# Patient Record
Sex: FEMALE | Race: BLACK OR AFRICAN AMERICAN | NOT HISPANIC OR LATINO | Employment: OTHER | URBAN - METROPOLITAN AREA
[De-identification: names, ages, dates, MRNs, and addresses within clinical notes are randomized per-mention and may not be internally consistent; named-entity substitution may affect disease eponyms.]

---

## 2023-03-25 PROBLEM — H52.203 MYOPIA OF BOTH EYES WITH ASTIGMATISM AND PRESBYOPIA: Status: ACTIVE | Noted: 2023-03-25

## 2023-03-25 PROBLEM — E66.812 OBESITY, CLASS II, BMI 35-39.9: Status: ACTIVE | Noted: 2023-03-25

## 2023-03-25 PROBLEM — S81.801A WOUND OF RIGHT LEG: Status: ACTIVE | Noted: 2023-03-25

## 2023-03-25 PROBLEM — D17.9 LIPOMA: Status: ACTIVE | Noted: 2023-03-25

## 2023-03-25 PROBLEM — I50.9 CHF, CHRONIC (MULTI): Status: ACTIVE | Noted: 2023-03-25

## 2023-03-25 PROBLEM — R80.9 MICROALBUMINURIA: Status: ACTIVE | Noted: 2023-03-25

## 2023-03-25 PROBLEM — R07.9 CHEST PAIN AT REST: Status: ACTIVE | Noted: 2023-03-25

## 2023-03-25 PROBLEM — R53.83 FATIGUE: Status: ACTIVE | Noted: 2023-03-25

## 2023-03-25 PROBLEM — N90.89 SKIN TAG OF LABIA: Status: ACTIVE | Noted: 2023-03-25

## 2023-03-25 PROBLEM — I25.10 CAD (CORONARY ARTERY DISEASE): Status: ACTIVE | Noted: 2023-03-25

## 2023-03-25 PROBLEM — M25.562 KNEE PAIN, BILATERAL: Status: ACTIVE | Noted: 2023-03-25

## 2023-03-25 PROBLEM — M13.0 POLYARTHRITIS: Status: ACTIVE | Noted: 2023-03-25

## 2023-03-25 PROBLEM — R05.3 COUGH, PERSISTENT: Status: ACTIVE | Noted: 2023-03-25

## 2023-03-25 PROBLEM — L28.0 LICHEN SIMPLEX CHRONICUS: Status: ACTIVE | Noted: 2023-03-25

## 2023-03-25 PROBLEM — L85.3 XEROSIS OF SKIN: Status: ACTIVE | Noted: 2023-03-25

## 2023-03-25 PROBLEM — H52.4 MYOPIA OF BOTH EYES WITH ASTIGMATISM AND PRESBYOPIA: Status: ACTIVE | Noted: 2023-03-25

## 2023-03-25 PROBLEM — I25.10 ARTERIOSCLEROTIC CARDIOVASCULAR DISEASE (ASCVD): Status: ACTIVE | Noted: 2023-03-25

## 2023-03-25 PROBLEM — G50.1 ATYPICAL FACE PAIN: Status: ACTIVE | Noted: 2023-03-25

## 2023-03-25 PROBLEM — G89.4 PAIN SYNDROME, CHRONIC: Status: ACTIVE | Noted: 2023-03-25

## 2023-03-25 PROBLEM — L28.0 NEURODERMATITIS: Status: ACTIVE | Noted: 2023-03-25

## 2023-03-25 PROBLEM — R60.0 BILATERAL EDEMA OF LOWER EXTREMITY: Status: ACTIVE | Noted: 2023-03-25

## 2023-03-25 PROBLEM — E55.9 VITAMIN D DEFICIENCY, UNSPECIFIED: Status: ACTIVE | Noted: 2023-03-25

## 2023-03-25 PROBLEM — N76.0 VAGINITIS: Status: ACTIVE | Noted: 2023-03-25

## 2023-03-25 PROBLEM — D25.9 FIBROID UTERUS: Status: ACTIVE | Noted: 2023-03-25

## 2023-03-25 PROBLEM — G47.00 INSOMNIA: Status: ACTIVE | Noted: 2023-03-25

## 2023-03-25 PROBLEM — R06.02 SHORTNESS OF BREATH: Status: ACTIVE | Noted: 2023-03-25

## 2023-03-25 PROBLEM — F32.A DEPRESSION: Status: ACTIVE | Noted: 2023-03-25

## 2023-03-25 PROBLEM — R47.9 DIFFICULTY SPEAKING: Status: ACTIVE | Noted: 2023-03-25

## 2023-03-25 PROBLEM — M25.562 LATERAL KNEE PAIN, LEFT: Status: ACTIVE | Noted: 2023-03-25

## 2023-03-25 PROBLEM — G47.33 OBSTRUCTIVE SLEEP APNEA: Status: ACTIVE | Noted: 2023-03-25

## 2023-03-25 PROBLEM — S91.309A WOUND OF FOOT: Status: ACTIVE | Noted: 2023-03-25

## 2023-03-25 PROBLEM — E11.29 TYPE II OR UNSPECIFIED TYPE DIABETES MELLITUS WITH RENAL MANIFESTATIONS, UNCONTROLLED(250.42) (MULTI): Status: ACTIVE | Noted: 2023-03-25

## 2023-03-25 PROBLEM — S91.301A WOUND OF RIGHT FOOT: Status: ACTIVE | Noted: 2023-03-25

## 2023-03-25 PROBLEM — D25.9 UTERINE FIBROID: Status: ACTIVE | Noted: 2023-03-25

## 2023-03-25 PROBLEM — K21.9 GERD (GASTROESOPHAGEAL REFLUX DISEASE): Status: ACTIVE | Noted: 2023-03-25

## 2023-03-25 PROBLEM — L97.909 DIABETIC LEG ULCER (MULTI): Status: ACTIVE | Noted: 2023-03-25

## 2023-03-25 PROBLEM — R06.09 DYSPNEA ON EXERTION: Status: ACTIVE | Noted: 2023-03-25

## 2023-03-25 PROBLEM — A59.9 TRICHIMONIASIS: Status: ACTIVE | Noted: 2023-03-25

## 2023-03-25 PROBLEM — R23.4: Status: ACTIVE | Noted: 2023-03-25

## 2023-03-25 PROBLEM — E66.01 MORBID OBESITY (MULTI): Status: ACTIVE | Noted: 2023-03-25

## 2023-03-25 PROBLEM — R19.5 LOOSE STOOLS: Status: ACTIVE | Noted: 2023-03-25

## 2023-03-25 PROBLEM — Z86.2 HISTORY OF ANEMIA: Status: ACTIVE | Noted: 2023-03-25

## 2023-03-25 PROBLEM — E66.9 OBESITY, CLASS II, BMI 35-39.9: Status: ACTIVE | Noted: 2023-03-25

## 2023-03-25 PROBLEM — N13.4 HYDROURETER ON LEFT: Status: ACTIVE | Noted: 2023-03-25

## 2023-03-25 PROBLEM — B37.31 VAGINAL CANDIDA: Status: ACTIVE | Noted: 2023-03-25

## 2023-03-25 PROBLEM — Z98.84 BARIATRIC SURGERY STATUS: Status: ACTIVE | Noted: 2023-03-25

## 2023-03-25 PROBLEM — R60.0 LOCALIZED EDEMA: Status: ACTIVE | Noted: 2023-03-25

## 2023-03-25 PROBLEM — R25.2 CRAMP AND SPASM: Status: ACTIVE | Noted: 2023-03-25

## 2023-03-25 PROBLEM — L30.8 PRURITIC DERMATITIS: Status: ACTIVE | Noted: 2023-03-25

## 2023-03-25 PROBLEM — E04.1 THYROID NODULE: Status: ACTIVE | Noted: 2023-03-25

## 2023-03-25 PROBLEM — R06.83 SNORING: Status: ACTIVE | Noted: 2023-03-25

## 2023-03-25 PROBLEM — E11.622 DIABETIC LEG ULCER (MULTI): Status: ACTIVE | Noted: 2023-03-25

## 2023-03-25 PROBLEM — E11.65 TYPE II OR UNSPECIFIED TYPE DIABETES MELLITUS WITH RENAL MANIFESTATIONS, UNCONTROLLED(250.42) (MULTI): Status: ACTIVE | Noted: 2023-03-25

## 2023-03-25 PROBLEM — N93.9 ABNORMAL UTERINE BLEEDING: Status: ACTIVE | Noted: 2023-03-25

## 2023-03-25 PROBLEM — G60.9 IDIOPATHIC PERIPHERAL NEUROPATHY: Status: ACTIVE | Noted: 2023-03-25

## 2023-03-25 PROBLEM — F41.9 ANXIETY: Status: ACTIVE | Noted: 2023-03-25

## 2023-03-25 PROBLEM — H40.1190 PRIMARY OPEN ANGLE GLAUCOMA: Status: ACTIVE | Noted: 2023-03-25

## 2023-03-25 PROBLEM — R29.6 MULTIPLE FALLS: Status: ACTIVE | Noted: 2023-03-25

## 2023-03-25 PROBLEM — R79.89 ELEVATED PARATHYROID HORMONE: Status: ACTIVE | Noted: 2023-03-25

## 2023-03-25 PROBLEM — L40.9 PSORIASIS: Status: ACTIVE | Noted: 2023-03-25

## 2023-03-25 PROBLEM — S81.802A WOUND OF LEFT LEG: Status: ACTIVE | Noted: 2023-03-25

## 2023-03-25 PROBLEM — L81.9 DISCOLORATION OF SKIN OF LOWER LEG: Status: ACTIVE | Noted: 2023-03-25

## 2023-03-25 PROBLEM — R21 RASH/SKIN ERUPTION: Status: ACTIVE | Noted: 2023-03-25

## 2023-03-25 PROBLEM — M25.561 KNEE PAIN, BILATERAL: Status: ACTIVE | Noted: 2023-03-25

## 2023-03-25 PROBLEM — G47.30 SLEEP DISORDER BREATHING: Status: ACTIVE | Noted: 2023-03-25

## 2023-03-25 PROBLEM — G47.30 SLEEP APNEA: Status: ACTIVE | Noted: 2023-03-25

## 2023-03-25 PROBLEM — D25.9 LEIOMYOMA OF UTERUS: Status: ACTIVE | Noted: 2023-03-25

## 2023-03-25 PROBLEM — H33.41 RETINAL DETACHMENT, TRACTIONAL, RIGHT EYE: Status: ACTIVE | Noted: 2023-03-25

## 2023-03-25 PROBLEM — K76.0 NAFLD (NONALCOHOLIC FATTY LIVER DISEASE): Status: ACTIVE | Noted: 2023-03-25

## 2023-03-25 PROBLEM — M79.7 FIBROMYALGIA: Status: ACTIVE | Noted: 2023-03-25

## 2023-03-25 PROBLEM — Z86.39 HISTORY OF UNCONTROLLED DIABETES: Status: ACTIVE | Noted: 2023-03-25

## 2023-03-25 PROBLEM — H52.13 MYOPIA OF BOTH EYES WITH ASTIGMATISM AND PRESBYOPIA: Status: ACTIVE | Noted: 2023-03-25

## 2023-03-25 PROBLEM — E66.01 PSYCHOLOGICAL FACTORS AFFECTING MORBID OBESITY (MULTI): Status: ACTIVE | Noted: 2023-03-25

## 2023-03-25 PROBLEM — R30.0 DYSURIA: Status: ACTIVE | Noted: 2023-03-25

## 2023-03-25 PROBLEM — K59.09 OTHER CONSTIPATION: Status: ACTIVE | Noted: 2023-03-25

## 2023-03-25 PROBLEM — I73.9 PERIPHERAL VASCULAR DISEASE (CMS-HCC): Status: ACTIVE | Noted: 2023-03-25

## 2023-03-25 PROBLEM — H40.1131 PRIMARY OPEN ANGLE GLAUCOMA OF BOTH EYES, MILD STAGE: Status: ACTIVE | Noted: 2023-03-25

## 2023-03-25 PROBLEM — F54 PSYCHOLOGICAL FACTORS AFFECTING MORBID OBESITY (MULTI): Status: ACTIVE | Noted: 2023-03-25

## 2023-03-25 PROBLEM — L60.3 DYSTROPHIA UNGUIUM: Status: ACTIVE | Noted: 2023-03-25

## 2023-03-25 PROBLEM — Z95.5 PRESENCE OF DRUG COATED STENT IN RIGHT CORONARY ARTERY: Status: ACTIVE | Noted: 2023-03-25

## 2023-03-25 PROBLEM — I50.30 (HFPEF) HEART FAILURE WITH PRESERVED EJECTION FRACTION (MULTI): Status: ACTIVE | Noted: 2023-03-25

## 2023-03-25 PROBLEM — K46.0 ABDOMINAL HERNIA WITH OBSTRUCTION AND WITHOUT GANGRENE: Status: ACTIVE | Noted: 2023-03-25

## 2023-03-25 PROBLEM — E87.6 HYPOKALEMIA: Status: ACTIVE | Noted: 2023-03-25

## 2023-03-25 PROBLEM — R63.4 WEIGHT LOSS, UNINTENTIONAL: Status: ACTIVE | Noted: 2023-03-25

## 2023-03-25 PROBLEM — R10.2 FEMALE PELVIC PAIN: Status: ACTIVE | Noted: 2023-03-25

## 2023-03-25 PROBLEM — I20.0 UNSTABLE ANGINA (MULTI): Status: ACTIVE | Noted: 2023-03-25

## 2023-03-25 PROBLEM — K63.5 COLON POLYPS: Status: ACTIVE | Noted: 2023-03-25

## 2023-03-25 PROBLEM — F11.90 CHRONIC, CONTINUOUS USE OF OPIOIDS: Status: ACTIVE | Noted: 2023-03-25

## 2023-03-25 PROBLEM — I21.4 NSTEMI, INITIAL EPISODE OF CARE (MULTI): Status: ACTIVE | Noted: 2023-03-25

## 2023-03-25 PROBLEM — H35.61 RETINAL HEMORRHAGE OF RIGHT EYE: Status: ACTIVE | Noted: 2023-03-25

## 2023-03-25 PROBLEM — R51.9 HEADACHE: Status: ACTIVE | Noted: 2023-03-25

## 2023-03-25 PROBLEM — M54.50 LOWER BACK PAIN: Status: ACTIVE | Noted: 2023-03-25

## 2023-03-25 PROBLEM — N95.0 POSTMENOPAUSAL BLEEDING: Status: ACTIVE | Noted: 2023-03-25

## 2023-03-25 PROBLEM — N39.0 LOWER URINARY TRACT INFECTIOUS DISEASE: Status: ACTIVE | Noted: 2023-03-25

## 2023-03-25 PROBLEM — I10 HYPERTENSION, UNCONTROLLED: Status: ACTIVE | Noted: 2023-03-25

## 2023-03-25 PROBLEM — I16.0 ASYMPTOMATIC HYPERTENSIVE URGENCY: Status: ACTIVE | Noted: 2023-03-25

## 2023-03-25 PROBLEM — K31.84 GASTROPARESIS: Status: ACTIVE | Noted: 2023-03-25

## 2023-03-25 PROBLEM — Z98.61 S/P PTCA (PERCUTANEOUS TRANSLUMINAL CORONARY ANGIOPLASTY): Status: ACTIVE | Noted: 2023-03-25

## 2023-03-25 RX ORDER — ATORVASTATIN CALCIUM 80 MG/1
1 TABLET, FILM COATED ORAL DAILY
COMMUNITY
Start: 2015-10-13 | End: 2023-05-01 | Stop reason: SDUPTHER

## 2023-03-25 RX ORDER — NIFEDIPINE 90 MG/1
TABLET, FILM COATED, EXTENDED RELEASE ORAL
COMMUNITY
Start: 2023-01-19 | End: 2023-09-24 | Stop reason: ALTCHOICE

## 2023-03-25 RX ORDER — IBUPROFEN 200 MG
CAPSULE ORAL
COMMUNITY
Start: 2019-04-02 | End: 2023-05-11 | Stop reason: SDUPTHER

## 2023-03-25 RX ORDER — CARVEDILOL 25 MG/1
1 TABLET ORAL 2 TIMES DAILY
COMMUNITY
Start: 2018-09-14 | End: 2023-09-24 | Stop reason: ALTCHOICE

## 2023-03-25 RX ORDER — METOCLOPRAMIDE 5 MG/1
5 TABLET ORAL 2 TIMES DAILY
COMMUNITY
Start: 2021-03-05 | End: 2023-09-24 | Stop reason: ALTCHOICE

## 2023-03-25 RX ORDER — NITROGLYCERIN 0.4 MG/1
TABLET SUBLINGUAL
COMMUNITY
Start: 2016-07-20

## 2023-03-25 RX ORDER — FUROSEMIDE 40 MG/1
1 TABLET ORAL DAILY
COMMUNITY
Start: 2018-06-18 | End: 2023-05-01 | Stop reason: SDUPTHER

## 2023-03-25 RX ORDER — BRIMONIDINE TARTRATE 2 MG/ML
1 SOLUTION/ DROPS OPHTHALMIC 2 TIMES DAILY
COMMUNITY
Start: 2017-01-21 | End: 2023-09-24 | Stop reason: ALTCHOICE

## 2023-03-25 RX ORDER — DULOXETIN HYDROCHLORIDE 30 MG/1
30 CAPSULE, DELAYED RELEASE ORAL 2 TIMES DAILY
COMMUNITY
End: 2024-03-22

## 2023-03-25 RX ORDER — ONDANSETRON 4 MG/1
TABLET, ORALLY DISINTEGRATING ORAL
COMMUNITY
Start: 2021-07-13 | End: 2023-09-24 | Stop reason: ALTCHOICE

## 2023-03-25 RX ORDER — HYDROXYZINE HYDROCHLORIDE 25 MG/1
25 TABLET, FILM COATED ORAL 2 TIMES DAILY PRN
Status: ON HOLD | COMMUNITY
Start: 2016-02-19 | End: 2024-06-08

## 2023-03-25 RX ORDER — INSULIN LISPRO 100 [IU]/ML
INJECTION, SOLUTION INTRAVENOUS; SUBCUTANEOUS
COMMUNITY
Start: 2020-11-08 | End: 2023-09-24 | Stop reason: ALTCHOICE

## 2023-03-25 RX ORDER — NAPROXEN SODIUM 220 MG/1
81 TABLET, FILM COATED ORAL
COMMUNITY
Start: 2018-01-20 | End: 2023-05-01 | Stop reason: SDUPTHER

## 2023-03-25 RX ORDER — AMMONIUM LACTATE 5 %
LOTION (GRAM) TOPICAL
COMMUNITY
Start: 2016-02-19 | End: 2023-09-24 | Stop reason: ALTCHOICE

## 2023-03-25 RX ORDER — LOSARTAN POTASSIUM 100 MG/1
1 TABLET ORAL DAILY
COMMUNITY
Start: 2019-01-15 | End: 2023-05-01 | Stop reason: SDUPTHER

## 2023-03-25 RX ORDER — MUPIROCIN 20 MG/G
OINTMENT TOPICAL
COMMUNITY
Start: 2017-07-13 | End: 2023-09-24 | Stop reason: ALTCHOICE

## 2023-03-25 RX ORDER — NALOXONE HYDROCHLORIDE 4 MG/.1ML
4 SPRAY NASAL AS NEEDED
COMMUNITY
End: 2024-03-21 | Stop reason: ALTCHOICE

## 2023-03-25 RX ORDER — DOCUSATE SODIUM 100 MG/1
100 CAPSULE, LIQUID FILLED ORAL 2 TIMES DAILY PRN
COMMUNITY
Start: 2021-03-05 | End: 2023-09-24 | Stop reason: ALTCHOICE

## 2023-03-25 RX ORDER — LATANOPROST 50 UG/ML
1 SOLUTION/ DROPS OPHTHALMIC NIGHTLY
COMMUNITY
Start: 2017-01-21 | End: 2023-10-20 | Stop reason: SDUPTHER

## 2023-03-25 RX ORDER — HEATING PAD
EACH MISCELLANEOUS
COMMUNITY
Start: 2020-12-21 | End: 2023-09-24 | Stop reason: ALTCHOICE

## 2023-03-25 RX ORDER — INSULIN GLARGINE 300 U/ML
INJECTION, SOLUTION SUBCUTANEOUS
COMMUNITY
Start: 2016-03-04 | End: 2023-09-24 | Stop reason: ALTCHOICE

## 2023-03-25 RX ORDER — TRAZODONE HYDROCHLORIDE 50 MG/1
1 TABLET ORAL NIGHTLY PRN
COMMUNITY
Start: 2016-11-28 | End: 2023-09-24 | Stop reason: ALTCHOICE

## 2023-03-25 RX ORDER — LYSINE HCL 500 MG
2 TABLET ORAL DAILY
COMMUNITY
Start: 2017-04-05 | End: 2023-05-01 | Stop reason: SDUPTHER

## 2023-03-25 RX ORDER — OXYCODONE AND ACETAMINOPHEN 5; 325 MG/1; MG/1
1 TABLET ORAL DAILY PRN
COMMUNITY
Start: 2016-06-15 | End: 2023-03-30 | Stop reason: SDUPTHER

## 2023-03-25 RX ORDER — LANCETS
1 EACH MISCELLANEOUS 2 TIMES DAILY
COMMUNITY
End: 2023-09-24 | Stop reason: ALTCHOICE

## 2023-03-27 ENCOUNTER — PATIENT OUTREACH (OUTPATIENT)
Dept: CARE COORDINATION | Facility: CLINIC | Age: 63
End: 2023-03-27
Payer: COMMERCIAL

## 2023-03-29 ENCOUNTER — PATIENT OUTREACH (OUTPATIENT)
Dept: CARE COORDINATION | Facility: CLINIC | Age: 63
End: 2023-03-29
Payer: COMMERCIAL

## 2023-03-30 ENCOUNTER — OFFICE VISIT (OUTPATIENT)
Dept: PRIMARY CARE | Facility: CLINIC | Age: 63
End: 2023-03-30
Payer: COMMERCIAL

## 2023-03-30 VITALS
DIASTOLIC BLOOD PRESSURE: 65 MMHG | TEMPERATURE: 96.3 F | SYSTOLIC BLOOD PRESSURE: 127 MMHG | BODY MASS INDEX: 39.69 KG/M2 | HEART RATE: 75 BPM | HEIGHT: 59 IN | OXYGEN SATURATION: 96 %

## 2023-03-30 DIAGNOSIS — F11.90 OPIOID USE: Primary | ICD-10-CM

## 2023-03-30 DIAGNOSIS — G89.4 CHRONIC PAIN SYNDROME: ICD-10-CM

## 2023-03-30 PROCEDURE — 3046F HEMOGLOBIN A1C LEVEL >9.0%: CPT | Performed by: STUDENT IN AN ORGANIZED HEALTH CARE EDUCATION/TRAINING PROGRAM

## 2023-03-30 PROCEDURE — 3074F SYST BP LT 130 MM HG: CPT | Performed by: STUDENT IN AN ORGANIZED HEALTH CARE EDUCATION/TRAINING PROGRAM

## 2023-03-30 PROCEDURE — 3066F NEPHROPATHY DOC TX: CPT | Performed by: STUDENT IN AN ORGANIZED HEALTH CARE EDUCATION/TRAINING PROGRAM

## 2023-03-30 PROCEDURE — 3008F BODY MASS INDEX DOCD: CPT | Performed by: STUDENT IN AN ORGANIZED HEALTH CARE EDUCATION/TRAINING PROGRAM

## 2023-03-30 PROCEDURE — 3078F DIAST BP <80 MM HG: CPT | Performed by: STUDENT IN AN ORGANIZED HEALTH CARE EDUCATION/TRAINING PROGRAM

## 2023-03-30 PROCEDURE — 99214 OFFICE O/P EST MOD 30 MIN: CPT | Performed by: STUDENT IN AN ORGANIZED HEALTH CARE EDUCATION/TRAINING PROGRAM

## 2023-03-30 PROCEDURE — 4010F ACE/ARB THERAPY RXD/TAKEN: CPT | Performed by: STUDENT IN AN ORGANIZED HEALTH CARE EDUCATION/TRAINING PROGRAM

## 2023-03-30 RX ORDER — OXYCODONE AND ACETAMINOPHEN 5; 325 MG/1; MG/1
1 TABLET ORAL EVERY 12 HOURS PRN
Qty: 28 TABLET | Refills: 0 | Status: SHIPPED | OUTPATIENT
Start: 2023-03-30 | End: 2023-04-13

## 2023-03-30 ASSESSMENT — PAIN SCALES - GENERAL: PAINLEVEL: 10-WORST PAIN EVER

## 2023-03-30 NOTE — PROGRESS NOTES
"Subjective   Patient ID: Nelida Mata is a 63 y.o. female who presents to family medicine clinic for hospital Follow-up s/p acute exacerbation of acute heart failure due to missed diuretic dose.     HPI     Patient was admitted to hospital 18-March-2023 for acute exacerbation of heart failure due to missed diurectic dose, accompanied by severe b/l lower extremity edema. Patient reports greatly improved lower extremity swelling since discharge.     Patient reports mild \"puffiness\" of feet and ankles which she reports is at her baseline from prior to most recent hospitalization. She denies cough. She reports limited ability to tolerate supine position, requiring her to sleep elevated with 3 pillows, however, she reports this was her baseline prior to most recent hospitalization.    Patient reporting significant pain in abdomen from uterine fibroids, lower back, and around pelvis from chronic pelvic pain. She reports \"They only gave me 14 Oxycodones at discharge which is not going to be enough for me. I can't take this pain no more.\"     Confirmed with patient she IS taking Furosemide BID, and NOT taking Nifedipine or Trazodone, as per discharge instructions.     Review of Systems negative except for above.    Objective   /65 (BP Location: Right arm, Patient Position: Sitting, BP Cuff Size: Adult)   Pulse 75   Temp 35.7 °C (96.3 °F)   Ht 1.499 m (4' 11\")   SpO2 96%   BMI 39.69 kg/m²     Physical Exam  General: Obese patient sitting in chair in no acute distress, though reports significant pain.  Lungs: CTAB, no increased work of breathing noted.  Heart: Systolic murmur with loud S2 sound present on ausculation.   Lower Extremity Exam: bilateral generalized mild edema of feet, ankles, and shins. 1+ pitting edema bilaterally with mild overlying erythema of skin.     Assessment/Plan   Nelida Mata is a 63 y.o. female presenting to family medicine clinic for hospital follow-up s/p acute " Noted.   exacerbation of acute heart failure. Patient showing improvement of lower extremity edema and is back to baseline, however, patient struggling with significant chronic pain issues and is need of Rx refill.     #Bilateral lower extremity edema  Likely 2/2 to hx of CHF  Last echo 3/23 with Normal EF diastolic dysfunction   - Back to pre-hospitalization baseline per assessment in clinic today   - Recommend continuing current regimen and following up with cardiology and usual care team.     #Chronic pain  Likely 2/2 to hx of fibroid uterus   - Rx Oxycodone/Acetaminophen 5/325 q12h for 14 days until patient can be seen by  who is the primary care provider managing chronic pain and narcotic Rx.   - PDMP reviewed  - encouraged Obgyn follow up for consideration of surgical management  - encoruaged vascular fu with anticoagulant management post surgical      Patient seen and discussed with resident Dr. Blanca Holguni and attending Dr. Africa Godwin, medical student    I saw and evaluated the patient. I personally obtained the key and critical portions of the history and physical exam. I reviewed and modified the student's documentation of the HPI and discussed the patient with the medical student. I agree with the above documentation of the HPI.    Blanca Holguin MD  Family Medicine, PGY-2

## 2023-03-31 ENCOUNTER — APPOINTMENT (OUTPATIENT)
Dept: PRIMARY CARE | Facility: CLINIC | Age: 63
End: 2023-03-31
Payer: COMMERCIAL

## 2023-03-31 DIAGNOSIS — L28.0 LICHEN SIMPLEX CHRONICUS: Primary | ICD-10-CM

## 2023-03-31 LAB
HAPTOGLOBIN (MG/DL) IN SER/PLAS: 244 MG/DL (ref 30–200)
LACTATE DEHYDROGENASE (U/L) IN SER/PLAS BY LAC->PYR RXN: 200 U/L (ref 84–246)

## 2023-03-31 RX ORDER — TRIAMCINOLONE ACETONIDE 1 MG/G
OINTMENT TOPICAL 2 TIMES DAILY PRN
Qty: 60 G | Refills: 0 | Status: SHIPPED | OUTPATIENT
Start: 2023-03-31 | End: 2023-07-29

## 2023-03-31 NOTE — PROGRESS NOTES
I saw and evaluated the patient. I personally obtained the key and critical portions of the history and physical exam or was physically present for key and critical portions performed by the resident/fellow. I reviewed the resident/fellow's documentation and discussed the patient with the resident/fellow. I agree with the resident/fellow's medical decision making as documented in the note with the exception/addition of the following:  She is advised to take an extra dose of diuretic (furosemide) if she starts to develop increased edema, and to contact her physician's office then.  She recognized this advice from prior encounters, but had not followed it prior to her recent hospitalization.  Had missed some doses of diuretic.    She has had chronic pain opioid agreements for lower abdominal (fibroid uterus) pain intermittently since at least 2018.  Recently, was not a surgical candidate because of SVC thrombus, anticoagulated.  She has upcoming consultation with vascular medicine regarding duration of anticoagulation.  (They have recommended indefinite for now.)  Dr. Ventura's office note says that opioid agreement was renewed in October 2022, although the AEMR seems to contain a most recent opioid consent from October 2021.   WE decided to renew the stable dose of oxycodone 5 mg BID prn for 1 month, until her upcoming visit with her primary prescribing physician, Dr. Ventura.    UDS ordered to F/U on positive screen for barbiturates in past.   She denies use of any illicit drugs or non-prescribed pills.     Lichen simplex chronicus:  She has serpiginous, purplish-brown skin lesions on, for example, her r. Shoulder and chest.  Today there are small ulcers related to excoriations, but no yellowish crusts nor thick scale.  The lesions were previously biopsied, consistent with either eczema or psoriasis.  Dermatology consultation gave a diagnosis of lichen simplex chronicus.  She would like a refill of topical steroid  cream to reduce the itching.  I agree.     .          Africa Joe MD

## 2023-04-02 LAB
IMMUNOGLOBULIN LIGHT CHAINS KAPPA/LAMBDA (MASS RATIO) IN SERUM: 2.1 (ref 0.26–1.65)
IMMUNOGLOBULIN LIGHT CHAINS.KAPPA (MG/DL) IN SERUM: 4.39 MG/DL (ref 0.33–1.94)
IMMUNOGLOBULIN LIGHT CHAINS.LAMBDA (MG/DL) IN SERUM: 2.09 MG/DL (ref 0.57–2.63)
Lab: NORMAL
PNH RESULTS: NEGATIVE
PV191: NORMAL

## 2023-04-06 LAB
ALBUMIN ELP: 3.8 G/DL (ref 3.4–5)
ALPHA 1: 0.3 G/DL (ref 0.2–0.6)
ALPHA 2: 1 G/DL (ref 0.4–1.1)
BETA: 0.8 G/DL (ref 0.5–1.2)
GAMMA GLOBULIN: 1.1 G/DL (ref 0.5–1.4)
PATH REVIEW - SERUM IMMUNOFIXATION: NORMAL
PATH REVIEW-SERUM PROTEIN ELECTROPHORESIS: NORMAL
PROTEIN ELECTROPHORESIS INTERPRETATION: NORMAL
PROTEIN TOTAL: 7 G/DL (ref 6.4–8.2)
SERUM IMMUNOFIXATION INTERPRETATION: NORMAL

## 2023-04-28 ENCOUNTER — APPOINTMENT (OUTPATIENT)
Dept: PRIMARY CARE | Facility: CLINIC | Age: 63
End: 2023-04-28
Payer: COMMERCIAL

## 2023-05-01 DIAGNOSIS — I10 HYPERTENSION, UNCONTROLLED: ICD-10-CM

## 2023-05-01 DIAGNOSIS — I25.10 ARTERIOSCLEROTIC CARDIOVASCULAR DISEASE (ASCVD): ICD-10-CM

## 2023-05-01 DIAGNOSIS — I50.32 CHRONIC HEART FAILURE WITH PRESERVED EJECTION FRACTION (MULTI): Primary | ICD-10-CM

## 2023-05-01 DIAGNOSIS — K59.04 CHRONIC IDIOPATHIC CONSTIPATION: ICD-10-CM

## 2023-05-01 DIAGNOSIS — E55.9 VITAMIN D DEFICIENCY, UNSPECIFIED: ICD-10-CM

## 2023-05-01 DIAGNOSIS — I50.32 CHRONIC HEART FAILURE WITH PRESERVED EJECTION FRACTION (MULTI): ICD-10-CM

## 2023-05-01 RX ORDER — LOSARTAN POTASSIUM 100 MG/1
100 TABLET ORAL DAILY
Qty: 90 TABLET | Refills: 3 | Status: SHIPPED | OUTPATIENT
Start: 2023-05-01 | End: 2023-05-01 | Stop reason: SDUPTHER

## 2023-05-01 RX ORDER — ATORVASTATIN CALCIUM 80 MG/1
80 TABLET, FILM COATED ORAL DAILY
Qty: 90 TABLET | Refills: 3 | Status: SHIPPED | OUTPATIENT
Start: 2023-05-01 | End: 2023-05-01 | Stop reason: SDUPTHER

## 2023-05-01 RX ORDER — NAPROXEN SODIUM 220 MG/1
81 TABLET, FILM COATED ORAL
Qty: 90 TABLET | Refills: 3 | Status: SHIPPED | OUTPATIENT
Start: 2023-05-01 | End: 2023-05-01 | Stop reason: SDUPTHER

## 2023-05-01 RX ORDER — AMOXICILLIN 250 MG
1 CAPSULE ORAL DAILY
Qty: 30 TABLET | Refills: 11 | Status: SHIPPED | OUTPATIENT
Start: 2023-05-01 | End: 2023-05-01 | Stop reason: SDUPTHER

## 2023-05-01 RX ORDER — ATORVASTATIN CALCIUM 80 MG/1
80 TABLET, FILM COATED ORAL DAILY
Qty: 90 TABLET | Refills: 3 | Status: SHIPPED | OUTPATIENT
Start: 2023-05-01 | End: 2023-05-01

## 2023-05-01 RX ORDER — LYSINE HCL 500 MG
1 TABLET ORAL DAILY
Qty: 90 EACH | Refills: 3 | Status: SHIPPED | OUTPATIENT
Start: 2023-05-01

## 2023-05-01 RX ORDER — FUROSEMIDE 40 MG/1
40 TABLET ORAL DAILY
Qty: 90 TABLET | Refills: 3 | Status: SHIPPED | OUTPATIENT
Start: 2023-05-01 | End: 2023-05-01 | Stop reason: SDUPTHER

## 2023-05-01 RX ORDER — NAPROXEN SODIUM 220 MG/1
81 TABLET, FILM COATED ORAL
Qty: 90 TABLET | Refills: 3 | Status: SHIPPED | OUTPATIENT
Start: 2023-05-01 | End: 2023-05-01

## 2023-05-01 RX ORDER — FUROSEMIDE 40 MG/1
40 TABLET ORAL DAILY
Qty: 90 TABLET | Refills: 3 | Status: SHIPPED | OUTPATIENT
Start: 2023-05-01 | End: 2023-09-24 | Stop reason: ALTCHOICE

## 2023-05-01 RX ORDER — AMOXICILLIN 250 MG
1 CAPSULE ORAL DAILY
Qty: 30 TABLET | Refills: 11 | Status: SHIPPED | OUTPATIENT
Start: 2023-05-01 | End: 2023-05-01

## 2023-05-01 RX ORDER — LYSINE HCL 500 MG
1 TABLET ORAL DAILY
Qty: 90 EACH | Refills: 3 | Status: SHIPPED | OUTPATIENT
Start: 2023-05-01 | End: 2023-05-01 | Stop reason: SDUPTHER

## 2023-05-01 RX ORDER — LOSARTAN POTASSIUM 100 MG/1
100 TABLET ORAL DAILY
Qty: 90 TABLET | Refills: 3 | Status: SHIPPED | OUTPATIENT
Start: 2023-05-01 | End: 2023-09-24 | Stop reason: ALTCHOICE

## 2023-05-01 NOTE — PROGRESS NOTES
Refilling medications per patient request:    Statin  ASA 81 mg CVD hx  ARB, loop diuretic  Ca-VitD  Senna-docusate

## 2023-05-10 ENCOUNTER — TELEPHONE (OUTPATIENT)
Dept: PRIMARY CARE | Facility: CLINIC | Age: 63
End: 2023-05-10
Payer: COMMERCIAL

## 2023-05-11 DIAGNOSIS — E11.29 TYPE II OR UNSPECIFIED TYPE DIABETES MELLITUS WITH RENAL MANIFESTATIONS, UNCONTROLLED(250.42) (MULTI): Primary | ICD-10-CM

## 2023-05-11 DIAGNOSIS — E11.65 TYPE II OR UNSPECIFIED TYPE DIABETES MELLITUS WITH RENAL MANIFESTATIONS, UNCONTROLLED(250.42) (MULTI): Primary | ICD-10-CM

## 2023-05-11 RX ORDER — IBUPROFEN 200 MG
CAPSULE ORAL
Qty: 100 STRIP | Refills: 3 | Status: SHIPPED | OUTPATIENT
Start: 2023-05-11 | End: 2023-11-20

## 2023-05-19 ENCOUNTER — APPOINTMENT (OUTPATIENT)
Dept: PRIMARY CARE | Facility: CLINIC | Age: 63
End: 2023-05-19
Payer: COMMERCIAL

## 2023-05-30 ENCOUNTER — APPOINTMENT (OUTPATIENT)
Dept: PRIMARY CARE | Facility: CLINIC | Age: 63
End: 2023-05-30
Payer: COMMERCIAL

## 2023-06-03 DIAGNOSIS — E11.65 TYPE 2 DIABETES MELLITUS WITH HYPERGLYCEMIA (MULTI): ICD-10-CM

## 2023-06-03 DIAGNOSIS — Z79.4 LONG TERM (CURRENT) USE OF INSULIN (MULTI): ICD-10-CM

## 2023-06-07 RX ORDER — INSULIN GLARGINE 300 U/ML
INJECTION, SOLUTION SUBCUTANEOUS
Refills: 5 | OUTPATIENT
Start: 2023-06-07

## 2023-08-02 ENCOUNTER — APPOINTMENT (OUTPATIENT)
Dept: PRIMARY CARE | Facility: CLINIC | Age: 63
End: 2023-08-02
Payer: COMMERCIAL

## 2023-08-16 PROBLEM — E11.29 TYPE 2 DIABETES MELLITUS WITH RENAL COMPLICATION (MULTI): Status: ACTIVE | Noted: 2023-08-16

## 2023-08-16 PROBLEM — I82.210: Status: ACTIVE | Noted: 2023-08-16

## 2023-08-16 PROBLEM — Z89.421 ACQUIRED ABSENCE OF OTHER RIGHT TOE(S) (MULTI): Status: ACTIVE | Noted: 2020-12-08

## 2023-08-16 PROBLEM — F32.9 MAJOR DEPRESSIVE DISORDER, SINGLE EPISODE: Status: ACTIVE | Noted: 2020-11-20

## 2023-08-16 PROBLEM — I11.0 HYPERTENSIVE HEART DISEASE WITH HEART FAILURE (MULTI): Status: ACTIVE | Noted: 2020-11-20

## 2023-08-16 PROBLEM — E16.2 HYPOGLYCEMIA: Status: ACTIVE | Noted: 2021-06-20

## 2023-08-16 PROBLEM — E78.49 OTHER HYPERLIPIDEMIA: Status: ACTIVE | Noted: 2021-06-22

## 2023-08-16 PROBLEM — Z01.411 ABNORMAL GYNECOLOGICAL EXAMINATION: Status: ACTIVE | Noted: 2023-08-16

## 2023-08-16 RX ORDER — OXYCODONE AND ACETAMINOPHEN 5; 325 MG/1; MG/1
2 TABLET ORAL DAILY PRN
COMMUNITY
End: 2023-10-20 | Stop reason: ALTCHOICE

## 2023-08-16 RX ORDER — TRAMADOL HYDROCHLORIDE 50 MG/1
50 TABLET ORAL EVERY 6 HOURS PRN
COMMUNITY
Start: 2012-08-25 | End: 2023-09-24 | Stop reason: ALTCHOICE

## 2023-08-16 RX ORDER — ISOSORBIDE MONONITRATE 60 MG/1
TABLET, EXTENDED RELEASE ORAL
COMMUNITY
Start: 2023-07-02 | End: 2024-02-13

## 2023-08-16 RX ORDER — NYSTATIN 100000 [USP'U]/G
1 POWDER TOPICAL 3 TIMES DAILY PRN
COMMUNITY
Start: 2022-11-17 | End: 2023-09-24 | Stop reason: ALTCHOICE

## 2023-08-16 RX ORDER — NIFEDIPINE 60 MG/1
60 TABLET, EXTENDED RELEASE ORAL DAILY
COMMUNITY
Start: 2022-10-25 | End: 2023-09-24 | Stop reason: ALTCHOICE

## 2023-08-16 RX ORDER — POLYETHYLENE GLYCOL 3350 17 G/17G
POWDER, FOR SOLUTION ORAL DAILY PRN
Status: ON HOLD | COMMUNITY
Start: 2020-10-01 | End: 2024-06-08

## 2023-08-16 RX ORDER — DULOXETIN HYDROCHLORIDE 20 MG/1
30 CAPSULE, DELAYED RELEASE ORAL 2 TIMES DAILY
COMMUNITY
Start: 2023-07-02 | End: 2023-09-24 | Stop reason: ALTCHOICE

## 2023-08-16 RX ORDER — INSULIN GLARGINE 100 [IU]/ML
INJECTION, SOLUTION SUBCUTANEOUS
COMMUNITY
Start: 2023-03-24 | End: 2023-09-24 | Stop reason: ALTCHOICE

## 2023-08-16 RX ORDER — LANCETS 30 GAUGE
EACH MISCELLANEOUS
COMMUNITY
Start: 2023-07-02 | End: 2023-09-24 | Stop reason: ALTCHOICE

## 2023-08-16 RX ORDER — ESOMEPRAZOLE MAGNESIUM 40 MG/1
CAPSULE, DELAYED RELEASE ORAL
COMMUNITY
Start: 2023-07-02 | End: 2023-09-24 | Stop reason: ALTCHOICE

## 2023-08-16 RX ORDER — DOXYCYCLINE 100 MG/1
100 CAPSULE ORAL 2 TIMES DAILY
COMMUNITY
End: 2023-09-24 | Stop reason: ALTCHOICE

## 2023-08-16 RX ORDER — HYDRALAZINE HYDROCHLORIDE 25 MG/1
25 TABLET, FILM COATED ORAL 3 TIMES DAILY
COMMUNITY
Start: 2023-07-02 | End: 2023-11-27

## 2023-08-16 RX ORDER — ISOSORBIDE MONONITRATE 120 MG/1
1 TABLET, EXTENDED RELEASE ORAL DAILY
COMMUNITY
Start: 2022-09-22 | End: 2023-09-24 | Stop reason: ALTCHOICE

## 2023-08-16 RX ORDER — PANTOPRAZOLE SODIUM 40 MG/1
40 TABLET, DELAYED RELEASE ORAL DAILY
COMMUNITY
End: 2023-12-14

## 2023-08-16 RX ORDER — MULTIVITAMIN
1 TABLET ORAL DAILY
COMMUNITY
End: 2023-09-24 | Stop reason: ALTCHOICE

## 2023-08-16 RX ORDER — ASPIRIN 81 MG/1
162 TABLET ORAL DAILY
COMMUNITY
Start: 2020-09-30 | End: 2023-10-20 | Stop reason: SDUPTHER

## 2023-08-16 RX ORDER — CLOPIDOGREL BISULFATE 75 MG/1
75 TABLET ORAL DAILY
COMMUNITY
End: 2023-09-24 | Stop reason: ALTCHOICE

## 2023-08-16 RX ORDER — LANCETS 33 GAUGE
EACH MISCELLANEOUS
COMMUNITY
Start: 2023-03-24 | End: 2023-09-24 | Stop reason: ALTCHOICE

## 2023-08-16 RX ORDER — ISOPROPYL ALCOHOL 70 ML/100ML
SWAB TOPICAL
COMMUNITY
Start: 2023-07-02 | End: 2023-09-24 | Stop reason: ALTCHOICE

## 2023-08-16 RX ORDER — LOSARTAN POTASSIUM 25 MG/1
TABLET ORAL
COMMUNITY
Start: 2023-07-02 | End: 2023-09-24 | Stop reason: ALTCHOICE

## 2023-08-16 RX ORDER — FUROSEMIDE 40 MG/1
40 TABLET ORAL 2 TIMES DAILY PRN
COMMUNITY
Start: 2020-08-17 | End: 2023-09-24 | Stop reason: ALTCHOICE

## 2023-08-16 RX ORDER — ERGOCALCIFEROL 1.25 MG/1
50000 CAPSULE ORAL
COMMUNITY
Start: 2022-11-17 | End: 2023-09-24 | Stop reason: ALTCHOICE

## 2023-08-16 RX ORDER — LOSARTAN POTASSIUM 50 MG/1
TABLET ORAL
COMMUNITY
Start: 2023-03-23 | End: 2023-09-24 | Stop reason: ALTCHOICE

## 2023-08-16 RX ORDER — FLASH GLUCOSE SCANNING READER
EACH MISCELLANEOUS
COMMUNITY
End: 2023-09-24 | Stop reason: ALTCHOICE

## 2023-08-16 RX ORDER — CLOBETASOL PROPIONATE 0.5 MG/G
1 OINTMENT TOPICAL 2 TIMES DAILY
COMMUNITY
Start: 2022-11-17 | End: 2023-09-24 | Stop reason: ALTCHOICE

## 2023-09-11 PROBLEM — F33.40 RECURRENT MAJOR DEPRESSIVE DISORDER, IN REMISSION (CMS-HCC): Chronic | Status: ACTIVE | Noted: 2023-06-29

## 2023-09-11 PROBLEM — Z86.718 HISTORY OF DVT (DEEP VEIN THROMBOSIS): Chronic | Status: ACTIVE | Noted: 2023-06-29

## 2023-09-11 PROBLEM — E11.65 UNCONTROLLED TYPE 2 DIABETES MELLITUS WITH HYPERGLYCEMIA (MULTI): Status: ACTIVE | Noted: 2023-06-29

## 2023-09-11 PROBLEM — Z95.5 S/P CORONARY ARTERY STENT PLACEMENT: Chronic | Status: ACTIVE | Noted: 2023-06-29

## 2023-09-11 RX ORDER — SENNOSIDES 8.6 MG/1
8.6 TABLET ORAL DAILY
COMMUNITY
Start: 2023-07-02 | End: 2023-09-24 | Stop reason: ALTCHOICE

## 2023-09-13 ENCOUNTER — DOCUMENTATION (OUTPATIENT)
Dept: CARE COORDINATION | Facility: CLINIC | Age: 63
End: 2023-09-13
Payer: COMMERCIAL

## 2023-09-13 NOTE — PROGRESS NOTES
Admitted to [facility] 9/7/23 to 9/12/23 with STEMI.; status post cardiac cath without PCI.   PMH: HFpEF (EF 60-65% 3/2023), IDDM (A1c 9.0 in 9/2023), CAD s/p PCI w/ stent x3 (2016, 2018), SVC thrombus on Eliquis (dx 1/2023)   Refused rehab.  Home with [facility] ( start of care 1-3 days)   Home meds resumed,  Lasix reduced to 20 mg daily, start Hydral, hold Losartan.  Appts with cardiology 10/20, endocrine 10/4,  Schedule 1 week appt with PCP and with GYN.      Transition of care outreach completed by discharging facility.   Enrolled in conversa call to monitor for 30 day transition period and will outreach if issues arise.    Valentine MENDOZA RN CCM  RN Care Coordinator  [facility]  Phone [phone]

## 2023-09-18 ENCOUNTER — PATIENT OUTREACH (OUTPATIENT)
Dept: CARE COORDINATION | Facility: CLINIC | Age: 63
End: 2023-09-18
Payer: COMMERCIAL

## 2023-09-18 NOTE — PROGRESS NOTES
Attempted outreach to patient for ED visit 9/17/23 for dizziness and left sided neck pain..  Patient was directed to ED by home care nurse for bp 80's/40's; however, was  131/61 in ED.    Appt with PCP 9/19    Valentine MENDOZA RN CCM  RN Care Coordinator  [facility]  Phone [phone]

## 2023-09-19 ENCOUNTER — LAB (OUTPATIENT)
Dept: LAB | Facility: LAB | Age: 63
End: 2023-09-19
Payer: COMMERCIAL

## 2023-09-19 ENCOUNTER — OFFICE VISIT (OUTPATIENT)
Dept: PRIMARY CARE | Facility: CLINIC | Age: 63
End: 2023-09-19
Payer: COMMERCIAL

## 2023-09-19 VITALS
HEART RATE: 65 BPM | OXYGEN SATURATION: 97 % | TEMPERATURE: 97.3 F | BODY MASS INDEX: 43.26 KG/M2 | WEIGHT: 214.6 LBS | SYSTOLIC BLOOD PRESSURE: 119 MMHG | HEIGHT: 59 IN | DIASTOLIC BLOOD PRESSURE: 69 MMHG

## 2023-09-19 DIAGNOSIS — E11.65 TYPE 2 DIABETES MELLITUS WITH HYPERGLYCEMIA, WITH LONG-TERM CURRENT USE OF INSULIN (MULTI): Primary | ICD-10-CM

## 2023-09-19 DIAGNOSIS — G89.4 CHRONIC PAIN SYNDROME: ICD-10-CM

## 2023-09-19 DIAGNOSIS — I50.32 CHRONIC HEART FAILURE WITH PRESERVED EJECTION FRACTION (MULTI): ICD-10-CM

## 2023-09-19 DIAGNOSIS — Z79.4 TYPE 2 DIABETES MELLITUS WITH HYPERGLYCEMIA, WITH LONG-TERM CURRENT USE OF INSULIN (MULTI): ICD-10-CM

## 2023-09-19 DIAGNOSIS — I10 HYPERTENSION, UNCONTROLLED: ICD-10-CM

## 2023-09-19 DIAGNOSIS — F11.90 OPIOID USE: ICD-10-CM

## 2023-09-19 DIAGNOSIS — E11.65 TYPE 2 DIABETES MELLITUS WITH HYPERGLYCEMIA, WITH LONG-TERM CURRENT USE OF INSULIN (MULTI): ICD-10-CM

## 2023-09-19 DIAGNOSIS — Z79.4 TYPE 2 DIABETES MELLITUS WITH HYPERGLYCEMIA, WITH LONG-TERM CURRENT USE OF INSULIN (MULTI): Primary | ICD-10-CM

## 2023-09-19 LAB
ALBUMIN (G/DL) IN SER/PLAS: 4.2 G/DL (ref 3.4–5)
ANION GAP IN SER/PLAS: 15 MMOL/L (ref 10–20)
CALCIUM (MG/DL) IN SER/PLAS: 9.6 MG/DL (ref 8.6–10.6)
CARBON DIOXIDE, TOTAL (MMOL/L) IN SER/PLAS: 27 MMOL/L (ref 21–32)
CHLORIDE (MMOL/L) IN SER/PLAS: 104 MMOL/L (ref 98–107)
CREATININE (MG/DL) IN SER/PLAS: 2.23 MG/DL (ref 0.5–1.05)
GFR FEMALE: 24 ML/MIN/1.73M2
GLUCOSE (MG/DL) IN SER/PLAS: 288 MG/DL (ref 74–99)
PHOSPHATE (MG/DL) IN SER/PLAS: 3.9 MG/DL (ref 2.5–4.9)
POC FINGERSTICK BLOOD GLUCOSE: 265 MG/DL (ref 70–100)
POTASSIUM (MMOL/L) IN SER/PLAS: 4.5 MMOL/L (ref 3.5–5.3)
SODIUM (MMOL/L) IN SER/PLAS: 141 MMOL/L (ref 136–145)
UREA NITROGEN (MG/DL) IN SER/PLAS: 31 MG/DL (ref 6–23)

## 2023-09-19 PROCEDURE — 3052F HG A1C>EQUAL 8.0%<EQUAL 9.0%: CPT | Performed by: STUDENT IN AN ORGANIZED HEALTH CARE EDUCATION/TRAINING PROGRAM

## 2023-09-19 PROCEDURE — 80069 RENAL FUNCTION PANEL: CPT

## 2023-09-19 PROCEDURE — 99214 OFFICE O/P EST MOD 30 MIN: CPT | Performed by: STUDENT IN AN ORGANIZED HEALTH CARE EDUCATION/TRAINING PROGRAM

## 2023-09-19 PROCEDURE — 3074F SYST BP LT 130 MM HG: CPT | Performed by: STUDENT IN AN ORGANIZED HEALTH CARE EDUCATION/TRAINING PROGRAM

## 2023-09-19 PROCEDURE — 3066F NEPHROPATHY DOC TX: CPT | Performed by: STUDENT IN AN ORGANIZED HEALTH CARE EDUCATION/TRAINING PROGRAM

## 2023-09-19 PROCEDURE — 3008F BODY MASS INDEX DOCD: CPT | Performed by: STUDENT IN AN ORGANIZED HEALTH CARE EDUCATION/TRAINING PROGRAM

## 2023-09-19 PROCEDURE — 36415 COLL VENOUS BLD VENIPUNCTURE: CPT

## 2023-09-19 PROCEDURE — 82962 GLUCOSE BLOOD TEST: CPT | Performed by: STUDENT IN AN ORGANIZED HEALTH CARE EDUCATION/TRAINING PROGRAM

## 2023-09-19 PROCEDURE — 3078F DIAST BP <80 MM HG: CPT | Performed by: STUDENT IN AN ORGANIZED HEALTH CARE EDUCATION/TRAINING PROGRAM

## 2023-09-19 RX ORDER — TRAMADOL HYDROCHLORIDE 50 MG/1
50 TABLET ORAL EVERY 12 HOURS PRN
Qty: 60 TABLET | Refills: 0 | Status: SHIPPED | OUTPATIENT
Start: 2023-09-19 | End: 2023-09-19

## 2023-09-19 RX ORDER — TRAMADOL HYDROCHLORIDE 50 MG/1
50 TABLET ORAL EVERY 12 HOURS PRN
Qty: 14 TABLET | Refills: 0 | Status: SHIPPED | OUTPATIENT
Start: 2023-09-19 | End: 2023-09-26

## 2023-09-19 RX ORDER — FLASH GLUCOSE SENSOR
KIT MISCELLANEOUS
Qty: 1 EACH | Refills: 0 | Status: SHIPPED | OUTPATIENT
Start: 2023-09-19 | End: 2023-10-04 | Stop reason: ALTCHOICE

## 2023-09-19 RX ORDER — INSULIN GLARGINE 300 U/ML
40 INJECTION, SOLUTION SUBCUTANEOUS 2 TIMES DAILY
Qty: 7.8 ML | Refills: 11 | Status: SHIPPED | OUTPATIENT
Start: 2023-09-19 | End: 2024-04-05 | Stop reason: CLARIF

## 2023-09-19 ASSESSMENT — PATIENT HEALTH QUESTIONNAIRE - PHQ9
SUM OF ALL RESPONSES TO PHQ9 QUESTIONS 1 AND 2: 6
3. TROUBLE FALLING OR STAYING ASLEEP OR SLEEPING TOO MUCH: NOT AT ALL
4. FEELING TIRED OR HAVING LITTLE ENERGY: NEARLY EVERY DAY
1. LITTLE INTEREST OR PLEASURE IN DOING THINGS: NEARLY EVERY DAY
2. FEELING DOWN, DEPRESSED OR HOPELESS: NEARLY EVERY DAY

## 2023-09-19 ASSESSMENT — PAIN SCALES - GENERAL: PAINLEVEL: 8

## 2023-09-19 ASSESSMENT — ENCOUNTER SYMPTOMS
SHORTNESS OF BREATH: 1
NUMBNESS: 1
FEVER: 0
RHINORRHEA: 0
COUGH: 0
CONSTIPATION: 0
FATIGUE: 1
SORE THROAT: 0
DIARRHEA: 0
WOUND: 1
DEPRESSION: 1
NAUSEA: 0
VOMITING: 0
ABDOMINAL PAIN: 0

## 2023-09-19 ASSESSMENT — COLUMBIA-SUICIDE SEVERITY RATING SCALE - C-SSRS
6. HAVE YOU EVER DONE ANYTHING, STARTED TO DO ANYTHING, OR PREPARED TO DO ANYTHING TO END YOUR LIFE?: NO
2. HAVE YOU ACTUALLY HAD ANY THOUGHTS OF KILLING YOURSELF?: NO
1. IN THE PAST MONTH, HAVE YOU WISHED YOU WERE DEAD OR WISHED YOU COULD GO TO SLEEP AND NOT WAKE UP?: NO

## 2023-09-19 NOTE — PROGRESS NOTES
"Subjective   Patient ID: Nelida Mata is a 63 y.o. female who presents for Follow-up and Med Refill.    HPI    #ED follow up  - head/neck pain resolved  - dizziness/lightedness improved  - taking per    #DM  - 40u glargine  -22 {unit/units:3697893} before meals  - sugars checked 4x a day  - 160s-200s  - 319 this morning  - [facility] visit glucose 1160  - needs refill on long acting    #CKD Stage 3  - following with    #HFpEF  #MI    #    Review of Systems    Objective   /69 (BP Location: Left arm, Patient Position: Sitting) Comment (Patient Position): in a wheelchair  Pulse 65   Temp 36.3 °C (97.3 °F) (Temporal)   Ht 1.499 m (4' 11\")   Wt 97.3 kg (214 lb 9.6 oz)   SpO2 97%   BMI 43.34 kg/m²      Physical Exam     Assessment/Plan   Diagnoses and all orders for this visit:  Type 2 diabetes mellitus with hyperglycemia, with long-term current use of insulin (CMS/[facility])  -     POCT Fingerstick Glucose manually resulted  -     Renal function panel; Future  -     Referral to Podiatry; Future  -     Referral to Wound Clinic; Future  -     FreeStyle Filipe sensor system (FreeStyle Filipe 2 Sensor) kit; Use as instructed  -     insulin glargine (Toujeo Max U-300 SoloStar) 300 unit/mL (3 mL) injection; Inject 39 Units under the skin 2 times a day. Take as directed per insulin instructions.  -     traMADol (Ultram) 50 mg tablet; Take 1 tablet (50 mg) by mouth every 12 hours if needed for severe pain (7 - 10) for up to 7 days.  -     C-Peptide; Future  Chronic pain syndrome  -     traMADol (Ultram) 50 mg tablet; Take 1 tablet (50 mg) by mouth every 12 hours if needed for severe pain (7 - 10) for up to 7 days.  -     Drug Screen, Urine With Reflex to Confirmation; Future  Other orders  -     Follow Up In Primary Care - Established; Future  -     Follow Up In Primary Care      Follow up/Return to the clinic in {RTC:16262}    {Attending Supervision:14433::\"discussed\":1} with attending physician,  ***    Kassandra " MD Jessica  Family Medicine, PGY-3

## 2023-09-19 NOTE — PROGRESS NOTES
Subjective   Patient ID: Nelida Mata is a 63 y.o. female who presents for diabetes management and ED FUV (9/17).     HPI   Ms. Mata presents today following an ED visit at  on 9/17 for left sided head, neck, and chest pain as well as dizziness. In the ED, she was found to have blood pressures of 80s/40s. Troponin, BNP, and CBC were wnl. CMP showed elevated glucose of 210, elevated Cr of 2.62 and elevated BUN of 33. She had a CTA chest and head and neck which showed no PE, no acute pulmonary process, no stenosis of the vessels and no aneurysms. She was discharged with suspected MSK pain.    Today she is feeling much better since her ED visit. She states that the left neck and chest pain have largely resolved. She does not take any OTC NSAIDs or tylenol. She does take percocet as needed for pain management of her neck and chest pain. The dizziness and lightheadedness is also back to her baseline and has resolved.     In terms of diabetes management, she reports checking her blood glucose 4 times per day. She reports that her BG at home has been around 160-200 but can reach the 300s. This morning her BG was 319 fasting. She feels that they have been the highest after each meal. 2 months ago she had a complication with her DM where she went to [facility] for glucose readings over 1,000. She reports good adherence to her insulin regimen of 40 units glargine BID and 22 units lispro before each meal. She reports some episodes of hypoglycemia with BG of 60 where she feels hot, sweaty, and anxious. She would like a refill on her long acting insulin at this time.     Review of Systems   Constitutional:  Positive for fatigue. Negative for fever.   HENT:  Negative for rhinorrhea and sore throat.    Respiratory:  Positive for shortness of breath. Negative for cough.    Cardiovascular:  Negative for leg swelling.   Gastrointestinal:  Negative for abdominal pain, constipation, diarrhea, nausea and vomiting.   Skin:   "Positive for wound.   Neurological:  Positive for numbness.   Extremities: Positive for tingling and numbness in bilateral lower extremities      Objective   /69 (BP Location: Left arm, Patient Position: Sitting) Comment (Patient Position): in a wheelchair  Pulse 65   Temp 36.3 °C (97.3 °F) (Temporal)   Ht 1.499 m (4' 11\")   Wt 97.3 kg (214 lb 9.6 oz)   SpO2 97%   BMI 43.34 kg/m²     Physical Examination:  General: well-appearing, in no acute distress, pleasant  Cardio: RRR with normal S1/S2, no murmurs, rubs, or gallops, however heart sounds were distant; no pretibial or pedal edema  Pulm: clear to auscultation with no wheezes, rhonchi, or crackles  Extremities: sensation in tact and symmetric bilaterally in the lower extremities; small ulceration on heel of right foot with fissuring and cracking     Assessment/Plan   Ms. Mata is a 64 yo Fm who presents today for diabetes management and ED follow up. My assessment is that her symptoms from her visit to the ED on 9/17 have largely resolved, aside from slight pain to the left neck. In addition, her diabetes appears to be uncontrolled given her blood glucose readings ranging from 200s-300s and episodes of hypoglycemia. This could potentially be in the setting her worsening CKD, given recently elevated creatinine (2.62) and BUN (33).    #Insulin dependent T2DM, uncontrolled  - POC glucose today was 265 fasting  - last HbA1c 9% (9/7)  - maintain current insulin regimen (40 units glargine BID and 22 units lispro with each meal) until endocrinology appointment  - endocrinology appt on 10/4  - prescribed CGM today  - patient states she has an eye doctor appointment in December- encouraged her to reach out about earlier cancellations  - encouraged patient to reconnect with podiatry about foot ulceration  - C peptide ordered to assess T1DM vs T2DM given early onset (in her 20s) and patient states she has always been insulin dependent  - patient would like to " consider Ozempic in the future for help with weight loss    #CKD stage III  - has appointment with nephrology on 10/5  - will repeat CMP to reassess creatinine and BUN after ED visit on 9/17    #Chronic pain  -Rx Tramadol for 7 days until patient can be seen by  who is the primary care provider managing chronic pain and narcotic Rx  - Urine drug screen ordered  - Consider pain management referral for continued assessment    Patient to follow up in 1 month    Seen and discussed with Dr. Jessica Lowery, MS3       I saw and evaluated the patient. I personally obtained the key and critical portions of the history and physical exam. I reviewed and modified the student's documentation and discussed the patient with the medical student. I agree with the above documentation and medical decision making.    Attending Supervision: seen and discussed with attending physician, Dr. Susan Lopez MD  Family Medicine, PGY-3

## 2023-09-22 ENCOUNTER — PATIENT OUTREACH (OUTPATIENT)
Dept: CARE COORDINATION | Facility: CLINIC | Age: 63
End: 2023-09-22
Payer: COMMERCIAL

## 2023-09-22 NOTE — PROGRESS NOTES
Hospital provider appointment follow up.     PCP note of 9/19 is not imaged.    Outreach to patient who states saw PCP on 9/19 and Losartan continues to be on hold.   This writer reviewed upcoming appts with PCP 10/4 endocrinology and 10/5 renal.   Patient states she will begin CINEMA program, needs to get her blood sugar under better control to have hysterectomy, has appt with GYN in early October, and transportation provided by [facility].    Will continue to monitor for 30 day transition period and outreach if issues arise.    Valentine MENDOZA RN CCM  RN Care Coordinator  [facility]  Phone [phone]

## 2023-09-24 PROBLEM — Z79.4 TYPE 2 DIABETES MELLITUS WITH HYPERGLYCEMIA, WITH LONG-TERM CURRENT USE OF INSULIN (MULTI): Status: ACTIVE | Noted: 2023-08-16

## 2023-09-24 PROBLEM — E11.65 TYPE 2 DIABETES MELLITUS WITH HYPERGLYCEMIA, WITH LONG-TERM CURRENT USE OF INSULIN (MULTI): Status: ACTIVE | Noted: 2023-08-16

## 2023-09-24 RX ORDER — METOPROLOL SUCCINATE 50 MG/1
50 TABLET, EXTENDED RELEASE ORAL DAILY
COMMUNITY
Start: 2023-09-12 | End: 2024-02-29

## 2023-09-24 RX ORDER — FUROSEMIDE 20 MG/1
20 TABLET ORAL DAILY
COMMUNITY
Start: 2023-09-24 | End: 2024-02-19

## 2023-09-24 RX ORDER — BRIMONIDINE TARTRATE 2 MG/ML
1 SOLUTION/ DROPS OPHTHALMIC 2 TIMES DAILY
COMMUNITY
Start: 2023-09-24 | End: 2023-10-20 | Stop reason: SDUPTHER

## 2023-09-25 NOTE — PROGRESS NOTES
I saw and evaluated the patient. I personally obtained the key and critical portions of the history and physical exam or was physically present for key and critical portions performed by the resident/fellow. I reviewed the resident/fellow's documentation and discussed the patient with the resident/fellow. I agree with the resident/fellow's medical decision making as documented in the note.    Kulwinder Davis MD

## 2023-10-02 ENCOUNTER — DOCUMENTATION (OUTPATIENT)
Dept: CARE COORDINATION | Facility: CLINIC | Age: 63
End: 2023-10-02
Payer: COMMERCIAL

## 2023-10-02 NOTE — PROGRESS NOTES
10/02/2023 Discharged from the ED with lightheadedness and left neck pain. Had a prior cardiac cath. No answer at this time, unable to leave a voice message    marleny    WITH PS AROUND PUPIL DESPITE FREQUENT STEROIDS.

## 2023-10-03 NOTE — PROGRESS NOTES
Provider Impressions  Nelida Willis is a  62yo female with a history of HFpEF (EF 60-65% 3/2023), IDDM (A1c 9.0 in 9/2023), CAD s/p PCI w/ stent x3 (2016, 2018), STEMI (9/2023), PVD, SVC thrombus on Eliquis (dx 1/2023), CKD  3, CVA (9/2023) here for evaluation for poorly controlled diabetes .    Pt is phenotypically type 2 but states that she was dx at age 20 concerning for type 1 . We will work her up completely before considering initiation of GLP-1 agonist or SGLT2 inhibitor..     #Type 2 DM vs Type 1 DM with extensive vasculopathy  Recommendations  - Glargine 50 bid. If  low BS <100 in am then back off to glargine 45  if continues to be less than 100 decrease the morning dose to 40  - humalog 22 -22-25 plus  SSI 2 units for every 50 above 150  - ma/cr, lipid panel, c peptide, carlito 65, IA2 Ab , rfp.   - Free style kenyon re ordered with reader   - referral to ophthalmology, podiatry , nutrition placed   - Pt will f/up with nephrology tomorrow and obtain labs then as she needs to be fasting   - Based on whether appears to be type 1 or type 2 we will determine whether she is a candidate for ozempic .   - She is also a candidate for Jardiance but states that she predisposed to UTIs so will likely hold off on starting this at next visit .   -Counselled her on wt loss . Asked her to change to diet pop from regular pop    Return to clinic in 3 months    Case seen, examined, and discussed with Dr. Miles     History of Present Illness  NELIDA WILLIS is being seen for routine follow-up for diabetes.   date of diagnosis: 20 years old and placed on insulin at dx.Pt says  she was diagnosed in \[facility] [place] [facility]\"" .  She was on metformin  previously per pt.  Never been on other oral medications or injectables other than insulin.   LCV w/ endo 9/2020 with Dr. Ravi Valentin . Here for follow up after a 3 year hiatus,    Interval History:   She was admitted in march for  ADHF and in September 7-12, 2023 for STEMI.   She was seen  by primary on sept 19th and given CGM but states that she didn't receive it.  Pt seen recently admitted to [facility] for  setp 27- 29 2023 for CVA With right-sided weakness in the lower extremities felt to be possibly secondary to cardioembolic versus hypercoagulable disease?    MRI head:  IMPRESSION:  Small acute-subacute infarcts at the left thalamocapsular junction and subcortical white matter of the left precentral gyrus. No significant mass effect or hemorrhagic conversion.       Current DM Regimen:.   Glargine 40 units qam and 40 units qpm  and 22 units lispro with each meal along with a mild sliding scale of 2 units for every 50 above 150  Compliant with home glucose monitoring. Checks 4 times a day.   Glucose Ranges: AM: 168-250, bedtime: 322 reportedly  Did not bring glucometer   Hypoglycemic awareness present , No recent hypoglycemic episodes. 80's very symptomatic, sweating, dizzy.   Eats erratically- Skips breakfast sometimes, this morning ate potatoes , night drinks orange juice . Snacks on grapes . Drinks pop frequently    Diabetes Complications:   Opthalmic: Has not been recently seen by ophthalmology .blind in left eye according to patient  Foot ulcers: History of chronic nonpressure ulceration right foot ,  PVD , Amputated 4th and 5th metacarpal of R foot in 2020 at  - saw podiatry last during inpt admission in March 2023      Diabetic neuropathy : Present, on duloxetine   Current symptoms include the patient is currently asymptomatic.  At this visit, she request to be started on Ozempic for weight loss.   No h/o pancreatitis   No FH of Medullary thyroid cancer   No history of gastroparesis or  gastric bypass surgery    Cr 1.63 GFR 35 on 9/27/2023  Last LDL 90/22/21-98 statin: Atorvastatin 80 mg once daily  Ace: Losartan 25 mg once daily  ASA: Aspirin 81 mg once daily    A1C           Vitals:    10/04/23 0855   BP: 178/80   Pulse: 74   Temp: 36.3 °C (97.3 °F)   SpO2: 99%      Physical  exam  PE:  Constitutional: NAD, well groomed. AOx3. Cooperative  Skin/Hair: Hyperpigmentation over bilateral arms with exposed areas from excoriations .  HEENT: EOMI, Anicteric scleras   Neck: Soft, supple   Cardiovascular: normal HR  Respiratory: no increased wob,  accessory muscle use.  Abdomen - obese, soft  , nondistended, nontender to palpation  Psych : appropriate affect    Musculoskeletal-no lower extremity edema noted  Labs reviewed

## 2023-10-04 ENCOUNTER — OFFICE VISIT (OUTPATIENT)
Dept: ENDOCRINOLOGY | Facility: HOSPITAL | Age: 63
End: 2023-10-04
Payer: COMMERCIAL

## 2023-10-04 VITALS
HEART RATE: 74 BPM | SYSTOLIC BLOOD PRESSURE: 178 MMHG | DIASTOLIC BLOOD PRESSURE: 80 MMHG | HEIGHT: 59 IN | OXYGEN SATURATION: 99 % | BODY MASS INDEX: 41.93 KG/M2 | WEIGHT: 208 LBS | TEMPERATURE: 97.3 F

## 2023-10-04 DIAGNOSIS — E11.65 TYPE 2 DIABETES MELLITUS WITH HYPERGLYCEMIA, WITH LONG-TERM CURRENT USE OF INSULIN (MULTI): Primary | ICD-10-CM

## 2023-10-04 DIAGNOSIS — Z79.4 TYPE 2 DIABETES MELLITUS WITH HYPERGLYCEMIA, WITH LONG-TERM CURRENT USE OF INSULIN (MULTI): Primary | ICD-10-CM

## 2023-10-04 DIAGNOSIS — E78.49 OTHER HYPERLIPIDEMIA: ICD-10-CM

## 2023-10-04 LAB — GLUCOSE BLD MANUAL STRIP-MCNC: 158 MG/DL (ref 74–99)

## 2023-10-04 PROCEDURE — 3008F BODY MASS INDEX DOCD: CPT | Performed by: STUDENT IN AN ORGANIZED HEALTH CARE EDUCATION/TRAINING PROGRAM

## 2023-10-04 PROCEDURE — 3048F LDL-C <100 MG/DL: CPT | Performed by: STUDENT IN AN ORGANIZED HEALTH CARE EDUCATION/TRAINING PROGRAM

## 2023-10-04 PROCEDURE — 3066F NEPHROPATHY DOC TX: CPT | Performed by: STUDENT IN AN ORGANIZED HEALTH CARE EDUCATION/TRAINING PROGRAM

## 2023-10-04 PROCEDURE — 82947 ASSAY GLUCOSE BLOOD QUANT: CPT | Performed by: STUDENT IN AN ORGANIZED HEALTH CARE EDUCATION/TRAINING PROGRAM

## 2023-10-04 PROCEDURE — 99215 OFFICE O/P EST HI 40 MIN: CPT | Performed by: STUDENT IN AN ORGANIZED HEALTH CARE EDUCATION/TRAINING PROGRAM

## 2023-10-04 PROCEDURE — 36416 COLLJ CAPILLARY BLOOD SPEC: CPT | Performed by: STUDENT IN AN ORGANIZED HEALTH CARE EDUCATION/TRAINING PROGRAM

## 2023-10-04 PROCEDURE — 3052F HG A1C>EQUAL 8.0%<EQUAL 9.0%: CPT | Performed by: STUDENT IN AN ORGANIZED HEALTH CARE EDUCATION/TRAINING PROGRAM

## 2023-10-04 PROCEDURE — 99205 OFFICE O/P NEW HI 60 MIN: CPT | Performed by: STUDENT IN AN ORGANIZED HEALTH CARE EDUCATION/TRAINING PROGRAM

## 2023-10-04 PROCEDURE — 3079F DIAST BP 80-89 MM HG: CPT | Performed by: STUDENT IN AN ORGANIZED HEALTH CARE EDUCATION/TRAINING PROGRAM

## 2023-10-04 PROCEDURE — 3061F NEG MICROALBUMINURIA REV: CPT | Performed by: STUDENT IN AN ORGANIZED HEALTH CARE EDUCATION/TRAINING PROGRAM

## 2023-10-04 PROCEDURE — 1036F TOBACCO NON-USER: CPT | Performed by: STUDENT IN AN ORGANIZED HEALTH CARE EDUCATION/TRAINING PROGRAM

## 2023-10-04 PROCEDURE — 3077F SYST BP >= 140 MM HG: CPT | Performed by: STUDENT IN AN ORGANIZED HEALTH CARE EDUCATION/TRAINING PROGRAM

## 2023-10-04 RX ORDER — FLASH GLUCOSE SCANNING READER
EACH MISCELLANEOUS
Qty: 1 EACH | Refills: 0 | Status: SHIPPED | OUTPATIENT
Start: 2023-10-04

## 2023-10-04 RX ORDER — FLASH GLUCOSE SENSOR
KIT MISCELLANEOUS
Qty: 2 EACH | Refills: 11 | Status: SHIPPED | OUTPATIENT
Start: 2023-10-04 | End: 2023-10-20 | Stop reason: SDUPTHER

## 2023-10-04 ASSESSMENT — ENCOUNTER SYMPTOMS
DEPRESSION: 1
OCCASIONAL FEELINGS OF UNSTEADINESS: 1
LOSS OF SENSATION IN FEET: 1

## 2023-10-04 ASSESSMENT — PATIENT HEALTH QUESTIONNAIRE - PHQ9
SUM OF ALL RESPONSES TO PHQ9 QUESTIONS 1 AND 2: 2
1. LITTLE INTEREST OR PLEASURE IN DOING THINGS: SEVERAL DAYS
10. IF YOU CHECKED OFF ANY PROBLEMS, HOW DIFFICULT HAVE THESE PROBLEMS MADE IT FOR YOU TO DO YOUR WORK, TAKE CARE OF THINGS AT HOME, OR GET ALONG WITH OTHER PEOPLE: SOMEWHAT DIFFICULT
2. FEELING DOWN, DEPRESSED OR HOPELESS: SEVERAL DAYS

## 2023-10-04 ASSESSMENT — PAIN SCALES - GENERAL: PAINLEVEL: 6

## 2023-10-04 NOTE — PATIENT INSTRUCTIONS
Increase Lantus to 50 units twice daily  In case blood sugar in the morning less than 100 decrease the evening dose to 45 if continues to be less than 100 decrease the morning dose to 45  Increase Humalog to 22 units before breakfast 22 units before lunch and 25 units before dinner  Change sliding scale to 2 units for every 50 above 150  Make sure you check your sugars 3-4 times a day  We will reorder the freestyle kenyon  Make sure to get your glucometer next time with you  We will refer you to ophthalmology podiatry and dietitian  Continue cholesterol medication and losartan  Blood work tomorrow fasting

## 2023-10-05 ENCOUNTER — APPOINTMENT (OUTPATIENT)
Dept: NEPHROLOGY | Facility: CLINIC | Age: 63
End: 2023-10-05
Payer: COMMERCIAL

## 2023-10-13 ENCOUNTER — DOCUMENTATION (OUTPATIENT)
Dept: CARE COORDINATION | Facility: CLINIC | Age: 63
End: 2023-10-13
Payer: COMMERCIAL

## 2023-10-13 NOTE — PROGRESS NOTES
"10/13/2023  Patient recently discharged from the hospital.   While discussing her state of health, she stated, \" both sides of my neck hurt like when I was having my stroke and heart attacks\".  Advised Nelida to return to the emergency room for evaluation.  She was not happy but said she would go today.    Medications  Medications reviewed with patient/caregiver?: Yes (10/13/2023 11:14 AM)  Is the patient having any side effects they believe may be caused by any medication additions or changes?: No (10/13/2023 11:14 AM)  Does the patient have all medications ordered at discharge?: Yes (10/13/2023 11:14 AM)  Care Management Interventions: Provided patient education (10/13/2023 11:14 AM)  Is the patient taking all medications as directed (includes completed medication regime)?: Yes (10/13/2023 11:14 AM)  Care Management Interventions: Provided patient education (10/13/2023 11:14 AM)    Appointments  Does the patient have a primary care provider?: Yes (10/13/2023 Patient stated both sides of her neck her \"like\" when I had my stroke and heart attack blood pressure today 173/77.  Advised patient to call her doctor and go to the ED for evaluation.  hjm) (10/13/2023 11:14 AM)  Care Management Interventions: Advised patient to make appointment (10/13/2023 11:14 AM)  Has the patient kept scheduled appointments due by today?: Yes (10/13/2023 11:14 AM)    Patient Teaching  Does the patient have access to their discharge instructions?: Yes (10/13/2023 11:14 AM)  Care Management Interventions: Reviewed instructions with patient (10/13/2023 11:14 AM)  What is the patient's perception of their health status since discharge?: Worsening (10/13/2023   Complaints of neck pain bilaterally, she stated, \"it hurts the same way it did when I had my stroke and heart attack\".  Advised her to go to the emergency room for evaluation.  She was not happy but did say she would go.  marleny) (10/13/2023 11:14 AM)      hjm  "

## 2023-10-17 ENCOUNTER — TELEPHONE (OUTPATIENT)
Dept: PRIMARY CARE | Facility: CLINIC | Age: 63
End: 2023-10-17

## 2023-10-17 NOTE — TELEPHONE ENCOUNTER
Pt's  BP during her home health care visit today was 101/40, they said they needed to notify the provider. Thanks.

## 2023-10-20 ENCOUNTER — OFFICE VISIT (OUTPATIENT)
Dept: CARDIOLOGY | Facility: HOSPITAL | Age: 63
End: 2023-10-20
Payer: COMMERCIAL

## 2023-10-20 ENCOUNTER — PHARMACY VISIT (OUTPATIENT)
Dept: PHARMACY | Facility: CLINIC | Age: 63
End: 2023-10-20
Payer: COMMERCIAL

## 2023-10-20 VITALS
HEART RATE: 77 BPM | HEIGHT: 59 IN | WEIGHT: 205 LBS | SYSTOLIC BLOOD PRESSURE: 121 MMHG | BODY MASS INDEX: 41.33 KG/M2 | DIASTOLIC BLOOD PRESSURE: 67 MMHG

## 2023-10-20 DIAGNOSIS — I25.10 ARTERIOSCLEROTIC CARDIOVASCULAR DISEASE (ASCVD): ICD-10-CM

## 2023-10-20 DIAGNOSIS — Z86.718 HISTORY OF DVT (DEEP VEIN THROMBOSIS): Chronic | ICD-10-CM

## 2023-10-20 DIAGNOSIS — I21.4 NSTEMI, INITIAL EPISODE OF CARE (MULTI): ICD-10-CM

## 2023-10-20 DIAGNOSIS — Z79.4 CONTROLLED TYPE 2 DIABETES MELLITUS WITHOUT COMPLICATION, WITH LONG-TERM CURRENT USE OF INSULIN (MULTI): Primary | ICD-10-CM

## 2023-10-20 DIAGNOSIS — I10 HYPERTENSION, UNCONTROLLED: ICD-10-CM

## 2023-10-20 DIAGNOSIS — E11.9 CONTROLLED TYPE 2 DIABETES MELLITUS WITHOUT COMPLICATION, WITH LONG-TERM CURRENT USE OF INSULIN (MULTI): Primary | ICD-10-CM

## 2023-10-20 DIAGNOSIS — I50.32 CHRONIC HEART FAILURE WITH PRESERVED EJECTION FRACTION (MULTI): ICD-10-CM

## 2023-10-20 PROCEDURE — 93005 ELECTROCARDIOGRAM TRACING: CPT | Performed by: INTERNAL MEDICINE

## 2023-10-20 PROCEDURE — 3061F NEG MICROALBUMINURIA REV: CPT | Performed by: INTERNAL MEDICINE

## 2023-10-20 PROCEDURE — 1036F TOBACCO NON-USER: CPT | Performed by: INTERNAL MEDICINE

## 2023-10-20 PROCEDURE — 3008F BODY MASS INDEX DOCD: CPT | Performed by: INTERNAL MEDICINE

## 2023-10-20 PROCEDURE — 97802 MEDICAL NUTRITION INDIV IN: CPT | Mod: 59 | Performed by: INTERNAL MEDICINE

## 2023-10-20 PROCEDURE — 99215 OFFICE O/P EST HI 40 MIN: CPT | Performed by: INTERNAL MEDICINE

## 2023-10-20 PROCEDURE — 3066F NEPHROPATHY DOC TX: CPT | Performed by: INTERNAL MEDICINE

## 2023-10-20 PROCEDURE — 3078F DIAST BP <80 MM HG: CPT | Performed by: INTERNAL MEDICINE

## 2023-10-20 PROCEDURE — 3052F HG A1C>EQUAL 8.0%<EQUAL 9.0%: CPT | Performed by: INTERNAL MEDICINE

## 2023-10-20 PROCEDURE — 3048F LDL-C <100 MG/DL: CPT | Performed by: INTERNAL MEDICINE

## 2023-10-20 PROCEDURE — 3074F SYST BP LT 130 MM HG: CPT | Performed by: INTERNAL MEDICINE

## 2023-10-20 RX ORDER — DAPAGLIFLOZIN 10 MG/1
10 TABLET, FILM COATED ORAL DAILY
Qty: 30 TABLET | Refills: 11 | Status: SHIPPED | OUTPATIENT
Start: 2023-10-20 | End: 2023-12-08 | Stop reason: SDUPTHER

## 2023-10-20 RX ORDER — SEMAGLUTIDE 0.68 MG/ML
0.25 INJECTION, SOLUTION SUBCUTANEOUS
Qty: 3 ML | Refills: 0 | Status: SHIPPED | OUTPATIENT
Start: 2023-10-20 | End: 2023-12-08 | Stop reason: SDUPTHER

## 2023-10-20 ASSESSMENT — PAIN SCALES - GENERAL: PAINLEVEL: 0-NO PAIN

## 2023-10-20 NOTE — PROGRESS NOTES
[facility] NUTRITION THERAPY   62 yo female is here today for nutrition counseling and support for diabetes. Pt is frustrated that she continues to gain weight and she would like ozempic.     Pt has CHF, CAD, HTN, ASCVD  Social History: lives byself - boyfriend lives upstairs.     Pt was diagnosed with diabetes at age 20. She reports that she has been on insulin ever since she was diagnosed. A1c 8.4%  Current DM meds include: Lantus 50 units in AM and PM, humalog before meals 20-22 units + sliding scale.     Other significant labs: triglycerides 245    Diet: vague about intake. States she doesn't eat a lot. She did mention drinking juice to keep her sugar up. Today she had green tea, salad, and chili.     Pt did see Endo this week and started her on kenyon 2 CGM, reader downloaded. Reminded pt to scan minimally once every 8 hours. Glucose is erractic.     Exercise: ADL. She has physical therapist at her house 1x per week. Encouraged her to do exercises 2 days per week.    Education/plan:  Reviewed [employer] welcome folder.   Pt denied zoom education calls.   Nutrition goal: stop drinking sugar sweetened beverages  Exercise goal: provided pt with resistance band, encouraged to do strength exercises 2 days per week and to walk more as tolerated.    I will follow up with patient in 1-2 weeks with a phone call.   30 minutes spent face to face.   Madie Ríos RD, LD, CDE

## 2023-10-20 NOTE — PROGRESS NOTES
[facility] ([facility])      Reason for Visit: Hypertension and Hyperlipidemia and INITIAL VISIT CINEMA    Type 2 DM but long standing diabetic with concern for Type 1. Being worked up with ZOHAIB and Islet cell antobodies  CAD. NSTEMI recently. Prior PCI in 2016 and 2018  HFPEF. Stage C.  SVC Thrombus on Eliquis  Cerebrovascular disease with admission in September 27 to 29, 2023 at [facility] for right-sided weakness felt to be secondary to cardioembolic.  An MRI at that time revealed small acute/subacute infarction of the thalamocapsular junction and subcortical white matter of the left precentral gyrus.    Problems:  Problem List Items Addressed This Visit          Cardiac and Vasculature    (HFpEF) heart failure with preserved ejection fraction (CMS/HCC)    Arteriosclerotic cardiovascular disease (ASCVD)    NSTEMI, initial episode of care (CMS/[facility])    Hypertension, uncontrolled - Primary    Relevant Orders    ECG 12 lead (Clinic Performed)       Coag and Thromboembolic    History of DVT (deep vein thrombosis) (Chronic)       Endocrine/Metabolic    Diabetes mellitus type 2, controlled, without complications (CMS/[facility])    Overview     Controlled type 2 diabetes with diabetic polyneuropathy with long term current use of insulin              History of Present Illness: Nelida Mata is a 63 y.o. female who presents for initial evaluation in the CINEMA program.  He was recently hospitalized with a non-STEMI and was taken to Cath Lab where a diagnostic left heart catheterization revealed mostly patent stents with a jailed first diagonal.  She has undergone a previous PCI 3 stents in 2016 to LAD as well as in 2018.  She has a history of a SVC thrombus for which she is on Eliquis diagnosed in January 2023.  She has multiple other comorbidities including advanced CKD and carries a history of heart failure with preserved ejection fraction.  A previous echocardiogram revealed  normal ejection fraction with impaired LV relaxation.    She is very sedentary and does get dyspneic on minimal exertion since discharge she has not felt orthopnea or PND.  She does get mild pedal edema.      Patient does not report symptoms of hypoglycemia. Patient deniesnone.    Past Medical History:  Recently hospitalized.  64yo female with a history of HFpEF (EF 60-65%    /2023), IDDM (A1c 9.0 in 9/2023), CAD s/p PCI w/ stent x3 (2016, 2018), SVC   thrombus on Eliquis (dx 1/2023) who presented with chest pain, N/V. Pt vitally   stable on arrival. Pt was given BMX, oxy 5mg, and Zofran 4mg IVP initially.   CTPE was done with no PE visualized. Pt subsequently developed worsening chest   pain while in the ED with diaphoresis a few hours after arrival to ED. ECG done   showing ST depression and T wave inversions in inferior leads with lateral ST   elevations. STEMI call was activated, pt loaded with heparin, Brilinta and   aspirin and transferred to cath lab. LHC done showing no significant stenosis   in LAD except ostial first diagonal has 80 to 90% stenosis with NAT-3 flow   jailed by 2 layers of stent of bracket. Otherwise patent stents. No   intervention wasdone. Pt was transferred to the CICU for further management and   monitoring. On arrival, pt HDS with 8/10 chest pain although appeared   comfortable. Pt started on heparin gtt for ACS, switched to home apixiban 9/8.   Pt not continued on DAPT given no trop leak and symptoms resolved. Pt to   continue aspirin. Pt was started on metop tartrate 12.5mg q6h and hydral 25mg   q8h 9/7. Hospital course c/b CLARIBEL likely prerenal and contrast induced,   uptrending on 9/8. Pt without arrhthymias on telemetry and chest pain relieved   and was stable for floor transfer to [facility] 9/8. Her heparin gtt was   discontinued and her home Apixaban 5 mg BID was re-started on 9/8. Her   creatinine was up-trending to 3.20 on 9/9 so heparin gtt was re-started in the   event that  Apixaban was contributing to her CLARIBEL. She also received LR fluids   for her CLARIBEL.  On 9/10 she was transitioned from heparin gtt to home Apixaban   5mg BID. Her creatinine increased to 3.60. She continued to receive 500 cc LR   boluses. PT assessed the patient and recommended rehab for moderate intensity   therapy. Patient refused rehab/SNF and wanted to go home with PT/OT. On 9/12   her creatinine was 1.97.     Past Surgical History:  She has a past surgical history that includes Coronary angioplasty with stent (03/06/2017); Cataract extraction (03/18/2015); Retinal detachment surgery (03/18/2015); MR angio head wo IV contrast (8/16/2013); MR angio neck wo IV contrast (8/16/2013); and CT angio aorta and bilateral iliofemoral runoff w and or wo IV contrast (8/8/2022).    Social History:  She reports that she has never smoked. She has never used smokeless tobacco. She reports that she does not use drugs.    Family History:  Family History   Problem Relation Name Age of Onset    Diabetes Mother      Glaucoma Father      Hypertension Other FAMILY        Allergies:  Other, Penicillins, and Procaine    Outpatient Medications:  Current Outpatient Medications   Medication Instructions    apixaban (ELIQUIS) 5 mg, oral, Every 12 hours    aspirin 81 mg chewable tablet CHEW 1 TABLET BY MOUTH ONCE DAILY    atorvastatin (Lipitor) 80 mg tablet TAKE 1 TABLET BY MOUTH ONCE DAILY    blood sugar diagnostic (Blood Glucose Test) strip USE 1 STRIP 3 times daily    brimonidine (AlphaGAN P) 0.2 % ophthalmic solution INSTILL 1 DROP IN BOTH EYES TWO TIMES A DAY    calcium carbonate-vit D3-min 600 mg calcium- 400 unit tablet 1 tablet, oral, Daily    DULoxetine (CYMBALTA) 30 mg, oral, 2 times daily    FreeStyle Filipe reader (FreeStyle Filipe 2 [place]) misc Use as instructed    furosemide (LASIX) 20 mg, oral, Daily    hydrALAZINE (Apresoline) 25 mg tablet Take 1 tablet (25 mg) by mouth 3 times a day.    hydrOXYzine HCL (ATARAX) 25 mg, oral, 2  "times daily PRN    isosorbide mononitrate ER (Imdur) 60 mg 24 hr tablet     latanoprost (Xalatan) 0.005 % ophthalmic solution INSTILL 1 DROP IN BOTH EYES AT BEDTIME    metoprolol succinate XL (TOPROL-XL) 50 mg, oral, Daily    naloxone (NARCAN) 4 mg, nasal, As needed    nitroglycerin (Nitrostat) 0.4 mg SL tablet PLACE 1 TABLET UNDER THE TONGUE EVERY 5 MINUTES FOR UP TO 3 DOSES AS NEEDED FOR CHEST PAIN.CALL 911 IF PAIN PERSISTS.    pantoprazole (PROTONIX) 40 mg, oral, Daily, (6 am)    polyethylene glycol (Glycolax, Miralax) 17 gram/dose powder oral, Daily PRN    sennosides-docusate sodium (Zoila-Colace) 8.6-50 mg tablet TAKE 1 TABLET BY MOUTH ONCE DAILY    Toujeo Max U-300 SoloStar 39 Units, subcutaneous, 2 times daily, Take as directed per insulin instructions.       Review of Systems:  ROS    Last Recorded Vitals:  Vitals:    10/20/23 1502   BP: 121/67   BP Location: Left arm   Patient Position: Sitting   Pulse: 77   Weight: 93 kg (205 lb)   Height: 1.499 m (4' 11\")     Body mass index is 41.4 kg/m².     Physical Examination:  General: Well appearing, well-nourished, in no acute distress.  HEENT: Normocephalic atraumatic, pupils equal and reactive to light, extraocular muscles intact, no conjunctival injection, oropharynx clear without exudates.  Neck: Normal carotid arterial pulses, no arterial bruits, no thyromegaly.  Cardiac: Regular rhythm and normal heart rate.  S1, S2 present and normal.  No murmurs, rubs, or gallops.   Jugular venous pressure and pulsations are normal  Pulmonary: Normal breath sounds, no increased work of breathing, no wheezes or crackles.  GI: Normal bowel sounds, no organomegaly appreciated;  no abdominal bruits.  Lower extremities: No cyanosis, clubbing, or edema.  No xanthelasma present. Normal distal pulses.  Skin: Skin intact. No significant rashes or lesions present.  Neuro: Alert and oriented x 3, normal attention and cognition, no focal motor or sensory neurologic deficits.  Psych: " "Normal affect and mood.  Musculoskeletal: Normal gait normal muscle tone.    Laboratory Studies:  Lab Results   Component Value Date    GLUCOSE 288 (H) 09/19/2023    CALCIUM 9.6 09/19/2023     09/19/2023    K 4.5 09/19/2023    CO2 27 09/19/2023     09/19/2023    BUN 31 (H) 09/19/2023    CREATININE 2.23 (H) 09/19/2023     Lab Results   Component Value Date    ALT 13 09/17/2023    AST 17 09/17/2023    ALKPHOS 88 09/17/2023    BILITOT 0.9 09/17/2023     Lab Results   Component Value Date    CHOL 183 09/22/2020    CHOL 70 01/02/2020    CHOL 220 (H) 01/10/2018     Lab Results   Component Value Date    HDL 45.4 09/22/2020    HDL 35.4 (A) 01/02/2020    HDL 56.2 01/10/2018     No results found for: \"LDLCALC\"  Lab Results   Component Value Date    TRIG 198 (H) 09/22/2020    TRIG 210 (H) 01/02/2020    TRIG 435 (H) 01/10/2018     No components found for: \"CHOLHDL\"  No components found for: \"UACR\"  Lab Results   Component Value Date    HGBA1C 8.4 (H) 09/27/2023    HGBA1C 9.0 (A) 09/07/2023    HGBA1C CANCELED 09/07/2023     Lab Results   Component Value Date    TSH 2.35 09/07/2023     Lab Results   Component Value Date    WBC 7.4 09/17/2023    HGB 12.2 09/17/2023    HCT 35.9 (L) 09/17/2023    MCV 89 09/17/2023     09/17/2023            Assessment and Plan:  63-year-old complex vasculopath with longstanding type 2 diabetes mellitus with concern for burnt out pancreas versus type 1 diabetes mellitus.  She was recently hospitalized for non-STEMI in addition to right-sided weakness which is presumably cardioembolic in origin.  Her concomitant metabolic problems include hypertension and hyperlipidemia.  Her physical examination today is consistent with visceral adiposity with no overt cardiovascular signs.  Laboratory evaluation reveals poor glucose control.  Her medical regimen includes a twice daily regimen of glargine insulin.  She is on a factor X inhibitor for prior SVC thrombus which may also benefit her " arterial thrombotic risk.    PLAN  Type 1 versus Type 2 DM.  She is currently being worked up with antiislet cell antibodies and anti-ZOHAIB antibodies that would provide distinction.  It is best to continue her current glargine regimen until we understand the physiology better.  She is on CAD risk reduction including a statin and factor X inhibitor for hypercoagulability.  Her hypertension regimen may need better approaches given the fact that she is on complex regimen including thrice daily drug like hydralazine.  Follow up in 3 months        Problem List Items Addressed This Visit          Cardiac and Vasculature    (HFpEF) heart failure with preserved ejection fraction (CMS/HCC)    Arteriosclerotic cardiovascular disease (ASCVD)    NSTEMI, initial episode of care (CMS/[facility])    Hypertension, uncontrolled - Primary    Relevant Orders    ECG 12 lead (Clinic Performed)       Coag and Thromboembolic    History of DVT (deep vein thrombosis) (Chronic)       Endocrine/Metabolic    Diabetes mellitus type 2, controlled, without complications (CMS/[facility])       Education: Reviewed ‘ABCs’ of diabetes management (respective goals in parentheses):  A1C (<6), blood pressure (<130/80), and cholesterol (LDL <70).  Compliance at present is estimated to be fair. Efforts to improve compliance (if necessary) will be directed.  Follow up: 3 month        Michael Hilario MD, FACC, FACANDIDA.  Chief, Cardiovascular Medicine  [facility]  Chief Academic and Scientific Officer  [employer]  Professor of Medicine, Radiology and Biomedical Engineering  [employer]  [place], [place]

## 2023-10-20 NOTE — PATIENT INSTRUCTIONS
Plan:  Cardiac MRI  Start Ozempic 0.25 mg and will titrate up to 1-2 mg.  Farxiga 10 mg  Get Labs in 2 weeks after starting Farxiga.  Follow up in 3 months with Labs.

## 2023-10-24 ENCOUNTER — APPOINTMENT (OUTPATIENT)
Dept: PRIMARY CARE | Facility: CLINIC | Age: 63
End: 2023-10-24
Payer: COMMERCIAL

## 2023-10-25 ENCOUNTER — TELEMEDICINE (OUTPATIENT)
Dept: PHARMACY | Facility: HOSPITAL | Age: 63
End: 2023-10-25
Payer: COMMERCIAL

## 2023-10-25 DIAGNOSIS — I10 HYPERTENSION, UNCONTROLLED: ICD-10-CM

## 2023-10-25 DIAGNOSIS — Z86.718 HISTORY OF DVT (DEEP VEIN THROMBOSIS): Chronic | ICD-10-CM

## 2023-10-25 DIAGNOSIS — I25.10 ARTERIOSCLEROTIC CARDIOVASCULAR DISEASE (ASCVD): ICD-10-CM

## 2023-10-25 DIAGNOSIS — I50.32 CHRONIC HEART FAILURE WITH PRESERVED EJECTION FRACTION (MULTI): ICD-10-CM

## 2023-10-25 DIAGNOSIS — I21.4 NSTEMI, INITIAL EPISODE OF CARE (MULTI): ICD-10-CM

## 2023-10-25 DIAGNOSIS — Z79.4 CONTROLLED TYPE 2 DIABETES MELLITUS WITHOUT COMPLICATION, WITH LONG-TERM CURRENT USE OF INSULIN (MULTI): ICD-10-CM

## 2023-10-25 DIAGNOSIS — E11.9 CONTROLLED TYPE 2 DIABETES MELLITUS WITHOUT COMPLICATION, WITH LONG-TERM CURRENT USE OF INSULIN (MULTI): ICD-10-CM

## 2023-10-25 NOTE — PROGRESS NOTES
Patient is sent at the request of Michael Hilario MD for medication management.  My final recommendations will be communicated back to the requesting provider by way of shared medical record.    Subjective     Allergies   Allergen Reactions    Other Rash     Novocaine Solution, reaction rash     Penicillins Rash    Procaine Rash     Cardiovascular risk factors include diabetes mellitus, hypertension, obesity (BMI >= 30 kg/m2), and CHF, CAD, clinical ASCVD . The patient is not on an ACE inhibitor or angiotensin II receptor blocker.      Current monitoring regimen:   Patient is using: continuous glucose monitor - Freestyle Filipe 2 with Freestyle Filipe 2 reader    Not connected to [employer] portal     Objective     There were no vitals taken for this visit.    Diabetes Pharmacotherapy:    Toujeo Max Solostar: Inject 50 units in the morning and 50 units in the evening     Insulin injections: Insulin dosage review with Nelida suggested compliance all of the time.    Pre-breakfast Humalog 22 units plus SSI   Pre-lunch Humalog 22 units plus SSI   Pre-dinner Humalog 22 units plus SSI   Bedtime None     Patient was advised to do 22-22-25 on 10/4    Sliding Scale:   <150 - 0 units  151-200 - 2 units   201-250 - 4 units   251-300 - 6 units   301-350 - 8 units   351-400 -10 units  401 - 12 units    Historical Pharmacotherapy:  Metformin     Secondary Prevention:   - Statin? Yes  - ACE-I/ARB? No  - Aspirin? Yes    Pertinent PMH Review: Per chart review  - PMH of Pancreatitis: No  - PMH of Retinopathy: No  - PMH of Urinary Tract Infections: Yes  - PMH of MTC: No    Labs:   Lab Results   Component Value Date    BILITOT 0.9 09/17/2023    CALCIUM 9.6 09/19/2023    CO2 27 09/19/2023     09/19/2023    CREATININE 2.23 (H) 09/19/2023    GLUCOSE 288 (H) 09/19/2023    ALKPHOS 88 09/17/2023    K 4.5 09/19/2023    PROT 7.4 09/17/2023     09/19/2023    AST 17 09/17/2023    ALT 13 09/17/2023    BUN 31 (H) 09/19/2023     ANIONGAP 15 09/19/2023    MG 2.42 (H) 09/12/2023    PHOS 3.9 09/19/2023     03/31/2023    ALBUMIN 4.2 09/19/2023    LIPASE 15 09/07/2023    GFRF 24 (A) 09/19/2023    GFRMALE CANCELED 03/22/2023     Lab Results   Component Value Date    TRIG 198 (H) 09/22/2020    CHOL 183 09/22/2020    HDL 45.4 09/22/2020     Lab Results   Component Value Date    HGBA1C 8.4 (H) 09/27/2023    HGBA1C 9.0 (A) 09/07/2023    HGBA1C CANCELED 09/07/2023     The ASCVD Risk score (Adrianne POTTS, et al., 2019) failed to calculate for the following reasons:    The patient has a prior MI or stroke diagnosis    Health Maintenance:   Lipid Panel: 9/27/2023 - 44 mg/dL  Urine Albumin/Creatinine Ratio: Not available - Lab order placed on 10/4    Assessment/Plan   Problem List Items Addressed This Visit       (HFpEF) heart failure with preserved ejection fraction (CMS/[facility])    Arteriosclerotic cardiovascular disease (ASCVD)    NSTEMI, initial episode of care (CMS/[facility])    Hypertension, uncontrolled    Diabetes mellitus type 2, controlled, without complications (CMS/[facility])    History of DVT (deep vein thrombosis) (Chronic)     Patients diabetes is poorly controlled with most recent A1c of 8.4% (Goal < 7%).   Therapy was initiated with SGLT2i and GLP1ra at her initial [facility] visit. Therapy was pended after conclusion of her initial endocrinology visit on 10/4/2023.  Today I I discussed with patient completing her lab work that was advised after her initial endocrinology visit.   We will follow up in one week to discuss initiation of therapy with Farxiga 10 mg and Ozempic 0.25 mg/0.5 mg. Both prescriptions are no copay under her current insurance plan.   She is currently monitoring her glucose with Freestyle Filipe 2 CGM and reader. She is not connected to [email or web address].   Losartan 100 mg previously dispensed on 7/27/2023 for #90 ds, will discuss with patient current supply at our follow up.  Compliance at present is estimated to be fair.   Follow-up: I  recommend diabetes care be  11/3/2023 at 3:30 .  [facility] Follow-Up:     Nima Santamaria, PharmD    Continue all meds under the continuation of care with the referring provider and clinical pharmacy team.

## 2023-11-10 ENCOUNTER — LAB (OUTPATIENT)
Dept: LAB | Facility: LAB | Age: 63
End: 2023-11-10
Payer: COMMERCIAL

## 2023-11-10 DIAGNOSIS — G89.4 CHRONIC PAIN SYNDROME: ICD-10-CM

## 2023-11-10 DIAGNOSIS — Z79.4 TYPE 2 DIABETES MELLITUS WITH HYPERGLYCEMIA, WITH LONG-TERM CURRENT USE OF INSULIN (MULTI): ICD-10-CM

## 2023-11-10 DIAGNOSIS — I10 HYPERTENSION, UNCONTROLLED: ICD-10-CM

## 2023-11-10 DIAGNOSIS — E11.65 TYPE 2 DIABETES MELLITUS WITH HYPERGLYCEMIA, WITH LONG-TERM CURRENT USE OF INSULIN (MULTI): ICD-10-CM

## 2023-11-10 LAB
ALBUMIN SERPL BCP-MCNC: 4.4 G/DL (ref 3.4–5)
ALBUMIN SERPL BCP-MCNC: 4.4 G/DL (ref 3.4–5)
ALP SERPL-CCNC: 102 U/L (ref 33–136)
ALT SERPL W P-5'-P-CCNC: 10 U/L (ref 7–45)
AMPHETAMINES UR QL SCN: NORMAL
ANION GAP SERPL CALC-SCNC: 16 MMOL/L (ref 10–20)
ANION GAP SERPL CALC-SCNC: 20 MMOL/L (ref 10–20)
AST SERPL W P-5'-P-CCNC: 14 U/L (ref 9–39)
BARBITURATES UR QL SCN: NORMAL
BENZODIAZ UR QL SCN: NORMAL
BILIRUB SERPL-MCNC: 0.5 MG/DL (ref 0–1.2)
BUN SERPL-MCNC: 38 MG/DL (ref 6–23)
BUN SERPL-MCNC: 39 MG/DL (ref 6–23)
BZE UR QL SCN: NORMAL
C PEPTIDE SERPL-MCNC: 1.5 NG/ML (ref 0.7–3.9)
CALCIUM SERPL-MCNC: 9.3 MG/DL (ref 8.6–10.6)
CALCIUM SERPL-MCNC: 9.5 MG/DL (ref 8.6–10.6)
CANNABINOIDS UR QL SCN: NORMAL
CHLORIDE SERPL-SCNC: 102 MMOL/L (ref 98–107)
CHLORIDE SERPL-SCNC: 102 MMOL/L (ref 98–107)
CHOLEST SERPL-MCNC: 109 MG/DL (ref 0–199)
CHOLESTEROL/HDL RATIO: 2.7
CO2 SERPL-SCNC: 21 MMOL/L (ref 21–32)
CO2 SERPL-SCNC: 26 MMOL/L (ref 21–32)
CREAT SERPL-MCNC: 2.28 MG/DL (ref 0.5–1.05)
CREAT SERPL-MCNC: 2.31 MG/DL (ref 0.5–1.05)
CREAT UR-MCNC: 57.1 MG/DL (ref 20–320)
FENTANYL+NORFENTANYL UR QL SCN: NORMAL
GFR SERPL CREATININE-BSD FRML MDRD: 23 ML/MIN/1.73M*2
GFR SERPL CREATININE-BSD FRML MDRD: 23 ML/MIN/1.73M*2
GLUCOSE SERPL-MCNC: 345 MG/DL (ref 74–99)
GLUCOSE SERPL-MCNC: 346 MG/DL (ref 74–99)
HDLC SERPL-MCNC: 39.8 MG/DL
LDLC SERPL CALC-MCNC: 7 MG/DL
MICROALBUMIN UR-MCNC: 27.1 MG/L
MICROALBUMIN/CREAT UR: 47.5 UG/MG CREAT
NON HDL CHOLESTEROL: 69 MG/DL (ref 0–149)
OPIATES UR QL SCN: NORMAL
OXYCODONE+OXYMORPHONE UR QL SCN: NORMAL
PCP UR QL SCN: NORMAL
PHOSPHATE SERPL-MCNC: 3.9 MG/DL (ref 2.5–4.9)
POTASSIUM SERPL-SCNC: 4.9 MMOL/L (ref 3.5–5.3)
POTASSIUM SERPL-SCNC: 5 MMOL/L (ref 3.5–5.3)
PROT SERPL-MCNC: 7.1 G/DL (ref 6.4–8.2)
SODIUM SERPL-SCNC: 138 MMOL/L (ref 136–145)
SODIUM SERPL-SCNC: 139 MMOL/L (ref 136–145)
TRIGL SERPL-MCNC: 312 MG/DL (ref 0–149)
VLDL: 62 MG/DL (ref 0–40)

## 2023-11-10 PROCEDURE — 80307 DRUG TEST PRSMV CHEM ANLYZR: CPT

## 2023-11-10 PROCEDURE — 82570 ASSAY OF URINE CREATININE: CPT

## 2023-11-10 PROCEDURE — 80061 LIPID PANEL: CPT

## 2023-11-10 PROCEDURE — 84681 ASSAY OF C-PEPTIDE: CPT

## 2023-11-10 PROCEDURE — 80053 COMPREHEN METABOLIC PANEL: CPT

## 2023-11-10 PROCEDURE — 36415 COLL VENOUS BLD VENIPUNCTURE: CPT

## 2023-11-10 PROCEDURE — 80069 RENAL FUNCTION PANEL: CPT

## 2023-11-10 PROCEDURE — 82043 UR ALBUMIN QUANTITATIVE: CPT

## 2023-11-10 PROCEDURE — 86341 ISLET CELL ANTIBODY: CPT

## 2023-11-10 PROCEDURE — 83519 RIA NONANTIBODY: CPT

## 2023-11-12 LAB — GAD65 AB SER IA-ACNC: <5 IU/ML (ref 0–5)

## 2023-11-13 LAB — ISLET CELL512 AB SER IA-ACNC: <5.4 U/ML (ref 0–7.4)

## 2023-11-20 ENCOUNTER — TELEPHONE (OUTPATIENT)
Dept: PRIMARY CARE | Facility: CLINIC | Age: 63
End: 2023-11-20

## 2023-11-20 NOTE — TELEPHONE ENCOUNTER
PT is calling asking for a refill of their OneTouch Ultra Blue test strips.  Would like them sent to the [facility] pharmacy in her chart.

## 2023-11-27 DIAGNOSIS — I10 PRIMARY HYPERTENSION: Primary | ICD-10-CM

## 2023-11-27 RX ORDER — HYDRALAZINE HYDROCHLORIDE 25 MG/1
25 TABLET, FILM COATED ORAL EVERY 8 HOURS
Qty: 90 TABLET | Refills: 1 | Status: SHIPPED | OUTPATIENT
Start: 2023-11-27 | End: 2024-02-09 | Stop reason: ALTCHOICE

## 2023-11-27 NOTE — TELEPHONE ENCOUNTER
Refilled hydralazine through end of January. Notes say she should get in with  Nephrology beginning of January.

## 2023-12-05 ENCOUNTER — HOSPITAL ENCOUNTER (OUTPATIENT)
Dept: RADIOLOGY | Facility: HOSPITAL | Age: 63
Discharge: HOME | End: 2023-12-05
Payer: COMMERCIAL

## 2023-12-05 DIAGNOSIS — Z12.31 ENCOUNTER FOR SCREENING MAMMOGRAM FOR MALIGNANT NEOPLASM OF BREAST: ICD-10-CM

## 2023-12-05 PROCEDURE — 77063 BREAST TOMOSYNTHESIS BI: CPT | Performed by: RADIOLOGY

## 2023-12-05 PROCEDURE — 77067 SCR MAMMO BI INCL CAD: CPT | Performed by: RADIOLOGY

## 2023-12-05 PROCEDURE — 77063 BREAST TOMOSYNTHESIS BI: CPT

## 2023-12-08 ENCOUNTER — TELEMEDICINE (OUTPATIENT)
Dept: PHARMACY | Facility: HOSPITAL | Age: 63
End: 2023-12-08
Payer: COMMERCIAL

## 2023-12-08 DIAGNOSIS — E11.9 CONTROLLED TYPE 2 DIABETES MELLITUS WITHOUT COMPLICATION, WITH LONG-TERM CURRENT USE OF INSULIN (MULTI): ICD-10-CM

## 2023-12-08 DIAGNOSIS — Z79.4 CONTROLLED TYPE 2 DIABETES MELLITUS WITHOUT COMPLICATION, WITH LONG-TERM CURRENT USE OF INSULIN (MULTI): ICD-10-CM

## 2023-12-08 PROCEDURE — RXMED WILLOW AMBULATORY MEDICATION CHARGE

## 2023-12-08 RX ORDER — DAPAGLIFLOZIN 10 MG/1
10 TABLET, FILM COATED ORAL DAILY
Qty: 30 TABLET | Refills: 11 | Status: SHIPPED | OUTPATIENT
Start: 2023-12-08 | End: 2024-12-07

## 2023-12-08 RX ORDER — SEMAGLUTIDE 0.68 MG/ML
INJECTION, SOLUTION SUBCUTANEOUS
Qty: 3 ML | Refills: 0 | Status: SHIPPED | OUTPATIENT
Start: 2023-12-08 | End: 2024-01-24 | Stop reason: SDUPTHER

## 2023-12-08 NOTE — Clinical Note
Patient completed updated labs placed by endocrinology. C peptide, ZOHAIB and islet antigen 2 ab appear to be normal and indicative of type two picture. Advised to start Ozempic and farxiga. Of note, eGFR is <30 ml/min/1.73m2 (at 23 ml/min/1.73 m2).  recommendation for Farxiga states not to start below 25 ml/min/1.73 m2.

## 2023-12-08 NOTE — PROGRESS NOTES
Patient is sent at the request of Michael Hilario MD for medication management.  My final recommendations will be communicated back to the requesting provider by way of shared medical record.    Subjective     Allergies   Allergen Reactions    Other Rash     Novocaine Solution, reaction rash     Penicillins Rash    Procaine Rash     Cardiovascular risk factors include diabetes mellitus, hypertension, obesity (BMI >= 30 kg/m2), and CHF, CAD, clinical ASCVD . The patient is not on an ACE inhibitor or angiotensin II receptor blocker.      Current monitoring regimen:   Patient is using: continuous glucose monitor - Freestyle Filipe 2 with Freestyle Filipe 2 reader    Reports glucose on 12/7/2023 as 173 mg/dL in a fasting state and 240 mg/dL in the afternoon.     CGM Data - Not available during our encounter on 12/8/2023    Not connected to [employer] portal     Objective     Diabetes Pharmacotherapy:    Ozempic - Not started as of 12/8/2023  Farxiga 10 mg - Not started as of 12/8/2023  Toujeo Max Solostar: Inject 39 units in the morning and 39 units in the evening     Insulin injections: Insulin dosage review with Nelida suggested compliance all of the time.    Pre-breakfast Humalog 22 units plus SSI   Pre-lunch Humalog 22 units plus SSI   Pre-dinner Humalog 22 units plus SSI   Bedtime None     Sliding Scale:   <150 - 0 units  151-200 - 2 units   201-250 - 4 units   251-300 - 6 units   301-350 - 8 units   351-400 -10 units  401 - 12 units    Historical Pharmacotherapy:  Metformin     Secondary Prevention:   - Statin? Yes  - ACE-I/ARB? No  - Aspirin? Yes    Pertinent PMH Review: Per chart review  - PMH of Pancreatitis: No  - PMH of Retinopathy: No  - PMH of Urinary Tract Infections: Yes  - PMH of MTC: No    Labs:   Lab Results   Component Value Date    BILITOT 0.5 11/10/2023    CALCIUM 9.3 11/10/2023    CO2 21 11/10/2023     11/10/2023    CREATININE 2.31 (H) 11/10/2023    GLUCOSE 345 (H) 11/10/2023    ALKPHOS  102 11/10/2023    K 5.0 11/10/2023    PROT 7.1 11/10/2023     11/10/2023    AST 14 11/10/2023    ALT 10 11/10/2023    BUN 39 (H) 11/10/2023    ANIONGAP 20 11/10/2023    MG 2.42 (H) 09/12/2023    PHOS 3.9 11/10/2023     03/31/2023    ALBUMIN 4.4 11/10/2023    LIPASE 15 09/07/2023    GFRF 24 (A) 09/19/2023    GFRMALE CANCELED 03/22/2023     Lab Results   Component Value Date    TRIG 312 (H) 11/10/2023    CHOL 109 11/10/2023    LDLCALC 7 11/10/2023    HDL 39.8 11/10/2023     Lab Results   Component Value Date    HGBA1C 8.4 (H) 09/27/2023    HGBA1C 9.0 (A) 09/07/2023    HGBA1C CANCELED 09/07/2023     Component      Latest Ref Rng 11/10/2023   Albumin, Urine Random      Not established mg/L 27.1    Creatinine, Urine Random      20.0 - 320.0 mg/dL 57.1    Albumin/Creatine Ratio      <30.0 ug/mg Creat 47.5 (H)       Component      Latest Ref Rng 11/10/2023   C-Peptide      0.7 - 3.9 ng/mL 1.5    Glutamic Acid Decarb Ab      0.0 - 5.0 IU/mL <5.0    Islet Antigen-2 Antibody      0.0 - 7.4 U/mL <5.4      The ASCVD Risk score (Clinton DK, et al., 2019) failed to calculate for the following reasons:    The patient has a prior MI or stroke diagnosis    Health Maintenance:   Lipid Panel: 11/10/2023 - 7 mg/dL  Urine Albumin/Creatinine Ratio: 47.5 mcg/mg (Microalbuminuria)    Assessment/Plan   Problem List Items Addressed This Visit       Diabetes mellitus type 2, controlled, without complications (CMS/HCC)    Relevant Medications    semaglutide (Ozempic) 0.25 mg or 0.5 mg (2 mg/3 mL) pen injector    dapagliflozin propanediol (Farxiga) 10 mg     Patients diabetes is poorly controlled with most recent A1c of 8.4% (Goal < 7%).   Initiate:   Ozempic 0.25 mg/0.5 mg: Inject 0.25 mg under the skin weekly for four weeks then inject 0.5 mg thereafter   Farxiga 10 mg: Take one tablet by mouth once daily QAM  Renal function currently <25 ml/min/1.73 m2   She is currently monitoring her glucose via glucosestick readings. She notes  that her most recently freestyle kenyon sensor fell off. She will go to [facility] to have this refilled.  eGFR <30 ml/min/1.73 m2   Compliance at present is estimated to be fair.   Follow-up: I recommend diabetes care be  12/13/2023 .  [facility] Follow-Up: 2/9/2024    Nima Santamaria, PharmD    Continue all meds under the continuation of care with the referring provider and clinical pharmacy team.

## 2023-12-08 NOTE — PATIENT INSTRUCTIONS
Advised to pickup new start medications:   - Ozempic 0.25 mg/0.5 mg: Inject 0.25 mg under the skin weekly for four weeks then inject 0.5 mg thereafter   - Farxiga 10 mg: Take one tablet by mouth once daily QAM    Once new prescriptions are picked up please contact me at [phone] to discuss medication administration and dosing instructions.     Continue basal/bolus insulin regimen as prescribed at this time.  a new prescription for Filipe 2 sensors.

## 2023-12-10 DIAGNOSIS — K21.9 GASTROESOPHAGEAL REFLUX DISEASE WITHOUT ESOPHAGITIS: ICD-10-CM

## 2023-12-10 DIAGNOSIS — Z79.4 TYPE 2 DIABETES MELLITUS WITH HYPERGLYCEMIA, WITH LONG-TERM CURRENT USE OF INSULIN (MULTI): Primary | ICD-10-CM

## 2023-12-10 DIAGNOSIS — H40.20X0 PRIMARY ANGLE CLOSURE GLAUCOMA OF BOTH EYES, UNSPECIFIED GLAUCOMA STAGE, UNSPECIFIED PRIMARY ANGLE-CLOSURE GLAUCOMA TYPE: ICD-10-CM

## 2023-12-10 DIAGNOSIS — E11.65 TYPE 2 DIABETES MELLITUS WITH HYPERGLYCEMIA, WITH LONG-TERM CURRENT USE OF INSULIN (MULTI): Primary | ICD-10-CM

## 2023-12-11 ENCOUNTER — PHARMACY VISIT (OUTPATIENT)
Dept: PHARMACY | Facility: CLINIC | Age: 63
End: 2023-12-11
Payer: COMMERCIAL

## 2023-12-12 DIAGNOSIS — R92.0 MICROCALCIFICATION OF RIGHT BREAST ON MAMMOGRAM: Primary | ICD-10-CM

## 2023-12-12 NOTE — RESULT ENCOUNTER NOTE
Spoke w/ patient over the phone. R breast w/ microcalcifications, requiring spot magnification views. Ordered both diagnostic mammogram and ultrasound of the right breast. Patient advised to call and schedule these imaging. Patient agreeable to plan.

## 2023-12-13 ENCOUNTER — TELEMEDICINE (OUTPATIENT)
Dept: PHARMACY | Facility: HOSPITAL | Age: 63
End: 2023-12-13
Payer: COMMERCIAL

## 2023-12-13 DIAGNOSIS — I21.4 NSTEMI, INITIAL EPISODE OF CARE (MULTI): ICD-10-CM

## 2023-12-13 DIAGNOSIS — I25.10 ARTERIOSCLEROTIC CARDIOVASCULAR DISEASE (ASCVD): ICD-10-CM

## 2023-12-13 DIAGNOSIS — I10 HYPERTENSION, UNCONTROLLED: ICD-10-CM

## 2023-12-13 DIAGNOSIS — E11.9 CONTROLLED TYPE 2 DIABETES MELLITUS WITHOUT COMPLICATION, WITH LONG-TERM CURRENT USE OF INSULIN (MULTI): Primary | ICD-10-CM

## 2023-12-13 DIAGNOSIS — Z86.718 HISTORY OF DVT (DEEP VEIN THROMBOSIS): ICD-10-CM

## 2023-12-13 DIAGNOSIS — I50.32 CHRONIC HEART FAILURE WITH PRESERVED EJECTION FRACTION (MULTI): ICD-10-CM

## 2023-12-13 DIAGNOSIS — Z79.4 CONTROLLED TYPE 2 DIABETES MELLITUS WITHOUT COMPLICATION, WITH LONG-TERM CURRENT USE OF INSULIN (MULTI): Primary | ICD-10-CM

## 2023-12-13 NOTE — PROGRESS NOTES
Patient is sent at the request of Michael Hilario MD for medication management.  My final recommendations will be communicated back to the requesting provider by way of shared medical record.    Subjective     Allergies   Allergen Reactions    Other Rash     Novocaine Solution, reaction rash     Penicillins Rash    Procaine Rash     Cardiovascular risk factors include diabetes mellitus, hypertension, obesity (BMI >= 30 kg/m2), and CHF, CAD, clinical ASCVD . The patient is not on an ACE inhibitor or angiotensin II receptor blocker.      Current monitoring regimen:   Patient is using: continuous glucose monitor - Freestyle Filipe 2 with Freestyle Filipe 2 reader    12/13/2023 - 234 mg/dL  1:50 pm - 123 mg/dL (Last meal - 10 am)    Not connected to [employer] portal     Objective     Diabetes Pharmacotherapy:    Ozempic 0.25 mg/0.5 mg: Inject 0.25 mg under the skin once weekly x 4 weeks then 0.5 mg thereafter. (Therapy not started as of 12/13/2023)  Farxiga 10 mg: Take one tablet by mouth once daily (Therapy started on 12/13/2023)  Toujeo Max Solostar: Inject 39 units in the morning and 39 units in the evening     Insulin injections: Insulin dosage review with Nelida suggested compliance all of the time.    Pre-breakfast Humalog 22 units plus SSI   Pre-lunch Humalog 22 units plus SSI   Pre-dinner Humalog 22 units plus SSI   Bedtime None     Sliding Scale:   <150 - 0 units  151-200 - 2 units   201-250 - 4 units   251-300 - 6 units   301-350 - 8 units   351-400 -10 units  401 - 12 units    Historical Pharmacotherapy:  Metformin     Secondary Prevention:   - Statin? Yes  - ACE-I/ARB? No  - Aspirin? Yes    Pertinent PMH Review: Per chart review  - PMH of Pancreatitis: No  - PMH of Retinopathy: No  - PMH of Urinary Tract Infections: Yes  - PMH of MTC: No    Labs:   Lab Results   Component Value Date    BILITOT 0.5 11/10/2023    CALCIUM 9.3 11/10/2023    CO2 21 11/10/2023     11/10/2023    CREATININE 2.31 (H)  11/10/2023    GLUCOSE 345 (H) 11/10/2023    ALKPHOS 102 11/10/2023    K 5.0 11/10/2023    PROT 7.1 11/10/2023     11/10/2023    AST 14 11/10/2023    ALT 10 11/10/2023    BUN 39 (H) 11/10/2023    ANIONGAP 20 11/10/2023    MG 2.42 (H) 09/12/2023    PHOS 3.9 11/10/2023     03/31/2023    ALBUMIN 4.4 11/10/2023    LIPASE 15 09/07/2023    GFRF 24 (A) 09/19/2023    GFRMALE CANCELED 03/22/2023     Lab Results   Component Value Date    TRIG 312 (H) 11/10/2023    CHOL 109 11/10/2023    LDLCALC 7 11/10/2023    HDL 39.8 11/10/2023     Lab Results   Component Value Date    HGBA1C 8.4 (H) 09/27/2023    HGBA1C 9.0 (A) 09/07/2023    HGBA1C CANCELED 09/07/2023     Component      Latest Ref Rng 11/10/2023   Albumin, Urine Random      Not established mg/L 27.1    Creatinine, Urine Random      20.0 - 320.0 mg/dL 57.1    Albumin/Creatine Ratio      <30.0 ug/mg Creat 47.5 (H)       Component      Latest Ref Rng 11/10/2023   C-Peptide      0.7 - 3.9 ng/mL 1.5    Glutamic Acid Decarb Ab      0.0 - 5.0 IU/mL <5.0    Islet Antigen-2 Antibody      0.0 - 7.4 U/mL <5.4      The ASCVD Risk score (Adrianne DK, et al., 2019) failed to calculate for the following reasons:    The patient has a prior MI or stroke diagnosis    Health Maintenance:   Lipid Panel: 11/10/2023 - 7 mg/dL  Urine Albumin/Creatinine Ratio: 47.5 mcg/mg (Microalbuminuria)    Assessment/Plan   Problem List Items Addressed This Visit       (HFpEF) heart failure with preserved ejection fraction (CMS/HCC)    Arteriosclerotic cardiovascular disease (ASCVD)    NSTEMI, initial episode of care (CMS/[facility])    Hypertension, uncontrolled    Diabetes mellitus type 2, controlled, without complications (CMS/HCC) - Primary    History of DVT (deep vein thrombosis) (Chronic)     Patients diabetes is poorly controlled with most recent A1c of 8.4% (Goal < 7%).   Initiate:   Ozempic 0.25 mg/0.5 mg: Inject 0.25 mg under the skin weekly for four weeks then inject 0.5 mg thereafter.  Patient  confirmed understanding of medication dosing and administration instructions. Noted that ozempic injections are similar to her current administration techinque for humalog/toujeo.  Patient notes she will complete her injections on Sundays starting 12/17/2023.  I will follow up after her first four doses to review tolerability and efficacy  Farxiga 10 mg: Take one tablet by mouth once daily QAM  Renal function currently <25 ml/min/1.73 m2   Continue: Toujeo Max solostar and Humalog as prescribed  Compliance at present is estimated to be fair.   Education:  Injection days will be on Sundays  Ozempic Education:   Counseled patient on Ozempic MOA, expectations, side effects, duration of therapy, administration, and monitoring parameters.  Provided detailed dosing and administration counseling to ensure proper technique.   Reviewed Ozempic titration schedule, starting with 0.25 mg once weekly for 4 weeks, then continuing on 0.5 mg once weekly. Pt verbalized understanding.  Counseled patient on the benefits of GLP-1ra, such as cardiovascular risk reduction, glycemic control, and weight loss potential.  Reviewed storage requirements of Ozempic when not in use, and when to administer the medication if a dose is missed.  Advised patient that they may experience improved satiety after meals and portion sizes of meals may be reduced as doses of Ozempic increase.  Counseled patient to avoid foods that are fatty/oily as this may precipitate the nausea/GI upset that may occur with new start Ozempic.   Follow-up: I recommend diabetes care be  1/10/2024 .  [facility] Follow-Up: 2/9/2024    Nima Santamaria, PharmD    Continue all meds under the continuation of care with the referring provider and clinical pharmacy team.

## 2023-12-13 NOTE — PATIENT INSTRUCTIONS
Completed Diabetic Education for the administration of once-weekly Ozempic:    1. Instructed patient that Ozempic must be kept refrigerated, if necessary a pen may be kept at room temperature for up to 56 days.   2. Remove the pen from the refrigerator and check the name and colored label to ensure you have the right medication and the right strength.   3. Prior to administration, wash and rinse your hands with soap and water.  4. Pull the cap off of the pen and check to ensure the Ozempic medication is clear and colorless.   5. When ready to give your injection, wipe off the tip of the pen with an alcohol swab, then attach a new needle to your pen by pushing it straight onto the pen and turning until it is tight. Remove the outer needle cap and do not throw it away. Then remove the inner needle cap which can be thrown away.   6. With each new pen, turn the dial until you reach the flow check symbol (two dots). Press and hold the dose button until the dose counter shows 0. A drop of medication should appear at the tip of the needle. If a drop of medication does not appear repeat this step up to 6 times. If there is still no drop change the needle to a new one and repeat this step one more time. If a drop still does not appear, do not use the pen and contact the .    7. Next, choose your injection site (You may inject into your abdomen, thigh, or the fatty part of the arm) and wipe it with an alcohol swab. Rotate your injection site each week. You may use the same area of your body but be sure to choose a different injection site in that area.   8. Turn the dose selector until the dose counter shows your correct dose.   9. Insert the needle into your skin, then press and hold down the dose button until the dose counter shows 0.   10. Hold the needle in your skin and count to 6 after the dose counter has reached 0.  11. Remove the needle from your skin, replace the outer needle cap, then twist the needle  off and place it in a sharps container.   12. Put the pen cap back on the Ozempic pen and store the used pen at room temperature for up to 56 days.  13. When finished with a pen, dispose of the whole pen into a sharps container.  14. Injections should be done on the same day every week. If a dose is missed, administer Ozempic as soon as possible within 5 days after the missed dose. If more than 5 days have passed, skip the missed dose and administer the next dose on the regularly scheduled day.

## 2023-12-14 RX ORDER — LATANOPROST 50 UG/ML
1 SOLUTION/ DROPS OPHTHALMIC NIGHTLY
Qty: 30 ML | Refills: 1 | Status: SHIPPED | OUTPATIENT
Start: 2023-12-14 | End: 2024-02-12

## 2023-12-14 RX ORDER — PANTOPRAZOLE SODIUM 40 MG/1
40 TABLET, DELAYED RELEASE ORAL
Qty: 90 TABLET | Refills: 3 | Status: SHIPPED | OUTPATIENT
Start: 2023-12-14 | End: 2024-12-13

## 2023-12-19 ENCOUNTER — HOSPITAL ENCOUNTER (OUTPATIENT)
Dept: RADIOLOGY | Facility: HOSPITAL | Age: 63
Discharge: HOME | End: 2023-12-19
Payer: COMMERCIAL

## 2023-12-19 DIAGNOSIS — R92.0 MICROCALCIFICATION OF RIGHT BREAST ON MAMMOGRAM: ICD-10-CM

## 2023-12-19 PROCEDURE — 77065 DX MAMMO INCL CAD UNI: CPT | Mod: RT

## 2023-12-19 PROCEDURE — 77065 DX MAMMO INCL CAD UNI: CPT | Mod: RIGHT SIDE | Performed by: STUDENT IN AN ORGANIZED HEALTH CARE EDUCATION/TRAINING PROGRAM

## 2024-01-04 ENCOUNTER — APPOINTMENT (OUTPATIENT)
Dept: NEPHROLOGY | Facility: CLINIC | Age: 64
End: 2024-01-04
Payer: COMMERCIAL

## 2024-01-10 ENCOUNTER — TELEMEDICINE (OUTPATIENT)
Dept: PHARMACY | Facility: HOSPITAL | Age: 64
End: 2024-01-10
Payer: COMMERCIAL

## 2024-01-10 DIAGNOSIS — I50.32 CHRONIC HEART FAILURE WITH PRESERVED EJECTION FRACTION (MULTI): ICD-10-CM

## 2024-01-10 DIAGNOSIS — E11.9 CONTROLLED TYPE 2 DIABETES MELLITUS WITHOUT COMPLICATION, WITH LONG-TERM CURRENT USE OF INSULIN (MULTI): Primary | ICD-10-CM

## 2024-01-10 DIAGNOSIS — I21.4 NSTEMI, INITIAL EPISODE OF CARE (MULTI): ICD-10-CM

## 2024-01-10 DIAGNOSIS — I25.10 ARTERIOSCLEROTIC CARDIOVASCULAR DISEASE (ASCVD): ICD-10-CM

## 2024-01-10 DIAGNOSIS — Z86.718 HISTORY OF DVT (DEEP VEIN THROMBOSIS): ICD-10-CM

## 2024-01-10 DIAGNOSIS — I10 HYPERTENSION, UNCONTROLLED: ICD-10-CM

## 2024-01-10 DIAGNOSIS — Z79.4 CONTROLLED TYPE 2 DIABETES MELLITUS WITHOUT COMPLICATION, WITH LONG-TERM CURRENT USE OF INSULIN (MULTI): Primary | ICD-10-CM

## 2024-01-10 NOTE — PROGRESS NOTES
Patient is sent at the request of Michael Hilario MD for medication management.  My final recommendations will be communicated back to the requesting provider by way of shared medical record.    Yvette Mata is a 63 y.o. year old female patient with  long-standing uncontrolled type two diabetes mellitus complicated by microalbuminuria, CKD, HFpEF, NAFLD, CAD s/p PCI, SVC thrombus (Apixaban), Hx DVT, MICHAEL, CVA referred for medication management following CINEMA visit. Since her initial CINEMA encounter she has initiated therapy with Ozempic and Farxiga therapy. On 2023 she presented to [facility] for concerns related to CLARIBEL. At the time of her ED visit she presented with SBP in the low 70s and was raised with IV fluid hydration. She was discontinued on hydralazine and metoprolol therapy.         Allergies   Allergen Reactions    Other Rash     Novocaine Solution, reaction rash     Penicillins Rash    Procaine Rash     Cardiovascular risk factors include diabetes mellitus, hypertension, obesity (BMI >= 30 kg/m2), and CHF, CAD, clinical ASCVD . The patient is not on an ACE inhibitor or angiotensin II receptor blocker.      Diet/Exercise:  Has increase fluid intake  1 meal per day - States that she is only hungry for one meal during the day   She will eat on fruits and vegetables throughout the day  Notes overall reduced appetite     Current monitoring regimen:   Patient is using: continuous glucose monitor - Freestyle Filipe 2 with Freestyle Filipe 2 reader    Not connected to [employer] portal     Tries to check her blood glucose four times per day    Patient reported range: 150 mg/dL - 250 mg/dL  Reports Low Glucose: 60 mg/dL - Two episodes in the evening    Current HTN monitorin/77 mmHg on 1/15/2024     MEDICATION RECONCILIATION  Therapy stopped:   Hydralazine 25 mg TID-  Discontinued by outside provider   Metoprolol succinate 50 mg - Discontinued by outside  provider    Adverse Effects:   Reports worsening side effect of itching all over her body   Utilizes hydroxyzine 25 mg BID for itching  No reported GI adverse effects    Objective     Diabetes Pharmacotherapy:    Ozempic 0.25 mg/0.5 mg: Inject 0.25 mg under the skin once weekly x 4 weeks then 0.5 mg thereafter on Sundays  Farxiga 10 mg: Take one tablet by mouth once daily  Toujeo solostar : Inject 39 units once daily in the morning     Insulin injections: Insulin dosage review with Nelida suggested compliance all of the time.    Pre-breakfast Humalog 22 units plus SSI   Pre-lunch Humalog 22 units plus SSI   Pre-dinner Humalog 22 units plus SSI   Bedtime None     Sliding Scale:   <150 - 0 units  151-200 - 2 units   201-250 - 4 units   251-300 - 6 units   301-350 - 8 units   351-400 -10 units  401 - 12 units    Historical DM Pharmacotherapy:  Metformin - Not appropriate with renal function     Current HTN Pharmacotherapy:   Carvedilol 25 mg: Take one tablet by mouth twice daily  Isosorbide Mononitrate ER   Secondary Prevention:   - Statin? Yes  - ACE-I/ARB? No  - Aspirin? Yes    Pertinent PMH Review: Per chart review  - PMH of Pancreatitis: No  - PMH of Retinopathy: No  - PMH of Urinary Tract Infections: Yes  - PMH of MTC: No    Labs:   Lab Results   Component Value Date    BILITOT 0.5 11/10/2023    CALCIUM 9.3 11/10/2023    CO2 21 11/10/2023     11/10/2023    CREATININE 2.31 (H) 11/10/2023    GLUCOSE 345 (H) 11/10/2023    ALKPHOS 102 11/10/2023    K 5.0 11/10/2023    PROT 7.1 11/10/2023     11/10/2023    AST 14 11/10/2023    ALT 10 11/10/2023    BUN 39 (H) 11/10/2023    ANIONGAP 20 11/10/2023    MG 2.42 (H) 09/12/2023    PHOS 3.9 11/10/2023     03/31/2023    ALBUMIN 4.4 11/10/2023    LIPASE 15 09/07/2023    GFRF 24 (A) 09/19/2023    GFRMALE CANCELED 03/22/2023     Lab Results   Component Value Date    TRIG 312 (H) 11/10/2023    CHOL 109 11/10/2023    LDLCALC 7 11/10/2023    HDL 39.8 11/10/2023      Lab Results   Component Value Date    HGBA1C 8.4 (H) 09/27/2023    HGBA1C 9.0 (A) 09/07/2023    HGBA1C CANCELED 09/07/2023     Component      Latest Ref Rng 11/10/2023   Albumin, Urine Random      Not established mg/L 27.1    Creatinine, Urine Random      20.0 - 320.0 mg/dL 57.1    Albumin/Creatine Ratio      <30.0 ug/mg Creat 47.5 (H)       Component      Latest Ref Rng 11/10/2023   C-Peptide      0.7 - 3.9 ng/mL 1.5    Glutamic Acid Decarb Ab      0.0 - 5.0 IU/mL <5.0    Islet Antigen-2 Antibody      0.0 - 7.4 U/mL <5.4      The ASCVD Risk score (Adrianne POTTS, et al., 2019) failed to calculate for the following reasons:    The patient has a prior MI or stroke diagnosis    Health Maintenance:   Lipid Panel: 11/10/2023 - 7 mg/dL  Urine Albumin/Creatinine Ratio: 47.5 mcg/mg (Microalbuminuria)    Assessment/Plan   Problem List Items Addressed This Visit       (HFpEF) heart failure with preserved ejection fraction (CMS/[facility])    Relevant Medications    carvedilol (Coreg) 25 mg tablet    Arteriosclerotic cardiovascular disease (ASCVD)    NSTEMI, initial episode of care (CMS/[facility])    Relevant Medications    carvedilol (Coreg) 25 mg tablet    Hypertension, uncontrolled    Diabetes mellitus type 2, controlled, without complications (CMS/[facility]) - Primary    History of DVT (deep vein thrombosis) (Chronic)     Patients diabetes is poorly controlled with most recent A1c of 8.5% on 12/28/2023 compared to previous elevation 8.4% on 9/27/2023 (Goal < 7%). Patient presented to ED on 12/28/2023, eGFR 29 ml/min/1.73 m2 (Scr 1.93 mg/dL and BUN elevated at 44 mg/dL).  Continue:   Ozempic 0.25 mg/0.5 mg: Inject 0.5 mg under the skin   Patient advised to increase dosing starting Sunday 1/13/2024 for her next injection  Patient is aware for her upcoming injection and the following week to inject 0.5 mg.  Patient verbalized understanding and noted over the phone that she recognized the 0.5 mg dose on her device.  Farxiga 10 mg: Take one  tablet by mouth once daily QAM  Renal function currently <25 ml/min/1.73 m2   Toujeo Max solostar: Inject 44 units under the skin once daily in the morning   Humalog 22 units plus SSI  Compliance at present is estimated to be fair.   Follow-up: I recommend diabetes care be  1/24/2024 .  [facility] Follow-Up: 2/9/2024    Nima Santamaria, PharmD    Continue all meds under the continuation of care with the referring provider and clinical pharmacy team.

## 2024-01-15 RX ORDER — CARVEDILOL 25 MG/1
25 TABLET ORAL
COMMUNITY

## 2024-01-18 ENCOUNTER — APPOINTMENT (OUTPATIENT)
Dept: NEPHROLOGY | Facility: CLINIC | Age: 64
End: 2024-01-18
Payer: COMMERCIAL

## 2024-01-19 PROCEDURE — RXMED WILLOW AMBULATORY MEDICATION CHARGE

## 2024-01-22 ENCOUNTER — PHARMACY VISIT (OUTPATIENT)
Dept: PHARMACY | Facility: CLINIC | Age: 64
End: 2024-01-22
Payer: COMMERCIAL

## 2024-01-24 ENCOUNTER — TELEMEDICINE (OUTPATIENT)
Dept: PHARMACY | Facility: HOSPITAL | Age: 64
End: 2024-01-24
Payer: COMMERCIAL

## 2024-01-24 DIAGNOSIS — I50.32 CHRONIC HEART FAILURE WITH PRESERVED EJECTION FRACTION (MULTI): ICD-10-CM

## 2024-01-24 DIAGNOSIS — Z79.4 CONTROLLED TYPE 2 DIABETES MELLITUS WITHOUT COMPLICATION, WITH LONG-TERM CURRENT USE OF INSULIN (MULTI): Primary | ICD-10-CM

## 2024-01-24 DIAGNOSIS — I21.4 NSTEMI, INITIAL EPISODE OF CARE (MULTI): ICD-10-CM

## 2024-01-24 DIAGNOSIS — I10 HYPERTENSION, UNCONTROLLED: ICD-10-CM

## 2024-01-24 DIAGNOSIS — I25.10 ARTERIOSCLEROTIC CARDIOVASCULAR DISEASE (ASCVD): ICD-10-CM

## 2024-01-24 DIAGNOSIS — Z86.718 HISTORY OF DVT (DEEP VEIN THROMBOSIS): ICD-10-CM

## 2024-01-24 DIAGNOSIS — E11.9 CONTROLLED TYPE 2 DIABETES MELLITUS WITHOUT COMPLICATION, WITH LONG-TERM CURRENT USE OF INSULIN (MULTI): Primary | ICD-10-CM

## 2024-01-24 PROCEDURE — RXMED WILLOW AMBULATORY MEDICATION CHARGE

## 2024-01-24 RX ORDER — SEMAGLUTIDE 0.68 MG/ML
0.5 INJECTION, SOLUTION SUBCUTANEOUS
Qty: 3 ML | Refills: 1 | Status: SHIPPED | OUTPATIENT
Start: 2024-01-24 | End: 2024-02-16 | Stop reason: ALTCHOICE

## 2024-01-24 NOTE — PROGRESS NOTES
Patient is sent at the request of Michael Hilario MD for medication management.  My final recommendations will be communicated back to the requesting provider by way of shared medical record.    Yvette Mata is a 63 y.o. year old female patient with  long-standing uncontrolled type two diabetes mellitus complicated by microalbuminuria, CKD, HFpEF, NAFLD, CAD s/p PCI, SVC thrombus (Apixaban), Hx DVT, MICHAEL, CVA referred for medication management following CINEMA visit. Since her initial CINEMA encounter she has initiated therapy with Ozempic and Farxiga therapy. On 12/28/2023 she presented to [facility] for concerns related to CLARIBEL. At the time of her ED visit she presented with SBP in the low 70s and was raised with IV fluid hydration. She was discontinued on hydralazine and metoprolol therapy. Today we are following up to review her home glucose and blood pressure readings.      Allergies   Allergen Reactions    Other Rash     Novocaine Solution, reaction rash     Penicillins Rash    Procaine Rash     Cardiovascular risk factors include diabetes mellitus, hypertension, obesity (BMI >= 30 kg/m2), and CHF, CAD, clinical ASCVD . The patient is not on an ACE inhibitor or angiotensin II receptor blocker.      Diet/Exercise:  1 meal per day - States that she is only hungry for one meal during the day  Today she reports that she ate breakfast - she had noodles. She states she will not eat any other food throughout   She will eat on fruits and vegetables throughout the day, does not endorse any other snacking throughout the day  Notes overall reduced appetite and infrequent snacking of unhealthy foods  Patient could utilize nutritional counseling    Current monitoring regimen:   Patient is using: continuous glucose monitor - Freestyle Filipe 2 with Freestyle Filipe 2 reader - Not connected to [employer] portal     Glucose on Filipe at 1:40 pm: 211 mg/dL     Fingerstick readings provided below while  patient did not have a sensor placed    Date FBG   1/16/2024 339   1/17/2024 211   1/18/2024 235   1/19/2024 245   1/20/2024 233   1/21/2024 215   1/22/2024 216   1/23/2024 168   1/24/2024 165   Average 225     Hypoglycemia Reported: No reported hypoglycemia since our last encounter    Current HTN monitoring:   Date SBP DBP HR   1/16/2024 1/17/2024 132 79 89   1/18/2024 147 72 87   1/19/2024 137 78 80   1/20/2024 117 64 84   1/21/2024 142 79 81   1/22/2024 116 55 79   1/23/2024 152 79 81   1/24/2024 145 78 78   Average 136 73 82.375     MEDICATION RECONCILIATION  No changes to medication therapy today    Adverse Effects:   No reported GI adverse effects    Objective     Diabetes Pharmacotherapy:    Ozempic 0.25 mg/0.5 mg: Inject 0.5 mg under the skin once weekly  Farxiga 10 mg: Take one tablet by mouth once daily  Toujeo solostar : Inject 44 units once daily in the morning     Insulin injections: Insulin dosage review with Nelida suggested compliance all of the time.    - Patient states that she will NOT inject her insulin if she is not consuming a meal  - We reviewed today for example that she consumed breakfast. She completed both her Toujeo injection and pre-breakfast dose of humalog + SSI.  Pre-breakfast Humalog 22 units plus SSI   Pre-lunch Humalog 22 units plus SSI   Pre-dinner Humalog 22 units plus SSI   Bedtime None     Sliding Scale:   <150 - 0 units  151-200 - 2 units   201-250 - 4 units   251-300 - 6 units   301-350 - 8 units   351-400 -10 units  401 - 12 units    Patient verbalized and read off sliding scale that she has at home (1/24/2024)    Historical DM Pharmacotherapy:  Metformin - Not appropriate with renal function     Current HTN Pharmacotherapy:   Carvedilol 25 mg: Take one tablet by mouth twice daily  Isosorbide Mononitrate ER 60 mg once daily    Secondary Prevention:   - Statin? Yes  - ACE-I/ARB? No  - Aspirin? Yes    Pertinent PMH Review: Per chart review  - PMH of Pancreatitis:  No  - PMH of Retinopathy: No  - PMH of Urinary Tract Infections: Yes  - PMH of MTC: No    Labs:   Lab Results   Component Value Date    BILITOT 0.5 11/10/2023    CALCIUM 9.3 11/10/2023    CO2 21 11/10/2023     11/10/2023    CREATININE 2.31 (H) 11/10/2023    GLUCOSE 345 (H) 11/10/2023    ALKPHOS 102 11/10/2023    K 5.0 11/10/2023    PROT 7.1 11/10/2023     11/10/2023    AST 14 11/10/2023    ALT 10 11/10/2023    BUN 39 (H) 11/10/2023    ANIONGAP 20 11/10/2023    MG 2.42 (H) 09/12/2023    PHOS 3.9 11/10/2023     03/31/2023    ALBUMIN 4.4 11/10/2023    LIPASE 15 09/07/2023    GFRF 24 (A) 09/19/2023    GFRMALE CANCELED 03/22/2023     Lab Results   Component Value Date    TRIG 312 (H) 11/10/2023    CHOL 109 11/10/2023    LDLCALC 7 11/10/2023    HDL 39.8 11/10/2023     Lab Results   Component Value Date    HGBA1C 8.5 (H) 12/28/2023    HGBA1C 8.4 (H) 09/27/2023    HGBA1C 9.0 (A) 09/07/2023     Component      Latest Ref Rng 11/10/2023   Albumin, Urine Random      Not established mg/L 27.1    Creatinine, Urine Random      20.0 - 320.0 mg/dL 57.1    Albumin/Creatine Ratio      <30.0 ug/mg Creat 47.5 (H)       Component      Latest Ref Rng 11/10/2023   C-Peptide      0.7 - 3.9 ng/mL 1.5    Glutamic Acid Decarb Ab      0.0 - 5.0 IU/mL <5.0    Islet Antigen-2 Antibody      0.0 - 7.4 U/mL <5.4      The ASCVD Risk score (Adrianne DK, et al., 2019) failed to calculate for the following reasons:    The patient has a prior MI or stroke diagnosis    Health Maintenance:   Lipid Panel: 11/10/2023 - 7 mg/dL  Urine Albumin/Creatinine Ratio: 47.5 mcg/mg (Microalbuminuria)    Assessment/Plan   Problem List Items Addressed This Visit       (HFpEF) heart failure with preserved ejection fraction (CMS/[facility])    Arteriosclerotic cardiovascular disease (ASCVD)    NSTEMI, initial episode of care (CMS/[facility])    Hypertension, uncontrolled    Diabetes mellitus type 2, controlled, without complications (CMS/[facility]) - Primary    Relevant  Medications    semaglutide (Ozempic) 0.25 mg or 0.5 mg (2 mg/3 mL) pen injector    History of DVT (deep vein thrombosis) (Chronic)     Patients diabetes is poorly controlled with most recent A1c of 8.5% on 12/28/2023 compared to previous elevation 8.4% on 9/27/2023 (Goal < 7%). Advised to continue with Ozempic 0.5 mg once weekly as patient reports only eating approximately one meal per day. Fingerstick glucose readings were provided during today's encounter which are overall above goal. I have elected to increase her Toujeo to 50 units daily and continue all other medication doses.  Initiate:   Toujeo Max solostar: Inject 50 units under the skin once daily in the morning (Previously injecting 44 units once daily)  Patient verbalized and provided read back of instructions  Continue:   Ozempic 0.25 mg/0.5 mg: Inject 0.5 mg under the skin   Farxiga 10 mg: Take one tablet by mouth once daily QAM  Renal function currently <25 ml/min/1.73 m2   Humalog 22 units plus SSI with meals  Compliance at present is estimated to be fair.   Follow-up: I recommend diabetes care be  2/16/2024 at 1:30 pm .  [facility] Follow-Up: 2/9/2024    Nima Santamaria, PharmD    Continue all meds under the continuation of care with the referring provider and clinical pharmacy team.

## 2024-01-29 ENCOUNTER — PHARMACY VISIT (OUTPATIENT)
Dept: PHARMACY | Facility: CLINIC | Age: 64
End: 2024-01-29
Payer: COMMERCIAL

## 2024-01-29 DIAGNOSIS — E11.9 CONTROLLED TYPE 2 DIABETES MELLITUS WITHOUT COMPLICATION, WITHOUT LONG-TERM CURRENT USE OF INSULIN (MULTI): Primary | ICD-10-CM

## 2024-02-02 ENCOUNTER — APPOINTMENT (OUTPATIENT)
Dept: LAB | Facility: HOSPITAL | Age: 64
End: 2024-02-02
Payer: COMMERCIAL

## 2024-02-02 ENCOUNTER — LAB (OUTPATIENT)
Dept: LAB | Facility: LAB | Age: 64
End: 2024-02-02
Payer: COMMERCIAL

## 2024-02-02 DIAGNOSIS — Z79.4 CONTROLLED TYPE 2 DIABETES MELLITUS WITHOUT COMPLICATION, WITH LONG-TERM CURRENT USE OF INSULIN (MULTI): ICD-10-CM

## 2024-02-02 DIAGNOSIS — E11.9 CONTROLLED TYPE 2 DIABETES MELLITUS WITHOUT COMPLICATION, WITHOUT LONG-TERM CURRENT USE OF INSULIN (MULTI): ICD-10-CM

## 2024-02-02 DIAGNOSIS — E11.9 CONTROLLED TYPE 2 DIABETES MELLITUS WITHOUT COMPLICATION, WITH LONG-TERM CURRENT USE OF INSULIN (MULTI): ICD-10-CM

## 2024-02-02 LAB
ALBUMIN SERPL BCP-MCNC: 3.9 G/DL (ref 3.4–5)
ALP SERPL-CCNC: 99 U/L (ref 33–136)
ALT SERPL W P-5'-P-CCNC: 13 U/L (ref 7–45)
ANION GAP SERPL CALC-SCNC: 16 MMOL/L (ref 10–20)
AST SERPL W P-5'-P-CCNC: 15 U/L (ref 9–39)
BILIRUB SERPL-MCNC: 0.7 MG/DL (ref 0–1.2)
BUN SERPL-MCNC: 17 MG/DL (ref 6–23)
CALCIUM SERPL-MCNC: 9.6 MG/DL (ref 8.6–10.6)
CHLORIDE SERPL-SCNC: 102 MMOL/L (ref 98–107)
CHOLEST SERPL-MCNC: 84 MG/DL (ref 0–199)
CHOLESTEROL/HDL RATIO: 3.2
CO2 SERPL-SCNC: 27 MMOL/L (ref 21–32)
CREAT SERPL-MCNC: 1.71 MG/DL (ref 0.5–1.05)
EGFRCR SERPLBLD CKD-EPI 2021: 33 ML/MIN/1.73M*2
EST. AVERAGE GLUCOSE BLD GHB EST-MCNC: 197 MG/DL
GLUCOSE SERPL-MCNC: 307 MG/DL (ref 74–99)
HBA1C MFR BLD: 8.5 %
HDLC SERPL-MCNC: 26.5 MG/DL
LDLC SERPL CALC-MCNC: 28 MG/DL
NON HDL CHOLESTEROL: 58 MG/DL (ref 0–149)
POTASSIUM SERPL-SCNC: 3.7 MMOL/L (ref 3.5–5.3)
PROT SERPL-MCNC: 6.7 G/DL (ref 6.4–8.2)
SODIUM SERPL-SCNC: 141 MMOL/L (ref 136–145)
TRIGL SERPL-MCNC: 150 MG/DL (ref 0–149)
VLDL: 30 MG/DL (ref 0–40)

## 2024-02-02 PROCEDURE — 36415 COLL VENOUS BLD VENIPUNCTURE: CPT

## 2024-02-02 PROCEDURE — 80061 LIPID PANEL: CPT

## 2024-02-02 PROCEDURE — 80053 COMPREHEN METABOLIC PANEL: CPT

## 2024-02-02 PROCEDURE — 83036 HEMOGLOBIN GLYCOSYLATED A1C: CPT

## 2024-02-08 NOTE — PROGRESS NOTES
[facility] (Critical access hospital)      Reason for Visit: No chief complaint on file. and INITIAL VISIT Critical access hospital    Type 2 DM but long standing diabetic. ZOHAIB and Islet cell antobodies are negative. Most recent HbA1c was 8.5. Glargine 50 units once daily  Class 3 Obesity. Most recent BMI of 42.  CKD. Stage 4. Last GFR between 23-33. K value was 3.7. She should be able to tolerate Spironolactone  CAD. NSTEMI recently. Prior PCI in 2016 and 2018  HFPEF. Stage C.  SVC Thrombus on Eliquis  Cerebrovascular disease with admission in September 27 to 29, 2023 at [facility] for right-sided weakness felt to be secondary to cardioembolic.  An MRI at that time revealed small acute/subacute infarction of the thalamocapsular junction and subcortical white matter of the left precentral gyrus.  6. Hyperlipidemia. Most recent LDL was <40      History of Present Illness: Nelida Mata is a 64 y.o. female who presents for a follow up evaluation in the Critical access hospital program.  She is doing relatively well but they have not made significant inroads in her weight as well as glycemic control.  Her blood pressure and LDL cholesterol are well-controlled.  She remains at high risk for heart failure.  Her most recent echocardiogram revealed an LVEF between 50 to 55% with diastolic dysfunction.  She is short of breath with minimal exertion and often has to use a wheelchair.    She was recently switched to once a day glargine regimen at 50 units once today.  Her fasting glucose is not very well-controlled    She does not have NT proBNP levels.  She has not had a cardiac MRI.      She had a non-STEMI in 2023 and was taken to Cath Lab where a diagnostic left heart catheterization revealed mostly patent stents with a jailed first diagonal.  She has undergone a previous PCI 3 stents in 2016 to LAD as well as in 2018.      She has a history of a SVC thrombus for which she is on Eliquis diagnosed in January 2023.  She has  multiple other comorbidities including advanced CKD and carries a history of heart failure with preserved ejection fraction.        A previous echocardiogram revealed normal ejection fraction with impaired LV relaxation.    Past Medical History:  Recently hospitalized.  64yo female with a history of HFpEF (EF 60-65%    /2023), IDDM (A1c 9.0 in 9/2023), CAD s/p PCI w/ stent x3 (2016, 2018), SVC   thrombus on Eliquis (dx 1/2023) who presented with chest pain, N/V.     Past Surgical History:  She has a past surgical history that includes Coronary angioplasty with stent (03/06/2017); Cataract extraction (03/18/2015); Retinal detachment surgery (03/18/2015); MR angio head wo IV contrast (08/16/2013); MR angio neck wo IV contrast (08/16/2013); CT angio aorta and bilateral iliofemoral runoff w and or wo IV contrast (08/08/2022); and Breast biopsy (Left).    Social History:  She reports that she has never smoked. She has never used smokeless tobacco. She reports that she does not use drugs.    Family History:  Family History   Problem Relation Name Age of Onset    Diabetes Mother      Glaucoma Father      Hypertension Other FAMILY        Allergies:  Other, Penicillins, and Procaine    Outpatient Medications:  Current Outpatient Medications   Medication Instructions    apixaban (ELIQUIS) 5 mg, oral, Every 12 hours    aspirin 81 mg chewable tablet CHEW 1 TABLET BY MOUTH ONCE DAILY    atorvastatin (Lipitor) 80 mg tablet TAKE 1 TABLET BY MOUTH ONCE DAILY    blood sugar diagnostic (OneTouch Ultra Test) strip 1 STRIP(S) INJECTABLE 3 TIMES A DAY    brimonidine (AlphaGAN P) 0.2 % ophthalmic solution INSTILL 1 DROP IN BOTH EYES TWO TIMES A DAY    calcium carbonate-vit D3-min 600 mg calcium- 400 unit tablet 1 tablet, oral, Daily    carvedilol (COREG) 25 mg, oral, 2 times daily with meals    DULoxetine (CYMBALTA) 30 mg, oral, 2 times daily    Farxiga 10 mg, oral, Daily    FreeStyle Filipe reader (FreeStyle Filipe 2 [place]) misc Use as  instructed    furosemide (LASIX) 20 mg, oral, Daily    hydrALAZINE (APRESOLINE) 25 mg, oral, Every 8 hours    hydrOXYzine HCL (ATARAX) 25 mg, oral, 2 times daily PRN    isosorbide mononitrate ER (Imdur) 60 mg 24 hr tablet     latanoprost (Xalatan) 0.005 % ophthalmic solution 1 drop, Both Eyes, Nightly    metoprolol succinate XL (TOPROL-XL) 50 mg, oral, Daily    naloxone (NARCAN) 4 mg, nasal, As needed    nitroglycerin (Nitrostat) 0.4 mg SL tablet PLACE 1 TABLET UNDER THE TONGUE EVERY 5 MINUTES FOR UP TO 3 DOSES AS NEEDED FOR CHEST PAIN.CALL 911 IF PAIN PERSISTS.    Ozempic 0.5 mg, subcutaneous, Weekly    pantoprazole (PROTONIX) 40 mg, oral, Daily before breakfast    polyethylene glycol (Glycolax, Miralax) 17 gram/dose powder oral, Daily PRN    sennosides-docusate sodium (Zoila-Colace) 8.6-50 mg tablet TAKE 1 TABLET BY MOUTH ONCE DAILY    Toujeo Max U-300 SoloStar 39 Units, subcutaneous, 2 times daily, Take as directed per insulin instructions.       Review of Systems:  ROS    Last Recorded Vitals:  There were no vitals filed for this visit.    There is no height or weight on file to calculate BMI.     Physical Examination:  General: Well appearing, well-nourished, in no acute distress.  HEENT: Normocephalic atraumatic, pupils equal and reactive to light, extraocular muscles intact, no conjunctival injection, oropharynx clear without exudates.  Neck: Normal carotid arterial pulses, no arterial bruits, no thyromegaly.  Cardiac: Regular rhythm and normal heart rate.  S1, S2 present and normal.  No murmurs, rubs, or gallops.   Jugular venous pressure and pulsations are normal  Pulmonary: Normal breath sounds, no increased work of breathing, no wheezes or crackles.  GI: Normal bowel sounds, no organomegaly appreciated;  no abdominal bruits.  Lower extremities: No cyanosis, clubbing, or edema.  No xanthelasma present. Normal distal pulses.  Skin: Skin intact. No significant rashes or lesions present.  Neuro: Alert and  "oriented x 3, normal attention and cognition, no focal motor or sensory neurologic deficits.  Psych: Normal affect and mood.  Musculoskeletal: Normal gait normal muscle tone.    Laboratory Studies:  Lab Results   Component Value Date    GLUCOSE 307 (H) 02/02/2024    CALCIUM 9.6 02/02/2024     02/02/2024    K 3.7 02/02/2024    CO2 27 02/02/2024     02/02/2024    BUN 17 02/02/2024    CREATININE 1.71 (H) 02/02/2024     Lab Results   Component Value Date    ALT 13 02/02/2024    AST 15 02/02/2024    ALKPHOS 99 02/02/2024    BILITOT 0.7 02/02/2024     Lab Results   Component Value Date    CHOL 84 02/02/2024    CHOL 109 11/10/2023    CHOL 183 09/22/2020     Lab Results   Component Value Date    HDL 26.5 02/02/2024    HDL 39.8 11/10/2023    HDL 45.4 09/22/2020     Lab Results   Component Value Date    LDLCALC 28 02/02/2024    LDLCALC 7 11/10/2023     Lab Results   Component Value Date    TRIG 150 (H) 02/02/2024    TRIG 312 (H) 11/10/2023    TRIG 198 (H) 09/22/2020     No components found for: \"CHOLHDL\"  No components found for: \"UACR\"  Lab Results   Component Value Date    HGBA1C 8.5 (H) 02/02/2024    HGBA1C 8.5 (H) 12/28/2023    HGBA1C 8.4 (H) 09/27/2023     Lab Results   Component Value Date    TSH 2.35 09/07/2023     Lab Results   Component Value Date    WBC 7.4 09/17/2023    HGB 12.2 09/17/2023    HCT 35.9 (L) 09/17/2023    MCV 89 09/17/2023     09/17/2023            Assessment and Plan:  63-year-old complex vasculopath with longstanding type 2 diabetes mellitus with concern for burnt out pancreas versus type 1 diabetes mellitus.  She was recently hospitalized for non-STEMI in addition to right-sided weakness which is presumably cardioembolic in origin.  Her concomitant metabolic problems include hypertension and hyperlipidemia.  Her physical examination today is consistent with visceral adiposity with no overt cardiovascular signs.  Laboratory evaluation reveals poor glucose control.  Her medical " regimen includes a twice daily regimen of glargine insulin.  She is on a factor X inhibitor for prior SVC thrombus which may also benefit her arterial thrombotic risk.    PLAN  Type 2 DM. Will need better control to get HBa1C to <7.  She is currently on glargine regimen 50 units once a day.  She may benefit from being on a long-acting insulin such as degludec at 50 units once daily.  In addition we will escalate her GLP-1 receptor agonist which is currently at 0.5 mg of Ozempic.  She is on an SGLT2 inhibitor in the form of Farxiga.  Given that her GFR is in the high 20s we may consider metformin 500 mg 1 p.o. daily.  CKD.  She has been scheduled to see nephrologist.  She will benefit from being on Kerendia at 5 mg 1 p.o. daily.  He is on an SGLT2 inhibitor.  She remains at high risk for progression to renal replacement therapy.  HFpEF.  Stage C heart failure ACC AHA.  She is symptomatic at rest and I suspect that her dyspnea is likely related to heart failure.  We will try and schedule a regadenoson cardiac MRI and also have her see our heart failure specialist.  She is in all guideline based treatment for heart failure including beta-blocker, SGLT2 inhibitor, and MRA which we are going to add but is not on an ARNI.  However the latter may be problematic given her CKD.  Will follow-up with this patient approximately 3 months.  Will also obtain a Chem-7 to ensure that her potassium remains under reasonable levels on initiation of therapy with Kerendia.  Refer to heart Failure with Dr. Hurst.    Problem List Items Addressed This Visit    None    Education: Reviewed ‘ABCs’ of diabetes management (respective goals in parentheses):  A1C (<6), blood pressure (<130/80), and cholesterol (LDL <70).  Compliance at present is estimated to be fair. Efforts to improve compliance (if necessary) will be directed.  Follow up: 3 month        Michael Hilario MD, FACC, FACANDIDA.  Chief, Cardiovascular Medicine  [employer]  [facility] [place]  Chief Academic and Scientific Officer  [employer]  Professor of Medicine, Radiology and Biomedical Engineering  [employer]  [place], [place]

## 2024-02-09 ENCOUNTER — OFFICE VISIT (OUTPATIENT)
Dept: CARDIOLOGY | Facility: HOSPITAL | Age: 64
End: 2024-02-09
Payer: COMMERCIAL

## 2024-02-09 ENCOUNTER — NURSE ONLY (OUTPATIENT)
Dept: CARDIOLOGY | Facility: HOSPITAL | Age: 64
End: 2024-02-09

## 2024-02-09 VITALS
SYSTOLIC BLOOD PRESSURE: 87 MMHG | DIASTOLIC BLOOD PRESSURE: 50 MMHG | HEIGHT: 59 IN | HEART RATE: 83 BPM | BODY MASS INDEX: 41.93 KG/M2 | OXYGEN SATURATION: 95 % | WEIGHT: 208 LBS

## 2024-02-09 DIAGNOSIS — I50.32 CHRONIC HEART FAILURE WITH PRESERVED EJECTION FRACTION (MULTI): ICD-10-CM

## 2024-02-09 DIAGNOSIS — I13.11 BENIGN HYPERTENSIVE HEART AND KIDNEY DISEASE WITHOUT HEART FAILURE AND WITH CHRONIC KIDNEY DISEASE STAGE V OR END STAGE RENAL DISEASE(404.12) (MULTI): ICD-10-CM

## 2024-02-09 DIAGNOSIS — R80.9 MICROALBUMINURIA: ICD-10-CM

## 2024-02-09 DIAGNOSIS — E11.65 UNCONTROLLED TYPE 2 DIABETES MELLITUS WITH HYPERGLYCEMIA (MULTI): Primary | ICD-10-CM

## 2024-02-09 PROCEDURE — 1036F TOBACCO NON-USER: CPT | Performed by: INTERNAL MEDICINE

## 2024-02-09 PROCEDURE — 99215 OFFICE O/P EST HI 40 MIN: CPT | Performed by: INTERNAL MEDICINE

## 2024-02-09 PROCEDURE — 3074F SYST BP LT 130 MM HG: CPT | Performed by: INTERNAL MEDICINE

## 2024-02-09 PROCEDURE — 3078F DIAST BP <80 MM HG: CPT | Performed by: INTERNAL MEDICINE

## 2024-02-09 PROCEDURE — 3008F BODY MASS INDEX DOCD: CPT | Performed by: INTERNAL MEDICINE

## 2024-02-09 PROCEDURE — 3048F LDL-C <100 MG/DL: CPT | Performed by: INTERNAL MEDICINE

## 2024-02-09 PROCEDURE — 1125F AMNT PAIN NOTED PAIN PRSNT: CPT | Performed by: INTERNAL MEDICINE

## 2024-02-09 PROCEDURE — 3052F HG A1C>EQUAL 8.0%<EQUAL 9.0%: CPT | Performed by: INTERNAL MEDICINE

## 2024-02-09 RX ORDER — INSULIN LISPRO 200 [IU]/ML
22 INJECTION, SOLUTION SUBCUTANEOUS
Qty: 11 EACH | Refills: 3
Start: 2024-02-09

## 2024-02-09 RX ORDER — METFORMIN HYDROCHLORIDE 500 MG/1
500 TABLET, EXTENDED RELEASE ORAL
Qty: 30 TABLET | Refills: 11 | Status: SHIPPED | OUTPATIENT
Start: 2024-02-09 | End: 2024-02-16 | Stop reason: ALTCHOICE

## 2024-02-09 RX ORDER — INSULIN DEGLUDEC 200 U/ML
50 INJECTION, SOLUTION SUBCUTANEOUS NIGHTLY
Qty: 24 ML | Refills: 3 | Status: SHIPPED | OUTPATIENT
Start: 2024-02-09 | End: 2025-02-08

## 2024-02-09 ASSESSMENT — PATIENT HEALTH QUESTIONNAIRE - PHQ9
1. LITTLE INTEREST OR PLEASURE IN DOING THINGS: NOT AT ALL
2. FEELING DOWN, DEPRESSED OR HOPELESS: NOT AT ALL
SUM OF ALL RESPONSES TO PHQ9 QUESTIONS 1 AND 2: 0

## 2024-02-09 ASSESSMENT — PAIN SCALES - GENERAL: PAINLEVEL: 7

## 2024-02-09 ASSESSMENT — COLUMBIA-SUICIDE SEVERITY RATING SCALE - C-SSRS
2. HAVE YOU ACTUALLY HAD ANY THOUGHTS OF KILLING YOURSELF?: NO
6. HAVE YOU EVER DONE ANYTHING, STARTED TO DO ANYTHING, OR PREPARED TO DO ANYTHING TO END YOUR LIFE?: NO
1. IN THE PAST MONTH, HAVE YOU WISHED YOU WERE DEAD OR WISHED YOU COULD GO TO SLEEP AND NOT WAKE UP?: NO

## 2024-02-12 NOTE — PROGRESS NOTES
[facility] ([facility]) Nursing Note FUV    Name: Nelida Mata    Referring Clinician: Erin Valenzuela MD (GYN)    Cardiometabolic Problem List: HFpER, PCI, ASCVD, CHF, HTN, Neuropathy, NAFLD, NSTEMI, Obesity class III, MICHAEL    Past Medical History:  She has a past medical history of Abnormal uterine and vaginal bleeding, unspecified (04/10/2018), Abnormal uterine and vaginal bleeding, unspecified (02/22/2018), Acute candidiasis of vulva and vagina (04/10/2018), Acute candidiasis of vulva and vagina (02/22/2018), Acute vaginitis (04/10/2018), Acute vaginitis (02/22/2018), Alcohol abuse, in remission, Anxiety disorder, unspecified (04/10/2018), Anxiety disorder, unspecified (02/22/2018), Atherosclerotic heart disease of native coronary artery without angina pectoris (04/10/2018), Atherosclerotic heart disease of native coronary artery without angina pectoris (02/22/2018), Atherosclerotic heart disease of native coronary artery without angina pectoris (02/22/2018), Atherosclerotic heart disease of native coronary artery without angina pectoris (04/10/2018), Atypical facial pain (04/10/2018), Atypical facial pain (02/22/2018), Benign lipomatous neoplasm, unspecified (04/10/2018), Benign lipomatous neoplasm, unspecified (02/22/2018), Changes in skin texture (04/10/2018), Changes in skin texture (02/22/2018), Chest pain, unspecified (04/10/2018), Chest pain, unspecified (02/22/2018), Chronic cough (04/10/2018), Chronic cough (02/22/2018), Chronic pain syndrome (04/10/2018), Chronic pain syndrome (02/22/2018), Cramp and spasm (04/10/2018), Cramp and spasm (02/22/2018), Depression, unspecified (04/10/2018), Depression, unspecified (02/22/2018), Disorder of pigmentation, unspecified (04/10/2018), Disorder of pigmentation, unspecified (02/22/2018), Dysuria (04/10/2018), Dysuria (02/22/2018), Encounter for immunization (04/10/2018), Encounter for immunization (04/10/2018), Encounter for immunization (02/22/2018),  Encounter for immunization (02/22/2018), Encounter for screening for infections with a predominantly sexual mode of transmission, Encounter for screening mammogram for malignant neoplasm of breast (04/10/2018), Encounter for screening mammogram for malignant neoplasm of breast (02/22/2018), Essential (primary) hypertension (04/10/2018), Essential (primary) hypertension (02/22/2018), Fatty (change of) liver, not elsewhere classified (04/10/2018), Fatty (change of) liver, not elsewhere classified (02/22/2018), Fibromyalgia (04/10/2018), Fibromyalgia (02/22/2018), Gastro-esophageal reflux disease without esophagitis (04/10/2018), Generalized contraction of visual field, left eye (02/06/2017), Headache, unspecified (04/10/2018), Headache, unspecified (02/22/2018), Heart failure, unspecified (CMS/HCC) (02/22/2018), Heart failure, unspecified (CMS/HCC) (04/10/2018), Hereditary and idiopathic neuropathy, unspecified (04/10/2018), Hereditary and idiopathic neuropathy, unspecified (02/22/2018), Hydroureter (04/10/2018), Hydroureter (02/22/2018), Hyperlipidemia, unspecified, Hypertensive urgency (04/10/2018), Hypertensive urgency (02/22/2018), Hypokalemia (04/10/2018), Hypokalemia (02/22/2018), Insomnia, unspecified (04/10/2018), Insomnia, unspecified (02/22/2018), Localized edema (04/10/2018), Localized edema (02/22/2018), Macula scars of posterior pole (postinflammatory) (post-traumatic), left eye (04/10/2018), Morbid (severe) obesity due to excess calories (CMS/HCC) (04/10/2018), Morbid (severe) obesity due to excess calories (CMS/HCC) (02/22/2018), 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), Nail dystrophy (02/22/2018), Non-ST elevation (NSTEMI) myocardial infarction (CMS/HCC) (04/10/2018), Non-ST elevation (NSTEMI) myocardial infarction (CMS/HCC) (02/22/2018), Obstructive sleep apnea (adult) (pediatric) (04/10/2018), Obstructive sleep apnea (adult) (pediatric) (02/22/2018), Other chest pain (02/22/2018), Other chest pain (04/10/2018),  Other conditions influencing health status (04/10/2018), Other conditions influencing health status (04/10/2018), Other conditions influencing health status (04/10/2018), Other conditions influencing health status (02/22/2018), Other conditions influencing health status (02/22/2018), Other conditions influencing health status (02/22/2018), Other fatigue (04/10/2018), Other fatigue (02/22/2018), Other fecal abnormalities (04/10/2018), Other fecal abnormalities (02/22/2018), Other forms of dyspnea (04/10/2018), Other forms of dyspnea (02/22/2018), Other problems related to lifestyle, Other specified health status (04/10/2018), Other specified health status (02/22/2018), Pain in right knee (02/22/2018), Pain in right knee (04/10/2018), Peripheral vascular disease, unspecified (CMS/HCC) (04/10/2018), Peripheral vascular disease, unspecified (CMS/HCC) (02/22/2018), Personal history of other diseases of the digestive system, Personal history of other diseases of the digestive system, Personal history of other diseases of the nervous system and sense organs, Personal history of other endocrine, nutritional and metabolic disease, Personal history of other endocrine, nutritional and metabolic disease, Personal history of other endocrine, nutritional and metabolic disease, Polyarthritis, unspecified (04/10/2018), Polyarthritis, unspecified (02/22/2018), Polyp of colon (04/10/2018), Polyp of colon (02/22/2018), Postmenopausal bleeding (04/10/2018), Postmenopausal bleeding (02/22/2018), Presence of coronary angioplasty implant and graft (04/10/2018), Presence of coronary angioplasty implant and graft (02/22/2018), Presence of intraocular lens (04/10/2018), Presence of intraocular lens (03/18/2015), Primary open-angle glaucoma, unspecified eye, stage unspecified (04/10/2018), Proteinuria, unspecified (04/10/2018), Proteinuria, unspecified (02/22/2018), Pruritus, unspecified (04/10/2018), Pruritus, unspecified (02/22/2018),  Psoriasis, unspecified (04/10/2018), Psoriasis, unspecified (02/22/2018), Rash and other nonspecific skin eruption (04/10/2018), Rash and other nonspecific skin eruption (02/22/2018), Repeated falls (04/10/2018), Repeated falls (02/22/2018), Retinal hemorrhage, right eye (02/06/2017), Shortness of breath (04/10/2018), Shortness of breath (02/22/2018), Sleep apnea, unspecified (04/10/2018), Sleep apnea, unspecified (02/22/2018), Traction detachment of retina, right eye (02/06/2017), Type 2 diabetes mellitus with diabetic polyneuropathy (CMS/[facility]) (04/10/2018), Type 2 diabetes mellitus with diabetic polyneuropathy (CMS/[facility]) (02/22/2018), Type 2 diabetes mellitus with other skin ulcer (CODE) (CMS/[facility]) (04/10/2018), Type 2 diabetes mellitus with other skin ulcer (CODE) (CMS/[facility]) (02/22/2018), Type 2 diabetes mellitus with proliferative diabetic retinopathy without macular edema, unspecified eye (CMS/[facility]) (04/10/2018), Type 2 diabetes mellitus with unspecified diabetic retinopathy without macular edema (CMS/[facility]) (02/06/2017), Umbilical hernia without obstruction or gangrene, Unqualified visual loss, both eyes (02/06/2017), Unspecified glaucoma (06/11/2015), Unstable angina (CMS/[facility]) (04/10/2018), Unstable angina (CMS/[facility]) (02/22/2018), Vitamin D deficiency, unspecified (04/10/2018), Vitamin D deficiency, unspecified (02/22/2018), Xerosis cutis (04/10/2018), and Xerosis cutis (02/22/2018).    Past Surgical History:  She has a past surgical history that includes Coronary angioplasty with stent (03/06/2017); Cataract extraction (03/18/2015); Retinal detachment surgery (03/18/2015); MR angio head wo IV contrast (08/16/2013); MR angio neck wo IV contrast (08/16/2013); CT angio aorta and bilateral iliofemoral runoff w and or wo IV contrast (08/08/2022); and Breast biopsy (Left).    Social History:  She reports that she has never smoked. She has never used smokeless tobacco. She reports that she does not use drugs.    Family  History:  Family History   Problem Relation Name Age of Onset    Diabetes Mother      Glaucoma Father      Hypertension Other FAMILY        Allergies:  Other, Penicillins, and Procaine    Outpatient Medications:  Current Outpatient Medications   Medication Instructions    apixaban (ELIQUIS) 5 mg, oral, Every 12 hours    aspirin 81 mg chewable tablet CHEW 1 TABLET BY MOUTH ONCE DAILY    atorvastatin (Lipitor) 80 mg tablet TAKE 1 TABLET BY MOUTH ONCE DAILY    blood sugar diagnostic (OneTouch Ultra Test) strip 1 STRIP(S) INJECTABLE 3 TIMES A DAY    brimonidine (AlphaGAN P) 0.2 % ophthalmic solution INSTILL 1 DROP IN BOTH EYES TWO TIMES A DAY    calcium carbonate-vit D3-min 600 mg calcium- 400 unit tablet 1 tablet, oral, Daily    carvedilol (COREG) 25 mg, oral, 2 times daily with meals    DULoxetine (CYMBALTA) 30 mg, oral, 2 times daily    Farxiga 10 mg, oral, Daily    finerenone (KERENDIA) 10 mg, oral, Daily    FreeStyle Filipe reader (FreeStyle Filipe 2 [place]) misc Use as instructed    furosemide (LASIX) 20 mg, oral, Daily    HumaLOG KwikPen Insulin 22 Units, subcutaneous, 3 times daily with meals, Take as directed per insulin instructions.    hydrOXYzine HCL (ATARAX) 25 mg, oral, 2 times daily PRN    insulin degludec (TRESIBA FLEXTOUCH) 50 Units, subcutaneous, Nightly, Take as directed per insulin instructions.    isosorbide mononitrate ER (Imdur) 60 mg 24 hr tablet     latanoprost (Xalatan) 0.005 % ophthalmic solution 1 drop, Both Eyes, Nightly    metFORMIN XR (GLUCOPHAGE-XR) 500 mg, oral, Daily with evening meal, Do not crush, chew, or split.    metoprolol succinate XL (TOPROL-XL) 50 mg, oral, Daily    naloxone (NARCAN) 4 mg, nasal, As needed    nitroglycerin (Nitrostat) 0.4 mg SL tablet PLACE 1 TABLET UNDER THE TONGUE EVERY 5 MINUTES FOR UP TO 3 DOSES AS NEEDED FOR CHEST PAIN.CALL 911 IF PAIN PERSISTS.    Ozempic 0.5 mg, subcutaneous, Weekly    pantoprazole (PROTONIX) 40 mg, oral, Daily before breakfast     "polyethylene glycol (Glycolax, Miralax) 17 gram/dose powder oral, Daily PRN    sennosides-docusate sodium (Zoila-Colace) 8.6-50 mg tablet TAKE 1 TABLET BY MOUTH ONCE DAILY    Toujeo Max U-300 SoloStar 39 Units, subcutaneous, 2 times daily, Take as directed per insulin instructions.       Last Recorded Vitals:      3/30/2023    11:48 AM 3/31/2023    10:49 AM 5/1/2023     2:26 PM 9/19/2023     8:49 AM 10/4/2023     8:55 AM 10/20/2023     3:02 PM 2/9/2024    10:03 AM   Vitals   Systolic 127 103 113 119 178 121 87   Diastolic 65 66 71 69 80 67 50   Heart Rate 75 72 81 65 74 77 83   Temp 35.7 °C (96.3 °F)   36.3 °C (97.3 °F) 36.3 °C (97.3 °F)     Resp  18        Height (in) 1.499 m (4' 11\") 1.499 m (4' 11\")  1.499 m (4' 11\") 1.499 m (4' 11\") 1.499 m (4' 11\") 1.499 m (4' 11\")   Weight (lb)  199 207 214.6 208 205 208   BMI 39.18 kg/m2 40.19 kg/m2 41.81 kg/m2 43.34 kg/m2 42.01 kg/m2 41.4 kg/m2 42.01 kg/m2   BSA (m2) 1.91 m2 1.94 m2 1.98 m2 2.01 m2 1.98 m2 1.97 m2 1.98 m2   Visit Report Report   Report Report Report Report       Laboratory Studies:  Lab Results   Component Value Date    GLUCOSE 307 (H) 02/02/2024    K 3.7 02/02/2024    CREATININE 1.71 (H) 02/02/2024     Lab Results   Component Value Date    ALT 13 02/02/2024    AST 15 02/02/2024    ALKPHOS 99 02/02/2024    BILITOT 0.7 02/02/2024     Lab Results   Component Value Date    CHOL 84 02/02/2024    CHOL 109 11/10/2023    CHOL 183 09/22/2020     Lab Results   Component Value Date    HDL 26.5 02/02/2024    HDL 39.8 11/10/2023    HDL 45.4 09/22/2020     Lab Results   Component Value Date    LDLCALC 28 02/02/2024    LDLCALC 7 11/10/2023     Lab Results   Component Value Date    TRIG 150 (H) 02/02/2024    TRIG 312 (H) 11/10/2023    TRIG 198 (H) 09/22/2020     No components found for: \"CHOLHDL\"  No components found for: \"UACR\"  Lab Results   Component Value Date    HGBA1C 8.5 (H) 02/02/2024    HGBA1C 8.5 (H) 12/28/2023    HGBA1C 8.4 (H) 09/27/2023         Cardiology " Tests:  ECG:No results found for this or any previous visit from the past 1095 days.    Echo:No results found for this or any previous visit from the past 1095 days.    Cath:No results found for this or any previous visit from the past 1095 days.    Stress Test:No results found for this or any previous visit from the past 1095 days.    Cardiac Imaging:No results found for this or any previous visit from the past 1095 days.      Assessment/Plan     Patient-Centered Goals:    Switch to Degludec 50 units, Metformin XR, Kerendia, Check K in 1 week, BNP, MR Stress with Regegenason.     Notable Changes:   Labs are good.  A1C unchanged.     Referral Orders Placed Today:    Cardiology Destiny Hurst for HF

## 2024-02-13 DIAGNOSIS — I25.118 CORONARY ARTERY DISEASE OF NATIVE HEART WITH STABLE ANGINA PECTORIS, UNSPECIFIED VESSEL OR LESION TYPE (CMS-HCC): Primary | ICD-10-CM

## 2024-02-13 DIAGNOSIS — I50.32 CHRONIC HEART FAILURE WITH PRESERVED EJECTION FRACTION (MULTI): ICD-10-CM

## 2024-02-13 PROCEDURE — RXMED WILLOW AMBULATORY MEDICATION CHARGE

## 2024-02-13 RX ORDER — ISOSORBIDE MONONITRATE 60 MG/1
60 TABLET, EXTENDED RELEASE ORAL
Qty: 90 TABLET | Refills: 1 | Status: SHIPPED | OUTPATIENT
Start: 2024-02-13

## 2024-02-16 ENCOUNTER — TELEMEDICINE (OUTPATIENT)
Dept: PHARMACY | Facility: HOSPITAL | Age: 64
End: 2024-02-16
Payer: COMMERCIAL

## 2024-02-16 DIAGNOSIS — E11.9 CONTROLLED TYPE 2 DIABETES MELLITUS WITHOUT COMPLICATION, WITH LONG-TERM CURRENT USE OF INSULIN (MULTI): Primary | ICD-10-CM

## 2024-02-16 DIAGNOSIS — I25.10 ARTERIOSCLEROTIC CARDIOVASCULAR DISEASE (ASCVD): ICD-10-CM

## 2024-02-16 DIAGNOSIS — Z86.718 HISTORY OF DVT (DEEP VEIN THROMBOSIS): ICD-10-CM

## 2024-02-16 DIAGNOSIS — I21.4 NSTEMI, INITIAL EPISODE OF CARE (MULTI): ICD-10-CM

## 2024-02-16 DIAGNOSIS — Z79.4 CONTROLLED TYPE 2 DIABETES MELLITUS WITHOUT COMPLICATION, WITH LONG-TERM CURRENT USE OF INSULIN (MULTI): Primary | ICD-10-CM

## 2024-02-16 DIAGNOSIS — R80.9 MICROALBUMINURIA: ICD-10-CM

## 2024-02-16 DIAGNOSIS — I50.32 CHRONIC HEART FAILURE WITH PRESERVED EJECTION FRACTION (MULTI): ICD-10-CM

## 2024-02-16 DIAGNOSIS — E11.65 UNCONTROLLED TYPE 2 DIABETES MELLITUS WITH HYPERGLYCEMIA (MULTI): ICD-10-CM

## 2024-02-16 DIAGNOSIS — I10 HYPERTENSION, UNCONTROLLED: ICD-10-CM

## 2024-02-16 PROCEDURE — RXMED WILLOW AMBULATORY MEDICATION CHARGE

## 2024-02-16 RX ORDER — SEMAGLUTIDE 1.34 MG/ML
1 INJECTION, SOLUTION SUBCUTANEOUS
Qty: 3 ML | Refills: 1 | Status: SHIPPED | OUTPATIENT
Start: 2024-02-16 | End: 2024-04-05 | Stop reason: DRUGHIGH

## 2024-02-16 NOTE — PROGRESS NOTES
Patient is sent at the request of Michael Hilario MD for medication management.  My final recommendations will be communicated back to the requesting provider by way of shared medical record.    Yvette Mata is a 63 y.o. year old female patient with  long-standing uncontrolled type two diabetes mellitus complicated by microalbuminuria, CKD, HFpEF, NAFLD, CAD s/p PCI, SVC thrombus (Apixaban), Hx DVT, MICHAEL, CVA referred for medication management following [facility] visit. Since her initial [facility] encounter she has initiated therapy with Ozempic and Farxiga therapy. Today we are following up after her last [facility] visit which she was prescribed Kerendia 10 mg, Metformin 500 mg daily and Tresiba flextouch.       Allergies   Allergen Reactions    Other Rash     Novocaine Solution, reaction rash     Penicillins Rash    Procaine Rash     Cardiovascular risk factors include diabetes mellitus, hypertension, obesity (BMI >= 30 kg/m2), and CHF, CAD, clinical ASCVD . The patient is not on an ACE inhibitor or angiotensin II receptor blocker.      Current monitoring regimen:   Patient is using: continuous glucose monitor - Freestyle Filipe 2 with Freestyle Filipe 2 reader - Not connected to [employer] portal     Filipe 2 FBG at 9 am on 2/16/2024: 209 mg/dL   Filipe 2 Glucose check at 1:42 PM on 2/16/2024: 247 mg/dL - Last meal today was breakfast at 11 am (cereal with milk)  Glucose on Filipe at 1:40 pm: 211 mg/dL     CGM Data  Time in ranges    7 Day  Above Goal: 43%  In range: 57%  Low: 0% 14 day  Above Goal: 47%  In range: 53%  Low: 0%   30 days  Above Goal: N/A  In range: N/A  Low: N/A 90 Days  Above Goal: N/A  In range: N/A  Low: N/A       Time CGM Active      Scans per day: 5  Sensor data captured: 85%         Hypoglycemia Reported: No reported hypoglycemia since our last encounter    MEDICATION RECONCILIATION  No changes to medication therapy today    Adverse Effects:   No reported GI adverse  effects  Reports itching   Reports heart burn with acid reflux     Objective     Diabetes Pharmacotherapy:    Ozempic 0.25 mg/0.5 mg: Inject 0.5 mg under the skin once weekly on Mondays (1 dose remaining - 02/19/2024)  Farxiga 10 mg: Take one tablet by mouth once daily  Toujeo solostar : Inject 50 units once daily in the morning  Metformin  mg: Take one tablet by mouth once daily (Therapy not started as of 2/16/2024)    Insulin injections: Insulin dosage review with Nelida suggested compliance all of the time.    - Patient states that she will NOT inject her insulin if she is not consuming a meal  - We reviewed today for example that she consumed breakfast. She completed both her Toujeo injection and pre-breakfast dose of humalog + SSI.    Pre-breakfast Humalog 22 units plus SSI   Pre-lunch Humalog 22 units plus SSI   Pre-dinner Humalog 22 units plus SSI   Bedtime None     Sliding Scale:   <150 - 0 units  151-200 - 2 units   201-250 - 4 units   251-300 - 6 units   301-350 - 8 units   351-400 -10 units  401 - 12 units    Patient verbalized and read off sliding scale that she has at home (1/24/2024)    Historical DM Pharmacotherapy:  Metformin - Not appropriate with renal function     Current HTN Pharmacotherapy:   Carvedilol 25 mg: Take one tablet by mouth twice daily  Isosorbide Mononitrate ER 60 mg once daily    Secondary Prevention:   - Statin? Yes  - ACE-I/ARB? No  - Aspirin? Yes    Pertinent PMH Review: Per chart review  - PMH of Pancreatitis: No  - PMH of Retinopathy: No  - PMH of Urinary Tract Infections: Yes  - PMH of MTC: No    Labs:   Lab Results   Component Value Date    BILITOT 0.7 02/02/2024    CALCIUM 9.6 02/02/2024    CO2 27 02/02/2024     02/02/2024    CREATININE 1.71 (H) 02/02/2024    GLUCOSE 307 (H) 02/02/2024    ALKPHOS 99 02/02/2024    K 3.7 02/02/2024    PROT 6.7 02/02/2024     02/02/2024    AST 15 02/02/2024    ALT 13 02/02/2024    BUN 17 02/02/2024    ANIONGAP 16  02/02/2024    MG 2.42 (H) 09/12/2023    PHOS 3.9 11/10/2023     03/31/2023    ALBUMIN 3.9 02/02/2024    LIPASE 15 09/07/2023    GFRF 24 (A) 09/19/2023    GFRMALE CANCELED 03/22/2023     Lab Results   Component Value Date    TRIG 150 (H) 02/02/2024    CHOL 84 02/02/2024    LDLCALC 28 02/02/2024    HDL 26.5 02/02/2024     Lab Results   Component Value Date    HGBA1C 8.5 (H) 02/02/2024    HGBA1C 8.5 (H) 12/28/2023    HGBA1C 8.4 (H) 09/27/2023     Component      Latest Ref Rng 11/10/2023   Albumin, Urine Random      Not established mg/L 27.1    Creatinine, Urine Random      20.0 - 320.0 mg/dL 57.1    Albumin/Creatine Ratio      <30.0 ug/mg Creat 47.5 (H)       Component      Latest Ref Rng 11/10/2023   C-Peptide      0.7 - 3.9 ng/mL 1.5    Glutamic Acid Decarb Ab      0.0 - 5.0 IU/mL <5.0    Islet Antigen-2 Antibody      0.0 - 7.4 U/mL <5.4      The ASCVD Risk score (Adrianne DK, et al., 2019) failed to calculate for the following reasons:    The patient has a prior MI or stroke diagnosis    Health Maintenance:   Lipid Panel: 11/10/2023 - 7 mg/dL  Urine Albumin/Creatinine Ratio: 47.5 mcg/mg (Microalbuminuria)    Assessment/Plan   Problem List Items Addressed This Visit       (HFpEF) heart failure with preserved ejection fraction (CMS/HCC)    Arteriosclerotic cardiovascular disease (ASCVD)    NSTEMI, initial episode of care (CMS/[facility])    Hypertension, uncontrolled    Diabetes mellitus type 2, controlled, without complications (CMS/[facility]) - Primary    History of DVT (deep vein thrombosis) (Chronic)     Patients diabetes is poorly controlled with most recent A1c of 8.5% on 12/28/2023 compared to previous elevation 8.4% on 9/27/2023 (Goal < 7%). Today we discussed her follow up after her most recent [facility] visit. She has continued with therapy with Toujeo Max solostar as she endorsed remaining supply. She was prescribed Tresiba therapy which has not been started. She has not started therapy with Metformin or Kerendia. I  will review the claims for Kerendia and asssit with prior authoirzation. She will need to repeat her BMP in four weeks after initiation. She is aware to contact me following starting the medication. Finally, we discussed increasing her Ozempic to 1 mg weekly after completing her Ozempic 0.5 mg. I will follow up on 3/8/2024 to discuss her glycemic control. She was able to provide her 7 day and 14 day target goals which are below goal.   Initiate:   Toujeo Max solostar: Inject 56 units under the skin once daily in the morning (Increase from 50 units daily)  Ozempic 4 mg/3 mL: Inject 1 mg under the skin once weekly  Kerendia 10 mg: Take one tablet by mouth once daily  Continue:   Ozempic 0.25 mg/0.5 mg: Inject 0.5 mg under the skin once weekly - Complete final injection for 2/19/2024  Farxiga 10 mg: Take one tablet by mouth once daily QAM  Humalog 22 units plus SSI with meals  Discontinue:   Metformin 500 mg - Renal function most consistently below 30 ml/min/1.73 m2   Most recent eGFR > 30 ml/min/1.73 m2 -   Compliance at present is estimated to be fair.   Follow-up: I recommend diabetes care be  03/08/2024 at 1:30 pm .  [facility] Follow-Up: 08/09/2024  Nephrology: 2/26/2024  Heart Failure: 03/21/2024    Nima Santamaria, PharmD    Continue all meds under the continuation of care with the referring provider and clinical pharmacy team.

## 2024-02-19 ENCOUNTER — PHARMACY VISIT (OUTPATIENT)
Dept: PHARMACY | Facility: CLINIC | Age: 64
End: 2024-02-19
Payer: COMMERCIAL

## 2024-02-19 DIAGNOSIS — I10 HYPERTENSION, UNCONTROLLED: ICD-10-CM

## 2024-02-19 DIAGNOSIS — I50.32 CHRONIC HEART FAILURE WITH PRESERVED EJECTION FRACTION (MULTI): ICD-10-CM

## 2024-02-19 RX ORDER — FUROSEMIDE 20 MG/1
20 TABLET ORAL DAILY
Qty: 90 TABLET | Refills: 2 | Status: SHIPPED | OUTPATIENT
Start: 2024-02-19 | End: 2024-04-30 | Stop reason: SDUPTHER

## 2024-02-21 ENCOUNTER — SPECIALTY PHARMACY (OUTPATIENT)
Dept: PHARMACY | Facility: CLINIC | Age: 64
End: 2024-02-21

## 2024-02-25 DIAGNOSIS — I50.32 CHRONIC HEART FAILURE WITH PRESERVED EJECTION FRACTION (MULTI): Primary | ICD-10-CM

## 2024-02-25 DIAGNOSIS — I10 HYPERTENSION, UNCONTROLLED: ICD-10-CM

## 2024-02-26 DIAGNOSIS — I50.9 HEART FAILURE, UNSPECIFIED (MULTI): ICD-10-CM

## 2024-02-26 RX ORDER — FUROSEMIDE 40 MG/1
40 TABLET ORAL DAILY
Qty: 90 TABLET | Refills: 3 | OUTPATIENT
Start: 2024-02-26

## 2024-02-29 RX ORDER — METOPROLOL SUCCINATE 50 MG/1
50 TABLET, EXTENDED RELEASE ORAL DAILY
Qty: 90 TABLET | Refills: 3 | Status: SHIPPED | OUTPATIENT
Start: 2024-02-29 | End: 2024-06-08 | Stop reason: HOSPADM

## 2024-03-08 ENCOUNTER — TELEMEDICINE (OUTPATIENT)
Dept: PHARMACY | Facility: HOSPITAL | Age: 64
End: 2024-03-08
Payer: COMMERCIAL

## 2024-03-08 DIAGNOSIS — E11.65 UNCONTROLLED TYPE 2 DIABETES MELLITUS WITH HYPERGLYCEMIA (MULTI): Primary | ICD-10-CM

## 2024-03-08 PROCEDURE — RXMED WILLOW AMBULATORY MEDICATION CHARGE

## 2024-03-08 RX ORDER — FLASH GLUCOSE SENSOR
KIT MISCELLANEOUS
Qty: 2 EACH | Refills: 11 | Status: SHIPPED | OUTPATIENT
Start: 2024-03-08

## 2024-03-08 NOTE — PROGRESS NOTES
Patient is sent at the request of Michael Hilario MD for medication management.  My final recommendations will be communicated back to the requesting provider by way of shared medical record.    Yvette Mata is a 63 y.o. year old female patient with  long-standing uncontrolled type two diabetes mellitus complicated by microalbuminuria, CKD, HFpEF, NAFLD, CAD s/p PCI, SVC thrombus (Apixaban), Hx DVT, MICHAEL, CVA referred for medication management following [facility] visit. Since her initial [facility] encounter she has initiated therapy with Ozempic and Farxiga therapy. Today we are following up to discuss glycemic control and medication follow-up.       Allergies   Allergen Reactions    Other Rash     Novocaine Solution, reaction rash     Penicillins Rash    Procaine Rash     Cardiovascular risk factors include diabetes mellitus, hypertension, obesity (BMI >= 30 kg/m2), and CHF, CAD, clinical ASCVD . The patient is not on an ACE inhibitor or angiotensin II receptor blocker.      Current monitoring regimen:   Patient is using: continuous glucose monitor - Freestyle Filipe 2 with Freestyle Filipe 2 reader - Not connected to [employer] portal     Blood glucose:   2 pm on 3/8/2024 - 128 mg/dL    CGM Data - 3/8/24  Time in ranges    7 Day  Above Goal: 22%  In range: 75%  Low: 3% 14 day  Above Goal: 21%  In range: 77%  Low: 2%   30 days  Above Goal: 30%  In range: 68%  Low: 2% 90 Days  Above Goal: 46%  In range: 52%  Low: 2%   Average glucose    Time CGM Active     7 days average: 138 mg/dL   14 day average: 142 mg/dL   30 day average: 157 mg/dL  Scans per day: 6  Sensor data captured: 92%         CGM Data: 2/16/2024  Time in ranges    7 Day  Above Goal: 43%  In range: 57%  Low: 0% 14 day  Above Goal: 47%  In range: 53%  Low: 0%   30 days  Above Goal: N/A  In range: N/A  Low: N/A 90 Days  Above Goal: N/A  In range: N/A  Low: N/A       Time CGM Active      Scans per day: 5  Sensor data captured: 85%          Hypoglycemia Reported: No reported symptomatic hypoglycemia symptoms   3/7/2024 - 69 mg/dL     MEDICATION RECONCILIATION  No changes to medication therapy today    Adverse Effects:   No reported GI adverse effects  Reports itching   Reports heart burn with acid reflux     Weight:   3/2/2024 - 197 lb    Objective     Wt Readings from Last 6 Encounters:   02/09/24 94.3 kg (208 lb)   10/20/23 93 kg (205 lb)   10/04/23 94.3 kg (208 lb)   09/19/23 97.3 kg (214 lb 9.6 oz)   05/01/23 93.9 kg (207 lb)   03/31/23 90.3 kg (199 lb)      Diabetes Pharmacotherapy:    Toujeo Max solostar: Inject 56 units under the skin once daily in the evening  Ozempic 4 mg/3 mL: Inject 1 mg under the skin once weekly  Farxiga 10 mg: Take one tablet by mouth once daily  Toujeo solostar : Inject 50 units once daily in the morning  Metformin  mg: Take one tablet by mouth once daily (Therapy not started as of 2/16/2024)  Humalog kwikpen: Inject 22 units daily plus SSI    Sliding Scale:   <150 - 0 units  151-200 - 2 units   201-250 - 4 units   251-300 - 6 units   301-350 - 8 units   351-400 -10 units  401 - 12 units    Historical DM Pharmacotherapy:  Metformin - Not appropriate with renal function     Chronic Kidney Disease:   Kerendia 10 mg: Take one tablet by mouth once daily    Current HTN Pharmacotherapy:   Carvedilol 25 mg: Take one tablet by mouth twice daily  Isosorbide Mononitrate ER 60 mg once daily    Secondary Prevention:   - Statin? Yes  - ACE-I/ARB? No  - Aspirin? Yes    Pertinent PMH Review: Per chart review  - PMH of Pancreatitis: No  - PMH of Retinopathy: No  - PMH of Urinary Tract Infections: Yes  - PMH of MTC: No    Labs:   Lab Results   Component Value Date    BILITOT 0.7 02/02/2024    CALCIUM 9.6 02/02/2024    CO2 27 02/02/2024     02/02/2024    CREATININE 1.71 (H) 02/02/2024    GLUCOSE 307 (H) 02/02/2024    ALKPHOS 99 02/02/2024    K 3.7 02/02/2024    PROT 6.7 02/02/2024     02/02/2024    AST 15 02/02/2024     ALT 13 02/02/2024    BUN 17 02/02/2024    ANIONGAP 16 02/02/2024    MG 2.42 (H) 09/12/2023    PHOS 3.9 11/10/2023     03/31/2023    ALBUMIN 3.9 02/02/2024    LIPASE 15 09/07/2023    GFRF 24 (A) 09/19/2023    GFRMALE CANCELED 03/22/2023     Lab Results   Component Value Date    TRIG 150 (H) 02/02/2024    CHOL 84 02/02/2024    LDLCALC 28 02/02/2024    HDL 26.5 02/02/2024     Lab Results   Component Value Date    HGBA1C 8.5 (H) 02/02/2024    HGBA1C 8.5 (H) 12/28/2023    HGBA1C 8.4 (H) 09/27/2023     Component      Latest Ref Rng 11/10/2023   Albumin, Urine Random      Not established mg/L 27.1    Creatinine, Urine Random      20.0 - 320.0 mg/dL 57.1    Albumin/Creatine Ratio      <30.0 ug/mg Creat 47.5 (H)       Component      Latest Ref Rng 11/10/2023   C-Peptide      0.7 - 3.9 ng/mL 1.5    Glutamic Acid Decarb Ab      0.0 - 5.0 IU/mL <5.0    Islet Antigen-2 Antibody      0.0 - 7.4 U/mL <5.4      The ASCVD Risk score (Adrianne POTTS, et al., 2019) failed to calculate for the following reasons:    The patient has a prior MI or stroke diagnosis    Health Maintenance:   Lipid Panel: 11/10/2023 - 7 mg/dL  Urine Albumin/Creatinine Ratio: 47.5 mcg/mg (Microalbuminuria)    Assessment/Plan   Problem List Items Addressed This Visit       Uncontrolled type 2 diabetes mellitus with hyperglycemia (CMS/[facility]) - Primary    Relevant Medications    FreeStyle Filipe 2 Sensor kit     Patients diabetes is poorly controlled with most recent A1c of 8.5% on 2/2/2024 and 12/28/2023 (Goal < 7%). Today we reviewed home glycemic control based on freestyle filipe 2 monitor readings. Patient provded 7, 14 and 30 day average glucose readings along with time in target readings for 7, 14, 30 and 90 days. Glycemic control has improved with most recent changes including increase to 56 units of Toujeo and Ozempic 1 mg. Patient is currently eating one large meal per day, thus only injecting humalog once daily with her meal. She has had one noted  hypoglycemic value, but her CGM reports 2-3% lows. I am decreasing Toujeo to 52 units. Metformin not started based on renal function.   Initiate:   Toujeo Max solostar: Inject 52 units under the skin once daily in the evening (Decreased from 56 units)  Continue:   Ozempic 1 mg: Inject 1 mg under the skin once   Farxiga 10 mg: Take one tablet by mouth once daily QAM  Humalog 22 units plus SSI with meals  Kerendia - Advised to complete updated BMP to review potassium.   Compliance at present is estimated to be fair.   Follow-up: I recommend diabetes care be  3/14/2024  .  [facility] Follow-Up: 08/09/2024  Heart Failure: 03/21/2024    Nima Santamaria, PharmD    Continue all meds under the continuation of care with the referring provider and clinical pharmacy team.

## 2024-03-12 ENCOUNTER — PHARMACY VISIT (OUTPATIENT)
Dept: PHARMACY | Facility: CLINIC | Age: 64
End: 2024-03-12
Payer: COMMERCIAL

## 2024-03-15 ENCOUNTER — LAB (OUTPATIENT)
Dept: LAB | Facility: LAB | Age: 64
End: 2024-03-15
Payer: COMMERCIAL

## 2024-03-15 DIAGNOSIS — I50.32 CHRONIC HEART FAILURE WITH PRESERVED EJECTION FRACTION (MULTI): ICD-10-CM

## 2024-03-15 DIAGNOSIS — I13.11 BENIGN HYPERTENSIVE HEART AND KIDNEY DISEASE WITHOUT HEART FAILURE AND WITH CHRONIC KIDNEY DISEASE STAGE V OR END STAGE RENAL DISEASE(404.12) (MULTI): ICD-10-CM

## 2024-03-15 DIAGNOSIS — E11.9 CONTROLLED TYPE 2 DIABETES MELLITUS WITHOUT COMPLICATION, WITHOUT LONG-TERM CURRENT USE OF INSULIN (MULTI): ICD-10-CM

## 2024-03-15 LAB
ANION GAP SERPL CALC-SCNC: 14 MMOL/L (ref 10–20)
BNP SERPL-MCNC: 8 PG/ML (ref 0–99)
BUN SERPL-MCNC: 18 MG/DL (ref 6–23)
CALCIUM SERPL-MCNC: 9.9 MG/DL (ref 8.6–10.6)
CHLORIDE SERPL-SCNC: 99 MMOL/L (ref 98–107)
CO2 SERPL-SCNC: 32 MMOL/L (ref 21–32)
CREAT SERPL-MCNC: 1.37 MG/DL (ref 0.5–1.05)
CREAT UR-MCNC: 95.9 MG/DL (ref 20–320)
EGFRCR SERPLBLD CKD-EPI 2021: 43 ML/MIN/1.73M*2
GLUCOSE SERPL-MCNC: 132 MG/DL (ref 74–99)
MICROALBUMIN UR-MCNC: 15.9 MG/L
MICROALBUMIN/CREAT UR: 16.6 UG/MG CREAT
POTASSIUM SERPL-SCNC: 3.9 MMOL/L (ref 3.5–5.3)
SODIUM SERPL-SCNC: 141 MMOL/L (ref 136–145)

## 2024-03-15 PROCEDURE — 82570 ASSAY OF URINE CREATININE: CPT

## 2024-03-15 PROCEDURE — 82043 UR ALBUMIN QUANTITATIVE: CPT

## 2024-03-15 PROCEDURE — 80048 BASIC METABOLIC PNL TOTAL CA: CPT

## 2024-03-15 PROCEDURE — 83880 ASSAY OF NATRIURETIC PEPTIDE: CPT

## 2024-03-15 PROCEDURE — 36415 COLL VENOUS BLD VENIPUNCTURE: CPT

## 2024-03-19 DIAGNOSIS — G89.4 CHRONIC PAIN SYNDROME: Primary | ICD-10-CM

## 2024-03-19 DIAGNOSIS — H40.1131 PRIMARY OPEN ANGLE GLAUCOMA OF BOTH EYES, MILD STAGE: ICD-10-CM

## 2024-03-19 PROCEDURE — RXMED WILLOW AMBULATORY MEDICATION CHARGE

## 2024-03-21 ENCOUNTER — OFFICE VISIT (OUTPATIENT)
Dept: CARDIOLOGY | Facility: HOSPITAL | Age: 64
End: 2024-03-21
Payer: COMMERCIAL

## 2024-03-21 ENCOUNTER — PHARMACY VISIT (OUTPATIENT)
Dept: PHARMACY | Facility: CLINIC | Age: 64
End: 2024-03-21
Payer: COMMERCIAL

## 2024-03-21 ENCOUNTER — LAB (OUTPATIENT)
Dept: LAB | Facility: LAB | Age: 64
End: 2024-03-21
Payer: COMMERCIAL

## 2024-03-21 VITALS
SYSTOLIC BLOOD PRESSURE: 112 MMHG | OXYGEN SATURATION: 93 % | DIASTOLIC BLOOD PRESSURE: 78 MMHG | HEART RATE: 96 BPM | BODY MASS INDEX: 40.32 KG/M2 | HEIGHT: 59 IN | WEIGHT: 200 LBS

## 2024-03-21 DIAGNOSIS — G47.30 SLEEP APNEA, UNSPECIFIED TYPE: ICD-10-CM

## 2024-03-21 DIAGNOSIS — I50.32 CHRONIC HEART FAILURE WITH PRESERVED EJECTION FRACTION (MULTI): Primary | ICD-10-CM

## 2024-03-21 DIAGNOSIS — R22.9 MULTIPLE SKIN NODULES: Primary | ICD-10-CM

## 2024-03-21 DIAGNOSIS — I50.32 CHRONIC HEART FAILURE WITH PRESERVED EJECTION FRACTION (MULTI): ICD-10-CM

## 2024-03-21 LAB — TSH SERPL-ACNC: 2.15 MIU/L (ref 0.44–3.98)

## 2024-03-21 PROCEDURE — 3074F SYST BP LT 130 MM HG: CPT | Performed by: STUDENT IN AN ORGANIZED HEALTH CARE EDUCATION/TRAINING PROGRAM

## 2024-03-21 PROCEDURE — 1036F TOBACCO NON-USER: CPT | Performed by: STUDENT IN AN ORGANIZED HEALTH CARE EDUCATION/TRAINING PROGRAM

## 2024-03-21 PROCEDURE — 84443 ASSAY THYROID STIM HORMONE: CPT

## 2024-03-21 PROCEDURE — 3061F NEG MICROALBUMINURIA REV: CPT | Performed by: STUDENT IN AN ORGANIZED HEALTH CARE EDUCATION/TRAINING PROGRAM

## 2024-03-21 PROCEDURE — 3078F DIAST BP <80 MM HG: CPT | Performed by: STUDENT IN AN ORGANIZED HEALTH CARE EDUCATION/TRAINING PROGRAM

## 2024-03-21 PROCEDURE — 3048F LDL-C <100 MG/DL: CPT | Performed by: STUDENT IN AN ORGANIZED HEALTH CARE EDUCATION/TRAINING PROGRAM

## 2024-03-21 PROCEDURE — 93005 ELECTROCARDIOGRAM TRACING: CPT | Performed by: STUDENT IN AN ORGANIZED HEALTH CARE EDUCATION/TRAINING PROGRAM

## 2024-03-21 PROCEDURE — 3052F HG A1C>EQUAL 8.0%<EQUAL 9.0%: CPT | Performed by: STUDENT IN AN ORGANIZED HEALTH CARE EDUCATION/TRAINING PROGRAM

## 2024-03-21 PROCEDURE — 1159F MED LIST DOCD IN RCRD: CPT | Performed by: STUDENT IN AN ORGANIZED HEALTH CARE EDUCATION/TRAINING PROGRAM

## 2024-03-21 PROCEDURE — 99215 OFFICE O/P EST HI 40 MIN: CPT | Performed by: STUDENT IN AN ORGANIZED HEALTH CARE EDUCATION/TRAINING PROGRAM

## 2024-03-21 PROCEDURE — 3008F BODY MASS INDEX DOCD: CPT | Performed by: STUDENT IN AN ORGANIZED HEALTH CARE EDUCATION/TRAINING PROGRAM

## 2024-03-21 PROCEDURE — 36415 COLL VENOUS BLD VENIPUNCTURE: CPT

## 2024-03-21 NOTE — PROGRESS NOTES
"Referring Clinician: Dr. 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 Review of the electronic medical record shows a past medical history significant for HFpEF, GERD,  obesity with BMI 40 kg/m² (maintained on semaglutide), CAD status post PCI ( STEMI 9/2023 at ; NSTEMI 11/2023 care at [facility]), known SVC thrombus maintained on DOAC, TIA 9/2023, hyperlipidemia, CKD, MICHAEL non adherent with CPAP and does not follow the sleep medicine, status post toe amputations for gangrene, insomnia.    She is on excellent pharmacotherapy for HFpEF (finerenone, dapagliflozin) blood pressure is well controlled.    At her last visit with [facility] team she was referred for cardiac MRI which is pending.    Symptomatically, there is no chest pain but she has had \" heart burn\"  which she associates with the initiation of semaglutide, she has been heartburn free for approximately 1 week. She has chronic SOB at rest, a 3 pillow orthopnoea since 2016, chronic fatigue since  2020 which she relates temporarily to her toe  amputations, bendopnoea, chronic leg  swelling for 5-6 years.    Functionally, her exercise capacity is described as limited by exertional dyspnea and imbalance that occurred after her toe amputations.  Her exertional dyspnea is not progressive since 2016.  She describes that she needs to stop walking after 5 minutes due to dyspnea.  She is unable to climb stairs but this seems to be driven more by disequilibrium/imbalance.    She is adherent with prescribed medications.    Surgical Hx:  - Toe amputations 2020    Past Obstetric Hx:  - G 1  - No  cardiac complications of pregnancy    Social Hx:  - Smoking-never  - ETOH-1 glass of wine twice a week, prev heavy use \" in early life\"  - Illicit drugs- never   - Lives alone, has a very supportive partner who lives nearby.    Family Hx:  Specifically, there is no family history of ICD, PPM, " "LVAD, OHT, arrhythmias, CVA, or sudden cardiac death.    Brother- \" Enlarged heart\"  Mother- CVA , \" heart attack\"  Multiple relatives - DM    Review of Systems   Constitutional: Negative for decreased appetite.   HENT:  Negative for sore throat.    Eyes:  Negative for blurred vision and double vision.   Cardiovascular:  Positive for dyspnea on exertion, leg swelling, orthopnea and paroxysmal nocturnal dyspnea. Negative for chest pain and palpitations.   Respiratory:  Positive for shortness of breath. Negative for cough.    Musculoskeletal:  Negative for falls and muscle weakness.   Gastrointestinal:  Positive for constipation and nausea. Negative for vomiting.   Genitourinary:  Negative for dysuria.   Neurological:  Positive for headaches. Negative for light-headedness.   Psychiatric/Behavioral:  Positive for depression.       Medication reconciliation completed, see below.     Medication Documentation Review Audit       Reviewed by Deborah Hopkins RN (Registered Nurse) on 03/21/24 at 0821      Medication Order Taking? Sig Documenting Provider Last Dose Status   apixaban (Eliquis) 5 mg tablet [id number] Yes Take 1 tablet (5 mg) by mouth every 12 hours. Historical Provider, MD Taking Active   aspirin 81 mg chewable tablet [id number] Yes CHEW 1 TABLET BY MOUTH ONCE DAILY Darren Ventura MD Taking Active   atorvastatin (Lipitor) 80 mg tablet [id number] Yes TAKE 1 TABLET BY MOUTH ONCE DAILY Darren Ventura MD Taking Active   blood sugar diagnostic (OneTouch Ultra Test) strip [id number]  1 STRIP(S) INJECTABLE 3 TIMES A DAY Marilyn Degroot MD  Active   brimonidine (AlphaGAN P) 0.2 % ophthalmic solution [id number] Yes INSTILL 1 DROP IN BOTH EYES TWO TIMES A DAY Nile Lopez MD Taking Active   calcium carbonate-vit D3-min 600 mg calcium- 400 unit tablet [id number] Yes Take 1 tablet by mouth once daily. Darren Ventura MD Taking Active   carvedilol (Coreg) 25 mg tablet [id number] Yes Take 1 tablet (25 mg) by mouth 2 times a day " with meals. Historical Provider, MD Taking Active   dapagliflozin propanediol (Farxiga) 10 mg [id number] Yes Take 1 tablet (10 mg) by mouth once daily. Michael Hilario MD Taking Active   DULoxetine (Cymbalta) 30 mg DR capsule [id number] Yes Take 1 capsule (30 mg) by mouth twice a day. Historical Provider, MD Taking Active   finerenone (Kerendia) 10 mg tablet tablet [id number] Yes Take 1 tablet (10 mg) by mouth once daily. Michael Hilario MD Taking Active   FreeStyle Filipe 2 Sensor kit [id number] Yes Apply 1 sensor to the back of the upper arm. Change sensor every 14 days. Michael Hilario MD Taking Active   FreeStyle Filipe reader (FreeStyle Filipe 2 [place]) misc [id number] Yes Use as instructed Marek Miles MD Taking Active   furosemide (Lasix) 20 mg tablet [id number] Yes TAKE 1 TABLET BY MOUTH EVERY DAY Clive Rivera MD Taking Active   hydrOXYzine HCL (Atarax) 25 mg tablet [id number] Yes Take 1 tablet (25 mg) by mouth 2 times a day as needed. Historical Provider, MD Taking Active   insulin degludec (Tresiba FlexTouch) 200 unit/mL (3 mL) injection [id number] Yes Inject 50 Units under the skin once daily at bedtime. Take as directed per insulin instructions. Michael Hilario MD Taking Active   insulin glargine (Toujeo Max U-300 SoloStar) 300 unit/mL (3 mL) injection [id number] No Inject 39 Units under the skin 2 times a day. Take as directed per insulin instructions.   Patient not taking: Reported on 3/21/2024    Kassandra Lopez MD Not Taking Flag for Review   insulin lispro (HumaLOG KwikPen Insulin) 200 unit/mL (3 mL) insulin pen pen [id number] Yes Inject 22 Units under the skin 3 times a day with meals. Take as directed per insulin instructions. Michael Hilario MD Taking Active   isosorbide mononitrate ER (Imdur) 60 mg 24 hr tablet [id number] Yes TAKE 1 TABLET BY MOUTH EVERY DAY IN THE MORNING Oral Schwarz MD Taking Active   metoprolol succinate XL (Toprol-XL) 50 mg 24 hr tablet [id number] No Take 1  "tablet (50 mg) by mouth once daily.   Patient not taking: Reported on 3/21/2024    Kassandra Lopez MD Not Taking Active     Discontinued 03/21/24 0821   nitroglycerin (Nitrostat) 0.4 mg SL tablet [id number] Yes PLACE 1 TABLET UNDER THE TONGUE EVERY 5 MINUTES FOR UP TO 3 DOSES AS NEEDED FOR CHEST PAIN.CALL 911 IF PAIN PERSISTS. Historical Provider, MD Taking Active   pantoprazole (ProtoNix) 40 mg EC tablet [id number] Yes Take 1 tablet (40 mg) by mouth once daily in the morning. Take before meals. Kassandra Lopez MD Taking Active   polyethylene glycol (Glycolax, Miralax) 17 gram/dose powder [id number] Yes Take by mouth once daily as needed. Historical Provider, MD Taking Active   semaglutide (Ozempic) 1 mg/dose (4 mg/3 mL) pen injector [id number] Yes Inject 1 mg under the skin 1 (one) time per week. Michael Hilario MD Taking Active   sennosides-docusate sodium (Zoila-Colace) 8.6-50 mg tablet [id number] Yes TAKE 1 TABLET BY MOUTH ONCE DAILY Darren Ventura MD Taking Active                   Allergies   Allergen Reactions    Other Rash     Novocaine Solution, reaction rash     Penicillins Rash    Procaine Rash     Investigations:    The electronic medical record has been reviewed by me for salient history. All cardiovascular imaging and testing available in the electronic medical record, and [employer] has been reviewed. The most recent ECG (3/21/2024) has been reviewed independently by me.    3/21/2024 ECG: LVH changes      Visit Vitals  /78 (BP Location: Left arm, Patient Position: Sitting, BP Cuff Size: Large adult)   Pulse 96   Ht 1.499 m (4' 11\")   Wt 90.7 kg (200 lb)   LMP  (LMP Unknown)   SpO2 93%   BMI 40.40 kg/m²   OB Status Postmenopausal   Smoking Status Never   BSA 1.94 m²      On examination:    Very pleasant obese AA woman of short stature in no apparent CP or painful distress  Neck: No appreciable JVD or HJR  CVS: HS 1,2.   No added sounds  Resp: CTA bilaterally. Percussion note resonant   Abdomen: " Obese, SNT, BS wnl  Extremities: 2+ pedal oedema  Skin: warm and dry, multiple thickened nontender hyperkeratotic plaques her extremities, thorax, abdomen  CNS: AO x 4    Lab Results   Component Value Date    WBC 7.4 09/17/2023    HGB 12.2 09/17/2023    HCT 35.9 (L) 09/17/2023    MCV 89 09/17/2023     09/17/2023     Lab Results   Component Value Date    GLUCOSE 132 (H) 03/15/2024    CALCIUM 9.9 03/15/2024     03/15/2024    K 3.9 03/15/2024    CO2 32 03/15/2024    CL 99 03/15/2024    BUN 18 03/15/2024    CREATININE 1.37 (H) 03/15/2024       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 Review of the electronic medical record shows a past medical history significant for HFpEF, GERD,  obesity with BMI 40 kg/m² (maintained on semaglutide), CAD status post PCI ( STEMI 9/2023 at ; NSTEMI 11/2023 care at [facility]), known SVC thrombus maintained on DOAC, TIA 9/2023, hyperlipidemia, CKD, MICHAEL non adherent with CPAP and does not follow the sleep medicine, status post toe amputations for gangrene, insomnia.    NYHA Functional Class: 3  ACC/AHA Stage C heart failure  Volume status: Hypervolaemic  Perfusion status: Warm to touch  Aetiology: Ischemic    PLAN:    #HFpEF  -Ms Mata has complex CAD which likely contributed to her dyspneic symptoms.  Cardiac MRI for viability has been requested for her, and this is still pending.  She was reminded today of the need to schedule this study.  Once cardiac MRI is reviewed, we will explore the utility of right heart catheterization with and without exercise challenge.  In the interim she will continue on finerenone, SGLT2 inhibition.    We discussed the importance of weight loss, she is doing her best with this, and is on semaglutide.    She is non-adherent with MICHAEL management (finds CPAP uncomfortable); management needed for optimize HFpEF care.  She has been referred to the sleep medicine team for discussion of  alternative treatments for MICHAEL.    This note was transcribed using the Dragon Dictation system. There may be grammatical, punctuation, or verbiage errors that can occur with voice recognition programs.    Destiny Hurst MD PhD

## 2024-03-21 NOTE — PATIENT INSTRUCTIONS
Thank you for coming in today. If you have any questions or concerns, you may call the Heart Failure Office at [phone] option [phone], or [phone].  You may also contact our heart failure nursing team via email on [email or web address].    For quicker results set-up your  [employer] account to receive results and other correspondence directly to your phone.    Please bring all your pills/medications to your Cardiology appointments.    **  -Please schedule the cardiac MRI ordered for you by Dr. Hilario.  You can CALL [phone] to schedule this test    - No new medication changes today    - Please have the following tests done:  1.Blood tests (TSH with auto reflex)    -You will be referred to the following teams, CALL  [phone] to schedule your appointments with:  1. Sleep Medicine ( management of MICHAEL). You may also CALL  [phone] ([phone]) to  schedule follow up with Sleep Medicine    - Please make an appointment to be seen in 2 months

## 2024-03-22 RX ORDER — DULOXETIN HYDROCHLORIDE 30 MG/1
30 CAPSULE, DELAYED RELEASE ORAL 2 TIMES DAILY
Qty: 60 CAPSULE | Refills: 0 | Status: SHIPPED | OUTPATIENT
Start: 2024-03-22 | End: 2024-06-08 | Stop reason: HOSPADM

## 2024-03-22 RX ORDER — BRIMONIDINE TARTRATE 2 MG/ML
1 SOLUTION/ DROPS OPHTHALMIC 2 TIMES DAILY
Qty: 30 ML | Refills: 0 | Status: SHIPPED | OUTPATIENT
Start: 2024-03-22

## 2024-03-25 ENCOUNTER — TELEPHONE (OUTPATIENT)
Dept: PHARMACY | Facility: HOSPITAL | Age: 64
End: 2024-03-25
Payer: COMMERCIAL

## 2024-03-25 DIAGNOSIS — Z86.718 HISTORY OF DVT (DEEP VEIN THROMBOSIS): ICD-10-CM

## 2024-03-25 DIAGNOSIS — E11.65 UNCONTROLLED TYPE 2 DIABETES MELLITUS WITH HYPERGLYCEMIA (MULTI): Primary | ICD-10-CM

## 2024-03-25 DIAGNOSIS — I10 HYPERTENSION, UNCONTROLLED: ICD-10-CM

## 2024-03-25 DIAGNOSIS — I21.4 NSTEMI, INITIAL EPISODE OF CARE (MULTI): ICD-10-CM

## 2024-03-25 DIAGNOSIS — R80.9 MICROALBUMINURIA: ICD-10-CM

## 2024-03-25 DIAGNOSIS — I25.10 ARTERIOSCLEROTIC CARDIOVASCULAR DISEASE (ASCVD): ICD-10-CM

## 2024-03-25 DIAGNOSIS — I50.32 CHRONIC HEART FAILURE WITH PRESERVED EJECTION FRACTION (MULTI): ICD-10-CM

## 2024-03-25 PROCEDURE — RXMED WILLOW AMBULATORY MEDICATION CHARGE

## 2024-03-25 NOTE — TELEPHONE ENCOUNTER
Scheduled patient for follow up on 4/15/2024 at 1 pm. Patient advised to contact [facility] pharmacy for refill of Ozempic 1 mg. We will follow up to discuss ozempic 2 mg titration.

## 2024-03-26 ENCOUNTER — PHARMACY VISIT (OUTPATIENT)
Dept: PHARMACY | Facility: CLINIC | Age: 64
End: 2024-03-26
Payer: COMMERCIAL

## 2024-04-03 LAB
ATRIAL RATE: 94 BPM
P AXIS: 39 DEGREES
P OFFSET: 181 MS
P ONSET: 129 MS
PR INTERVAL: 160 MS
Q ONSET: 209 MS
QRS COUNT: 15 BEATS
QRS DURATION: 122 MS
QT INTERVAL: 374 MS
QTC CALCULATION(BAZETT): 467 MS
QTC FREDERICIA: 434 MS
R AXIS: -24 DEGREES
T AXIS: 86 DEGREES
T OFFSET: 396 MS
VENTRICULAR RATE: 94 BPM

## 2024-04-04 NOTE — PROGRESS NOTES
Pharmacy [facility]   Follow Up  Nelida Mata is a 64 y.o. female was referred to Clinical Pharmacy Team for diabetes management.     Referring Provider: Michael Hilario MD    Subjective   Nelida Mata is a 63 y.o. year old female patient with long-standing uncontrolled type two diabetes mellitus complicated by microalbuminuria, CKD, HFpEF, NAFLD, CAD s/p PCI, SVC thrombus (Apixaban), Hx DVT, MICHAEL, CVA referred for medication management following [facility] visit. Since her initial [facility] encounter she has initiated therapy with Ozempic and Farxiga therapy. Today we are following up to discuss glycemic control and medication follow-up.     Cardiovascular risk factors include diabetes mellitus, hypertension, obesity (BMI >= 30 kg/m2), and CHF, CAD, clinical ASCVD . The patient is not on an ACE inhibitor or angiotensin II receptor blocker.    Special needs/barriers to therapy: none     At last visit, Tresiba insulin was decreased from 56 units to 52 units.     Pharmacotherapy   Farxiga 10 mg: Take one tablet by mouth once daily QAM  Humalog 22 units plus SSI with meals  Tresiba: Inject 52 units under the skin once daily in the evening  Ozempic 1 mg inject 1 mg under the skin once a week    Previous Pharmacotherapy    N/a     Adverse Effects:   Patient denies any GI adverse effects (Upset stomach/diarrhea/constipation/nausea/vomiting)  Adherence: Takes medication as directed and reports no missed doses  Affordability/Accessibility: No issues reported    Risk Reducing Meds  On ACEi/ARB: No   On Statin: Yes   On SGLT2i: Yes   On GLP1-RA: Yes       Glucose Monitoring:  Patient is using continuous glucose monitor; FreeStyle Filipe 2; not connected to [employer]    Reported BG Measurements:  - This  mg/dL  - FBG Avg 97      Previous CGM Report:   Refer to previous PharmD notes for detailed reports  - 3/8/24: 30 day TIR 68% (above goal 30%)  - 3/8/24: 90 day TIR 52% (above goal 46%)  -  2/16/24: 14 day TIR 53% (above goal 47%)    Hypoglycemia? Experienced 1 hypoglycemic events (BG 51 mg/dL)  Hyperglycemia? Denies signs and symptoms Highest 247 mg/dL    Reported Weight  Baseline: 200 lbs  Current: 196 lbs      Pertinent PMH Review for GLP1-ra and/or SGLT2i:  PMH of Pancreatitis: No  PMH of Retinopathy: No  PMH of Recurrent Urinary Tract Infections: No  PMH of Medullary Thyroid Cancer (MTC): No  PMH of Multiple Endocrine Neoplasia (MEN) Type II: No     Objective   Labs:   Lab Results   Component Value Date    BILITOT 0.7 02/02/2024    CALCIUM 9.9 03/15/2024    CO2 32 03/15/2024    CL 99 03/15/2024    CREATININE 1.37 (H) 03/15/2024    GLUCOSE 132 (H) 03/15/2024    ALKPHOS 99 02/02/2024    K 3.9 03/15/2024    PROT 6.7 02/02/2024     03/15/2024    AST 15 02/02/2024    ALT 13 02/02/2024    BUN 18 03/15/2024    ANIONGAP 14 03/15/2024    MG 2.42 (H) 09/12/2023    PHOS 3.9 11/10/2023     03/31/2023    ALBUMIN 3.9 02/02/2024    LIPASE 15 09/07/2023    GFRF 24 (A) 09/19/2023    GFRMALE CANCELED 03/22/2023     Lab Results   Component Value Date    TRIG 150 (H) 02/02/2024    CHOL 84 02/02/2024    LDLCALC 28 02/02/2024    HDL 26.5 02/02/2024     Lab Results   Component Value Date    HGBA1C 8.5 (H) 02/02/2024    HGBA1C 8.5 (H) 12/28/2023    HGBA1C 8.4 (H) 09/27/2023     The ASCVD Risk score (Adrianne POTTS, et al., 2019) failed to calculate for the following reasons:    The patient has a prior MI or stroke diagnosis      Assessment/Plan   Problem List Items Addressed This Visit          Cardiac and Vasculature    (HFpEF) heart failure with preserved ejection fraction (CMS/HCC)    Relevant Medications    semaglutide 2 mg/dose (8 mg/3 mL) pen injector (Start on 4/7/2024)    Arteriosclerotic cardiovascular disease (ASCVD)    Relevant Medications    semaglutide 2 mg/dose (8 mg/3 mL) pen injector (Start on 4/7/2024)    NSTEMI, initial episode of care (CMS/[facility])    Relevant Medications    semaglutide 2 mg/dose  (8 mg/3 mL) pen injector (Start on 4/7/2024)    Hypertension, uncontrolled    Relevant Medications    semaglutide 2 mg/dose (8 mg/3 mL) pen injector (Start on 4/7/2024)       Coag and Thromboembolic    History of DVT (deep vein thrombosis) (Chronic)    Relevant Medications    semaglutide 2 mg/dose (8 mg/3 mL) pen injector (Start on 4/7/2024)       Endocrine/Metabolic    Uncontrolled type 2 diabetes mellitus with hyperglycemia (CMS/HCC)    Relevant Medications    semaglutide 2 mg/dose (8 mg/3 mL) pen injector (Start on 4/7/2024)       Genitourinary and Reproductive    Microalbuminuria    Relevant Medications    semaglutide 2 mg/dose (8 mg/3 mL) pen injector (Start on 4/7/2024)     Patients diabetes is poorly controlled with most recent A1c of 8.5% (goal < 7 %). Glycemic control is estimated to be not at goal. Patient's SMBG are pretty controlled with fasting averages in the low 100s.     Patient has been tolerating Ozempic 1 mg with no reported adverse effects. Will titrate up to Ozempic 2 mg and adjust her long acting insulin Tresiba to 48 units daily. Patient is currently eating one large meal per day, thus only injecting humalog 22 units once daily with her meal. Will also titrate her Humalog down to 18 units as she notes her blood sugars after meals are in the 90s.     Kerendia - Advised to complete updated BMP to review potassium.     Compliance at present is estimated to be fair.     Plan:  CONTINUE  - Kerendia 10 mg 1 tablet by mouth once a day (K 3.9)  - Farxiga 10 mg 1 tablet by mouth once daily AM (eGFR 43)    DECREASE  - Tresiba: 52 units to 48 units under the skin once daily in the evening  - Humalog 22 units to 18 units under the skin plus SSI with meals    INCREASE  - Ozempic 1 mg to 2 mg: Inject 2 mg under the skin once       Labs ordered: none     Follow up: 4/26/24  [facility] Follow Up: 8/9/24  PCP Follow Up: last visit 3/15/24      Provided contact info and encouraged patient to reach out with  any concerns or questions regarding medications.     Elaina Dimas, PharmD    Continue all meds under the continuation of care with the referring provider and clinical pharmacy team.

## 2024-04-05 ENCOUNTER — PHARMACY VISIT (OUTPATIENT)
Dept: PHARMACY | Facility: CLINIC | Age: 64
End: 2024-04-05
Payer: COMMERCIAL

## 2024-04-05 ENCOUNTER — TELEMEDICINE (OUTPATIENT)
Dept: PHARMACY | Facility: HOSPITAL | Age: 64
End: 2024-04-05
Payer: COMMERCIAL

## 2024-04-05 DIAGNOSIS — E11.65 UNCONTROLLED TYPE 2 DIABETES MELLITUS WITH HYPERGLYCEMIA (MULTI): ICD-10-CM

## 2024-04-05 DIAGNOSIS — I21.4 NSTEMI, INITIAL EPISODE OF CARE (MULTI): ICD-10-CM

## 2024-04-05 DIAGNOSIS — Z86.718 HISTORY OF DVT (DEEP VEIN THROMBOSIS): ICD-10-CM

## 2024-04-05 DIAGNOSIS — R80.9 MICROALBUMINURIA: ICD-10-CM

## 2024-04-05 DIAGNOSIS — I10 HYPERTENSION, UNCONTROLLED: ICD-10-CM

## 2024-04-05 DIAGNOSIS — I25.10 ARTERIOSCLEROTIC CARDIOVASCULAR DISEASE (ASCVD): ICD-10-CM

## 2024-04-05 DIAGNOSIS — I50.32 CHRONIC HEART FAILURE WITH PRESERVED EJECTION FRACTION (MULTI): ICD-10-CM

## 2024-04-05 PROCEDURE — RXMED WILLOW AMBULATORY MEDICATION CHARGE

## 2024-04-08 ENCOUNTER — HOSPITAL ENCOUNTER (OUTPATIENT)
Dept: RADIOLOGY | Facility: HOSPITAL | Age: 64
Discharge: HOME | End: 2024-04-08
Payer: COMMERCIAL

## 2024-04-08 VITALS — WEIGHT: 200 LBS | HEIGHT: 59 IN | BODY MASS INDEX: 40.32 KG/M2

## 2024-04-08 DIAGNOSIS — I50.32 CHRONIC HEART FAILURE WITH PRESERVED EJECTION FRACTION (MULTI): ICD-10-CM

## 2024-04-08 PROCEDURE — 75563 CARD MRI W/STRESS IMG & DYE: CPT | Performed by: INTERNAL MEDICINE

## 2024-04-08 PROCEDURE — 2550000001 HC RX 255 CONTRASTS: Performed by: INTERNAL MEDICINE

## 2024-04-08 PROCEDURE — 75563 CARD MRI W/STRESS IMG & DYE: CPT

## 2024-04-08 PROCEDURE — 75565 CARD MRI VELOC FLOW MAPPING: CPT | Performed by: INTERNAL MEDICINE

## 2024-04-08 PROCEDURE — A9575 INJ GADOTERATE MEGLUMI 0.1ML: HCPCS | Performed by: INTERNAL MEDICINE

## 2024-04-08 RX ORDER — GADOTERATE MEGLUMINE 376.9 MG/ML
36 INJECTION INTRAVENOUS
Status: COMPLETED | OUTPATIENT
Start: 2024-04-08 | End: 2024-04-08

## 2024-04-08 RX ADMIN — GADOTERATE MEGLUMINE 36 ML: 376.9 INJECTION INTRAVENOUS at 15:35

## 2024-04-08 NOTE — NURSING NOTE
MRI STRESS        Stress protocol: Regadenoson  Regadenoson Dose: 0.4mg  Aminophylline Dose: 100mg     Resting Vitals:  BP: 122/63 HR   HR: 88 bpm   SPO2: 97% RA   Resting ECG: NSR, left axis      Stress:  0.4mg IV push Regadenoson:  1 min: BP 84/43  HR 92 bpm 98% RA Symptoms: “tingling”  2 min: BP 84/43  HR 94 bpm 94% RA Symptoms: “tingling”     Reversal/Recovery:  100mg IV push Aminophylline:  1 min: BP 95/54  HR 90 bpm 98% RA Symptoms: Denies  2 min: /66  HR 94 bpm 96% RA Symptoms: Denies  4 min: /74  HR 89 bpm 93% RA Symptoms: Denies   6 min: /64  HR 90 bpm 93% RA Symptoms: Denies     Patients resting HR of 90 bpm calli to a maximum of 94 bpm.  Resting BP of 122/63 calli to a maximum of 140/64. Patients post stress EKG remained unchanged.  Patient discharged from the [facility] to home.

## 2024-04-19 ENCOUNTER — TELEPHONE (OUTPATIENT)
Dept: PRIMARY CARE | Facility: CLINIC | Age: 64
End: 2024-04-19
Payer: COMMERCIAL

## 2024-04-19 DIAGNOSIS — G89.4 CHRONIC PAIN SYNDROME: Primary | ICD-10-CM

## 2024-04-19 RX ORDER — DEXTROMETHORPHAN HYDROBROMIDE, GUAIFENESIN 5; 100 MG/5ML; MG/5ML
650 LIQUID ORAL EVERY 8 HOURS PRN
Qty: 30 TABLET | Refills: 2 | Status: SHIPPED | OUTPATIENT
Start: 2024-04-19 | End: 2024-05-19

## 2024-04-22 PROCEDURE — RXMED WILLOW AMBULATORY MEDICATION CHARGE

## 2024-04-25 ENCOUNTER — PHARMACY VISIT (OUTPATIENT)
Dept: PHARMACY | Facility: CLINIC | Age: 64
End: 2024-04-25
Payer: COMMERCIAL

## 2024-04-26 ENCOUNTER — TELEMEDICINE (OUTPATIENT)
Dept: PHARMACY | Facility: HOSPITAL | Age: 64
End: 2024-04-26
Payer: COMMERCIAL

## 2024-04-26 DIAGNOSIS — I25.10 ARTERIOSCLEROTIC CARDIOVASCULAR DISEASE (ASCVD): ICD-10-CM

## 2024-04-26 DIAGNOSIS — I21.4 NSTEMI, INITIAL EPISODE OF CARE (MULTI): ICD-10-CM

## 2024-04-26 DIAGNOSIS — Z86.718 HISTORY OF DVT (DEEP VEIN THROMBOSIS): ICD-10-CM

## 2024-04-26 DIAGNOSIS — I50.32 CHRONIC HEART FAILURE WITH PRESERVED EJECTION FRACTION (MULTI): ICD-10-CM

## 2024-04-26 DIAGNOSIS — I10 HYPERTENSION, UNCONTROLLED: ICD-10-CM

## 2024-04-26 DIAGNOSIS — R80.9 MICROALBUMINURIA: ICD-10-CM

## 2024-04-26 DIAGNOSIS — E11.65 UNCONTROLLED TYPE 2 DIABETES MELLITUS WITH HYPERGLYCEMIA (MULTI): Primary | ICD-10-CM

## 2024-04-26 PROCEDURE — RXMED WILLOW AMBULATORY MEDICATION CHARGE

## 2024-04-26 NOTE — PROGRESS NOTES
Patient is sent at the request of Michael Hilario MD for medication management.  My final recommendations will be communicated back to the requesting provider by way of shared medical record.    Yvette Mata is a 63 y.o. year old female patient with  long-standing uncontrolled type two diabetes mellitus complicated by microalbuminuria, CKD, HFpEF, NAFLD, CAD s/p PCI, SVC thrombus (Apixaban), Hx DVT, MICHAEL, CVA referred for medication management following [facility] visit. She remains on Ozempic and Farxiga for glycemic control and cardiovascular risk reduction.       Allergies   Allergen Reactions    Other Rash     Novocaine Solution, reaction rash     Penicillins Rash    Procaine Rash     Cardiovascular risk factors include diabetes mellitus, hypertension, obesity (BMI >= 30 kg/m2), and CHF, CAD, clinical ASCVD . The patient is not on an ACE inhibitor or angiotensin II receptor blocker.      Meals Per Day: 1     Current monitoring regimen:   Patient is using: continuous glucose monitor - Freestyle Filipe 2 with Freestyle Filipe 2 reader - Not connected to [employer] portal     CGM Data - 4/26/2024     10:37 Am: 113 mg/dL (7:58 am - woke up)    Time in target    7 Day  Above Goal: 30%  In range: 69%  Low: 1% 14 day  Above Goal: 29%  In range: 70%  Low: 1%   30 days  Above Goal: 32%  In range: 67%  Low: 1% 90 Days  Above Goal: 37%  In range: 62%  Low: 1%   Average glucose    Time CGM Active Hypoglycemia Event    7 days average: 169 mg/dL   14 day average: 162 mg/dL   30 day average: 161 mg/dL   90 day average: 167 mg/dL  Scans per day: 7  Sensor data captured: 73% 51 mg/dL - Overnight low glucose (Non-symptomatic)        CGM Data - 3/8/24    2 pm on 3/8/2024 - 128 mg/dL    Time in target    7 Day  Above Goal: 22%  In range: 75%  Low: 3% 14 day  Above Goal: 21%  In range: 77%  Low: 2%   30 days  Above Goal: 30%  In range: 68%  Low: 2% 90 Days  Above Goal: 46%  In range: 52%  Low: 2%   Average  glucose    Time CGM Active     7 days average: 138 mg/dL   14 day average: 142 mg/dL   30 day average: 157 mg/dL  Scans per day: 6  Sensor data captured: 92%         CGM Data: 2024  Time in target    7 Day  Above Goal: 43%  In range: 57%  Low: 0% 14 day  Above Goal: 47%  In range: 53%  Low: 0%   30 days  Above Goal: N/A  In range: N/A  Low: N/A 90 Days  Above Goal: N/A  In range: N/A  Low: N/A       Time CGM Active      Scans per day: 5  Sensor data captured: 85%         MEDICATION RECONCILIATION  No changes to medication therapy today    Weight:   2024 - 200 lb   2024 - 195 lb   3/2/2024 - 197 lb    Adverse Effects:   1 episode of constipation on 2024 - No other reported GI adverse effects   Relieved constipation with one dose of Senokot     Objective     Wt Readings from Last 6 Encounters:   24 90.7 kg (200 lb)   24 90.7 kg (200 lb)   24 94.3 kg (208 lb)   10/20/23 93 kg (205 lb)   10/04/23 94.3 kg (208 lb)   23 97.3 kg (214 lb 9.6 oz)      Diabetes Pharmacotherapy:    Tresiba Flextouch:  Inject 48 units under the skin once daily in the evening  Ozempic 8 mg/3 mL: Inject 2 mg under the skin once weekly (Injections Remainin)  Farxiga 10 mg: Take one tablet by mouth once daily  Humalog kwikpen: Inject 18 units daily plus SSI    Sliding Scale:   <150 - 0 units  151-200 - 2 units   201-250 - 4 units   251-300 - 6 units   301-350 - 8 units   351-400 -10 units  401 - 12 units    Historical DM Pharmacotherapy:  Metformin - Not appropriate with renal function     Chronic Kidney Disease:   Kerendia 10 mg: Take one tablet by mouth once daily    Current HTN Pharmacotherapy:   Carvedilol 25 mg: Take one tablet by mouth twice daily  Isosorbide Mononitrate ER 60 mg once daily    Secondary Prevention:   - Statin? Yes  - ACE-I/ARB? No  - Aspirin? Yes    Pertinent PMH Review: Per chart review  - PMH of Pancreatitis: No  - PMH of Retinopathy: No  - PMH of Urinary Tract Infections:  Yes  - PMH of MTC: No    Labs:   Lab Results   Component Value Date    BILITOT 0.7 02/02/2024    CALCIUM 9.9 03/15/2024    CO2 32 03/15/2024    CL 99 03/15/2024    CREATININE 1.37 (H) 03/15/2024    GLUCOSE 132 (H) 03/15/2024    ALKPHOS 99 02/02/2024    K 3.9 03/15/2024    PROT 6.7 02/02/2024     03/15/2024    AST 15 02/02/2024    ALT 13 02/02/2024    BUN 18 03/15/2024    ANIONGAP 14 03/15/2024    MG 2.42 (H) 09/12/2023    PHOS 3.9 11/10/2023     03/31/2023    ALBUMIN 3.9 02/02/2024    LIPASE 15 09/07/2023    GFRF 24 (A) 09/19/2023    GFRMALE CANCELED 03/22/2023     Lab Results   Component Value Date    TRIG 150 (H) 02/02/2024    CHOL 84 02/02/2024    LDLCALC 28 02/02/2024    HDL 26.5 02/02/2024     Lab Results   Component Value Date    HGBA1C 8.5 (H) 02/02/2024    HGBA1C 8.5 (H) 12/28/2023    HGBA1C 8.4 (H) 09/27/2023     Component      Latest Ref Rng 11/10/2023   Albumin, Urine Random      Not established mg/L 27.1    Creatinine, Urine Random      20.0 - 320.0 mg/dL 57.1    Albumin/Creatine Ratio      <30.0 ug/mg Creat 47.5 (H)       Component      Latest Ref Rng 11/10/2023   C-Peptide      0.7 - 3.9 ng/mL 1.5    Glutamic Acid Decarb Ab      0.0 - 5.0 IU/mL <5.0    Islet Antigen-2 Antibody      0.0 - 7.4 U/mL <5.4      The ASCVD Risk score (Adrianne DK, et al., 2019) failed to calculate for the following reasons:    The patient has a prior MI or stroke diagnosis    Health Maintenance:   Lipid Panel: 11/10/2023 - 7 mg/dL --> 2/2/2024 28 mg/dL   Urine Albumin/Creatinine Ratio: 47.5 mcg/mg (Microalbuminuria) --> WNL on3/15/2024    Assessment/Plan   Problem List Items Addressed This Visit       (HFpEF) heart failure with preserved ejection fraction (Multi)    Arteriosclerotic cardiovascular disease (ASCVD)    Microalbuminuria    NSTEMI, initial episode of care (Multi)    Hypertension, uncontrolled    History of DVT (deep vein thrombosis) (Chronic)    Uncontrolled type 2 diabetes mellitus with hyperglycemia  (Multi) - Primary     Patients diabetes is poorly controlled with most recent A1c of 8.5% on 2/2/2024 and 12/28/2023 (Goal < 7%). Today we reviewed home glycemic control based on freestyle kenyon 2 monitor readings. Patient provded 7, 14 and 30 day average glucose readings along with time in target readings for 7, 14, 30 and 90 days. Glycemic control has worsened since previous encounter based on kenyon report. I previously held new start Metformin based on renal function less than 30 ml/min/1.73 m2. Renal function has improved to 43 ml/min/1.73 m2. Consideration for combination therapy Xigduo XR discussed.   Continue:   Tresiba Flextouch: Inject 52 units under the skin once daily in the evening   Ozempic 2 mg: Inject 2 mg under the skin once   Farxiga 10 mg: Take one tablet by mouth once daily QAM  Humalog 18 units plus SSI with meals   Kerendia 10 mg: Take one tablet by mouth once daily  Potassium 3.9 on 3/15/2024  Compliance at present is estimated to be fair.   Follow-up: I recommend diabetes care be  05/22/2024 at 3:30 pm  .  [facility] Follow-Up: 08/09/2024  Heart Failure: 05/24/2024    Nima Santamaria, PharmD    Continue all meds under the continuation of care with the referring provider and clinical pharmacy team.

## 2024-04-27 ENCOUNTER — PHARMACY VISIT (OUTPATIENT)
Dept: PHARMACY | Facility: CLINIC | Age: 64
End: 2024-04-27
Payer: COMMERCIAL

## 2024-04-29 ENCOUNTER — PHARMACY VISIT (OUTPATIENT)
Dept: PHARMACY | Facility: CLINIC | Age: 64
End: 2024-04-29

## 2024-04-30 DIAGNOSIS — I10 HYPERTENSION, UNCONTROLLED: ICD-10-CM

## 2024-04-30 DIAGNOSIS — I50.32 CHRONIC HEART FAILURE WITH PRESERVED EJECTION FRACTION (MULTI): ICD-10-CM

## 2024-04-30 RX ORDER — FUROSEMIDE 20 MG/1
20 TABLET ORAL DAILY
Qty: 90 TABLET | Refills: 2 | Status: SHIPPED | OUTPATIENT
Start: 2024-04-30

## 2024-04-30 NOTE — TELEPHONE ENCOUNTER
Pt spoke with nurse on the phone and requested refill of losartan and lasix. Noted losartan discontinued via primary at last visit. Lasix order pended, and message sent to provider r/t pt request of discontinued med.

## 2024-05-03 ENCOUNTER — TELEPHONE (OUTPATIENT)
Dept: PRIMARY CARE | Facility: CLINIC | Age: 64
End: 2024-05-03

## 2024-05-06 DIAGNOSIS — I10 HYPERTENSION, UNCONTROLLED: ICD-10-CM

## 2024-05-06 DIAGNOSIS — I11.0 HYPERTENSIVE HEART DISEASE WITH HEART FAILURE (MULTI): Primary | ICD-10-CM

## 2024-05-06 RX ORDER — ACETAMINOPHEN 500 MG
1 TABLET ORAL DAILY
Qty: 1 KIT | Refills: 0 | Status: SHIPPED | OUTPATIENT
Start: 2024-05-06

## 2024-05-14 PROBLEM — G47.30 SLEEP DISORDER BREATHING: Status: RESOLVED | Noted: 2023-03-25 | Resolved: 2024-05-14

## 2024-05-14 PROBLEM — G47.30 SLEEP APNEA: Status: RESOLVED | Noted: 2023-03-25 | Resolved: 2024-05-14

## 2024-05-14 PROBLEM — R06.83 SNORING: Status: RESOLVED | Noted: 2023-03-25 | Resolved: 2024-05-14

## 2024-05-14 PROBLEM — R53.83 FATIGUE: Status: RESOLVED | Noted: 2023-03-25 | Resolved: 2024-05-14

## 2024-05-19 DIAGNOSIS — E11.29 TYPE II OR UNSPECIFIED TYPE DIABETES MELLITUS WITH RENAL MANIFESTATIONS, UNCONTROLLED(250.42) (MULTI): ICD-10-CM

## 2024-05-19 DIAGNOSIS — E11.65 TYPE II OR UNSPECIFIED TYPE DIABETES MELLITUS WITH RENAL MANIFESTATIONS, UNCONTROLLED(250.42) (MULTI): ICD-10-CM

## 2024-05-20 RX ORDER — BLOOD SUGAR DIAGNOSTIC
STRIP MISCELLANEOUS
Qty: 100 STRIP | Refills: 3 | Status: SHIPPED | OUTPATIENT
Start: 2024-05-20

## 2024-05-26 PROCEDURE — RXMED WILLOW AMBULATORY MEDICATION CHARGE

## 2024-05-29 ENCOUNTER — PHARMACY VISIT (OUTPATIENT)
Dept: PHARMACY | Facility: CLINIC | Age: 64
End: 2024-05-29
Payer: COMMERCIAL

## 2024-05-30 PROCEDURE — RXMED WILLOW AMBULATORY MEDICATION CHARGE

## 2024-05-31 ENCOUNTER — APPOINTMENT (OUTPATIENT)
Dept: PRIMARY CARE | Facility: CLINIC | Age: 64
End: 2024-05-31
Payer: COMMERCIAL

## 2024-06-03 ENCOUNTER — PHARMACY VISIT (OUTPATIENT)
Dept: PHARMACY | Facility: CLINIC | Age: 64
End: 2024-06-03
Payer: COMMERCIAL

## 2024-06-05 ENCOUNTER — APPOINTMENT (OUTPATIENT)
Dept: PHARMACY | Facility: HOSPITAL | Age: 64
End: 2024-06-05
Payer: COMMERCIAL

## 2024-06-06 ENCOUNTER — CLINICAL SUPPORT (OUTPATIENT)
Dept: EMERGENCY MEDICINE | Facility: HOSPITAL | Age: 64
End: 2024-06-06
Payer: COMMERCIAL

## 2024-06-06 ENCOUNTER — HOSPITAL ENCOUNTER (INPATIENT)
Facility: HOSPITAL | Age: 64
LOS: 2 days | Discharge: HOME | End: 2024-06-08
Attending: EMERGENCY MEDICINE | Admitting: INTERNAL MEDICINE
Payer: COMMERCIAL

## 2024-06-06 ENCOUNTER — APPOINTMENT (OUTPATIENT)
Dept: RADIOLOGY | Facility: HOSPITAL | Age: 64
End: 2024-06-06
Payer: COMMERCIAL

## 2024-06-06 DIAGNOSIS — E11.65 UNCONTROLLED TYPE 2 DIABETES MELLITUS WITH HYPERGLYCEMIA (MULTI): ICD-10-CM

## 2024-06-06 DIAGNOSIS — Z86.39 HISTORY OF UNCONTROLLED DIABETES: ICD-10-CM

## 2024-06-06 DIAGNOSIS — I50.32 CHRONIC HEART FAILURE WITH PRESERVED EJECTION FRACTION (MULTI): ICD-10-CM

## 2024-06-06 DIAGNOSIS — R07.9 CHEST PAIN, UNSPECIFIED TYPE: ICD-10-CM

## 2024-06-06 DIAGNOSIS — I21.4 NSTEMI (NON-ST ELEVATED MYOCARDIAL INFARCTION) (MULTI): Primary | ICD-10-CM

## 2024-06-06 DIAGNOSIS — I11.0 HYPERTENSIVE HEART DISEASE WITH HEART FAILURE (MULTI): ICD-10-CM

## 2024-06-06 DIAGNOSIS — I50.33 ACUTE ON CHRONIC HEART FAILURE WITH PRESERVED EJECTION FRACTION (MULTI): ICD-10-CM

## 2024-06-06 DIAGNOSIS — K59.00 CONSTIPATION, UNSPECIFIED CONSTIPATION TYPE: ICD-10-CM

## 2024-06-06 DIAGNOSIS — F33.40 RECURRENT MAJOR DEPRESSIVE DISORDER, IN REMISSION (CMS-HCC): Chronic | ICD-10-CM

## 2024-06-06 DIAGNOSIS — Z95.5 S/P CORONARY ARTERY STENT PLACEMENT: Chronic | ICD-10-CM

## 2024-06-06 LAB
ALBUMIN SERPL BCP-MCNC: 4 G/DL (ref 3.4–5)
ALP SERPL-CCNC: 112 U/L (ref 33–136)
ALT SERPL W P-5'-P-CCNC: 6 U/L (ref 7–45)
ANION GAP BLDV CALCULATED.4IONS-SCNC: 11 MMOL/L (ref 10–25)
ANION GAP SERPL CALC-SCNC: 17 MMOL/L (ref 10–20)
APTT PPP: 42 SECONDS (ref 27–38)
AST SERPL W P-5'-P-CCNC: 14 U/L (ref 9–39)
ATRIAL RATE: 78 BPM
ATRIAL RATE: 79 BPM
ATRIAL RATE: 82 BPM
ATRIAL RATE: 85 BPM
B-OH-BUTYR SERPL-SCNC: 0.15 MMOL/L (ref 0.02–0.27)
BASE EXCESS BLDV CALC-SCNC: 3.5 MMOL/L (ref -2–3)
BASOPHILS # BLD AUTO: 0.1 X10*3/UL (ref 0–0.1)
BASOPHILS NFR BLD AUTO: 1.3 %
BILIRUB SERPL-MCNC: 0.5 MG/DL (ref 0–1.2)
BNP SERPL-MCNC: 12 PG/ML (ref 0–99)
BODY TEMPERATURE: 37 DEGREES CELSIUS
BUN SERPL-MCNC: 20 MG/DL (ref 6–23)
CA-I BLDV-SCNC: 1.17 MMOL/L (ref 1.1–1.33)
CALCIUM SERPL-MCNC: 9.2 MG/DL (ref 8.6–10.6)
CARDIAC TROPONIN I PNL SERPL HS: 38 NG/L (ref 0–34)
CARDIAC TROPONIN I PNL SERPL HS: 42 NG/L (ref 0–34)
CARDIAC TROPONIN I PNL SERPL HS: 77 NG/L (ref 0–34)
CHLORIDE BLDV-SCNC: 101 MMOL/L (ref 98–107)
CHLORIDE SERPL-SCNC: 101 MMOL/L (ref 98–107)
CO2 SERPL-SCNC: 25 MMOL/L (ref 21–32)
CREAT SERPL-MCNC: 1.54 MG/DL (ref 0.5–1.05)
EGFRCR SERPLBLD CKD-EPI 2021: 37 ML/MIN/1.73M*2
EOSINOPHIL # BLD AUTO: 0.17 X10*3/UL (ref 0–0.7)
EOSINOPHIL NFR BLD AUTO: 2.2 %
ERYTHROCYTE [DISTWIDTH] IN BLOOD BY AUTOMATED COUNT: 13.4 % (ref 11.5–14.5)
GLUCOSE BLD MANUAL STRIP-MCNC: 278 MG/DL (ref 74–99)
GLUCOSE BLDV-MCNC: 367 MG/DL (ref 74–99)
GLUCOSE SERPL-MCNC: 314 MG/DL (ref 74–99)
HCO3 BLDV-SCNC: 29.6 MMOL/L (ref 22–26)
HCT VFR BLD AUTO: 39.4 % (ref 36–46)
HCT VFR BLD EST: 41 % (ref 36–46)
HGB BLD-MCNC: 13.5 G/DL (ref 12–16)
HGB BLDV-MCNC: 13.5 G/DL (ref 12–16)
IMM GRANULOCYTES # BLD AUTO: 0.02 X10*3/UL (ref 0–0.7)
IMM GRANULOCYTES NFR BLD AUTO: 0.3 % (ref 0–0.9)
INR PPP: 1.4 (ref 0.9–1.1)
LACTATE BLDV-SCNC: 2.1 MMOL/L (ref 0.4–2)
LYMPHOCYTES # BLD AUTO: 1.74 X10*3/UL (ref 1.2–4.8)
LYMPHOCYTES NFR BLD AUTO: 22.8 %
MAGNESIUM SERPL-MCNC: 2.16 MG/DL (ref 1.6–2.4)
MCH RBC QN AUTO: 29.6 PG (ref 26–34)
MCHC RBC AUTO-ENTMCNC: 34.3 G/DL (ref 32–36)
MCV RBC AUTO: 86 FL (ref 80–100)
MONOCYTES # BLD AUTO: 0.43 X10*3/UL (ref 0.1–1)
MONOCYTES NFR BLD AUTO: 5.6 %
NEUTROPHILS # BLD AUTO: 5.16 X10*3/UL (ref 1.2–7.7)
NEUTROPHILS NFR BLD AUTO: 67.8 %
NRBC BLD-RTO: 0 /100 WBCS (ref 0–0)
OXYHGB MFR BLDV: 45.3 % (ref 45–75)
P AXIS: -7 DEGREES
P AXIS: 30 DEGREES
P AXIS: 4 DEGREES
P AXIS: 8 DEGREES
P OFFSET: 154 MS
P OFFSET: 161 MS
P OFFSET: 184 MS
P OFFSET: 189 MS
P ONSET: 134 MS
P ONSET: 134 MS
P ONSET: 139 MS
P ONSET: 140 MS
PCO2 BLDV: 50 MM HG (ref 41–51)
PH BLDV: 7.38 PH (ref 7.33–7.43)
PLATELET # BLD AUTO: 235 X10*3/UL (ref 150–450)
PO2 BLDV: 34 MM HG (ref 35–45)
POTASSIUM BLDV-SCNC: 3.9 MMOL/L (ref 3.5–5.3)
POTASSIUM SERPL-SCNC: 3.7 MMOL/L (ref 3.5–5.3)
PR INTERVAL: 136 MS
PR INTERVAL: 138 MS
PR INTERVAL: 146 MS
PR INTERVAL: 162 MS
PROT SERPL-MCNC: 7.4 G/DL (ref 6.4–8.2)
PROTHROMBIN TIME: 15.9 SECONDS (ref 9.8–12.8)
Q ONSET: 207 MS
Q ONSET: 207 MS
Q ONSET: 209 MS
Q ONSET: 215 MS
QRS COUNT: 13 BEATS
QRS COUNT: 13 BEATS
QRS COUNT: 14 BEATS
QRS COUNT: 14 BEATS
QRS DURATION: 118 MS
QRS DURATION: 118 MS
QRS DURATION: 120 MS
QRS DURATION: 124 MS
QT INTERVAL: 426 MS
QT INTERVAL: 430 MS
QT INTERVAL: 432 MS
QT INTERVAL: 440 MS
QTC CALCULATION(BAZETT): 495 MS
QTC CALCULATION(BAZETT): 501 MS
QTC CALCULATION(BAZETT): 502 MS
QTC CALCULATION(BAZETT): 506 MS
QTC FREDERICIA: 473 MS
QTC FREDERICIA: 477 MS
QTC FREDERICIA: 478 MS
QTC FREDERICIA: 480 MS
R AXIS: -22 DEGREES
R AXIS: -23 DEGREES
R AXIS: -24 DEGREES
R AXIS: -26 DEGREES
RBC # BLD AUTO: 4.56 X10*6/UL (ref 4–5.2)
SAO2 % BLDV: 46 % (ref 45–75)
SODIUM BLDV-SCNC: 138 MMOL/L (ref 136–145)
SODIUM SERPL-SCNC: 139 MMOL/L (ref 136–145)
T AXIS: 15 DEGREES
T AXIS: 32 DEGREES
T AXIS: 41 DEGREES
T AXIS: 7 DEGREES
T OFFSET: 422 MS
T OFFSET: 422 MS
T OFFSET: 427 MS
T OFFSET: 431 MS
VENTRICULAR RATE: 78 BPM
VENTRICULAR RATE: 79 BPM
VENTRICULAR RATE: 82 BPM
VENTRICULAR RATE: 85 BPM
WBC # BLD AUTO: 7.6 X10*3/UL (ref 4.4–11.3)

## 2024-06-06 PROCEDURE — 80069 RENAL FUNCTION PANEL: CPT | Mod: CCI | Performed by: EMERGENCY MEDICINE

## 2024-06-06 PROCEDURE — 93005 ELECTROCARDIOGRAM TRACING: CPT

## 2024-06-06 PROCEDURE — 82947 ASSAY GLUCOSE BLOOD QUANT: CPT

## 2024-06-06 PROCEDURE — 82010 KETONE BODYS QUAN: CPT | Performed by: EMERGENCY MEDICINE

## 2024-06-06 PROCEDURE — 71275 CT ANGIOGRAPHY CHEST: CPT

## 2024-06-06 PROCEDURE — 36415 COLL VENOUS BLD VENIPUNCTURE: CPT | Performed by: EMERGENCY MEDICINE

## 2024-06-06 PROCEDURE — 84484 ASSAY OF TROPONIN QUANT: CPT | Mod: 91 | Performed by: EMERGENCY MEDICINE

## 2024-06-06 PROCEDURE — 83735 ASSAY OF MAGNESIUM: CPT | Performed by: EMERGENCY MEDICINE

## 2024-06-06 PROCEDURE — 2550000001 HC RX 255 CONTRASTS: Performed by: EMERGENCY MEDICINE

## 2024-06-06 PROCEDURE — 84132 ASSAY OF SERUM POTASSIUM: CPT

## 2024-06-06 PROCEDURE — 2500000001 HC RX 250 WO HCPCS SELF ADMINISTERED DRUGS (ALT 637 FOR MEDICARE OP): Performed by: STUDENT IN AN ORGANIZED HEALTH CARE EDUCATION/TRAINING PROGRAM

## 2024-06-06 PROCEDURE — 1200000002 HC GENERAL ROOM WITH TELEMETRY DAILY

## 2024-06-06 PROCEDURE — 85730 THROMBOPLASTIN TIME PARTIAL: CPT | Performed by: EMERGENCY MEDICINE

## 2024-06-06 PROCEDURE — 82435 ASSAY OF BLOOD CHLORIDE: CPT | Performed by: EMERGENCY MEDICINE

## 2024-06-06 PROCEDURE — 36415 COLL VENOUS BLD VENIPUNCTURE: CPT

## 2024-06-06 PROCEDURE — 71046 X-RAY EXAM CHEST 2 VIEWS: CPT | Mod: FOREIGN READ | Performed by: RADIOLOGY

## 2024-06-06 PROCEDURE — 84484 ASSAY OF TROPONIN QUANT: CPT | Mod: 91

## 2024-06-06 PROCEDURE — 2500000002 HC RX 250 W HCPCS SELF ADMINISTERED DRUGS (ALT 637 FOR MEDICARE OP, ALT 636 FOR OP/ED): Performed by: STUDENT IN AN ORGANIZED HEALTH CARE EDUCATION/TRAINING PROGRAM

## 2024-06-06 PROCEDURE — 85520 HEPARIN ASSAY: CPT

## 2024-06-06 PROCEDURE — 82435 ASSAY OF BLOOD CHLORIDE: CPT

## 2024-06-06 PROCEDURE — 71046 X-RAY EXAM CHEST 2 VIEWS: CPT

## 2024-06-06 PROCEDURE — 83880 ASSAY OF NATRIURETIC PEPTIDE: CPT | Performed by: EMERGENCY MEDICINE

## 2024-06-06 PROCEDURE — 99285 EMERGENCY DEPT VISIT HI MDM: CPT

## 2024-06-06 PROCEDURE — 85025 COMPLETE CBC W/AUTO DIFF WBC: CPT | Performed by: EMERGENCY MEDICINE

## 2024-06-06 PROCEDURE — 2500000001 HC RX 250 WO HCPCS SELF ADMINISTERED DRUGS (ALT 637 FOR MEDICARE OP)

## 2024-06-06 PROCEDURE — 84484 ASSAY OF TROPONIN QUANT: CPT | Performed by: EMERGENCY MEDICINE

## 2024-06-06 PROCEDURE — 71275 CT ANGIOGRAPHY CHEST: CPT | Mod: FOREIGN READ | Performed by: RADIOLOGY

## 2024-06-06 PROCEDURE — 2500000004 HC RX 250 GENERAL PHARMACY W/ HCPCS (ALT 636 FOR OP/ED)

## 2024-06-06 PROCEDURE — 2500000001 HC RX 250 WO HCPCS SELF ADMINISTERED DRUGS (ALT 637 FOR MEDICARE OP): Performed by: EMERGENCY MEDICINE

## 2024-06-06 RX ORDER — POLYETHYLENE GLYCOL 3350 17 G/17G
17 POWDER, FOR SOLUTION ORAL DAILY PRN
Status: DISCONTINUED | OUTPATIENT
Start: 2024-06-06 | End: 2024-06-08 | Stop reason: HOSPADM

## 2024-06-06 RX ORDER — DEXTROSE 50 % IN WATER (D50W) INTRAVENOUS SYRINGE
12.5
Status: DISCONTINUED | OUTPATIENT
Start: 2024-06-06 | End: 2024-06-08 | Stop reason: HOSPADM

## 2024-06-06 RX ORDER — METOPROLOL SUCCINATE 50 MG/1
50 TABLET, EXTENDED RELEASE ORAL DAILY
Status: DISCONTINUED | OUTPATIENT
Start: 2024-06-06 | End: 2024-06-06

## 2024-06-06 RX ORDER — BRIMONIDINE TARTRATE 2 MG/ML
1 SOLUTION/ DROPS OPHTHALMIC 2 TIMES DAILY
Status: DISCONTINUED | OUTPATIENT
Start: 2024-06-06 | End: 2024-06-08 | Stop reason: HOSPADM

## 2024-06-06 RX ORDER — FUROSEMIDE 20 MG/1
20 TABLET ORAL DAILY
Status: DISCONTINUED | OUTPATIENT
Start: 2024-06-06 | End: 2024-06-08 | Stop reason: HOSPADM

## 2024-06-06 RX ORDER — ASPIRIN 81 MG/1
81 TABLET ORAL DAILY
Status: DISCONTINUED | OUTPATIENT
Start: 2024-06-07 | End: 2024-06-08 | Stop reason: HOSPADM

## 2024-06-06 RX ORDER — NAPROXEN SODIUM 220 MG/1
324 TABLET, FILM COATED ORAL ONCE
Status: COMPLETED | OUTPATIENT
Start: 2024-06-06 | End: 2024-06-06

## 2024-06-06 RX ORDER — UREA 200 MG/G
1 CREAM TOPICAL 2 TIMES DAILY
Status: DISCONTINUED | OUTPATIENT
Start: 2024-06-06 | End: 2024-06-08 | Stop reason: HOSPADM

## 2024-06-06 RX ORDER — INSULIN DEGLUDEC 100 U/ML
25 INJECTION, SOLUTION SUBCUTANEOUS NIGHTLY
Status: DISCONTINUED | OUTPATIENT
Start: 2024-06-06 | End: 2024-06-08

## 2024-06-06 RX ORDER — HEPARIN SODIUM 10000 [USP'U]/100ML
0-4500 INJECTION, SOLUTION INTRAVENOUS CONTINUOUS
Status: DISCONTINUED | OUTPATIENT
Start: 2024-06-06 | End: 2024-06-08

## 2024-06-06 RX ORDER — DAPAGLIFLOZIN 10 MG/1
10 TABLET, FILM COATED ORAL DAILY
Status: DISCONTINUED | OUTPATIENT
Start: 2024-06-06 | End: 2024-06-08 | Stop reason: HOSPADM

## 2024-06-06 RX ORDER — ACETAMINOPHEN 160 MG/5ML
650 SOLUTION ORAL EVERY 4 HOURS PRN
Status: DISCONTINUED | OUTPATIENT
Start: 2024-06-06 | End: 2024-06-08 | Stop reason: HOSPADM

## 2024-06-06 RX ORDER — ACETAMINOPHEN 650 MG/1
650 SUPPOSITORY RECTAL EVERY 4 HOURS PRN
Status: DISCONTINUED | OUTPATIENT
Start: 2024-06-06 | End: 2024-06-08 | Stop reason: HOSPADM

## 2024-06-06 RX ORDER — TALC
6 POWDER (GRAM) TOPICAL ONCE
Status: COMPLETED | OUTPATIENT
Start: 2024-06-06 | End: 2024-06-06

## 2024-06-06 RX ORDER — ISOSORBIDE MONONITRATE 30 MG/1
60 TABLET, EXTENDED RELEASE ORAL
Status: DISCONTINUED | OUTPATIENT
Start: 2024-06-07 | End: 2024-06-08 | Stop reason: HOSPADM

## 2024-06-06 RX ORDER — CARVEDILOL 25 MG/1
25 TABLET ORAL
Status: DISCONTINUED | OUTPATIENT
Start: 2024-06-06 | End: 2024-06-08 | Stop reason: HOSPADM

## 2024-06-06 RX ORDER — ACETAMINOPHEN 325 MG/1
650 TABLET ORAL EVERY 4 HOURS PRN
Status: DISCONTINUED | OUTPATIENT
Start: 2024-06-06 | End: 2024-06-08 | Stop reason: HOSPADM

## 2024-06-06 RX ORDER — INSULIN LISPRO 100 [IU]/ML
15 INJECTION, SOLUTION INTRAVENOUS; SUBCUTANEOUS
Status: COMPLETED | OUTPATIENT
Start: 2024-06-06 | End: 2024-06-06

## 2024-06-06 RX ORDER — LATANOPROST 50 UG/ML
1 SOLUTION/ DROPS OPHTHALMIC NIGHTLY
Status: DISCONTINUED | OUTPATIENT
Start: 2024-06-06 | End: 2024-06-08 | Stop reason: HOSPADM

## 2024-06-06 RX ORDER — DULOXETIN HYDROCHLORIDE 30 MG/1
30 CAPSULE, DELAYED RELEASE ORAL 2 TIMES DAILY
Status: DISCONTINUED | OUTPATIENT
Start: 2024-06-06 | End: 2024-06-08 | Stop reason: HOSPADM

## 2024-06-06 RX ORDER — NITROGLYCERIN 0.4 MG/1
0.4 TABLET SUBLINGUAL EVERY 5 MIN PRN
Status: DISCONTINUED | OUTPATIENT
Start: 2024-06-06 | End: 2024-06-08 | Stop reason: HOSPADM

## 2024-06-06 RX ORDER — INSULIN LISPRO 100 [IU]/ML
0-5 INJECTION, SOLUTION INTRAVENOUS; SUBCUTANEOUS EVERY 4 HOURS
Status: DISCONTINUED | OUTPATIENT
Start: 2024-06-07 | End: 2024-06-07

## 2024-06-06 RX ORDER — ATORVASTATIN CALCIUM 80 MG/1
80 TABLET, FILM COATED ORAL DAILY
Status: DISCONTINUED | OUTPATIENT
Start: 2024-06-06 | End: 2024-06-08 | Stop reason: HOSPADM

## 2024-06-06 RX ORDER — PANTOPRAZOLE SODIUM 40 MG/1
40 TABLET, DELAYED RELEASE ORAL
Status: DISCONTINUED | OUTPATIENT
Start: 2024-06-07 | End: 2024-06-08 | Stop reason: HOSPADM

## 2024-06-06 RX ORDER — FERROUS SULFATE, DRIED 160(50) MG
1 TABLET, EXTENDED RELEASE ORAL DAILY
Status: DISCONTINUED | OUTPATIENT
Start: 2024-06-06 | End: 2024-06-08 | Stop reason: HOSPADM

## 2024-06-06 RX ADMIN — DULOXETINE HYDROCHLORIDE 30 MG: 30 CAPSULE, DELAYED RELEASE ORAL at 20:44

## 2024-06-06 RX ADMIN — ASPIRIN 81 MG 324 MG: 81 TABLET ORAL at 13:39

## 2024-06-06 RX ADMIN — BRIMONIDINE TARTRATE 1 DROP: 2 SOLUTION OPHTHALMIC at 20:45

## 2024-06-06 RX ADMIN — ATORVASTATIN CALCIUM 80 MG: 80 TABLET, FILM COATED ORAL at 20:44

## 2024-06-06 RX ADMIN — HEPARIN SODIUM 1600 UNITS/HR: 10000 INJECTION, SOLUTION INTRAVENOUS at 18:12

## 2024-06-06 RX ADMIN — INSULIN DEGLUDEC INJECTION 25 UNITS: 100 INJECTION, SOLUTION SUBCUTANEOUS at 22:38

## 2024-06-06 RX ADMIN — INSULIN LISPRO 15 UNITS: 100 INJECTION, SOLUTION INTRAVENOUS; SUBCUTANEOUS at 20:45

## 2024-06-06 RX ADMIN — Medication 6 MG: at 21:05

## 2024-06-06 RX ADMIN — IOHEXOL 56 ML: 350 INJECTION, SOLUTION INTRAVENOUS at 19:49

## 2024-06-06 RX ADMIN — ACETAMINOPHEN 650 MG: 325 TABLET ORAL at 22:42

## 2024-06-06 RX ADMIN — CARVEDILOL 25 MG: 25 TABLET, FILM COATED ORAL at 20:44

## 2024-06-06 RX ADMIN — Medication 1 APPLICATION: at 22:38

## 2024-06-06 RX ADMIN — Medication 1 TABLET: at 20:44

## 2024-06-06 ASSESSMENT — PAIN - FUNCTIONAL ASSESSMENT: PAIN_FUNCTIONAL_ASSESSMENT: 0-10

## 2024-06-06 ASSESSMENT — PAIN DESCRIPTION - DESCRIPTORS: DESCRIPTORS: DISCOMFORT

## 2024-06-06 ASSESSMENT — PAIN SCALES - GENERAL
PAINLEVEL_OUTOF10: 7
PAINLEVEL_OUTOF10: 7

## 2024-06-06 NOTE — HOSPITAL COURSE
"64 y.o. female with a history of HFpEF (EF 50-55%, 9/2023), CAD s/p PCI w/ stent x3 (2016, 2018) and STEMI 9/2023 (no stents placed), TIA 9/2023, SVC thrombus on Eliquis, remote DVT, DM2, HLD, CKD, obesity, NAFLD, MICHAEL, prior toe amputations for gangrene in 2020, presenting with one day of sharp 8/10 chest pain lasting 15 minutes which then became a chest \"tightness.\" Vitals stable. Exam notable for baseline 1+ bilateral LE edema but otherwise unremarkable. No signs of acute volume overload. Labs notable for Cr 1.54 (Cr 1.37 in 3/2024), glucose 314, Troponin HS 38 -> 42, lactate 2.1. LFT wnl. CXR with no acute findings. CTA chest with no PE, opacification of SVC consistent with known SVC thrombus. EKG with NSR, small ST elevation in V1-V2 (seen on prior EKG) but no other acute ST or T-wave changes. Given  in ED. Suspect possible NSTEMI given sx and mildly elevated troponin. EKG does not appear to have new, acute pathology as these small ST elevations in V1 and V2 were seen in the last two EKGs. Less likely aortic dissection or pericarditis. Trended troponin and EKGs. Started heparin drip (hold home DOAC) on admission. Troponin 38 -> 42 -> 77 -> 70 -> 78 -> 66. EKGs were trended and stable overnight after admission. Chest pain resolved by the morning following admission. BNP wnl.     Follow up:   [ ] Primary care   [ ] Cardiology     To do/FYI:   - Pending TTE   - Pending PT/OT - given weakness, lives alone currently   - No cardiology consult needed unless worsening sx or EKG changes   - Monitor fluid status and consider resuming home Lasix PO   "

## 2024-06-06 NOTE — ED PROVIDER NOTES
BENI Mata is a 64 y.o. female with a history of HfpEF, CAD s/p PCI for STEMI 9/23, NSTEMI 11/13, TIA 9/23, known SVC thrombus on Eliquis, remote DVT, DM, HLD, CKD, BMI 40, MICHAEL presents with one day of chest pain. The patient woke up this morning with sharp chest pain that radiated across her chest and into her left arm. The pain has been constant, it started at a 8/10 and is now 7/10. The patient was worried about her pain in the context of her past MI x2 and TIA and therefore came to the ED. Pt endorses headache and chronic shortness of breath on exertion, denies new back pain and pleuritic chest pain.     PMH  Past Medical History:   Diagnosis Date    Abnormal uterine and vaginal bleeding, unspecified 04/10/2018    Abnormal uterine bleeding    Abnormal uterine and vaginal bleeding, unspecified 02/22/2018    Abnormal uterine bleeding    Acute candidiasis of vulva and vagina 04/10/2018    Vaginal candida    Acute candidiasis of vulva and vagina 02/22/2018    Vaginal candida    Acute vaginitis 04/10/2018    Vaginitis    Acute vaginitis 02/22/2018    Vaginitis    Alcohol abuse, in remission     History of alcohol abuse    Anxiety disorder, unspecified 04/10/2018    Anxiety    Anxiety disorder, unspecified 02/22/2018    Anxiety    Atherosclerotic heart disease of native coronary artery without angina pectoris 04/10/2018    Arteriosclerotic cardiovascular disease (ASCVD)    Atherosclerotic heart disease of native coronary artery without angina pectoris 02/22/2018    Arteriosclerotic cardiovascular disease (ASCVD)    Atherosclerotic heart disease of native coronary artery without angina pectoris 02/22/2018    CAD (coronary artery disease)    Atherosclerotic heart disease of native coronary artery without angina pectoris 04/10/2018    CAD (coronary artery disease)    Atypical facial pain 04/10/2018    Atypical face pain    Atypical facial pain 02/22/2018    Atypical face pain    Benign lipomatous  neoplasm, unspecified 04/10/2018    Lipoma    Benign lipomatous neoplasm, unspecified 02/22/2018    Lipoma    Changes in skin texture 04/10/2018    Thickening, skin    Changes in skin texture 02/22/2018    Thickening, skin    Chest pain, unspecified 04/10/2018    Chest pain at rest    Chest pain, unspecified 02/22/2018    Chest pain at rest    Chronic cough 04/10/2018    Cough, persistent    Chronic cough 02/22/2018    Cough, persistent    Chronic pain syndrome 04/10/2018    Pain syndrome, chronic    Chronic pain syndrome 02/22/2018    Pain syndrome, chronic    Cramp and spasm 04/10/2018    Cramp and spasm    Cramp and spasm 02/22/2018    Cramp and spasm    Depression, unspecified 04/10/2018    Depression    Depression, unspecified 02/22/2018    Depression    Disorder of pigmentation, unspecified 04/10/2018    Discoloration of skin of lower leg    Disorder of pigmentation, unspecified 02/22/2018    Discoloration of skin of lower leg    Dysuria 04/10/2018    Dysuria    Dysuria 02/22/2018    Dysuria    Encounter for immunization 04/10/2018    Flu vaccine need    Encounter for immunization 04/10/2018    Immunization due    Encounter for immunization 02/22/2018    Flu vaccine need    Encounter for immunization 02/22/2018    Immunization due    Encounter for screening for infections with a predominantly sexual mode of transmission     Routine screening for STI (sexually transmitted infection)    Encounter for screening mammogram for malignant neoplasm of breast 04/10/2018    Visit for screening mammogram    Encounter for screening mammogram for malignant neoplasm of breast 02/22/2018    Visit for screening mammogram    Essential (primary) hypertension 04/10/2018    Hypertension, uncontrolled    Essential (primary) hypertension 02/22/2018    Hypertension, uncontrolled    Fatty (change of) liver, not elsewhere classified 04/10/2018    NAFLD (nonalcoholic fatty liver disease)    Fatty (change of) liver, not elsewhere  classified 02/22/2018    NAFLD (nonalcoholic fatty liver disease)    Fibromyalgia 04/10/2018    Fibromyalgia    Fibromyalgia 02/22/2018    Fibromyalgia    Gastro-esophageal reflux disease without esophagitis 04/10/2018    GERD (gastroesophageal reflux disease)    Generalized contraction of visual field, left eye 02/06/2017    Generalized contraction of visual field of left eye    Headache, unspecified 04/10/2018    Headache    Headache, unspecified 02/22/2018    Headache    Heart failure, unspecified (Multi) 02/22/2018    CHF, chronic    Heart failure, unspecified (Multi) 04/10/2018    CHF, chronic    Hereditary and idiopathic neuropathy, unspecified 04/10/2018    Idiopathic peripheral neuropathy    Hereditary and idiopathic neuropathy, unspecified 02/22/2018    Idiopathic peripheral neuropathy    Hydroureter 04/10/2018    Hydroureter on left    Hydroureter 02/22/2018    Hydroureter on left    Hyperlipidemia, unspecified     Dyslipidemia    Hypertensive urgency 04/10/2018    Asymptomatic hypertensive urgency    Hypertensive urgency 02/22/2018    Asymptomatic hypertensive urgency    Hypokalemia 04/10/2018    Hypokalemia    Hypokalemia 02/22/2018    Hypokalemia    Insomnia, unspecified 04/10/2018    Insomnia    Insomnia, unspecified 02/22/2018    Insomnia    Localized edema 04/10/2018    Bilateral edema of lower extremity    Localized edema 02/22/2018    Bilateral edema of lower extremity    Macula scars of posterior pole (postinflammatory) (post-traumatic), left eye 04/10/2018    Macula scar of posterior pole of left eye    Morbid (severe) obesity due to excess calories (Multi) 04/10/2018    Morbid obesity with BMI of 40.0-44.9, adult    Morbid (severe) obesity due to excess calories (Multi) 02/22/2018    Morbid obesity with BMI of 40.0-44.9, adult    Nail dystrophy 04/10/2018    Dystrophia unguium    Nail dystrophy 02/22/2018    Dystrophia unguium    Non-ST elevation (NSTEMI) myocardial infarction (Multi) 04/10/2018     NSTEMI, initial episode of care    Non-ST elevation (NSTEMI) myocardial infarction (Multi) 02/22/2018    NSTEMI, initial episode of care    Obstructive sleep apnea (adult) (pediatric) 04/10/2018    Obstructive sleep apnea    Obstructive sleep apnea (adult) (pediatric) 02/22/2018    Obstructive sleep apnea    Other chest pain 02/22/2018    Chest tightness or pressure    Other chest pain 04/10/2018    Chest tightness or pressure    Other conditions influencing health status 04/10/2018    Insulin dependent type 2 diabetes mellitus, uncontrolled    Other conditions influencing health status 04/10/2018    Uncontrolled diabetes mellitus    Other conditions influencing health status 04/10/2018    DM (diabetes mellitus), type 2, uncontrolled, with renal complications    Other conditions influencing health status 02/22/2018    Insulin dependent type 2 diabetes mellitus, uncontrolled    Other conditions influencing health status 02/22/2018    Uncontrolled diabetes mellitus    Other conditions influencing health status 02/22/2018    DM (diabetes mellitus), type 2, uncontrolled, with renal complications    Other fatigue 04/10/2018    Fatigue    Other fatigue 02/22/2018    Fatigue    Other fecal abnormalities 04/10/2018    Loose stools    Other fecal abnormalities 02/22/2018    Loose stools    Other forms of dyspnea 04/10/2018    Dyspnea on exertion    Other forms of dyspnea 02/22/2018    Dyspnea on exertion    Other problems related to lifestyle     Other problems related to lifestyle    Other specified health status 04/10/2018    Medically complex patient    Other specified health status 02/22/2018    Medically complex patient    Pain in right knee 02/22/2018    Knee pain, bilateral    Pain in right knee 04/10/2018    Knee pain, bilateral    Peripheral vascular disease, unspecified (CMS-HCC) 04/10/2018    Peripheral vascular disease    Peripheral vascular disease, unspecified (CMS-HCC) 02/22/2018    Peripheral vascular  disease    Personal history of other diseases of the digestive system     History of esophageal reflux    Personal history of other diseases of the digestive system     History of constipation    Personal history of other diseases of the nervous system and sense organs     History of glaucoma    Personal history of other endocrine, nutritional and metabolic disease     History of hypothyroidism    Personal history of other endocrine, nutritional and metabolic disease     History of hyperlipidemia    Personal history of other endocrine, nutritional and metabolic disease     History of diabetes mellitus    Polyarthritis, unspecified 04/10/2018    Polyarthritis    Polyarthritis, unspecified 02/22/2018    Polyarthritis    Polyp of colon 04/10/2018    Colon polyps    Polyp of colon 02/22/2018    Colon polyps    Postmenopausal bleeding 04/10/2018    Postmenopausal bleeding    Postmenopausal bleeding 02/22/2018    Postmenopausal bleeding    Presence of coronary angioplasty implant and graft 04/10/2018    Presence of drug coated stent in right coronary artery    Presence of coronary angioplasty implant and graft 02/22/2018    Presence of drug coated stent in right coronary artery    Presence of intraocular lens 04/10/2018    Pseudophakia, both eyes    Presence of intraocular lens 03/18/2015    Pseudophakia of left eye    Primary open-angle glaucoma, unspecified eye, stage unspecified 04/10/2018    Primary open angle glaucoma    Proteinuria, unspecified 04/10/2018    Microalbuminuria    Proteinuria, unspecified 02/22/2018    Microalbuminuria    Pruritus, unspecified 04/10/2018    Pruritic dermatitis    Pruritus, unspecified 02/22/2018    Pruritic dermatitis    Psoriasis, unspecified 04/10/2018    Psoriasis    Psoriasis, unspecified 02/22/2018    Psoriasis    Rash and other nonspecific skin eruption 04/10/2018    Rash/skin eruption    Rash and other nonspecific skin eruption 02/22/2018    Rash/skin eruption    Repeated falls  04/10/2018    Multiple falls    Repeated falls 02/22/2018    Multiple falls    Retinal hemorrhage, right eye 02/06/2017    Retinal hemorrhage of right eye    Shortness of breath 04/10/2018    Shortness of breath    Shortness of breath 02/22/2018    Shortness of breath    Sleep apnea, unspecified 04/10/2018    Sleep disorder breathing    Sleep apnea, unspecified 02/22/2018    Sleep disorder breathing    Traction detachment of retina, right eye 02/06/2017    Retinal detachment, tractional, right eye    Type 2 diabetes mellitus with diabetic polyneuropathy (Multi) 04/10/2018    Controlled type 2 diabetes mellitus with diabetic polyneuropathy, with long-term current use of insulin    Type 2 diabetes mellitus with diabetic polyneuropathy (Multi) 02/22/2018    Controlled type 2 diabetes mellitus with diabetic polyneuropathy, with long-term current use of insulin    Type 2 diabetes mellitus with other skin ulcer (CODE) (Multi) 04/10/2018    Diabetic leg ulcer    Type 2 diabetes mellitus with other skin ulcer (CODE) (Multi) 02/22/2018    Diabetic leg ulcer    Type 2 diabetes mellitus with proliferative diabetic retinopathy without macular edema, unspecified eye (Multi) 04/10/2018    Proliferative diabetic retinopathy    Type 2 diabetes mellitus with unspecified diabetic retinopathy without macular edema (Multi) 02/06/2017    Background diabetic retinopathy    Umbilical hernia without obstruction or gangrene     Umbilical hernia    Unqualified visual loss, both eyes 02/06/2017    Complete loss of vision in visual field    Unspecified glaucoma 06/11/2015    Glaucoma    Unstable angina (Multi) 04/10/2018    Unstable angina    Unstable angina (Multi) 02/22/2018    Unstable angina    Vitamin D deficiency, unspecified 04/10/2018    Vitamin D deficiency, unspecified    Vitamin D deficiency, unspecified 02/22/2018    Vitamin D deficiency, unspecified    Xerosis cutis 04/10/2018    Xerosis of skin    Xerosis cutis 02/22/2018     Xerosis of skin       Meds  Current Outpatient Medications   Medication Instructions    apixaban (ELIQUIS) 5 mg, oral, Every 12 hours    aspirin 81 mg EC tablet 1 TAB(S) ORALLY ONCE A DAY -.MEDS TO BEDS    atorvastatin (Lipitor) 80 mg tablet TAKE 1 TABLET BY MOUTH ONCE DAILY    blood pressure monitor kit 1 each, miscellaneous, Daily, Use once daily to check blood pressure    blood sugar diagnostic (OneTouch Ultra Test) strip USE 1 STRIP(S) TO TEST BLOOD SUGAR 3 TIMES A DAY    brimonidine (AlphaGAN) 0.2 % ophthalmic solution 1 drop, Both Eyes, 2 times daily, PATIENT NEEDS PCP APPT FOR FURTHER REFILLS    calcium carbonate-vit D3-min 600 mg calcium- 400 unit tablet 1 tablet, oral, Daily    carvedilol (COREG) 25 mg, oral, 2 times daily (morning and late afternoon)    DULoxetine (CYMBALTA) 30 mg, oral, 2 times daily, PATIENT NEEDS APPT FOR FURTHER REFILLS    Farxiga 10 mg, oral, Daily    FreeStyle Filipe 2 Sensor kit Apply 1 sensor to the back of the upper arm. Change sensor every 14 days.    FreeStyle Filipe reader (FreeStyle Filipe 2 Gilbert) misc Use as instructed    furosemide (LASIX) 20 mg, oral, Daily    HumaLOG KwikPen Insulin 22 Units, subcutaneous, 3 times daily (morning, midday, late afternoon), Take as directed per insulin instructions.    hydrOXYzine HCL (ATARAX) 25 mg, oral, 2 times daily PRN    insulin degludec (TRESIBA FLEXTOUCH) 50 Units, subcutaneous, Nightly, Take as directed per insulin instructions.    isosorbide mononitrate ER (IMDUR) 60 mg, oral, Daily before breakfast    Kerendia 10 mg, oral, Daily    latanoprost (Xalatan) 0.005 % ophthalmic solution ADMINISTER 1 DROP INTO BOTH EYES ONCE DAILY AT BEDTIME.    metoprolol succinate XL (TOPROL-XL) 50 mg, oral, Daily    nitroglycerin (Nitrostat) 0.4 mg SL tablet PLACE 1 TABLET UNDER THE TONGUE EVERY 5 MINUTES FOR UP TO 3 DOSES AS NEEDED FOR CHEST PAIN.CALL 911 IF PAIN PERSISTS.    Ozempic 2 mg, subcutaneous, Once Weekly    pantoprazole (PROTONIX) 40 mg,  "oral, Daily before breakfast    polyethylene glycol (Glycolax, Miralax) 17 gram/dose powder oral, Daily PRN    urea (Carmol) 20 % cream 1 Application, Topical, 2 times daily       Allergies  Allergies   Allergen Reactions    Other Rash     Novocaine Solution, reaction rash     Penicillins Rash    Procaine Rash        SHx  Social History     Tobacco Use    Smoking status: Never    Smokeless tobacco: Never   Substance Use Topics    Alcohol use: Not Currently     Comment: occasionally    Drug use: Never     Pt uses alcohol 3 times per week with 2 shots of alcohol during each use.     ------------------------------------------------------------------------------------------------------------------------------------------    /77   Pulse 81   Temp 36.9 °C (98.4 °F)   Resp 18   Ht 1.499 m (4' 11\")   Wt 89.4 kg (197 lb)   LMP  (LMP Unknown)   SpO2 98%   BMI 39.79 kg/m²     Physical Exam  Vitals reviewed.   Constitutional:       Appearance: She is not toxic-appearing.      Comments: BMI 40.   HENT:      Head: Normocephalic and atraumatic.   Eyes:      Extraocular Movements: Extraocular movements intact.   Cardiovascular:      Rate and Rhythm: Normal rate and regular rhythm.      Pulses:           Radial pulses are 2+ on the right side and 2+ on the left side.      Heart sounds: Normal heart sounds.   Pulmonary:      Effort: Pulmonary effort is normal. No tachypnea or respiratory distress.      Breath sounds: Normal breath sounds.   Chest:      Chest wall: No mass, deformity, tenderness or edema.   Abdominal:      Palpations: Abdomen is soft.      Comments: Tenderness to palpation of the bilateral upper quadrants.    Musculoskeletal:      Comments: 3+ pitting edema of the bilateral lower extremities.   Skin:     General: Skin is warm and dry.      Capillary Refill: Capillary refill takes less than 2 seconds.      Coloration: Skin is not cyanotic.      Findings: No erythema.   Neurological:      General: No focal " deficit present.      Mental Status: She is alert and oriented to person, place, and time.      Comments: 4/5 strength of the RLE, 3/5 strength of the LLE. No facial weakness or pronator drift bilaterally.    Psychiatric:         Mood and Affect: Mood normal.         Behavior: Behavior normal.        ------------------------------------------------------------------------------------------------------------------------------------------    Medical Decision Making: Nelida Mata is a 64 y.o. female with a history of HFpEF, CAD s/p PCI for STEMI 9/23, NSTEMI 11/13, TIA 9/23, known SVC thrombus on Eliquis, remote DVT, DM, HLD, CKD, BMI 40, MICHAEL presents with one day of sharp chest pain in the setting of chronic shortness of breath. In the context of her extensive cardiac and clotting history, her chest pain was most concerning for ACS and PE. Aortic dissection was unlikely without pain radiating to the back, and pneumonia was unlikely without a fever or cough and with an unremarkable CXR. VBG demonstrated normal pH without hypercapnia but did demonstrate a blood glucose of 367, prompting concern for DKA in the context of her DM and abdominal tenderness. Beta-hydroxybutyrate returned WNL. CMP returned with creatinine at baseline and was otherwise unremarkable, CBC and BNP unremarkable, making acute CHF exacerbation unlikely. Troponins returned elevated and rising therefore the patient was admitted to medicine for NSTEMI, the patient is stable.     Patient was signed out at 1715 with CTA pending.       EKG interpreted by me:  ED Course as of 06/06/24 1631   Thu Jun 06, 2024   1241 POCT Glucose, Venous(!): 367 [JY]      ED Course User Index  [JY] Alexey Romero DO         Diagnoses as of 06/06/24 1631   NSTEMI (non-ST elevated myocardial infarction) (Multi)       Discussed with: Attending Dr. Nick Ulloa, MS4       Denise Ulloa  06/06/24 4840

## 2024-06-06 NOTE — H&P
Chief Complaint: chest pain    History Of Present Illness  Nelida Mata is a 64 y.o. female with a history of HFpEF (EF 50-55%, 9/2023), CAD s/p PCI w/ stent x3 (2016, 2018) and STEMI 9/2023 (no stents placed), TIA 9/2023, SVC thrombus on Eliquis, remote DVT, DM2, HLD, CKD, obesity, NAFLD, MICHAEL, prior toe amputations for gangrene in 2020, presenting with one day of chest pain. Chest pain started when she got out of bed around 7 am today. Pt states that she was hypertensive to 195/90s at that time. Sharp 8/10 pain started on right side of chest and spread across to the left side and down her arm to mid-elbow. Pt states that sharp pain lasted approx 15 minutes and has had dull chest tightness since then.     Pt endorsing dyspnea on exertion and at rest which is chronic and unchanged. States that her chronic LE edema is stable. Denies fever, chills, or cough.     Left Heart Catheterization (9/27/23)  CONCLUSIONS:  1. Indication: STEMI (high lateral).  2. Ostial medium size diagonal has 90% stenosis with NAT 3 flow (Jailed by 2 layers of stents in LAD).  3. Known occluded RCA (not injected today).  4. Otherwise, mild to moderate left coronary artery disease.      ED Course  Vitals   T 98.4 F, HR 85, RR 16, /78, Sats 98% RA       Labs  CBC: WBC 7.6, Hgb 13.5,   RFP: Na 139, K 3.7, Cl 101, HCO3 25, BUN 20, Cr 1.54 (Cr 1.37 in 3/2024), Glucose 314, Mg 2.16  LFT: Alkp 112, ALT 6, AST 14, Tbili 0.5  Troponin HS: 38 -> 42  VBG: pH 7.38, pCO2 50, pO2 34, lactate 2.1  BNP: pending     Imaging/studies  CXR   IMPRESSION:  No acute findings on two-view chest.    CTA chest: pending     EKG: NSR, small ST elevation in V1-V2 (seen on prior EKG) but no other acute ST or T-wave changes     Interventions  - In ED, pt given        Past Medical History  - as above     Surgical History  She has a past surgical history that includes Coronary angioplasty with stent (03/06/2017); Cataract extraction (03/18/2015);  Retinal detachment surgery (03/18/2015); MR angio head wo IV contrast (08/16/2013); MR angio neck wo IV contrast (08/16/2013); CT angio aorta and bilateral iliofemoral runoff w and or wo IV contrast (08/08/2022); and Breast biopsy (Left).     Social History  - No tobacco use hx. Alcohol use of 1-2 drinks per week. Denies any other drug use.   - Lives alone. Boyfriend lives in an apartment in the same building. Does not use cane or other device for assistance with walking.     Family History  Family History   Problem Relation Name Age of Onset    Diabetes Mother      Glaucoma Father      Hypertension Other FAMILY      Allergies  Other, Penicillins, and Procaine    Medications  Current Outpatient Medications   Medication Instructions    apixaban (ELIQUIS) 5 mg, oral, Every 12 hours    aspirin 81 mg EC tablet 1 TAB(S) ORALLY ONCE A DAY -.MEDS TO BEDS    atorvastatin (Lipitor) 80 mg tablet TAKE 1 TABLET BY MOUTH ONCE DAILY    blood pressure monitor kit 1 each, miscellaneous, Daily, Use once daily to check blood pressure    blood sugar diagnostic (OneTouch Ultra Test) strip USE 1 STRIP(S) TO TEST BLOOD SUGAR 3 TIMES A DAY    brimonidine (AlphaGAN) 0.2 % ophthalmic solution 1 drop, Both Eyes, 2 times daily, PATIENT NEEDS PCP APPT FOR FURTHER REFILLS    calcium carbonate-vit D3-min 600 mg calcium- 400 unit tablet 1 tablet, oral, Daily    carvedilol (COREG) 25 mg, oral, 2 times daily (morning and late afternoon)    DULoxetine (CYMBALTA) 30 mg, oral, 2 times daily, PATIENT NEEDS APPT FOR FURTHER REFILLS    Farxiga 10 mg, oral, Daily    FreeStyle Filipe 2 Sensor kit Apply 1 sensor to the back of the upper arm. Change sensor every 14 days.    FreeStyle Filipe reader (FreeStyle Filipe 2 [place]) misc Use as instructed    furosemide (LASIX) 20 mg, oral, Daily    HumaLOG KwikPen Insulin 22 Units, subcutaneous, 3 times daily (morning, midday, late afternoon), Take as directed per insulin instructions.    hydrOXYzine HCL (ATARAX) 25  "mg, oral, 2 times daily PRN    insulin degludec (TRESIBA FLEXTOUCH) 50 Units, subcutaneous, Nightly, Take as directed per insulin instructions.    isosorbide mononitrate ER (IMDUR) 60 mg, oral, Daily before breakfast    Kerendia 10 mg, oral, Daily    latanoprost (Xalatan) 0.005 % ophthalmic solution ADMINISTER 1 DROP INTO BOTH EYES ONCE DAILY AT BEDTIME.    metoprolol succinate XL (TOPROL-XL) 50 mg, oral, Daily    nitroglycerin (Nitrostat) 0.4 mg SL tablet PLACE 1 TABLET UNDER THE TONGUE EVERY 5 MINUTES FOR UP TO 3 DOSES AS NEEDED FOR CHEST PAIN.CALL 911 IF PAIN PERSISTS.    Ozempic 2 mg, subcutaneous, Once Weekly    pantoprazole (PROTONIX) 40 mg, oral, Daily before breakfast    polyethylene glycol (Glycolax, Miralax) 17 gram/dose powder oral, Daily PRN    urea (Carmol) 20 % cream 1 Application, Topical, 2 times daily      Scheduled medications    Continuous medications    PRN medications       ROS  - Pt endorsing chronic fatigue for the past few years. Chronic bilateral LE edema. Chronic constipation (last bm the day prior to admission). ROS negative aside from that mentioned above in HPI      Last Recorded Vitals  Blood pressure 114/77, pulse 81, temperature 36.9 °C (98.4 °F), resp. rate 18, height 1.499 m (4' 11\"), weight 89.4 kg (197 lb), SpO2 98%.    Physical Exam   General: awake, alert, no acute distress  HEENT: head normocephalic, atraumatic; EOM grossly intact; oral mucosa moist  Cardiac: regular rate and rhythm; no murmurs; no JVD   Pulm: CTAB; normal respiratory effort; no wheezing, rales, or rhonchi   Abdominal: soft, non-tender, non-distended, without palpable masses, no suprapubic tenderness   MSK: moves all extremities spontaneously, bilateral 1+ pitting edema in LE    Neuro: AOx3, no gross focal neuro deficits  Psych: affect and mood appropriate     Assessment & Plan  64 y.o. female with a history of HFpEF (EF 50-55%, 9/2023), CAD s/p PCI w/ stent x3 (2016, 2018) and STEMI 9/2023 (no stents placed), " "TIA 9/2023, SVC thrombus on Eliquis, remote DVT, DM2, HLD, CKD, obesity, NAFLD, MICHAEL, prior toe amputations for gangrene in 2020, presenting with one day of sharp 8/10 chest pain lasting 15 minutes which then became a chest \"tightness.\" Vitals stable. Exam notable for baseline 1+ bilateral LE edema but otherwise unremarkable. No signs of acute volume overload. Labs notable for Cr 1.54 (Cr 1.37 in 3/2024), glucose 314, Troponin HS 38 -> 42, lactate 2.1. LFT wnl. CXR with no acute findings. EKG with NSR, small ST elevation in V1-V2 (seen on prior EKG) but no other acute ST or T-wave changes. Given  in ED. Suspect NSTEMI given sx and mildly elevated troponin. EKG does not appear to have new, acute pathology as these small ST elevations in V1 and V2 were seen in the last two EKGs. Less likely aortic dissection or pericarditis. Will trend troponin and EKGs. Will start heparin (hold home DOAC), repeat TTE, and consider further studies such as coronary CTA or stress test.       # NSTEMI  # CAD s/p PCI w/ stent x3 (2016, 2018) and STEMI 9/2023 (no stents placed)  :: Sx sharp 8/10 chest pain for 15 min followed by chest \"tightness\"  :: Vitals stable on admission. No signs of acute worsening volume overload on exam.   :: EKG on admission with NSR, small ST elevation in V1-V2 (seen on prior EKG), but no other acute ST or T-wave changes   :: Last heart cath at time of STEMI in 9/2023 with diagonal 90% stenosis (Jailed by 2 layers of stents in LAD) and Known occluded RCA (not injected), no stents placed  :: Troponin 38 -> 42  - Trend troponin and EKG q4hr  - Start heparin drip   - NPO at midnight  - Obtain TTE   - On Tele   - Consider stress test vs coronary CTA for further evaluation   - Follow up CTA chest   - Cont. ASA 81  - Cont. Atorva 80 mg daily  - Sublingual nitroglycerin PRN  - Cont. Imdur 60 mg daily     # HFpEF  :: TTE 9/2023: EF 50-55%, abnormal pattern of LV diastolic filling, elevated mean LA pressure, " reduced RV systolic function   :: Follows with cardiology (last seen 3/2024)  :: Appears euvolemic and pt states that she is at dry weight on admission   - Repeat TTE as above   - HOLD Dapagliflozin 10 mg daily   - Cont. Coreg 25 mg BID  - HOLD home Lasix 20 mg daily     - strict I/O, daily weights   - Follow up BNP    # SVC thrombus on Eliquis  # TIA 9/2023  # Remote DVT hx  - HOLD home apixaban 5 mg BID  - Heparin drip as above     # DM2  # HLD  :: Home regimen: Tresiba flextouch (insulin degludec) 52 units at bedtime, ozempic, farxiga 10 mg qAM, insulin lispro 20 units plus SSI with meals   - Cont. Insulin degludec 26 units at bedtime  - Cont. Insulin lispro 16 units TID   - SSI     # CKD   :: Cr 1.54 on admission (Cr 1.37 in 3/2024)   - HOLD Kerendia 10 mg PO daily   - Monitor RFP     # MICHAEL   :: Non adherent with CPAP and does not follow the sleep medicine   - Monitor    # NAFLD  - Monitor     # Misc  - Miralax daily PRN   - Cont brimonidine and latanoprost eye drops   - Cont. Duloxetine 30 mg BID       F: PRN   E: PRN   N: cardiac diet   A: PIV   DVT Ppx: on heparin drip   GI Ppx: PPI    Code status: full code (confirmed on admission)   NOK: Brother (Joe) [phone]; Son (Glynn Medina) [phone]      Tk Osman MD   PGY-1    Patient will be staffed with internal medicine attending in the morning.

## 2024-06-06 NOTE — ED TRIAGE NOTES
Patient has chest pain that started when she got out of the bed this morning at 7am that starts in R chest and spreads across her L chest and down her L arm to about mid elbow. Patient also complains of feeling tired with activity for about x1 year. Hx x2 MI with 3 stents placed and hx of stroke with residual slight aphasia all within the last 20 months - per patient report

## 2024-06-06 NOTE — SIGNIFICANT EVENT
INPATIENT INTERNAL MEDICINE  SENIOR STAFFING NOTE     Subjective   HPI:  Nelida Mata is a 64 y.o. female with HFpEF, GERD,  obesity with BMI 40 kg/m² (maintained on semaglutide), CAD status post PCI ( STEMI 9/2023 at ; NSTEMI 11/2023 care at [facility]), known SVC thrombus maintained on DOAC, TIA 9/2023, hyperlipidemia, CKD, MICHAEL non adherent with CPAP and does not follow the sleep medicine, status post toe amputations for gangrene, insomnia who presents with chest pain.    Patient was in her usual state of health, 3 days ago developed nausea and vomiting and had 3 episodes of emesis. Reports that this was followed by chest pain that started today. She woke up from sleep and then started to develop chest pain at rest, pain was across chest and radiates to left arm. Reports measuring her BP with SBP 190s. She denies fevers or chills. She called EMS and by time team arrived pain had started to resolved. In the ED the patient reports mild chest pressure. She was HDS, BP was improved, and EKG showed ST elevation in V1 and V2 that were present during prior admission and office visit EKGs. She was loaded with aspirin by ED team.    Of note, she had a STEMI on 9.2023 with 90% D1 lesion however no possible PCI due to LAD jailing stents. Had a TIA in Oct 2023 suspected to be due to cardio embolic disease. (although patient never had afib or known PFO)    She was as    ED Course:  Vitals:   T 36.9 °C (98.4 °F)  HR 85  /78  RR 16  O2 98 % None (Room air)    Relevant studies:  EKG reviewed  Labs reviewed  Troponins 38>42, Cr noted 1.5, unclear baseline was normal prior to 2023.      Interventions:  Aspirin load      Medical History:  PMH: above  Medications prior to admission: reviewed   Current Outpatient Medications   Medication Instructions    apixaban (ELIQUIS) 5 mg, oral, Every 12 hours    aspirin 81 mg EC tablet 1 TAB(S) ORALLY ONCE A DAY -.MEDS TO BEDS    atorvastatin (Lipitor) 80 mg tablet TAKE 1  TABLET BY MOUTH ONCE DAILY    blood pressure monitor kit 1 each, miscellaneous, Daily, Use once daily to check blood pressure    blood sugar diagnostic (OneTouch Ultra Test) strip USE 1 STRIP(S) TO TEST BLOOD SUGAR 3 TIMES A DAY    brimonidine (AlphaGAN) 0.2 % ophthalmic solution 1 drop, Both Eyes, 2 times daily, PATIENT NEEDS PCP APPT FOR FURTHER REFILLS    calcium carbonate-vit D3-min 600 mg calcium- 400 unit tablet 1 tablet, oral, Daily    carvedilol (COREG) 25 mg, oral, 2 times daily (morning and late afternoon)    DULoxetine (CYMBALTA) 30 mg, oral, 2 times daily, PATIENT NEEDS APPT FOR FURTHER REFILLS    Farxiga 10 mg, oral, Daily    FreeStyle Filipe 2 Sensor kit Apply 1 sensor to the back of the upper arm. Change sensor every 14 days.    FreeStyle Filipe reader (FreeStyle Filipe 2 [place]) misc Use as instructed    furosemide (LASIX) 20 mg, oral, Daily    HumaLOG KwikPen Insulin 22 Units, subcutaneous, 3 times daily (morning, midday, late afternoon), Take as directed per insulin instructions.    hydrOXYzine HCL (ATARAX) 25 mg, oral, 2 times daily PRN    insulin degludec (TRESIBA FLEXTOUCH) 50 Units, subcutaneous, Nightly, Take as directed per insulin instructions.    isosorbide mononitrate ER (IMDUR) 60 mg, oral, Daily before breakfast    Kerendia 10 mg, oral, Daily    latanoprost (Xalatan) 0.005 % ophthalmic solution ADMINISTER 1 DROP INTO BOTH EYES ONCE DAILY AT BEDTIME.    metoprolol succinate XL (TOPROL-XL) 50 mg, oral, Daily    nitroglycerin (Nitrostat) 0.4 mg SL tablet PLACE 1 TABLET UNDER THE TONGUE EVERY 5 MINUTES FOR UP TO 3 DOSES AS NEEDED FOR CHEST PAIN.CALL 911 IF PAIN PERSISTS.    Ozempic 2 mg, subcutaneous, Once Weekly    pantoprazole (PROTONIX) 40 mg, oral, Daily before breakfast    polyethylene glycol (Glycolax, Miralax) 17 gram/dose powder oral, Daily PRN    urea (Carmol) 20 % cream 1 Application, Topical, 2 times daily       Allergies: NKDA   FamHx:   +ve for heart disease in  faimly    SocHx:  Home: lives alone, supportive partner visits daily  ADLs: performs ADLs on own. Ambulates without aid, but has balance issues 2/2 toe amps    -Alcohol: denies  -Tobacco: denies  -Recreational drugs: denies    ROS: 10-point ROS negative unless otherwise mentioned in HPI          Objective   Vitals: reviewed  Exam:  Gen: well-appearing, NAD  Head and neck: NCAT, neck supple without LAD  HEENT: MMM, normal nose without congestion  CV: RRR no MRG, radial pulses 2+  Pulm: CTAB, no wheezing or crackles, normal WOB on RA  Abd: soft, non-tender, non-distended  Ext: WWP, trace pedal edema  Neuro: alert and oriented x3, normal tone, face symmetric, moves all extremities spontaneously           Assessment/Plan    Nelida Mata is a 64 y.o. female with HFpEF, GERD,  obesity with BMI 40 kg/m² (maintained on semaglutide), CAD status post PCI ( STEMI 9/2023 at ; NSTEMI 11/2023 care at [facility]), known SVC thrombus maintained on DOAC, TIA 9/2023, hyperlipidemia, CKD, MICHAEL non adherent with CPAP and does not follow the sleep medicine, status post toe amputations for gangrene, insomnia who presents with chest pain. Trop mild elevation. EKG with ST changes present from prior. Chest pain improved compared to sx onset. Possible demand ischemia in setting of HTN at home vs ACS. Cont to trend troponin, sub ling nitrate, switch doac to hep gtt, cont asa, will obtain echocardiogram to eval wall motion and EF. Will consider percy scan given CLARIBEL. Will cont home HTN meds given BP well controlled in ED in ED.     CODE STATUS: FULL (confirmed with patient on admission)  Surrogate decision maker: brother   Glynn Diaz (Sibling)  [phone] (Home Phone)     See H&P for more details.    Rosalva Davila MD

## 2024-06-06 NOTE — PROGRESS NOTES
Pharmacy Medication History Review    Nelida Mata is a 64 y.o. female admitted for NSTEMI (non-ST elevated myocardial infarction) (Multi). Pharmacy reviewed the patient's [id number] medications and allergies for accuracy.    The list below reflects the updated PTA list. Comments regarding how patient may be taking medications differently can be found in the Admit Orders Activity  Prior to Admission Medications   Prescriptions Last Dose Informant   DULoxetine (Cymbalta) 30 mg DR capsule 2024 Self   Sig: Take 1 capsule (30 mg) by mouth 2 times a day. PATIENT NEEDS APPT FOR FURTHER REFILLS   FreeStyle Filipe 2 Sensor kit  Self   Sig: Apply 1 sensor to the back of the upper arm. Change sensor every 14 days.   FreeStyle Filipe reader (FreeStyle Filipe 2 [place]) misc  Self   Sig: Use as instructed   MELATONIN ORAL Past Week Self   Sig: Take 1 tablet by mouth once daily at bedtime.   apixaban (Eliquis) 5 mg tablet 2024 Self   Sig: Take 1 tablet (5 mg) by mouth every 12 hours.   aspirin 81 mg EC tablet 2024 Self   Si TAB(S) ORALLY ONCE A DAY -.MEDS TO BEDS   atorvastatin (Lipitor) 80 mg tablet 2024 Self   Sig: TAKE 1 TABLET BY MOUTH ONCE DAILY   blood pressure monitor kit  Self   Si each once daily. Use once daily to check blood pressure   blood sugar diagnostic (OneTouch Ultra Test) strip  Self   Sig: USE 1 STRIP(S) TO TEST BLOOD SUGAR 3 TIMES A DAY   brimonidine (AlphaGAN) 0.2 % ophthalmic solution 2024 Self   Sig: Administer 1 drop into both eyes 2 times a day. PATIENT NEEDS PCP APPT FOR FURTHER REFILLS   calcium carbonate-vit D3-min 600 mg calcium- 400 unit tablet Past Month Self   Sig: Take 1 tablet by mouth once daily.   carvedilol (Coreg) 25 mg tablet 2024 Self   Sig: Take 1 tablet (25 mg) by mouth 2 times daily (morning and late afternoon).   dapagliflozin propanediol (Farxiga) 10 mg 2024 Self   Sig: Take 1 tablet (10 mg) by mouth once daily.   finerenone  (Kerendia) 10 mg tablet tablet 6/6/2024 Self   Sig: Take 1 tablet (10 mg) by mouth once daily.   furosemide (Lasix) 20 mg tablet 6/6/2024 Self   Sig: Take 1 tablet (20 mg) by mouth once daily.   hydrOXYzine HCL (Atarax) 25 mg tablet 6/6/2024 Self   Sig: Take 1 tablet (25 mg) by mouth 2 times a day as needed.   insulin degludec (Tresiba FlexTouch) 200 unit/mL (3 mL) injection 6/5/2024 at Night Self   Sig: Inject 50 Units under the skin once daily at bedtime. Take as directed per insulin instructions.   Patient taking differently: Inject 52 Units under the skin once daily at bedtime. Take as directed per insulin instructions.   insulin lispro (HumaLOG KwikPen Insulin) 200 unit/mL (3 mL) insulin pen pen 6/5/2024 at Night Self   Sig: Inject 22 Units under the skin 3 times a day with meals. Take as directed per insulin instructions.   Patient taking differently: Inject 20 Units under the skin 3 times daily (morning, midday, late afternoon). Sliding scale. Take as directed per insulin instructions.   isosorbide mononitrate ER (Imdur) 60 mg 24 hr tablet Past Month Self   Sig: TAKE 1 TABLET BY MOUTH EVERY DAY IN THE MORNING   latanoprost (Xalatan) 0.005 % ophthalmic solution 6/5/2024 Self   Sig: ADMINISTER 1 DROP INTO BOTH EYES ONCE DAILY AT BEDTIME.   metoprolol succinate XL (Toprol-XL) 50 mg 24 hr tablet Not Taking Self   Sig: Take 1 tablet (50 mg) by mouth once daily.   Patient not taking: Reported on 6/6/2024   nitroglycerin (Nitrostat) 0.4 mg SL tablet 6/5/2024 Self   Sig: PLACE 1 TABLET UNDER THE TONGUE EVERY 5 MINUTES FOR UP TO 3 DOSES AS NEEDED FOR CHEST PAIN.CALL 911 IF PAIN PERSISTS.   pantoprazole (ProtoNix) 40 mg EC tablet 6/5/2024 Self   Sig: Take 1 tablet (40 mg) by mouth once daily in the morning. Take before meals.   polyethylene glycol (Glycolax, Miralax) 17 gram/dose powder Past Week Self   Sig: Take by mouth once daily as needed.   semaglutide 2 mg/dose (8 mg/3 mL) pen injector Past Week at Sunday Self    Sig: Inject 2 mg under the skin 1 (one) time per week.   urea (Carmol) 20 % cream 6/5/2024 Self   Sig: Apply 1 Application topically 2 times a day.      Facility-Administered Medications: None        The list below reflects the updated allergy list. Please review each documented allergy for additional clarification and justification.  Allergies  Reviewed by Xenia Ivy RN on 6/6/2024        Severity Reactions Comments    Other Low Rash Novocaine Solution, reaction rash     Penicillins Low Rash     Procaine Low Rash             Patient accepts M2B at discharge. Pharmacy has been updated to [facility].    Sources used to complete the med history include out patient fill history, OARRS, and patient interview (patient was a decent medical historian, knew the medications when listed off to her.).    Below are additional concerns with the patient's PTA list.  N/A    BreAnna Phoenix, CPhT  Transitions of Care   Meds [facility]  Please reach out via Secure Chat for questions, or if no response call [facility] or [facility]

## 2024-06-07 ENCOUNTER — CLINICAL SUPPORT (OUTPATIENT)
Dept: EMERGENCY MEDICINE | Facility: HOSPITAL | Age: 64
End: 2024-06-07
Payer: COMMERCIAL

## 2024-06-07 ENCOUNTER — APPOINTMENT (OUTPATIENT)
Dept: CARDIOLOGY | Facility: HOSPITAL | Age: 64
End: 2024-06-07
Payer: COMMERCIAL

## 2024-06-07 LAB
ALBUMIN SERPL BCP-MCNC: 4 G/DL (ref 3.4–5)
ANION GAP SERPL CALC-SCNC: 26 MMOL/L (ref 10–20)
AORTIC VALVE PEAK VELOCITY: 1.75 M/S
APTT PPP: 37 SECONDS (ref 27–38)
ATRIAL RATE: 76 BPM
AV PEAK GRADIENT: 12.3 MMHG
AVA (PEAK VEL): 1.64 CM2
BASOPHILS # BLD AUTO: 0.13 X10*3/UL (ref 0–0.1)
BASOPHILS NFR BLD AUTO: 1.5 %
BUN SERPL-MCNC: 21 MG/DL (ref 6–23)
CALCIUM SERPL-MCNC: 9.4 MG/DL (ref 8.6–10.6)
CARDIAC TROPONIN I PNL SERPL HS: 66 NG/L (ref 0–34)
CARDIAC TROPONIN I PNL SERPL HS: 70 NG/L (ref 0–34)
CARDIAC TROPONIN I PNL SERPL HS: 78 NG/L (ref 0–34)
CHLORIDE SERPL-SCNC: 105 MMOL/L (ref 98–107)
CO2 SERPL-SCNC: 18 MMOL/L (ref 21–32)
CREAT SERPL-MCNC: 1.67 MG/DL (ref 0.5–1.05)
EGFRCR SERPLBLD CKD-EPI 2021: 34 ML/MIN/1.73M*2
EJECTION FRACTION APICAL 4 CHAMBER: 52.7
EOSINOPHIL # BLD AUTO: 0.22 X10*3/UL (ref 0–0.7)
EOSINOPHIL NFR BLD AUTO: 2.6 %
ERYTHROCYTE [DISTWIDTH] IN BLOOD BY AUTOMATED COUNT: 14.6 % (ref 11.5–14.5)
GLUCOSE BLD MANUAL STRIP-MCNC: 139 MG/DL (ref 74–99)
GLUCOSE BLD MANUAL STRIP-MCNC: 152 MG/DL (ref 74–99)
GLUCOSE BLD MANUAL STRIP-MCNC: 161 MG/DL (ref 74–99)
GLUCOSE BLD MANUAL STRIP-MCNC: 179 MG/DL (ref 74–99)
GLUCOSE BLD MANUAL STRIP-MCNC: 195 MG/DL (ref 74–99)
GLUCOSE BLD MANUAL STRIP-MCNC: 354 MG/DL (ref 74–99)
GLUCOSE SERPL-MCNC: 330 MG/DL (ref 74–99)
HCT VFR BLD AUTO: 38.3 % (ref 36–46)
HGB BLD-MCNC: 13.3 G/DL (ref 12–16)
IMM GRANULOCYTES # BLD AUTO: 0.02 X10*3/UL (ref 0–0.7)
IMM GRANULOCYTES NFR BLD AUTO: 0.2 % (ref 0–0.9)
INR PPP: 1.2 (ref 0.9–1.1)
LEFT ATRIUM VOLUME AREA LENGTH INDEX BSA: 19.1 ML/M2
LEFT VENTRICLE INTERNAL DIMENSION DIASTOLE: 4.7 CM (ref 3.5–6)
LEFT VENTRICULAR OUTFLOW TRACT DIAMETER: 1.9 CM
LV EJECTION FRACTION BIPLANE: 56 %
LYMPHOCYTES # BLD AUTO: 2.23 X10*3/UL (ref 1.2–4.8)
LYMPHOCYTES NFR BLD AUTO: 25.9 %
MCH RBC QN AUTO: 29.5 PG (ref 26–34)
MCHC RBC AUTO-ENTMCNC: 34.7 G/DL (ref 32–36)
MCV RBC AUTO: 85 FL (ref 80–100)
MITRAL VALVE E/A RATIO: 0.84
MITRAL VALVE E/E' RATIO: 14.82
MONOCYTES # BLD AUTO: 0.56 X10*3/UL (ref 0.1–1)
MONOCYTES NFR BLD AUTO: 6.5 %
NEUTROPHILS # BLD AUTO: 5.45 X10*3/UL (ref 1.2–7.7)
NEUTROPHILS NFR BLD AUTO: 63.3 %
NRBC BLD-RTO: 0 /100 WBCS (ref 0–0)
P AXIS: 5 DEGREES
P OFFSET: 185 MS
P ONSET: 136 MS
PHOSPHATE SERPL-MCNC: 2.4 MG/DL (ref 2.5–4.9)
PLATELET # BLD AUTO: 158 X10*3/UL (ref 150–450)
POTASSIUM SERPL-SCNC: 3.8 MMOL/L (ref 3.5–5.3)
PR INTERVAL: 148 MS
PROTHROMBIN TIME: 13.3 SECONDS (ref 9.8–12.8)
Q ONSET: 210 MS
QRS COUNT: 12 BEATS
QRS DURATION: 116 MS
QT INTERVAL: 424 MS
QTC CALCULATION(BAZETT): 477 MS
QTC FREDERICIA: 458 MS
R AXIS: -27 DEGREES
RBC # BLD AUTO: 4.51 X10*6/UL (ref 4–5.2)
RIGHT VENTRICLE FREE WALL PEAK S': 9.3 CM/S
SODIUM SERPL-SCNC: 145 MMOL/L (ref 136–145)
T AXIS: 19 DEGREES
T OFFSET: 422 MS
TRICUSPID ANNULAR PLANE SYSTOLIC EXCURSION: 2.1 CM
UFH PPP CHRO-ACNC: 0.6 IU/ML
UFH PPP CHRO-ACNC: 0.8 IU/ML
UFH PPP CHRO-ACNC: 0.9 IU/ML
UFH PPP CHRO-ACNC: 1 IU/ML
UFH PPP CHRO-ACNC: 1.5 IU/ML
UFH PPP CHRO-ACNC: >2 IU/ML
VENTRICULAR RATE: 76 BPM
VIT B12 SERPL-MCNC: 367 PG/ML (ref 211–911)
WBC # BLD AUTO: 8.6 X10*3/UL (ref 4.4–11.3)

## 2024-06-07 PROCEDURE — 93005 ELECTROCARDIOGRAM TRACING: CPT

## 2024-06-07 PROCEDURE — 36415 COLL VENOUS BLD VENIPUNCTURE: CPT

## 2024-06-07 PROCEDURE — 82947 ASSAY GLUCOSE BLOOD QUANT: CPT | Mod: 91

## 2024-06-07 PROCEDURE — 85610 PROTHROMBIN TIME: CPT

## 2024-06-07 PROCEDURE — 84484 ASSAY OF TROPONIN QUANT: CPT

## 2024-06-07 PROCEDURE — 93306 TTE W/DOPPLER COMPLETE: CPT | Performed by: INTERNAL MEDICINE

## 2024-06-07 PROCEDURE — 85520 HEPARIN ASSAY: CPT | Mod: 91 | Performed by: STUDENT IN AN ORGANIZED HEALTH CARE EDUCATION/TRAINING PROGRAM

## 2024-06-07 PROCEDURE — 82947 ASSAY GLUCOSE BLOOD QUANT: CPT | Mod: 91,MUE

## 2024-06-07 PROCEDURE — 93306 TTE W/DOPPLER COMPLETE: CPT

## 2024-06-07 PROCEDURE — 85520 HEPARIN ASSAY: CPT | Mod: 91 | Performed by: EMERGENCY MEDICINE

## 2024-06-07 PROCEDURE — 99233 SBSQ HOSP IP/OBS HIGH 50: CPT

## 2024-06-07 PROCEDURE — 2500000001 HC RX 250 WO HCPCS SELF ADMINISTERED DRUGS (ALT 637 FOR MEDICARE OP): Performed by: STUDENT IN AN ORGANIZED HEALTH CARE EDUCATION/TRAINING PROGRAM

## 2024-06-07 PROCEDURE — 85520 HEPARIN ASSAY: CPT | Mod: 91,MUE | Performed by: INTERNAL MEDICINE

## 2024-06-07 PROCEDURE — 2500000002 HC RX 250 W HCPCS SELF ADMINISTERED DRUGS (ALT 637 FOR MEDICARE OP, ALT 636 FOR OP/ED): Performed by: STUDENT IN AN ORGANIZED HEALTH CARE EDUCATION/TRAINING PROGRAM

## 2024-06-07 PROCEDURE — 1200000002 HC GENERAL ROOM WITH TELEMETRY DAILY

## 2024-06-07 PROCEDURE — 2500000004 HC RX 250 GENERAL PHARMACY W/ HCPCS (ALT 636 FOR OP/ED)

## 2024-06-07 PROCEDURE — 82607 VITAMIN B-12: CPT

## 2024-06-07 PROCEDURE — 85025 COMPLETE CBC W/AUTO DIFF WBC: CPT

## 2024-06-07 PROCEDURE — 2500000004 HC RX 250 GENERAL PHARMACY W/ HCPCS (ALT 636 FOR OP/ED): Performed by: STUDENT IN AN ORGANIZED HEALTH CARE EDUCATION/TRAINING PROGRAM

## 2024-06-07 PROCEDURE — 2500000001 HC RX 250 WO HCPCS SELF ADMINISTERED DRUGS (ALT 637 FOR MEDICARE OP)

## 2024-06-07 RX ORDER — ACETAMINOPHEN 500 MG
5 TABLET ORAL NIGHTLY PRN
Status: DISCONTINUED | OUTPATIENT
Start: 2024-06-07 | End: 2024-06-08 | Stop reason: HOSPADM

## 2024-06-07 RX ORDER — CYCLOBENZAPRINE HCL 10 MG
5 TABLET ORAL ONCE
Status: COMPLETED | OUTPATIENT
Start: 2024-06-07 | End: 2024-06-07

## 2024-06-07 RX ORDER — INSULIN LISPRO 100 [IU]/ML
0-10 INJECTION, SOLUTION INTRAVENOUS; SUBCUTANEOUS
Status: DISCONTINUED | OUTPATIENT
Start: 2024-06-08 | End: 2024-06-08 | Stop reason: HOSPADM

## 2024-06-07 RX ORDER — CYCLOBENZAPRINE HCL 10 MG
5 TABLET ORAL 3 TIMES DAILY
Status: COMPLETED | OUTPATIENT
Start: 2024-06-07 | End: 2024-06-07

## 2024-06-07 RX ORDER — ACETAMINOPHEN 10 MG/ML
1000 INJECTION, SOLUTION INTRAVENOUS ONCE
Status: COMPLETED | OUTPATIENT
Start: 2024-06-07 | End: 2024-06-07

## 2024-06-07 RX ORDER — ONDANSETRON HYDROCHLORIDE 2 MG/ML
4 INJECTION, SOLUTION INTRAVENOUS EVERY 6 HOURS PRN
Status: DISCONTINUED | OUTPATIENT
Start: 2024-06-07 | End: 2024-06-08 | Stop reason: HOSPADM

## 2024-06-07 RX ADMIN — CYCLOBENZAPRINE 5 MG: 10 TABLET, FILM COATED ORAL at 23:07

## 2024-06-07 RX ADMIN — INSULIN LISPRO 1 UNITS: 100 INJECTION, SOLUTION INTRAVENOUS; SUBCUTANEOUS at 12:41

## 2024-06-07 RX ADMIN — INSULIN LISPRO 1 UNITS: 100 INJECTION, SOLUTION INTRAVENOUS; SUBCUTANEOUS at 08:13

## 2024-06-07 RX ADMIN — DULOXETINE HYDROCHLORIDE 30 MG: 30 CAPSULE, DELAYED RELEASE ORAL at 22:32

## 2024-06-07 RX ADMIN — CYCLOBENZAPRINE 5 MG: 10 TABLET, FILM COATED ORAL at 17:56

## 2024-06-07 RX ADMIN — BRIMONIDINE TARTRATE 1 DROP: 2 SOLUTION OPHTHALMIC at 08:13

## 2024-06-07 RX ADMIN — ONDANSETRON 4 MG: 2 INJECTION INTRAMUSCULAR; INTRAVENOUS at 09:37

## 2024-06-07 RX ADMIN — PANTOPRAZOLE SODIUM 40 MG: 40 TABLET, DELAYED RELEASE ORAL at 06:17

## 2024-06-07 RX ADMIN — HEPARIN SODIUM 1100 UNITS/HR: 10000 INJECTION, SOLUTION INTRAVENOUS at 18:43

## 2024-06-07 RX ADMIN — ISOSORBIDE MONONITRATE 60 MG: 30 TABLET, EXTENDED RELEASE ORAL at 08:12

## 2024-06-07 RX ADMIN — CARVEDILOL 25 MG: 25 TABLET, FILM COATED ORAL at 08:12

## 2024-06-07 RX ADMIN — INSULIN LISPRO 1 UNITS: 100 INJECTION, SOLUTION INTRAVENOUS; SUBCUTANEOUS at 18:48

## 2024-06-07 RX ADMIN — HEPARIN SODIUM 1100 UNITS/HR: 10000 INJECTION, SOLUTION INTRAVENOUS at 19:25

## 2024-06-07 RX ADMIN — LATANOPROST 1 DROP: 50 SOLUTION OPHTHALMIC at 22:32

## 2024-06-07 RX ADMIN — ACETAMINOPHEN 1000 MG: 1000 INJECTION INTRAVENOUS at 09:19

## 2024-06-07 RX ADMIN — BRIMONIDINE TARTRATE 1 DROP: 2 SOLUTION OPHTHALMIC at 22:32

## 2024-06-07 RX ADMIN — INSULIN DEGLUDEC INJECTION 25 UNITS: 100 INJECTION, SOLUTION SUBCUTANEOUS at 22:33

## 2024-06-07 RX ADMIN — PERFLUTREN 2 ML OF DILUTION: 6.52 INJECTION, SUSPENSION INTRAVENOUS at 16:04

## 2024-06-07 RX ADMIN — ASPIRIN 81 MG: 81 TABLET, COATED ORAL at 08:12

## 2024-06-07 RX ADMIN — DULOXETINE HYDROCHLORIDE 30 MG: 30 CAPSULE, DELAYED RELEASE ORAL at 08:12

## 2024-06-07 RX ADMIN — CARVEDILOL 25 MG: 25 TABLET, FILM COATED ORAL at 17:56

## 2024-06-07 RX ADMIN — HEPARIN SODIUM 1300 UNITS/HR: 10000 INJECTION, SOLUTION INTRAVENOUS at 13:02

## 2024-06-07 RX ADMIN — Medication 5 MG: at 23:07

## 2024-06-07 RX ADMIN — Medication 1 TABLET: at 08:12

## 2024-06-07 RX ADMIN — ATORVASTATIN CALCIUM 80 MG: 80 TABLET, FILM COATED ORAL at 22:32

## 2024-06-07 SDOH — ECONOMIC STABILITY: HOUSING INSECURITY: IN THE LAST 12 MONTHS, HOW MANY PLACES HAVE YOU LIVED?: 1

## 2024-06-07 SDOH — SOCIAL STABILITY: SOCIAL INSECURITY: DO YOU FEEL ANYONE HAS EXPLOITED OR TAKEN ADVANTAGE OF YOU FINANCIALLY OR OF YOUR PERSONAL PROPERTY?: NO

## 2024-06-07 SDOH — SOCIAL STABILITY: SOCIAL INSECURITY: WERE YOU ABLE TO COMPLETE ALL THE BEHAVIORAL HEALTH SCREENINGS?: YES

## 2024-06-07 SDOH — SOCIAL STABILITY: SOCIAL INSECURITY: DOES ANYONE TRY TO KEEP YOU FROM HAVING/CONTACTING OTHER FRIENDS OR DOING THINGS OUTSIDE YOUR HOME?: NO

## 2024-06-07 SDOH — SOCIAL STABILITY: SOCIAL INSECURITY: HAVE YOU HAD ANY THOUGHTS OF HARMING ANYONE ELSE?: NO

## 2024-06-07 SDOH — HEALTH STABILITY: MENTAL HEALTH: HOW MANY STANDARD DRINKS CONTAINING ALCOHOL DO YOU HAVE ON A TYPICAL DAY?: 1 OR 2

## 2024-06-07 SDOH — SOCIAL STABILITY: SOCIAL INSECURITY: HAS ANYONE EVER THREATENED TO HURT YOUR FAMILY OR YOUR PETS?: NO

## 2024-06-07 SDOH — ECONOMIC STABILITY: INCOME INSECURITY: HOW HARD IS IT FOR YOU TO PAY FOR THE VERY BASICS LIKE FOOD, HOUSING, MEDICAL CARE, AND HEATING?: NOT VERY HARD

## 2024-06-07 SDOH — ECONOMIC STABILITY: INCOME INSECURITY: IN THE LAST 12 MONTHS, WAS THERE A TIME WHEN YOU WERE NOT ABLE TO PAY THE MORTGAGE OR RENT ON TIME?: NO

## 2024-06-07 SDOH — SOCIAL STABILITY: SOCIAL INSECURITY: DO YOU FEEL UNSAFE GOING BACK TO THE PLACE WHERE YOU ARE LIVING?: NO

## 2024-06-07 SDOH — HEALTH STABILITY: MENTAL HEALTH: HOW OFTEN DO YOU HAVE A DRINK CONTAINING ALCOHOL?: 2-4 TIMES A MONTH

## 2024-06-07 SDOH — ECONOMIC STABILITY: TRANSPORTATION INSECURITY
IN THE PAST 12 MONTHS, HAS LACK OF TRANSPORTATION KEPT YOU FROM MEETINGS, WORK, OR FROM GETTING THINGS NEEDED FOR DAILY LIVING?: NO

## 2024-06-07 SDOH — ECONOMIC STABILITY: HOUSING INSECURITY
IN THE LAST 12 MONTHS, WAS THERE A TIME WHEN YOU DID NOT HAVE A STEADY PLACE TO SLEEP OR SLEPT IN A SHELTER (INCLUDING NOW)?: NO

## 2024-06-07 SDOH — SOCIAL STABILITY: SOCIAL INSECURITY: HAVE YOU HAD THOUGHTS OF HARMING ANYONE ELSE?: NO

## 2024-06-07 SDOH — HEALTH STABILITY: MENTAL HEALTH: HOW OFTEN DO YOU HAVE 6 OR MORE DRINKS ON ONE OCCASION?: NEVER

## 2024-06-07 SDOH — ECONOMIC STABILITY: TRANSPORTATION INSECURITY
IN THE PAST 12 MONTHS, HAS THE LACK OF TRANSPORTATION KEPT YOU FROM MEDICAL APPOINTMENTS OR FROM GETTING MEDICATIONS?: NO

## 2024-06-07 SDOH — SOCIAL STABILITY: SOCIAL INSECURITY: ARE THERE ANY APPARENT SIGNS OF INJURIES/BEHAVIORS THAT COULD BE RELATED TO ABUSE/NEGLECT?: NO

## 2024-06-07 SDOH — SOCIAL STABILITY: SOCIAL INSECURITY: ARE YOU OR HAVE YOU BEEN THREATENED OR ABUSED PHYSICALLY, EMOTIONALLY, OR SEXUALLY BY ANYONE?: NO

## 2024-06-07 SDOH — SOCIAL STABILITY: SOCIAL INSECURITY: ABUSE: ADULT

## 2024-06-07 ASSESSMENT — ACTIVITIES OF DAILY LIVING (ADL)
DRESSING YOURSELF: INDEPENDENT
WALKS IN HOME: INDEPENDENT
JUDGMENT_ADEQUATE_SAFELY_COMPLETE_DAILY_ACTIVITIES: YES
HEARING - RIGHT EAR: FUNCTIONAL
GROOMING: INDEPENDENT
ADEQUATE_TO_COMPLETE_ADL: NO
PATIENT'S MEMORY ADEQUATE TO SAFELY COMPLETE DAILY ACTIVITIES?: YES
HEARING - LEFT EAR: FUNCTIONAL
TOILETING: INDEPENDENT
FEEDING YOURSELF: INDEPENDENT
BATHING: INDEPENDENT
ASSISTIVE_DEVICE: CANE;WALKER

## 2024-06-07 ASSESSMENT — COGNITIVE AND FUNCTIONAL STATUS - GENERAL
MOBILITY SCORE: 21
DAILY ACTIVITIY SCORE: 23
STANDING UP FROM CHAIR USING ARMS: A LITTLE
TOILETING: A LITTLE
WALKING IN HOSPITAL ROOM: A LITTLE
WALKING IN HOSPITAL ROOM: A LITTLE
PATIENT BASELINE BEDBOUND: NO
MOBILITY SCORE: 21
CLIMB 3 TO 5 STEPS WITH RAILING: A LITTLE
DAILY ACTIVITIY SCORE: 23
CLIMB 3 TO 5 STEPS WITH RAILING: A LITTLE
STANDING UP FROM CHAIR USING ARMS: A LITTLE
TOILETING: A LITTLE

## 2024-06-07 ASSESSMENT — LIFESTYLE VARIABLES
AUDIT-C TOTAL SCORE: 2
EVER FELT BAD OR GUILTY ABOUT YOUR DRINKING: NO
EVER HAD A DRINK FIRST THING IN THE MORNING TO STEADY YOUR NERVES TO GET RID OF A HANGOVER: NO
SKIP TO QUESTIONS 9-10: 1
HAVE YOU EVER FELT YOU SHOULD CUT DOWN ON YOUR DRINKING: NO
TOTAL SCORE: 0
HAVE PEOPLE ANNOYED YOU BY CRITICIZING YOUR DRINKING: NO

## 2024-06-07 ASSESSMENT — PAIN SCALES - GENERAL
PAINLEVEL_OUTOF10: 4
PAINLEVEL_OUTOF10: 2

## 2024-06-07 ASSESSMENT — PATIENT HEALTH QUESTIONNAIRE - PHQ9
1. LITTLE INTEREST OR PLEASURE IN DOING THINGS: SEVERAL DAYS
SUM OF ALL RESPONSES TO PHQ9 QUESTIONS 1 & 2: 2
2. FEELING DOWN, DEPRESSED OR HOPELESS: SEVERAL DAYS

## 2024-06-07 ASSESSMENT — PAIN - FUNCTIONAL ASSESSMENT: PAIN_FUNCTIONAL_ASSESSMENT: 0-10

## 2024-06-07 ASSESSMENT — PAIN SCALES - WONG BAKER: WONGBAKER_NUMERICALRESPONSE: HURTS LITTLE BIT

## 2024-06-07 NOTE — NURSING NOTE
Patient admitted to the floor with heparin infusion not hooked up to patient and iv pump turned off. Patient heparin infusion was not infusing in to patient at the time the patient arrived to [place] [id number]. The end of the tubing was connected to y-site upon arrival.

## 2024-06-07 NOTE — PROGRESS NOTES
"Nelida Mata is a 64 y.o. female on day 1 of admission presenting with NSTEMI (non-ST elevated myocardial infarction) (Multi).    Subjective   No acute events. Chest pain has resolved. Endorsing occasional posterior neck tension/soreness. Denies fever, chills, worsening shortness of breath, or any other new issues.        Objective     Physical Exam  General: awake, alert, no acute distress  HEENT: head normocephalic, atraumatic; EOM grossly intact; oral mucosa moist  Cardiac: regular rate and rhythm; no murmurs; no JVD   Pulm: CTAB; normal respiratory effort; no wheezing, rales, or rhonchi   Abdominal: soft, non-tender, non-distended, without palpable masses, no suprapubic tenderness   MSK: moves all extremities spontaneously, bilateral 1+ pitting edema in LE    Neuro: AOx3, no gross focal neuro deficits  Psych: affect and mood appropriate     Last Recorded Vitals  Blood pressure (!) 137/97, pulse 85, temperature 36.9 °C (98.4 °F), resp. rate 16, height 1.499 m (4' 11\"), weight 89.4 kg (197 lb), SpO2 97%.  Intake/Output last 3 Shifts:  I/O last 3 completed shifts:  In: - (0 mL/kg)   Out: 300 (3.4 mL/kg) [Urine:300 (0.1 mL/kg/hr)]  Weight: 89.4 kg     Relevant Results  Results for orders placed or performed during the hospital encounter of 06/06/24 (from the past 24 hour(s))   Blood Gas Venous Full Panel Unsolicited   Result Value Ref Range    POCT pH, Venous 7.38 7.33 - 7.43 pH    POCT pCO2, Venous 50 41 - 51 mm Hg    POCT pO2, Venous 34 (L) 35 - 45 mm Hg    POCT SO2, Venous 46 45 - 75 %    POCT Oxy Hemoglobin, Venous 45.3 45.0 - 75.0 %    POCT Hematocrit Calculated, Venous 41.0 36.0 - 46.0 %    POCT Sodium, Venous 138 136 - 145 mmol/L    POCT Potassium, Venous 3.9 3.5 - 5.3 mmol/L    POCT Chloride, Venous 101 98 - 107 mmol/L    POCT Ionized Calicum, Venous 1.17 1.10 - 1.33 mmol/L    POCT Glucose, Venous 367 (H) 74 - 99 mg/dL    POCT Lactate, Venous 2.1 (H) 0.4 - 2.0 mmol/L    POCT Base Excess, Venous 3.5 " (H) -2.0 - 3.0 mmol/L    POCT HCO3 Calculated, Venous 29.6 (H) 22.0 - 26.0 mmol/L    POCT Hemoglobin, Venous 13.5 12.0 - 16.0 g/dL    POCT Anion Gap, Venous 11.0 10.0 - 25.0 mmol/L    Patient Temperature 37.0 degrees Celsius   ECG 12 lead   Result Value Ref Range    Ventricular Rate 85 BPM    Atrial Rate 85 BPM    MA Interval 138 ms    QRS Duration 118 ms    QT Interval 426 ms    QTC Calculation(Bazett) 506 ms    P Axis 4 degrees    R Axis -23 degrees    T Axis 41 degrees    QRS Count 14 beats    Q Onset 209 ms    P Onset 140 ms    P Offset 189 ms    T Offset 422 ms    QTC Fredericia 478 ms   CBC and Auto Differential   Result Value Ref Range    WBC 7.6 4.4 - 11.3 x10*3/uL    nRBC 0.0 0.0 - 0.0 /100 WBCs    RBC 4.56 4.00 - 5.20 x10*6/uL    Hemoglobin 13.5 12.0 - 16.0 g/dL    Hematocrit 39.4 36.0 - 46.0 %    MCV 86 80 - 100 fL    MCH 29.6 26.0 - 34.0 pg    MCHC 34.3 32.0 - 36.0 g/dL    RDW 13.4 11.5 - 14.5 %    Platelets 235 150 - 450 x10*3/uL    Neutrophils % 67.8 40.0 - 80.0 %    Immature Granulocytes %, Automated 0.3 0.0 - 0.9 %    Lymphocytes % 22.8 13.0 - 44.0 %    Monocytes % 5.6 2.0 - 10.0 %    Eosinophils % 2.2 0.0 - 6.0 %    Basophils % 1.3 0.0 - 2.0 %    Neutrophils Absolute 5.16 1.20 - 7.70 x10*3/uL    Immature Granulocytes Absolute, Automated 0.02 0.00 - 0.70 x10*3/uL    Lymphocytes Absolute 1.74 1.20 - 4.80 x10*3/uL    Monocytes Absolute 0.43 0.10 - 1.00 x10*3/uL    Eosinophils Absolute 0.17 0.00 - 0.70 x10*3/uL    Basophils Absolute 0.10 0.00 - 0.10 x10*3/uL   Comprehensive Metabolic Panel   Result Value Ref Range    Glucose 314 (H) 74 - 99 mg/dL    Sodium 139 136 - 145 mmol/L    Potassium 3.7 3.5 - 5.3 mmol/L    Chloride 101 98 - 107 mmol/L    Bicarbonate 25 21 - 32 mmol/L    Anion Gap 17 10 - 20 mmol/L    Urea Nitrogen 20 6 - 23 mg/dL    Creatinine 1.54 (H) 0.50 - 1.05 mg/dL    eGFR 37 (L) >60 mL/min/1.73m*2    Calcium 9.2 8.6 - 10.6 mg/dL    Albumin 4.0 3.4 - 5.0 g/dL    Alkaline Phosphatase 112 33 -  136 U/L    Total Protein 7.4 6.4 - 8.2 g/dL    AST 14 9 - 39 U/L    Bilirubin, Total 0.5 0.0 - 1.2 mg/dL    ALT 6 (L) 7 - 45 U/L   Magnesium   Result Value Ref Range    Magnesium 2.16 1.60 - 2.40 mg/dL   Troponin I, High Sensitivity, Initial   Result Value Ref Range    Troponin I, High Sensitivity 38 (H) 0 - 34 ng/L   Beta Hydroxybutyrate   Result Value Ref Range    Beta-Hydroxybutyrate 0.15 0.02 - 0.27 mmol/L   B-type natriuretic peptide   Result Value Ref Range    BNP 12 0 - 99 pg/mL   Troponin, High Sensitivity, 1 Hour   Result Value Ref Range    Troponin I, High Sensitivity 42 (H) 0 - 34 ng/L   ECG 12 lead   Result Value Ref Range    Ventricular Rate 79 BPM    Atrial Rate 79 BPM    SD Interval 162 ms    QRS Duration 118 ms    QT Interval 432 ms    QTC Calculation(Bazett) 495 ms    P Axis 30 degrees    R Axis -22 degrees    T Axis 15 degrees    QRS Count 13 beats    Q Onset 215 ms    P Onset 134 ms    P Offset 184 ms    T Offset 431 ms    QTC Fredericia 473 ms   Troponin I, High Sensitivity   Result Value Ref Range    Troponin I, High Sensitivity 77 (H) 0 - 34 ng/L   Coagulation Screen   Result Value Ref Range    Protime 15.9 (H) 9.8 - 12.8 seconds    INR 1.4 (H) 0.9 - 1.1    aPTT 42 (H) 27 - 38 seconds   POCT GLUCOSE   Result Value Ref Range    POCT Glucose 278 (H) 74 - 99 mg/dL   ECG 12 lead   Result Value Ref Range    Ventricular Rate 82 BPM    Atrial Rate 82 BPM    SD Interval 146 ms    QRS Duration 124 ms    QT Interval 430 ms    QTC Calculation(Bazett) 502 ms    P Axis -7 degrees    R Axis -26 degrees    T Axis 7 degrees    QRS Count 14 beats    Q Onset 207 ms    P Onset 134 ms    P Offset 154 ms    T Offset 422 ms    QTC Fredericia 477 ms   ECG 12 Lead   Result Value Ref Range    Ventricular Rate 78 BPM    Atrial Rate 78 BPM    SD Interval 136 ms    QRS Duration 120 ms    QT Interval 440 ms    QTC Calculation(Bazett) 501 ms    P Axis 8 degrees    R Axis -24 degrees    T Axis 32 degrees    QRS Count 13  beats    Q Onset 207 ms    P Onset 139 ms    P Offset 161 ms    T Offset 427 ms    QTC Fredericia 480 ms   Troponin I, High Sensitivity   Result Value Ref Range    Troponin I, High Sensitivity 70 (H) 0 - 34 ng/L   Heparin Assay, UFH   Result Value Ref Range    Heparin Unfractionated >2.0 (HH) See Comment Below for Therapeutic Ranges IU/mL   POCT GLUCOSE   Result Value Ref Range    POCT Glucose 139 (H) 74 - 99 mg/dL   POCT GLUCOSE   Result Value Ref Range    POCT Glucose 152 (H) 74 - 99 mg/dL   Troponin I, High Sensitivity   Result Value Ref Range    Troponin I, High Sensitivity 78 (H) 0 - 34 ng/L   Heparin Assay, UFH   Result Value Ref Range    Heparin Unfractionated 1.5 (HH) See Comment Below for Therapeutic Ranges IU/mL   CBC and Auto Differential   Result Value Ref Range    WBC 8.6 4.4 - 11.3 x10*3/uL    nRBC 0.0 0.0 - 0.0 /100 WBCs    RBC 4.51 4.00 - 5.20 x10*6/uL    Hemoglobin 13.3 12.0 - 16.0 g/dL    Hematocrit 38.3 36.0 - 46.0 %    MCV 85 80 - 100 fL    MCH 29.5 26.0 - 34.0 pg    MCHC 34.7 32.0 - 36.0 g/dL    RDW 14.6 (H) 11.5 - 14.5 %    Platelets 158 150 - 450 x10*3/uL    Neutrophils % 63.3 40.0 - 80.0 %    Immature Granulocytes %, Automated 0.2 0.0 - 0.9 %    Lymphocytes % 25.9 13.0 - 44.0 %    Monocytes % 6.5 2.0 - 10.0 %    Eosinophils % 2.6 0.0 - 6.0 %    Basophils % 1.5 0.0 - 2.0 %    Neutrophils Absolute 5.45 1.20 - 7.70 x10*3/uL    Immature Granulocytes Absolute, Automated 0.02 0.00 - 0.70 x10*3/uL    Lymphocytes Absolute 2.23 1.20 - 4.80 x10*3/uL    Monocytes Absolute 0.56 0.10 - 1.00 x10*3/uL    Eosinophils Absolute 0.22 0.00 - 0.70 x10*3/uL    Basophils Absolute 0.13 (H) 0.00 - 0.10 x10*3/uL   Coagulation Screen   Result Value Ref Range    Protime 13.3 (H) 9.8 - 12.8 seconds    INR 1.2 (H) 0.9 - 1.1    aPTT 37 27 - 38 seconds   Troponin I, High Sensitivity   Result Value Ref Range    Troponin I, High Sensitivity 66 (H) 0 - 34 ng/L   Heparin Assay, UFH   Result Value Ref Range    Heparin  "Unfractionated 1.0 See Comment Below for Therapeutic Ranges IU/mL   POCT GLUCOSE   Result Value Ref Range    POCT Glucose 161 (H) 74 - 99 mg/dL     Scheduled medications  acetaminophen, 1,000 mg, intravenous, Once  aspirin, 81 mg, oral, Daily  atorvastatin, 80 mg, oral, Daily  brimonidine, 1 drop, Both Eyes, BID  calcium carbonate-vitamin D3, 1 tablet, oral, Daily  carvedilol, 25 mg, oral, BID  [Held by provider] dapagliflozin propanediol, 10 mg, oral, Daily  DULoxetine, 30 mg, oral, BID  [Held by provider] finerenone, 10 mg, oral, Daily  [Held by provider] furosemide, 20 mg, oral, Daily  insulin degludec, 25 Units, subcutaneous, Nightly  insulin lispro, 0-5 Units, subcutaneous, q4h  isosorbide mononitrate ER, 60 mg, oral, Daily before breakfast  latanoprost, 1 drop, Both Eyes, Nightly  pantoprazole, 40 mg, oral, Daily before breakfast  urea, 1 Application, Topical, BID      Continuous medications  heparin, 0-4,500 Units/hr, Last Rate: Stopped (06/07/24 0008)      PRN medications  PRN medications: [Held by provider] acetaminophen **OR** [Held by provider] acetaminophen **OR** [Held by provider] acetaminophen, dextrose, glucagon, heparin, nitroglycerin, ondansetron, polyethylene glycol    Assessment & Plan  64 y.o. female with a history of HFpEF (EF 50-55%, 9/2023), CAD s/p PCI w/ stent x3 (2016, 2018) and STEMI 9/2023 (no stents placed), TIA 9/2023, SVC thrombus on Eliquis, remote DVT, DM2, HLD, CKD, obesity, NAFLD, MICHAEL, prior toe amputations for gangrene in 2020, presenting with one day of sharp 8/10 chest pain lasting 15 minutes which then became a chest \"tightness.\" Vitals stable. Exam notable for baseline 1+ bilateral LE edema but otherwise unremarkable. No signs of acute volume overload. Labs notable for Cr 1.54 (Cr 1.37 in 3/2024), glucose 314, Troponin HS 38 -> 42, lactate 2.1. LFT wnl. CXR with no acute findings. EKG with NSR, small ST elevation in V1-V2 (seen on prior EKG) but no other acute ST or T-wave " "changes. Given  in ED. Suspect NSTEMI given sx and mildly elevated troponin. EKG does not appear to have new, acute pathology as these small ST elevations in V1 and V2 were seen in the last two EKGs. Less likely aortic dissection or pericarditis. Will trend troponin and EKGs. Will start heparin (hold home DOAC), repeat TTE, and consider further studies such as coronary CTA or stress test.     Update 6/7:   - Troponin 38 -> 42 -> 77 -> 70 -> 78 -> 66 (stopped trending)  - EKG stable, chest pain resolved  - Pending TTE   - Pending PT/OT eval   - CTA chest with no PE, opacification of SVC consistent with known SVC thrombus   - BNP wnl   - Obtain B12        # Possible NSTEMI  # CAD s/p PCI w/ stent x3 (2016, 2018) and STEMI 9/2023 (no stents placed)  :: Sx sharp 8/10 chest pain for 15 min followed by chest \"tightness\"  :: Vitals stable on admission. No signs of acute worsening volume overload on exam.   :: EKG on admission with NSR, small ST elevation in V1-V2 (seen on prior EKG), but no other acute ST or T-wave changes   :: Last heart cath at time of STEMI in 9/2023 with diagonal 90% stenosis (Jailed by 2 layers of stents in LAD) and Known occluded RCA (not injected), no stents placed  :: Troponin 38 -> 42 -> 77 -> 70 -> 78 -> 66 (stopped trending)  :: CTA chest with no PE, opacification of SVC consistent with known SVC thrombus   - EKG stable overnight after admission   - Cont. heparin drip   - Pending TTE   - On Tele   - Cont. ASA 81  - Cont. Atorva 80 mg daily  - Sublingual nitroglycerin PRN  - Cont. Imdur 60 mg daily      # HFpEF  :: TTE 9/2023: EF 50-55%, abnormal pattern of LV diastolic filling, elevated mean LA pressure, reduced RV systolic function   :: Follows with cardiology (last seen 3/2024)  :: Appears euvolemic and pt states that she is at dry weight on admission   :: BNP wnl   - Repeat TTE as above   - HOLD Dapagliflozin 10 mg daily   - Cont. Coreg 25 mg BID  - HOLD home Lasix 20 mg daily     - " strict I/O, daily weights      # SVC thrombus on Eliquis  # TIA 9/2023  # Remote DVT hx  - HOLD home apixaban 5 mg BID  - Heparin drip as above      # DM2  # HLD  :: Home regimen: Tresiba flextouch (insulin degludec) 52 units at bedtime, ozempic, farxiga 10 mg qAM, insulin lispro 20 units plus SSI with meals   - Cont. Insulin degludec 26 units at bedtime  - Cont. Insulin lispro 16 units TID   - SSI      # CKD   :: Cr 1.54 on admission (Cr 1.37 in 3/2024)   - HOLD Kerendia 10 mg PO daily   - Monitor RFP      # MICHAEL   :: Non adherent with CPAP and does not follow the sleep medicine   - Monitor     # NAFLD  - Monitor      # Misc  - Miralax daily PRN   - Cont brimonidine and latanoprost eye drops   - Cont. Duloxetine 30 mg BID         F: PRN   E: PRN   N: cardiac diet   A: PIV   DVT Ppx: on heparin drip   GI Ppx: PPI     Code status: full code (confirmed on admission)   NOK: Brother (Joe) [phone]; Son (Glynn Medina) [phone]

## 2024-06-07 NOTE — PROGRESS NOTES
Pharmacy Admission Order Reconciliation Review    Nelida Mata is a 64 y.o. female admitted for NSTEMI (non-ST elevated myocardial infarction) (Multi). Pharmacy reviewed the patient's unreconciled admission medications.    Prior to admission medications that were reviewed and acted on by the pharmacist include:  Apixaban  Melatonin    These medications have been reconciled.     Any other unreconcilied medications have been addressed and will be ordered or held by the patient's medical team. Medications addressed by the pharmacist may be added or changed by the patient's medical team at any time.    Saira Bell, PharmD  Transitions of Care Pharmacist  [facility]  Please reach out via Secure Chat for questions

## 2024-06-08 ENCOUNTER — DOCUMENTATION (OUTPATIENT)
Dept: HOME HEALTH SERVICES | Facility: HOME HEALTH | Age: 64
End: 2024-06-08
Payer: COMMERCIAL

## 2024-06-08 ENCOUNTER — HOME HEALTH ADMISSION (OUTPATIENT)
Dept: HOME HEALTH SERVICES | Facility: HOME HEALTH | Age: 64
End: 2024-06-08
Payer: COMMERCIAL

## 2024-06-08 ENCOUNTER — PHARMACY VISIT (OUTPATIENT)
Dept: PHARMACY | Facility: CLINIC | Age: 64
End: 2024-06-08
Payer: COMMERCIAL

## 2024-06-08 VITALS
SYSTOLIC BLOOD PRESSURE: 149 MMHG | DIASTOLIC BLOOD PRESSURE: 84 MMHG | TEMPERATURE: 97.7 F | OXYGEN SATURATION: 94 % | WEIGHT: 197 LBS | BODY MASS INDEX: 39.72 KG/M2 | HEIGHT: 59 IN | RESPIRATION RATE: 16 BRPM | HEART RATE: 76 BPM

## 2024-06-08 LAB
ALBUMIN SERPL BCP-MCNC: 3.9 G/DL (ref 3.4–5)
ANION GAP SERPL CALC-SCNC: 13 MMOL/L (ref 10–20)
BASOPHILS # BLD AUTO: 0.12 X10*3/UL (ref 0–0.1)
BASOPHILS NFR BLD AUTO: 1.7 %
BUN SERPL-MCNC: 19 MG/DL (ref 6–23)
CALCIUM SERPL-MCNC: 9.3 MG/DL (ref 8.6–10.6)
CHLORIDE SERPL-SCNC: 99 MMOL/L (ref 98–107)
CO2 SERPL-SCNC: 28 MMOL/L (ref 21–32)
CREAT SERPL-MCNC: 1.3 MG/DL (ref 0.5–1.05)
EGFRCR SERPLBLD CKD-EPI 2021: 46 ML/MIN/1.73M*2
EOSINOPHIL # BLD AUTO: 0.29 X10*3/UL (ref 0–0.7)
EOSINOPHIL NFR BLD AUTO: 4.2 %
ERYTHROCYTE [DISTWIDTH] IN BLOOD BY AUTOMATED COUNT: 13.3 % (ref 11.5–14.5)
GLUCOSE BLD MANUAL STRIP-MCNC: 222 MG/DL (ref 74–99)
GLUCOSE BLD MANUAL STRIP-MCNC: 304 MG/DL (ref 74–99)
GLUCOSE SERPL-MCNC: 268 MG/DL (ref 74–99)
HCT VFR BLD AUTO: 37.6 % (ref 36–46)
HGB BLD-MCNC: 12.4 G/DL (ref 12–16)
IMM GRANULOCYTES # BLD AUTO: 0.02 X10*3/UL (ref 0–0.7)
IMM GRANULOCYTES NFR BLD AUTO: 0.3 % (ref 0–0.9)
LYMPHOCYTES # BLD AUTO: 1.82 X10*3/UL (ref 1.2–4.8)
LYMPHOCYTES NFR BLD AUTO: 26.3 %
MAGNESIUM SERPL-MCNC: 2.05 MG/DL (ref 1.6–2.4)
MCH RBC QN AUTO: 29.6 PG (ref 26–34)
MCHC RBC AUTO-ENTMCNC: 33 G/DL (ref 32–36)
MCV RBC AUTO: 90 FL (ref 80–100)
MONOCYTES # BLD AUTO: 0.41 X10*3/UL (ref 0.1–1)
MONOCYTES NFR BLD AUTO: 5.9 %
NEUTROPHILS # BLD AUTO: 4.25 X10*3/UL (ref 1.2–7.7)
NEUTROPHILS NFR BLD AUTO: 61.6 %
NRBC BLD-RTO: 0 /100 WBCS (ref 0–0)
PHOSPHATE SERPL-MCNC: 3.5 MG/DL (ref 2.5–4.9)
PLATELET # BLD AUTO: 237 X10*3/UL (ref 150–450)
POTASSIUM SERPL-SCNC: 3.9 MMOL/L (ref 3.5–5.3)
RBC # BLD AUTO: 4.19 X10*6/UL (ref 4–5.2)
SODIUM SERPL-SCNC: 136 MMOL/L (ref 136–145)
UFH PPP CHRO-ACNC: 0.7 IU/ML
WBC # BLD AUTO: 6.9 X10*3/UL (ref 4.4–11.3)

## 2024-06-08 PROCEDURE — 83735 ASSAY OF MAGNESIUM: CPT

## 2024-06-08 PROCEDURE — 82947 ASSAY GLUCOSE BLOOD QUANT: CPT | Mod: 91

## 2024-06-08 PROCEDURE — 36415 COLL VENOUS BLD VENIPUNCTURE: CPT

## 2024-06-08 PROCEDURE — 99239 HOSP IP/OBS DSCHRG MGMT >30: CPT

## 2024-06-08 PROCEDURE — 80069 RENAL FUNCTION PANEL: CPT

## 2024-06-08 PROCEDURE — 85025 COMPLETE CBC W/AUTO DIFF WBC: CPT

## 2024-06-08 PROCEDURE — 2500000002 HC RX 250 W HCPCS SELF ADMINISTERED DRUGS (ALT 637 FOR MEDICARE OP, ALT 636 FOR OP/ED): Performed by: INTERNAL MEDICINE

## 2024-06-08 PROCEDURE — 85520 HEPARIN ASSAY: CPT

## 2024-06-08 PROCEDURE — RXMED WILLOW AMBULATORY MEDICATION CHARGE

## 2024-06-08 PROCEDURE — 2500000001 HC RX 250 WO HCPCS SELF ADMINISTERED DRUGS (ALT 637 FOR MEDICARE OP)

## 2024-06-08 PROCEDURE — 2500000001 HC RX 250 WO HCPCS SELF ADMINISTERED DRUGS (ALT 637 FOR MEDICARE OP): Performed by: STUDENT IN AN ORGANIZED HEALTH CARE EDUCATION/TRAINING PROGRAM

## 2024-06-08 RX ORDER — HYDROXYZINE HYDROCHLORIDE 25 MG/1
25 TABLET, FILM COATED ORAL 2 TIMES DAILY PRN
Qty: 14 TABLET | Refills: 0 | Status: SHIPPED | OUTPATIENT
Start: 2024-06-08 | End: 2024-06-15

## 2024-06-08 RX ORDER — DULOXETIN HYDROCHLORIDE 30 MG/1
30 CAPSULE, DELAYED RELEASE ORAL 2 TIMES DAILY
Qty: 10 CAPSULE | Refills: 0 | Status: SHIPPED | OUTPATIENT
Start: 2024-06-08 | End: 2024-06-14 | Stop reason: SDUPTHER

## 2024-06-08 RX ORDER — POLYETHYLENE GLYCOL 3350 17 G/17G
17 POWDER, FOR SOLUTION ORAL DAILY
Qty: 510 G | Refills: 0 | Status: SHIPPED | OUTPATIENT
Start: 2024-06-08 | End: 2024-07-08

## 2024-06-08 RX ORDER — INSULIN DEGLUDEC 100 U/ML
30 INJECTION, SOLUTION SUBCUTANEOUS NIGHTLY
Status: DISCONTINUED | OUTPATIENT
Start: 2024-06-08 | End: 2024-06-08 | Stop reason: HOSPADM

## 2024-06-08 RX ORDER — ASPIRIN 81 MG/1
81 TABLET ORAL DAILY
Qty: 30 TABLET | Refills: 0 | Status: SHIPPED | OUTPATIENT
Start: 2024-06-09 | End: 2024-07-09

## 2024-06-08 RX ADMIN — INSULIN LISPRO 8 UNITS: 100 INJECTION, SOLUTION INTRAVENOUS; SUBCUTANEOUS at 12:05

## 2024-06-08 RX ADMIN — PANTOPRAZOLE SODIUM 40 MG: 40 TABLET, DELAYED RELEASE ORAL at 04:51

## 2024-06-08 RX ADMIN — APIXABAN 5 MG: 5 TABLET, FILM COATED ORAL at 13:50

## 2024-06-08 RX ADMIN — Medication 1 APPLICATION: at 08:12

## 2024-06-08 RX ADMIN — DULOXETINE HYDROCHLORIDE 30 MG: 30 CAPSULE, DELAYED RELEASE ORAL at 08:12

## 2024-06-08 RX ADMIN — Medication 1 TABLET: at 08:12

## 2024-06-08 RX ADMIN — ASPIRIN 81 MG: 81 TABLET, COATED ORAL at 08:12

## 2024-06-08 RX ADMIN — INSULIN LISPRO 4 UNITS: 100 INJECTION, SOLUTION INTRAVENOUS; SUBCUTANEOUS at 08:12

## 2024-06-08 RX ADMIN — CARVEDILOL 25 MG: 25 TABLET, FILM COATED ORAL at 08:12

## 2024-06-08 RX ADMIN — ISOSORBIDE MONONITRATE 60 MG: 30 TABLET, EXTENDED RELEASE ORAL at 04:51

## 2024-06-08 RX ADMIN — BRIMONIDINE TARTRATE 1 DROP: 2 SOLUTION OPHTHALMIC at 08:12

## 2024-06-08 ASSESSMENT — COGNITIVE AND FUNCTIONAL STATUS - GENERAL
MOBILITY SCORE: 21
STANDING UP FROM CHAIR USING ARMS: A LITTLE
TOILETING: A LITTLE
STANDING UP FROM CHAIR USING ARMS: A LITTLE
WALKING IN HOSPITAL ROOM: A LITTLE
DAILY ACTIVITIY SCORE: 23
CLIMB 3 TO 5 STEPS WITH RAILING: A LITTLE
DAILY ACTIVITIY SCORE: 23
CLIMB 3 TO 5 STEPS WITH RAILING: A LITTLE
TOILETING: A LITTLE
WALKING IN HOSPITAL ROOM: A LITTLE
STANDING UP FROM CHAIR USING ARMS: A LITTLE
WALKING IN HOSPITAL ROOM: A LITTLE
STANDING UP FROM CHAIR USING ARMS: A LITTLE
DAILY ACTIVITIY SCORE: 23
CLIMB 3 TO 5 STEPS WITH RAILING: A LITTLE
WALKING IN HOSPITAL ROOM: A LITTLE
CLIMB 3 TO 5 STEPS WITH RAILING: A LITTLE
MOBILITY SCORE: 21
TOILETING: A LITTLE
TOILETING: A LITTLE
MOBILITY SCORE: 21

## 2024-06-08 ASSESSMENT — PAIN SCALES - GENERAL: PAINLEVEL_OUTOF10: 0 - NO PAIN

## 2024-06-08 NOTE — PROGRESS NOTES
"Nelida Mata is a 64 y.o. female on day 2 of admission presenting with NSTEMI (non-ST elevated myocardial infarction) (Multi).    Subjective   No acute events. Chest pain has resolved. Endorsing continued occasional posterior neck tension/soreness. Denies fever, chills, worsening shortness of breath, or any other new issues.        Objective     Physical Exam  General: awake, alert, no acute distress  HEENT: head normocephalic, atraumatic; EOM grossly intact; oral mucosa moist  Cardiac: regular rate and rhythm; no murmurs; no JVD   Pulm: CTAB; normal respiratory effort; no wheezing, rales, or rhonchi   Abdominal: soft, non-tender, non-distended, without palpable masses, no suprapubic tenderness   MSK: moves all extremities spontaneously, bilateral trace edema in LE    Neuro: AOx3, no gross focal neuro deficits  Psych: affect and mood appropriate     Last Recorded Vitals  Blood pressure 149/84, pulse 76, temperature 36.5 °C (97.7 °F), temperature source Temporal, resp. rate 16, height 1.499 m (4' 11\"), weight 89.4 kg (197 lb), SpO2 94%.  Intake/Output last 3 Shifts:  I/O last 3 completed shifts:  In: 255.1 (2.9 mL/kg) [I.V.:255.1 (2.9 mL/kg)]  Out: 500 (5.6 mL/kg) [Urine:500 (0.2 mL/kg/hr)]  Weight: 89.4 kg     Relevant Results  Results for orders placed or performed during the hospital encounter of 06/06/24 (from the past 24 hour(s))   Transthoracic Echo (TTE) Complete   Result Value Ref Range    AV pk mayco 1.75 m/s    LVOT diam 1.90 cm    LV Biplane EF 56 %    MV avg E/e' ratio 14.82     MV E/A ratio 0.84     LA vol index A/L 19.1 ml/m2    Tricuspid annular plane systolic excursion 2.1 cm    RV free wall pk S' 9.30 cm/s    LVIDd 4.70 cm    Aortic Valve Area by Continuity of Peak Velocity 1.64 cm2    AV pk grad 12.3 mmHg    LV A4C EF 52.7    POCT GLUCOSE   Result Value Ref Range    POCT Glucose 195 (H) 74 - 99 mg/dL   Heparin Assay, UFH   Result Value Ref Range    Heparin Unfractionated 0.9 See Comment " Below for Therapeutic Ranges IU/mL   POCT GLUCOSE   Result Value Ref Range    POCT Glucose 354 (H) 74 - 99 mg/dL   Heparin Assay, UFH   Result Value Ref Range    Heparin Unfractionated 0.6 See Comment Below for Therapeutic Ranges IU/mL   CBC and Auto Differential   Result Value Ref Range    WBC 6.9 4.4 - 11.3 x10*3/uL    nRBC 0.0 0.0 - 0.0 /100 WBCs    RBC 4.19 4.00 - 5.20 x10*6/uL    Hemoglobin 12.4 12.0 - 16.0 g/dL    Hematocrit 37.6 36.0 - 46.0 %    MCV 90 80 - 100 fL    MCH 29.6 26.0 - 34.0 pg    MCHC 33.0 32.0 - 36.0 g/dL    RDW 13.3 11.5 - 14.5 %    Platelets 237 150 - 450 x10*3/uL    Neutrophils % 61.6 40.0 - 80.0 %    Immature Granulocytes %, Automated 0.3 0.0 - 0.9 %    Lymphocytes % 26.3 13.0 - 44.0 %    Monocytes % 5.9 2.0 - 10.0 %    Eosinophils % 4.2 0.0 - 6.0 %    Basophils % 1.7 0.0 - 2.0 %    Neutrophils Absolute 4.25 1.20 - 7.70 x10*3/uL    Immature Granulocytes Absolute, Automated 0.02 0.00 - 0.70 x10*3/uL    Lymphocytes Absolute 1.82 1.20 - 4.80 x10*3/uL    Monocytes Absolute 0.41 0.10 - 1.00 x10*3/uL    Eosinophils Absolute 0.29 0.00 - 0.70 x10*3/uL    Basophils Absolute 0.12 (H) 0.00 - 0.10 x10*3/uL   Magnesium   Result Value Ref Range    Magnesium 2.05 1.60 - 2.40 mg/dL   Renal Function Panel   Result Value Ref Range    Glucose 268 (H) 74 - 99 mg/dL    Sodium 136 136 - 145 mmol/L    Potassium 3.9 3.5 - 5.3 mmol/L    Chloride 99 98 - 107 mmol/L    Bicarbonate 28 21 - 32 mmol/L    Anion Gap 13 10 - 20 mmol/L    Urea Nitrogen 19 6 - 23 mg/dL    Creatinine 1.30 (H) 0.50 - 1.05 mg/dL    eGFR 46 (L) >60 mL/min/1.73m*2    Calcium 9.3 8.6 - 10.6 mg/dL    Phosphorus 3.5 2.5 - 4.9 mg/dL    Albumin 3.9 3.4 - 5.0 g/dL   Heparin Assay, UFH   Result Value Ref Range    Heparin Unfractionated 0.7 See Comment Below for Therapeutic Ranges IU/mL   POCT GLUCOSE   Result Value Ref Range    POCT Glucose 222 (H) 74 - 99 mg/dL   POCT GLUCOSE   Result Value Ref Range    POCT Glucose 304 (H) 74 - 99 mg/dL     Scheduled  "medications  apixaban, 5 mg, oral, q12h  aspirin, 81 mg, oral, Daily  atorvastatin, 80 mg, oral, Daily  brimonidine, 1 drop, Both Eyes, BID  calcium carbonate-vitamin D3, 1 tablet, oral, Daily  carvedilol, 25 mg, oral, BID  dapagliflozin propanediol, 10 mg, oral, Daily  DULoxetine, 30 mg, oral, BID  finerenone, 10 mg, oral, Daily  furosemide, 20 mg, oral, Daily  insulin degludec, 30 Units, subcutaneous, Nightly  insulin lispro, 0-10 Units, subcutaneous, TID  isosorbide mononitrate ER, 60 mg, oral, Daily before breakfast  latanoprost, 1 drop, Both Eyes, Nightly  pantoprazole, 40 mg, oral, Daily before breakfast  urea, 1 Application, Topical, BID      Continuous medications       PRN medications  PRN medications: acetaminophen **OR** acetaminophen **OR** acetaminophen, dextrose, glucagon, melatonin, nitroglycerin, ondansetron, polyethylene glycol    Assessment & Plan  64 y.o. female with a history of HFpEF (EF 50-55%, 9/2023), CAD s/p PCI w/ stent x3 (2016, 2018) and STEMI 9/2023 (no stents placed), TIA 9/2023, SVC thrombus on Eliquis, remote DVT, DM2, HLD, CKD, obesity, NAFLD, MICHAEL, prior toe amputations for gangrene in 2020, presenting with one day of sharp 8/10 chest pain lasting 15 minutes which then became a chest \"tightness.\" Vitals stable. Exam notable for baseline 1+ bilateral LE edema but otherwise unremarkable. No signs of acute volume overload. Labs notable for Cr 1.54 (Cr 1.37 in 3/2024), glucose 314, Troponin HS 38 -> 42, lactate 2.1. LFT wnl. CXR with no acute findings. EKG with NSR, small ST elevation in V1-V2 (seen on prior EKG) but no other acute ST or T-wave changes. Given  in ED. Suspect NSTEMI given sx and mildly elevated troponin. EKG does not appear to have new, acute pathology as these small ST elevations in V1 and V2 were seen in the last two EKGs. Less likely aortic dissection or pericarditis. Will trend troponin and EKGs. Will start heparin (hold home DOAC), repeat TTE, and consider " "further studies such as coronary CTA or stress test.     DAILY UPDATES:  - Remains chest pain free  - No regional wall motion abnormalities on TTE, very low suspicion for ACS   - Patient states that she would refuse SNF even if recommended by PT, will arrange home care for home PT, per RN requires assist with transfers  - Patient wishes to go home, states that she will be able to manage at home with home PT. Tentative discharge today with close PCP and Cardiology follow up.   - Transitioned heparin gtt to eliquis     # Possible NSTEMI  # CAD s/p PCI w/ stent x3 (2016, 2018) and STEMI 9/2023 (no stents placed)  :: Sx sharp 8/10 chest pain for 15 min followed by chest \"tightness\"  :: Vitals stable on admission. No signs of acute worsening volume overload on exam.   :: EKG on admission with NSR, small ST elevation in V1-V2 (seen on prior EKG), but no other acute ST or T-wave changes   :: Last heart cath at time of STEMI in 9/2023 with diagonal 90% stenosis (Jailed by 2 layers of stents in LAD) and Known occluded RCA (not injected), no stents placed  :: Troponin 38 -> 42 -> 77 -> 70 -> 78 -> 66 (stopped trending)  :: CTA chest with no PE, opacification of SVC consistent with known SVC thrombus   - EKG stable overnight after admission   - Cont. heparin drip   - Pending TTE   - On Tele   - Cont. ASA 81  - Cont. Atorva 80 mg daily  - Sublingual nitroglycerin PRN  - Cont. Imdur 60 mg daily      # HFpEF  :: TTE 9/2023: EF 50-55%, abnormal pattern of LV diastolic filling, elevated mean LA pressure, reduced RV systolic function   :: Follows with cardiology (last seen 3/2024)  :: Appears euvolemic and pt states that she is at dry weight on admission   :: BNP wnl   - Repeat TTE as above   - HOLD Dapagliflozin 10 mg daily   - Cont. Coreg 25 mg BID  - HOLD home Lasix 20 mg daily     - strict I/O, daily weights      # SVC thrombus on Eliquis  # TIA 9/2023  # Remote DVT hx  - HOLD home apixaban 5 mg BID  - Heparin drip as above    "   # DM2  # HLD  :: Home regimen: Tresiba flextouch (insulin degludec) 52 units at bedtime, ozempic, farxiga 10 mg qAM, insulin lispro 20 units plus SSI with meals   - Cont. Insulin degludec 26 units at bedtime  - Cont. Insulin lispro 16 units TID   - SSI      # CKD   :: Cr 1.54 on admission (Cr 1.37 in 3/2024)   - HOLD Kerendia 10 mg PO daily   - Monitor RFP      # MICHAEL   :: Non adherent with CPAP and does not follow the sleep medicine   - Monitor     # NAFLD  - Monitor      # Misc  - Miralax daily PRN   - Cont brimonidine and latanoprost eye drops   - Cont. Duloxetine 30 mg BID         F: PRN   E: PRN   N: cardiac diet   A: PIV   DVT Ppx: on heparin drip   GI Ppx: PPI     Code status: full code (confirmed on admission)   NOK: Brother (Joe) [phone]; Son (Glynn Medina) [phone]

## 2024-06-08 NOTE — CARE PLAN
The patient's goals for the shift include      The clinical goals for the shift include patient will remain free of falls during the shift    Over the shift, the patient did not make progress toward the following goals. Barriers to progression include acute disease process. Recommendations to address these barriers include adherence to treatment plan.

## 2024-06-08 NOTE — HH CARE COORDINATION
Home Care received a Referral for Physical Therapy. We have processed the referral for a Start of Care on 6.9 or 6.10.     If you have any questions or concerns, please feel free to contact us at [phone]. Follow the prompts, enter your five digit zip code, and you will be directed to your care team on [facility] 1.

## 2024-06-08 NOTE — CARE PLAN
The patient's goals for the shift include      The clinical goals for the shift include      Over the shift, the patient did not make progress toward the following goals. Barriers to progression include . Recommendations to address these barriers include .

## 2024-06-08 NOTE — DISCHARGE INSTRUCTIONS
Dear Ms. Mata,    You were admitted to the hospital due to concern for chest pain and to ensure that you were not having an acute heart attack. Your blood work, your EKG were reassuring that you were not having a heart attack. We did an ultrasound of your heart which showed that it was still beating well. You will be discharged home with close follow up with your primary care provider (who can provide you with any additional refills) and with your cardiologist.     It is important that you take your medications as prescribed. Please make sure you schedule appointments with your cardiologist and with your primary care doctor.     Thank you for allowing us to participate in your care,  [facility] Internal Medicine Team

## 2024-06-08 NOTE — PROGRESS NOTES
06/08/24 1511   Discharge Planning   Who is requesting discharge planning? Provider   Home or Post Acute Services In home services   Type of Home Care Services Home OT;Home PT   Patient expects to be discharged to: Home with home health care services     Per medical team pt is planned for discharge home today, home PT/OT services will be ordered. Pt is agreeable to above plan of care and prefers [facility]. Secure chat sent to [facility] central intake to confirm SOC/processing for home care services. Pt will transport via [employer]/lyft at time of discharge, her boyfriend is with her to accompany during transport. Care Transitions team will continue to follow for discharge planning needs.    Viktoria Tan RN  Transitional Care Coordinator/TCC  [phone]

## 2024-06-08 NOTE — DISCHARGE SUMMARY
"Discharge Diagnosis  NSTEMI (non-ST elevated myocardial infarction) (Multi)    Issues Requiring Follow-Up  - PCP follow up for assistance with chronic back pain, med refills   - Cardiology follow up regarding HFpEF, CAD hx    Test Results Pending At Discharge  Pending Labs       Order Current Status    Blood Gas Lactic Acid, Venous In process            Hospital Course  64 y.o. female with a history of HFpEF (EF 50-55%, 9/2023), CAD s/p PCI w/ stent x3 (2016, 2018) and STEMI 9/2023 (no stents placed), TIA 9/2023, SVC thrombus on Eliquis, remote DVT, DM2, HLD, CKD, obesity, NAFLD, MICHAEL, prior toe amputations for gangrene in 2020, presenting with one day of sharp 8/10 chest pain lasting 15 minutes which then became a chest \"tightness.\" Vitals stable. Exam notable for baseline 1+ bilateral LE edema but otherwise unremarkable. No signs of acute volume overload. Labs notable for Cr 1.54 (Cr 1.37 in 3/2024), glucose 314, Troponin HS 38 -> 42, lactate 2.1. LFT wnl. CXR with no acute findings. CTA chest with no PE, opacification of SVC consistent with known SVC thrombus. EKG with NSR, small ST elevation in V1-V2 (seen on prior EKG) but no other acute ST or T-wave changes. Given  in ED. Suspect possible NSTEMI given sx and mildly elevated troponin. EKG does not appear to have new, acute pathology as these small ST elevations in V1 and V2 were seen in the last two EKGs. Less likely aortic dissection or pericarditis. Trended troponin and EKGs. Started heparin drip (hold home DOAC) on admission. Troponin 38 -> 42 -> 77 -> 70 -> 78 -> 66. EKGs were trended and stable overnight after admission. Chest pain resolved by the morning following admission. BNP wnl. TTE without RWMA. Transitioned back to eliquis.     Patient with continued weakness with transfers, however refused consideration for SNF with PT. Thus will discharge with home PT.     Follow up:   [ ] Primary care   [ ] Cardiology       Pertinent Physical Exam At Time " of Discharge  General: awake, alert, no acute distress  HEENT: head normocephalic, atraumatic; EOM grossly intact; oral mucosa moist  Cardiac: regular rate and rhythm; no murmurs; no JVD   Pulm: CTAB; normal respiratory effort; no wheezing, rales, or rhonchi   Abdominal: soft, non-tender, non-distended, without palpable masses, no suprapubic tenderness   MSK: moves all extremities spontaneously, bilateral trace edema in LE    Neuro: AOx3, no gross focal neuro deficits  Psych: affect and mood appropriate         Home Medications     Medication List      CHANGE how you take these medications     aspirin 81 mg EC tablet; Take 1 tablet (81 mg) by mouth once daily.;   Start taking on: June 9, 2024; What changed: See the new instructions.   DULoxetine 30 mg DR capsule; Commonly known as: Cymbalta; Take 1 capsule   (30 mg) by mouth 2 times a day for 5 days. Do not crush or chew.; What   changed: additional instructions   hydrOXYzine HCL 25 mg tablet; Commonly known as: Atarax; Take 1 tablet   (25 mg) by mouth 2 times a day as needed for itching for up to 7 days.;   What changed: reasons to take this   polyethylene glycol 17 gram/dose powder; Commonly known as: Glycolax,   Miralax; Take 17 g by mouth once daily.; What changed: how much to take,   when to take this, reasons to take this     CONTINUE taking these medications     apixaban 5 mg tablet; Commonly known as: Eliquis   atorvastatin 80 mg tablet; Commonly known as: Lipitor; TAKE 1 TABLET BY   MOUTH ONCE DAILY   blood pressure monitor kit; 1 each once daily. Use once daily to check   blood pressure   brimonidine 0.2 % ophthalmic solution; Commonly known as: AlphaGAN;   Administer 1 drop into both eyes 2 times a day. PATIENT NEEDS PCP APPT FOR   FURTHER REFILLS   calcium carbonate-vit D3-min 600 mg calcium- 400 unit tablet; Take 1   tablet by mouth once daily.   carvedilol 25 mg tablet; Commonly known as: Coreg   Farxiga 10 mg; Generic drug: dapagliflozin propanediol;  Take 1 tablet   (10 mg) by mouth once daily.   FreeStyle Filipe 2 [place] misc; Generic drug: flash glucose scanning   reader; Use as instructed   FreeStyle Filipe 2 Sensor kit; Generic drug: flash glucose sensor kit;   Apply 1 sensor to the back of the upper arm. Change sensor every 14 days.   furosemide 20 mg tablet; Commonly known as: Lasix; Take 1 tablet (20 mg)   by mouth once daily.   HumaLOG KwikPen Insulin 200 unit/mL (3 mL) insulin pen pen; Generic   drug: insulin lispro; Inject 22 Units under the skin 3 times a day with   meals. Take as directed per insulin instructions.   insulin degludec 200 unit/mL (3 mL) injection; Commonly known as:   Tresiba FlexTouch; Inject 50 Units under the skin once daily at bedtime.   Take as directed per insulin instructions.   isosorbide mononitrate ER 60 mg 24 hr tablet; Commonly known as: Imdur;   TAKE 1 TABLET BY MOUTH EVERY DAY IN THE MORNING   Kerendia 10 mg tablet tablet; Generic drug: finerenone; Take 1 tablet   (10 mg) by mouth once daily.   latanoprost 0.005 % ophthalmic solution; Commonly known as: Xalatan   MELATONIN ORAL   nitroglycerin 0.4 mg SL tablet; Commonly known as: Nitrostat   OneTouch Ultra Test strip; Generic drug: blood sugar diagnostic; USE 1   STRIP(S) TO TEST BLOOD SUGAR 3 TIMES A DAY   Ozempic 2 mg/dose (8 mg/3 mL) pen injector; Generic drug: semaglutide;   Inject 2 mg under the skin 1 (one) time per week.   pantoprazole 40 mg EC tablet; Commonly known as: ProtoNix; Take 1 tablet   (40 mg) by mouth once daily in the morning. Take before meals.   urea 20 % cream; Commonly known as: Carmol     STOP taking these medications     metoprolol succinate XL 50 mg 24 hr tablet; Commonly known as: Toprol-XL       Outpatient Follow-Up  Future Appointments   Date Time Provider Department Center   6/12/2024  3:00 PM Jessica Vergara OD [id number] [facility]   7/10/2024  9:45 AM Cyndie Cloud MD [id number] [facility]   8/9/2024  9:40 AM Michael Hilario MD  [id number] [facility]       Darren Tellez MD

## 2024-06-08 NOTE — CARE PLAN
Problem: Pain  Goal: My pain/discomfort is manageable  Outcome: Progressing     Problem: Safety  Goal: Patient will be injury free during hospitalization  Outcome: Progressing  Goal: I will remain free of falls  Outcome: Progressing     Problem: Daily Care  Goal: Daily care needs are met  Outcome: Progressing     Problem: Psychosocial Needs  Goal: Demonstrates ability to cope with hospitalization/illness  Outcome: Progressing  Goal: Collaborate with me, my family, and caregiver to identify my specific goals  Outcome: Progressing     Problem: Discharge Barriers  Goal: My discharge needs are met  Outcome: Progressing     The clinical goals for the shift include Patient will remain safe and stable throughout the shift

## 2024-06-11 ENCOUNTER — PATIENT OUTREACH (OUTPATIENT)
Dept: CARE COORDINATION | Facility: CLINIC | Age: 64
End: 2024-06-11
Payer: COMMERCIAL

## 2024-06-11 NOTE — PROGRESS NOTES
Outreach call to patient to support a smooth transition of care from recent admission.  Unable to leave a voicemail message for patient with my contact information.  Enrolled patient in [employer] chatbot for additional support and patient education through transition period.  Will continue to monitor through transition period.    ROSEANNE PadgettN, RN

## 2024-06-12 ENCOUNTER — APPOINTMENT (OUTPATIENT)
Dept: OPHTHALMOLOGY | Facility: CLINIC | Age: 64
End: 2024-06-12
Payer: COMMERCIAL

## 2024-06-12 ENCOUNTER — DOCUMENTATION (OUTPATIENT)
Dept: PRIMARY CARE | Facility: CLINIC | Age: 64
End: 2024-06-12

## 2024-06-12 NOTE — PROGRESS NOTES
The pharmacist at [facility] called requesting refill of pt losartan. Noted med discontinued in September 2023. Message sent to provider r/t pt possibly in need of education r/t discontinued medication, if therapy is not needed.

## 2024-06-14 ENCOUNTER — TELEMEDICINE (OUTPATIENT)
Dept: PHARMACY | Facility: HOSPITAL | Age: 64
End: 2024-06-14
Payer: COMMERCIAL

## 2024-06-14 ENCOUNTER — APPOINTMENT (OUTPATIENT)
Dept: PRIMARY CARE | Facility: CLINIC | Age: 64
End: 2024-06-14
Payer: COMMERCIAL

## 2024-06-14 DIAGNOSIS — Z86.718 HISTORY OF DVT (DEEP VEIN THROMBOSIS): ICD-10-CM

## 2024-06-14 DIAGNOSIS — I21.4 NSTEMI, INITIAL EPISODE OF CARE (MULTI): ICD-10-CM

## 2024-06-14 DIAGNOSIS — R80.9 MICROALBUMINURIA: ICD-10-CM

## 2024-06-14 DIAGNOSIS — E11.9 CONTROLLED TYPE 2 DIABETES MELLITUS WITHOUT COMPLICATION, WITH LONG-TERM CURRENT USE OF INSULIN (MULTI): ICD-10-CM

## 2024-06-14 DIAGNOSIS — I10 HYPERTENSION, UNCONTROLLED: ICD-10-CM

## 2024-06-14 DIAGNOSIS — Z79.4 CONTROLLED TYPE 2 DIABETES MELLITUS WITHOUT COMPLICATION, WITH LONG-TERM CURRENT USE OF INSULIN (MULTI): ICD-10-CM

## 2024-06-14 DIAGNOSIS — F33.40 RECURRENT MAJOR DEPRESSIVE DISORDER, IN REMISSION (CMS-HCC): Chronic | ICD-10-CM

## 2024-06-14 DIAGNOSIS — I50.32 CHRONIC HEART FAILURE WITH PRESERVED EJECTION FRACTION (MULTI): ICD-10-CM

## 2024-06-14 DIAGNOSIS — E11.65 UNCONTROLLED TYPE 2 DIABETES MELLITUS WITH HYPERGLYCEMIA (MULTI): Primary | ICD-10-CM

## 2024-06-14 DIAGNOSIS — Z86.39 HISTORY OF UNCONTROLLED DIABETES: ICD-10-CM

## 2024-06-14 DIAGNOSIS — I25.10 ARTERIOSCLEROTIC CARDIOVASCULAR DISEASE (ASCVD): ICD-10-CM

## 2024-06-14 RX ORDER — DULOXETIN HYDROCHLORIDE 30 MG/1
30 CAPSULE, DELAYED RELEASE ORAL 2 TIMES DAILY
Qty: 60 CAPSULE | Refills: 1 | Status: SHIPPED | OUTPATIENT
Start: 2024-06-14 | End: 2024-07-14

## 2024-06-17 ENCOUNTER — HOME CARE VISIT (OUTPATIENT)
Dept: HOME HEALTH SERVICES | Facility: HOME HEALTH | Age: 64
End: 2024-06-17

## 2024-06-20 PROCEDURE — RXMED WILLOW AMBULATORY MEDICATION CHARGE

## 2024-06-22 ENCOUNTER — PHARMACY VISIT (OUTPATIENT)
Dept: PHARMACY | Facility: CLINIC | Age: 64
End: 2024-06-22
Payer: COMMERCIAL

## 2024-06-25 PROCEDURE — RXMED WILLOW AMBULATORY MEDICATION CHARGE

## 2024-06-26 ENCOUNTER — PHARMACY VISIT (OUTPATIENT)
Dept: PHARMACY | Facility: CLINIC | Age: 64
End: 2024-06-26
Payer: COMMERCIAL

## 2024-06-28 ENCOUNTER — LAB (OUTPATIENT)
Dept: LAB | Facility: LAB | Age: 64
End: 2024-06-28
Payer: COMMERCIAL

## 2024-06-28 ENCOUNTER — OFFICE VISIT (OUTPATIENT)
Dept: CARDIOLOGY | Facility: HOSPITAL | Age: 64
End: 2024-06-28
Payer: COMMERCIAL

## 2024-06-28 ENCOUNTER — TELEPHONE (OUTPATIENT)
Dept: CARDIOLOGY | Facility: HOSPITAL | Age: 64
End: 2024-06-28

## 2024-06-28 VITALS
WEIGHT: 191 LBS | SYSTOLIC BLOOD PRESSURE: 98 MMHG | BODY MASS INDEX: 38.51 KG/M2 | HEART RATE: 90 BPM | DIASTOLIC BLOOD PRESSURE: 61 MMHG | HEIGHT: 59 IN

## 2024-06-28 DIAGNOSIS — I50.32 CHRONIC HEART FAILURE WITH PRESERVED EJECTION FRACTION (MULTI): ICD-10-CM

## 2024-06-28 LAB
ALBUMIN SERPL BCP-MCNC: 4.4 G/DL (ref 3.4–5)
ANION GAP SERPL CALC-SCNC: 14 MMOL/L (ref 10–20)
BNP SERPL-MCNC: 4 PG/ML (ref 0–99)
BUN SERPL-MCNC: 20 MG/DL (ref 6–23)
CALCIUM SERPL-MCNC: 10.5 MG/DL (ref 8.6–10.6)
CHLORIDE SERPL-SCNC: 98 MMOL/L (ref 98–107)
CO2 SERPL-SCNC: 30 MMOL/L (ref 21–32)
CREAT SERPL-MCNC: 1.49 MG/DL (ref 0.5–1.05)
EGFRCR SERPLBLD CKD-EPI 2021: 39 ML/MIN/1.73M*2
ERYTHROCYTE [DISTWIDTH] IN BLOOD BY AUTOMATED COUNT: 13.2 % (ref 11.5–14.5)
FERRITIN SERPL-MCNC: 25 NG/ML (ref 8–150)
GLUCOSE SERPL-MCNC: 108 MG/DL (ref 74–99)
HCT VFR BLD AUTO: 44 % (ref 36–46)
HGB BLD-MCNC: 13.5 G/DL (ref 12–16)
INR PPP: 1.4 (ref 0.9–1.1)
IRON SATN MFR SERPL: 17 % (ref 25–45)
IRON SERPL-MCNC: 79 UG/DL (ref 35–150)
MCH RBC QN AUTO: 28.2 PG (ref 26–34)
MCHC RBC AUTO-ENTMCNC: 30.7 G/DL (ref 32–36)
MCV RBC AUTO: 92 FL (ref 80–100)
NRBC BLD-RTO: 0 /100 WBCS (ref 0–0)
PHOSPHATE SERPL-MCNC: 4.4 MG/DL (ref 2.5–4.9)
PLATELET # BLD AUTO: 256 X10*3/UL (ref 150–450)
POTASSIUM SERPL-SCNC: 4.3 MMOL/L (ref 3.5–5.3)
PROTHROMBIN TIME: 15.9 SECONDS (ref 9.8–12.8)
RBC # BLD AUTO: 4.79 X10*6/UL (ref 4–5.2)
SODIUM SERPL-SCNC: 138 MMOL/L (ref 136–145)
TIBC SERPL-MCNC: 473 UG/DL (ref 240–445)
UIBC SERPL-MCNC: 394 UG/DL (ref 110–370)
WBC # BLD AUTO: 7.2 X10*3/UL (ref 4.4–11.3)

## 2024-06-28 PROCEDURE — 3048F LDL-C <100 MG/DL: CPT | Performed by: STUDENT IN AN ORGANIZED HEALTH CARE EDUCATION/TRAINING PROGRAM

## 2024-06-28 PROCEDURE — 1111F DSCHRG MED/CURRENT MED MERGE: CPT | Performed by: STUDENT IN AN ORGANIZED HEALTH CARE EDUCATION/TRAINING PROGRAM

## 2024-06-28 PROCEDURE — 36415 COLL VENOUS BLD VENIPUNCTURE: CPT

## 2024-06-28 PROCEDURE — 80069 RENAL FUNCTION PANEL: CPT

## 2024-06-28 PROCEDURE — 3052F HG A1C>EQUAL 8.0%<EQUAL 9.0%: CPT | Performed by: STUDENT IN AN ORGANIZED HEALTH CARE EDUCATION/TRAINING PROGRAM

## 2024-06-28 PROCEDURE — 85027 COMPLETE CBC AUTOMATED: CPT

## 2024-06-28 PROCEDURE — 99215 OFFICE O/P EST HI 40 MIN: CPT | Performed by: STUDENT IN AN ORGANIZED HEALTH CARE EDUCATION/TRAINING PROGRAM

## 2024-06-28 PROCEDURE — 83550 IRON BINDING TEST: CPT

## 2024-06-28 PROCEDURE — 83540 ASSAY OF IRON: CPT

## 2024-06-28 PROCEDURE — 82728 ASSAY OF FERRITIN: CPT

## 2024-06-28 PROCEDURE — 1159F MED LIST DOCD IN RCRD: CPT | Performed by: STUDENT IN AN ORGANIZED HEALTH CARE EDUCATION/TRAINING PROGRAM

## 2024-06-28 PROCEDURE — 83880 ASSAY OF NATRIURETIC PEPTIDE: CPT

## 2024-06-28 PROCEDURE — 1036F TOBACCO NON-USER: CPT | Performed by: STUDENT IN AN ORGANIZED HEALTH CARE EDUCATION/TRAINING PROGRAM

## 2024-06-28 PROCEDURE — 3074F SYST BP LT 130 MM HG: CPT | Performed by: STUDENT IN AN ORGANIZED HEALTH CARE EDUCATION/TRAINING PROGRAM

## 2024-06-28 PROCEDURE — G2211 COMPLEX E/M VISIT ADD ON: HCPCS | Performed by: STUDENT IN AN ORGANIZED HEALTH CARE EDUCATION/TRAINING PROGRAM

## 2024-06-28 PROCEDURE — 3008F BODY MASS INDEX DOCD: CPT | Performed by: STUDENT IN AN ORGANIZED HEALTH CARE EDUCATION/TRAINING PROGRAM

## 2024-06-28 PROCEDURE — 3061F NEG MICROALBUMINURIA REV: CPT | Performed by: STUDENT IN AN ORGANIZED HEALTH CARE EDUCATION/TRAINING PROGRAM

## 2024-06-28 PROCEDURE — 3078F DIAST BP <80 MM HG: CPT | Performed by: STUDENT IN AN ORGANIZED HEALTH CARE EDUCATION/TRAINING PROGRAM

## 2024-06-28 PROCEDURE — 85610 PROTHROMBIN TIME: CPT

## 2024-06-28 RX ORDER — CARVEDILOL 12.5 MG/1
12.5 TABLET ORAL
Qty: 60 TABLET | Refills: 11 | Status: SHIPPED | OUTPATIENT
Start: 2024-06-28

## 2024-06-28 ASSESSMENT — ENCOUNTER SYMPTOMS
LIGHT-HEADEDNESS: 1
SHORTNESS OF BREATH: 1
DECREASED APPETITE: 0
PALPITATIONS: 0
HEADACHES: 1
DYSPNEA ON EXERTION: 1
GASTROINTESTINAL NEGATIVE: 1
DIZZINESS: 1

## 2024-06-28 NOTE — H&P (VIEW-ONLY)
Referring Clinician: Dr. 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 Review of the electronic medical record shows a past medical history significant for HFpEF, GERD,  obesity with BMI 40 kg/m² (maintained on semaglutide), CAD status post PCI ( STEMI 9/2023 at ; NSTEMI 11/2023 care at [facility], [place]), known SVC thrombus maintained on DOAC, TIA 9/2023, hyperlipidemia, CKD, MICHAEL non adherent with CPAP and does not follow the sleep medicine, status post toe amputations for gangrene, insomnia.  She is on pharmacotherapy for HFpEF (finerenone, dapagliflozin) blood pressure has been previously well controlled, but she reports hypotension recently.  Cardiac MRI 4/2024 there was evidence of inducible myocardial ischemia in the basal-mid inferior septal/inferior wall with a basal-mid anterior wall mild defects.  LVEF was 53% on this study.   was admitted to the hospital 6/2024 with NSTEMI.  TTE 6/2024 demonstrated normal biventricular systolic function with LVEF 55-60%.    She denies chest pain but has significant exertional dyspnea after walking 20 m.  She now also reports having a 3 pillow orthopnea.  She avoids stairs actively because of her exertional dyspnea.  She has leg swelling 3 days a week, but denies PND.  She reports significant orthopnea which she says is due to obstructive sleep apnea.    Review of Systems   Constitutional: Negative for decreased appetite.   Cardiovascular:  Positive for dyspnea on exertion and leg swelling. Negative for palpitations.   Respiratory:  Positive for shortness of breath.    Gastrointestinal: Negative.    Genitourinary: Negative.    Neurological:  Positive for dizziness, headaches and light-headedness.      She is adherent with prescribed medications.    Surgical Hx:  - Toe amputations 2020    Past Obstetric Hx:  - G 1  - No  cardiac complications of pregnancy    Social Hx:  -  "Smoking-never  - ETOH-1 glass of wine twice a week, prev heavy use \" in early life\"  - Illicit drugs- never   - Lives alone, has a very supportive partner who lives nearby.    Family Hx:  Specifically, there is no family history of ICD, PPM, LVAD, OHT, arrhythmias, CVA, or sudden cardiac death.    Brother- \" Enlarged heart\"  Mother- CVA , \" heart attack\"  Multiple relatives - DM     Medication reconciliation completed, see below.     Medication Documentation Review Audit       Reviewed by Suzanne Apple RN (Registered Nurse) on 24 at 1312      Medication Order Taking? Sig Documenting Provider Last Dose Status   apixaban (Eliquis) 5 mg tablet [id number]  Take 1 tablet (5 mg) by mouth every 12 hours. Historical Provider, MD   24 2359   aspirin 81 mg EC tablet [id number] Yes Take 1 tablet (81 mg) by mouth once daily. Darren Tellez MD Taking Active   atorvastatin (Lipitor) 80 mg tablet [id number] Yes TAKE 1 TABLET BY MOUTH ONCE DAILY Darren Ventura MD Taking Active   blood pressure monitor kit [id number] Yes 1 each once daily. Use once daily to check blood pressure Kassandra Lopez MD Taking Active   blood sugar diagnostic (OneTouch Ultra Test) strip [id number] Yes USE 1 STRIP(S) TO TEST BLOOD SUGAR 3 TIMES A DAY Kassandra Lopez MD Taking Active   brimonidine (AlphaGAN) 0.2 % ophthalmic solution [id number] Yes Administer 1 drop into both eyes 2 times a day. PATIENT NEEDS PCP APPT FOR FURTHER REFILLS Kassandra Lopez MD Taking Active   calcium carbonate-vit D3-min 600 mg calcium- 400 unit tablet [id number] Yes Take 1 tablet by mouth once daily. Darren Ventura MD Taking Active   carvedilol (Coreg) 25 mg tablet [id number] Yes Take 1 tablet (25 mg) by mouth 2 times daily (morning and late afternoon). Historical Provider, MD Taking Active   dapagliflozin propanediol (Farxiga) 10 mg [id number] Yes Take 1 tablet (10 mg) by mouth once daily. Michael Hilario MD Taking Active   DULoxetine (Cymbalta) 30 " mg DR capsule [id number] No Take 1 capsule (30 mg) by mouth 2 times a day. Do not crush or chew.   Patient not taking: Reported on 2024    Kassandra Lopez MD Not Taking Active   finerenone (Kerendia) 10 mg tablet tablet [id number] Yes Take 1 tablet (10 mg) by mouth once daily. Michael Hilario MD Taking Active   FreeStyle Filipe 2 Sensor kit [id number] Yes Apply 1 sensor to the back of the upper arm. Change sensor every 14 days. Michael Hilario MD Taking Active   FreeStyle Filipe reader (FreeStyle Filipe 2 [place]) misc [id number] Yes Use as instructed Marek Miles MD Taking Active   furosemide (Lasix) 20 mg tablet [id number] Yes Take 1 tablet (20 mg) by mouth once daily. Najma Lang MD Taking Active   hydrOXYzine HCL (Atarax) 25 mg tablet [id number]  Take 1 tablet (25 mg) by mouth 2 times a day as needed for itching for up to 7 days. Darren Tellez MD   06/15/24 2353   insulin degludec (Tresiba FlexTouch) 200 unit/mL (3 mL) injection [id number] Yes Inject 50 Units under the skin once daily at bedtime. Take as directed per insulin instructions. Michael Hilario MD Taking Active   insulin lispro (HumaLOG KwikPen Insulin) 200 unit/mL (3 mL) insulin pen pen [id number] Yes Inject 22 Units under the skin 3 times a day with meals. Take as directed per insulin instructions. Michael Hilario MD Taking Active   isosorbide mononitrate ER (Imdur) 60 mg 24 hr tablet [id number] Yes TAKE 1 TABLET BY MOUTH EVERY DAY IN THE MORNING Oral Schwarz MD Taking Active   latanoprost (Xalatan) 0.005 % ophthalmic solution [id number] Yes ADMINISTER 1 DROP INTO BOTH EYES ONCE DAILY AT BEDTIME. Historical Provider, MD Taking Active   MELATONIN ORAL [id number] Yes Take 1 tablet by mouth once daily at bedtime. Historical Provider, MD Taking Active   nitroglycerin (Nitrostat) 0.4 mg SL tablet [id number] Yes PLACE 1 TABLET UNDER THE TONGUE EVERY 5 MINUTES FOR UP TO 3 DOSES AS NEEDED FOR CHEST PAIN.CALL 911 IF PAIN PERSISTS.  "Historical Provider, MD Taking Active   pantoprazole (ProtoNix) 40 mg EC tablet [id number] Yes Take 1 tablet (40 mg) by mouth once daily in the morning. Take before meals. Kassandra Lopez MD Taking Active   polyethylene glycol (Glycolax, Miralax) 17 gram/dose powder [id number] Yes Take 17 g by mouth once daily. Darren Tellez MD Taking Active   semaglutide 2 mg/dose (8 mg/3 mL) pen injector [id number] Yes Inject 2 mg under the skin 1 (one) time per week. Michael Hilario MD Taking Active   urea (Carmol) 20 % cream [id number] Yes Apply 1 Application topically 2 times a day. Historical Provider, MD Taking Active                      Allergies   Allergen Reactions    Other Rash     Novocaine Solution, reaction rash     Penicillins Rash    Procaine Rash     Investigations:    The electronic medical record has been reviewed by me for salient history. All cardiovascular imaging and testing available in the electronic medical record, and [facility] has been reviewed.     .Visit Vitals  BP 98/61   Pulse 90   Ht 1.499 m (4' 11\")   Wt 86.6 kg (191 lb)   LMP  (LMP Unknown)   BMI 38.58 kg/m²   OB Status Postmenopausal   Smoking Status Never   BSA 1.9 m²         On examination:    Very pleasant obese AA woman of short stature in no apparent CP or painful distress  Neck: No appreciable JVD or HJR  CVS: HS 1,2.   No added sounds  Resp: CTA bilaterally. Percussion note resonant   Abdomen: Obese, SNT, BS wnl  Extremities: no pedal oedema (2+ prev)  Skin: warm and dry, multiple thickened nontender hyperkeratotic plaques her extremities, thorax, abdomen  CNS: AO x 4    Lab Results   Component Value Date    WBC 6.9 06/08/2024    HGB 12.4 06/08/2024    HCT 37.6 06/08/2024    MCV 90 06/08/2024     06/08/2024     Lab Results   Component Value Date    GLUCOSE 268 (H) 06/08/2024    CALCIUM 9.3 06/08/2024     06/08/2024    K 3.9 06/08/2024    CO2 28 06/08/2024    CL 99 06/08/2024    BUN 19 06/08/2024    CREATININE 1.30 (H) 06/08/2024 "       cMRI 4/2024   1. Normal LV size (EDVi 40 ml/m2) with low-normal systolic function (LVEF 53%).  2. No regional wall motion abnormalities.  3. Concentric left ventricular hypertrophy.  4. Evidence of inducible myocardial ischemia of the basal-mid inferoseptal/inferior wall (moderate defect) and basal-mid anterior wall (mild defect).  5. No definite evidence of myocardial infarction or infiltration on LGE imaging. There is RV insertion point and mild diffuse septal  fibrosis.  6. Normal RV size (EDVi-41 ml/m2) and systolic function (RVEF50%).    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 Review of the electronic medical record shows a past medical history significant for HFpEF, GERD,  obesity with BMI 40 kg/m² (maintained on semaglutide), CAD status post PCI ( STEMI 9/2023 at ; NSTEMI 11/2023 care at [facility] known SVC thrombus maintained on DOAC, TIA 9/2023, hyperlipidemia, CKD, MICHAEL non adherent with CPAP and does not follow the sleep medicine, status post toe amputations for gangrene, insomnia.  Cardiac MRI 4/2024 demonstrated inducible ischemic changes.  She was last admitted with an NSTEMI 6/2024, and has been having exertional symptoms.    NYHA Functional Class: 3  ACC/AHA Stage C heart failure  Volume status: Hypervolaemic  Perfusion status: Warm to touch  Aetiology: Ischemic    PLAN:    #HFpEF  -Ms Mata has complex CAD which likely contributes to her dyspneic symptoms.  Left heart catheterization has been requested.  -Right heart catheterization also requested  - Continue finerenone, SGLT2 inhibition  -Reduce carvedilol by half to 12.5 mg twice daily    #Obesity  We discussed the importance of weight loss, she is doing her best with this, and is on semaglutide.    #Obstructive sleep apnea  She is non-adherent with MICHAEL management (finds CPAP uncomfortable); management needed for optimize HFpEF care.  She has been referred to the sleep  medicine team for discussion of alternative treatments for MICHAEL.    This note was transcribed using the Dragon Dictation system. There may be grammatical, punctuation, or verbiage errors that can occur with voice recognition programs.    Destiny Hurst MD PhD

## 2024-06-28 NOTE — TELEPHONE ENCOUNTER
Spoke with [place] [facility] [place] [place]. Lab orders placed,  patient instructed to hold eliquis two days prior to test Advised her to monitor for phone call from COVID testing team as COVID test is required prior to procedure. Agrees that a friend/family member will be able to transport her to and from procedure. Gave patient pre procedure instructions and a map of how to get to the cath lab in clinic on 6/28/24. Questions answered about procedure. Patient given my phone number [phone] to call with any questions/concerns prior to procedure.

## 2024-06-28 NOTE — PROGRESS NOTES
Referring Clinician: Dr. 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 Review of the electronic medical record shows a past medical history significant for HFpEF, GERD,  obesity with BMI 40 kg/m² (maintained on semaglutide), CAD status post PCI ( STEMI 9/2023 at ; NSTEMI 11/2023 care at [facility]), known SVC thrombus maintained on DOAC, TIA 9/2023, hyperlipidemia, CKD, MICHAEL non adherent with CPAP and does not follow the sleep medicine, status post toe amputations for gangrene, insomnia.  She is on pharmacotherapy for HFpEF (finerenone, dapagliflozin) blood pressure has been previously well controlled, but she reports hypotension recently.  Cardiac MRI 4/2024 there was evidence of inducible myocardial ischemia in the basal-mid inferior septal/inferior wall with a basal-mid anterior wall mild defects.  LVEF was 53% on this study.   was admitted to the hospital 6/2024 with NSTEMI.  TTE 6/2024 demonstrated normal biventricular systolic function with LVEF 55-60%.    She denies chest pain but has significant exertional dyspnea after walking 20 m.  She now also reports having a 3 pillow orthopnea.  She avoids stairs actively because of her exertional dyspnea.  She has leg swelling 3 days a week, but denies PND.  She reports significant orthopnea which she says is due to obstructive sleep apnea.    Review of Systems   Constitutional: Negative for decreased appetite.   Cardiovascular:  Positive for dyspnea on exertion and leg swelling. Negative for palpitations.   Respiratory:  Positive for shortness of breath.    Gastrointestinal: Negative.    Genitourinary: Negative.    Neurological:  Positive for dizziness, headaches and light-headedness.      She is adherent with prescribed medications.    Surgical Hx:  - Toe amputations 2020    Past Obstetric Hx:  - G 1  - No  cardiac complications of pregnancy    Social Hx:  -  "Smoking-never  - ETOH-1 glass of wine twice a week, prev heavy use \" in early life\"  - Illicit drugs- never   - Lives alone, has a very supportive partner who lives nearby.    Family Hx:  Specifically, there is no family history of ICD, PPM, LVAD, OHT, arrhythmias, CVA, or sudden cardiac death.    Brother- \" Enlarged heart\"  Mother- CVA , \" heart attack\"  Multiple relatives - DM     Medication reconciliation completed, see below.     Medication Documentation Review Audit       Reviewed by Suzanne Apple RN (Registered Nurse) on 24 at 1312      Medication Order Taking? Sig Documenting Provider Last Dose Status   apixaban (Eliquis) 5 mg tablet [id number]  Take 1 tablet (5 mg) by mouth every 12 hours. Historical Provider, MD   24 2359   aspirin 81 mg EC tablet [id number] Yes Take 1 tablet (81 mg) by mouth once daily. Darren Tellez MD Taking Active   atorvastatin (Lipitor) 80 mg tablet [id number] Yes TAKE 1 TABLET BY MOUTH ONCE DAILY Darren Ventura MD Taking Active   blood pressure monitor kit [id number] Yes 1 each once daily. Use once daily to check blood pressure Kassandra Lopez MD Taking Active   blood sugar diagnostic (OneTouch Ultra Test) strip [id number] Yes USE 1 STRIP(S) TO TEST BLOOD SUGAR 3 TIMES A DAY Kassandra Lopez MD Taking Active   brimonidine (AlphaGAN) 0.2 % ophthalmic solution [id number] Yes Administer 1 drop into both eyes 2 times a day. PATIENT NEEDS PCP APPT FOR FURTHER REFILLS Kassandra Lopez MD Taking Active   calcium carbonate-vit D3-min 600 mg calcium- 400 unit tablet [id number] Yes Take 1 tablet by mouth once daily. Darren Ventura MD Taking Active   carvedilol (Coreg) 25 mg tablet [id number] Yes Take 1 tablet (25 mg) by mouth 2 times daily (morning and late afternoon). Historical Provider, MD Taking Active   dapagliflozin propanediol (Farxiga) 10 mg [id number] Yes Take 1 tablet (10 mg) by mouth once daily. Michael Hilario MD Taking Active   DULoxetine (Cymbalta) 30 " mg DR capsule [id number] No Take 1 capsule (30 mg) by mouth 2 times a day. Do not crush or chew.   Patient not taking: Reported on 2024    Kassandra Lopez MD Not Taking Active   finerenone (Kerendia) 10 mg tablet tablet [id number] Yes Take 1 tablet (10 mg) by mouth once daily. Michael Hilario MD Taking Active   FreeStyle Filipe 2 Sensor kit [id number] Yes Apply 1 sensor to the back of the upper arm. Change sensor every 14 days. Michael Hilario MD Taking Active   FreeStyle Filipe reader (FreeStyle Filipe 2 [place]) misc [id number] Yes Use as instructed Marek Miles MD Taking Active   furosemide (Lasix) 20 mg tablet [id number] Yes Take 1 tablet (20 mg) by mouth once daily. Najma Lang MD Taking Active   hydrOXYzine HCL (Atarax) 25 mg tablet [id number]  Take 1 tablet (25 mg) by mouth 2 times a day as needed for itching for up to 7 days. Darren Tellez MD   06/15/24 2355   insulin degludec (Tresiba FlexTouch) 200 unit/mL (3 mL) injection [id number] Yes Inject 50 Units under the skin once daily at bedtime. Take as directed per insulin instructions. Michael Hilario MD Taking Active   insulin lispro (HumaLOG KwikPen Insulin) 200 unit/mL (3 mL) insulin pen pen [id number] Yes Inject 22 Units under the skin 3 times a day with meals. Take as directed per insulin instructions. Michael Hilario MD Taking Active   isosorbide mononitrate ER (Imdur) 60 mg 24 hr tablet [id number] Yes TAKE 1 TABLET BY MOUTH EVERY DAY IN THE MORNING Oral Schwarz MD Taking Active   latanoprost (Xalatan) 0.005 % ophthalmic solution [id number] Yes ADMINISTER 1 DROP INTO BOTH EYES ONCE DAILY AT BEDTIME. Historical Provider, MD Taking Active   MELATONIN ORAL [id number] Yes Take 1 tablet by mouth once daily at bedtime. Historical Provider, MD Taking Active   nitroglycerin (Nitrostat) 0.4 mg SL tablet [id number] Yes PLACE 1 TABLET UNDER THE TONGUE EVERY 5 MINUTES FOR UP TO 3 DOSES AS NEEDED FOR CHEST PAIN.CALL 911 IF PAIN PERSISTS.  "Historical Provider, MD Taking Active   pantoprazole (ProtoNix) 40 mg EC tablet [id number] Yes Take 1 tablet (40 mg) by mouth once daily in the morning. Take before meals. Kassandra Lopez MD Taking Active   polyethylene glycol (Glycolax, Miralax) 17 gram/dose powder [id number] Yes Take 17 g by mouth once daily. Darren Tellez MD Taking Active   semaglutide 2 mg/dose (8 mg/3 mL) pen injector [id number] Yes Inject 2 mg under the skin 1 (one) time per week. Michael Hilario MD Taking Active   urea (Carmol) 20 % cream [id number] Yes Apply 1 Application topically 2 times a day. Historical Provider, MD Taking Active                      Allergies   Allergen Reactions    Other Rash     Novocaine Solution, reaction rash     Penicillins Rash    Procaine Rash     Investigations:    The electronic medical record has been reviewed by me for salient history. All cardiovascular imaging and testing available in the electronic medical record, and [employer] has been reviewed.     .Visit Vitals  BP 98/61   Pulse 90   Ht 1.499 m (4' 11\")   Wt 86.6 kg (191 lb)   LMP  (LMP Unknown)   BMI 38.58 kg/m²   OB Status Postmenopausal   Smoking Status Never   BSA 1.9 m²         On examination:    Very pleasant obese AA woman of short stature in no apparent CP or painful distress  Neck: No appreciable JVD or HJR  CVS: HS 1,2.   No added sounds  Resp: CTA bilaterally. Percussion note resonant   Abdomen: Obese, SNT, BS wnl  Extremities: no pedal oedema (2+ prev)  Skin: warm and dry, multiple thickened nontender hyperkeratotic plaques her extremities, thorax, abdomen  CNS: AO x 4    Lab Results   Component Value Date    WBC 6.9 06/08/2024    HGB 12.4 06/08/2024    HCT 37.6 06/08/2024    MCV 90 06/08/2024     06/08/2024     Lab Results   Component Value Date    GLUCOSE 268 (H) 06/08/2024    CALCIUM 9.3 06/08/2024     06/08/2024    K 3.9 06/08/2024    CO2 28 06/08/2024    CL 99 06/08/2024    BUN 19 06/08/2024    CREATININE 1.30 (H) 06/08/2024 "       cMRI 4/2024   1. Normal LV size (EDVi 40 ml/m2) with low-normal systolic function (LVEF 53%).  2. No regional wall motion abnormalities.  3. Concentric left ventricular hypertrophy.  4. Evidence of inducible myocardial ischemia of the basal-mid inferoseptal/inferior wall (moderate defect) and basal-mid anterior wall (mild defect).  5. No definite evidence of myocardial infarction or infiltration on LGE imaging. There is RV insertion point and mild diffuse septal  fibrosis.  6. Normal RV size (EDVi-41 ml/m2) and systolic function (RVEF50%).    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 Review of the electronic medical record shows a past medical history significant for HFpEF, GERD,  obesity with BMI 40 kg/m² (maintained on semaglutide), CAD status post PCI ( STEMI 9/2023 at ; NSTEMI 11/2023 care at [facility]), known SVC thrombus maintained on DOAC, TIA 9/2023, hyperlipidemia, CKD, MICHAEL non adherent with CPAP and does not follow the sleep medicine, status post toe amputations for gangrene, insomnia.  Cardiac MRI 4/2024 demonstrated inducible ischemic changes.  She was last admitted with an NSTEMI 6/2024, and has been having exertional symptoms.    NYHA Functional Class: 3  ACC/AHA Stage C heart failure  Volume status: Hypervolaemic  Perfusion status: Warm to touch  Aetiology: Ischemic    PLAN:    #HFpEF  -Ms Mata has complex CAD which likely contributes to her dyspneic symptoms.  Left heart catheterization has been requested.  -Right heart catheterization also requested  - Continue finerenone, SGLT2 inhibition  -Reduce carvedilol by half to 12.5 mg twice daily    #Obesity  We discussed the importance of weight loss, she is doing her best with this, and is on semaglutide.    #Obstructive sleep apnea  She is non-adherent with MICHAEL management (finds CPAP uncomfortable); management needed for optimize HFpEF care.  She has been referred to the sleep  medicine team for discussion of alternative treatments for MICHAEL.    This note was transcribed using the Dragon Dictation system. There may be grammatical, punctuation, or verbiage errors that can occur with voice recognition programs.    Destiny Hurst MD PhD

## 2024-06-28 NOTE — PATIENT INSTRUCTIONS
Thank you for coming in today. If you have any questions or concerns, you may call the Heart Failure Office at [phone] option [phone], or [phone].  You may also contact our heart failure nursing team via email on [email or web address].    For quicker results set-up your  [employer] account to receive results and other correspondence directly to your phone.    Please bring all your pills/medications to your Cardiology appointments.    **  -Our heart failure nurse team will work with you to arrange for left  and right heart catheterization, as we discussed today    - Please make the following medication changes:  1. DECREASE Carvedilol to 12.5 mg twice a day    - Please have the following tests done:  1.Blood tests just before your next visit (RFP, BNP, CBC, iron, TIBC, ferritin)    -Please make an appointment to see Dr. Hilario    - Please make an appointment to be seen in 10/2024

## 2024-07-03 DIAGNOSIS — E61.1 IRON DEFICIENCY: Primary | ICD-10-CM

## 2024-07-03 DIAGNOSIS — I50.30 HEART FAILURE WITH PRESERVED EJECTION FRACTION, UNSPECIFIED HF CHRONICITY (MULTI): ICD-10-CM

## 2024-07-03 RX ORDER — ALBUTEROL SULFATE 0.83 MG/ML
3 SOLUTION RESPIRATORY (INHALATION) AS NEEDED
OUTPATIENT
Start: 2024-07-05

## 2024-07-03 RX ORDER — EPINEPHRINE 0.3 MG/.3ML
0.3 INJECTION SUBCUTANEOUS EVERY 5 MIN PRN
OUTPATIENT
Start: 2024-07-05

## 2024-07-03 RX ORDER — FAMOTIDINE 10 MG/ML
20 INJECTION INTRAVENOUS ONCE AS NEEDED
OUTPATIENT
Start: 2024-07-05

## 2024-07-03 RX ORDER — DIPHENHYDRAMINE HYDROCHLORIDE 50 MG/ML
50 INJECTION INTRAMUSCULAR; INTRAVENOUS AS NEEDED
OUTPATIENT
Start: 2024-07-05

## 2024-07-05 ENCOUNTER — HOSPITAL ENCOUNTER (OUTPATIENT)
Facility: HOSPITAL | Age: 64
Setting detail: OUTPATIENT SURGERY
Discharge: HOME | End: 2024-07-05
Attending: INTERNAL MEDICINE | Admitting: INTERNAL MEDICINE
Payer: COMMERCIAL

## 2024-07-05 VITALS
HEART RATE: 88 BPM | OXYGEN SATURATION: 100 % | SYSTOLIC BLOOD PRESSURE: 140 MMHG | RESPIRATION RATE: 16 BRPM | DIASTOLIC BLOOD PRESSURE: 79 MMHG

## 2024-07-05 DIAGNOSIS — Z86.718 HISTORY OF DVT (DEEP VEIN THROMBOSIS): Primary | Chronic | ICD-10-CM

## 2024-07-05 DIAGNOSIS — I50.32 CHRONIC HEART FAILURE WITH PRESERVED EJECTION FRACTION (MULTI): ICD-10-CM

## 2024-07-05 LAB — GLUCOSE BLD MANUAL STRIP-MCNC: 123 MG/DL (ref 74–99)

## 2024-07-05 PROCEDURE — 99153 MOD SED SAME PHYS/QHP EA: CPT | Performed by: INTERNAL MEDICINE

## 2024-07-05 PROCEDURE — 93451 RIGHT HEART CATH: CPT | Performed by: INTERNAL MEDICINE

## 2024-07-05 PROCEDURE — C1887 CATHETER, GUIDING: HCPCS | Performed by: INTERNAL MEDICINE

## 2024-07-05 PROCEDURE — 2720000007 HC OR 272 NO HCPCS: Performed by: INTERNAL MEDICINE

## 2024-07-05 PROCEDURE — C1769 GUIDE WIRE: HCPCS | Performed by: INTERNAL MEDICINE

## 2024-07-05 PROCEDURE — 99152 MOD SED SAME PHYS/QHP 5/>YRS: CPT | Performed by: INTERNAL MEDICINE

## 2024-07-05 PROCEDURE — 2550000001 HC RX 255 CONTRASTS: Performed by: INTERNAL MEDICINE

## 2024-07-05 PROCEDURE — 82947 ASSAY GLUCOSE BLOOD QUANT: CPT

## 2024-07-05 PROCEDURE — 2500000004 HC RX 250 GENERAL PHARMACY W/ HCPCS (ALT 636 FOR OP/ED): Performed by: INTERNAL MEDICINE

## 2024-07-05 PROCEDURE — 93456 R HRT CORONARY ARTERY ANGIO: CPT

## 2024-07-05 PROCEDURE — 7100000009 HC PHASE TWO TIME - INITIAL BASE CHARGE: Performed by: INTERNAL MEDICINE

## 2024-07-05 PROCEDURE — 93454 CORONARY ARTERY ANGIO S&I: CPT | Performed by: INTERNAL MEDICINE

## 2024-07-05 PROCEDURE — 93456 R HRT CORONARY ARTERY ANGIO: CPT | Performed by: INTERNAL MEDICINE

## 2024-07-05 PROCEDURE — 2500000005 HC RX 250 GENERAL PHARMACY W/O HCPCS: Performed by: INTERNAL MEDICINE

## 2024-07-05 PROCEDURE — 7100000010 HC PHASE TWO TIME - EACH INCREMENTAL 1 MINUTE: Performed by: INTERNAL MEDICINE

## 2024-07-05 PROCEDURE — C1894 INTRO/SHEATH, NON-LASER: HCPCS | Performed by: INTERNAL MEDICINE

## 2024-07-05 RX ORDER — FENTANYL CITRATE 50 UG/ML
INJECTION, SOLUTION INTRAMUSCULAR; INTRAVENOUS AS NEEDED
Status: DISCONTINUED | OUTPATIENT
Start: 2024-07-05 | End: 2024-07-05 | Stop reason: HOSPADM

## 2024-07-05 RX ORDER — LIDOCAINE HYDROCHLORIDE 20 MG/ML
INJECTION, SOLUTION INFILTRATION; PERINEURAL AS NEEDED
Status: DISCONTINUED | OUTPATIENT
Start: 2024-07-05 | End: 2024-07-05 | Stop reason: HOSPADM

## 2024-07-05 RX ORDER — INSULIN LISPRO 100 [IU]/ML
22 INJECTION, SOLUTION INTRAVENOUS; SUBCUTANEOUS 3 TIMES DAILY
COMMUNITY
Start: 2024-07-01

## 2024-07-05 RX ORDER — ONDANSETRON HYDROCHLORIDE 2 MG/ML
INJECTION, SOLUTION INTRAVENOUS AS NEEDED
Status: DISCONTINUED | OUTPATIENT
Start: 2024-07-05 | End: 2024-07-05 | Stop reason: HOSPADM

## 2024-07-05 RX ORDER — DIPHENHYDRAMINE HYDROCHLORIDE 50 MG/ML
INJECTION INTRAMUSCULAR; INTRAVENOUS AS NEEDED
Status: DISCONTINUED | OUTPATIENT
Start: 2024-07-05 | End: 2024-07-05 | Stop reason: HOSPADM

## 2024-07-05 RX ORDER — MIDAZOLAM HYDROCHLORIDE 1 MG/ML
INJECTION, SOLUTION INTRAMUSCULAR; INTRAVENOUS AS NEEDED
Status: DISCONTINUED | OUTPATIENT
Start: 2024-07-05 | End: 2024-07-05 | Stop reason: HOSPADM

## 2024-07-05 NOTE — DISCHARGE INSTRUCTIONS
Do not RESUME your Eliquis until 7/6 evening dose.             CARDIAC CATHETERIZATION DISCHARGE INSTRUCTIONS (procedure done on 7/5/24)     FOR SUDDEN AND SEVERE CHEST PAIN, SHORTNESS OF BREATH, EXCESSIVE BLEEDING, SIGNS OF STROKE, OR CHANGES IN MENTAL STATUS YOU SHOULD CALL 911 IMMEDIATELY.     If your provider has prescribed aspirin and/or clopidogrel (Plavix), or prasugrel (Effient), or ticagrelor (Brilinta), DO NOT STOP THESE MEDICATIONS for any reason without talking to your cardiologist first. If any of these were prescribed, you must take them every day without missing a single dose. If you are getting low on these medications, contact your provider immediately for a refill.     FOR NEXT 24 HOURS  - Upon discharge, you should return home and rest for the remainder of the day and evening. You do not have to stay on bed rest but should not be very active.  It is recommended a responsible adult be with you for the first 24 hours after the procedure.    - No driving for 24 hours after procedure. Please arrange for someone to drive you home from the hospital today.     - Do not drive, operate machinery, or use power tools for 24 hours after your procedure.     - Do not make any legal decisions for 24 hours after your procedure.     - Do not drink alcoholic beverages for 24 hours after your procedure.    WOUND CARE   *FOR FEMORAL (LEG) ACCESS*  ·      Avoid heavy lifting (over 10 pounds) for 7 days, squatting or excessive bending for 2 days, and strenuous exercise for 7 days.  ·      No submerged bathing, swimming, or hot tubs for the next 7 days, or until fully healed.  ·      Avoid sexual activity for 3-4 days until any groin discomfort has ceased.     *FOR RADIAL (WRIST) ACCESS*  ·      No lifting more than 5 pounds or excessive use of the wrist for 24 hours - for example, treat your wrist as if it is sprained.  ·      Do not engage in vigorous activities (tennis, golf, bowling, weights) for at least 48 hours  after the procedure.  ·      Do not submerge the wrist for 7 days after the procedure.  ·      You should expect mild tingling in your hand and tenderness at the puncture site for up to 3 days.    - The transparent dressing should be removed from the site 24 hours after the procedure.  Wash the site gently with soap and water. Rinse well and pat dry. Keep the area clean and dry. You may apply a Band-Aid to the site. Avoid lotions, ointments, or powders until fully healed.     - You may shower the day after your procedure.      - It is normal to notice a small bruise around the puncture site and/or a small grape sized or smaller lump. Any large bruising or large lump warrants a call to the office.     - If bleeding should occur, lay down and apply pressure to the affected area for 10 minutes.  If the bleeding stops notify your physician.  If there is a large amount of bleeding or spurting of blood CALL 911 immediately.  DO NOT drive yourself to the hospital.    - You may experience some tenderness, bruising or minimal inflammation.  If you have any concerns, you may contact the Cath Lab or if any of these symptoms become excessive, contact your cardiologist or go to the emergency room.     OTHER INSTRUCTIONS  - You may take acetaminophen (Tylenol) as directed for discomfort.  If pain is not relieved with acetaminophen (Tylenol), contact your doctor.    - If you notice or experience any of the following, you should notify your doctor or seek medical attention  Chest pain or discomfort  Change in mental status or weakness in extremities.  Dizziness, light headedness, or feeling faint.  Change in the site where the procedure was performed, such as bleeding or an increased area of bruising or swelling.  Tingling, numbness, pain, or coolness in the leg/arm beyond the site where the procedure was performed.  Signs of infection (i.e. shaking chills, temperature > 100 degrees Fahrenheit, warmth, redness) in the leg/arm area  where the procedure was performed.  Changes in urination   Bloody or black stools  Vomiting blood  Severe nose bleeds  Any excessive bleeding    - If you DO NOT have an appointment with your cardiologist within 2-4 weeks following your procedure, please contact their office.

## 2024-07-05 NOTE — POST-PROCEDURE NOTE
Physician Transition of Care Summary  Invasive Cardiovascular Lab    Procedure Date: 7/5/2024  Attending:    * Loki Donis - Primary  Resident/Fellow/Other Assistant: Surgeons and Role:     * Melinda Pablo MD - Fellow    Indications:   Pre-op Diagnosis     * Chronic heart failure with preserved ejection fraction (Multi) [I50.32]    Post-procedure diagnosis:   Post-op Diagnosis     * Chronic heart failure with preserved ejection fraction (Multi) [I50.32]    Procedure(s):   Left And Right Heart Cath, Without LV  72148 - [place] R & L HRT CATH WINJX HRT ART& L VENTR IMG      RHC:  Right femoral vein access - 6 Fr  RA: 1  RVEDP: 1  PA: 23/7 (13)  PCWP: 3  PA-SAT: 60.6  CO: 3.96/2.19  Low right and left sided filling pressures with mildly depressed CO/CI, likely from hypovolemia.     LHC:  Right femoral artery access - 4 Fr  LM: no obstructive CAD  LAD: proximal stent with 40-50% ISR  LCX: small AV groove LCX without significant disease, medium OM1 with 40-50% stenosis  RCA: not injected due to known  (robust left to right collaterals seen)  Overall, LHC minimally changed from before.     Stents/Implants:   Implants       No implant documentation for this case.            Anticoagulation/Antiplatelet Plan:   Continue ASA    Estimated Blood Loss:   5 mL    Anesthesia: Moderate Sedation Anesthesia Staff: No anesthesia staff entered.    Any Specimen(s) Removed:   No specimens collected during this procedure.    Disposition:   home      Electronically signed by: Melinda Pablo MD, 7/5/2024 9:13 AM

## 2024-07-05 NOTE — POST-PROCEDURE NOTE
Physician Transition of Care Summary  Invasive Cardiovascular Lab    Procedure Date: 7/5/2024  Attending:    * Loki Donis - Primary  Resident/Fellow/Other Assistant: Surgeons and Role:     * Melinda Pablo MD - Fellow    Indications:   Pre-op Diagnosis     * Chronic heart failure with preserved ejection fraction (Multi) [I50.32]    Post-procedure diagnosis:   Post-op Diagnosis     * Chronic heart failure with preserved ejection fraction (Multi) [I50.32]    Procedure(s):   Left And Right Heart Cath, Without LV  35262 - [place] R & L HRT CATH WINJX HRT ART& L VENTR IMG        Procedure Findings:   RA: 1  RVEDP: 1  PA: 23/7 (13)  PCWP: 3  PA-SAT: 60.6  CO: 3.96/2.19  Low right and left sided filling pressures with mildly depressed CO/CI, likely from hypovolemia.     LM: no obstructive CAD  LAD: proximal stent with 40-50% ISR  LCX: small AV groove LCX without significant disease, medium OM1 with 40-50% stenosis  RCA: not injected due to known  (robust left to right collaterals seen)  Overall, LHC minimally changed from before.     Description of the Procedure:   RCFA 4F, manual pressure  RCFV 6F manual pressure    Complications:   None    Stents/Implants:   Implants       No implant documentation for this case.            Anticoagulation/Antiplatelet Plan:   As before , resume tomorrow am    Estimated Blood Loss:   5 mL    Anesthesia: Moderate Sedation Anesthesia Staff: No anesthesia staff entered.    Any Specimen(s) Removed:   No specimens collected during this procedure.    Disposition:   Bedrest x 2 hours.   Home today      Electronically signed by: Loki Donis MD, 7/5/2024 9:17 AM

## 2024-07-05 NOTE — PROGRESS NOTES
Pharmacy Medication History Review    Nelida Mata is a 64 y.o. female admitted for (HFpEF) heart failure with preserved ejection fraction (Multi). Pharmacy reviewed the patient's [id number] medications and allergies for accuracy.    Medications ADDED:  N/A  Medications CHANGED:  N/A  Medications REMOVED:   N/A     The list below reflects the updated PTA list. Comments regarding how patient may be taking medications differently can be found in the Admit Orders Activity  Prior to Admission Medications   Prescriptions Last Dose Informant   DULoxetine (Cymbalta) 30 mg DR capsule  Self   Sig: Take 1 capsule (30 mg) by mouth 2 times a day. Do not crush or chew.   Patient not taking: Reported on 2024   FreeStyle Filipe 2 Sensor kit  Self   Sig: Apply 1 sensor to the back of the upper arm. Change sensor every 14 days.   FreeStyle Filipe reader (FreeStyle Filipe 2 [place]) misc  Self   Sig: Use as instructed   HumaLOG KwikPen Insulin 100 unit/mL injection 2024 at 11 units Self   Sig: Inject 22 Units under the skin 3 times a day. With meals   apixaban (Eliquis) 5 mg tablet 2024 at pm Self   Sig: Take 1 tablet (5 mg) by mouth every 12 hours.   aspirin 81 mg EC tablet 2024 Self   Sig: Take 1 tablet (81 mg) by mouth once daily.   atorvastatin (Lipitor) 80 mg tablet 2024 Self   Sig: TAKE 1 TABLET BY MOUTH ONCE DAILY   blood pressure monitor kit  Self   Si each once daily. Use once daily to check blood pressure   blood sugar diagnostic (OneTouch Ultra Test) strip  Self   Sig: USE 1 STRIP(S) TO TEST BLOOD SUGAR 3 TIMES A DAY   brimonidine (AlphaGAN) 0.2 % ophthalmic solution 2024 Self   Sig: Administer 1 drop into both eyes 2 times a day. PATIENT NEEDS PCP APPT FOR FURTHER REFILLS   calcium carbonate-vit D3-min 600 mg calcium- 400 unit tablet 2024 Self   Sig: Take 1 tablet by mouth once daily.   carvedilol (Coreg) 12.5 mg tablet 2024 Self   Sig: Take 1 tablet (12.5 mg) by mouth 2  times daily (morning and late afternoon).   dapagliflozin propanediol (Farxiga) 10 mg 7/5/2024 Self   Sig: Take 1 tablet (10 mg) by mouth once daily.   finerenone (Kerendia) 10 mg tablet tablet 7/5/2024 Self   Sig: Take 1 tablet (10 mg) by mouth once daily.   furosemide (Lasix) 20 mg tablet 7/5/2024 Self   Sig: Take 1 tablet (20 mg) by mouth once daily.   hydrOXYzine HCL (Atarax) 25 mg tablet Unknown    Sig: Take 1 tablet (25 mg) by mouth 2 times a day as needed for itching for up to 7 days.   insulin degludec (Tresiba FlexTouch) 200 unit/mL (3 mL) injection 7/4/2024 Self   Sig: Inject 50 Units under the skin once daily at bedtime. Take as directed per insulin instructions.   Patient taking differently: Inject 52 Units under the skin once daily at bedtime. Take as directed per insulin instructions.   insulin lispro (HumaLOG KwikPen Insulin) 200 unit/mL (3 mL) insulin pen pen Not Taking Self   Sig: Inject 22 Units under the skin 3 times a day with meals. Take as directed per insulin instructions.   Patient not taking: Reported on 7/5/2024   isosorbide mononitrate ER (Imdur) 60 mg 24 hr tablet 7/5/2024 Self   Sig: TAKE 1 TABLET BY MOUTH EVERY DAY IN THE MORNING   latanoprost (Xalatan) 0.005 % ophthalmic solution 7/4/2024 Self   Sig: ADMINISTER 1 DROP INTO BOTH EYES ONCE DAILY AT BEDTIME.   melatonin 5 mg tablet Unknown Self   Sig: Take 1 tablet (5 mg) by mouth once daily at bedtime.   nitroglycerin (Nitrostat) 0.4 mg SL tablet  Self   Sig: PLACE 1 TABLET UNDER THE TONGUE EVERY 5 MINUTES FOR UP TO 3 DOSES AS NEEDED FOR CHEST PAIN.CALL 911 IF PAIN PERSISTS.   pantoprazole (ProtoNix) 40 mg EC tablet  Self   Sig: Take 1 tablet (40 mg) by mouth once daily in the morning. Take before meals.   polyethylene glycol (Glycolax, Miralax) 17 gram/dose powder Unknown Self   Sig: Take 17 g by mouth once daily.   semaglutide 2 mg/dose (8 mg/3 mL) pen injector 6/30/2024 Self   Sig: Inject 2 mg under the skin 1 (one) time per week.    urea (Carmol) 20 % cream Unknown Self   Sig: Apply 1 Application topically 2 times a day.      Facility-Administered Medications: None       The list below reflects the updated allergy list. Please review each documented allergy for additional clarification and justification.  Allergies  Reviewed by Gaviota Childress RN on 7/5/2024        Severity Reactions Comments    Other Low Rash Novocaine Solution, reaction rash     Penicillins Low Rash, Hives     Procaine Low Rash             M2B service not offered prior to surgery, please reassess prior to patient discharge if Meds to Beds is desired.  Pharmacy has been updated to [facility] in [place].    Sources used to complete the med history include out patient fill history, OARRS, cardiology note 6/28/24, and patient interview   Reliable historian- after visit summary at bedside for medication review    Below are additional concerns with the patient's PTA list.  Patient reports she is out of duloxetine (Cymbalta)- requesting increased dose or alternative agent due to medication not working as well    Vincenzo Perez, PharmD  Transitions of Care Pharmacist  [facility]  Please reach out via Secure Chat for questions, or if no response call [facility] or [facility]

## 2024-07-10 ENCOUNTER — APPOINTMENT (OUTPATIENT)
Dept: DERMATOLOGY | Facility: CLINIC | Age: 64
End: 2024-07-10
Payer: COMMERCIAL

## 2024-07-11 ENCOUNTER — PATIENT OUTREACH (OUTPATIENT)
Dept: CARE COORDINATION | Facility: CLINIC | Age: 64
End: 2024-07-11
Payer: COMMERCIAL

## 2024-07-11 DIAGNOSIS — I25.10 CORONARY ARTERY DISEASE INVOLVING NATIVE HEART WITHOUT ANGINA PECTORIS, UNSPECIFIED VESSEL OR LESION TYPE: ICD-10-CM

## 2024-07-11 DIAGNOSIS — I25.10 ARTERIOSCLEROTIC CARDIOVASCULAR DISEASE (ASCVD): Primary | ICD-10-CM

## 2024-07-11 NOTE — PROGRESS NOTES
Outreach call to patient to check in 30 days after hospital discharge to support smooth transition of care.  Patient had recent cardiac cath on 7/5.  Patient states incision site is doing well with no pain or edema.  Patient does c/o of shortness of breath and fatigue.  CM advised patient to contact Cardiologist office and discuss current issues and scheduled of follow-up.  CM will continue to follow.       LINDA Padgett, RN   [phone]   [facility] Department

## 2024-07-12 DIAGNOSIS — I50.32 CHRONIC HEART FAILURE WITH PRESERVED EJECTION FRACTION (MULTI): Primary | ICD-10-CM

## 2024-07-12 RX ORDER — METOPROLOL TARTRATE 100 MG/1
100 TABLET ORAL ONCE
Qty: 1 TABLET | Refills: 0 | Status: SHIPPED | OUTPATIENT
Start: 2024-07-12 | End: 2024-07-12

## 2024-07-17 ENCOUNTER — HOSPITAL ENCOUNTER (OUTPATIENT)
Dept: RADIOLOGY | Facility: HOSPITAL | Age: 64
Discharge: HOME | End: 2024-07-17
Payer: COMMERCIAL

## 2024-07-17 VITALS — HEART RATE: 68 BPM | OXYGEN SATURATION: 99 % | SYSTOLIC BLOOD PRESSURE: 152 MMHG | DIASTOLIC BLOOD PRESSURE: 74 MMHG

## 2024-07-17 DIAGNOSIS — I25.10 ARTERIOSCLEROTIC CARDIOVASCULAR DISEASE (ASCVD): ICD-10-CM

## 2024-07-17 DIAGNOSIS — I25.10 CORONARY ARTERY DISEASE INVOLVING NATIVE HEART WITHOUT ANGINA PECTORIS, UNSPECIFIED VESSEL OR LESION TYPE: ICD-10-CM

## 2024-07-17 PROCEDURE — 75574 CT ANGIO HRT W/3D IMAGE: CPT

## 2024-07-17 PROCEDURE — 2550000001 HC RX 255 CONTRASTS: Performed by: STUDENT IN AN ORGANIZED HEALTH CARE EDUCATION/TRAINING PROGRAM

## 2024-07-17 PROCEDURE — 2500000005 HC RX 250 GENERAL PHARMACY W/O HCPCS: Performed by: INTERNAL MEDICINE

## 2024-07-17 PROCEDURE — 2500000001 HC RX 250 WO HCPCS SELF ADMINISTERED DRUGS (ALT 637 FOR MEDICARE OP): Performed by: INTERNAL MEDICINE

## 2024-07-17 PROCEDURE — 75574 CT ANGIO HRT W/3D IMAGE: CPT | Performed by: INTERNAL MEDICINE

## 2024-07-17 RX ORDER — METOPROLOL TARTRATE 100 MG/1
100 TABLET ORAL ONCE AS NEEDED
Status: DISCONTINUED | OUTPATIENT
Start: 2024-07-17 | End: 2024-07-18 | Stop reason: HOSPADM

## 2024-07-17 RX ORDER — LORAZEPAM 2 MG/ML
0.5 INJECTION INTRAMUSCULAR EVERY 5 MIN PRN
Status: DISCONTINUED | OUTPATIENT
Start: 2024-07-17 | End: 2024-07-18 | Stop reason: HOSPADM

## 2024-07-17 RX ORDER — METOPROLOL TARTRATE 1 MG/ML
5 INJECTION, SOLUTION INTRAVENOUS ONCE AS NEEDED
Status: DISCONTINUED | OUTPATIENT
Start: 2024-07-17 | End: 2024-07-18 | Stop reason: HOSPADM

## 2024-07-17 RX ORDER — METOPROLOL TARTRATE 1 MG/ML
5 INJECTION, SOLUTION INTRAVENOUS ONCE AS NEEDED
Status: COMPLETED | OUTPATIENT
Start: 2024-07-17 | End: 2024-07-17

## 2024-07-17 RX ORDER — NITROGLYCERIN 0.4 MG/1
0.8 TABLET SUBLINGUAL ONCE
Status: COMPLETED | OUTPATIENT
Start: 2024-07-17 | End: 2024-07-17

## 2024-07-17 RX ORDER — METOPROLOL TARTRATE 1 MG/ML
5 INJECTION, SOLUTION INTRAVENOUS ONCE
Status: COMPLETED | OUTPATIENT
Start: 2024-07-17 | End: 2024-07-17

## 2024-07-17 RX ORDER — METOPROLOL TARTRATE 100 MG/1
100 TABLET ORAL ONCE
Status: DISCONTINUED | OUTPATIENT
Start: 2024-07-17 | End: 2024-07-18 | Stop reason: HOSPADM

## 2024-07-17 ASSESSMENT — PAIN - FUNCTIONAL ASSESSMENT: PAIN_FUNCTIONAL_ASSESSMENT: 0-10

## 2024-07-17 ASSESSMENT — PAIN SCALES - GENERAL: PAINLEVEL_OUTOF10: 0 - NO PAIN

## 2024-07-22 ENCOUNTER — APPOINTMENT (OUTPATIENT)
Dept: PRIMARY CARE | Facility: CLINIC | Age: 64
End: 2024-07-22
Payer: COMMERCIAL

## 2024-07-22 VITALS
SYSTOLIC BLOOD PRESSURE: 121 MMHG | TEMPERATURE: 96.3 F | BODY MASS INDEX: 39.6 KG/M2 | HEIGHT: 59 IN | OXYGEN SATURATION: 97 % | DIASTOLIC BLOOD PRESSURE: 73 MMHG | WEIGHT: 196.4 LBS | HEART RATE: 84 BPM

## 2024-07-22 DIAGNOSIS — Z86.2 HISTORY OF ANEMIA: ICD-10-CM

## 2024-07-22 DIAGNOSIS — L90.6 STRIAE: ICD-10-CM

## 2024-07-22 DIAGNOSIS — E11.65 UNCONTROLLED TYPE 2 DIABETES MELLITUS WITH HYPERGLYCEMIA (MULTI): ICD-10-CM

## 2024-07-22 DIAGNOSIS — Z86.39 HISTORY OF UNCONTROLLED DIABETES: ICD-10-CM

## 2024-07-22 DIAGNOSIS — E66.9 OBESITY (BMI 30-39.9): ICD-10-CM

## 2024-07-22 DIAGNOSIS — I50.32 CHRONIC HEART FAILURE WITH PRESERVED EJECTION FRACTION (MULTI): ICD-10-CM

## 2024-07-22 DIAGNOSIS — K63.5 POLYP OF COLON, UNSPECIFIED PART OF COLON, UNSPECIFIED TYPE: ICD-10-CM

## 2024-07-22 DIAGNOSIS — G89.4 PAIN SYNDROME, CHRONIC: ICD-10-CM

## 2024-07-22 DIAGNOSIS — F33.40 RECURRENT MAJOR DEPRESSIVE DISORDER, IN REMISSION (CMS-HCC): Chronic | ICD-10-CM

## 2024-07-22 DIAGNOSIS — M17.0 OSTEOARTHRITIS OF BOTH KNEES, UNSPECIFIED OSTEOARTHRITIS TYPE: Primary | ICD-10-CM

## 2024-07-22 DIAGNOSIS — R06.02 SHORTNESS OF BREATH: ICD-10-CM

## 2024-07-22 PROCEDURE — 3061F NEG MICROALBUMINURIA REV: CPT

## 2024-07-22 PROCEDURE — 3078F DIAST BP <80 MM HG: CPT

## 2024-07-22 PROCEDURE — 1125F AMNT PAIN NOTED PAIN PRSNT: CPT

## 2024-07-22 PROCEDURE — 3052F HG A1C>EQUAL 8.0%<EQUAL 9.0%: CPT

## 2024-07-22 PROCEDURE — 99214 OFFICE O/P EST MOD 30 MIN: CPT

## 2024-07-22 PROCEDURE — 3074F SYST BP LT 130 MM HG: CPT

## 2024-07-22 PROCEDURE — 1036F TOBACCO NON-USER: CPT

## 2024-07-22 PROCEDURE — 3048F LDL-C <100 MG/DL: CPT

## 2024-07-22 PROCEDURE — 3008F BODY MASS INDEX DOCD: CPT

## 2024-07-22 PROCEDURE — 1159F MED LIST DOCD IN RCRD: CPT

## 2024-07-22 RX ORDER — INSULIN LISPRO 200 [IU]/ML
22 INJECTION, SOLUTION SUBCUTANEOUS
Qty: 11 EACH | Refills: 3 | Status: SHIPPED | OUTPATIENT
Start: 2024-07-22

## 2024-07-22 RX ORDER — ASPIRIN 81 MG/1
81 TABLET ORAL DAILY
Qty: 30 TABLET | Refills: 11 | Status: SHIPPED | OUTPATIENT
Start: 2024-07-22 | End: 2025-07-22

## 2024-07-22 RX ORDER — DULOXETIN HYDROCHLORIDE 30 MG/1
30 CAPSULE, DELAYED RELEASE ORAL 2 TIMES DAILY
Qty: 60 CAPSULE | Refills: 1 | Status: SHIPPED | OUTPATIENT
Start: 2024-07-22 | End: 2024-07-22

## 2024-07-22 RX ORDER — HYDROXYZINE HYDROCHLORIDE 25 MG/1
25 TABLET, FILM COATED ORAL 2 TIMES DAILY PRN
Qty: 14 TABLET | Refills: 0 | Status: SHIPPED | OUTPATIENT
Start: 2024-07-22 | End: 2024-07-29

## 2024-07-22 RX ORDER — INSULIN DEGLUDEC 200 U/ML
52 INJECTION, SOLUTION SUBCUTANEOUS NIGHTLY
Qty: 24 ML | Refills: 3 | Status: SHIPPED | OUTPATIENT
Start: 2024-07-22 | End: 2025-07-22

## 2024-07-22 RX ORDER — DULOXETIN HYDROCHLORIDE 30 MG/1
CAPSULE, DELAYED RELEASE ORAL
Qty: 60 CAPSULE | Refills: 2 | Status: SHIPPED | OUTPATIENT
Start: 2024-07-22 | End: 2024-09-20

## 2024-07-22 ASSESSMENT — PATIENT HEALTH QUESTIONNAIRE - PHQ9
2. FEELING DOWN, DEPRESSED OR HOPELESS: NEARLY EVERY DAY
2. FEELING DOWN, DEPRESSED OR HOPELESS: NOT AT ALL
10. IF YOU CHECKED OFF ANY PROBLEMS, HOW DIFFICULT HAVE THESE PROBLEMS MADE IT FOR YOU TO DO YOUR WORK, TAKE CARE OF THINGS AT HOME, OR GET ALONG WITH OTHER PEOPLE: SOMEWHAT DIFFICULT
3. TROUBLE FALLING OR STAYING ASLEEP OR SLEEPING TOO MUCH: MORE THAN HALF THE DAYS
SUM OF ALL RESPONSES TO PHQ QUESTIONS 1-9: 10
1. LITTLE INTEREST OR PLEASURE IN DOING THINGS: NOT AT ALL
7. TROUBLE CONCENTRATING ON THINGS, SUCH AS READING THE NEWSPAPER OR WATCHING TELEVISION: NOT AT ALL
SUM OF ALL RESPONSES TO PHQ9 QUESTIONS 1 AND 2: 5
8. MOVING OR SPEAKING SO SLOWLY THAT OTHER PEOPLE COULD HAVE NOTICED. OR THE OPPOSITE, BEING SO FIGETY OR RESTLESS THAT YOU HAVE BEEN MOVING AROUND A LOT MORE THAN USUAL: NOT AT ALL
9. THOUGHTS THAT YOU WOULD BE BETTER OFF DEAD, OR OF HURTING YOURSELF: NOT AT ALL
1. LITTLE INTEREST OR PLEASURE IN DOING THINGS: MORE THAN HALF THE DAYS
4. FEELING TIRED OR HAVING LITTLE ENERGY: NEARLY EVERY DAY
5. POOR APPETITE OR OVEREATING: NOT AT ALL
6. FEELING BAD ABOUT YOURSELF - OR THAT YOU ARE A FAILURE OR HAVE LET YOURSELF OR YOUR FAMILY DOWN: NOT AT ALL
SUM OF ALL RESPONSES TO PHQ9 QUESTIONS 1 AND 2: 0

## 2024-07-22 ASSESSMENT — COLUMBIA-SUICIDE SEVERITY RATING SCALE - C-SSRS
1. IN THE PAST MONTH, HAVE YOU WISHED YOU WERE DEAD OR WISHED YOU COULD GO TO SLEEP AND NOT WAKE UP?: NO
2. HAVE YOU ACTUALLY HAD ANY THOUGHTS OF KILLING YOURSELF?: NO
6. HAVE YOU EVER DONE ANYTHING, STARTED TO DO ANYTHING, OR PREPARED TO DO ANYTHING TO END YOUR LIFE?: NO

## 2024-07-22 ASSESSMENT — ENCOUNTER SYMPTOMS
LOSS OF SENSATION IN FEET: 1
OCCASIONAL FEELINGS OF UNSTEADINESS: 1
DEPRESSION: 1

## 2024-07-22 ASSESSMENT — PAIN SCALES - GENERAL: PAINLEVEL: 8

## 2024-07-22 NOTE — PROGRESS NOTES
Subjective   Patient ID: Nelida Mata is a 64 y.o. female with PMH of CAD s/p stent, HFpEF, HTN, PAD, HLD, DM-2, NAFLD, Anemia, chronic pain, CKD III, elevated PTH who presents for Follow-up (Nausea chronic pain all over, need medication refill also referral for eyes and colon ).    HPI  Patient is in wheelchair. Coming for multiple issues    1) Review of recent heart imaging:  -  Reviewed CT scan/ MRI/ echo  - low-normal systolic function (LVEF 53%) with concentric LVH     2) Sob and tired  - Past 2 years worse over past few months  - No energy to do anything  - Take her 10-20 minutes to make the bed  - Always sob when walking little distances (bedroom to bathroom)  - Has poor sleep due to sleep apnea, CPAP doesn't help  - Has FRANCO, but has not scheduled IV iron     3) Depression  - chronic, worse recently  Patient Health Questionnaire-9 Score: 10  - On duloxetine 30 mg BID, not working anymore  - no SI/HI, boyfriend helps a lot      4)Chronic pain  - Has 2 toes amputated with pain there  - Previously on Percocet 5-325 BID, stopped on 9/10/2023 by Dr. Lopez, with plans to follow-up for continued pain  - Was unable to get an appointment    5) Knee pain  - chronic, worsening recently  - Wants ortho referral    6) Rash  - chronic since she was 3 yo  - worse recently, pruritic  - Diffuse on whole body    7) Refills. Reviewed and ordered    8) Headaches-> deferred till next visit    9) Obesity  - wants referral to bariatric - ordered      Review of Systems   All other systems reviewed and are negative.      Current Outpatient Medications on File Prior to Visit   Medication Sig Dispense Refill    apixaban (Eliquis) 5 mg tablet Take 1 tablet (5 mg) by mouth every 12 hours. Do not RESUME until 7/6/24 evening dose. Do not fill before July 6, 2024. 60 tablet 11    atorvastatin (Lipitor) 80 mg tablet TAKE 1 TABLET BY MOUTH ONCE DAILY 90 tablet 3    blood pressure monitor kit 1 each once daily. Use once daily to check  blood pressure 1 kit 0    blood sugar diagnostic (OneTouch Ultra Test) strip USE 1 STRIP(S) TO TEST BLOOD SUGAR 3 TIMES A  strip 3    brimonidine (AlphaGAN) 0.2 % ophthalmic solution Administer 1 drop into both eyes 2 times a day. PATIENT NEEDS PCP APPT FOR FURTHER REFILLS 30 mL 0    calcium carbonate-vit D3-min 600 mg calcium- 400 unit tablet Take 1 tablet by mouth once daily. 90 each 3    carvedilol (Coreg) 12.5 mg tablet Take 1 tablet (12.5 mg) by mouth 2 times daily (morning and late afternoon). 60 tablet 11    dapagliflozin propanediol (Farxiga) 10 mg Take 1 tablet (10 mg) by mouth once daily. 30 tablet 11    finerenone (Kerendia) 10 mg tablet tablet Take 1 tablet (10 mg) by mouth once daily. 30 tablet 11    FreeStyle Filipe 2 Sensor kit Apply 1 sensor to the back of the upper arm. Change sensor every 14 days. 2 each 11    FreeStyle Filipe reader (FreeStyle Filipe 2 [place]) misc Use as instructed 1 each 0    furosemide (Lasix) 20 mg tablet Take 1 tablet (20 mg) by mouth once daily. 90 tablet 2    HumaLOG KwikPen Insulin 100 unit/mL injection Inject 22 Units under the skin 3 times a day. With meals      isosorbide mononitrate ER (Imdur) 60 mg 24 hr tablet TAKE 1 TABLET BY MOUTH EVERY DAY IN THE MORNING 90 tablet 1    latanoprost (Xalatan) 0.005 % ophthalmic solution ADMINISTER 1 DROP INTO BOTH EYES ONCE DAILY AT BEDTIME.      melatonin 5 mg tablet Take 1 tablet (5 mg) by mouth once daily at bedtime.      nitroglycerin (Nitrostat) 0.4 mg SL tablet PLACE 1 TABLET UNDER THE TONGUE EVERY 5 MINUTES FOR UP TO 3 DOSES AS NEEDED FOR CHEST PAIN.CALL 911 IF PAIN PERSISTS.      pantoprazole (ProtoNix) 40 mg EC tablet Take 1 tablet (40 mg) by mouth once daily in the morning. Take before meals. 90 tablet 3    semaglutide 2 mg/dose (8 mg/3 mL) pen injector Inject 2 mg under the skin 1 (one) time per week. 3 mL 11    urea (Carmol) 20 % cream Apply 1 Application topically 2 times a day.      [DISCONTINUED] DULoxetine  "(Cymbalta) 30 mg DR capsule Take 1 capsule (30 mg) by mouth 2 times a day. Do not crush or chew. (Patient not taking: Reported on 6/28/2024) 60 capsule 1    [DISCONTINUED] hydrOXYzine HCL (Atarax) 25 mg tablet Take 1 tablet (25 mg) by mouth 2 times a day as needed for itching for up to 7 days. 14 tablet 0    [DISCONTINUED] insulin degludec (Tresiba FlexTouch) 200 unit/mL (3 mL) injection Inject 50 Units under the skin once daily at bedtime. Take as directed per insulin instructions. (Patient taking differently: Inject 52 Units under the skin once daily at bedtime. Take as directed per insulin instructions.) 24 mL 3    [DISCONTINUED] insulin lispro (HumaLOG KwikPen Insulin) 200 unit/mL (3 mL) insulin pen pen Inject 22 Units under the skin 3 times a day with meals. Take as directed per insulin instructions. (Patient not taking: Reported on 7/5/2024) 11 each 3    [DISCONTINUED] metoprolol tartrate (Lopressor) 100 mg tablet Take 1 tablet (100 mg) by mouth 1 time for 1 dose. BEFORE CARDIAC CTA IF NEEDED 1 tablet 0     No current facility-administered medications on file prior to visit.        Objective   Vitals: /73 (BP Location: Right arm, Patient Position: Sitting, BP Cuff Size: Adult)   Pulse 84   Temp 35.7 °C (96.3 °F) (Temporal)   Ht 1.499 m (4' 11\")   Wt 89.1 kg (196 lb 6.4 oz)   LMP  (LMP Unknown)   SpO2 97%   BMI 39.67 kg/m²      Physical Exam  General: appears sleepy, in wheelchair, obese  HEENT: NC/AT  Cardiac: rrr, no m/r/g  Lungs: normal work of breathing, CTAB  Abdomen: soft, non-tender, non-distended, BS present  Neuro: grossly intact  Skin: diffuse striae with excoriations on upper extremities, chest, abdomen (picture in media)  Psych: depressed, slowed affect      Assessment/Plan   Osteoarthritis of both knees, unspecified osteoarthritis type  :: Needs re-evaluation given worsening of chronic pain  -     Referral to Orthopaedic Surgery; Future  Recurrent major depressive disorder, in " remission (CMS-HCC)  :: Poorly controlled, will increase duloxetine to 30/60, if resistant despite max dose of duloxetine will refer to psychiatry in future  -     DULoxetine (Cymbalta) 30 mg DR capsule; Take 1 capsule (30 mg) by mouth once daily in the morning AND 2 capsules (60 mg) once daily at bedtime. Do not crush or chew..  -     hydrOXYzine HCL (Atarax) 25 mg tablet; Take 1 tablet (25 mg) by mouth 2 times a day as needed for itching for up to 7 days.  Chronic heart failure with preserved ejection fraction (Multi)  -     aspirin 81 mg EC tablet; Take 1 tablet (81 mg) by mouth once daily.  Polyp of colon, unspecified part of colon, unspecified type  :: Hx of polyp on colono in 2015, never followed up, was due in 2020  -     Colonoscopy Screening; High Risk Patient; Future  Shortness of breath  :: Less likely cardiac, need to rule out pulmonary etiology but could be component of FRANCO  -     Complete Pulmonary Function Test Pre/Post Bronchodialator (Spirometry Pre/Post/DLCO/Lung Volumes); Future  Striae  -     Referral to Dermatology  Uncontrolled type 2 diabetes mellitus with hyperglycemia (Multi)  -     insulin degludec (Tresiba FlexTouch U-200) 200 unit/mL (3 mL) injection; Inject 52 Units under the skin once daily at bedtime. Take as directed per insulin instructions.  -     insulin lispro (HumaLOG KwikPen Insulin) 200 unit/mL (3 mL) insulin pen pen; Inject 22 Units under the skin 3 times daily (morning, midday, late afternoon). Take as directed per insulin instructions.  Obesity (BMI 30-39.9)  -     Referral to Bariatric Surgery; Future  Pain syndrome, chronic  :: Will need to review previous management/ OAARS/ UDS at next visit and refer to pain management for assistance with management  -     Referral to Pain Medicine; Future  Other orders  -     Follow Up In Primary Care; Future    Attending Supervision: seen and discussed with attending physician (faina listed on this note).    RTC in 1-2 months for  pain management, or earlier as needed.    Patient seen and discussed with attending physician Dr. Angulo    Plan preliminary until cosigned by attending physician.    Betty Boone M.D.  Family Medicine  PGY-2

## 2024-07-23 NOTE — PROGRESS NOTES
I saw and evaluated the patient. I personally obtained the key and critical portions of the history and physical exam or was physically present for key and critical portions performed by the resident. I reviewed the resident's documentation and discussed the patient with the resident. I agree with the resident's medical decision making as documented in the note.    Sara Angulo MD

## 2024-07-25 ENCOUNTER — DOCUMENTATION (OUTPATIENT)
Dept: SURGERY | Facility: HOSPITAL | Age: 64
End: 2024-07-25
Payer: COMMERCIAL

## 2024-07-25 DIAGNOSIS — D12.2 ADENOMATOUS POLYP OF ASCENDING COLON: Primary | ICD-10-CM

## 2024-07-25 RX ORDER — POLYETHYLENE GLYCOL-3350 AND ELECTROLYTES WITH FLAVOR PACK 240; 5.84; 2.98; 6.72; 22.72 G/278.26G; G/278.26G; G/278.26G; G/278.26G; G/278.26G
4000 POWDER, FOR SOLUTION ORAL ONCE
Qty: 4000 ML | Refills: 0 | Status: SHIPPED | OUTPATIENT
Start: 2024-07-25 | End: 2024-07-25

## 2024-07-25 NOTE — PROGRESS NOTES
Called the pt, she was offered [facility] and [facility] and chose [facility], [facility] message sent to Ines.

## 2024-07-26 ENCOUNTER — APPOINTMENT (OUTPATIENT)
Dept: PHARMACY | Facility: HOSPITAL | Age: 64
End: 2024-07-26
Payer: COMMERCIAL

## 2024-07-26 DIAGNOSIS — I10 HYPERTENSION, UNCONTROLLED: ICD-10-CM

## 2024-07-26 DIAGNOSIS — R80.9 MICROALBUMINURIA: ICD-10-CM

## 2024-07-26 DIAGNOSIS — Z86.718 HISTORY OF DVT (DEEP VEIN THROMBOSIS): ICD-10-CM

## 2024-07-26 DIAGNOSIS — I25.10 ARTERIOSCLEROTIC CARDIOVASCULAR DISEASE (ASCVD): ICD-10-CM

## 2024-07-26 DIAGNOSIS — I50.32 CHRONIC HEART FAILURE WITH PRESERVED EJECTION FRACTION (MULTI): ICD-10-CM

## 2024-07-26 DIAGNOSIS — I21.4 NSTEMI, INITIAL EPISODE OF CARE (MULTI): ICD-10-CM

## 2024-07-26 DIAGNOSIS — E11.9 CONTROLLED TYPE 2 DIABETES MELLITUS WITHOUT COMPLICATION, WITH LONG-TERM CURRENT USE OF INSULIN (MULTI): ICD-10-CM

## 2024-07-26 DIAGNOSIS — E11.65 UNCONTROLLED TYPE 2 DIABETES MELLITUS WITH HYPERGLYCEMIA (MULTI): Primary | ICD-10-CM

## 2024-07-26 DIAGNOSIS — Z79.4 CONTROLLED TYPE 2 DIABETES MELLITUS WITHOUT COMPLICATION, WITH LONG-TERM CURRENT USE OF INSULIN (MULTI): ICD-10-CM

## 2024-07-26 ASSESSMENT — ENCOUNTER SYMPTOMS
SWEATS: 1
TREMORS: 1
POLYDIPSIA: 1

## 2024-07-26 NOTE — PROGRESS NOTES
Patient is sent at the request of Michael Hilario MD for medication management.  My final recommendations will be communicated back to the requesting provider by way of shared medical record.    Yvette Mata is a 63 y.o. year old female patient with  long-standing uncontrolled type two diabetes mellitus complicated by microalbuminuria, CKD, HFpEF, NAFLD, CAD s/p PCI, SVC thrombus (Apixaban), Hx DVT, MICHAEL, CVA referred for medication management following [facility] visit. She remains on Ozempic and Farxiga for glycemic control and cardiovascular risk reduction.     Diabetes  She presents for her follow-up diabetic visit. She has type 2 diabetes mellitus. Hypoglycemia symptoms include sweats and tremors. Associated symptoms include polydipsia. There are no hypoglycemic complications. Her overall blood glucose range is 140-180 mg/dl.     Allergies   Allergen Reactions    Other Rash     Novocaine Solution, reaction rash     Penicillins Rash and Hives    Procaine Rash     Cardiovascular risk factors include diabetes mellitus, hypertension, obesity (BMI >= 30 kg/m2), and CHF, CAD, clinical ASCVD . The patient is not on an ACE inhibitor or angiotensin II receptor blocker.      Lifestyle:   Patient reports difficulty with appetite in the last two weeks, eating approximately 1 meal daily.   24 Hour recall:   7/25/2024: cereal and milk   7/26/2024: 1/2 peanut butter sandwich.   Fluids: Water (reports 2 cups daily) and apple juice.     Current monitoring regimen:   Patient is using: continuous glucose monitor - Freestyle Filipe 2 with Freestyle Filipe 2 reader     Not connected to [employer] portal     CGM Data - 7/26/2024    Time in ranges    7 Day  Above Goal: 28%  In range: 71%  Low: 1% 14 day  Above Goal: 48%  In range: 52%  Low: 0%   30 days  Above Goal: 42%  In range: 54%  Low: 0% 90 Days  Above Goal: 41%  In range: 58%  Low: 1%   Average glucose    Time CGM Active Current Glucose  Hypoglycemia   7  days average: 163 mg/dL   14 day average: 187 mg/dL   30 day average: 177 mg/dL  7 days  Scans per day: 8  Sensor data captured: 89%    14 days  Scans per day: 8  Sensor data captured: 93%    30 days  Scans per day: 7  Sensor data captured: 95% 130 mg/dL    Patient reports symptomatic hypoglycemia when her blood glucose is <95 mg/dL. She endorses symptoms of sweating. Patient reports 3 episodes in the last 30 days which she used apple juice for resolution.       CGM Data - 06/14/2024    Last Sensor Placement: 7 days ago (sensor ends in 5 days)     Current Glucose: 140 mg/dL - Last meal two hours ago, 24 units before meal     Time in ranges   7 Day  Above Goal: 33%  In range: 60%  Low: 7% 14 day  Above Goal: 32%  In range: 63%  Low: 5%   30 days  Above Goal: 37%  In range: 60%  Low: 3%   Average glucose    Time CGM Active    7 days average: 156 mg/dL   14 day average: 155 mg/dL   30 day average: 165 mg/dL  Not discussed         Medication Reconciliation:   No changes to medication therapy today    Adverse Effects:   Reports symptoms of GI cramping and vomiting for 48 hours (1 episode of emesis on 7/25 and 1 episode of emesis on 7/26).   No reported episodes of emesis prior to yesterday.   Denies upset stomach, diarrhea, constipation     Objective     BP Readings from Last 4 Encounters:   07/22/24 121/73   07/17/24 152/74   07/05/24 140/79   06/28/24 98/61     BMI Readings from Last 6 Encounters:   07/22/24 39.67 kg/m²   06/28/24 38.58 kg/m²   06/07/24 39.79 kg/m²   04/08/24 40.40 kg/m²   03/21/24 40.40 kg/m²   02/09/24 42.01 kg/m²     Wt Readings from Last 6 Encounters:   07/22/24 89.1 kg (196 lb 6.4 oz)   06/28/24 86.6 kg (191 lb)   06/07/24 89.4 kg (197 lb)   04/08/24 90.7 kg (200 lb)   03/21/24 90.7 kg (200 lb)   02/09/24 94.3 kg (208 lb)      Diabetes Pharmacotherapy:    Tresiba Flextouch:  Inject 52 units under the skin once daily in the evening  Ozempic 8 mg/3 mL: Inject 2 mg under the skin once weekly on  Saturdays  Farxiga 10 mg: Take one tablet by mouth once daily --> Currently out of therapy  Humalog kwikpen: Inject 20 units daily plus SSI daily     Sliding Scale:   <150 - 0 units  151-200 - 2 units   201-250 - 4 units   251-300 - 6 units   301-350 - 8 units   351-400 -10 units  401 - 12 units    Historical DM Pharmacotherapy:  Metformin - Not appropriate with renal function     Chronic Kidney Disease:   Kerendia 10 mg: Take one tablet by mouth once daily --> Currently out of therapy     Secondary Prevention:   - Statin? Yes  - ACE-I/ARB? No  - Aspirin? Yes    Pertinent PMH Review: Per chart review  - PMH of Pancreatitis: No  - PMH of Retinopathy: No  - PMH of Urinary Tract Infections: Yes  - PMH of MTC: No    Labs:   Lab Results   Component Value Date    BILITOT 0.5 06/06/2024    CALCIUM 10.5 06/28/2024    CO2 30 06/28/2024    CL 98 06/28/2024    CREATININE 1.49 (H) 06/28/2024    GLUCOSE 108 (H) 06/28/2024    ALKPHOS 112 06/06/2024    K 4.3 06/28/2024    PROT 7.4 06/06/2024     06/28/2024    AST 14 06/06/2024    ALT 6 (L) 06/06/2024    BUN 20 06/28/2024    ANIONGAP 14 06/28/2024    MG 2.05 06/08/2024    PHOS 4.4 06/28/2024     03/31/2023    ALBUMIN 4.4 06/28/2024    LIPASE 15 09/07/2023    GFRF 24 (A) 09/19/2023    GFRMALE CANCELED 03/22/2023     Lab Results   Component Value Date    TRIG 150 (H) 02/02/2024    CHOL 84 02/02/2024    LDLCALC 28 02/02/2024    HDL 26.5 02/02/2024     Lab Results   Component Value Date    HGBA1C 8.5 (H) 02/02/2024    HGBA1C 8.5 (H) 12/28/2023    HGBA1C 8.4 (H) 09/27/2023     Component      Latest Ref Rng 11/10/2023   Albumin, Urine Random      Not established mg/L 27.1    Creatinine, Urine Random      20.0 - 320.0 mg/dL 57.1    Albumin/Creatine Ratio      <30.0 ug/mg Creat 47.5 (H)       Component      Latest Ref Rn 11/10/2023   C-Peptide      0.7 - 3.9 ng/mL 1.5    Glutamic Acid Decarb Ab      0.0 - 5.0 IU/mL <5.0    Islet Antigen-2 Antibody      0.0 - 7.4 U/mL <5.4       The ASCVD Risk score (Adrianne POTTS, et al., 2019) failed to calculate for the following reasons:    The patient has a prior MI or stroke diagnosis    Health Maintenance:   Lipid Panel: 11/10/2023 - 7 mg/dL --> 2/2/2024 28 mg/dL   Urine Albumin/Creatinine Ratio: 47.5 mcg/mg (Microalbuminuria) --> WNL on3/15/2024    Assessment/Plan   Problem List Items Addressed This Visit       (HFpEF) heart failure with preserved ejection fraction (Multi)    Arteriosclerotic cardiovascular disease (ASCVD)    Microalbuminuria    NSTEMI, initial episode of care (Multi)    Hypertension, uncontrolled    Diabetes mellitus type 2, controlled, without complications (Multi)    History of DVT (deep vein thrombosis) (Chronic)    Uncontrolled type 2 diabetes mellitus with hyperglycemia (Multi) - Primary     Patients diabetes is poorly controlled with most recent A1c of 8.5% on 2/2/2024 and 12/28/2023 (Goal < 7%). Advised patient to hold therapy with Ozempic 2 mg scheduled for 7/27/2024 due to reported episodes of vomiting. She is currently without therapy on her Farxiga and Kerendia tablets at this time as well. She reports only drinking approximately 2 cups of water daily. Advised at this time to hold therapy with Farxiga, Kerendia, and Ozempic until follow up call next week. Discussed potential risk of dehydration and acute kidney injury in the setting of vomiting/diarrhea when nutrition is poor. Patient is scheduled for bariatric surgery initial consultation in September 2024.  Continue:   Tresiba Flextouch: Inject 52 units under the skin once daily in the evening   Ozempic 2 mg: Inject 2 mg under the skin once   Farxiga 10 mg: Take one tablet by mouth once daily QAM  Humalog 20 units plus SSI with meals   Kerendia 10 mg: Take one tablet by mouth once daily  Renal funcition panel 6/8/2024: Scr 1.30 mg/dL; eGFR 46; Potassium 3.9 mmol/L  Notable prescription changes/issues:   Patient was dispensed a 5 day prescription (10 tablets) for  Duloxetine 30 mg. She was established on this therapy as maintenance. She reports worsening depression recently. Denies thoughts of suicide or ideations.   Patient reports ~20 tablets of Eliquis 5 mg remaining at home (10 DS)  Compliance at present is estimated to be fair.   Follow-up: I recommend diabetes care be  07/26/2024 at 3:30 pm  .  CINEMA Follow-Up: 10/11/2024  Heart Failure Follow-Up: 10/4/2024  Bariatric New Patient Visit: 9/12/2024    Nima Santamaria, PharmD    Continue all meds under the continuation of care with the referring provider and clinical pharmacy team.

## 2024-07-26 NOTE — Clinical Note
1. Patients diabetes is poorly controlled with most recent A1c of 8.5% on 2/2/2024 and 12/28/2023 (Goal < 7%). Advised patient to hold therapy with Ozempic 2 mg scheduled for 7/27/2024 due to reported episodes of vomiting. She is currently without therapy on her Farxiga and Kerendia tablets at this time as well. She reports only drinking approximately 2 cups of water daily. Advised at this time to hold therapy with Farxiga, Kerendia, and Ozempic until follow up call next week. Discussed potential risk of dehydration and acute kidney injury in the setting of vomiting/diarrhea when nutrition is poor.

## 2024-07-31 ENCOUNTER — TELEPHONE (OUTPATIENT)
Dept: PRIMARY CARE | Facility: CLINIC | Age: 64
End: 2024-07-31
Payer: COMMERCIAL

## 2024-07-31 PROCEDURE — RXMED WILLOW AMBULATORY MEDICATION CHARGE

## 2024-08-01 ENCOUNTER — HOSPITAL ENCOUNTER (OUTPATIENT)
Dept: RESPIRATORY THERAPY | Facility: HOSPITAL | Age: 64
Discharge: HOME | End: 2024-08-01
Payer: COMMERCIAL

## 2024-08-01 DIAGNOSIS — R06.02 SHORTNESS OF BREATH: ICD-10-CM

## 2024-08-01 LAB
MGC ASCENT PFT - FEV1 - POST: 1.66
MGC ASCENT PFT - FEV1 - PRE: 1.73
MGC ASCENT PFT - FEV1 - PREDICTED: 1.93
MGC ASCENT PFT - FVC - POST: 1.67
MGC ASCENT PFT - FVC - PRE: 1.77
MGC ASCENT PFT - FVC - PREDICTED: 2.41

## 2024-08-01 PROCEDURE — 94726 PLETHYSMOGRAPHY LUNG VOLUMES: CPT

## 2024-08-02 ENCOUNTER — TELEPHONE (OUTPATIENT)
Dept: PHARMACY | Facility: HOSPITAL | Age: 64
End: 2024-08-02
Payer: COMMERCIAL

## 2024-08-02 ENCOUNTER — PHARMACY VISIT (OUTPATIENT)
Dept: PHARMACY | Facility: CLINIC | Age: 64
End: 2024-08-02
Payer: COMMERCIAL

## 2024-08-02 DIAGNOSIS — E11.65 UNCONTROLLED TYPE 2 DIABETES MELLITUS WITH HYPERGLYCEMIA (MULTI): Primary | ICD-10-CM

## 2024-08-02 NOTE — TELEPHONE ENCOUNTER
Patient called back after one week. She states that her appetite and fluid intake have improved. She denies any episodes of vomiting since last encounter. Advised to resume therapy with Ozempic and Farxiga for glycemic control. Repeat BMP placed before re-initiation of Kerendia.

## 2024-08-06 ENCOUNTER — OFFICE VISIT (OUTPATIENT)
Dept: ORTHOPEDIC SURGERY | Facility: CLINIC | Age: 64
End: 2024-08-06
Payer: COMMERCIAL

## 2024-08-06 ENCOUNTER — HOSPITAL ENCOUNTER (OUTPATIENT)
Dept: RADIOLOGY | Facility: CLINIC | Age: 64
Discharge: HOME | End: 2024-08-06
Payer: COMMERCIAL

## 2024-08-06 ENCOUNTER — LAB (OUTPATIENT)
Dept: LAB | Facility: LAB | Age: 64
End: 2024-08-06
Payer: COMMERCIAL

## 2024-08-06 VITALS — WEIGHT: 196 LBS | HEIGHT: 59 IN | BODY MASS INDEX: 39.51 KG/M2

## 2024-08-06 DIAGNOSIS — M25.562 PAIN IN BOTH KNEES, UNSPECIFIED CHRONICITY: ICD-10-CM

## 2024-08-06 DIAGNOSIS — E11.65 UNCONTROLLED TYPE 2 DIABETES MELLITUS WITH HYPERGLYCEMIA (MULTI): ICD-10-CM

## 2024-08-06 DIAGNOSIS — M25.561 PAIN IN BOTH KNEES, UNSPECIFIED CHRONICITY: ICD-10-CM

## 2024-08-06 DIAGNOSIS — M17.0 OSTEOARTHRITIS OF BOTH KNEES, UNSPECIFIED OSTEOARTHRITIS TYPE: ICD-10-CM

## 2024-08-06 DIAGNOSIS — I50.30 HEART FAILURE WITH PRESERVED EJECTION FRACTION, UNSPECIFIED HF CHRONICITY (MULTI): ICD-10-CM

## 2024-08-06 DIAGNOSIS — E61.1 IRON DEFICIENCY: ICD-10-CM

## 2024-08-06 LAB
ANION GAP SERPL CALC-SCNC: 10 MMOL/L (ref 10–20)
BUN SERPL-MCNC: 13 MG/DL (ref 6–23)
CALCIUM SERPL-MCNC: 8.9 MG/DL (ref 8.6–10.3)
CHLORIDE SERPL-SCNC: 103 MMOL/L (ref 98–107)
CO2 SERPL-SCNC: 27 MMOL/L (ref 21–32)
CREAT SERPL-MCNC: 1.31 MG/DL (ref 0.5–1.05)
EGFRCR SERPLBLD CKD-EPI 2021: 45 ML/MIN/1.73M*2
ERYTHROCYTE [DISTWIDTH] IN BLOOD BY AUTOMATED COUNT: 13.3 % (ref 11.5–14.5)
GLUCOSE SERPL-MCNC: 207 MG/DL (ref 74–99)
HCT VFR BLD AUTO: 40.7 % (ref 36–46)
HGB BLD-MCNC: 12.6 G/DL (ref 12–16)
MCH RBC QN AUTO: 28.3 PG (ref 26–34)
MCHC RBC AUTO-ENTMCNC: 31 G/DL (ref 32–36)
MCV RBC AUTO: 92 FL (ref 80–100)
NRBC BLD-RTO: 0 /100 WBCS (ref 0–0)
PLATELET # BLD AUTO: 237 X10*3/UL (ref 150–450)
POTASSIUM SERPL-SCNC: 4.3 MMOL/L (ref 3.5–5.3)
RBC # BLD AUTO: 4.45 X10*6/UL (ref 4–5.2)
SODIUM SERPL-SCNC: 136 MMOL/L (ref 136–145)
WBC # BLD AUTO: 6.3 X10*3/UL (ref 4.4–11.3)

## 2024-08-06 PROCEDURE — 2500000004 HC RX 250 GENERAL PHARMACY W/ HCPCS (ALT 636 FOR OP/ED): Performed by: ORTHOPAEDIC SURGERY

## 2024-08-06 PROCEDURE — 3048F LDL-C <100 MG/DL: CPT | Performed by: ORTHOPAEDIC SURGERY

## 2024-08-06 PROCEDURE — 99214 OFFICE O/P EST MOD 30 MIN: CPT | Mod: 25 | Performed by: ORTHOPAEDIC SURGERY

## 2024-08-06 PROCEDURE — 3008F BODY MASS INDEX DOCD: CPT | Performed by: ORTHOPAEDIC SURGERY

## 2024-08-06 PROCEDURE — 1125F AMNT PAIN NOTED PAIN PRSNT: CPT | Performed by: ORTHOPAEDIC SURGERY

## 2024-08-06 PROCEDURE — 1036F TOBACCO NON-USER: CPT | Performed by: ORTHOPAEDIC SURGERY

## 2024-08-06 PROCEDURE — 80048 BASIC METABOLIC PNL TOTAL CA: CPT

## 2024-08-06 PROCEDURE — 73560 X-RAY EXAM OF KNEE 1 OR 2: CPT | Mod: BILATERAL PROCEDURE | Performed by: RADIOLOGY

## 2024-08-06 PROCEDURE — 36415 COLL VENOUS BLD VENIPUNCTURE: CPT

## 2024-08-06 PROCEDURE — 99204 OFFICE O/P NEW MOD 45 MIN: CPT | Performed by: ORTHOPAEDIC SURGERY

## 2024-08-06 PROCEDURE — 20610 DRAIN/INJ JOINT/BURSA W/O US: CPT | Mod: 50 | Performed by: ORTHOPAEDIC SURGERY

## 2024-08-06 PROCEDURE — 3052F HG A1C>EQUAL 8.0%<EQUAL 9.0%: CPT | Performed by: ORTHOPAEDIC SURGERY

## 2024-08-06 PROCEDURE — 1159F MED LIST DOCD IN RCRD: CPT | Performed by: ORTHOPAEDIC SURGERY

## 2024-08-06 PROCEDURE — 2500000005 HC RX 250 GENERAL PHARMACY W/O HCPCS: Performed by: ORTHOPAEDIC SURGERY

## 2024-08-06 PROCEDURE — 73560 X-RAY EXAM OF KNEE 1 OR 2: CPT | Mod: 50

## 2024-08-06 PROCEDURE — 3061F NEG MICROALBUMINURIA REV: CPT | Performed by: ORTHOPAEDIC SURGERY

## 2024-08-06 PROCEDURE — 85027 COMPLETE CBC AUTOMATED: CPT

## 2024-08-06 RX ORDER — LIDOCAINE HYDROCHLORIDE 20 MG/ML
2 INJECTION, SOLUTION INFILTRATION; PERINEURAL
Status: COMPLETED | OUTPATIENT
Start: 2024-08-06 | End: 2024-08-06

## 2024-08-06 RX ORDER — METHYLPREDNISOLONE ACETATE 40 MG/ML
40 INJECTION, SUSPENSION INTRA-ARTICULAR; INTRALESIONAL; INTRAMUSCULAR; SOFT TISSUE
Status: COMPLETED | OUTPATIENT
Start: 2024-08-06 | End: 2024-08-06

## 2024-08-06 ASSESSMENT — PAIN - FUNCTIONAL ASSESSMENT: PAIN_FUNCTIONAL_ASSESSMENT: 0-10

## 2024-08-06 ASSESSMENT — PAIN DESCRIPTION - DESCRIPTORS: DESCRIPTORS: PATIENT UNABLE TO DESCRIBE

## 2024-08-06 ASSESSMENT — PAIN SCALES - GENERAL: PAINLEVEL_OUTOF10: 9

## 2024-08-06 NOTE — PROGRESS NOTES
Bilateral knee pain left worse than right progressive worse with ambulation and relieved by rest no particular injury has been told she needs knee replacements  Past medical,family and social histories have been reviewed and are up to date.  All other body systems have been reviewed and are negative for complaint.  Constitutional: Well-developed well-nourished   Eyes: Sclerae anicteric, pupils equal and round  HENT: Normocephalic atraumatic  Cardiovascular: Pulses full, regular rate and rhythm  Respiratory: Breathing not labored, no wheezing  Integumentary: Skin intact, no lesions or rashes  Neurological: Sensation intact, no gross strength deficits, reflexes equal  Psychiatric: Alert oriented and appropriate  Hematologic/lymphatic: No lymphadenopathy  Left knee: Valgus deformity mastering laterally small effusion flexion to 90 degrees full extension  Right knee varus deformity tender medial joint line small effusion range of motion 5-110  X-rays show bilateral arthritis windswept appearance  Pression bilateral knee arthritis discussed natural history discussed indications knee replacement surgery  Injected both knees today  L Inj/Asp: bilateral knee on 8/6/2024 3:48 PM  Indications: pain  Details: 21 G needle, anteromedial approach  Medications (Right): 2 mL lidocaine 20 mg/mL (2 %); 40 mg methylPREDNISolone acetate 40 mg/mL  Medications (Left): 2 mL lidocaine 20 mg/mL (2 %); 40 mg methylPREDNISolone acetate 40 mg/mL  Procedure, treatment alternatives, risks and benefits explained, specific risks discussed. Consent was given by the patient. Immediately prior to procedure a time out was called to verify the correct patient, procedure, equipment, support staff and site/side marked as required. Patient was prepped and draped in the usual sterile fashion.

## 2024-08-09 ENCOUNTER — APPOINTMENT (OUTPATIENT)
Dept: PHARMACY | Facility: HOSPITAL | Age: 64
End: 2024-08-09
Payer: COMMERCIAL

## 2024-08-09 ENCOUNTER — APPOINTMENT (OUTPATIENT)
Dept: CARDIOLOGY | Facility: HOSPITAL | Age: 64
End: 2024-08-09
Payer: COMMERCIAL

## 2024-08-11 ENCOUNTER — APPOINTMENT (OUTPATIENT)
Dept: RADIOLOGY | Facility: HOSPITAL | Age: 64
End: 2024-08-11
Payer: COMMERCIAL

## 2024-08-11 ENCOUNTER — HOSPITAL ENCOUNTER (INPATIENT)
Facility: HOSPITAL | Age: 64
End: 2024-08-11
Attending: EMERGENCY MEDICINE | Admitting: STUDENT IN AN ORGANIZED HEALTH CARE EDUCATION/TRAINING PROGRAM
Payer: COMMERCIAL

## 2024-08-11 ENCOUNTER — CLINICAL SUPPORT (OUTPATIENT)
Dept: EMERGENCY MEDICINE | Facility: HOSPITAL | Age: 64
End: 2024-08-11
Payer: COMMERCIAL

## 2024-08-11 VITALS
DIASTOLIC BLOOD PRESSURE: 63 MMHG | HEART RATE: 88 BPM | BODY MASS INDEX: 39.51 KG/M2 | SYSTOLIC BLOOD PRESSURE: 128 MMHG | TEMPERATURE: 98.1 F | OXYGEN SATURATION: 99 % | WEIGHT: 196 LBS | RESPIRATION RATE: 16 BRPM | HEIGHT: 59 IN

## 2024-08-11 DIAGNOSIS — E11.65 TYPE 2 DIABETES MELLITUS WITH HYPERGLYCEMIA, WITH LONG-TERM CURRENT USE OF INSULIN (MULTI): ICD-10-CM

## 2024-08-11 DIAGNOSIS — Z79.4 TYPE 2 DIABETES MELLITUS WITH HYPERGLYCEMIA, WITH LONG-TERM CURRENT USE OF INSULIN (MULTI): ICD-10-CM

## 2024-08-11 DIAGNOSIS — M15.3 OTHER SECONDARY OSTEOARTHRITIS OF MULTIPLE SITES: ICD-10-CM

## 2024-08-11 DIAGNOSIS — K25.0 ACUTE GASTRIC ULCER WITH HEMORRHAGE: Primary | ICD-10-CM

## 2024-08-11 DIAGNOSIS — K92.2 GASTROINTESTINAL HEMORRHAGE, UNSPECIFIED GASTROINTESTINAL HEMORRHAGE TYPE: Primary | ICD-10-CM

## 2024-08-11 LAB
ABO GROUP (TYPE) IN BLOOD: NORMAL
ALBUMIN SERPL BCP-MCNC: 3.7 G/DL (ref 3.4–5)
ALP SERPL-CCNC: 79 U/L (ref 33–136)
ALT SERPL W P-5'-P-CCNC: 7 U/L (ref 7–45)
ANION GAP BLDV CALCULATED.4IONS-SCNC: 6 MMOL/L (ref 10–25)
ANION GAP SERPL CALC-SCNC: 16 MMOL/L (ref 10–20)
ANTIBODY SCREEN: NORMAL
APTT PPP: 30 SECONDS (ref 27–38)
AST SERPL W P-5'-P-CCNC: 11 U/L (ref 9–39)
ATRIAL RATE: 88 BPM
BASE EXCESS BLDV CALC-SCNC: 8.8 MMOL/L (ref -2–3)
BASOPHILS # BLD AUTO: 0.1 X10*3/UL (ref 0–0.1)
BASOPHILS # BLD AUTO: 0.12 X10*3/UL (ref 0–0.1)
BASOPHILS # BLD AUTO: 0.13 X10*3/UL (ref 0–0.1)
BASOPHILS NFR BLD AUTO: 1.2 %
BASOPHILS NFR BLD AUTO: 1.2 %
BASOPHILS NFR BLD AUTO: 1.3 %
BILIRUB SERPL-MCNC: 0.5 MG/DL (ref 0–1.2)
BODY TEMPERATURE: 37 DEGREES CELSIUS
BUN SERPL-MCNC: 61 MG/DL (ref 6–23)
CA-I BLDV-SCNC: 1.26 MMOL/L (ref 1.1–1.33)
CALCIUM SERPL-MCNC: 9.1 MG/DL (ref 8.6–10.6)
CHLORIDE BLDV-SCNC: 107 MMOL/L (ref 98–107)
CHLORIDE SERPL-SCNC: 107 MMOL/L (ref 98–107)
CO2 SERPL-SCNC: 26 MMOL/L (ref 21–32)
CREAT SERPL-MCNC: 1.51 MG/DL (ref 0.5–1.05)
EGFRCR SERPLBLD CKD-EPI 2021: 38 ML/MIN/1.73M*2
EOSINOPHIL # BLD AUTO: 0.1 X10*3/UL (ref 0–0.7)
EOSINOPHIL # BLD AUTO: 0.1 X10*3/UL (ref 0–0.7)
EOSINOPHIL # BLD AUTO: 0.23 X10*3/UL (ref 0–0.7)
EOSINOPHIL NFR BLD AUTO: 0.9 %
EOSINOPHIL NFR BLD AUTO: 1.2 %
EOSINOPHIL NFR BLD AUTO: 2.4 %
ERYTHROCYTE [DISTWIDTH] IN BLOOD BY AUTOMATED COUNT: 13.4 % (ref 11.5–14.5)
ERYTHROCYTE [DISTWIDTH] IN BLOOD BY AUTOMATED COUNT: 13.6 % (ref 11.5–14.5)
ERYTHROCYTE [DISTWIDTH] IN BLOOD BY AUTOMATED COUNT: 13.7 % (ref 11.5–14.5)
GLUCOSE BLD MANUAL STRIP-MCNC: 103 MG/DL (ref 74–99)
GLUCOSE BLD MANUAL STRIP-MCNC: 172 MG/DL (ref 74–99)
GLUCOSE BLDV-MCNC: 169 MG/DL (ref 74–99)
GLUCOSE SERPL-MCNC: 146 MG/DL (ref 74–99)
HCO3 BLDV-SCNC: 35.1 MMOL/L (ref 22–26)
HCT VFR BLD AUTO: 23.6 % (ref 36–46)
HCT VFR BLD AUTO: 24.9 % (ref 36–46)
HCT VFR BLD AUTO: 26.6 % (ref 36–46)
HCT VFR BLD EST: 28 % (ref 36–46)
HGB BLD-MCNC: 7.8 G/DL (ref 12–16)
HGB BLD-MCNC: 8.2 G/DL (ref 12–16)
HGB BLD-MCNC: 9 G/DL (ref 12–16)
HGB BLDV-MCNC: 9.4 G/DL (ref 12–16)
IMM GRANULOCYTES # BLD AUTO: 0.02 X10*3/UL (ref 0–0.7)
IMM GRANULOCYTES # BLD AUTO: 0.04 X10*3/UL (ref 0–0.7)
IMM GRANULOCYTES # BLD AUTO: 0.06 X10*3/UL (ref 0–0.7)
IMM GRANULOCYTES NFR BLD AUTO: 0.2 % (ref 0–0.9)
IMM GRANULOCYTES NFR BLD AUTO: 0.5 % (ref 0–0.9)
IMM GRANULOCYTES NFR BLD AUTO: 0.5 % (ref 0–0.9)
INR PPP: 1.6 (ref 0.9–1.1)
LACTATE BLDV-SCNC: 2 MMOL/L (ref 0.4–2)
LYMPHOCYTES # BLD AUTO: 2.18 X10*3/UL (ref 1.2–4.8)
LYMPHOCYTES # BLD AUTO: 2.2 X10*3/UL (ref 1.2–4.8)
LYMPHOCYTES # BLD AUTO: 2.74 X10*3/UL (ref 1.2–4.8)
LYMPHOCYTES NFR BLD AUTO: 19.9 %
LYMPHOCYTES NFR BLD AUTO: 26.3 %
LYMPHOCYTES NFR BLD AUTO: 29.2 %
MCH RBC QN AUTO: 27.6 PG (ref 26–34)
MCH RBC QN AUTO: 28 PG (ref 26–34)
MCH RBC QN AUTO: 28.1 PG (ref 26–34)
MCHC RBC AUTO-ENTMCNC: 32.9 G/DL (ref 32–36)
MCHC RBC AUTO-ENTMCNC: 33.1 G/DL (ref 32–36)
MCHC RBC AUTO-ENTMCNC: 33.8 G/DL (ref 32–36)
MCV RBC AUTO: 82 FL (ref 80–100)
MCV RBC AUTO: 85 FL (ref 80–100)
MCV RBC AUTO: 85 FL (ref 80–100)
MONOCYTES # BLD AUTO: 0.65 X10*3/UL (ref 0.1–1)
MONOCYTES # BLD AUTO: 0.76 X10*3/UL (ref 0.1–1)
MONOCYTES # BLD AUTO: 0.83 X10*3/UL (ref 0.1–1)
MONOCYTES NFR BLD AUTO: 7.6 %
MONOCYTES NFR BLD AUTO: 7.8 %
MONOCYTES NFR BLD AUTO: 8.1 %
NEUTROPHILS # BLD AUTO: 5.29 X10*3/UL (ref 1.2–7.7)
NEUTROPHILS # BLD AUTO: 5.52 X10*3/UL (ref 1.2–7.7)
NEUTROPHILS # BLD AUTO: 7.65 X10*3/UL (ref 1.2–7.7)
NEUTROPHILS NFR BLD AUTO: 58.8 %
NEUTROPHILS NFR BLD AUTO: 63 %
NEUTROPHILS NFR BLD AUTO: 69.9 %
NRBC BLD-RTO: 0 /100 WBCS (ref 0–0)
OXYHGB MFR BLDV: 20.6 % (ref 45–75)
P AXIS: 18 DEGREES
P OFFSET: 194 MS
P ONSET: 147 MS
PCO2 BLDV: 58 MM HG (ref 41–51)
PH BLDV: 7.39 PH (ref 7.33–7.43)
PLATELET # BLD AUTO: 181 X10*3/UL (ref 150–450)
PLATELET # BLD AUTO: 195 X10*3/UL (ref 150–450)
PLATELET # BLD AUTO: 211 X10*3/UL (ref 150–450)
PO2 BLDV: 23 MM HG (ref 35–45)
POTASSIUM BLDV-SCNC: 4.3 MMOL/L (ref 3.5–5.3)
POTASSIUM SERPL-SCNC: 4.5 MMOL/L (ref 3.5–5.3)
PR INTERVAL: 154 MS
PROT SERPL-MCNC: 6.1 G/DL (ref 6.4–8.2)
PROTHROMBIN TIME: 18.2 SECONDS (ref 9.8–12.8)
Q ONSET: 224 MS
QRS COUNT: 15 BEATS
QRS DURATION: 112 MS
QT INTERVAL: 398 MS
QTC CALCULATION(BAZETT): 481 MS
QTC FREDERICIA: 452 MS
R AXIS: -31 DEGREES
RBC # BLD AUTO: 2.79 X10*6/UL (ref 4–5.2)
RBC # BLD AUTO: 2.92 X10*6/UL (ref 4–5.2)
RBC # BLD AUTO: 3.26 X10*6/UL (ref 4–5.2)
RH FACTOR (ANTIGEN D): NORMAL
SAO2 % BLDV: 21 % (ref 45–75)
SODIUM BLDV-SCNC: 144 MMOL/L (ref 136–145)
SODIUM SERPL-SCNC: 144 MMOL/L (ref 136–145)
T AXIS: -13 DEGREES
T OFFSET: 423 MS
VENTRICULAR RATE: 88 BPM
WBC # BLD AUTO: 11 X10*3/UL (ref 4.4–11.3)
WBC # BLD AUTO: 8.4 X10*3/UL (ref 4.4–11.3)
WBC # BLD AUTO: 9.4 X10*3/UL (ref 4.4–11.3)

## 2024-08-11 PROCEDURE — 2500000004 HC RX 250 GENERAL PHARMACY W/ HCPCS (ALT 636 FOR OP/ED)

## 2024-08-11 PROCEDURE — 85025 COMPLETE CBC W/AUTO DIFF WBC: CPT

## 2024-08-11 PROCEDURE — 2500000002 HC RX 250 W HCPCS SELF ADMINISTERED DRUGS (ALT 637 FOR MEDICARE OP, ALT 636 FOR OP/ED)

## 2024-08-11 PROCEDURE — 36415 COLL VENOUS BLD VENIPUNCTURE: CPT

## 2024-08-11 PROCEDURE — 99223 1ST HOSP IP/OBS HIGH 75: CPT | Performed by: STUDENT IN AN ORGANIZED HEALTH CARE EDUCATION/TRAINING PROGRAM

## 2024-08-11 PROCEDURE — 85610 PROTHROMBIN TIME: CPT

## 2024-08-11 PROCEDURE — 76705 ECHO EXAM OF ABDOMEN: CPT | Performed by: RADIOLOGY

## 2024-08-11 PROCEDURE — 99285 EMERGENCY DEPT VISIT HI MDM: CPT | Mod: 25

## 2024-08-11 PROCEDURE — 93010 ELECTROCARDIOGRAM REPORT: CPT | Performed by: EMERGENCY MEDICINE

## 2024-08-11 PROCEDURE — 82947 ASSAY GLUCOSE BLOOD QUANT: CPT

## 2024-08-11 PROCEDURE — 2500000001 HC RX 250 WO HCPCS SELF ADMINISTERED DRUGS (ALT 637 FOR MEDICARE OP)

## 2024-08-11 PROCEDURE — C9113 INJ PANTOPRAZOLE SODIUM, VIA: HCPCS

## 2024-08-11 PROCEDURE — 99285 EMERGENCY DEPT VISIT HI MDM: CPT | Performed by: EMERGENCY MEDICINE

## 2024-08-11 PROCEDURE — 80053 COMPREHEN METABOLIC PANEL: CPT

## 2024-08-11 PROCEDURE — 96374 THER/PROPH/DIAG INJ IV PUSH: CPT

## 2024-08-11 PROCEDURE — 84132 ASSAY OF SERUM POTASSIUM: CPT

## 2024-08-11 PROCEDURE — 93975 VASCULAR STUDY: CPT

## 2024-08-11 PROCEDURE — 86901 BLOOD TYPING SEROLOGIC RH(D): CPT

## 2024-08-11 PROCEDURE — 93975 VASCULAR STUDY: CPT | Performed by: RADIOLOGY

## 2024-08-11 PROCEDURE — 1100000001 HC PRIVATE ROOM DAILY

## 2024-08-11 PROCEDURE — 93005 ELECTROCARDIOGRAM TRACING: CPT

## 2024-08-11 PROCEDURE — 99223 1ST HOSP IP/OBS HIGH 75: CPT

## 2024-08-11 RX ORDER — DEXTROSE 50 % IN WATER (D50W) INTRAVENOUS SYRINGE
25
Status: ACTIVE | OUTPATIENT
Start: 2024-08-11

## 2024-08-11 RX ORDER — PANTOPRAZOLE SODIUM 40 MG/10ML
40 INJECTION, POWDER, LYOPHILIZED, FOR SOLUTION INTRAVENOUS 2 TIMES DAILY
Status: DISPENSED | OUTPATIENT
Start: 2024-08-11

## 2024-08-11 RX ORDER — NITROGLYCERIN 0.4 MG/1
0.4 TABLET SUBLINGUAL EVERY 5 MIN PRN
Status: ACTIVE | OUTPATIENT
Start: 2024-08-11

## 2024-08-11 RX ORDER — ACETAMINOPHEN 500 MG
5 TABLET ORAL NIGHTLY
Status: DISPENSED | OUTPATIENT
Start: 2024-08-11

## 2024-08-11 RX ORDER — PANTOPRAZOLE SODIUM 40 MG/10ML
80 INJECTION, POWDER, LYOPHILIZED, FOR SOLUTION INTRAVENOUS ONCE
Status: COMPLETED | OUTPATIENT
Start: 2024-08-11 | End: 2024-08-11

## 2024-08-11 RX ORDER — DEXTROSE 50 % IN WATER (D50W) INTRAVENOUS SYRINGE
12.5
Status: ACTIVE | OUTPATIENT
Start: 2024-08-11

## 2024-08-11 RX ORDER — ALUMINUM HYDROXIDE, MAGNESIUM HYDROXIDE, AND SIMETHICONE 1200; 120; 1200 MG/30ML; MG/30ML; MG/30ML
10 SUSPENSION ORAL 3 TIMES DAILY PRN
Status: DISPENSED | OUTPATIENT
Start: 2024-08-11

## 2024-08-11 RX ORDER — ACETAMINOPHEN 160 MG/5ML
975 SOLUTION ORAL EVERY 8 HOURS SCHEDULED
Status: ACTIVE | OUTPATIENT
Start: 2024-08-11

## 2024-08-11 RX ORDER — ACETAMINOPHEN 650 MG/1
650 SUPPOSITORY RECTAL EVERY 8 HOURS SCHEDULED
Status: ACTIVE | OUTPATIENT
Start: 2024-08-11

## 2024-08-11 RX ORDER — LATANOPROST 50 UG/ML
1 SOLUTION/ DROPS OPHTHALMIC NIGHTLY
Status: ACTIVE | OUTPATIENT
Start: 2024-08-11

## 2024-08-11 RX ORDER — OXYCODONE HYDROCHLORIDE 5 MG/1
5 TABLET ORAL EVERY 6 HOURS PRN
Status: DISPENSED | OUTPATIENT
Start: 2024-08-11

## 2024-08-11 RX ORDER — DULOXETIN HYDROCHLORIDE 30 MG/1
30 CAPSULE, DELAYED RELEASE ORAL DAILY
Status: ACTIVE | OUTPATIENT
Start: 2024-08-11

## 2024-08-11 RX ORDER — INSULIN DEGLUDEC 100 U/ML
20 INJECTION, SOLUTION SUBCUTANEOUS NIGHTLY
Status: DISPENSED | OUTPATIENT
Start: 2024-08-11

## 2024-08-11 RX ORDER — ATORVASTATIN CALCIUM 80 MG/1
80 TABLET, FILM COATED ORAL DAILY
Status: ACTIVE | OUTPATIENT
Start: 2024-08-11

## 2024-08-11 RX ORDER — BRIMONIDINE TARTRATE 2 MG/ML
1 SOLUTION/ DROPS OPHTHALMIC 2 TIMES DAILY
Status: DISPENSED | OUTPATIENT
Start: 2024-08-11

## 2024-08-11 RX ORDER — DULOXETIN HYDROCHLORIDE 60 MG/1
60 CAPSULE, DELAYED RELEASE ORAL NIGHTLY
Status: ACTIVE | OUTPATIENT
Start: 2024-08-11

## 2024-08-11 RX ORDER — ACETAMINOPHEN 325 MG/1
975 TABLET ORAL EVERY 8 HOURS SCHEDULED
Status: DISPENSED | OUTPATIENT
Start: 2024-08-11

## 2024-08-11 RX ADMIN — ACETAMINOPHEN 975 MG: 325 TABLET ORAL at 13:10

## 2024-08-11 RX ADMIN — LATANOPROST 1 DROP: 50 SOLUTION OPHTHALMIC at 21:06

## 2024-08-11 RX ADMIN — ALUMINUM HYDROXIDE, MAGNESIUM HYDROXIDE, AND SIMETHICONE 10 ML: 1200; 120; 1200 SUSPENSION ORAL at 16:26

## 2024-08-11 RX ADMIN — PANTOPRAZOLE SODIUM 80 MG: 40 INJECTION, POWDER, FOR SOLUTION INTRAVENOUS at 03:21

## 2024-08-11 RX ADMIN — INSULIN DEGLUDEC INJECTION 20 UNITS: 100 INJECTION, SOLUTION SUBCUTANEOUS at 21:06

## 2024-08-11 RX ADMIN — OXYCODONE HYDROCHLORIDE 5 MG: 5 TABLET ORAL at 17:27

## 2024-08-11 RX ADMIN — PANTOPRAZOLE SODIUM 40 MG: 40 INJECTION, POWDER, FOR SOLUTION INTRAVENOUS at 16:26

## 2024-08-11 RX ADMIN — ACETAMINOPHEN 975 MG: 325 TABLET ORAL at 21:07

## 2024-08-11 RX ADMIN — BRIMONIDINE TARTRATE 1 DROP: 2 SOLUTION/ DROPS OPHTHALMIC at 21:09

## 2024-08-11 RX ADMIN — OXYCODONE HYDROCHLORIDE 5 MG: 5 TABLET ORAL at 11:13

## 2024-08-11 RX ADMIN — BRIMONIDINE TARTRATE 1 DROP: 2 SOLUTION/ DROPS OPHTHALMIC at 09:39

## 2024-08-11 ASSESSMENT — PAIN DESCRIPTION - PROGRESSION
CLINICAL_PROGRESSION: GRADUALLY IMPROVING
CLINICAL_PROGRESSION: GRADUALLY WORSENING
CLINICAL_PROGRESSION: GRADUALLY WORSENING
CLINICAL_PROGRESSION: GRADUALLY IMPROVING

## 2024-08-11 ASSESSMENT — PAIN DESCRIPTION - LOCATION
LOCATION: ABDOMEN

## 2024-08-11 ASSESSMENT — PAIN SCALES - GENERAL
PAINLEVEL_OUTOF10: 5 - MODERATE PAIN
PAINLEVEL_OUTOF10: 7
PAINLEVEL_OUTOF10: 8
PAINLEVEL_OUTOF10: 8
PAINLEVEL_OUTOF10: 10 - WORST POSSIBLE PAIN
PAINLEVEL_OUTOF10: 0 - NO PAIN
PAINLEVEL_OUTOF10: 8
PAINLEVEL_OUTOF10: 7
PAINLEVEL_OUTOF10: 0 - NO PAIN

## 2024-08-11 ASSESSMENT — PAIN DESCRIPTION - ONSET
ONSET: ONGOING
ONSET: ONGOING

## 2024-08-11 ASSESSMENT — PAIN - FUNCTIONAL ASSESSMENT
PAIN_FUNCTIONAL_ASSESSMENT: 0-10

## 2024-08-11 ASSESSMENT — LIFESTYLE VARIABLES
HAVE PEOPLE ANNOYED YOU BY CRITICIZING YOUR DRINKING: NO
TOTAL SCORE: 0
EVER HAD A DRINK FIRST THING IN THE MORNING TO STEADY YOUR NERVES TO GET RID OF A HANGOVER: NO
HAVE YOU EVER FELT YOU SHOULD CUT DOWN ON YOUR DRINKING: NO
EVER FELT BAD OR GUILTY ABOUT YOUR DRINKING: NO

## 2024-08-11 ASSESSMENT — PAIN DESCRIPTION - ORIENTATION
ORIENTATION: RIGHT;LEFT

## 2024-08-11 ASSESSMENT — PAIN DESCRIPTION - PAIN TYPE
TYPE: ACUTE PAIN

## 2024-08-11 ASSESSMENT — PAIN DESCRIPTION - FREQUENCY
FREQUENCY: CONSTANT/CONTINUOUS

## 2024-08-11 ASSESSMENT — PAIN DESCRIPTION - DESCRIPTORS
DESCRIPTORS: CRAMPING;PRESSURE;DISCOMFORT
DESCRIPTORS: PRESSURE;CRAMPING;DISCOMFORT
DESCRIPTORS: CRAMPING;PRESSURE;DISCOMFORT

## 2024-08-11 NOTE — ED PROVIDER NOTES
CC: Black or Bloody Stool     HPI:  Patient is a 64-year-old female with a past medical history of CAD s/p stent, HFpEF, hypertension, PAD, HLD, T2DM, CKD 3, HFpEF, and SVC thrombus on Eliquis who presents to the ED for black vomit and stool.  Patient noted that her dark vomit and stool began this past night.  Noted multiple episodes of black vomit and black liquid stool.  Patient notes compliance with her Eliquis which she last took last night.  Per EMS, patient was administered a 500 cc bolus of normal saline.  Denies history of abdominal surgeries.  Denied abdominal pain, chest pain, difficulty breathing, headache, trauma, falls, cough, congestion, dysuria, hemoptysis, and abnormal bowel movements.    Limitations to history: None  Independent historian(s): Patient  Records Reviewed: Recent available ED and inpatient notes reviewed in EMR.    PMHx/PSHx:  Per HPI.   - has a past medical history of Abnormal uterine and vaginal bleeding, unspecified (04/10/2018), Abnormal uterine and vaginal bleeding, unspecified (02/22/2018), Acute candidiasis of vulva and vagina (04/10/2018), Acute candidiasis of vulva and vagina (02/22/2018), Acute vaginitis (04/10/2018), Acute vaginitis (02/22/2018), Alcohol abuse, in remission, Anxiety disorder, unspecified (04/10/2018), Anxiety disorder, unspecified (02/22/2018), Atherosclerotic heart disease of native coronary artery without angina pectoris (04/10/2018), Atherosclerotic heart disease of native coronary artery without angina pectoris (02/22/2018), Atherosclerotic heart disease of native coronary artery without angina pectoris (02/22/2018), Atherosclerotic heart disease of native coronary artery without angina pectoris (04/10/2018), Atypical facial pain (04/10/2018), Atypical facial pain (02/22/2018), Benign lipomatous neoplasm, unspecified (04/10/2018), Benign lipomatous neoplasm, unspecified (02/22/2018), Changes in skin texture (04/10/2018), Changes in skin texture  (02/22/2018), Chest pain, unspecified (04/10/2018), Chest pain, unspecified (02/22/2018), Chronic cough (04/10/2018), Chronic cough (02/22/2018), Chronic pain syndrome (04/10/2018), Chronic pain syndrome (02/22/2018), Cramp and spasm (04/10/2018), Cramp and spasm (02/22/2018), Depression, unspecified (04/10/2018), Depression, unspecified (02/22/2018), Disorder of pigmentation, unspecified (04/10/2018), Disorder of pigmentation, unspecified (02/22/2018), Dysuria (04/10/2018), Dysuria (02/22/2018), Encounter for immunization (04/10/2018), Encounter for immunization (04/10/2018), Encounter for immunization (02/22/2018), Encounter for immunization (02/22/2018), Encounter for screening for infections with a predominantly sexual mode of transmission, Encounter for screening mammogram for malignant neoplasm of breast (04/10/2018), Encounter for screening mammogram for malignant neoplasm of breast (02/22/2018), Essential (primary) hypertension (04/10/2018), Essential (primary) hypertension (02/22/2018), Fatty (change of) liver, not elsewhere classified (04/10/2018), Fatty (change of) liver, not elsewhere classified (02/22/2018), Fibromyalgia (04/10/2018), Fibromyalgia (02/22/2018), Gastro-esophageal reflux disease without esophagitis (04/10/2018), Generalized contraction of visual field, left eye (02/06/2017), Headache, unspecified (04/10/2018), Headache, unspecified (02/22/2018), Heart failure, unspecified (Multi) (02/22/2018), Heart failure, unspecified (Multi) (04/10/2018), Hereditary and idiopathic neuropathy, unspecified (04/10/2018), Hereditary and idiopathic neuropathy, unspecified (02/22/2018), Hydroureter (04/10/2018), Hydroureter (02/22/2018), Hyperlipidemia, unspecified, Hypertensive urgency (04/10/2018), Hypertensive urgency (02/22/2018), Hypokalemia (04/10/2018), Hypokalemia (02/22/2018), Insomnia, unspecified (04/10/2018), Insomnia, unspecified (02/22/2018), Localized edema (04/10/2018), Localized edema  (02/22/2018), Macula scars of posterior pole (postinflammatory) (post-traumatic), left eye (04/10/2018), Morbid (severe) obesity due to excess calories (Multi) (04/10/2018), Morbid (severe) obesity due to excess calories (Multi) (02/22/2018), 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), Nail dystrophy (02/22/2018), Non-ST elevation (NSTEMI) myocardial infarction (Multi) (04/10/2018), Non-ST elevation (NSTEMI) myocardial infarction (Multi) (02/22/2018), Obstructive sleep apnea (adult) (pediatric) (04/10/2018), Obstructive sleep apnea (adult) (pediatric) (02/22/2018), Other chest pain (02/22/2018), Other chest pain (04/10/2018), Other conditions influencing health status (04/10/2018), Other conditions influencing health status (04/10/2018), Other conditions influencing health status (04/10/2018), Other conditions influencing health status (02/22/2018), Other conditions influencing health status (02/22/2018), Other conditions influencing health status (02/22/2018), Other fatigue (04/10/2018), Other fatigue (02/22/2018), Other fecal abnormalities (04/10/2018), Other fecal abnormalities (02/22/2018), Other forms of dyspnea (04/10/2018), Other forms of dyspnea (02/22/2018), Other problems related to lifestyle, Other specified health status (04/10/2018), Other specified health status (02/22/2018), Pain in right knee (02/22/2018), Pain in right knee (04/10/2018), Peripheral vascular disease, unspecified (CMS-HCC) (04/10/2018), Peripheral vascular disease, unspecified (CMS-HCC) (02/22/2018), Personal history of other diseases of the digestive system, Personal history of other diseases of the digestive system, Personal history of other diseases of the nervous system and sense organs, Personal history of other endocrine, nutritional and metabolic disease, Personal history of other endocrine, nutritional and metabolic disease, Personal history of other endocrine, nutritional and metabolic disease, Polyarthritis, unspecified (04/10/2018),  Polyarthritis, unspecified (02/22/2018), Polyp of colon (04/10/2018), Polyp of colon (02/22/2018), Postmenopausal bleeding (04/10/2018), Postmenopausal bleeding (02/22/2018), Presence of coronary angioplasty implant and graft (04/10/2018), Presence of coronary angioplasty implant and graft (02/22/2018), Presence of intraocular lens (04/10/2018), Presence of intraocular lens (03/18/2015), Primary open-angle glaucoma, unspecified eye, stage unspecified (04/10/2018), Proteinuria, unspecified (04/10/2018), Proteinuria, unspecified (02/22/2018), Pruritus, unspecified (04/10/2018), Pruritus, unspecified (02/22/2018), Psoriasis, unspecified (04/10/2018), Psoriasis, unspecified (02/22/2018), Rash and other nonspecific skin eruption (04/10/2018), Rash and other nonspecific skin eruption (02/22/2018), Repeated falls (04/10/2018), Repeated falls (02/22/2018), Retinal hemorrhage, right eye (02/06/2017), Shortness of breath (04/10/2018), Shortness of breath (02/22/2018), Sleep apnea, unspecified (04/10/2018), Sleep apnea, unspecified (02/22/2018), Traction detachment of retina, right eye (02/06/2017), Type 2 diabetes mellitus with diabetic polyneuropathy (Multi) (04/10/2018), Type 2 diabetes mellitus with diabetic polyneuropathy (Multi) (02/22/2018), Type 2 diabetes mellitus with other skin ulcer (CODE) (Multi) (04/10/2018), Type 2 diabetes mellitus with other skin ulcer (CODE) (Multi) (02/22/2018), Type 2 diabetes mellitus with proliferative diabetic retinopathy without macular edema, unspecified eye (Multi) (04/10/2018), Type 2 diabetes mellitus with unspecified diabetic retinopathy without macular edema (Multi) (02/06/2017), Umbilical hernia without obstruction or gangrene, Unqualified visual loss, both eyes (02/06/2017), Unspecified glaucoma (06/11/2015), Unstable angina (Multi) (04/10/2018), Unstable angina (Multi) (02/22/2018), Vitamin D deficiency, unspecified (04/10/2018), Vitamin D deficiency, unspecified (02/22/2018),  Xerosis cutis (04/10/2018), and Xerosis cutis (02/22/2018).  - has a past surgical history that includes Coronary angioplasty with stent (03/06/2017); Cataract extraction (03/18/2015); Retinal detachment surgery (03/18/2015); MR angio head wo IV contrast (08/16/2013); MR angio neck wo IV contrast (08/16/2013); CT angio aorta and bilateral iliofemoral runoff w and or wo IV contrast (08/08/2022); Breast biopsy (Left); and Cardiac catheterization (N/A, 7/5/2024).    Medications:  Reviewed in EMR. See EMR for complete list of medications and doses.    Allergies:  Other, Penicillins, and Procaine    Social History:  - Tobacco:  reports that she has never smoked. She has never used smokeless tobacco.   - Alcohol:  reports that she does not currently use alcohol.   - Illicit Drugs:  reports no history of drug use.     ROS:  Per HPI.       ???????????????????????????????????????????????????????????????  Triage Vitals:  T 36.7 °C (98.1 °F)  HR 86  /53  RR 16  O2 99 %      Physical Exam  Vitals and nursing note reviewed.   Constitutional:       General: She is not in acute distress.  HENT:      Head: Normocephalic and atraumatic.      Nose: Nose normal.      Mouth/Throat:      Mouth: Mucous membranes are moist.      Comments: Dark discoloration of her tongue.  Eyes:      Conjunctiva/sclera: Conjunctivae normal.   Cardiovascular:      Rate and Rhythm: Normal rate and regular rhythm.      Pulses: Normal pulses.   Pulmonary:      Effort: Pulmonary effort is normal. No respiratory distress.      Breath sounds: Normal breath sounds.   Abdominal:      Palpations: Abdomen is soft.      Tenderness: There is no abdominal tenderness.   Genitourinary:     Comments: Rectal exam performed with Jeanie Rod RN at bedside.  Patient noted to have melanotic stool.   Skin:     General: Skin is warm.   Neurological:      General: No focal deficit present.      Mental Status: She is alert.            ???????????????????????????????????????????????????????????????  Labs:   Labs Reviewed   CBC WITH AUTO DIFFERENTIAL   COMPREHENSIVE METABOLIC PANEL   COAGULATION SCREEN   TYPE AND SCREEN   BLOOD GAS VENOUS FULL PANEL        Imaging:   No orders to display        MDM:  Patient is a 64-year-old female with a past medical history of CAD s/p stent, HFpEF, hypertension, PAD, HLD, T2DM, CKD 3, HFpEF, and SVC thrombus on Eliquis who presents to the ED for black vomit and stool.  Patient presented HDS.  Physical exam findings significant for melanotic stool on PEGGY.  Low clinical concern for hemorrhagic shock, traumatic process, and acute infectious process.  Type and screen and coags ordered.  Metabolic panel significant for elevated BUN of 61  with previous BUN of 13 on 8/6/2024.  No ischemic findings noted on EKG.  CBC significant for no leukocytosis and hemoglobin of 9.0 with previous hemoglobin of 12.6 on 8/6/2024.  Venous full panel significant for hypercapnia with normal lactate.  No indication for imaging of the abdomen or reversal of anticoagulation at this time.  Patient presentation discussed with GI fellow.  They noted that they will follow in the morning.  Patient's bleeding is likely secondary to spontaneous bleed given that she is on anticoagulation.  Discussed ED findings and admission with the patient.  Patient stated understanding and agreement the plan.  All questions were answered.  Discussed patient presentation with admitting team.  Patient admitted to medicine in stable condition.    ED Course:  ED Course as of 08/12/24 2110   Sun Aug 11, 2024   0328 EKG: Rate is 88, sinus rhythm, normal axis, no interval prolongation, no st elevation or depression.  When compared to EKG on 6/7/24 review of EKG does not show any signs of STEMI, complete heart block, asystole, V-fib. []      ED Course User Index  [MH] Nima Barclay MD         Diagnoses as of 08/12/24 2110   Gastrointestinal hemorrhage,  unspecified gastrointestinal hemorrhage type       Social Determinants Limiting Care:  None identified    Disposition:  Admission to medicine    Nima Barclay MD   Emergency Medicine PGY-3  [facility]    Comment: Please note this report has been produced using speech recognition software and may contain errors related to that system including errors in grammar, punctuation, and spelling as well as words and phrases that may be inappropriate.  If there are any questions or concerns please feel free to contact the dictating provider for clarification.    Procedures ? SmartLinks last updated 8/11/2024 2:38 AM        Nima Braclay MD  Resident  08/12/24 [id number]

## 2024-08-11 NOTE — PROGRESS NOTES
"  MEDICINE INPATIENT PROGRESS NOTE      Nelida Mata is a 64 y.o. female on day 0 of admission presenting with Gastrointestinal hemorrhage, unspecified gastrointestinal hemorrhage type.    Subjective   ***    Review of Systems   Review of Systems          Objective     Last Recorded Vitals  Blood pressure 109/54, pulse 86, temperature 36.7 °C (98.1 °F), temperature source Oral, resp. rate 13, SpO2 97%.  Intake/Output last 3 Shifts:  No intake/output data recorded.    Physical Exam    Relevant Results    Labs    Results from last 7 days   Lab Units 08/11/24  0311 08/06/24  1609   WBC AUTO x10*3/uL 11.0 6.3   HEMOGLOBIN g/dL 9.0* 12.6   HEMATOCRIT % 26.6* 40.7   PLATELETS AUTO x10*3/uL 211 237            Results from last 7 days   Lab Units 08/11/24  0311 08/06/24  1609   SODIUM mmol/L 144 136   POTASSIUM mmol/L 4.5 4.3   CO2 mmol/L 26 27   ANION GAP mmol/L 16 10   BUN mg/dL 61* 13   CREATININE mg/dL 1.51* 1.31*   GLUCOSE mg/dL 146* 207*   EGFR mL/min/1.73m*2 38* 45*      Results from last 7 days   Lab Units 08/11/24  0311   ALT U/L 7   AST U/L 11   ALK PHOS U/L 79      Results from last 7 days   Lab Units 08/11/24  0311   INR  1.6*            No lab exists for component: \"BNPRESU\", \"CPKT\"         Medications  Scheduled medications  [Held by provider] atorvastatin, 80 mg, oral, Daily  brimonidine, 1 drop, Both Eyes, BID  [Held by provider] DULoxetine, 30 mg, oral, Daily   And  [Held by provider] DULoxetine, 60 mg, oral, Nightly  insulin degludec, 20 Units, subcutaneous, Nightly  latanoprost, 1 drop, Both Eyes, Nightly  melatonin, 5 mg, oral, Nightly  pantoprazole, 40 mg, intravenous, BID      Continuous medications     PRN medications  PRN medications: dextrose, dextrose, glucagon, glucagon, nitroglycerin     Imaging  === 08/22/19 ===    US THYROID    - Impression -  Normal-sized thyroid with homogeneous echotexture with stable  multiple colloid cysts. No further follow-up is needed.    I personally reviewed " "the images/study and I agree with the findings  as stated. This study was interpreted at [facility], [place], [place].  === 07/17/24 ===    CT ANGIO CORONARY ART WITH HEARTFLOW IF SCORE >30%    - Impression -  1. Stented proximal to mid LAD which is patent. The distal LAD  appears to be well-visualized by contrast suggesting lack of  significant stenosis proximally. The proximal RCA is diffusely  diseased with 50% stenosis. Stented mid RCA. The distal RCA is  diffusely diseased. High-grade stenosis at this level cannot be  excluded.  2. Patent left circumflex without significant stenosis.    MACRO:  None    Signed by: Michael Hilario 7/17/2024 2:13 PM  Dictation workstation:   [id number]    No results found for the last 90 days.         No lab exists for component: \"AGALPCRNB\" [unfilled]          Assessment/Plan     Nelida Mata is a 64 y.o. female with PMHx of   ***       F: PRN  E: PRN  N: Regular Diet  A: pIV    DVT ppx: Lovenox, SubQ Heparin, SCDs  GI ppx: Not indicated    CODE STATUS: Full Code   Surrogate Decision Maker: ***    Case seen and discussed with attending  ***, to addend as necessary.    DEEPTI MCKENZIE PGY-2            "

## 2024-08-11 NOTE — ED TRIAGE NOTES
Pt bib EMS for a GI bleed that started early this morning, Pt currently A&Ox4, but vomitting black and incontinent with black stool.  Pt denies any GI disorders, recent surgeries,  but endorses a daily elliquis use and 8/10 abdominal pain.  Bed low and locked, side rails up, call light within reach will continue to monitor

## 2024-08-11 NOTE — PROGRESS NOTES
"Nelida Mata is a 64 y.o. female on day 0 of admission presenting with Gastrointestinal hemorrhage, unspecified gastrointestinal hemorrhage type.      Subjective   Patient naussea and Vomiting has stopped. Received IV PPI in ED this AM. Has not had a stool (\"dark\") since early AM. She has not received any fluids or PO since admission this AM but thinks she can drink.       Objective     Last Recorded Vitals  /56 (BP Location: Left arm, Patient Position: Lying)   Pulse 96   Temp 36.7 °C (98.1 °F) (Oral)   Resp 14   Wt 88.9 kg (196 lb)   SpO2 96%   Intake/Output last 3 Shifts:  No intake or output data in the 24 hours ending 08/11/24 1117    Admission Weight  Weight: 88.9 kg (196 lb) (08/11/24 0857)    Daily Weight  08/11/24 : 88.9 kg (196 lb)    Image Results  XR knee 1-2 views bilateral  Narrative: Interpreted By:  Cruzito Berger,   STUDY:  XR KNEE 1-2 VIEWS BILATERAL      INDICATION:  Signs/Symptoms:pain.      COMPARISON:  May 11, 2016      ACCESSION NUMBER(S):  [id number]      ORDERING CLINICIAN:  CADEN RENE      FINDINGS:  Advanced osteoarthritis of the left knee worst laterally, progressed  from prior study.      Mild-to-moderate osteoarthritis right knee.      No evidence fracture. Numerous anterior calcifications bilaterally  left-greater-than-right likely small loose bodies or possible  phleboliths.      Impression: Advanced osteoarthritis of the left knee worst laterally, progressed  from prior study.      Mild-to-moderate osteoarthritis right knee.      Signed by: Cruzito Berger 8/9/2024 6:41 AM  Dictation workstation:   [id number]      Physical Exam  Constitutional:       General: She is not in acute distress.  HENT:      Mouth/Throat:      Mouth: Mucous membranes are dry.   Eyes:      General: No scleral icterus.     Conjunctiva/sclera: Conjunctivae normal.   Pulmonary:      Effort: Pulmonary effort is normal. No respiratory distress.   Abdominal:      General: Abdomen is flat. " Bowel sounds are normal. There is no distension.      Palpations: Abdomen is soft. There is mass.      Tenderness: There is abdominal tenderness.   Musculoskeletal:      Right lower leg: No edema.      Left lower leg: No edema.   Skin:     General: Skin is warm and dry.      Coloration: Skin is not jaundiced.   Neurological:      Mental Status: She is alert and oriented to person, place, and time.         Relevant Results               Assessment/Plan        Nelida Mata is a 64-year-old woman with a past medical history of HFpEF (50-55% 9/2023), CAD s/p PCI w/ stent x 3 (2016, 2018) and STEMI (9/2023, no stents placed), TIA 9/2023, SVC thrombus on Eliquis, remote DVT, T2DM, HLD, CKD, obesity, NAFLD, MICHAEL, and prior toe amputations for gangrene in 2020, who presented to the ED today with melena for a few days and coffee-ground emesis starting yesterday, consistent with likely upper GI bleed. The bleeding is likely in the setting of previously noted gastritis on EGD with questionable home PPI use versus Eliquis anticoagulation vs worsening liver function in setting of previously diagnosed NAFLD. She has no smoking history, remote history of AUD, no NSAID use, and no recent diet changes. The patient is currently hemodynamically stable, tolerating clear liquids PO. GI consulted & recommended for diagnostic vs therapeutic EGD tomorrow. Additionally, we will pursue liver ultrasound with doppler to assess for portal hypertension.           Principal Problem:    Gastrointestinal hemorrhage, unspecified gastrointestinal hemorrhage type    #Suspected upper GI bleed  - Currently HDS and no Emisis/Naussea since early AM  -Two large bore IV in place   -S/p 80mg IV pantoprazole in ED, continue 40mg IV BID  - GI consulted, EGD tomorrow. NPO Midnight with anticoags held.  - Currently tolerating Clear Liquid diet  - Follow Up CBC at least BID   - Consent for blood       #HFpEF  #CAD s/p PCI  #Hx STEMI  #SVC Thrombus  - Hold  Apixaban 5mg BID  - Hold ASA 81mg  - Hold carvedilol 12.5mg BID  - Hold furosemide 20mg  - Hold isosorbide mononitrate 60mg     #T2DM  ::Home insulin regimen: degludec 52 units PM, Humalog 22 units with meals  - Given NPO, will order 20 units degludec, can start short-acting insulin with meals once no longer NPO  - Hold dapagliflozin 10mg, semaglutide 2mg weekly     #HLD  - Continue atorvastatin 80mg (currently held while patient is NPO for possible scope)     #CKD   ::Cr on admission 1.51 (recent baseline 1.3-1.4)  - Hold Kerendia 10mg     #Misc  - Continue brimonidine, latanoprost eye drops  - Holding home calcium carbonate-vitamin D as not on formulary  - Continue duloxetine 30mg AM, 60mg PM (currently held while patient is NPO for possible scope)  - Continue melatonin 5mg    #Pain  -Tylenol 925mg q8hr scheduled  -Oxycodone 5mg IR prn q6hr            DEEPTI MCKENZIE, MS4

## 2024-08-11 NOTE — CONSULTS
Gastroenterology Consult Service INITIAL CONSULT Note  Department of Gastroenterology & Hepatology  [facility]    [facility]  August 11, 2024   Patient: Nelida Mata    Medical Record: [id number]    Reason for Consult: coffee ground emesis, melena   Requesting Service: RIVER/ Faye Crawford   Nelida Mata is a 64 y.o. female with HFpEF, CAD s/p PCI x TAMANNA (last in 2018), SVC thrombus (Jan 2023) on eliquis, T2DM, HLD, MICHAEL on CPAP, and remote hx of DVT, [place] [facility] [place] and coffee ground emesis. Gastroenterology is consulted for the same.     Briefly, pt was in her usual state of health until 8/10 PM when she began having loose stools as well as emesis. Emesis was dark and did not look like food she had eaten. No blood in emesis. She describes stools as melanic. No blood in stool. She thus presented to the ED. Last episode of emesis was in ED in evening 8/10. Endorses abd pain, which is achy in nature.  No fevers, no chills. No nausea. No dysphagia or odynophagia. No abd cramping. No NSAID use. NO steroid use. Her last dose of eliquis was 8/10 PM.     Pt was admitted in Jan 2023 with abd pain and dysphagia. During this hospitalization pt was found to have SVC thrombus. Pt underwent EGD during this admisison on 1/12/2023 with grade B erosive esophagitis as well as gastritis. Biopsies negative for HP. She was discharged off plavix and on eliqius. Discharged on PPI ID x 8 weeks, to be transitioned to daily thereafter. Of note, she continued on PPI daily for about 1 year until Dec 2023, and the she stopped taking. Has been off PPI for ~8 months.     Upon arrival to ED, was hemodynamically stable, though SBPs are between 109-96.  No tachycardia. Labs with no leukocytosis (WBC 11.0), Hb drop to 9.0 on arrival from 12.6 5 days prior. Plts 211. Na 144, BUN elevated at 61, Cr 1.51 (b/l between 1.30-1.67). INR 1.6. No recent iron studies this  admission (last checked 6/28: ferritin 25,  Tsat 17%, tiBC 473; at the time, Hb was 13.5). Repeat Hb this AM down to 7.8.     12 point ROS otherwise negative, unless indicated above.     Past Medical History:    Past Medical History:   Diagnosis Date    Abnormal uterine and vaginal bleeding, unspecified 04/10/2018    Abnormal uterine bleeding    Abnormal uterine and vaginal bleeding, unspecified 02/22/2018    Abnormal uterine bleeding    Acute candidiasis of vulva and vagina 04/10/2018    Vaginal candida    Acute candidiasis of vulva and vagina 02/22/2018    Vaginal candida    Acute vaginitis 04/10/2018    Vaginitis    Acute vaginitis 02/22/2018    Vaginitis    Alcohol abuse, in remission     History of alcohol abuse    Anxiety disorder, unspecified 04/10/2018    Anxiety    Anxiety disorder, unspecified 02/22/2018    Anxiety    Atherosclerotic heart disease of native coronary artery without angina pectoris 04/10/2018    Arteriosclerotic cardiovascular disease (ASCVD)    Atherosclerotic heart disease of native coronary artery without angina pectoris 02/22/2018    Arteriosclerotic cardiovascular disease (ASCVD)    Atherosclerotic heart disease of native coronary artery without angina pectoris 02/22/2018    CAD (coronary artery disease)    Atherosclerotic heart disease of native coronary artery without angina pectoris 04/10/2018    CAD (coronary artery disease)    Atypical facial pain 04/10/2018    Atypical face pain    Atypical facial pain 02/22/2018    Atypical face pain    Benign lipomatous neoplasm, unspecified 04/10/2018    Lipoma    Benign lipomatous neoplasm, unspecified 02/22/2018    Lipoma    Changes in skin texture 04/10/2018    Thickening, skin    Changes in skin texture 02/22/2018    Thickening, skin    Chest pain, unspecified 04/10/2018    Chest pain at rest    Chest pain, unspecified 02/22/2018    Chest pain at rest    Chronic cough 04/10/2018    Cough, persistent    Chronic cough 02/22/2018    Cough,  persistent    Chronic pain syndrome 04/10/2018    Pain syndrome, chronic    Chronic pain syndrome 02/22/2018    Pain syndrome, chronic    Cramp and spasm 04/10/2018    Cramp and spasm    Cramp and spasm 02/22/2018    Cramp and spasm    Depression, unspecified 04/10/2018    Depression    Depression, unspecified 02/22/2018    Depression    Disorder of pigmentation, unspecified 04/10/2018    Discoloration of skin of lower leg    Disorder of pigmentation, unspecified 02/22/2018    Discoloration of skin of lower leg    Dysuria 04/10/2018    Dysuria    Dysuria 02/22/2018    Dysuria    Encounter for immunization 04/10/2018    Flu vaccine need    Encounter for immunization 04/10/2018    Immunization due    Encounter for immunization 02/22/2018    Flu vaccine need    Encounter for immunization 02/22/2018    Immunization due    Encounter for screening for infections with a predominantly sexual mode of transmission     Routine screening for STI (sexually transmitted infection)    Encounter for screening mammogram for malignant neoplasm of breast 04/10/2018    Visit for screening mammogram    Encounter for screening mammogram for malignant neoplasm of breast 02/22/2018    Visit for screening mammogram    Essential (primary) hypertension 04/10/2018    Hypertension, uncontrolled    Essential (primary) hypertension 02/22/2018    Hypertension, uncontrolled    Fatty (change of) liver, not elsewhere classified 04/10/2018    NAFLD (nonalcoholic fatty liver disease)    Fatty (change of) liver, not elsewhere classified 02/22/2018    NAFLD (nonalcoholic fatty liver disease)    Fibromyalgia 04/10/2018    Fibromyalgia    Fibromyalgia 02/22/2018    Fibromyalgia    Gastro-esophageal reflux disease without esophagitis 04/10/2018    GERD (gastroesophageal reflux disease)    Generalized contraction of visual field, left eye 02/06/2017    Generalized contraction of visual field of left eye    Headache, unspecified 04/10/2018    Headache     Headache, unspecified 02/22/2018    Headache    Heart failure, unspecified (Multi) 02/22/2018    CHF, chronic    Heart failure, unspecified (Multi) 04/10/2018    CHF, chronic    Hereditary and idiopathic neuropathy, unspecified 04/10/2018    Idiopathic peripheral neuropathy    Hereditary and idiopathic neuropathy, unspecified 02/22/2018    Idiopathic peripheral neuropathy    Hydroureter 04/10/2018    Hydroureter on left    Hydroureter 02/22/2018    Hydroureter on left    Hyperlipidemia, unspecified     Dyslipidemia    Hypertensive urgency 04/10/2018    Asymptomatic hypertensive urgency    Hypertensive urgency 02/22/2018    Asymptomatic hypertensive urgency    Hypokalemia 04/10/2018    Hypokalemia    Hypokalemia 02/22/2018    Hypokalemia    Insomnia, unspecified 04/10/2018    Insomnia    Insomnia, unspecified 02/22/2018    Insomnia    Localized edema 04/10/2018    Bilateral edema of lower extremity    Localized edema 02/22/2018    Bilateral edema of lower extremity    Macula scars of posterior pole (postinflammatory) (post-traumatic), left eye 04/10/2018    Macula scar of posterior pole of left eye    Morbid (severe) obesity due to excess calories (Multi) 04/10/2018    Morbid obesity with BMI of 40.0-44.9, adult    Morbid (severe) obesity due to excess calories (Multi) 02/22/2018    Morbid obesity with BMI of 40.0-44.9, adult    Nail dystrophy 04/10/2018    Dystrophia unguium    Nail dystrophy 02/22/2018    Dystrophia unguium    Non-ST elevation (NSTEMI) myocardial infarction (Multi) 04/10/2018    NSTEMI, initial episode of care    Non-ST elevation (NSTEMI) myocardial infarction (Multi) 02/22/2018    NSTEMI, initial episode of care    Obstructive sleep apnea (adult) (pediatric) 04/10/2018    Obstructive sleep apnea    Obstructive sleep apnea (adult) (pediatric) 02/22/2018    Obstructive sleep apnea    Other chest pain 02/22/2018    Chest tightness or pressure    Other chest pain 04/10/2018    Chest tightness or  pressure    Other conditions influencing health status 04/10/2018    Insulin dependent type 2 diabetes mellitus, uncontrolled    Other conditions influencing health status 04/10/2018    Uncontrolled diabetes mellitus    Other conditions influencing health status 04/10/2018    DM (diabetes mellitus), type 2, uncontrolled, with renal complications    Other conditions influencing health status 02/22/2018    Insulin dependent type 2 diabetes mellitus, uncontrolled    Other conditions influencing health status 02/22/2018    Uncontrolled diabetes mellitus    Other conditions influencing health status 02/22/2018    DM (diabetes mellitus), type 2, uncontrolled, with renal complications    Other fatigue 04/10/2018    Fatigue    Other fatigue 02/22/2018    Fatigue    Other fecal abnormalities 04/10/2018    Loose stools    Other fecal abnormalities 02/22/2018    Loose stools    Other forms of dyspnea 04/10/2018    Dyspnea on exertion    Other forms of dyspnea 02/22/2018    Dyspnea on exertion    Other problems related to lifestyle     Other problems related to lifestyle    Other specified health status 04/10/2018    Medically complex patient    Other specified health status 02/22/2018    Medically complex patient    Pain in right knee 02/22/2018    Knee pain, bilateral    Pain in right knee 04/10/2018    Knee pain, bilateral    Peripheral vascular disease, unspecified (CMS-[facility]) 04/10/2018    Peripheral vascular disease    Peripheral vascular disease, unspecified (CMS-[facility]) 02/22/2018    Peripheral vascular disease    Personal history of other diseases of the digestive system     History of esophageal reflux    Personal history of other diseases of the digestive system     History of constipation    Personal history of other diseases of the nervous system and sense organs     History of glaucoma    Personal history of other endocrine, nutritional and metabolic disease     History of hypothyroidism    Personal history of other  endocrine, nutritional and metabolic disease     History of hyperlipidemia    Personal history of other endocrine, nutritional and metabolic disease     History of diabetes mellitus    Polyarthritis, unspecified 04/10/2018    Polyarthritis    Polyarthritis, unspecified 02/22/2018    Polyarthritis    Polyp of colon 04/10/2018    Colon polyps    Polyp of colon 02/22/2018    Colon polyps    Postmenopausal bleeding 04/10/2018    Postmenopausal bleeding    Postmenopausal bleeding 02/22/2018    Postmenopausal bleeding    Presence of coronary angioplasty implant and graft 04/10/2018    Presence of drug coated stent in right coronary artery    Presence of coronary angioplasty implant and graft 02/22/2018    Presence of drug coated stent in right coronary artery    Presence of intraocular lens 04/10/2018    Pseudophakia, both eyes    Presence of intraocular lens 03/18/2015    Pseudophakia of left eye    Primary open-angle glaucoma, unspecified eye, stage unspecified 04/10/2018    Primary open angle glaucoma    Proteinuria, unspecified 04/10/2018    Microalbuminuria    Proteinuria, unspecified 02/22/2018    Microalbuminuria    Pruritus, unspecified 04/10/2018    Pruritic dermatitis    Pruritus, unspecified 02/22/2018    Pruritic dermatitis    Psoriasis, unspecified 04/10/2018    Psoriasis    Psoriasis, unspecified 02/22/2018    Psoriasis    Rash and other nonspecific skin eruption 04/10/2018    Rash/skin eruption    Rash and other nonspecific skin eruption 02/22/2018    Rash/skin eruption    Repeated falls 04/10/2018    Multiple falls    Repeated falls 02/22/2018    Multiple falls    Retinal hemorrhage, right eye 02/06/2017    Retinal hemorrhage of right eye    Shortness of breath 04/10/2018    Shortness of breath    Shortness of breath 02/22/2018    Shortness of breath    Sleep apnea, unspecified 04/10/2018    Sleep disorder breathing    Sleep apnea, unspecified 02/22/2018    Sleep disorder breathing    Traction detachment  of retina, right eye 02/06/2017    Retinal detachment, tractional, right eye    Type 2 diabetes mellitus with diabetic polyneuropathy (Multi) 04/10/2018    Controlled type 2 diabetes mellitus with diabetic polyneuropathy, with long-term current use of insulin    Type 2 diabetes mellitus with diabetic polyneuropathy (Multi) 02/22/2018    Controlled type 2 diabetes mellitus with diabetic polyneuropathy, with long-term current use of insulin    Type 2 diabetes mellitus with other skin ulcer (CODE) (Multi) 04/10/2018    Diabetic leg ulcer    Type 2 diabetes mellitus with other skin ulcer (CODE) (Multi) 02/22/2018    Diabetic leg ulcer    Type 2 diabetes mellitus with proliferative diabetic retinopathy without macular edema, unspecified eye (Multi) 04/10/2018    Proliferative diabetic retinopathy    Type 2 diabetes mellitus with unspecified diabetic retinopathy without macular edema (Multi) 02/06/2017    Background diabetic retinopathy    Umbilical hernia without obstruction or gangrene     Umbilical hernia    Unqualified visual loss, both eyes 02/06/2017    Complete loss of vision in visual field    Unspecified glaucoma 06/11/2015    Glaucoma    Unstable angina (Multi) 04/10/2018    Unstable angina    Unstable angina (Multi) 02/22/2018    Unstable angina    Vitamin D deficiency, unspecified 04/10/2018    Vitamin D deficiency, unspecified    Vitamin D deficiency, unspecified 02/22/2018    Vitamin D deficiency, unspecified    Xerosis cutis 04/10/2018    Xerosis of skin    Xerosis cutis 02/22/2018    Xerosis of skin     Home Medications  (Not in a hospital admission)    Surgical History:  Past Surgical History:   Procedure Laterality Date    BREAST BIOPSY Left     2007    CARDIAC CATHETERIZATION N/A 7/5/2024    Procedure: Left And Right Heart Cath, Without LV;  Surgeon: Loki Donis MD;  Location: [place] [address] Cardiac Cath Lab;  Service: Cardiovascular;  Laterality: N/A;    CATARACT EXTRACTION  03/18/2015    Cataract  "Surgery    CORONARY ANGIOPLASTY WITH STENT PLACEMENT  03/06/2017    Cath Stent Placement    CT AORTA AND BILATERAL ILIOFEMORAL RUNOFF ANGIOGRAM W AND/OR WO IV CONTRAST  08/08/2022    CT AORTA AND BILATERAL ILIOFEMORAL RUNOFF ANGIOGRAM W AND/OR WO IV CONTRAST 8/8/2022 [facility] EMERGENCY LEGACY    MR HEAD ANGIO WO IV CONTRAST  08/16/2013    MR HEAD ANGIO WO IV CONTRAST 8/16/2013 [facility] ANCILLARY LEGACY    MR NECK ANGIO WO IV CONTRAST  08/16/2013    MR NECK ANGIO WO IV CONTRAST 8/16/2013 [facility] ANCILLARY LEGACY    RETINAL DETACHMENT SURGERY  03/18/2015    Repair Of Retinal Detachment       Allergies:  Allergies   Allergen Reactions    Other Rash     Novocaine Solution, reaction rash     Penicillins Rash and Hives    Procaine Rash       Social History:  Etoh denies  Drug use denies  Cigarette denies     Family History:  Family History   Problem Relation Name Age of Onset    Diabetes Mother      Glaucoma Father      Hypertension Other FAMILY    No family history of GI or liver disease.     Objective     Vitals:    Vitals:    08/11/24 0430 08/11/24 0742 08/11/24 0800 08/11/24 0857   BP: 109/54 96/54 98/50    Pulse: 86 90 93    Resp: 13 10 12    Temp: 36.7 °C (98.1 °F)      TempSrc: Oral      SpO2: 97% 98% (!) 93%    Weight:    88.9 kg (196 lb)   Height:    1.499 m (4' 11\")     Failed to redirect to the Timeline version of the [employer] SmartLink.  No intake or output data in the 24 hours ending 08/11/24 0920    Physical Exam  Gen: well appearing, A+Ox3, in no acute distress  HEENT: PEERL, EOMI, sclera anicteric, no conjunctival injection, MMM  Resp: CTAB, normal breath sounds, on RA   CV: RRR, normal S1/S2  GI: non-tender, non-distended, normal BSs  MSK: warm and well perfused, no edema  Neuro: A+Ox3, no focal deficits  Skin: warm and dry, no lesions, no rashes     Labs:   Lab Results   Component Value Date    WBC 11.0 08/11/2024    HGB 9.0 (L) 08/11/2024    HCT 26.6 (L) 08/11/2024    MCV 82 08/11/2024     08/11/2024     Lab " Results   Component Value Date    GLUCOSE 146 (H) 08/11/2024    CALCIUM 9.1 08/11/2024     08/11/2024    K 4.5 08/11/2024    CO2 26 08/11/2024     08/11/2024    BUN 61 (H) 08/11/2024    CREATININE 1.51 (H) 08/11/2024     Lab Results   Component Value Date    ALT 7 08/11/2024    AST 11 08/11/2024    ALKPHOS 79 08/11/2024    BILITOT 0.5 08/11/2024     Lab Results   Component Value Date    INR 1.6 (H) 08/11/2024    INR 1.4 (H) 06/28/2024    INR 1.2 (H) 06/07/2024    PROTIME 18.2 (H) 08/11/2024    PROTIME 15.9 (H) 06/28/2024    PROTIME 13.3 (H) 06/07/2024     Imaging: (no recent cross sectional abd imaging):    CTA chest (6/6/2024):  Upper abdomen demonstrates no acute pathology.   IMPRESSION:  1.  No definite CT evidence of an acute pulmonary embolus extending to  level segmental branch vessels.  2.  No focal pulmonary consolidation, pleural effusions or  pneumothorax.  3.  There is opacification of the SVC.  Mild low-density regions  within the SVC may represent mixing artifact from contrast although  thrombus cannot be excluded..    Barium esophagram 1/11/2023:  IMPRESSION:  *Distal esophageal narrowing and subtle mucosal fold irregularities  at the GE junction, with transient delayed emptying of the esophagus.  Recommend further assessment with endoscopy and biopsy if clinically  Indicated    CT A/P with IV contrast 1/8/2023:  IMPRESSION:  There is new trace air in the endometrial, endocervical and vaginal  canals. Correlate with recent instrumentation. Otherwise, this  finding is concerning for either infectious process or fistulization  to bowel.     Intraluminal air within the bladder may be secondary to cystitis  versus recent instrumentation. Correlate with patient history and  urinalysis as clinically indicated.     No other evidence of acute pathology in the abdomen or pelvis.     There is severe degenerative spinal canal narrowing at L4-L5  secondary to anterolisthesis, disc bulging and facet  osteophytes with  ligamentous thickening; this appears similar to 07/22/2022.     Please see separately dictated report for contemporaneous CTA of the  chest for details regarding the lower chest visible on the current  exam.    GI procedures:  Pt states that she had colonoscopy in 2021, and is due or repeat in Oct, 2024 but I am unable to find in our EMR.     EGD 6/16/2023:  Indications:           Dysphagia, Odynophagia, Abnormal cine-esophagram,                          Weight loss  Impression:            - LA Grade B esophagitis with no bleeding.                         - 1 cm hiatal hernia.                         - No esophageal mass.                         - Erosive gastritis. Biopsied.                         - Normal examined duodenum.  A.  GASTRIC BIOPSY:    -- GASTRIC ANTRAL- AND OXYNTIC-TYPE MUCOSA WITH MILD REACTIVE EPITHELIAL CHANGE   AND FOCAL MINIMAL CHRONIC INFLAMMATION.   -- NO HELICOBACTER PYLORI IDENTIFIED ON ROUTINE STAINED SLIDES.     Colonoscopy 12/14/2015:  Indications:           High risk colon cancer surveillance: Personal history                          of adenoma less than 10 mm in size  Impression:            - One 6 mm polyp in the transverse colon. Resected and                          retrieved.                         - The examination was otherwise normal on direct and                          retroflexion views.  FINAL DIAGNOSIS   A.  TRANSVERSE COLON POLYP, BIOPSY:   --TUBULAR ADENOMA.       Assessment & Plan   Nelida Mata is a 64 y.o. female with HFpEF, CAD s/p PCI x TAMANNA (last in 2018), SVC thrombus (Jan 2023) on eliquis, T2DM, HLD, MICHAEL on CPAP, and remote hx of DVT, admitted to [facility] with melena and coffee ground emesis. Gastroenterology is consulted for the same.     Pt has melena and coffee ground emesis prior to presentation, with associated Hb drop from 12 --> 7.8 today, all concerning for UGIB. Pt also w/ BUN/Cr > 20. Ddx: PUD, vascular lesion,  esophagitis/gastritis/duodenitis, malignancy, etc. Last dose of eliquis 8/10 PM. Will plan to proceed w/ EGD tomorrow.     #acute blood loss anemia   #melena, coffee ground emesis:  - EGD Monday  - Continue to trend Hgb BID (goal > 7), platelets (goal > 50)  - Ensure adequate IV access, ideally 2 large bore Ivs  - if concern for HDUS, low threshold to transfer to ICU, please page on call GI ([phone]) w/ any change in hemodynamics   - Remain NPO  - IV PPI BID    Patient discussed with on-call attending, Dr. Edmondson, to be seen by service attending, Dr. Sow in AM.      Loyda Stevenson, GI fellow    Thank you for the consultation. Gastroenterology will continue to the follow the patient.   Please do not hesitate to contact me on [place] or page [phone] if there are any further questions between the weekday hours of 7 AM - 5 PM.   If there is an urgent concern during the weekend, after-hours, or holidays; then please [place] [phone]. Thank you.    SIGNATURE: Loyda Stevenson MD PATIENT NAME: Nelida Mata   DATE: August 11, 2024 MRN: [id number]

## 2024-08-11 NOTE — H&P
"History Of Present Illness  Nelida Mata is a 64-year-old woman with a past medical history of HFpEF (50-55% 9/2023), CAD s/p PCI w/ stent x 3 (2016, 2018) and STEMI (9/2023, no stents placed), TIA 9/2023, SVC thrombus on Eliquis, remote DVT, T2DM, HLD, CKD, obesity, NAFLD, MICHAEL, and prior toe amputations for gangrene in 2020, who presented to the ED today with melena and coffee-ground emesis. According to the patient, she was in her usual state of health until around 6PM yesterday, which is when she started to have dark loose stools and vomiting. She states she has had multiple episodes of each, but is unable to specify exactly when her last episode except for the fact that it was a few hours ago.  She describes her vomit as \"dark,\" and denies having seeing any bright red blood in her vomit or stool. She reports she felt dizzy when her symptoms started last night, although she does not currently feel dizzy. She denies taking any oral iron supplements or Pepto Bismol. Otherwise, she reports a constant central abdominal pain that also started yesterday evening, which she describes as \"aching.\" She also reports having mild chest pain for which she took nitroglycerin twice yesterday, and has mostly improved but is still present. It is located across both sides of her chest and does not worsen with activity or radiate anywhere. Lastly, she notes that she has been having a few days of intermittent vaginal bleeding, which is a chronic issue. She notes she was supposed to have a hysterectomy, but has been unable to do so as of yet due to poor glycemic control.  She denies fevers, chills, or shortness of breath, and reports last having taking Eliquis yesterday evening.     In the ED:    - Vitals:   T 36.7, HR 86, /53, RR 16, SpO2 99% on RA  - Labs:   CBC: WBC 11.0, Hgb 9.0 (from 12.6 5 days prior), plt 211   BMP: Na 144, K 4.5, Cl 107, HCO3 26, BUN 61, Cr 1.51, glu 146   LFT: Ca 9.1, tprot 6.1, alb 3.7, alkphos " 79, AST 11, ALT 7, tbili 0.5   Heme: PT 18.2, INR 1.6, aPTT 30  - ECG:     Normal sinus rhythm.     - Imaging:   None obtained.     EGD 1/12/23:  Impression:    - LA Grade B esophagitis with no bleeding.                         - 1 cm hiatal hernia.                         - No esophageal mass.                         - Erosive gastritis. Biopsied.                         - Normal examined duodenum.    ED COURSE:   The above labs were obtained. The patient was given pantoprazole 80mg IV, and had reportedly already received 500 cc IVF during EMS transport. A rectal exam was performed and noted to have melanotic stool. GI was consulted and stated they would see the patient in the morning.     Home Medications:  Apixaban 5mg q12h  ASA 81mg  Atorvastatin 80mg  Brimonidine eye drops  Calcium carbonate-vitamin D  Carvedilol 12.5mg BID  Dapagliflozin 10mg  Duloxetine 30mg AM, 60mg PM  Finerenone 10mg  Furosemide 20mg  Humalog 22 units with meals  Hydroxyzine 50mg BID PRN itching  Degludec 52 units qHS  Isosorbide mononitrate ER 60mg  Latanoprost eye drops  Melatonin 5mg  Nitroglycerin SL PRN  Pantoprazole 40mg  Semaglutide 2mg weekly     Past Medical History  She has a past medical history of Abnormal uterine and vaginal bleeding, unspecified (04/10/2018), Abnormal uterine and vaginal bleeding, unspecified (02/22/2018), Acute candidiasis of vulva and vagina (04/10/2018), Acute candidiasis of vulva and vagina (02/22/2018), Acute vaginitis (04/10/2018), Acute vaginitis (02/22/2018), Alcohol abuse, in remission, Anxiety disorder, unspecified (04/10/2018), Anxiety disorder, unspecified (02/22/2018), Atherosclerotic heart disease of native coronary artery without angina pectoris (04/10/2018), Atherosclerotic heart disease of native coronary artery without angina pectoris (02/22/2018), Atherosclerotic heart disease of native coronary artery without angina pectoris (02/22/2018), Atherosclerotic heart disease of native coronary  artery without angina pectoris (04/10/2018), Atypical facial pain (04/10/2018), Atypical facial pain (02/22/2018), Benign lipomatous neoplasm, unspecified (04/10/2018), Benign lipomatous neoplasm, unspecified (02/22/2018), Changes in skin texture (04/10/2018), Changes in skin texture (02/22/2018), Chest pain, unspecified (04/10/2018), Chest pain, unspecified (02/22/2018), Chronic cough (04/10/2018), Chronic cough (02/22/2018), Chronic pain syndrome (04/10/2018), Chronic pain syndrome (02/22/2018), Cramp and spasm (04/10/2018), Cramp and spasm (02/22/2018), Depression, unspecified (04/10/2018), Depression, unspecified (02/22/2018), Disorder of pigmentation, unspecified (04/10/2018), Disorder of pigmentation, unspecified (02/22/2018), Dysuria (04/10/2018), Dysuria (02/22/2018), Encounter for immunization (04/10/2018), Encounter for immunization (04/10/2018), Encounter for immunization (02/22/2018), Encounter for immunization (02/22/2018), Encounter for screening for infections with a predominantly sexual mode of transmission, Encounter for screening mammogram for malignant neoplasm of breast (04/10/2018), Encounter for screening mammogram for malignant neoplasm of breast (02/22/2018), Essential (primary) hypertension (04/10/2018), Essential (primary) hypertension (02/22/2018), Fatty (change of) liver, not elsewhere classified (04/10/2018), Fatty (change of) liver, not elsewhere classified (02/22/2018), Fibromyalgia (04/10/2018), Fibromyalgia (02/22/2018), Gastro-esophageal reflux disease without esophagitis (04/10/2018), Generalized contraction of visual field, left eye (02/06/2017), Headache, unspecified (04/10/2018), Headache, unspecified (02/22/2018), Heart failure, unspecified (Multi) (02/22/2018), Heart failure, unspecified (Multi) (04/10/2018), Hereditary and idiopathic neuropathy, unspecified (04/10/2018), Hereditary and idiopathic neuropathy, unspecified (02/22/2018), Hydroureter (04/10/2018), Hydroureter  (02/22/2018), Hyperlipidemia, unspecified, Hypertensive urgency (04/10/2018), Hypertensive urgency (02/22/2018), Hypokalemia (04/10/2018), Hypokalemia (02/22/2018), Insomnia, unspecified (04/10/2018), Insomnia, unspecified (02/22/2018), Localized edema (04/10/2018), Localized edema (02/22/2018), Macula scars of posterior pole (postinflammatory) (post-traumatic), left eye (04/10/2018), Morbid (severe) obesity due to excess calories (Multi) (04/10/2018), Morbid (severe) obesity due to excess calories (Multi) (02/22/2018), 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), Nail dystrophy (02/22/2018), Non-ST elevation (NSTEMI) myocardial infarction (Multi) (04/10/2018), Non-ST elevation (NSTEMI) myocardial infarction (Multi) (02/22/2018), Obstructive sleep apnea (adult) (pediatric) (04/10/2018), Obstructive sleep apnea (adult) (pediatric) (02/22/2018), Other chest pain (02/22/2018), Other chest pain (04/10/2018), Other conditions influencing health status (04/10/2018), Other conditions influencing health status (04/10/2018), Other conditions influencing health status (04/10/2018), Other conditions influencing health status (02/22/2018), Other conditions influencing health status (02/22/2018), Other conditions influencing health status (02/22/2018), Other fatigue (04/10/2018), Other fatigue (02/22/2018), Other fecal abnormalities (04/10/2018), Other fecal abnormalities (02/22/2018), Other forms of dyspnea (04/10/2018), Other forms of dyspnea (02/22/2018), Other problems related to lifestyle, Other specified health status (04/10/2018), Other specified health status (02/22/2018), Pain in right knee (02/22/2018), Pain in right knee (04/10/2018), Peripheral vascular disease, unspecified (CMS-HCC) (04/10/2018), Peripheral vascular disease, unspecified (CMS-HCC) (02/22/2018), Personal history of other diseases of the digestive system, Personal history of other diseases of the digestive system, Personal history of other diseases of the nervous  system and sense organs, Personal history of other endocrine, nutritional and metabolic disease, Personal history of other endocrine, nutritional and metabolic disease, Personal history of other endocrine, nutritional and metabolic disease, Polyarthritis, unspecified (04/10/2018), Polyarthritis, unspecified (02/22/2018), Polyp of colon (04/10/2018), Polyp of colon (02/22/2018), Postmenopausal bleeding (04/10/2018), Postmenopausal bleeding (02/22/2018), Presence of coronary angioplasty implant and graft (04/10/2018), Presence of coronary angioplasty implant and graft (02/22/2018), Presence of intraocular lens (04/10/2018), Presence of intraocular lens (03/18/2015), Primary open-angle glaucoma, unspecified eye, stage unspecified (04/10/2018), Proteinuria, unspecified (04/10/2018), Proteinuria, unspecified (02/22/2018), Pruritus, unspecified (04/10/2018), Pruritus, unspecified (02/22/2018), Psoriasis, unspecified (04/10/2018), Psoriasis, unspecified (02/22/2018), Rash and other nonspecific skin eruption (04/10/2018), Rash and other nonspecific skin eruption (02/22/2018), Repeated falls (04/10/2018), Repeated falls (02/22/2018), Retinal hemorrhage, right eye (02/06/2017), Shortness of breath (04/10/2018), Shortness of breath (02/22/2018), Sleep apnea, unspecified (04/10/2018), Sleep apnea, unspecified (02/22/2018), Traction detachment of retina, right eye (02/06/2017), Type 2 diabetes mellitus with diabetic polyneuropathy (Multi) (04/10/2018), Type 2 diabetes mellitus with diabetic polyneuropathy (Multi) (02/22/2018), Type 2 diabetes mellitus with other skin ulcer (CODE) (Multi) (04/10/2018), Type 2 diabetes mellitus with other skin ulcer (CODE) (Multi) (02/22/2018), Type 2 diabetes mellitus with proliferative diabetic retinopathy without macular edema, unspecified eye (Multi) (04/10/2018), Type 2 diabetes mellitus with unspecified diabetic retinopathy without macular edema (Multi) (02/06/2017), Umbilical hernia without  obstruction or gangrene, Unqualified visual loss, both eyes (02/06/2017), Unspecified glaucoma (06/11/2015), Unstable angina (Multi) (04/10/2018), Unstable angina (Multi) (02/22/2018), Vitamin D deficiency, unspecified (04/10/2018), Vitamin D deficiency, unspecified (02/22/2018), Xerosis cutis (04/10/2018), and Xerosis cutis (02/22/2018).    Surgical History  She has a past surgical history that includes Coronary angioplasty with stent (03/06/2017); Cataract extraction (03/18/2015); Retinal detachment surgery (03/18/2015); MR angio head wo IV contrast (08/16/2013); MR angio neck wo IV contrast (08/16/2013); CT angio aorta and bilateral iliofemoral runoff w and or wo IV contrast (08/08/2022); Breast biopsy (Left); and Cardiac catheterization (N/A, 7/5/2024).     Social History  She reports that she has never smoked. She has never used smokeless tobacco. She reports that she does not currently use alcohol. She reports that she does not use drugs.    Family History  Family History   Problem Relation Name Age of Onset    Diabetes Mother      Glaucoma Father      Hypertension Other FAMILY      Allergies  Other, Penicillins, and Procaine    Review of Systems    12-point review of systems completed and negative except as stated above.      Physical Exam  Constitutional:       General: She is not in acute distress.     Appearance: Normal appearance.   HENT:      Head: Normocephalic and atraumatic.      Mouth/Throat:      Mouth: Mucous membranes are dry.   Eyes:      Pupils: Pupils are equal, round, and reactive to light.   Cardiovascular:      Rate and Rhythm: Normal rate and regular rhythm.      Heart sounds: No murmur heard.     Comments: Chest pain reproducible with palpation.   Pulmonary:      Effort: Pulmonary effort is normal. No respiratory distress.      Breath sounds: Normal breath sounds. No wheezing, rhonchi or rales.   Abdominal:      General: There is no distension.      Palpations: Abdomen is soft. There is no  mass.      Tenderness: There is abdominal tenderness. There is no guarding or rebound.      Comments: Mild tenderness to palpation of the epigastric area.   Musculoskeletal:         General: No swelling or deformity.   Skin:     General: Skin is warm and dry.      Findings: No rash.   Neurological:      General: No focal deficit present.      Mental Status: She is alert and oriented to person, place, and time.   Psychiatric:         Mood and Affect: Mood normal.         Behavior: Behavior normal.       Last Recorded Vitals  /54   Pulse 86   Temp 36.7 °C (98.1 °F) (Oral)   Resp 13   SpO2 97%     Assessment/Plan   Principal Problem:    Gastrointestinal hemorrhage, unspecified gastrointestinal hemorrhage type    Nelida Mata is a 64-year-old woman with a past medical history of HFpEF (50-55% 9/2023), CAD s/p PCI w/ stent x 3 (2016, 2018) and STEMI (9/2023, no stents placed), TIA 9/2023, SVC thrombus on Eliquis, remote DVT, T2DM, HLD, CKD, obesity, NAFLD, MICHAEL, and prior toe amputations for gangrene in 2020, who presented to the ED today with melena and coffee-ground emesis, consistent with likely upper GI bleed. The patient is currently hemodynamically stable. GI consulted & pending evaluation this morning.     #Suspected upper GI bleed  - S/p 80mg IV pantoprazole in ED, continue 40mg IV BID  - GI consulted, pending evaluation  - NPO for potential endoscopy  - Check CBC at least BID (next for 10 AM)  - Will need consent for blood  - Holding anticoagulants as below  - Currently HDS    #HFpEF  #CAD s/p PCI  #Hx STEMI  #SVC Thrombus  - Hold Apixaban 5mg BID  - Hold ASA 81mg  - Hold carvedilol 12.5mg BID  - Hold furosemide 20mg  - Hold isosorbide mononitrate 60mg    #T2DM  ::Home insulin regimen: degludec 52 units PM, Humalog 22 units with meals  - Given NPO, will order 20 units degludec, can start short-acting insulin with meals once no longer NPO  - Hold dapagliflozin 10mg, semaglutide 2mg  weekly    #HLD  - Continue atorvastatin 80mg (currently held while patient is NPO for possible scope)    #CKD   ::Cr on admission 1.51 (recent baseline 1.3-1.4)  - Hold Kerendia 10mg    #Misc  - Continue brimonidine, latanoprost eye drops  - Holding home calcium carbonate-vitamin D as not on formulary  - Continue duloxetine 30mg AM, 60mg PM (currently held while patient is NPO for possible scope)  - Continue melatonin 5mg    F: PRN  E: PRN  N: NPO  A: PIV  DVT: Held  GI: IV PPI BID    Code: Full (confirmed on admission)  NOK: Brother (Joe) [phone]; Son (Glynn Medina) [phone]     To be staffed with attending this morning.     Darren Smalls MD

## 2024-08-12 ENCOUNTER — APPOINTMENT (OUTPATIENT)
Dept: GASTROENTEROLOGY | Facility: HOSPITAL | Age: 64
End: 2024-08-12
Payer: COMMERCIAL

## 2024-08-12 ENCOUNTER — ANESTHESIA (OUTPATIENT)
Dept: GASTROENTEROLOGY | Facility: HOSPITAL | Age: 64
End: 2024-08-12
Payer: COMMERCIAL

## 2024-08-12 ENCOUNTER — DOCUMENTATION (OUTPATIENT)
Dept: CARE COORDINATION | Facility: CLINIC | Age: 64
End: 2024-08-12
Payer: COMMERCIAL

## 2024-08-12 ENCOUNTER — ANESTHESIA EVENT (OUTPATIENT)
Dept: GASTROENTEROLOGY | Facility: HOSPITAL | Age: 64
End: 2024-08-12
Payer: COMMERCIAL

## 2024-08-12 PROBLEM — I82.409 DVT (DEEP VENOUS THROMBOSIS) (MULTI): Status: ACTIVE | Noted: 2024-08-12

## 2024-08-12 PROBLEM — I63.9 CVA (CEREBRAL VASCULAR ACCIDENT) (MULTI): Status: ACTIVE | Noted: 2024-08-12

## 2024-08-12 LAB
ALBUMIN SERPL BCP-MCNC: 3.7 G/DL (ref 3.4–5)
ANION GAP SERPL CALC-SCNC: 18 MMOL/L (ref 10–20)
BASOPHILS # BLD AUTO: 0.08 X10*3/UL (ref 0–0.1)
BASOPHILS NFR BLD AUTO: 0.7 %
BUN SERPL-MCNC: 27 MG/DL (ref 6–23)
CALCIUM SERPL-MCNC: 8.8 MG/DL (ref 8.6–10.6)
CHLORIDE SERPL-SCNC: 105 MMOL/L (ref 98–107)
CO2 SERPL-SCNC: 21 MMOL/L (ref 21–32)
CREAT SERPL-MCNC: 1.08 MG/DL (ref 0.5–1.05)
EGFRCR SERPLBLD CKD-EPI 2021: 57 ML/MIN/1.73M*2
EOSINOPHIL # BLD AUTO: 0.03 X10*3/UL (ref 0–0.7)
EOSINOPHIL NFR BLD AUTO: 0.3 %
ERYTHROCYTE [DISTWIDTH] IN BLOOD BY AUTOMATED COUNT: 14 % (ref 11.5–14.5)
GLUCOSE BLD MANUAL STRIP-MCNC: 108 MG/DL (ref 74–99)
GLUCOSE BLD MANUAL STRIP-MCNC: 351 MG/DL (ref 74–99)
GLUCOSE SERPL-MCNC: 222 MG/DL (ref 74–99)
HCT VFR BLD AUTO: 31.9 % (ref 36–46)
HGB BLD-MCNC: 9.3 G/DL (ref 12–16)
IMM GRANULOCYTES # BLD AUTO: 0.05 X10*3/UL (ref 0–0.7)
IMM GRANULOCYTES NFR BLD AUTO: 0.4 % (ref 0–0.9)
LYMPHOCYTES # BLD AUTO: 0.84 X10*3/UL (ref 1.2–4.8)
LYMPHOCYTES NFR BLD AUTO: 7.2 %
MCH RBC QN AUTO: 27.8 PG (ref 26–34)
MCHC RBC AUTO-ENTMCNC: 29.2 G/DL (ref 32–36)
MCV RBC AUTO: 96 FL (ref 80–100)
MONOCYTES # BLD AUTO: 0.22 X10*3/UL (ref 0.1–1)
MONOCYTES NFR BLD AUTO: 1.9 %
NEUTROPHILS # BLD AUTO: 10.4 X10*3/UL (ref 1.2–7.7)
NEUTROPHILS NFR BLD AUTO: 89.5 %
NRBC BLD-RTO: 0 /100 WBCS (ref 0–0)
PHOSPHATE SERPL-MCNC: 4.1 MG/DL (ref 2.5–4.9)
PLATELET # BLD AUTO: 187 X10*3/UL (ref 150–450)
POTASSIUM SERPL-SCNC: 4.5 MMOL/L (ref 3.5–5.3)
RBC # BLD AUTO: 3.34 X10*6/UL (ref 4–5.2)
SODIUM SERPL-SCNC: 139 MMOL/L (ref 136–145)
WBC # BLD AUTO: 11.6 X10*3/UL (ref 4.4–11.3)

## 2024-08-12 PROCEDURE — 3700000002 HC GENERAL ANESTHESIA TIME - EACH INCREMENTAL 1 MINUTE

## 2024-08-12 PROCEDURE — 2500000001 HC RX 250 WO HCPCS SELF ADMINISTERED DRUGS (ALT 637 FOR MEDICARE OP)

## 2024-08-12 PROCEDURE — 0DB68ZX EXCISION OF STOMACH, VIA NATURAL OR ARTIFICIAL OPENING ENDOSCOPIC, DIAGNOSTIC: ICD-10-PCS | Performed by: STUDENT IN AN ORGANIZED HEALTH CARE EDUCATION/TRAINING PROGRAM

## 2024-08-12 PROCEDURE — 2500000004 HC RX 250 GENERAL PHARMACY W/ HCPCS (ALT 636 FOR OP/ED)

## 2024-08-12 PROCEDURE — 2500000005 HC RX 250 GENERAL PHARMACY W/O HCPCS

## 2024-08-12 PROCEDURE — 0DB78ZX EXCISION OF STOMACH, PYLORUS, VIA NATURAL OR ARTIFICIAL OPENING ENDOSCOPIC, DIAGNOSTIC: ICD-10-PCS | Performed by: STUDENT IN AN ORGANIZED HEALTH CARE EDUCATION/TRAINING PROGRAM

## 2024-08-12 PROCEDURE — 80069 RENAL FUNCTION PANEL: CPT

## 2024-08-12 PROCEDURE — 7100000009 HC PHASE TWO TIME - INITIAL BASE CHARGE

## 2024-08-12 PROCEDURE — 1100000001 HC PRIVATE ROOM DAILY

## 2024-08-12 PROCEDURE — 7100000001 HC RECOVERY ROOM TIME - INITIAL BASE CHARGE

## 2024-08-12 PROCEDURE — A43239 PR EDG TRANSORAL BIOPSY SINGLE/MULTIPLE

## 2024-08-12 PROCEDURE — 82947 ASSAY GLUCOSE BLOOD QUANT: CPT

## 2024-08-12 PROCEDURE — 0W3P8ZZ CONTROL BLEEDING IN GASTROINTESTINAL TRACT, VIA NATURAL OR ARTIFICIAL OPENING ENDOSCOPIC: ICD-10-PCS | Performed by: STUDENT IN AN ORGANIZED HEALTH CARE EDUCATION/TRAINING PROGRAM

## 2024-08-12 PROCEDURE — P9045 ALBUMIN (HUMAN), 5%, 250 ML: HCPCS | Mod: JZ

## 2024-08-12 PROCEDURE — 2500000002 HC RX 250 W HCPCS SELF ADMINISTERED DRUGS (ALT 637 FOR MEDICARE OP, ALT 636 FOR OP/ED)

## 2024-08-12 PROCEDURE — 43239 EGD BIOPSY SINGLE/MULTIPLE: CPT | Performed by: INTERNAL MEDICINE

## 2024-08-12 PROCEDURE — 99233 SBSQ HOSP IP/OBS HIGH 50: CPT | Performed by: STUDENT IN AN ORGANIZED HEALTH CARE EDUCATION/TRAINING PROGRAM

## 2024-08-12 PROCEDURE — 2500000004 HC RX 250 GENERAL PHARMACY W/ HCPCS (ALT 636 FOR OP/ED): Performed by: ANESTHESIOLOGY

## 2024-08-12 PROCEDURE — 7100000010 HC PHASE TWO TIME - EACH INCREMENTAL 1 MINUTE

## 2024-08-12 PROCEDURE — A43239 PR EDG TRANSORAL BIOPSY SINGLE/MULTIPLE: Performed by: ANESTHESIOLOGY

## 2024-08-12 PROCEDURE — 2720000007 HC OR 272 NO HCPCS

## 2024-08-12 PROCEDURE — 7100000002 HC RECOVERY ROOM TIME - EACH INCREMENTAL 1 MINUTE

## 2024-08-12 PROCEDURE — 0DB78ZZ EXCISION OF STOMACH, PYLORUS, VIA NATURAL OR ARTIFICIAL OPENING ENDOSCOPIC: ICD-10-PCS | Performed by: STUDENT IN AN ORGANIZED HEALTH CARE EDUCATION/TRAINING PROGRAM

## 2024-08-12 PROCEDURE — 36415 COLL VENOUS BLD VENIPUNCTURE: CPT

## 2024-08-12 PROCEDURE — 3700000001 HC GENERAL ANESTHESIA TIME - INITIAL BASE CHARGE

## 2024-08-12 PROCEDURE — 43255 EGD CONTROL BLEEDING ANY: CPT | Performed by: INTERNAL MEDICINE

## 2024-08-12 PROCEDURE — 85025 COMPLETE CBC W/AUTO DIFF WBC: CPT

## 2024-08-12 PROCEDURE — 43251 EGD REMOVE LESION SNARE: CPT | Performed by: INTERNAL MEDICINE

## 2024-08-12 PROCEDURE — 99232 SBSQ HOSP IP/OBS MODERATE 35: CPT | Performed by: STUDENT IN AN ORGANIZED HEALTH CARE EDUCATION/TRAINING PROGRAM

## 2024-08-12 RX ORDER — SUCCINYLCHOLINE CHLORIDE 20 MG/ML
INJECTION INTRAMUSCULAR; INTRAVENOUS AS NEEDED
Status: DISCONTINUED | OUTPATIENT
Start: 2024-08-12 | End: 2024-08-12

## 2024-08-12 RX ORDER — SODIUM CHLORIDE, SODIUM LACTATE, POTASSIUM CHLORIDE, CALCIUM CHLORIDE 600; 310; 30; 20 MG/100ML; MG/100ML; MG/100ML; MG/100ML
100 INJECTION, SOLUTION INTRAVENOUS CONTINUOUS
Status: DISCONTINUED | OUTPATIENT
Start: 2024-08-12 | End: 2024-08-13 | Stop reason: HOSPADM

## 2024-08-12 RX ORDER — DICLOFENAC SODIUM 10 MG/G
4 GEL TOPICAL 2 TIMES DAILY
Status: DISCONTINUED | OUTPATIENT
Start: 2024-08-12 | End: 2024-08-13 | Stop reason: HOSPADM

## 2024-08-12 RX ORDER — ONDANSETRON HYDROCHLORIDE 2 MG/ML
4 INJECTION, SOLUTION INTRAVENOUS ONCE AS NEEDED
Status: DISCONTINUED | OUTPATIENT
Start: 2024-08-12 | End: 2024-08-13 | Stop reason: HOSPADM

## 2024-08-12 RX ORDER — LIDOCAINE HYDROCHLORIDE 20 MG/ML
INJECTION, SOLUTION INFILTRATION; PERINEURAL AS NEEDED
Status: DISCONTINUED | OUTPATIENT
Start: 2024-08-12 | End: 2024-08-12

## 2024-08-12 RX ORDER — DIPHENHYDRAMINE HCL 25 MG
25 CAPSULE ORAL ONCE
Status: COMPLETED | OUTPATIENT
Start: 2024-08-12 | End: 2024-08-12

## 2024-08-12 RX ORDER — ACETAMINOPHEN 325 MG/1
650 TABLET ORAL EVERY 4 HOURS PRN
Status: DISCONTINUED | OUTPATIENT
Start: 2024-08-12 | End: 2024-08-13 | Stop reason: HOSPADM

## 2024-08-12 RX ORDER — PROPOFOL 10 MG/ML
INJECTION, EMULSION INTRAVENOUS AS NEEDED
Status: DISCONTINUED | OUTPATIENT
Start: 2024-08-12 | End: 2024-08-12

## 2024-08-12 RX ORDER — ALBUMIN HUMAN 50 G/1000ML
SOLUTION INTRAVENOUS AS NEEDED
Status: DISCONTINUED | OUTPATIENT
Start: 2024-08-12 | End: 2024-08-12

## 2024-08-12 RX ORDER — PHENYLEPHRINE HCL IN 0.9% NACL 0.4MG/10ML
SYRINGE (ML) INTRAVENOUS AS NEEDED
Status: DISCONTINUED | OUTPATIENT
Start: 2024-08-12 | End: 2024-08-12

## 2024-08-12 RX ORDER — POLYETHYLENE GLYCOL 3350 17 G/17G
17 POWDER, FOR SOLUTION ORAL DAILY PRN
Status: DISCONTINUED | OUTPATIENT
Start: 2024-08-12 | End: 2024-08-13 | Stop reason: HOSPADM

## 2024-08-12 RX ORDER — FENTANYL CITRATE 50 UG/ML
INJECTION, SOLUTION INTRAMUSCULAR; INTRAVENOUS AS NEEDED
Status: DISCONTINUED | OUTPATIENT
Start: 2024-08-12 | End: 2024-08-12

## 2024-08-12 RX ORDER — FUROSEMIDE 20 MG/1
20 TABLET ORAL DAILY
Status: CANCELLED | OUTPATIENT
Start: 2024-08-12

## 2024-08-12 RX ORDER — CARVEDILOL 12.5 MG/1
12.5 TABLET ORAL 2 TIMES DAILY
Status: CANCELLED | OUTPATIENT
Start: 2024-08-12

## 2024-08-12 RX ORDER — ISOSORBIDE MONONITRATE 60 MG/1
60 TABLET, EXTENDED RELEASE ORAL DAILY
Status: DISCONTINUED | OUTPATIENT
Start: 2024-08-12 | End: 2024-08-13 | Stop reason: HOSPADM

## 2024-08-12 RX ORDER — ONDANSETRON HYDROCHLORIDE 2 MG/ML
INJECTION, SOLUTION INTRAVENOUS AS NEEDED
Status: DISCONTINUED | OUTPATIENT
Start: 2024-08-12 | End: 2024-08-12

## 2024-08-12 RX ORDER — LIDOCAINE HYDROCHLORIDE 10 MG/ML
0.1 INJECTION INFILTRATION; PERINEURAL ONCE
Status: DISCONTINUED | OUTPATIENT
Start: 2024-08-12 | End: 2024-08-13 | Stop reason: HOSPADM

## 2024-08-12 RX ADMIN — SODIUM CHLORIDE, POTASSIUM CHLORIDE, SODIUM LACTATE AND CALCIUM CHLORIDE 100 ML/HR: 600; 310; 30; 20 INJECTION, SOLUTION INTRAVENOUS at 17:20

## 2024-08-12 RX ADMIN — ISOSORBIDE MONONITRATE 60 MG: 60 TABLET, EXTENDED RELEASE ORAL at 20:28

## 2024-08-12 RX ADMIN — OXYCODONE HYDROCHLORIDE 5 MG: 5 TABLET ORAL at 13:17

## 2024-08-12 RX ADMIN — PANTOPRAZOLE SODIUM 40 MG: 40 INJECTION, POWDER, FOR SOLUTION INTRAVENOUS at 13:17

## 2024-08-12 RX ADMIN — PANTOPRAZOLE SODIUM 40 MG: 40 INJECTION, POWDER, FOR SOLUTION INTRAVENOUS at 20:28

## 2024-08-12 RX ADMIN — DICLOFENAC SODIUM 4 G: 10 GEL TOPICAL at 20:26

## 2024-08-12 RX ADMIN — OXYCODONE HYDROCHLORIDE 5 MG: 5 TABLET ORAL at 07:30

## 2024-08-12 RX ADMIN — DIPHENHYDRAMINE HYDROCHLORIDE 25 MG: 25 CAPSULE ORAL at 21:47

## 2024-08-12 RX ADMIN — Medication 5 MG: at 20:27

## 2024-08-12 RX ADMIN — INSULIN DEGLUDEC INJECTION 20 UNITS: 100 INJECTION, SOLUTION SUBCUTANEOUS at 21:47

## 2024-08-12 RX ADMIN — OXYCODONE HYDROCHLORIDE 5 MG: 5 TABLET ORAL at 20:27

## 2024-08-12 RX ADMIN — POLYETHYLENE GLYCOL 3350 17 G: 17 POWDER, FOR SOLUTION ORAL at 15:44

## 2024-08-12 RX ADMIN — ACETAMINOPHEN 975 MG: 325 TABLET ORAL at 21:58

## 2024-08-12 SDOH — SOCIAL STABILITY: SOCIAL INSECURITY: ARE THERE ANY APPARENT SIGNS OF INJURIES/BEHAVIORS THAT COULD BE RELATED TO ABUSE/NEGLECT?: NO

## 2024-08-12 SDOH — SOCIAL STABILITY: SOCIAL INSECURITY: WERE YOU ABLE TO COMPLETE ALL THE BEHAVIORAL HEALTH SCREENINGS?: YES

## 2024-08-12 SDOH — SOCIAL STABILITY: SOCIAL INSECURITY: DOES ANYONE TRY TO KEEP YOU FROM HAVING/CONTACTING OTHER FRIENDS OR DOING THINGS OUTSIDE YOUR HOME?: NO

## 2024-08-12 SDOH — SOCIAL STABILITY: SOCIAL INSECURITY: HAS ANYONE EVER THREATENED TO HURT YOUR FAMILY OR YOUR PETS?: NO

## 2024-08-12 SDOH — SOCIAL STABILITY: SOCIAL INSECURITY: HAVE YOU HAD ANY THOUGHTS OF HARMING ANYONE ELSE?: NO

## 2024-08-12 SDOH — SOCIAL STABILITY: SOCIAL INSECURITY: DO YOU FEEL UNSAFE GOING BACK TO THE PLACE WHERE YOU ARE LIVING?: NO

## 2024-08-12 SDOH — SOCIAL STABILITY: SOCIAL INSECURITY: DO YOU FEEL ANYONE HAS EXPLOITED OR TAKEN ADVANTAGE OF YOU FINANCIALLY OR OF YOUR PERSONAL PROPERTY?: NO

## 2024-08-12 SDOH — SOCIAL STABILITY: SOCIAL INSECURITY: ARE YOU OR HAVE YOU BEEN THREATENED OR ABUSED PHYSICALLY, EMOTIONALLY, OR SEXUALLY BY ANYONE?: NO

## 2024-08-12 SDOH — SOCIAL STABILITY: SOCIAL INSECURITY: ABUSE: ADULT

## 2024-08-12 SDOH — SOCIAL STABILITY: SOCIAL INSECURITY: HAVE YOU HAD THOUGHTS OF HARMING ANYONE ELSE?: NO

## 2024-08-12 ASSESSMENT — COGNITIVE AND FUNCTIONAL STATUS - GENERAL
MOVING TO AND FROM BED TO CHAIR: A LITTLE
HELP NEEDED FOR BATHING: A LITTLE
DRESSING REGULAR LOWER BODY CLOTHING: A LITTLE
STANDING UP FROM CHAIR USING ARMS: A LITTLE
PERSONAL GROOMING: A LITTLE
DAILY ACTIVITIY SCORE: 21
DRESSING REGULAR UPPER BODY CLOTHING: A LITTLE
WALKING IN HOSPITAL ROOM: TOTAL
TOILETING: A LITTLE
MOVING TO AND FROM BED TO CHAIR: A LITTLE
HELP NEEDED FOR BATHING: A LITTLE
CLIMB 3 TO 5 STEPS WITH RAILING: TOTAL
STANDING UP FROM CHAIR USING ARMS: A LITTLE
DAILY ACTIVITIY SCORE: 18
WALKING IN HOSPITAL ROOM: TOTAL
HELP NEEDED FOR BATHING: A LITTLE
MOVING TO AND FROM BED TO CHAIR: A LITTLE
MOBILITY SCORE: 16
MOVING TO AND FROM BED TO CHAIR: A LITTLE
DRESSING REGULAR LOWER BODY CLOTHING: A LITTLE
HELP NEEDED FOR BATHING: A LITTLE
DRESSING REGULAR LOWER BODY CLOTHING: A LITTLE
STANDING UP FROM CHAIR USING ARMS: A LITTLE
EATING MEALS: A LITTLE
WALKING IN HOSPITAL ROOM: TOTAL
TOILETING: A LITTLE
DAILY ACTIVITIY SCORE: 18
MOBILITY SCORE: 16
STANDING UP FROM CHAIR USING ARMS: A LITTLE
WALKING IN HOSPITAL ROOM: TOTAL
DAILY ACTIVITIY SCORE: 21
CLIMB 3 TO 5 STEPS WITH RAILING: TOTAL
TOILETING: A LITTLE
DRESSING REGULAR UPPER BODY CLOTHING: A LITTLE
TOILETING: A LITTLE
CLIMB 3 TO 5 STEPS WITH RAILING: TOTAL
EATING MEALS: A LITTLE
PATIENT BASELINE BEDBOUND: NO
DRESSING REGULAR LOWER BODY CLOTHING: A LITTLE
PERSONAL GROOMING: A LITTLE
CLIMB 3 TO 5 STEPS WITH RAILING: TOTAL

## 2024-08-12 ASSESSMENT — LIFESTYLE VARIABLES
SKIP TO QUESTIONS 9-10: 1
AUDIT-C TOTAL SCORE: 2
HOW MANY STANDARD DRINKS CONTAINING ALCOHOL DO YOU HAVE ON A TYPICAL DAY: 1 OR 2
AUDIT-C TOTAL SCORE: 2
HOW OFTEN DO YOU HAVE A DRINK CONTAINING ALCOHOL: 2-4 TIMES A MONTH
HOW OFTEN DO YOU HAVE 6 OR MORE DRINKS ON ONE OCCASION: NEVER

## 2024-08-12 ASSESSMENT — ACTIVITIES OF DAILY LIVING (ADL)
DRESSING YOURSELF: INDEPENDENT
PATIENT'S MEMORY ADEQUATE TO SAFELY COMPLETE DAILY ACTIVITIES?: YES
LACK_OF_TRANSPORTATION: NO
FEEDING YOURSELF: INDEPENDENT
ASSISTIVE_DEVICE: CANE;WALKER
GROOMING: INDEPENDENT
JUDGMENT_ADEQUATE_SAFELY_COMPLETE_DAILY_ACTIVITIES: YES
HEARING - RIGHT EAR: FUNCTIONAL
BATHING: INDEPENDENT
TOILETING: INDEPENDENT
WALKS IN HOME: INDEPENDENT
HEARING - LEFT EAR: FUNCTIONAL
ADEQUATE_TO_COMPLETE_ADL: NO

## 2024-08-12 ASSESSMENT — PAIN SCALES - GENERAL
PAINLEVEL_OUTOF10: 8
PAINLEVEL_OUTOF10: 0 - NO PAIN
PAINLEVEL_OUTOF10: 8
PAINLEVEL_OUTOF10: 5 - MODERATE PAIN
PAINLEVEL_OUTOF10: 8
PAINLEVEL_OUTOF10: 9
PAINLEVEL_OUTOF10: 0 - NO PAIN
PAINLEVEL_OUTOF10: 0 - NO PAIN
PAINLEVEL_OUTOF10: 8

## 2024-08-12 ASSESSMENT — PAIN - FUNCTIONAL ASSESSMENT
PAIN_FUNCTIONAL_ASSESSMENT: 0-10

## 2024-08-12 ASSESSMENT — COLUMBIA-SUICIDE SEVERITY RATING SCALE - C-SSRS
1. IN THE PAST MONTH, HAVE YOU WISHED YOU WERE DEAD OR WISHED YOU COULD GO TO SLEEP AND NOT WAKE UP?: NO
6. HAVE YOU EVER DONE ANYTHING, STARTED TO DO ANYTHING, OR PREPARED TO DO ANYTHING TO END YOUR LIFE?: NO
2. HAVE YOU ACTUALLY HAD ANY THOUGHTS OF KILLING YOURSELF?: NO
6. HAVE YOU EVER DONE ANYTHING, STARTED TO DO ANYTHING, OR PREPARED TO DO ANYTHING TO END YOUR LIFE?: NO
1. IN THE PAST MONTH, HAVE YOU WISHED YOU WERE DEAD OR WISHED YOU COULD GO TO SLEEP AND NOT WAKE UP?: NO
2. HAVE YOU ACTUALLY HAD ANY THOUGHTS OF KILLING YOURSELF?: NO

## 2024-08-12 ASSESSMENT — PATIENT HEALTH QUESTIONNAIRE - PHQ9
SUM OF ALL RESPONSES TO PHQ9 QUESTIONS 1 & 2: 0
2. FEELING DOWN, DEPRESSED OR HOPELESS: NOT AT ALL
1. LITTLE INTEREST OR PLEASURE IN DOING THINGS: NOT AT ALL

## 2024-08-12 ASSESSMENT — PAIN DESCRIPTION - DESCRIPTORS
DESCRIPTORS: PRESSURE
DESCRIPTORS: PRESSURE

## 2024-08-12 ASSESSMENT — PAIN DESCRIPTION - ORIENTATION: ORIENTATION: RIGHT;LEFT

## 2024-08-12 ASSESSMENT — PAIN DESCRIPTION - LOCATION: LOCATION: CHEST

## 2024-08-12 NOTE — PROGRESS NOTES
8/12/24 2248 Transitional Care Coordinator Notes:    Patient is currently scheduled for an EGD for complaints of melena. If clear, will possibly discharge to home tomorrow. PT/OT will evaluate tomorrow for discharge needs.             Assessment/Plan   Principal Problem:    Gastrointestinal hemorrhage, unspecified gastrointestinal hemorrhage type  Active Problems:    CVA (cerebral vascular accident) (Multi)    DVT (deep venous thrombosis) (Multi)               Taylor James RN

## 2024-08-12 NOTE — PROGRESS NOTES
"Nelida Mata is a 64 y.o. female on day 1 of admission presenting with Gastrointestinal hemorrhage, unspecified gastrointestinal hemorrhage type.    Updates 8/12  She remains HDS and received miralax overnight at her request, but reports improved abdominal pain and palpable chest pain since prior to admission. No melena or bloody emesis since admission. She is NPO midnight for EGD today. 8/11 Evening Hbg uptrending to 8.2 (7.8 afternoon).       Subjective   NAEON. Received miralax overnight at pt request for \"constipation\", no bowel movement since melena Saturday night/Sunday early AM. NPO midnight for EGD today.        Objective     Last Recorded Vitals  /57 (BP Location: Right arm, Patient Position: Lying)   Pulse 88   Temp 36.4 °C (97.5 °F) (Temporal)   Resp 18   Wt 88.4 kg (194 lb 14.2 oz)   SpO2 100%   Intake/Output last 3 Shifts:    Intake/Output Summary (Last 24 hours) at 8/12/2024 0706  Last data filed at 8/11/2024 2325  Gross per 24 hour   Intake --   Output 0 ml   Net 0 ml       Admission Weight  Weight: 88.9 kg (196 lb) (08/11/24 0857)    Daily Weight  08/11/24 : 88.4 kg (194 lb 14.2 oz)    Image Results  ECG 12 lead  Normal sinus rhythm with sinus arrhythmia  Left axis deviation  Moderate voltage criteria for LVH, may be normal variant ( R in aVL , [place] product )  Nonspecific ST and T wave abnormality  Prolonged QT  Abnormal ECG  When compared with ECG of 07-JUN-2024 09:35,  No significant change was found    See ED provider note for full interpretation and clinical correlation  Confirmed by Chiara Stanton ([id number]) on 8/11/2024 9:51:41 PM  US liver with doppler  Narrative: Interpreted By:  Te Carmona and Baker Zachary   STUDY:  US LIVER WITH DOPPLER;  8/11/2024 3:29 pm      INDICATION:  Signs/Symptoms:Evaluate portal hypertension.      COMPARISON:  CT abdomen pelvis on 01/08/2023. Ultrasound liver on 07/21/2017.      ACCESSION NUMBER(S):  [id number]      ORDERING " CLINICIAN:  ARIEL BUSTAMANTE      TECHNIQUE:  Multiple images of the right upper quadrant were obtained.  Gray  scale, color Doppler and spectral Doppler waveform analysis was  performed.  This examination was interpreted at [facility].      FINDINGS:  LIVER:  The liver measures 13.9 cm and is diffusely echogenic in appearance,  consistent with diffuse fatty infiltration. The resulting increased  beam attenuation thereby limiting evaluation of the liver for focal  lesions. Within the limitations, no focal lesions are seen. Linear  echogenicity visualized within the left hepatic lobe, likely  corresponding to focal calcification seen on CT from 01/08/2023.      GALLBLADDER:  The gallbladder is nondistended, and demonstrates no evidence of  gallstones, wall thickening or surrounding fluid. The gallbladder  wall thickness is 0.1 cm. Sonographic Pickett's sign is negative.      BILIARY SYSTEM:  No evidence of intra or extrahepatic biliary dilatation is  identified; the common bile duct measures 0.2 cm.      DOPPLER EVALUATION:      HEPATIC ARTERIES:  Hepatic artery and its right and left branches RI's are estimated at  0.9, .84 and 0.8, respectively.      PORTAL VEIN:  Portal vein is patent and measures .61. There is normal respiratory  variation. Portal vein velocities are calculated as follows: main  portal vein 46.5 cm/s, antegrade flow; left portal vein branch 23.7  cm/s, antegrade flow; right portal vein anterior branch 16.0,  antegrade flow; right portal vein posterior branch 36.0, antegrade  flow. The splenic vein is also patent.      HEPATIC VEIN:  The right, middle and left hepatic veins are patent and demonstrate  biphasic antegrade flow. IVC appears also patent.      PANCREAS:  The visualized pancreas is unremarkable in appearance.      RIGHT KIDNEY:  The right kidney measures 8.2 cm in length. No hydronephrosis or  renal calculi are seen.      SPLEEN:  The spleen  measures 8.7 cm and is grossly unremarkable.      PERITONEUM AND RETROPERITONEUM:  There is no free or loculated fluid seen in the abdomen.      Note is made that images 1-24 depict of the uterine fundus, which is  enlarged and heterogenous, consistent with known uterine leiomyomas.      Impression: 1. Findings suggestive of hepatic steatosis with no focal liver  lesion identified.  2. Borderline elevated resistive indices of the hepatic arteries,  which are nonspecific however may be secondary to chronic liver  disease. Otherwise unremarkable Doppler evaluation of the hepatic  vasculature.  3. Partially visualized enlarged leiomyomatous uterine fundus.      I personally reviewed the images/study and I agree with the findings  as stated by Dr. Jimy Walton M.D. This study was interpreted at  [place], [place].      MACRO:  None      Signed by: Te Carmona 8/11/2024 5:31 PM  Dictation workstation:   [id number]      Physical Exam  Constitutional:       General: She is not in acute distress.  HENT:      Mouth/Throat:      Mouth: Mucous membranes are dry.   Eyes:      General: No scleral icterus.     Conjunctiva/sclera: Conjunctivae normal.   Pulmonary:      Effort: Pulmonary effort is normal. No respiratory distress.   Abdominal:      General: Abdomen is flat. Bowel sounds are normal. There is no distension.      Palpations: Abdomen is soft. There is mass.      Tenderness: There is abdominal tenderness.   Musculoskeletal:      Right lower leg: No edema.      Left lower leg: No edema.   Skin:     General: Skin is warm and dry.      Coloration: Skin is not jaundiced.   Neurological:      Mental Status: She is alert and oriented to person, place, and time.         Relevant Results               Assessment/Plan        Nelida Mata is a 64-year-old woman with a past medical history of HFpEF (50-55% 9/2023), CAD s/p PCI w/ stent x 3 (2016, 2018) and STEMI (9/2023,  no stents placed), TIA 9/2023, SVC thrombus on Eliquis, remote DVT, T2DM, HLD, CKD, obesity, NAFLD, MICHAEL, and prior toe amputations for gangrene in 2020, who presented to the ED today with melena for a few days and coffee-ground emesis starting yesterday, consistent with likely upper GI bleed. The bleeding is likely in the setting of previously noted gastritis on EGD with questionable home PPI use versus Eliquis anticoagulation vs worsening liver function in setting of previously diagnosed NAFLD. She has no smoking history, remote history of AUD, no NSAID use, and no recent diet changes. The patient is currently hemodynamically stable, tolerating clear liquids PO. GI consulted & recommended for diagnostic vs therapeutic EGD tomorrow. Additionally, we will pursue liver ultrasound with doppler to assess for portal hypertension.     She has been NPO since midnight for EGD today. Liver ultrasound with doppler: 13.9cm span consistent with fatty infiltration/hepatic steatosis. Borderling elevation in flow indices, equivocal for portal hypertension. She remains HDS, she received miralax overnight, reports improvement in abdominal pain and no emesis or melena since admission.       Principal Problem:    Gastrointestinal hemorrhage, unspecified gastrointestinal hemorrhage type    #Suspected upper GI bleed  - Currently HDS and no Emisis/[place] since early AM admission 8/11  -S/p 80mg IV pantoprazole in ED, continue 40mg IV BID  -Two large bore IV in place   - GI consulted, EGD today  - NPO midnight with coags held  - Follow Up CBC BID   - Consented for blood       #HFpEF  #CAD s/p PCI  #Hx STEMI  #SVC Thrombus  - Hold Apixaban 5mg BID  - Hold ASA 81mg  - Hold carvedilol 12.5mg BID  - Hold furosemide 20mg  - Hold isosorbide mononitrate 60mg     #T2DM  ::Home insulin regimen: degludec 52 units PM, Humalog 22 units with meals  - Given NPO, will order 20 units degludec, can start short-acting insulin with meals once no longer  NPO  - Hold dapagliflozin 10mg, semaglutide 2mg weekly     #HLD  - Continue atorvastatin 80mg (currently held while patient is NPO for possible scope)     #CKD   ::Cr on admission 1.51 (recent baseline 1.3-1.4)  - Hold Kerendia 10mg     #Misc  - Continue brimonidine, latanoprost eye drops  - Holding home calcium carbonate-vitamin D as not on formulary  - Continue duloxetine 30mg AM, 60mg PM (currently held while patient is NPO for possible scope)  - Continue melatonin 5mg    #Pain  -Tylenol 925mg q8hr scheduled  -Oxycodone 5mg IR prn q6hr            DEEPTI MCKENZIE, MS4

## 2024-08-12 NOTE — CARE PLAN
The patient's goals for the shift include      The clinical goals for the shift include Pt to remain free from falls and/or injury during this shift      Problem: Pain  Goal: Takes deep breaths with improved pain control throughout the shift  Outcome: Progressing  Goal: Turns in bed with improved pain control throughout the shift  Outcome: Progressing  Goal: Walks with improved pain control throughout the shift  Outcome: Progressing  Goal: Performs ADL's with improved pain control throughout shift  Outcome: Progressing  Goal: Participates in PT with improved pain control throughout the shift  Outcome: Progressing  Goal: Free from opioid side effects throughout the shift  Outcome: Progressing  Goal: Free from acute confusion related to pain meds throughout the shift  Outcome: Progressing     Problem: Pain - Adult  Goal: Verbalizes/displays adequate comfort level or baseline comfort level  Outcome: Progressing     Problem: Safety - Adult  Goal: Free from fall injury  Outcome: Progressing     Problem: Discharge Planning  Goal: Discharge to home or other facility with appropriate resources  Outcome: Progressing     Problem: Chronic Conditions and Co-morbidities  Goal: Patient's chronic conditions and co-morbidity symptoms are monitored and maintained or improved  Outcome: Progressing     Problem: Skin  Goal: Decreased wound size/increased tissue granulation at next dressing change  Outcome: Progressing  Goal: Participates in plan/prevention/treatment measures  Outcome: Progressing  Goal: Prevent/manage excess moisture  Outcome: Progressing  Goal: Prevent/minimize sheer/friction injuries  Outcome: Progressing  Goal: Promote/optimize nutrition  Outcome: Progressing  Goal: Promote skin healing  Outcome: Progressing  Flowsheets (Taken 8/12/2024 0059)  Promote skin healing:   Assess skin/pad under line(s)/device(s)   Turn/reposition every 2 hours/use positioning/transfer devices   Ensure correct size (line/device) and apply  per  instructions     Problem: Diabetes  Goal: Achieve decreasing blood glucose levels by end of shift  Outcome: Progressing  Goal: Increase stability of blood glucose readings by end of shift  Outcome: Progressing  Goal: Decrease in ketones present in urine by end of shift  Outcome: Progressing  Goal: Maintain electrolyte levels within acceptable range throughout shift  Outcome: Progressing  Goal: Maintain glucose levels >70mg/dl to <250mg/dl throughout shift  Outcome: Progressing  Goal: No changes in neurological exam by end of shift  Outcome: Progressing  Goal: Learn about and adhere to nutrition recommendations by end of shift  Outcome: Progressing  Goal: Vital signs within normal range for age by end of shift  Outcome: Progressing  Goal: Increase self care and/or family involovement by end of shift  Outcome: Progressing  Goal: Receive DSME education by end of shift  Outcome: Progressing

## 2024-08-12 NOTE — ANESTHESIA PREPROCEDURE EVALUATION
Patient: Nelida Mata    Procedure Information       Date/Time: 08/12/24 0910    Scheduled providers: Paul Krishnan MD; Tala Garcia MD    Procedure: EGD    Location: [facility]            Relevant Problems   Anesthesia (within normal limits)  No amilyhx MH      Cardiac   (+) Asymptomatic hypertensive urgency   (+) CAD (coronary artery disease)   (+) CHF, chronic (Multi)   (+) Chest pain at rest   (+) Hypertension, uncontrolled   (+) NSTEMI (non-ST elevated myocardial infarction) (Multi) (11/2023  )   (+) NSTEMI, initial episode of care (Multi)   (+) Other hyperlipidemia   (+) Peripheral vascular disease (CMS-HCC)   (+) Presence of drug coated stent in right coronary artery   (+) Unstable angina (Multi)      Pulmonary   (+) Dyspnea on exertion   (+) Obstructive sleep apnea      Neuro   (+) Anxiety   (+) CVA (cerebral vascular accident) (Multi) (Last yr)   (+) Depression   (+) Idiopathic peripheral neuropathy   (+) Major depressive disorder, single episode   (+) Recurrent major depressive disorder, in remission (CMS-HCC)      GI   (+) GERD (gastroesophageal reflux disease)   (+) Gastrointestinal hemorrhage, unspecified gastrointestinal hemorrhage type      /Renal   (+) Lower urinary tract infectious disease      Liver   (+) NAFLD (nonalcoholic fatty liver disease)      Endocrine  Glucose 108   (+) Diabetes mellitus type 2, controlled, without complications (Multi)   (+) Morbid obesity (Multi)   (+) Type 2 diabetes mellitus with hyperglycemia, with long-term current use of insulin (Multi)      Hematology   (+) DVT (deep venous thrombosis) (Multi)      Musculoskeletal   (+) Fibromyalgia   (+) Osteoarthritis   (+) Pain syndrome, chronic      HEENT   (+) Primary open angle glaucoma   (+) Primary open angle glaucoma of both eyes, mild stage      ID   (+) Lower urinary tract infectious disease   (+) Trichimoniasis   (+) Vaginal candida      Skin   (+) Lichen simplex chronicus   (+)  Rash/skin eruption      GYN   (+) Abnormal uterine bleeding   (+) Fibroid uterus   (+) Leiomyoma of uterus   (+) Uterine fibroid   64-year-old woman with a past medical history of HFpEF (50-55% 9/2023), CAD s/p PCI w/ stent x 3 (2016, 2018) and STEMI (9/2023, no stents placed), TIA 9/2023, SVC thrombus on Eliquis, remote DVT, T2DM, HLD, CKD, obesity, NAFLD, MICHAEL, and prior toe amputations for gangrene in 2020, who presented to the ED today with melena for a few days and coffee-ground emesis   currently hemodynamically stable, tolerating clear liquids PO     Clinical information reviewed:   Tobacco  Allergies  Meds   Med Hx  Surg Hx  OB Status  Fam Hx  Soc   Hx      Cath 2023  Minimally unchanged coronary artery disease with known                            RCA  and 40-50% LAD instent restenosis.    ECHO 2023  CONCLUSIONS:   1. Left ventricular systolic function is normal with a 55-60% estimated ejection fraction.   2. Spectral Doppler shows an impaired relaxation pattern of left ventricular diastolic filling.   3. There is an elevated mean left atrial pressure.   4. There is no evidence of left ventricular hypertrophy.   5. The pulmonary artery is not well visualized.  NPO Detail:  NPO/Void Status  Carbohydrate Drink Given Prior to Surgery? : N  Date of Last Liquid: 08/12/24  Time of Last Liquid: 0700 (sips with meds)  Date of Last Solid: 08/10/24  Time of Last Solid: 2100  Last Intake Type: Clear fluids  Time of Last Void: 0846         Physical Exam    Airway  Mallampati: II     Cardiovascular - normal exam     Dental   Comments: Upper edentulous   Pulmonary - normal exam     Abdominal          Vitals:    08/12/24 0846   BP: 162/60   Pulse: 82   Resp: 16   Temp: 36 °C (96.8 °F)   SpO2: 100%       Past Surgical History:   Procedure Laterality Date    BREAST BIOPSY Left     2007    CARDIAC CATHETERIZATION N/A 7/5/2024    Procedure: Left And Right Heart Cath, Without LV;  Surgeon: Loki Donis MD;   Location: [facility] [address] Cardiac Cath Lab;  Service: Cardiovascular;  Laterality: N/A;    CATARACT EXTRACTION  03/18/2015    Cataract Surgery    CORONARY ANGIOPLASTY WITH STENT PLACEMENT  03/06/2017    Cath Stent Placement    CT AORTA AND BILATERAL ILIOFEMORAL RUNOFF ANGIOGRAM W AND/OR WO IV CONTRAST  08/08/2022    CT AORTA AND BILATERAL ILIOFEMORAL RUNOFF ANGIOGRAM W AND/OR WO IV CONTRAST 8/8/2022 [facility]    MR HEAD ANGIO WO IV CONTRAST  08/16/2013    MR HEAD ANGIO WO IV CONTRAST 8/16/2013 [facility]GAC[facility]    MR NECK ANGIO WO IV CONTRAST  08/16/2013    MR NECK ANGIO WO IV CONTRAST 8/16/2013 [facility]    RETINAL DETACHMENT SURGERY  03/18/2015    Repair Of Retinal Detachment     Past Medical History:   Diagnosis Date    Abnormal uterine and vaginal bleeding, unspecified 04/10/2018    Abnormal uterine bleeding    Abnormal uterine and vaginal bleeding, unspecified 02/22/2018    Abnormal uterine bleeding    Acute candidiasis of vulva and vagina 04/10/2018    Vaginal candida    Acute candidiasis of vulva and vagina 02/22/2018    Vaginal candida    Acute vaginitis 04/10/2018    Vaginitis    Acute vaginitis 02/22/2018    Vaginitis    Alcohol abuse, in remission     History of alcohol abuse    Anxiety disorder, unspecified 04/10/2018    Anxiety    Anxiety disorder, unspecified 02/22/2018    Anxiety    Atherosclerotic heart disease of native coronary artery without angina pectoris 04/10/2018    Arteriosclerotic cardiovascular disease (ASCVD)    Atherosclerotic heart disease of native coronary artery without angina pectoris 02/22/2018    Arteriosclerotic cardiovascular disease (ASCVD)    Atherosclerotic heart disease of native coronary artery without angina pectoris 02/22/2018    CAD (coronary artery disease)    Atherosclerotic heart disease of native coronary artery without angina pectoris 04/10/2018    CAD (coronary artery disease)    Atypical facial pain 04/10/2018    Atypical face pain     Atypical facial pain 02/22/2018    Atypical face pain    Benign lipomatous neoplasm, unspecified 04/10/2018    Lipoma    Benign lipomatous neoplasm, unspecified 02/22/2018    Lipoma    Changes in skin texture 04/10/2018    Thickening, skin    Changes in skin texture 02/22/2018    Thickening, skin    Chest pain, unspecified 04/10/2018    Chest pain at rest    Chest pain, unspecified 02/22/2018    Chest pain at rest    Chronic cough 04/10/2018    Cough, persistent    Chronic cough 02/22/2018    Cough, persistent    Chronic pain syndrome 04/10/2018    Pain syndrome, chronic    Chronic pain syndrome 02/22/2018    Pain syndrome, chronic    Cramp and spasm 04/10/2018    Cramp and spasm    Cramp and spasm 02/22/2018    Cramp and spasm    Depression, unspecified 04/10/2018    Depression    Depression, unspecified 02/22/2018    Depression    Disorder of pigmentation, unspecified 04/10/2018    Discoloration of skin of lower leg    Disorder of pigmentation, unspecified 02/22/2018    Discoloration of skin of lower leg    Dysuria 04/10/2018    Dysuria    Dysuria 02/22/2018    Dysuria    Encounter for immunization 04/10/2018    Flu vaccine need    Encounter for immunization 04/10/2018    Immunization due    Encounter for immunization 02/22/2018    Flu vaccine need    Encounter for immunization 02/22/2018    Immunization due    Encounter for screening for infections with a predominantly sexual mode of transmission     Routine screening for STI (sexually transmitted infection)    Encounter for screening mammogram for malignant neoplasm of breast 04/10/2018    Visit for screening mammogram    Encounter for screening mammogram for malignant neoplasm of breast 02/22/2018    Visit for screening mammogram    Essential (primary) hypertension 04/10/2018    Hypertension, uncontrolled    Essential (primary) hypertension 02/22/2018    Hypertension, uncontrolled    Fatty (change of) liver, not elsewhere classified 04/10/2018    NAFLD  (nonalcoholic fatty liver disease)    Fatty (change of) liver, not elsewhere classified 02/22/2018    NAFLD (nonalcoholic fatty liver disease)    Fibromyalgia 04/10/2018    Fibromyalgia    Fibromyalgia 02/22/2018    Fibromyalgia    Gastro-esophageal reflux disease without esophagitis 04/10/2018    GERD (gastroesophageal reflux disease)    Generalized contraction of visual field, left eye 02/06/2017    Generalized contraction of visual field of left eye    Headache, unspecified 04/10/2018    Headache    Headache, unspecified 02/22/2018    Headache    Heart failure, unspecified (Multi) 02/22/2018    CHF, chronic    Heart failure, unspecified (Multi) 04/10/2018    CHF, chronic    Hereditary and idiopathic neuropathy, unspecified 04/10/2018    Idiopathic peripheral neuropathy    Hereditary and idiopathic neuropathy, unspecified 02/22/2018    Idiopathic peripheral neuropathy    Hydroureter 04/10/2018    Hydroureter on left    Hydroureter 02/22/2018    Hydroureter on left    Hyperlipidemia, unspecified     Dyslipidemia    Hypertensive urgency 04/10/2018    Asymptomatic hypertensive urgency    Hypertensive urgency 02/22/2018    Asymptomatic hypertensive urgency    Hypokalemia 04/10/2018    Hypokalemia    Hypokalemia 02/22/2018    Hypokalemia    Insomnia, unspecified 04/10/2018    Insomnia    Insomnia, unspecified 02/22/2018    Insomnia    Localized edema 04/10/2018    Bilateral edema of lower extremity    Localized edema 02/22/2018    Bilateral edema of lower extremity    Macula scars of posterior pole (postinflammatory) (post-traumatic), left eye 04/10/2018    Macula scar of posterior pole of left eye    Morbid (severe) obesity due to excess calories (Multi) 04/10/2018    Morbid obesity with BMI of 40.0-44.9, adult    Morbid (severe) obesity due to excess calories (Multi) 02/22/2018    Morbid obesity with BMI of 40.0-44.9, adult    Nail dystrophy 04/10/2018    Dystrophia unguium    Nail dystrophy 02/22/2018    Dystrophia  unguium    Non-ST elevation (NSTEMI) myocardial infarction (Multi) 04/10/2018    NSTEMI, initial episode of care    Non-ST elevation (NSTEMI) myocardial infarction (Multi) 02/22/2018    NSTEMI, initial episode of care    Obstructive sleep apnea (adult) (pediatric) 04/10/2018    Obstructive sleep apnea    Obstructive sleep apnea (adult) (pediatric) 02/22/2018    Obstructive sleep apnea    Other chest pain 02/22/2018    Chest tightness or pressure    Other chest pain 04/10/2018    Chest tightness or pressure    Other conditions influencing health status 04/10/2018    Insulin dependent type 2 diabetes mellitus, uncontrolled    Other conditions influencing health status 04/10/2018    Uncontrolled diabetes mellitus    Other conditions influencing health status 04/10/2018    DM (diabetes mellitus), type 2, uncontrolled, with renal complications    Other conditions influencing health status 02/22/2018    Insulin dependent type 2 diabetes mellitus, uncontrolled    Other conditions influencing health status 02/22/2018    Uncontrolled diabetes mellitus    Other conditions influencing health status 02/22/2018    DM (diabetes mellitus), type 2, uncontrolled, with renal complications    Other fatigue 04/10/2018    Fatigue    Other fatigue 02/22/2018    Fatigue    Other fecal abnormalities 04/10/2018    Loose stools    Other fecal abnormalities 02/22/2018    Loose stools    Other forms of dyspnea 04/10/2018    Dyspnea on exertion    Other forms of dyspnea 02/22/2018    Dyspnea on exertion    Other problems related to lifestyle     Other problems related to lifestyle    Other specified health status 04/10/2018    Medically complex patient    Other specified health status 02/22/2018    Medically complex patient    Pain in right knee 02/22/2018    Knee pain, bilateral    Pain in right knee 04/10/2018    Knee pain, bilateral    Peripheral vascular disease, unspecified (CMS-HCC) 04/10/2018    Peripheral vascular disease    Peripheral  vascular disease, unspecified (CMS-HCC) 02/22/2018    Peripheral vascular disease    Personal history of other diseases of the digestive system     History of esophageal reflux    Personal history of other diseases of the digestive system     History of constipation    Personal history of other diseases of the nervous system and sense organs     History of glaucoma    Personal history of other endocrine, nutritional and metabolic disease     History of hypothyroidism    Personal history of other endocrine, nutritional and metabolic disease     History of hyperlipidemia    Personal history of other endocrine, nutritional and metabolic disease     History of diabetes mellitus    Polyarthritis, unspecified 04/10/2018    Polyarthritis    Polyarthritis, unspecified 02/22/2018    Polyarthritis    Polyp of colon 04/10/2018    Colon polyps    Polyp of colon 02/22/2018    Colon polyps    Postmenopausal bleeding 04/10/2018    Postmenopausal bleeding    Postmenopausal bleeding 02/22/2018    Postmenopausal bleeding    Presence of coronary angioplasty implant and graft 04/10/2018    Presence of drug coated stent in right coronary artery    Presence of coronary angioplasty implant and graft 02/22/2018    Presence of drug coated stent in right coronary artery    Presence of intraocular lens 04/10/2018    Pseudophakia, both eyes    Presence of intraocular lens 03/18/2015    Pseudophakia of left eye    Primary open-angle glaucoma, unspecified eye, stage unspecified 04/10/2018    Primary open angle glaucoma    Proteinuria, unspecified 04/10/2018    Microalbuminuria    Proteinuria, unspecified 02/22/2018    Microalbuminuria    Pruritus, unspecified 04/10/2018    Pruritic dermatitis    Pruritus, unspecified 02/22/2018    Pruritic dermatitis    Psoriasis, unspecified 04/10/2018    Psoriasis    Psoriasis, unspecified 02/22/2018    Psoriasis    Rash and other nonspecific skin eruption 04/10/2018    Rash/skin eruption    Rash and other  nonspecific skin eruption 02/22/2018    Rash/skin eruption    Repeated falls 04/10/2018    Multiple falls    Repeated falls 02/22/2018    Multiple falls    Retinal hemorrhage, right eye 02/06/2017    Retinal hemorrhage of right eye    Shortness of breath 04/10/2018    Shortness of breath    Shortness of breath 02/22/2018    Shortness of breath    Sleep apnea, unspecified 04/10/2018    Sleep disorder breathing    Sleep apnea, unspecified 02/22/2018    Sleep disorder breathing    Traction detachment of retina, right eye 02/06/2017    Retinal detachment, tractional, right eye    Type 2 diabetes mellitus with diabetic polyneuropathy (Multi) 04/10/2018    Controlled type 2 diabetes mellitus with diabetic polyneuropathy, with long-term current use of insulin    Type 2 diabetes mellitus with diabetic polyneuropathy (Multi) 02/22/2018    Controlled type 2 diabetes mellitus with diabetic polyneuropathy, with long-term current use of insulin    Type 2 diabetes mellitus with other skin ulcer (CODE) (Multi) 04/10/2018    Diabetic leg ulcer    Type 2 diabetes mellitus with other skin ulcer (CODE) (Multi) 02/22/2018    Diabetic leg ulcer    Type 2 diabetes mellitus with proliferative diabetic retinopathy without macular edema, unspecified eye (Multi) 04/10/2018    Proliferative diabetic retinopathy    Type 2 diabetes mellitus with unspecified diabetic retinopathy without macular edema (Multi) 02/06/2017    Background diabetic retinopathy    Umbilical hernia without obstruction or gangrene     Umbilical hernia    Unqualified visual loss, both eyes 02/06/2017    Complete loss of vision in visual field    Unspecified glaucoma 06/11/2015    Glaucoma    Unstable angina (Multi) 04/10/2018    Unstable angina    Unstable angina (Multi) 02/22/2018    Unstable angina    Vitamin D deficiency, unspecified 04/10/2018    Vitamin D deficiency, unspecified    Vitamin D deficiency, unspecified 02/22/2018    Vitamin D deficiency, unspecified     Xerosis cutis 04/10/2018    Xerosis of skin    Xerosis cutis 02/22/2018    Xerosis of skin       Current Facility-Administered Medications:     acetaminophen (Tylenol) tablet 975 mg, 975 mg, oral, q8h NORBERT, 975 mg at 08/11/24 2107 **OR** acetaminophen (Tylenol) oral liquid 1,000 mg, 1,000 mg, nasogastric tube, q8h NORBERT **OR** acetaminophen (Tylenol) suppository 650 mg, 650 mg, rectal, q8h NORBERT, Christopher Grossman MD    alum-mag hydroxide-simeth (Mylanta) 200-200-20 mg/5 mL oral suspension 10 mL, 10 mL, oral, TID PRN, Christopher Grossman MD, 10 mL at 08/11/24 1626    [Held by provider] atorvastatin (Lipitor) tablet 80 mg, 80 mg, oral, Daily, Darren Smalls MD    brimonidine (AlphaGAN) 0.2 % ophthalmic solution 1 drop, 1 drop, Both Eyes, BID, Darren Smalls MD, 1 drop at 08/11/24 2109    dextrose 50 % injection 12.5 g, 12.5 g, intravenous, q15 min PRN, Darren Smalls MD    dextrose 50 % injection 25 g, 25 g, intravenous, q15 min PRN, Darren Smalls MD    [Held by provider] DULoxetine (Cymbalta) DR capsule 30 mg, 30 mg, oral, Daily **AND** [Held by provider] DULoxetine (Cymbalta) DR capsule 60 mg, 60 mg, oral, Nightly, Darren Smalls MD    glucagon (Glucagen) injection 1 mg, 1 mg, intramuscular, q15 min PRN, Darren Smalls MD    glucagon (Glucagen) injection 1 mg, 1 mg, intramuscular, q15 min PRN, Darren Smalls MD    insulin degludec (Tresiba) injection 20 Units, 20 Units, subcutaneous, Nightly, Darren Smalls MD, 20 Units at 08/11/24 2106    latanoprost (Xalatan) 0.005 % ophthalmic solution 1 drop, 1 drop, Both Eyes, Nightly, Darren Smalls MD, 1 drop at 08/11/24 2106    melatonin tablet 5 mg, 5 mg, oral, Nightly, Darren Smalls MD    nitroglycerin (Nitrostat) SL tablet 0.4 mg, 0.4 mg, sublingual, q5 min PRN, Darren Smalls MD    oxyCODONE (Roxicodone) immediate release tablet 5 mg, 5 mg, oral, q6h PRN, Christopher Grossman MD, 5 mg at 08/12/24 0730    pantoprazole (ProtoNix) injection 40 mg, 40 mg, intravenous, BID,  Darren Smalls MD, 40 mg at 08/11/24 1626    polyethylene glycol (Glycolax, Miralax) packet 17 g, 17 g, oral, Daily PRN, Loyda Woods MD  Prior to Admission medications    Medication Sig Start Date End Date Taking? Authorizing Provider   apixaban (Eliquis) 5 mg tablet Take 1 tablet (5 mg) by mouth every 12 hours. Do not RESUME until 7/6/24 evening dose. Do not fill before July 6, 2024. 7/6/24 7/6/25 Yes Isidra Biggs, APRN-CNP   aspirin 81 mg EC tablet Take 1 tablet (81 mg) by mouth once daily. 7/22/24 7/22/25  Betty Boone MD   atorvastatin (Lipitor) 80 mg tablet TAKE 1 TABLET BY MOUTH ONCE DAILY 5/1/23 7/28/24  Darren Ventura MD   blood pressure monitor kit 1 each once daily. Use once daily to check blood pressure 5/6/24   Kassandra Lopez MD   blood sugar diagnostic (OneTouch Ultra Test) strip USE 1 STRIP(S) TO TEST BLOOD SUGAR 3 TIMES A DAY 5/20/24   Kassandra Lopez MD   brimonidine (AlphaGAN) 0.2 % ophthalmic solution Administer 1 drop into both eyes 2 times a day. PATIENT NEEDS PCP APPT FOR FURTHER REFILLS 3/22/24   Kassandra Lopez MD   calcium carbonate-vit D3-min 600 mg calcium- 400 unit tablet Take 1 tablet by mouth once daily. 5/1/23   Darren Ventura MD   carvedilol (Coreg) 12.5 mg tablet Take 1 tablet (12.5 mg) by mouth 2 times daily (morning and late afternoon). 6/28/24   Destiny Hurst MD PhD   dapagliflozin propanediol (Farxiga) 10 mg Take 1 tablet (10 mg) by mouth once daily. 12/8/23 12/7/24  Michael Hilario MD   DULoxetine (Cymbalta) 30 mg DR capsule Take 1 capsule (30 mg) by mouth once daily in the morning AND 2 capsules (60 mg) once daily at bedtime. Do not crush or chew.. 7/22/24 9/20/24  Betty Boone MD   finerenone (Kerendia) 10 mg tablet tablet Take 1 tablet (10 mg) by mouth once daily. 2/16/24   Michael Hilario MD   FreeStyle Filipe 2 Sensor kit Apply 1 sensor to the back of the upper arm. Change sensor every 14 days. 3/8/24   Michael Hilario MD    FreeStyle Filipe reader (FreeStyle Filipe 2 [place]) misc Use as instructed 10/4/23   Marek Miles MD   furosemide (Lasix) 20 mg tablet Take 1 tablet (20 mg) by mouth once daily. 4/30/24   Najma Lang MD   HumaLOG KwikPen Insulin 100 unit/mL injection Inject 22 Units under the skin 3 times a day. With meals 7/1/24   Historical Provider, MD   hydrOXYzine HCL (Atarax) 25 mg tablet Take 1 tablet (25 mg) by mouth 2 times a day as needed for itching for up to 7 days. 7/22/24 7/29/24  Betty Boone MD   insulin degludec (Tresiba FlexTouch U-200) 200 unit/mL (3 mL) injection Inject 52 Units under the skin once daily at bedtime. Take as directed per insulin instructions. 7/22/24 7/22/25  Betty Boone MD   insulin lispro (HumaLOG KwikPen Insulin) 200 unit/mL (3 mL) insulin pen pen Inject 22 Units under the skin 3 times daily (morning, midday, late afternoon). Take as directed per insulin instructions. 7/22/24   Betty Boone MD   isosorbide mononitrate ER (Imdur) 60 mg 24 hr tablet TAKE 1 TABLET BY MOUTH EVERY DAY IN THE MORNING 2/13/24   Oral Schwarz MD   latanoprost (Xalatan) 0.005 % ophthalmic solution ADMINISTER 1 DROP INTO BOTH EYES ONCE DAILY AT BEDTIME. 3/11/24   Historical Provider, MD   melatonin 5 mg tablet Take 1 tablet (5 mg) by mouth once daily at bedtime.    Historical Provider, MD   nitroglycerin (Nitrostat) 0.4 mg SL tablet PLACE 1 TABLET UNDER THE TONGUE EVERY 5 MINUTES FOR UP TO 3 DOSES AS NEEDED FOR CHEST PAIN.CALL 911 IF PAIN PERSISTS. 7/20/16   Historical Provider, MD   pantoprazole (ProtoNix) 40 mg EC tablet Take 1 tablet (40 mg) by mouth once daily in the morning. Take before meals. 12/14/23 12/13/24  Kassandra Lopez MD   semaglutide 2 mg/dose (8 mg/3 mL) pen injector Inject 2 mg under the skin 1 (one) time per week. 4/7/24   Michael Hilario MD   urea (Carmol) 20 % cream Apply 1 Application topically 2 times a day. 4/18/24   Historical Provider, MD     Allergies   Allergen  Reactions    Other Rash     Novocaine Solution, reaction rash     Penicillins Rash and Hives    Procaine Rash     Social History     Tobacco Use    Smoking status: Never     Passive exposure: Never    Smokeless tobacco: Never   Substance Use Topics    Alcohol use: Not Currently     Comment: occasionally         Chemistry    Lab Results   Component Value Date/Time     08/11/2024 0311    K 4.5 08/11/2024 0311     08/11/2024 0311    CO2 26 08/11/2024 0311    BUN 61 (H) 08/11/2024 0311    CREATININE 1.51 (H) 08/11/2024 0311    Lab Results   Component Value Date/Time    CALCIUM 9.1 08/11/2024 0311    ALKPHOS 79 08/11/2024 0311    AST 11 08/11/2024 0311    ALT 7 08/11/2024 0311    BILITOT 0.5 08/11/2024 0311          Lab Results   Component Value Date/Time    WBC 9.4 08/11/2024 1735    HGB 8.2 (L) 08/11/2024 1735    HCT 24.9 (L) 08/11/2024 1735     08/11/2024 1735     Lab Results   Component Value Date/Time    PROTIME 18.2 (H) 08/11/2024 0311    INR 1.6 (H) 08/11/2024 0311     Encounter Date: 08/11/24   ECG 12 lead   Result Value    Ventricular Rate 88    Atrial Rate 88    DC Interval 154    QRS Duration 112    QT Interval 398    QTC Calculation(Bazett) 481    P Axis 18    R Axis -31    T Axis -13    QRS Count 15    Q Onset 224    P Onset 147    P Offset 194    T Offset 423    QTC Fredericia 452    Narrative    Normal sinus rhythm with sinus arrhythmia  Left axis deviation  Moderate voltage criteria for LVH, may be normal variant ( R in aVL , [place] product )  Nonspecific ST and T wave abnormality  Prolonged QT  Abnormal ECG  When compared with ECG of 07-JUN-2024 09:35,  No significant change was found    See ED provider note for full interpretation and clinical correlation  Confirmed by Chiara Stanton ([id number]) on 8/11/2024 9:51:41 PM     No results found for this or any previous visit from the past 1095 days.       Anesthesia Plan    History of general anesthesia?: yes  History of complications of  general anesthesia?: no    ASA 3     general     inhalational induction   Trial extubation is planned.  Anesthetic plan and risks discussed with patient.  Use of blood products discussed with patient who consented to blood products.    Plan discussed with CRNA.

## 2024-08-12 NOTE — ANESTHESIA PROCEDURE NOTES
Airway  Date/Time: 8/12/2024 10:57 AM  Urgency: elective    Airway not difficult    Staffing  Performed: CRNA   Authorized by: Tala Garcia MD    Performed by: Tala Garcia MD  Patient location during procedure: OR    Indications and Patient Condition  Indications for airway management: anesthesia and airway protection  Spontaneous Ventilation: absent  Sedation level: deep  Preoxygenated: yes  Patient position: sniffing  Mask difficulty assessment: 0 - not attempted  Planned trial extubation    Final Airway Details  Final airway type: endotracheal airway      Successful airway: ETT  Cuffed: yes   Successful intubation technique: direct laryngoscopy  Facilitating devices/methods: intubating stylet and cricoid pressure  Endotracheal tube insertion site: oral  Blade: Boo  Blade size: #4  ETT size (mm): 7.0  Cormack-Lehane Classification: grade I - full view of glottis  Placement verified by: chest auscultation, capnometry and palpation of cuff   Measured from: lips  ETT to lips (cm): 20  Number of other approaches attempted: 0

## 2024-08-12 NOTE — PROGRESS NOTES
Occupational Therapy                 Therapy Communication Note    Patient Name: Nelida Mata  MRN: [id number]  Today's Date: 8/12/2024     Discipline: Occupational Therapy    Missed Visit Reason: Missed Visit Reason: Patient in a medical procedure (currently off division at EGD per EMR will re-attempt as medically indicated)    Missed Time: Attempt    08/12/24 at 1:11 PM   Saira Kolb OT   Rehab Office: [phone]

## 2024-08-12 NOTE — HOSPITAL COURSE
Nelida Mata is a 64-year-old woman with a past medical history of HFpEF (50-55% 9/2023), CAD s/p PCI w/ stent x 3 (2016, 2018) and STEMI (9/2023, no stents placed), TIA 9/2023, idiopathic SVC thrombus on Eliquis, remote DVT, T2DM, HLD, CKD, obesity, NAFLD, MICHAEL, and prior toe amputations for gangrene in 2020, who presented to the ED with a two day history of black emesis and black tarry stools, Hbg 9.0 (baseline >12). Her home antihypertensives and diuretics were held in the setting of low bp, and anticoagulation held in the setting of active GI bleed. Through admission, she remained HDS and bloody emesis and stools stopped and she did not require IV fluid resuscitation or transfusion. US liver with doppler showed a span of 13.9cm and findings consistent with hepatic steatosis and fatty infiltration, unimpressive for portal hypertension (borderline elevated hepetic vessel resistive indices). EGD had clean based ulcer in antrum with likely inflammatory polyp with adherent clot, epi injected and polyp removed and clip placed this is likely her cause of coffee ground emesis and melena, with GI recommending PPI BID. After the procedure, she tolerated a regular diet and her home anticoagulation and antihypertensives were resumed on discharge. Her home insulin regimen was resumed, follow up with PCP regarding when to restart Ozempic. She was discharged in stable condition to follow up with her PCP and gastroenterologist.     She was instructed to get repeat CBC and CMP within 1 week of discharge to review with PCP and gastroenterologist.

## 2024-08-12 NOTE — ANESTHESIA POSTPROCEDURE EVALUATION
Patient: Nelida Mata    Procedure Summary       Date: 08/12/24 Room / Location: [facility]    Anesthesia Start: 1034 Anesthesia Stop: 1145    Procedure: EGD Diagnosis: Gastrointestinal hemorrhage, unspecified gastrointestinal hemorrhage type    Scheduled Providers: Paul Krishnan MD; Tala Garcia MD Responsible Provider: Tala Garcia MD    Anesthesia Type: general ASA Status: 3            Anesthesia Type: general    Vitals Value Taken Time   /57 08/12/24 1210   Temp 36.3 °C (97.3 °F) 08/12/24 1140   Pulse 88 08/12/24 1210   Resp 18 08/12/24 1210   SpO2 96 % 08/12/24 1210       Anesthesia Post Evaluation    Patient location during evaluation: PACU  Patient participation: complete - patient participated  Level of consciousness: awake  Pain management: adequate  Airway patency: patent  Cardiovascular status: acceptable  Respiratory status: acceptable  Hydration status: acceptable  Postoperative Nausea and Vomiting: none        There were no known notable events for this encounter.

## 2024-08-12 NOTE — PROGRESS NOTES
"[facility]  [facility] [place]  CONSULT FOLLOW-UP     Reason For Consult  Coffee ground emesis, melena    Interval Events  Patient reports she is feeling ok this morning denies any further hematemesis or melena. Denies any bowel movement overnight. Reports has been having hiccups since yesterday.     EXAM     Last Recorded Vitals  Blood pressure 129/57, pulse 88, temperature 36.4 °C (97.5 °F), temperature source Temporal, resp. rate 18, height 1.499 m (4' 11.02\"), weight 88.4 kg (194 lb 14.2 oz), SpO2 100%.      Intake/Output Summary (Last 24 hours) at 8/12/2024 0846  Last data filed at 8/11/2024 2325  Gross per 24 hour   Intake --   Output 0 ml   Net 0 ml          Physical Exam  General: well-nourished, no acute distress  HEENT: PERRLA, EOM intact, no scleral icterus, moist MM  Respiratory: CTA bilaterally, normal work of breathing  Cardiovascular: RRR, no murmurs/rubs/gallops  Abdomen: Soft, nontender, nondistended, bowel sounds present, no masses palpated, no organomeagly  Extremities: LLE > RLE non pitting edema present  Neuro: alert and oriented, CNII-XII grossly intact, moves all 4 extremities with no focal deficits      OBJECTIVE                                                                              Medications             Current Facility-Administered Medications:     acetaminophen (Tylenol) tablet 975 mg, 975 mg, oral, q8h NORBERT, 975 mg at 08/11/24 2107 **OR** acetaminophen (Tylenol) oral liquid 1,000 mg, 1,000 mg, nasogastric tube, q8h NORBERT **OR** acetaminophen (Tylenol) suppository 650 mg, 650 mg, rectal, q8h NORBERT, Christopher Grossman MD    alum-mag hydroxide-simeth (Mylanta) 200-200-20 mg/5 mL oral suspension 10 mL, 10 mL, oral, TID PRN, Christopher Grossman MD, 10 mL at 08/11/24 1626    [Held by provider] atorvastatin (Lipitor) tablet 80 mg, 80 mg, oral, Daily, Darren Smalls MD    brimonidine (AlphaGAN) 0.2 % ophthalmic solution 1 drop, 1 drop, Both Eyes, BID, Darren" MD Serina, 1 drop at 08/11/24 2109    dextrose 50 % injection 12.5 g, 12.5 g, intravenous, q15 min PRN, Darren Smalls MD    dextrose 50 % injection 25 g, 25 g, intravenous, q15 min PRN, Darren Smalls MD    [Held by provider] DULoxetine (Cymbalta) DR capsule 30 mg, 30 mg, oral, Daily **AND** [Held by provider] DULoxetine (Cymbalta) DR capsule 60 mg, 60 mg, oral, Nightly, Darren Smalls MD    glucagon (Glucagen) injection 1 mg, 1 mg, intramuscular, q15 min PRN, Darren Smalls MD    glucagon (Glucagen) injection 1 mg, 1 mg, intramuscular, q15 min PRN, Darren Smalls MD    insulin degludec (Tresiba) injection 20 Units, 20 Units, subcutaneous, Nightly, Darren Smalls MD, 20 Units at 08/11/24 2106    latanoprost (Xalatan) 0.005 % ophthalmic solution 1 drop, 1 drop, Both Eyes, Nightly, Darren Smalls MD, 1 drop at 08/11/24 2106    melatonin tablet 5 mg, 5 mg, oral, Nightly, Darren Smalls MD    nitroglycerin (Nitrostat) SL tablet 0.4 mg, 0.4 mg, sublingual, q5 min PRN, Darren Smalls MD    oxyCODONE (Roxicodone) immediate release tablet 5 mg, 5 mg, oral, q6h PRN, Christopher Grossman MD, 5 mg at 08/12/24 0730    pantoprazole (ProtoNix) injection 40 mg, 40 mg, intravenous, BID, Darren Smalls MD, 40 mg at 08/11/24 1626    polyethylene glycol (Glycolax, Miralax) packet 17 g, 17 g, oral, Daily PRN, Loyda Woods MD                                                                            Labs     Results for orders placed or performed during the hospital encounter of 08/11/24 (from the past 24 hour(s))   POCT GLUCOSE   Result Value Ref Range    POCT Glucose 103 (H) 74 - 99 mg/dL   CBC and Auto Differential   Result Value Ref Range    WBC 8.4 4.4 - 11.3 x10*3/uL    nRBC 0.0 0.0 - 0.0 /100 WBCs    RBC 2.79 (L) 4.00 - 5.20 x10*6/uL    Hemoglobin 7.8 (L) 12.0 - 16.0 g/dL    Hematocrit 23.6 (L) 36.0 - 46.0 %    MCV 85 80 - 100 fL    MCH 28.0 26.0 - 34.0 pg    MCHC 33.1 32.0 - 36.0 g/dL    RDW 13.6 11.5 - 14.5 %     Platelets 181 150 - 450 x10*3/uL    Neutrophils % 63.0 40.0 - 80.0 %    Immature Granulocytes %, Automated 0.5 0.0 - 0.9 %    Lymphocytes % 26.3 13.0 - 44.0 %    Monocytes % 7.8 2.0 - 10.0 %    Eosinophils % 1.2 0.0 - 6.0 %    Basophils % 1.2 0.0 - 2.0 %    Neutrophils Absolute 5.29 1.20 - 7.70 x10*3/uL    Immature Granulocytes Absolute, Automated 0.04 0.00 - 0.70 x10*3/uL    Lymphocytes Absolute 2.20 1.20 - 4.80 x10*3/uL    Monocytes Absolute 0.65 0.10 - 1.00 x10*3/uL    Eosinophils Absolute 0.10 0.00 - 0.70 x10*3/uL    Basophils Absolute 0.10 0.00 - 0.10 x10*3/uL   CBC and Auto Differential   Result Value Ref Range    WBC 9.4 4.4 - 11.3 x10*3/uL    nRBC 0.0 0.0 - 0.0 /100 WBCs    RBC 2.92 (L) 4.00 - 5.20 x10*6/uL    Hemoglobin 8.2 (L) 12.0 - 16.0 g/dL    Hematocrit 24.9 (L) 36.0 - 46.0 %    MCV 85 80 - 100 fL    MCH 28.1 26.0 - 34.0 pg    MCHC 32.9 32.0 - 36.0 g/dL    RDW 13.7 11.5 - 14.5 %    Platelets 195 150 - 450 x10*3/uL    Neutrophils % 58.8 40.0 - 80.0 %    Immature Granulocytes %, Automated 0.2 0.0 - 0.9 %    Lymphocytes % 29.2 13.0 - 44.0 %    Monocytes % 8.1 2.0 - 10.0 %    Eosinophils % 2.4 0.0 - 6.0 %    Basophils % 1.3 0.0 - 2.0 %    Neutrophils Absolute 5.52 1.20 - 7.70 x10*3/uL    Immature Granulocytes Absolute, Automated 0.02 0.00 - 0.70 x10*3/uL    Lymphocytes Absolute 2.74 1.20 - 4.80 x10*3/uL    Monocytes Absolute 0.76 0.10 - 1.00 x10*3/uL    Eosinophils Absolute 0.23 0.00 - 0.70 x10*3/uL    Basophils Absolute 0.12 (H) 0.00 - 0.10 x10*3/uL   POCT GLUCOSE   Result Value Ref Range    POCT Glucose 172 (H) 74 - 99 mg/dL                                                                            Imaging     === 08/11/24 ===    US LIVER WITH DOPPLER    - Impression -  1. Findings suggestive of hepatic steatosis with no focal liver  lesion identified.  2. Borderline elevated resistive indices of the hepatic arteries,  which are nonspecific however may be secondary to chronic liver  disease. Otherwise  unremarkable Doppler evaluation of the hepatic  vasculature.  3. Partially visualized enlarged leiomyomatous uterine fundus.    I personally reviewed the images/study and I agree with the findings  as stated by Dr. Jimy Walton M.D. This study was interpreted at  [facility], [place], [place].    MACRO:  None    Signed by: Te Mathew 8/11/2024 5:31 PM  Dictation workstation:   [id number]                                                                             GI Procedures     EGD 1/12/23  Impression:              - LA Grade B esophagitis with no bleeding.  - 1 cm hiatal hernia.  - No esophageal mass.  - Erosive gastritis. Biopsied.  - Normal examined duodenum.    Colonoscopy   Impression:              - One 6 mm polyp in the transverse colon. Resected and retrieved.  - The examination was otherwise normal on direct and retroflexion views.  - Repeat colonoscopy in 5 years for surveillance.     ASSESSMENT / PLAN     ASSESSMENT/PLAN:    Nelida Mata is a 64 y.o. female with HFpEF, CAD s/p PCI x TAMANNA (last in 2018), SVC thrombus (Jan 2023) on eliquis, T2DM, HLD, MICHAEL on CPAP, and remote hx of DVT, admitted to [facility] with melena and coffee ground emesis. Gastroenterology is consulted for the same.      Pt has melena and coffee ground emesis prior to presentation, with associated Hb drop from 12 --> 7.8 yesterday AM, all concerning for UGIB. Pt also w/ BUN/Cr > 20. Ddx: PUD, vascular lesion, esophagitis/gastritis/duodenitis, malignancy, etc. Last dose of eliquis 8/10 PM. Will plan to proceed w/ EGD today.      #acute blood loss anemia   #melena, coffee ground emesis:  - EGD today  - Continue to trend Hgb BID (goal > 7), platelets (goal > 50)  - Maintain adequate IV access, ideally 2 large bore Ivs  - Remain NPO pending EGD   - IV PPI BID    Patient was seen and discussed with Dr. Sow    Thank you for this interesting consult. Gastroenterology will continue to  follow.  -During weekday hours of 7am-5pm please do not hesitate to contact me on [employer] Chat or page [phone] if there are any further questions between the weekday hours of 7 AM - 5 PM.   -After hours, on weekends, and on holidays, please page the on-call GI fellow at [phone]. Thank you.      Nadine Carson MD

## 2024-08-13 ENCOUNTER — PHARMACY VISIT (OUTPATIENT)
Dept: PHARMACY | Facility: CLINIC | Age: 64
End: 2024-08-13
Payer: COMMERCIAL

## 2024-08-13 ENCOUNTER — PATIENT OUTREACH (OUTPATIENT)
Dept: CARE COORDINATION | Facility: CLINIC | Age: 64
End: 2024-08-13
Payer: COMMERCIAL

## 2024-08-13 VITALS
HEART RATE: 86 BPM | TEMPERATURE: 98.6 F | HEIGHT: 59 IN | BODY MASS INDEX: 39.29 KG/M2 | WEIGHT: 194.89 LBS | RESPIRATION RATE: 18 BRPM | OXYGEN SATURATION: 99 % | DIASTOLIC BLOOD PRESSURE: 58 MMHG | SYSTOLIC BLOOD PRESSURE: 118 MMHG

## 2024-08-13 PROBLEM — D50.9 IRON DEFICIENCY ANEMIA: Status: ACTIVE | Noted: 2024-08-13

## 2024-08-13 LAB
ALBUMIN SERPL BCP-MCNC: 4 G/DL (ref 3.4–5)
ANION GAP SERPL CALC-SCNC: 15 MMOL/L (ref 10–20)
BASOPHILS # BLD AUTO: 0.04 X10*3/UL (ref 0–0.1)
BASOPHILS NFR BLD AUTO: 0.5 %
BUN SERPL-MCNC: 22 MG/DL (ref 6–23)
CALCIUM SERPL-MCNC: 9.3 MG/DL (ref 8.6–10.6)
CHLORIDE SERPL-SCNC: 103 MMOL/L (ref 98–107)
CO2 SERPL-SCNC: 26 MMOL/L (ref 21–32)
CREAT SERPL-MCNC: 1.12 MG/DL (ref 0.5–1.05)
EGFRCR SERPLBLD CKD-EPI 2021: 55 ML/MIN/1.73M*2
EOSINOPHIL # BLD AUTO: 0 X10*3/UL (ref 0–0.7)
EOSINOPHIL NFR BLD AUTO: 0 %
ERYTHROCYTE [DISTWIDTH] IN BLOOD BY AUTOMATED COUNT: 13.8 % (ref 11.5–14.5)
GLUCOSE BLD MANUAL STRIP-MCNC: 255 MG/DL (ref 74–99)
GLUCOSE BLD MANUAL STRIP-MCNC: 275 MG/DL (ref 74–99)
GLUCOSE SERPL-MCNC: 292 MG/DL (ref 74–99)
HCT VFR BLD AUTO: 29.4 % (ref 36–46)
HGB BLD-MCNC: 8.9 G/DL (ref 12–16)
IMM GRANULOCYTES # BLD AUTO: 0.03 X10*3/UL (ref 0–0.7)
IMM GRANULOCYTES NFR BLD AUTO: 0.3 % (ref 0–0.9)
LYMPHOCYTES # BLD AUTO: 0.94 X10*3/UL (ref 1.2–4.8)
LYMPHOCYTES NFR BLD AUTO: 10.9 %
MCH RBC QN AUTO: 27.7 PG (ref 26–34)
MCHC RBC AUTO-ENTMCNC: 30.3 G/DL (ref 32–36)
MCV RBC AUTO: 92 FL (ref 80–100)
MONOCYTES # BLD AUTO: 0.19 X10*3/UL (ref 0.1–1)
MONOCYTES NFR BLD AUTO: 2.2 %
NEUTROPHILS # BLD AUTO: 7.39 X10*3/UL (ref 1.2–7.7)
NEUTROPHILS NFR BLD AUTO: 86.1 %
NRBC BLD-RTO: 0 /100 WBCS (ref 0–0)
PHOSPHATE SERPL-MCNC: 3.7 MG/DL (ref 2.5–4.9)
PLATELET # BLD AUTO: 221 X10*3/UL (ref 150–450)
POTASSIUM SERPL-SCNC: 4.6 MMOL/L (ref 3.5–5.3)
RBC # BLD AUTO: 3.21 X10*6/UL (ref 4–5.2)
SODIUM SERPL-SCNC: 139 MMOL/L (ref 136–145)
WBC # BLD AUTO: 8.6 X10*3/UL (ref 4.4–11.3)

## 2024-08-13 PROCEDURE — RXMED WILLOW AMBULATORY MEDICATION CHARGE

## 2024-08-13 PROCEDURE — 2500000004 HC RX 250 GENERAL PHARMACY W/ HCPCS (ALT 636 FOR OP/ED)

## 2024-08-13 PROCEDURE — 2500000001 HC RX 250 WO HCPCS SELF ADMINISTERED DRUGS (ALT 637 FOR MEDICARE OP)

## 2024-08-13 PROCEDURE — 36415 COLL VENOUS BLD VENIPUNCTURE: CPT

## 2024-08-13 PROCEDURE — 97166 OT EVAL MOD COMPLEX 45 MIN: CPT | Mod: GO

## 2024-08-13 PROCEDURE — 82947 ASSAY GLUCOSE BLOOD QUANT: CPT

## 2024-08-13 PROCEDURE — 97535 SELF CARE MNGMENT TRAINING: CPT | Mod: GO

## 2024-08-13 PROCEDURE — 97530 THERAPEUTIC ACTIVITIES: CPT | Mod: GP

## 2024-08-13 PROCEDURE — 97161 PT EVAL LOW COMPLEX 20 MIN: CPT | Mod: GP

## 2024-08-13 PROCEDURE — 85025 COMPLETE CBC W/AUTO DIFF WBC: CPT

## 2024-08-13 PROCEDURE — 2500000002 HC RX 250 W HCPCS SELF ADMINISTERED DRUGS (ALT 637 FOR MEDICARE OP, ALT 636 FOR OP/ED)

## 2024-08-13 PROCEDURE — 80069 RENAL FUNCTION PANEL: CPT

## 2024-08-13 PROCEDURE — 99239 HOSP IP/OBS DSCHRG MGMT >30: CPT | Performed by: INTERNAL MEDICINE

## 2024-08-13 RX ORDER — CARVEDILOL 3.12 MG/1
3.12 TABLET ORAL 2 TIMES DAILY
Status: CANCELLED | OUTPATIENT
Start: 2024-08-13

## 2024-08-13 RX ORDER — AMOXICILLIN 250 MG
1 CAPSULE ORAL 2 TIMES DAILY
Qty: 60 TABLET | Refills: 0 | Status: SHIPPED | OUTPATIENT
Start: 2024-08-13 | End: 2024-09-12

## 2024-08-13 RX ORDER — INSULIN LISPRO 100 [IU]/ML
0-5 INJECTION, SOLUTION INTRAVENOUS; SUBCUTANEOUS
Status: DISCONTINUED | OUTPATIENT
Start: 2024-08-13 | End: 2024-08-13 | Stop reason: HOSPADM

## 2024-08-13 RX ORDER — BISACODYL 10 MG/1
10 SUPPOSITORY RECTAL ONCE
Status: CANCELLED | OUTPATIENT
Start: 2024-08-13

## 2024-08-13 RX ORDER — DULOXETIN HYDROCHLORIDE 30 MG/1
30 CAPSULE, DELAYED RELEASE ORAL DAILY
Status: CANCELLED | OUTPATIENT
Start: 2024-08-14

## 2024-08-13 RX ORDER — HYDROXYZINE HYDROCHLORIDE 10 MG/1
10 TABLET, FILM COATED ORAL 2 TIMES DAILY
Status: CANCELLED | OUTPATIENT
Start: 2024-08-13

## 2024-08-13 RX ORDER — AMOXICILLIN 250 MG
1 CAPSULE ORAL 2 TIMES DAILY
Status: DISCONTINUED | OUTPATIENT
Start: 2024-08-13 | End: 2024-08-13 | Stop reason: HOSPADM

## 2024-08-13 RX ORDER — ACETAMINOPHEN 325 MG/1
975 TABLET ORAL EVERY 8 HOURS PRN
Qty: 90 TABLET | Refills: 0 | Status: SHIPPED | OUTPATIENT
Start: 2024-08-13 | End: 2024-08-23

## 2024-08-13 RX ORDER — POLYETHYLENE GLYCOL 3350 17 G/17G
17 POWDER, FOR SOLUTION ORAL DAILY PRN
Qty: 510 G | Refills: 0 | Status: SHIPPED | OUTPATIENT
Start: 2024-08-13 | End: 2024-08-23 | Stop reason: SDUPTHER

## 2024-08-13 RX ORDER — NAPROXEN SODIUM 220 MG/1
81 TABLET, FILM COATED ORAL DAILY
Status: CANCELLED | OUTPATIENT
Start: 2024-08-13

## 2024-08-13 RX ORDER — IBUPROFEN 200 MG
15 TABLET ORAL ONCE
Qty: 20 TABLET | Refills: 0 | Status: SHIPPED | OUTPATIENT
Start: 2024-08-13 | End: 2024-08-14

## 2024-08-13 RX ORDER — DULOXETIN HYDROCHLORIDE 60 MG/1
60 CAPSULE, DELAYED RELEASE ORAL NIGHTLY
Status: CANCELLED | OUTPATIENT
Start: 2024-08-13

## 2024-08-13 RX ORDER — ATORVASTATIN CALCIUM 80 MG/1
80 TABLET, FILM COATED ORAL DAILY
Status: CANCELLED | OUTPATIENT
Start: 2024-08-14

## 2024-08-13 RX ORDER — BISACODYL 10 MG/1
10 SUPPOSITORY RECTAL ONCE AS NEEDED
Status: COMPLETED | OUTPATIENT
Start: 2024-08-13 | End: 2024-08-13

## 2024-08-13 RX ORDER — DICLOFENAC SODIUM 10 MG/G
4 GEL TOPICAL 2 TIMES DAILY
Qty: 800 G | Refills: 0 | Status: SHIPPED | OUTPATIENT
Start: 2024-08-13 | End: 2024-11-21

## 2024-08-13 RX ADMIN — INSULIN LISPRO 3 UNITS: 100 INJECTION, SOLUTION INTRAVENOUS; SUBCUTANEOUS at 14:01

## 2024-08-13 RX ADMIN — ACETAMINOPHEN 975 MG: 325 TABLET ORAL at 07:09

## 2024-08-13 RX ADMIN — OXYCODONE HYDROCHLORIDE 5 MG: 5 TABLET ORAL at 06:54

## 2024-08-13 RX ADMIN — BISACODYL 10 MG: 10 SUPPOSITORY RECTAL at 14:02

## 2024-08-13 RX ADMIN — POLYETHYLENE GLYCOL 3350 17 G: 17 POWDER, FOR SOLUTION ORAL at 08:38

## 2024-08-13 RX ADMIN — INSULIN LISPRO 3 UNITS: 100 INJECTION, SOLUTION INTRAVENOUS; SUBCUTANEOUS at 11:01

## 2024-08-13 RX ADMIN — ISOSORBIDE MONONITRATE 60 MG: 60 TABLET, EXTENDED RELEASE ORAL at 08:37

## 2024-08-13 RX ADMIN — APIXABAN 5 MG: 5 TABLET, FILM COATED ORAL at 11:02

## 2024-08-13 RX ADMIN — SENNOSIDES AND DOCUSATE SODIUM 1 TABLET: 50; 8.6 TABLET ORAL at 08:37

## 2024-08-13 RX ADMIN — BRIMONIDINE TARTRATE 1 DROP: 2 SOLUTION/ DROPS OPHTHALMIC at 08:37

## 2024-08-13 RX ADMIN — DICLOFENAC SODIUM 4 G: 10 GEL TOPICAL at 08:37

## 2024-08-13 RX ADMIN — PANTOPRAZOLE SODIUM 40 MG: 40 INJECTION, POWDER, FOR SOLUTION INTRAVENOUS at 08:37

## 2024-08-13 ASSESSMENT — COGNITIVE AND FUNCTIONAL STATUS - GENERAL
WALKING IN HOSPITAL ROOM: A LITTLE
DRESSING REGULAR LOWER BODY CLOTHING: A LOT
STANDING UP FROM CHAIR USING ARMS: A LITTLE
DAILY ACTIVITIY SCORE: 17
TOILETING: A LITTLE
EATING MEALS: A LITTLE
CLIMB 3 TO 5 STEPS WITH RAILING: A LITTLE
HELP NEEDED FOR BATHING: A LITTLE
PERSONAL GROOMING: A LITTLE
TURNING FROM BACK TO SIDE WHILE IN FLAT BAD: A LITTLE
MOVING TO AND FROM BED TO CHAIR: A LITTLE
DRESSING REGULAR UPPER BODY CLOTHING: A LITTLE
MOVING FROM LYING ON BACK TO SITTING ON SIDE OF FLAT BED WITH BEDRAILS: A LITTLE
MOBILITY SCORE: 18

## 2024-08-13 ASSESSMENT — ACTIVITIES OF DAILY LIVING (ADL): HOME_MANAGEMENT_TIME_ENTRY: 17

## 2024-08-13 ASSESSMENT — PAIN SCALES - GENERAL
PAINLEVEL_OUTOF10: 8
PAINLEVEL_OUTOF10: 8
PAINLEVEL_OUTOF10: 6
PAINLEVEL_OUTOF10: 7

## 2024-08-13 ASSESSMENT — PAIN - FUNCTIONAL ASSESSMENT
PAIN_FUNCTIONAL_ASSESSMENT: 0-10
PAIN_FUNCTIONAL_ASSESSMENT: 0-10

## 2024-08-13 NOTE — PROGRESS NOTES
Patient has completed her 30 days of outreach.  Patient currently readmitted on 8/11.     ROSEANNE PadgettN, RN   [phone]   ACO Department

## 2024-08-13 NOTE — CARE PLAN
Patient is calm and cooperative with care, complaints of pain, as well as itching,  medication administered.    Problem: Pain  Goal: Takes deep breaths with improved pain control throughout the shift  Outcome: Progressing  Goal: Turns in bed with improved pain control throughout the shift  Outcome: Progressing     Problem: Safety - Adult  Goal: Free from fall injury  Outcome: Progressing     Problem: Fall/Injury  Goal: Not fall by end of shift  Outcome: Progressing  Goal: Be free from injury by end of the shift  Outcome: Progressing   The patient's goals for the shift include      The clinical goals for the shift include pt will remain free from falls or injuries throughout the shift    Over the shift, the patient did make progress toward the following goals.

## 2024-08-13 NOTE — PROGRESS NOTES
Physical Therapy    Physical Therapy Evaluation & Treatment    Patient Name: Nelida Mata  MRN: [id number]  Today's Date: 8/13/2024   Time Calculation  Start Time: 1202  Stop Time: 1227  Time Calculation (min): 25 min    Assessment/Plan   PT Assessment  PT Assessment Results: Decreased strength, Impaired balance, Decreased mobility, Decreased range of motion  Rehab Prognosis: Good  Barriers to Discharge: medical comorbidities  End of Session Communication: Bedside nurse  Assessment Comment: Pt demos decreased strength, ROM, antalgic gait.  Appears would benefit from AD use.  Will benefit from skilled PT to continue to progress towards (I) functional mobility.  End of Session Patient Position: Bed, 3 rail up, Alarm off, not on at start of session (seated at EOB)   IP OR SWING BED PT PLAN  Inpatient or Swing Bed: Inpatient  PT Plan  Treatment/Interventions: Bed mobility, Transfer training, Gait training, Balance training, Strengthening, Therapeutic exercise, Therapeutic activity  PT Plan: Ongoing PT  PT Frequency: 3 times per week  PT Discharge Recommendations: Low intensity level of continued care  PT Recommended Transfer Status: Contact guard, Stand by assist  PT - OK to Discharge: Yes (POC/goals/discharge rec intensity created)    Subjective     General Visit Information:  General  Reason for Referral: suspected GIB bleed, gastritis, s/p EGD 8/12  Past Medical History Relevant to Rehab: CAD, HF, HTN, PAD, DM, CKD, SVC thrombus, retinal detachment surgery.  Pt reports toe amputations on (R) foot  Prior to Session Communication: Bedside nurse  General Comment: Pt agreeable to participate, ready to go home today.  Pt reports having baseline issues with limited knee flexion, requests hand held assist with gait around room.  Required assist to don hospital underwear for gait tasks.  Will continue to follow.  Home Living:  Home Living  Type of Home: Apartment  Lives With: Alone (sig other over frequently)  Home  Adaptive Equipment: Walker rolling or standard, Cane  Home Access: Elevator  Prior Level of Function:  Prior Function Per Pt/Caregiver Report  ADL Assistance:  (initially reports (I) with all tasks, later states needs help with lower body dressing as requesting PT assist to don underwear)  Ambulatory Assistance:  (reports (I) household amb/ furniture walks.  States holds onto sig other for longer distance amb.  Denies falls.)  Precautions:  Precautions  Medical Precautions: Fall precautions     Objective   Pain:  Pain Assessment  Pain Assessment: 0-10  0-10 (Numeric) Pain Score:  (denied pain)  Cognition:  Cognition  Arousal/Alertness: Appropriate responses to stimuli  Orientation Level: Oriented X4  Insight: Mild (reports she does not want to use AD as she feels this will lead to her going to a nursing home.)    General Assessments:     Activity Tolerance  Endurance: Tolerates 10 - 20 min exercise with multiple rests    Sensation  Sensation Comment: baseline neuropathy        Perception  Inattention/Neglect: Appears intact  Initiation: Appears intact  Motor Planning: Appears intact  Perseveration: Not present     Postural Control  Postural Control: Within Functional Limits    Static Sitting Balance  Static Sitting-Balance Support: Feet supported  Static Sitting-Level of Assistance: Independent    Static Standing Balance  Static Standing-Balance Support: No upper extremity supported  Static Standing-Level of Assistance: Close supervision  Dynamic Standing Balance  Dynamic Standing-Balance Support: No upper extremity supported  Dynamic Standing-Comments: CGA  Functional Assessments and Treatments:  ADL  ADL's Addressed:  (required assistance to doff adult brief, don hospital underwear with pad/thread legs through, pt able to pull up once in standing)    Bed Mobility  Bed Mobility: Yes  Bed Mobility 1  Bed Mobility 1: Supine to sitting  Level of Assistance 1: Close supervision  Bed Mobility Comments 1: HOB raised, use  of bed rail  Bed Mobility 2  Bed Mobility Comments 2: pt remained seated EOB with needs in reach at end of session.    Transfers  Transfer: Yes  Transfer 1  Transfer From 1: Bed to  Transfer to 1: Stand  Transfer Level of Assistance 1: Contact guard, Minimal verbal cues  Trials/Comments 1: completed x2  Transfers 2  Transfer From 2: Stand to  Transfer to 2: Bed  Transfer Level of Assistance 2: Close supervision  Trials/Comments 2: completed x2    Ambulation/Gait Training  Ambulation/Gait Training Performed: Yes  Ambulation/Gait Training 1  Surface 1: Level tile  Device 1: No device  Assistance 1: Contact guard  Quality of Gait 1: Wide base of support, Shuffling gait  Comments/Distance (ft) 1: 3 ft  Ambulation/Gait Training 2  Surface 2: Level tile  Device 2: No device  Assistance 2: Contact guard, Minimal verbal cues  Quality of Gait 2: Decreased step length, Shuffling gait (increased lateral sway, mild antalgic appearance. Pt requests PT CGA, discussed would benefit from AD use, though pt states once she is home she can hold onto furniture or boyfriend.)  Comments/Distance (ft) 2: 20 ft  Extremity/Trunk Assessments:  RLE   RLE : Exceptions to WFL  AROM RLE (degrees)  RLE AROM Comment: flexes knee to ~80 in sitting  Strength RLE  RLE Overall Strength: Greater than or equal to 3/5 as evidenced by functional mobility  LLE   LLE : Exceptions to WFL  AROM LLE (degrees)  LLE AROM Comment: flexes knee to ~80 in sitting  Strength LLE  LLE Overall Strength: Greater than or equal to 3/5 as evidenced by functional mobility    Outcome Measures:  [facility] Basic Mobility  Turning from your back to your side while in a flat bed without using bedrails: A little  Moving from lying on your back to sitting on the side of a flat bed without using bedrails: A little  Moving to and from bed to chair (including a wheelchair): A little  Standing up from a chair using your arms (e.g. wheelchair or bedside chair): A little  To walk in hospital  room: A little  Climbing 3-5 steps with railing: A little  Basic Mobility - Total Score: 18    Encounter Problems       Encounter Problems (Active)       Balance       Patient will maintain balance to allow for safe mobility with LRAD mod (I).        Start:  08/13/24    Expected End:  08/27/24               Mobility       STG - Patient will ambulate with LRAD, mod (I), 75 ft x2.        Start:  08/13/24    Expected End:  08/27/24               PT Transfers       STG - Patient will perform bed mobility (I).        Start:  08/13/24    Expected End:  08/27/24            STG - Patient will transfer sit to and from stand (I).        Start:  08/13/24    Expected End:  08/27/24               Strength       Will complete (B) LE therex to improve strength/ROM and transfers.       Start:  08/13/24    Expected End:  08/27/24                   Education Documentation  Mobility Training, taught by Aruna Khan, PT at 8/13/2024  1:18 PM.  Learner: Patient  Readiness: Acceptance  Method: Explanation  Response: Verbalizes Understanding, Needs Reinforcement  Comment: use of AD for safe ambulation      08/13/24 at 1:21 PM - Aruna Khan, PT

## 2024-08-13 NOTE — NURSING NOTE
Ms Mata is a 64 year old admitted melena  S/p egd clotted ulcer polpy removal  Pmh cva dvt  Age friendly  What matters no bleeding, able to eat, full nando return home  Meds eliquis ppi  Mentation cam neg  Mobility working PT recommends low intensity

## 2024-08-13 NOTE — PROGRESS NOTES
Occupational Therapy    Evaluation and Treatment    Patient Name: Nelida Mata  MRN: [id number]  Today's Date: 8/13/2024  Room: 20792079A  Time Calculation  Start Time: 1034  Stop Time: 1116  Time Calculation (min): 42 min    Assessment  IP OT Assessment  OT Assessment: Pt presents with mild instability during functional mobility and decreased ADL independence. However, pt appears to be functioning close to baseline. Pt would benefit from LOW intensity OT with 24 hour supervision.  Prognosis: Good  Evaluation/Treatment Tolerance: Patient tolerated treatment well  Medical Staff Made Aware: Yes  End of Session Communication: Bedside nurse  End of Session Patient Position: Bed, 2 rail up, Alarm off, not on at start of session    Plan:  Inpatient Plan  Treatment Interventions: ADL retraining, Functional transfer training, Equipment evaluation/education, Compensatory technique education, Patient/family training  OT Frequency: 2 times per week  OT Discharge Recommendations: Low intensity level of continued care (+24 hour supervision/assist)  Equipment Recommended upon Discharge:  (sock aid, reacher)  OT Recommended Transfer Status: Assist of 1  OT - OK to Discharge: Yes  OT Assessment  OT Assessment Results: Decreased ADL status, Decreased functional mobility, Decreased gross motor control  Prognosis: Good  Evaluation/Treatment Tolerance: Patient tolerated treatment well  Medical Staff Made Aware: Yes  Strengths: Ability to acquire knowledge, Attitude of self, Capable of completing ADLs semi/independent, Support of Caregivers    Subjective   Current Problem:  1. Acute gastric ulcer with hemorrhage  Esophagogastroduodenoscopy (EGD)      2. Gastrointestinal hemorrhage, unspecified gastrointestinal hemorrhage type  Esophagogastroduodenoscopy (EGD)    Esophagogastroduodenoscopy (EGD)    Surgical Pathology Exam    Surgical Pathology Exam    Esophagogastroduodenoscopy (EGD)    Referral to Gastroenterology     polyethylene glycol (Glycolax, Miralax) 17 gram/dose powder    sennosides-docusate sodium (Zoila-Colace) 8.6-50 mg tablet      3. Type 2 diabetes mellitus with hyperglycemia, with long-term current use of insulin (Multi)  glucose (Glucose GeL) 40 % gel oral gel      4. Other secondary osteoarthritis of multiple sites  acetaminophen (Tylenol) 325 mg tablet    diclofenac sodium (Voltaren) 1 % gel        General:  Reason for Referral: Per EMR: melena and coffee-ground emesis, Gastrointestinal hemorrhage, unspecified gastrointestinal hemorrhage type.  Past Medical History Relevant to Rehab: Per EMR: HFpEF (50-55% 9/2023), CAD s/p PCI w/ stent x 3 (2016, 2018) and STEMI (9/2023, no stents placed), TIA 9/2023, SVC thrombus on Eliquis, remote DVT, T2DM, HLD, CKD, obesity, NAFLD, MICHAEL, and prior toe amputations for gangrene in 2020  Prior to Session Communication: Bedside nurse  Patient Position Received: Bed, 4 rail up, Alarm off, not on at start of session (4 rails up upon arrival)  Family/Caregiver Present: Yes (significant other)  General Comment: Pt supine in bed and agreeable to therapy. Pt performed LB and UB dressing and grooming ADLs as well as few lateral steps at EOB. Pt endorsed mild dizziness and headache upon sitting up. Pt appears to be funtioning close to baseline, would benefit from low intensity OT with 24 hour supervision.     Precautions:  Medical Precautions: Fall precautions    Vital Signs:  Heart Rate: 85  SpO2: 98 %  BP: 128/80  BP Location: Left arm  BP Method: Automatic  Patient Position: Lying    Pain:  Pain Assessment  Pain Assessment: 0-10 (endosed headache when moving from supine to sitting - RN notified)  0-10 (Numeric) Pain Score: 8  Pain Location:  (stomach and chest)  Lines/Tubes/Drains:  External Urinary Catheter Female (Active)   Number of days: 1       Objective   Cognition:  Overall Cognitive Status: Within Functional Limits  Orientation Level: Oriented X4  Safety/Judgement: Within  "Functional Limits  Insight: Within function limits  Impulsive: Within functional limits  Processing Speed: Within funtional limits     Home Living:  Type of Home: Apartment  Lives With:  (boyfriend in apartment building, can help as needed)  Home Adaptive Equipment: Cane, Walker rolling or standard (does not use normally)  Home Layout: One level  Home Access: Elevator  Bathroom Shower/Tub: Tub/shower unit  Bathroom Toilet: Standard (endorses toilet being low)  Bathroom Equipment: Grab bars in shower     Prior Function:  Level of [place]: Needs assistance with ADLs, Needs assistance with homemaking (pt receives help from boyfriend for LB dressing and cooking/cleaning)  ADL Assistance:  (see above)  Homemaking Assistance:  (see above)  Ambulatory Assistance:  (does not typically use equipment but endorses becoming fatigued when in the community)  Vocational: On disability  Leisure: \"lottery\"  Hand Dominance: Right    IADL History:  IADL Comments: does not drive, boyfriend helps    ADL:  Eating Assistance:  (set up, anticipated)  Grooming Assistance:  (pt brushed teeth with SBA after setup of supplies while seated EOB)  Bathing Assistance:  (Erica for getting in/out of shower/tub, anticipated)  UE Dressing Assistance:  (pt doffed and donned gown with Erica while seated EOB)  LE Dressing Assistance:  (Pt required maxA for donning socks while seated EOB, explains this is baseline (boyfriend does this))  Toileting Assistance with Device:  (Erica for getting on/off toilet, anticipated)  Functional Assistance:  (Erica, anticipated)    Activity Tolerance:  Endurance:  (pt tolerated entire session but states she gets fatigued in community typically)    Balance:  Dynamic Sitting Balance  Dynamic Sitting-Comments: supervision during ADL tasks  Dynamic Standing Balance  Dynamic Standing-Balance:  (CGA during lateral steps with walker)  Static Sitting Balance  Static Sitting-Level of Assistance: Distant supervision  Static " Standing Balance  Static Standing-Level of Assistance: Contact guard    Bed Mobility/Transfers: Bed Mobility/Transfers: Bed Mobility  Bed Mobility: Yes  Bed Mobility 1  Bed Mobility 1: Supine to sitting, Sitting to supine  Level of Assistance 1: Close supervision  Functional Mobility  Functional Mobility Performed: Yes  Functional Mobility 1  Device 1:  (walker)  Assistance 1: Contact guard  Comments 1: Pt took few lateral steps at EOB with walker and CGA, pt demonstrating good walker management   and Transfers  Transfer: Yes  Transfer 1  Technique 1: Sit to stand, Stand to sit  Transfer Device 1: Walker  Transfer Level of Assistance 1: Minimum assistance  Trials/Comments 1: 2 trials    IADL's:   IADL Comments: does not drive, boyfriend helps    Vision:     and Vision - Complex Assessment  Vision Comments: Pt endorses not wearing glasses or contacts but 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 at baseline, no acute visual deficits    Sensation:  Sensation Comment: neuopathy, tingling in feet, itching (endorses none of this is new)    Coordination:  Movements are Fluid and Coordinated: Yes     Hand Function:  Hand Function  Gross Grasp: Functional  Coordination: Functional    Extremities:   RUE   RUE : Within Functional Limits (assessed via ADLs), LUE   LUE: Within Functional Limits (assessed via ADLs),  , and      Outcome Measures: [facility] Daily Activity  Putting on and taking off regular lower body clothing: A lot  Bathing (including washing, rinsing, drying): A little  Putting on and taking off regular upper body clothing: A little  Toileting, which includes using toilet, bedpan or urinal: A little  Taking care of personal grooming such as brushing teeth: A little  Eating Meals: A little  Daily Activity - Total Score: 17  Brief Confusion Assessment Method (bCAM)  CAM Result: CAM -    Education Documentation  Body Mechanics, taught by Vivian Monique OT at 8/13/2024 12:23 PM.  Learner: Patient  Readiness: Eager  Method: Explanation,  Demonstration  Response: Verbalizes Understanding, Demonstrated Understanding    Precautions, taught by Vivian Monique OT at 8/13/2024 12:23 PM.  Learner: Patient  Readiness: Eager  Method: Explanation, Demonstration  Response: Verbalizes Understanding, Demonstrated Understanding    ADL Training, taught by Vivian Monique OT at 8/13/2024 12:23 PM.  Learner: Patient  Readiness: Eager  Method: Explanation, Demonstration  Response: Verbalizes Understanding, Demonstrated Understanding    Education Comments  Provided pt with education about adaptive equipment that could be helpful once discharged (sock aid, reacher, raised toilet seat). Pt verbalized understanding. Pt provided with written list to remember items (requested this).      Goals:   Encounter Problems       Encounter Problems (Active)       ADLs       Patient will complete upper body dressing with independent level of assistance donning and doffing all UE clothes while edge of bed  (Progressing)       Start:  08/13/24    Expected End:  08/27/24            Patient will complete lower body dressing with modified independent level of assistance donning and doffing all LE clothes  with PRN adaptive equipment while edge of bed  (Progressing)       Start:  08/13/24    Expected End:  08/27/24            Patient will complete toileting including hygiene clothing management/hygiene with modified independent level of assistance and grab bars. (Progressing)       Start:  08/13/24    Expected End:  08/27/24               MOBILITY       Patient will perform Functional mobility mod  Household distances/Community Distances with modified independent level of assistance and least restrictive device in order to improve safety and functional mobility. (Progressing)       Start:  08/13/24    Expected End:  08/27/24               TRANSFERS       Patient will complete sit to stand transfer with modified independent level of assistance and least restrictive device in order to improve  safety and prepare for out of bed mobility. (Progressing)       Start:  08/13/24    Expected End:  08/27/24                   Treatment Completed on Evaluation  Activities of Daily Living:   Discussed and explained equipment with pt to maximize independence for LB dressing and provided pt with list of recommendations (pt requested this).     08/13/24 at 12:24 PM   ROGERIO Arriaza/LIANE  Rehab Office: [phone]

## 2024-08-13 NOTE — DISCHARGE SUMMARY
Discharge Diagnosis  Gastrointestinal hemorrhage, unspecified gastrointestinal hemorrhage type    Issues Requiring Follow-Up  CBC and CMP in 1 week s/p EGD for UGI bleed  Chronic itching  Costochondritis  Home glycemic control    Test Results Pending At Discharge  Pending Labs       Order Current Status    Surgical Pathology Exam In process            Hospital Course  Nelida Mata is a 64-year-old woman with a past medical history of HFpEF (50-55% 9/2023), CAD s/p PCI w/ stent x 3 (2016, 2018) and STEMI (9/2023, no stents placed), TIA 9/2023, idiopathic SVC thrombus on Eliquis, remote DVT, T2DM, HLD, CKD, obesity, NAFLD, MICHAEL, and prior toe amputations for gangrene in 2020, who presented to the ED with a two day history of black emesis and black tarry stools, Hbg 9.0 (baseline >12). Her home antihypertensives and diuretics were held in the setting of low bp, and anticoagulation held in the setting of active GI bleed. Through admission, she remained HDS and bloody emesis and stools stopped and she did not require IV fluid resuscitation or transfusion. US liver with doppler showed a span of 13.9cm and findings consistent with hepatic steatosis and fatty infiltration, unimpressive for portal hypertension (borderline elevated hepetic vessel resistive indices). EGD had clean based ulcer in antrum with likely inflammatory polyp with adherent clot, epi injected and polyp removed and clip placed this is likely her cause of coffee ground emesis and melena, with GI recommending PPI BID. After the procedure, she tolerated a regular diet and her home anticoagulation and antihypertensives were resumed on discharge. Her home insulin regimen was resumed, follow up with PCP regarding when to restart Ozempic. She was discharged in stable condition to follow up with her PCP and gastroenterologist.     She was instructed to get repeat CBC and CMP within 1 week of discharge to review with PCP and gastroenterologist.      Home  Medications     Medication List      START taking these medications     acetaminophen 325 mg tablet; Commonly known as: Tylenol; Take 3 tablets   (975 mg) by mouth every 8 hours if needed for mild pain (1 - 3) for up to   10 days.   diclofenac sodium 1 % gel; Commonly known as: Voltaren; Apply 4.5 inches   (4 g) topically 2 times a day.   glucose 4 gram chewable tablet; Chew 4 tablets (16 g) 1 time for 1 dose   as needed   polyethylene glycol 17 gram/dose powder; Commonly known as: Glycolax,   Miralax; Take 1 capful (17 g) mixed in 4-8 ounces of liquid by mouth once   daily as needed (constipation).   Senexon-S 8.6-50 mg tablet; Generic drug: sennosides-docusate sodium;   Take 1 tablet by mouth 2 times a day.     CHANGE how you take these medications     HumaLOG KwikPen Insulin 200 unit/mL (3 mL) insulin pen pen; Generic   drug: insulin lispro; Inject 22 Units under the skin 3 times daily   (morning, midday, late afternoon). Take as directed per insulin   instructions.; What changed: Another medication with the same name was   removed. Continue taking this medication, and follow the directions you   see here.     CONTINUE taking these medications     apixaban 5 mg tablet; Commonly known as: Eliquis; Take 1 tablet (5 mg)   by mouth every 12 hours. Do not RESUME until 7/6/24 evening dose. Do not   fill before July 6, 2024.   aspirin 81 mg EC tablet; Take 1 tablet (81 mg) by mouth once daily.   atorvastatin 80 mg tablet; Commonly known as: Lipitor; TAKE 1 TABLET BY   MOUTH ONCE DAILY   blood pressure monitor kit; 1 each once daily. Use once daily to check   blood pressure   brimonidine 0.2 % ophthalmic solution; Commonly known as: AlphaGAN;   Administer 1 drop into both eyes 2 times a day. PATIENT NEEDS PCP APPT FOR   FURTHER REFILLS   calcium carbonate-vit D3-min 600 mg calcium- 400 unit tablet; Take 1   tablet by mouth once daily.   carvedilol 12.5 mg tablet; Commonly known as: Coreg; Take 1 tablet (12.5   mg) by  mouth 2 times daily (morning and late afternoon).   DULoxetine 30 mg DR capsule; Commonly known as: Cymbalta; Take 1 capsule   (30 mg) by mouth once daily in the morning AND 2 capsules (60 mg) once   daily at bedtime. Do not crush or chew..   Farxiga 10 mg; Generic drug: dapagliflozin propanediol; Take 1 tablet   (10 mg) by mouth once daily.   FreeStyle Filipe 2 [place] misc; Generic drug: flash glucose scanning   reader; Use as instructed   FreeStyle Filipe 2 Sensor kit; Generic drug: flash glucose sensor kit;   Apply 1 sensor to the back of the upper arm. Change sensor every 14 days.   furosemide 20 mg tablet; Commonly known as: Lasix; Take 1 tablet (20 mg)   by mouth once daily.   hydrOXYzine HCL 25 mg tablet; Commonly known as: Atarax; Take 1 tablet   (25 mg) by mouth 2 times a day as needed for itching for up to 7 days.   isosorbide mononitrate ER 60 mg 24 hr tablet; Commonly known as: Imdur;   TAKE 1 TABLET BY MOUTH EVERY DAY IN THE MORNING   Kerendia 10 mg tablet tablet; Generic drug: finerenone; Take 1 tablet   (10 mg) by mouth once daily.   latanoprost 0.005 % ophthalmic solution; Commonly known as: Xalatan   melatonin 5 mg tablet   nitroglycerin 0.4 mg SL tablet; Commonly known as: Nitrostat   OneTouch Ultra Test strip; Generic drug: blood sugar diagnostic; USE 1   STRIP(S) TO TEST BLOOD SUGAR 3 TIMES A DAY   Ozempic 2 mg/dose (8 mg/3 mL) pen injector; Generic drug: semaglutide;   Inject 2 mg under the skin 1 (one) time per week.   pantoprazole 40 mg EC tablet; Commonly known as: ProtoNix; Take 1 tablet   (40 mg) by mouth once daily in the morning. Take before meals.   Tresiba FlexTouch U-200 200 unit/mL (3 mL) injection; Generic drug:   insulin degludec; Inject 52 Units under the skin once daily at bedtime.   Take as directed per insulin instructions.   urea 20 % cream; Commonly known as: Carmol       Outpatient Follow-Up  Future Appointments   Date Time Provider Department Center   8/15/2024  8:45 AM [facility]   8/19/2024  1:00 PM Carol Santiago MD [id number] [facility]   8/30/2024  2:30 PM [facility]F [facility]   9/12/2024  2:00 PM Gita Ferrell MD [id number] [facility]   9/12/2024  2:30 PM Rosa Terrazas RD [id number] [facility]   10/3/2024  8:30 AM Betty Boone MD [id number] [facility]   10/4/2024  8:40 AM Destiny Hurst MD PhD [id number] [facility]   10/11/2024  9:20 AM Michael Hilario MD [id number] [facility]   11/7/2024  8:15 AM Cyndie Cloud MD [id number] [facility]   2/5/2025  9:00 AM Jacinta Peraza MD [id number] [facility]       DEEPTI MCKENZIE, MS4

## 2024-08-13 NOTE — CARE PLAN
Per resident, PT/OT saw today.  Possibly discharge home today no needs....Regina Tidwell RN, BSN, TCC

## 2024-08-13 NOTE — PROGRESS NOTES
"Nelida Mata is a 64 y.o. female on day 2 of admission presenting with Gastrointestinal hemorrhage, unspecified gastrointestinal hemorrhage type.    Updates 8/13  She remains HDS and received miralax overnight at her request, but reports improved abdominal pain and palpable chest pain with voltaren Gel. No melena or bloody emesis since admission. She is s/p EGD with clipping for a clotted ulcer and gastric antrum polyp removal. We are resuming her home liquis, aspirin, statin and starting her on PPI bid. Patient wishes to go home today, to follow up with PCP and GI OP.       Subjective   NAEON. Received miralax overnight at pt request for \"constipation\", no bowel movement since Sunday AM. Her abdominal pain is improving and she is tolerating a full liquid diet post EGD yesterday=.        Objective     Last Recorded Vitals  /57   Pulse 101   Temp 36.2 °C (97.2 °F)   Resp 18   Wt 88.4 kg (194 lb 14.2 oz)   SpO2 100%   Intake/Output last 3 Shifts:    Intake/Output Summary (Last 24 hours) at 8/13/2024 1051  Last data filed at 8/12/2024 1844  Gross per 24 hour   Intake 890 ml   Output 1000 ml   Net -110 ml       Admission Weight  Weight: 88.9 kg (196 lb) (08/11/24 0857)    Daily Weight  08/11/24 : 88.4 kg (194 lb 14.2 oz)    Image Results  Esophagogastroduodenoscopy (EGD)  Table formatting from the original result was not included.  Impression  The esophagus appeared normal.  The cardia, fundus of the stomach and incisura appeared normal.  Single ulcer in the antrum and prepyloric region with clean base (Waqas   III)  Gale Ip polyp measuring 10-19 mm in the antrum; there was stigmata of   recent hemorrhage, and bleeding occurred before intervention; lift   performed; performed hot snare removal; placed 1 clip successfully;   hemostasis achieved  The duodenal bulb appeared normal.  Performed forceps biopsies in the fundus of the stomach, incisura and   antrum to rule out H. pylori    Findings  The " esophagus appeared normal. Z-line is 35 cm from the incisors.  The cardia, fundus of the stomach and incisura appeared normal. Digested   hematin observed  Single large, deep, linear, benign-appearing ulcer in the antrum and   prepyloric region with clean base (Waqas III). There was erythematous   and edematous mucosa around the base of the antral ulcer.  Pedunculated, benign-appearing and inflammatory (with adherent clot) Gale   Ip polyp measuring 10-19 mm in the antrum; there was stigmata of recent   hemorrhage, and bleeding occurred before intervention; lift performed with   3 mL of epinephrine injected into the submucosa; performed hot snare with   complete en bloc removal and retrieved specimen; placed 1 clip   successfully; hemostasis achieved. There was a pedunculated inflammatory   appearing polyp in the antrum.  This was adjacent to the ulcer in the pre   pyloric area.  There was a large clot adherent to the polyp.  No bleeding   was seen at the conclusion of the procedure.  The duodenal bulb appeared normal.  Performed multiple random forceps biopsies in the fundus of the stomach,   incisura and antrum to rule out H. pylori    Recommendation  Await pathology results   Repeat EGD in 3 months, due: 11/10/2024  Gastric ulcer surveillance     Continue BID PPI  Follow up on floor with GI Consult attending Dr. Sow        Indication  Gastrointestinal hemorrhage, unspecified gastrointestinal hemorrhage type    Staff  Staff Role   Paul Krishnan MD Proceduralist   Nadine Carson MD Fellow     Medications  See Anesthesia Record.     Preprocedure  A history and physical has been performed, and patient medication   allergies have been reviewed. The patient's tolerance of previous   anesthesia has been reviewed. The risks and benefits of the procedure and   the sedation options and risks were discussed with the patient. All   questions were answered and informed consent obtained.    Details of the  Procedure  The patient underwent general anesthesia, which was administered by an   anesthesia professional. The patient's blood pressure, ECG, ETCO2, heart   rate, level of consciousness, respirations and oxygen were monitored   throughout the procedure. The scope was introduced through the mouth and   advanced to the second part of the duodenum. Retroflexion was performed in   the cardia, fundus and incisura. Prior to the procedure, the patient's H.   Pylori status was unknown. The patient's estimated blood loss was minimal   (<5 mL). The procedure was not difficult. The patient tolerated the   procedure well. There were no apparent adverse events.     Events  Procedure Events   Event Event Time   ENDO SCOPE IN TIME 8/12/2024 11:04 AM     Specimens  ID Type Source Tests Collected by Time   1 : rule out adenoma Tissue STOMACH ANTRUM  POLYPECTOMY SURGICAL PATHOLOGY   EXAM Paul Krishnan MD 8/12/2024 1118   2 : stomach antrum and body biopsy rule out h pylori Tissue STOMACH ANTRUM   BIOPSY SURGICAL PATHOLOGY EXAM Paul Krishnan MD 8/12/2024 1122     Procedure Location  [address] [address]  [phone]    Referring Provider  Loyda Stevenson MD    Procedure Provider  Nadine Carson MD      Physical Exam  Constitutional:       General: She is not in acute distress.  HENT:      Mouth/Throat:      Mouth: Mucous membranes are dry.   Eyes:      General: No scleral icterus.     Conjunctiva/sclera: Conjunctivae normal.   Pulmonary:      Effort: Pulmonary effort is normal. No respiratory distress.   Abdominal:      General: Abdomen is flat. Bowel sounds are normal. There is no distension.      Palpations: Abdomen is soft. There is mass.      Tenderness: There is abdominal tenderness.   Musculoskeletal:      Right lower leg: No edema.      Left lower leg: No edema.   Skin:     General: Skin is warm and dry.      Coloration: Skin is  not jaundiced.   Neurological:      Mental Status: She is alert and oriented to person, place, and time.         Relevant Results               Assessment/Plan      Nelida Mata is a 64-year-old woman with a past medical history of HFpEF (50-55% 9/2023), CAD s/p PCI w/ stent x 3 (2016, 2018) and STEMI (9/2023, no stents placed), TIA 9/2023, SVC thrombus on Eliquis, remote DVT, T2DM, HLD, CKD, obesity, NAFLD, MICHAEL, and prior toe amputations for gangrene in 2020, who presented to the ED today with melena for a few days and coffee-ground emesis starting yesterday, consistent with likely upper GI bleed. The bleeding is likely in the setting of previously noted gastritis on EGD with questionable home PPI use versus Eliquis anticoagulation vs worsening liver function in setting of previously diagnosed NAFLD. She has no smoking history, remote history of AUD, no NSAID use, and no recent diet changes. The patient is currently hemodynamically stable, tolerating clear liquids PO. GI consulted & recommended for diagnostic vs therapeutic EGD tomorrow. Additionally, we will pursue liver ultrasound with doppler to assess for portal hypertension.     She is tolerating a full liquid diet s/p EGD with abdominal pain improving and no recurrence of emesis, melena or nausea (Hbg up trending to 9.0 from 8.2). EGD had clean based ulcer in antrum with likely inflammatory polyp with adherent clot, epi injected and polyp removed and clip placed this is likely her cause of coffee ground emesis and melena, with GI recommending PPI BID.  Liver ultrasound with doppler: 13.9cm span consistent with fatty infiltration/hepatic steatosis. Borderling elevation in flow indices, equivocal for portal hypertension. She remains HDS, she received miralax overnight, reports improvement in abdominal pain and no emesis or melena since admission. We are trying senna-docusate bid and a bisacodyl suppository for her reported constipation today. Low  concern for ileus or obstruction at this time. We are resuming her home aspirin, Eliquis 5mg, and statin. We are resuming her home carvedilol at a reduced dose 3.25mg BID. We will resume her home insulin regimen on discharge, to follow up with PCP regarding GLP-1 use.     Patient feels she is ready to go home today and appreciate PT/OT evaluation recommendations. She will follow up with outpatient gastroenterology and PCP within 1 week.        Principal Problem:    Gastrointestinal hemorrhage, unspecified gastrointestinal hemorrhage type  Active Problems:    CVA (cerebral vascular accident) (Multi)    DVT (deep venous thrombosis) (Multi)    #Suspected upper GI bleed  - Currently HDS and no Emisis/[place] since early AM admission 8/11  -S/p 80mg IV pantoprazole in ED, continue 40mg IV BID  -Two large bore IV in place   - GI consulted, EGD today  - NPO midnight with coags held  - Follow Up CBC BID   - Consented for blood       #HFpEF  #CAD s/p PCI  #Hx STEMI  #SVC Thrombus  - Restart Apixaban 5mg BID  - Restart ASA 81 mg  - Continue isosorbide mononitrate 60 mg  - Start carvedilol 3.25mg BID   - Held carvedilol 12.5mg BID   - Hold furosemide 20mg discuss with PCP       #T2DM  ::Home insulin regimen: degludec 52 units PM, Humalog 22 units with meals  - Receives 20 units degludec nightly  - SSI  - Diabetes educator for home insulin regimen  - Hold dapagliflozin 10mg, semaglutide 2mg weekly. To follow up with PCP      #HLD  - Resume atorvastatin 80mg      #CKD   ::Cr on admission 1.51, now 1.08 (recent baseline 1.3-1.4)  - Hold Kerendia 10mg to followup with PCP     #Misc  - Continue brimonidine, latanoprost eye drops  - Holding home calcium carbonate-vitamin D as not on formulary  - Continue duloxetine 30mg AM, 60mg PM (currently held while patient is NPO for possible scope)  - Continue melatonin 5mg    #Pain  -Tylenol 925mg q8hr scheduled  -Oxycodone 5mg IR prn q6hr. Stopping on discharge.            DEEPTI MCKENZIE,  MS4

## 2024-08-15 ENCOUNTER — APPOINTMENT (OUTPATIENT)
Dept: HEMATOLOGY/ONCOLOGY | Facility: HOSPITAL | Age: 64
End: 2024-08-15
Payer: COMMERCIAL

## 2024-08-15 DIAGNOSIS — I50.32 CHRONIC HEART FAILURE WITH PRESERVED EJECTION FRACTION (MULTI): ICD-10-CM

## 2024-08-15 DIAGNOSIS — I25.118 CORONARY ARTERY DISEASE OF NATIVE HEART WITH STABLE ANGINA PECTORIS, UNSPECIFIED VESSEL OR LESION TYPE (CMS-HCC): ICD-10-CM

## 2024-08-17 RX ORDER — ISOSORBIDE MONONITRATE 60 MG/1
60 TABLET, EXTENDED RELEASE ORAL
Qty: 90 TABLET | Refills: 1 | Status: ON HOLD | OUTPATIENT
Start: 2024-08-17

## 2024-08-18 ENCOUNTER — APPOINTMENT (OUTPATIENT)
Dept: RADIOLOGY | Facility: HOSPITAL | Age: 64
DRG: 378 | End: 2024-08-18
Payer: COMMERCIAL

## 2024-08-18 ENCOUNTER — HOSPITAL ENCOUNTER (INPATIENT)
Facility: HOSPITAL | Age: 64
LOS: 2 days | Discharge: HOME | DRG: 378 | End: 2024-08-20
Attending: STUDENT IN AN ORGANIZED HEALTH CARE EDUCATION/TRAINING PROGRAM | Admitting: INTERNAL MEDICINE
Payer: COMMERCIAL

## 2024-08-18 DIAGNOSIS — K52.9 GASTROENTERITIS: ICD-10-CM

## 2024-08-18 DIAGNOSIS — K21.9 GASTROESOPHAGEAL REFLUX DISEASE WITHOUT ESOPHAGITIS: ICD-10-CM

## 2024-08-18 DIAGNOSIS — D50.9 IRON DEFICIENCY ANEMIA, UNSPECIFIED IRON DEFICIENCY ANEMIA TYPE: ICD-10-CM

## 2024-08-18 DIAGNOSIS — I82.210 SUPERIOR VENA CAVA THROMBOSIS (MULTI): ICD-10-CM

## 2024-08-18 DIAGNOSIS — K92.2 GI BLEED: Primary | ICD-10-CM

## 2024-08-18 LAB
ABO GROUP (TYPE) IN BLOOD: NORMAL
ALBUMIN SERPL BCP-MCNC: 3.9 G/DL (ref 3.4–5)
ALP SERPL-CCNC: 89 U/L (ref 33–136)
ALT SERPL W P-5'-P-CCNC: 9 U/L (ref 7–45)
ANION GAP BLDV CALCULATED.4IONS-SCNC: 8 MMOL/L (ref 10–25)
ANION GAP SERPL CALC-SCNC: 13 MMOL/L (ref 10–20)
ANTIBODY SCREEN: NORMAL
APTT PPP: 32 SECONDS (ref 27–38)
AST SERPL W P-5'-P-CCNC: 13 U/L (ref 9–39)
BASE EXCESS BLDV CALC-SCNC: 6 MMOL/L (ref -2–3)
BASOPHILS # BLD AUTO: 0.11 X10*3/UL (ref 0–0.1)
BASOPHILS NFR BLD AUTO: 1.9 %
BILIRUB SERPL-MCNC: 0.3 MG/DL (ref 0–1.2)
BLOOD EXPIRATION DATE: NORMAL
BODY TEMPERATURE: 37 DEGREES CELSIUS
BUN SERPL-MCNC: 18 MG/DL (ref 6–23)
CA-I BLDV-SCNC: 1.31 MMOL/L (ref 1.1–1.33)
CALCIUM SERPL-MCNC: 9.7 MG/DL (ref 8.6–10.6)
CARDIAC TROPONIN I PNL SERPL HS: 12 NG/L (ref 0–34)
CHLORIDE BLDV-SCNC: 108 MMOL/L (ref 98–107)
CHLORIDE SERPL-SCNC: 107 MMOL/L (ref 98–107)
CO2 SERPL-SCNC: 28 MMOL/L (ref 21–32)
CREAT SERPL-MCNC: 1.28 MG/DL (ref 0.5–1.05)
DISPENSE STATUS: NORMAL
EGFRCR SERPLBLD CKD-EPI 2021: 47 ML/MIN/1.73M*2
EOSINOPHIL # BLD AUTO: 0.19 X10*3/UL (ref 0–0.7)
EOSINOPHIL NFR BLD AUTO: 3.3 %
ERYTHROCYTE [DISTWIDTH] IN BLOOD BY AUTOMATED COUNT: 13.9 % (ref 11.5–14.5)
FERRITIN SERPL-MCNC: 10 NG/ML (ref 8–150)
GLUCOSE BLD MANUAL STRIP-MCNC: 117 MG/DL (ref 74–99)
GLUCOSE BLD MANUAL STRIP-MCNC: 43 MG/DL (ref 74–99)
GLUCOSE BLD MANUAL STRIP-MCNC: 52 MG/DL (ref 74–99)
GLUCOSE BLD MANUAL STRIP-MCNC: 83 MG/DL (ref 74–99)
GLUCOSE BLD MANUAL STRIP-MCNC: 83 MG/DL (ref 74–99)
GLUCOSE BLDV-MCNC: 68 MG/DL (ref 74–99)
GLUCOSE SERPL-MCNC: 63 MG/DL (ref 74–99)
HCO3 BLDV-SCNC: 30.6 MMOL/L (ref 22–26)
HCT VFR BLD AUTO: 23.7 % (ref 36–46)
HCT VFR BLD EST: 24 % (ref 36–46)
HGB BLD-MCNC: 7.8 G/DL (ref 12–16)
HGB BLDV-MCNC: 7.9 G/DL (ref 12–16)
IMM GRANULOCYTES # BLD AUTO: 0.02 X10*3/UL (ref 0–0.7)
IMM GRANULOCYTES NFR BLD AUTO: 0.3 % (ref 0–0.9)
INHALED O2 CONCENTRATION: 21 %
INR PPP: 1.7 (ref 0.9–1.1)
IRON SATN MFR SERPL: 4 % (ref 25–45)
IRON SERPL-MCNC: 18 UG/DL (ref 35–150)
LACTATE BLDV-SCNC: 2.2 MMOL/L (ref 0.4–2)
LIPASE SERPL-CCNC: 51 U/L (ref 9–82)
LYMPHOCYTES # BLD AUTO: 1.49 X10*3/UL (ref 1.2–4.8)
LYMPHOCYTES NFR BLD AUTO: 25.6 %
MCH RBC QN AUTO: 27.7 PG (ref 26–34)
MCHC RBC AUTO-ENTMCNC: 32.9 G/DL (ref 32–36)
MCV RBC AUTO: 84 FL (ref 80–100)
MONOCYTES # BLD AUTO: 0.57 X10*3/UL (ref 0.1–1)
MONOCYTES NFR BLD AUTO: 9.8 %
NEUTROPHILS # BLD AUTO: 3.44 X10*3/UL (ref 1.2–7.7)
NEUTROPHILS NFR BLD AUTO: 59.1 %
NRBC BLD-RTO: 0 /100 WBCS (ref 0–0)
OXYHGB MFR BLDV: 24.5 % (ref 45–75)
PCO2 BLDV: 44 MM HG (ref 41–51)
PH BLDV: 7.45 PH (ref 7.33–7.43)
PLATELET # BLD AUTO: 331 X10*3/UL (ref 150–450)
PO2 BLDV: 21 MM HG (ref 35–45)
POTASSIUM BLDV-SCNC: 4 MMOL/L (ref 3.5–5.3)
POTASSIUM SERPL-SCNC: 3.8 MMOL/L (ref 3.5–5.3)
PRODUCT BLOOD TYPE: 5100
PRODUCT CODE: NORMAL
PROT SERPL-MCNC: 7.1 G/DL (ref 6.4–8.2)
PROTHROMBIN TIME: 18.7 SECONDS (ref 9.8–12.8)
RBC # BLD AUTO: 2.82 X10*6/UL (ref 4–5.2)
RH FACTOR (ANTIGEN D): NORMAL
SAO2 % BLDV: 25 % (ref 45–75)
SODIUM BLDV-SCNC: 143 MMOL/L (ref 136–145)
SODIUM SERPL-SCNC: 144 MMOL/L (ref 136–145)
TIBC SERPL-MCNC: 419 UG/DL (ref 240–445)
UIBC SERPL-MCNC: 401 UG/DL (ref 110–370)
UNIT ABO: NORMAL
UNIT NUMBER: NORMAL
UNIT RH: NORMAL
UNIT VOLUME: 350
WBC # BLD AUTO: 5.8 X10*3/UL (ref 4.4–11.3)
XM INTEP: NORMAL

## 2024-08-18 PROCEDURE — G0378 HOSPITAL OBSERVATION PER HR: HCPCS

## 2024-08-18 PROCEDURE — 74174 CTA ABD&PLVS W/CONTRAST: CPT | Mod: FOREIGN READ | Performed by: RADIOLOGY

## 2024-08-18 PROCEDURE — 82728 ASSAY OF FERRITIN: CPT

## 2024-08-18 PROCEDURE — 2500000001 HC RX 250 WO HCPCS SELF ADMINISTERED DRUGS (ALT 637 FOR MEDICARE OP)

## 2024-08-18 PROCEDURE — 1210000001 HC SEMI-PRIVATE ROOM DAILY

## 2024-08-18 PROCEDURE — 86923 COMPATIBILITY TEST ELECTRIC: CPT

## 2024-08-18 PROCEDURE — 85610 PROTHROMBIN TIME: CPT

## 2024-08-18 PROCEDURE — 93010 ELECTROCARDIOGRAM REPORT: CPT | Performed by: STUDENT IN AN ORGANIZED HEALTH CARE EDUCATION/TRAINING PROGRAM

## 2024-08-18 PROCEDURE — 96374 THER/PROPH/DIAG INJ IV PUSH: CPT

## 2024-08-18 PROCEDURE — 36415 COLL VENOUS BLD VENIPUNCTURE: CPT

## 2024-08-18 PROCEDURE — 82947 ASSAY GLUCOSE BLOOD QUANT: CPT

## 2024-08-18 PROCEDURE — 86901 BLOOD TYPING SEROLOGIC RH(D): CPT

## 2024-08-18 PROCEDURE — 83550 IRON BINDING TEST: CPT

## 2024-08-18 PROCEDURE — 83690 ASSAY OF LIPASE: CPT

## 2024-08-18 PROCEDURE — 82435 ASSAY OF BLOOD CHLORIDE: CPT

## 2024-08-18 PROCEDURE — 2500000004 HC RX 250 GENERAL PHARMACY W/ HCPCS (ALT 636 FOR OP/ED)

## 2024-08-18 PROCEDURE — 99285 EMERGENCY DEPT VISIT HI MDM: CPT | Mod: 25

## 2024-08-18 PROCEDURE — 86850 RBC ANTIBODY SCREEN: CPT

## 2024-08-18 PROCEDURE — 85025 COMPLETE CBC W/AUTO DIFF WBC: CPT

## 2024-08-18 PROCEDURE — 96361 HYDRATE IV INFUSION ADD-ON: CPT

## 2024-08-18 PROCEDURE — 84132 ASSAY OF SERUM POTASSIUM: CPT

## 2024-08-18 PROCEDURE — 84484 ASSAY OF TROPONIN QUANT: CPT

## 2024-08-18 PROCEDURE — 74174 CTA ABD&PLVS W/CONTRAST: CPT

## 2024-08-18 PROCEDURE — 85730 THROMBOPLASTIN TIME PARTIAL: CPT

## 2024-08-18 PROCEDURE — 2550000001 HC RX 255 CONTRASTS: Performed by: STUDENT IN AN ORGANIZED HEALTH CARE EDUCATION/TRAINING PROGRAM

## 2024-08-18 PROCEDURE — 84295 ASSAY OF SERUM SODIUM: CPT

## 2024-08-18 PROCEDURE — 99285 EMERGENCY DEPT VISIT HI MDM: CPT | Performed by: STUDENT IN AN ORGANIZED HEALTH CARE EDUCATION/TRAINING PROGRAM

## 2024-08-18 PROCEDURE — 96375 TX/PRO/DX INJ NEW DRUG ADDON: CPT

## 2024-08-18 PROCEDURE — 36430 TRANSFUSION BLD/BLD COMPNT: CPT

## 2024-08-18 PROCEDURE — 2500000005 HC RX 250 GENERAL PHARMACY W/O HCPCS

## 2024-08-18 PROCEDURE — P9016 RBC LEUKOCYTES REDUCED: HCPCS

## 2024-08-18 PROCEDURE — 83540 ASSAY OF IRON: CPT

## 2024-08-18 RX ORDER — PANTOPRAZOLE SODIUM 40 MG/10ML
40 INJECTION, POWDER, LYOPHILIZED, FOR SOLUTION INTRAVENOUS 2 TIMES DAILY
Status: DISCONTINUED | OUTPATIENT
Start: 2024-08-18 | End: 2024-08-20 | Stop reason: HOSPADM

## 2024-08-18 RX ORDER — ONDANSETRON HYDROCHLORIDE 2 MG/ML
4 INJECTION, SOLUTION INTRAVENOUS ONCE
Status: COMPLETED | OUTPATIENT
Start: 2024-08-18 | End: 2024-08-18

## 2024-08-18 RX ORDER — PANTOPRAZOLE SODIUM 40 MG/10ML
40 INJECTION, POWDER, LYOPHILIZED, FOR SOLUTION INTRAVENOUS ONCE
Status: COMPLETED | OUTPATIENT
Start: 2024-08-18 | End: 2024-08-18

## 2024-08-18 RX ORDER — DEXTROSE 50 % IN WATER (D50W) INTRAVENOUS SYRINGE
25
Status: DISCONTINUED | OUTPATIENT
Start: 2024-08-18 | End: 2024-08-19

## 2024-08-18 RX ORDER — DULOXETIN HYDROCHLORIDE 30 MG/1
30 CAPSULE, DELAYED RELEASE ORAL DAILY
Status: DISCONTINUED | OUTPATIENT
Start: 2024-08-19 | End: 2024-08-20 | Stop reason: HOSPADM

## 2024-08-18 RX ORDER — ACETAMINOPHEN 325 MG/1
975 TABLET ORAL EVERY 8 HOURS PRN
Status: DISCONTINUED | OUTPATIENT
Start: 2024-08-18 | End: 2024-08-20 | Stop reason: HOSPADM

## 2024-08-18 RX ORDER — INSULIN LISPRO 100 [IU]/ML
0-10 INJECTION, SOLUTION INTRAVENOUS; SUBCUTANEOUS EVERY 4 HOURS
Status: DISCONTINUED | OUTPATIENT
Start: 2024-08-18 | End: 2024-08-20 | Stop reason: HOSPADM

## 2024-08-18 RX ORDER — BRIMONIDINE TARTRATE 2 MG/ML
1 SOLUTION/ DROPS OPHTHALMIC 2 TIMES DAILY
Status: DISCONTINUED | OUTPATIENT
Start: 2024-08-18 | End: 2024-08-20 | Stop reason: HOSPADM

## 2024-08-18 RX ORDER — TALC
6 POWDER (GRAM) TOPICAL NIGHTLY
Status: DISCONTINUED | OUTPATIENT
Start: 2024-08-18 | End: 2024-08-20 | Stop reason: HOSPADM

## 2024-08-18 RX ORDER — DEXTROSE 50 % IN WATER (D50W) INTRAVENOUS SYRINGE
12.5
Status: DISCONTINUED | OUTPATIENT
Start: 2024-08-18 | End: 2024-08-19

## 2024-08-18 RX ORDER — MORPHINE SULFATE 4 MG/ML
4 INJECTION INTRAVENOUS ONCE
Status: COMPLETED | OUTPATIENT
Start: 2024-08-18 | End: 2024-08-18

## 2024-08-18 RX ORDER — ASPIRIN 81 MG/1
81 TABLET ORAL DAILY
Status: DISCONTINUED | OUTPATIENT
Start: 2024-08-18 | End: 2024-08-20 | Stop reason: HOSPADM

## 2024-08-18 RX ORDER — DULOXETIN HYDROCHLORIDE 60 MG/1
60 CAPSULE, DELAYED RELEASE ORAL NIGHTLY
Status: DISCONTINUED | OUTPATIENT
Start: 2024-08-18 | End: 2024-08-20 | Stop reason: HOSPADM

## 2024-08-18 RX ORDER — DEXTROSE 50 % IN WATER (D50W) INTRAVENOUS SYRINGE
Status: COMPLETED
Start: 2024-08-18 | End: 2024-08-18

## 2024-08-18 RX ORDER — LATANOPROST 50 UG/ML
1 SOLUTION/ DROPS OPHTHALMIC NIGHTLY
Status: DISCONTINUED | OUTPATIENT
Start: 2024-08-18 | End: 2024-08-20 | Stop reason: HOSPADM

## 2024-08-18 RX ORDER — ACETAMINOPHEN 325 MG/1
975 TABLET ORAL ONCE
Status: COMPLETED | OUTPATIENT
Start: 2024-08-18 | End: 2024-08-18

## 2024-08-18 RX ORDER — ATORVASTATIN CALCIUM 80 MG/1
80 TABLET, FILM COATED ORAL DAILY
Status: DISCONTINUED | OUTPATIENT
Start: 2024-08-18 | End: 2024-08-20 | Stop reason: HOSPADM

## 2024-08-18 RX ORDER — ALUMINUM HYDROXIDE, MAGNESIUM HYDROXIDE, AND SIMETHICONE 1200; 120; 1200 MG/30ML; MG/30ML; MG/30ML
10 SUSPENSION ORAL ONCE
Status: COMPLETED | OUTPATIENT
Start: 2024-08-18 | End: 2024-08-18

## 2024-08-18 RX ORDER — INSULIN DEGLUDEC 100 U/ML
25 INJECTION, SOLUTION SUBCUTANEOUS NIGHTLY
Status: DISCONTINUED | OUTPATIENT
Start: 2024-08-18 | End: 2024-08-19

## 2024-08-18 RX ADMIN — BRIMONIDINE TARTRATE 1 DROP: 2 SOLUTION/ DROPS OPHTHALMIC at 20:59

## 2024-08-18 RX ADMIN — ACETAMINOPHEN 975 MG: 325 TABLET ORAL at 14:41

## 2024-08-18 RX ADMIN — PANTOPRAZOLE SODIUM 40 MG: 40 INJECTION, POWDER, FOR SOLUTION INTRAVENOUS at 12:53

## 2024-08-18 RX ADMIN — PANTOPRAZOLE SODIUM 40 MG: 40 INJECTION, POWDER, FOR SOLUTION INTRAVENOUS at 21:02

## 2024-08-18 RX ADMIN — ALUMINUM HYDROXIDE, MAGNESIUM HYDROXIDE, AND SIMETHICONE 10 ML: 1200; 120; 1200 SUSPENSION ORAL at 12:53

## 2024-08-18 RX ADMIN — ATORVASTATIN CALCIUM 80 MG: 80 TABLET, FILM COATED ORAL at 18:33

## 2024-08-18 RX ADMIN — MORPHINE SULFATE 4 MG: 4 INJECTION INTRAVENOUS at 14:55

## 2024-08-18 RX ADMIN — Medication 6 MG: at 20:59

## 2024-08-18 RX ADMIN — ACETAMINOPHEN 975 MG: 325 TABLET ORAL at 21:00

## 2024-08-18 RX ADMIN — DULOXETINE HYDROCHLORIDE 60 MG: 60 CAPSULE, DELAYED RELEASE ORAL at 20:59

## 2024-08-18 RX ADMIN — DEXTROSE 50 % IN WATER (D50W) INTRAVENOUS SYRINGE 25 G: at 17:40

## 2024-08-18 RX ADMIN — ONDANSETRON 4 MG: 2 INJECTION INTRAMUSCULAR; INTRAVENOUS at 12:53

## 2024-08-18 RX ADMIN — ASPIRIN 81 MG: 81 TABLET, COATED ORAL at 18:34

## 2024-08-18 RX ADMIN — LATANOPROST 1 DROP: 50 SOLUTION/ DROPS OPHTHALMIC at 20:59

## 2024-08-18 RX ADMIN — SODIUM CHLORIDE, POTASSIUM CHLORIDE, SODIUM LACTATE AND CALCIUM CHLORIDE 1000 ML: 600; 310; 30; 20 INJECTION, SOLUTION INTRAVENOUS at 12:54

## 2024-08-18 RX ADMIN — DEXTROSE MONOHYDRATE 25 G: 25 INJECTION, SOLUTION INTRAVENOUS at 17:40

## 2024-08-18 RX ADMIN — IOHEXOL 100 ML: 350 INJECTION, SOLUTION INTRAVENOUS at 13:30

## 2024-08-18 SDOH — SOCIAL STABILITY: SOCIAL INSECURITY: ARE YOU OR HAVE YOU BEEN THREATENED OR ABUSED PHYSICALLY, EMOTIONALLY, OR SEXUALLY BY ANYONE?: NO

## 2024-08-18 SDOH — SOCIAL STABILITY: SOCIAL INSECURITY: ABUSE: ADULT

## 2024-08-18 SDOH — SOCIAL STABILITY: SOCIAL INSECURITY: HAVE YOU HAD ANY THOUGHTS OF HARMING ANYONE ELSE?: NO

## 2024-08-18 SDOH — SOCIAL STABILITY: SOCIAL INSECURITY: ARE THERE ANY APPARENT SIGNS OF INJURIES/BEHAVIORS THAT COULD BE RELATED TO ABUSE/NEGLECT?: NO

## 2024-08-18 SDOH — SOCIAL STABILITY: SOCIAL INSECURITY: HAVE YOU HAD THOUGHTS OF HARMING ANYONE ELSE?: NO

## 2024-08-18 SDOH — SOCIAL STABILITY: SOCIAL INSECURITY: DO YOU FEEL ANYONE HAS EXPLOITED OR TAKEN ADVANTAGE OF YOU FINANCIALLY OR OF YOUR PERSONAL PROPERTY?: NO

## 2024-08-18 SDOH — SOCIAL STABILITY: SOCIAL INSECURITY: DOES ANYONE TRY TO KEEP YOU FROM HAVING/CONTACTING OTHER FRIENDS OR DOING THINGS OUTSIDE YOUR HOME?: NO

## 2024-08-18 SDOH — SOCIAL STABILITY: SOCIAL INSECURITY: DO YOU FEEL UNSAFE GOING BACK TO THE PLACE WHERE YOU ARE LIVING?: NO

## 2024-08-18 SDOH — SOCIAL STABILITY: SOCIAL INSECURITY: HAS ANYONE EVER THREATENED TO HURT YOUR FAMILY OR YOUR PETS?: NO

## 2024-08-18 ASSESSMENT — ENCOUNTER SYMPTOMS
JOINT SWELLING: 0
PALPITATIONS: 0
RECTAL PAIN: 0
CHEST TIGHTNESS: 0
POLYDIPSIA: 0
FATIGUE: 1
ABDOMINAL DISTENTION: 0
DIARRHEA: 0
COUGH: 0
SPEECH DIFFICULTY: 0
BACK PAIN: 0
NAUSEA: 1
WEAKNESS: 0
ACTIVITY CHANGE: 0
SINUS PAIN: 0
VOICE CHANGE: 0
BLOOD IN STOOL: 0
FREQUENCY: 0
WHEEZING: 0
SORE THROAT: 0
EYE PAIN: 0
SINUS PRESSURE: 0
COLOR CHANGE: 0
APPETITE CHANGE: 1
LIGHT-HEADEDNESS: 0
SHORTNESS OF BREATH: 1
DIZZINESS: 0
NUMBNESS: 0
TREMORS: 1
ARTHRALGIAS: 0
FEVER: 0
VOMITING: 0
TROUBLE SWALLOWING: 0
FLANK PAIN: 0
ABDOMINAL PAIN: 1
CHOKING: 0
ANAL BLEEDING: 0
EYE DISCHARGE: 0
CONSTIPATION: 1
HEADACHES: 0
DIFFICULTY URINATING: 0

## 2024-08-18 ASSESSMENT — ACTIVITIES OF DAILY LIVING (ADL)
PATIENT'S MEMORY ADEQUATE TO SAFELY COMPLETE DAILY ACTIVITIES?: YES
FEEDING YOURSELF: INDEPENDENT
TOILETING: INDEPENDENT
DRESSING YOURSELF: INDEPENDENT
JUDGMENT_ADEQUATE_SAFELY_COMPLETE_DAILY_ACTIVITIES: YES
BATHING: INDEPENDENT
GROOMING: INDEPENDENT
LACK_OF_TRANSPORTATION: NO
HEARING - RIGHT EAR: FUNCTIONAL
HEARING - LEFT EAR: FUNCTIONAL
ADEQUATE_TO_COMPLETE_ADL: NO
WALKS IN HOME: INDEPENDENT

## 2024-08-18 ASSESSMENT — COGNITIVE AND FUNCTIONAL STATUS - GENERAL
PATIENT BASELINE BEDBOUND: NO
WALKING IN HOSPITAL ROOM: A LITTLE
CLIMB 3 TO 5 STEPS WITH RAILING: A LITTLE
DAILY ACTIVITIY SCORE: 24
MOBILITY SCORE: 22

## 2024-08-18 ASSESSMENT — PATIENT HEALTH QUESTIONNAIRE - PHQ9
SUM OF ALL RESPONSES TO PHQ9 QUESTIONS 1 & 2: 0
1. LITTLE INTEREST OR PLEASURE IN DOING THINGS: NOT AT ALL
2. FEELING DOWN, DEPRESSED OR HOPELESS: NOT AT ALL

## 2024-08-18 ASSESSMENT — LIFESTYLE VARIABLES
HOW OFTEN DO YOU HAVE A DRINK CONTAINING ALCOHOL: 2-4 TIMES A MONTH
HOW MANY STANDARD DRINKS CONTAINING ALCOHOL DO YOU HAVE ON A TYPICAL DAY: 1 OR 2
HAVE PEOPLE ANNOYED YOU BY CRITICIZING YOUR DRINKING: NO
EVER HAD A DRINK FIRST THING IN THE MORNING TO STEADY YOUR NERVES TO GET RID OF A HANGOVER: NO
HOW OFTEN DO YOU HAVE 6 OR MORE DRINKS ON ONE OCCASION: NEVER
AUDIT-C TOTAL SCORE: 2
AUDIT-C TOTAL SCORE: 2
SKIP TO QUESTIONS 9-10: 1
HAVE YOU EVER FELT YOU SHOULD CUT DOWN ON YOUR DRINKING: NO
EVER FELT BAD OR GUILTY ABOUT YOUR DRINKING: NO
TOTAL SCORE: 0

## 2024-08-18 ASSESSMENT — PAIN SCALES - GENERAL
PAINLEVEL_OUTOF10: 6
PAINLEVEL_OUTOF10: 9

## 2024-08-18 ASSESSMENT — COLUMBIA-SUICIDE SEVERITY RATING SCALE - C-SSRS
2. HAVE YOU ACTUALLY HAD ANY THOUGHTS OF KILLING YOURSELF?: NO
1. IN THE PAST MONTH, HAVE YOU WISHED YOU WERE DEAD OR WISHED YOU COULD GO TO SLEEP AND NOT WAKE UP?: NO
6. HAVE YOU EVER DONE ANYTHING, STARTED TO DO ANYTHING, OR PREPARED TO DO ANYTHING TO END YOUR LIFE?: NO

## 2024-08-18 ASSESSMENT — PAIN DESCRIPTION - PROGRESSION: CLINICAL_PROGRESSION: NOT CHANGED

## 2024-08-18 ASSESSMENT — PAIN - FUNCTIONAL ASSESSMENT: PAIN_FUNCTIONAL_ASSESSMENT: 0-10

## 2024-08-18 NOTE — ED PROVIDER NOTES
HPI   Chief Complaint   Patient presents with    Weakness, Gen    Black or Bloody Stool       HPI  64-year-old woman with a past medical history of HFpEF (50-55% 9/2023), CAD s/p PCI w/ stent x 3 (2016, 2018) and STEMI (9/2023, no stents placed), TIA 9/2023, idiopathic SVC thrombus on Eliquis, remote DVT, T2DM, HLD, CKD, obesity, NAFLD, MICHAEL, and prior toe amputations for gangrene in 2020, who presented to the ED for abdominal pain. Patient endorses nausea, black tarry stools, hematuria. Patient endorses no weakness on questioning. Patient denies fever, vomiting, pain with eating, shortness of breath, back pain. On questioning, patient keeps repeating that she is having black tarry stools and that she came in for this last time. Rates pain 8/10.       Patient History   Past Medical History:   Diagnosis Date    Abnormal uterine and vaginal bleeding, unspecified 04/10/2018    Abnormal uterine bleeding    Abnormal uterine and vaginal bleeding, unspecified 02/22/2018    Abnormal uterine bleeding    Acute candidiasis of vulva and vagina 04/10/2018    Vaginal candida    Acute candidiasis of vulva and vagina 02/22/2018    Vaginal candida    Acute vaginitis 04/10/2018    Vaginitis    Acute vaginitis 02/22/2018    Vaginitis    Alcohol abuse, in remission     History of alcohol abuse    Anxiety disorder, unspecified 04/10/2018    Anxiety    Anxiety disorder, unspecified 02/22/2018    Anxiety    Atherosclerotic heart disease of native coronary artery without angina pectoris 04/10/2018    Arteriosclerotic cardiovascular disease (ASCVD)    Atherosclerotic heart disease of native coronary artery without angina pectoris 02/22/2018    Arteriosclerotic cardiovascular disease (ASCVD)    Atherosclerotic heart disease of native coronary artery without angina pectoris 02/22/2018    CAD (coronary artery disease)    Atherosclerotic heart disease of native coronary artery without angina pectoris 04/10/2018    CAD (coronary artery disease)     Atypical facial pain 04/10/2018    Atypical face pain    Atypical facial pain 02/22/2018    Atypical face pain    Benign lipomatous neoplasm, unspecified 04/10/2018    Lipoma    Benign lipomatous neoplasm, unspecified 02/22/2018    Lipoma    Changes in skin texture 04/10/2018    Thickening, skin    Changes in skin texture 02/22/2018    Thickening, skin    Chest pain, unspecified 04/10/2018    Chest pain at rest    Chest pain, unspecified 02/22/2018    Chest pain at rest    Chronic cough 04/10/2018    Cough, persistent    Chronic cough 02/22/2018    Cough, persistent    Chronic pain syndrome 04/10/2018    Pain syndrome, chronic    Chronic pain syndrome 02/22/2018    Pain syndrome, chronic    Cramp and spasm 04/10/2018    Cramp and spasm    Cramp and spasm 02/22/2018    Cramp and spasm    Depression, unspecified 04/10/2018    Depression    Depression, unspecified 02/22/2018    Depression    Disorder of pigmentation, unspecified 04/10/2018    Discoloration of skin of lower leg    Disorder of pigmentation, unspecified 02/22/2018    Discoloration of skin of lower leg    Dysuria 04/10/2018    Dysuria    Dysuria 02/22/2018    Dysuria    Encounter for immunization 04/10/2018    Flu vaccine need    Encounter for immunization 04/10/2018    Immunization due    Encounter for immunization 02/22/2018    Flu vaccine need    Encounter for immunization 02/22/2018    Immunization due    Encounter for screening for infections with a predominantly sexual mode of transmission     Routine screening for STI (sexually transmitted infection)    Encounter for screening mammogram for malignant neoplasm of breast 04/10/2018    Visit for screening mammogram    Encounter for screening mammogram for malignant neoplasm of breast 02/22/2018    Visit for screening mammogram    Essential (primary) hypertension 04/10/2018    Hypertension, uncontrolled    Essential (primary) hypertension 02/22/2018    Hypertension, uncontrolled    Fatty (change of)  liver, not elsewhere classified 04/10/2018    NAFLD (nonalcoholic fatty liver disease)    Fatty (change of) liver, not elsewhere classified 02/22/2018    NAFLD (nonalcoholic fatty liver disease)    Fibromyalgia 04/10/2018    Fibromyalgia    Fibromyalgia 02/22/2018    Fibromyalgia    Gastro-esophageal reflux disease without esophagitis 04/10/2018    GERD (gastroesophageal reflux disease)    Generalized contraction of visual field, left eye 02/06/2017    Generalized contraction of visual field of left eye    Headache, unspecified 04/10/2018    Headache    Headache, unspecified 02/22/2018    Headache    Heart failure, unspecified (Multi) 02/22/2018    CHF, chronic    Heart failure, unspecified (Multi) 04/10/2018    CHF, chronic    Hereditary and idiopathic neuropathy, unspecified 04/10/2018    Idiopathic peripheral neuropathy    Hereditary and idiopathic neuropathy, unspecified 02/22/2018    Idiopathic peripheral neuropathy    Hydroureter 04/10/2018    Hydroureter on left    Hydroureter 02/22/2018    Hydroureter on left    Hyperlipidemia, unspecified     Dyslipidemia    Hypertensive urgency 04/10/2018    Asymptomatic hypertensive urgency    Hypertensive urgency 02/22/2018    Asymptomatic hypertensive urgency    Hypokalemia 04/10/2018    Hypokalemia    Hypokalemia 02/22/2018    Hypokalemia    Insomnia, unspecified 04/10/2018    Insomnia    Insomnia, unspecified 02/22/2018    Insomnia    Localized edema 04/10/2018    Bilateral edema of lower extremity    Localized edema 02/22/2018    Bilateral edema of lower extremity    Macula scars of posterior pole (postinflammatory) (post-traumatic), left eye 04/10/2018    Macula scar of posterior pole of left eye    Morbid (severe) obesity due to excess calories (Multi) 04/10/2018    Morbid obesity with BMI of 40.0-44.9, adult    Morbid (severe) obesity due to excess calories (Multi) 02/22/2018    Morbid obesity with BMI of 40.0-44.9, adult    Nail dystrophy 04/10/2018    Dystrophia  unguium    Nail dystrophy 02/22/2018    Dystrophia unguium    Non-ST elevation (NSTEMI) myocardial infarction (Multi) 04/10/2018    NSTEMI, initial episode of care    Non-ST elevation (NSTEMI) myocardial infarction (Multi) 02/22/2018    NSTEMI, initial episode of care    Obstructive sleep apnea (adult) (pediatric) 04/10/2018    Obstructive sleep apnea    Obstructive sleep apnea (adult) (pediatric) 02/22/2018    Obstructive sleep apnea    Other chest pain 02/22/2018    Chest tightness or pressure    Other chest pain 04/10/2018    Chest tightness or pressure    Other conditions influencing health status 04/10/2018    Insulin dependent type 2 diabetes mellitus, uncontrolled    Other conditions influencing health status 04/10/2018    Uncontrolled diabetes mellitus    Other conditions influencing health status 04/10/2018    DM (diabetes mellitus), type 2, uncontrolled, with renal complications    Other conditions influencing health status 02/22/2018    Insulin dependent type 2 diabetes mellitus, uncontrolled    Other conditions influencing health status 02/22/2018    Uncontrolled diabetes mellitus    Other conditions influencing health status 02/22/2018    DM (diabetes mellitus), type 2, uncontrolled, with renal complications    Other fatigue 04/10/2018    Fatigue    Other fatigue 02/22/2018    Fatigue    Other fecal abnormalities 04/10/2018    Loose stools    Other fecal abnormalities 02/22/2018    Loose stools    Other forms of dyspnea 04/10/2018    Dyspnea on exertion    Other forms of dyspnea 02/22/2018    Dyspnea on exertion    Other problems related to lifestyle     Other problems related to lifestyle    Other specified health status 04/10/2018    Medically complex patient    Other specified health status 02/22/2018    Medically complex patient    Pain in right knee 02/22/2018    Knee pain, bilateral    Pain in right knee 04/10/2018    Knee pain, bilateral    Peripheral vascular disease, unspecified (CMS-HCC)  04/10/2018    Peripheral vascular disease    Peripheral vascular disease, unspecified (CMS-[facility]) 02/22/2018    Peripheral vascular disease    Personal history of other diseases of the digestive system     History of esophageal reflux    Personal history of other diseases of the digestive system     History of constipation    Personal history of other diseases of the nervous system and sense organs     History of glaucoma    Personal history of other endocrine, nutritional and metabolic disease     History of hypothyroidism    Personal history of other endocrine, nutritional and metabolic disease     History of hyperlipidemia    Personal history of other endocrine, nutritional and metabolic disease     History of diabetes mellitus    Polyarthritis, unspecified 04/10/2018    Polyarthritis    Polyarthritis, unspecified 02/22/2018    Polyarthritis    Polyp of colon 04/10/2018    Colon polyps    Polyp of colon 02/22/2018    Colon polyps    Postmenopausal bleeding 04/10/2018    Postmenopausal bleeding    Postmenopausal bleeding 02/22/2018    Postmenopausal bleeding    Presence of coronary angioplasty implant and graft 04/10/2018    Presence of drug coated stent in right coronary artery    Presence of coronary angioplasty implant and graft 02/22/2018    Presence of drug coated stent in right coronary artery    Presence of intraocular lens 04/10/2018    Pseudophakia, both eyes    Presence of intraocular lens 03/18/2015    Pseudophakia of left eye    Primary open-angle glaucoma, unspecified eye, stage unspecified 04/10/2018    Primary open angle glaucoma    Proteinuria, unspecified 04/10/2018    Microalbuminuria    Proteinuria, unspecified 02/22/2018    Microalbuminuria    Pruritus, unspecified 04/10/2018    Pruritic dermatitis    Pruritus, unspecified 02/22/2018    Pruritic dermatitis    Psoriasis, unspecified 04/10/2018    Psoriasis    Psoriasis, unspecified 02/22/2018    Psoriasis    Rash and other nonspecific skin  eruption 04/10/2018    Rash/skin eruption    Rash and other nonspecific skin eruption 02/22/2018    Rash/skin eruption    Repeated falls 04/10/2018    Multiple falls    Repeated falls 02/22/2018    Multiple falls    Retinal hemorrhage, right eye 02/06/2017    Retinal hemorrhage of right eye    Shortness of breath 04/10/2018    Shortness of breath    Shortness of breath 02/22/2018    Shortness of breath    Sleep apnea, unspecified 04/10/2018    Sleep disorder breathing    Sleep apnea, unspecified 02/22/2018    Sleep disorder breathing    Traction detachment of retina, right eye 02/06/2017    Retinal detachment, tractional, right eye    Type 2 diabetes mellitus with diabetic polyneuropathy (Multi) 04/10/2018    Controlled type 2 diabetes mellitus with diabetic polyneuropathy, with long-term current use of insulin    Type 2 diabetes mellitus with diabetic polyneuropathy (Multi) 02/22/2018    Controlled type 2 diabetes mellitus with diabetic polyneuropathy, with long-term current use of insulin    Type 2 diabetes mellitus with other skin ulcer (CODE) (Multi) 04/10/2018    Diabetic leg ulcer    Type 2 diabetes mellitus with other skin ulcer (CODE) (Multi) 02/22/2018    Diabetic leg ulcer    Type 2 diabetes mellitus with proliferative diabetic retinopathy without macular edema, unspecified eye (Multi) 04/10/2018    Proliferative diabetic retinopathy    Type 2 diabetes mellitus with unspecified diabetic retinopathy without macular edema (Multi) 02/06/2017    Background diabetic retinopathy    Umbilical hernia without obstruction or gangrene     Umbilical hernia    Unqualified visual loss, both eyes 02/06/2017    Complete loss of vision in visual field    Unspecified glaucoma 06/11/2015    Glaucoma    Unstable angina (Multi) 04/10/2018    Unstable angina    Unstable angina (Multi) 02/22/2018    Unstable angina    Vitamin D deficiency, unspecified 04/10/2018    Vitamin D deficiency, unspecified    Vitamin D deficiency,  unspecified 02/22/2018    Vitamin D deficiency, unspecified    Xerosis cutis 04/10/2018    Xerosis of skin    Xerosis cutis 02/22/2018    Xerosis of skin     Past Surgical History:   Procedure Laterality Date    BREAST BIOPSY Left     2007    CARDIAC CATHETERIZATION N/A 7/5/2024    Procedure: Left And Right Heart Cath, Without LV;  Surgeon: Loki Donis MD;  Location: [place] [id number] [facility];  Service: Cardiovascular;  Laterality: N/A;    CATARACT EXTRACTION  03/18/2015    Cataract Surgery    CORONARY ANGIOPLASTY WITH STENT PLACEMENT  03/06/2017    Cath Stent Placement    CT AORTA AND BILATERAL ILIOFEMORAL RUNOFF ANGIOGRAM W AND/OR WO IV CONTRAST  08/08/2022    CT AORTA AND BILATERAL ILIOFEMORAL RUNOFF ANGIOGRAM W AND/OR WO IV CONTRAST 8/8/2022 [facility] [place] [facility]    MR HEAD ANGIO WO IV CONTRAST  08/16/2013    MR HEAD ANGIO WO IV CONTRAST 8/16/2013 [facility] [place] [facility]CILLARY LEGAC[facility]    MR NECK ANGIO WO IV CONTRAST  08/16/2013    MR NECK ANGIO WO IV CONTRAST 8/16/2013 [facility] [place] [facility]CILLARY LEGAC[facility]    RETINAL DETACHMENT SURGERY  03/18/2015    Repair Of Retinal Detachment     Family History   Problem Relation Name Age of Onset    Diabetes Mother      Glaucoma Father      Hypertension Other FAMILY      Social History     Tobacco Use    Smoking status: Never     Passive exposure: Never    Smokeless tobacco: Never   Vaping Use    Vaping status: Never Used   Substance Use Topics    Alcohol use: Not Currently     Comment: occasionally    Drug use: Never       Physical Exam   ED Triage Vitals   Temp Pulse Resp BP   -- -- -- --      SpO2 Temp src Heart Rate Source Patient Position   -- -- -- --      BP Location FiO2 (%)     -- --       Physical Exam  Vitals and nursing note reviewed.   Constitutional:       General: She is not in acute distress.     Appearance: She is well-developed.   HENT:      Head: Normocephalic and atraumatic.   Eyes:      Extraocular Movements: Extraocular movements intact.      Conjunctiva/sclera: Conjunctivae  "normal.   Cardiovascular:      Rate and Rhythm: Normal rate and regular rhythm.      Heart sounds: Normal heart sounds. No murmur heard.  Pulmonary:      Effort: Pulmonary effort is normal. No respiratory distress.      Breath sounds: Normal breath sounds.   Abdominal:      Palpations: Abdomen is soft.      Tenderness: There is abdominal tenderness. There is no right CVA tenderness or left CVA tenderness.      Comments: Midepigastric abdominal   Musculoskeletal:         General: No swelling.      Cervical back: Neck supple.   Skin:     General: Skin is warm.      Capillary Refill: Capillary refill takes less than 2 seconds.   Neurological:      Mental Status: She is alert.      Comments: Neurologic: Patient is awake and alert. Speech is clear. Face is symmetric without facial droop and facial sensation to light touch equal bilaterally. Uvula midline. Tongue protrusion midline. Hearing intact bilaterally. Full and equal shoulder shrug and head turn against resistance. 5/5 motor strength of UEs and LEs. Sensation to light touch intact in all four extremities.    Psychiatric:         Mood and Affect: Mood normal.           ED Course & MDM   ED Course as of 08/18/24 1424   Sun Aug 18, 2024   1154 8/13/24:  Patient had prior admission for black emesis and black tarry stools. Discharge summary states:  \"US liver with doppler showed a span of 13.9cm and findings consistent with hepatic steatosis and fatty infiltration, unimpressive for portal hypertension (borderline elevated hepetic vessel resistive indices). EGD had clean based ulcer in antrum with likely inflammatory polyp with adherent clot, epi injected and polyp removed and clip placed this is likely her cause of coffee ground emesis and melena, with GI recommending PPI BID.\" [AT]   1210 I independently interpreted the EKG:  Regular rate. Regular rhythm. Normal axis. Motion artifact present as patient shaking.  Normal MA, QRS, and Qtc intervals.   No ST segment " elevations, depressions, or T wave inversions. [AT]   1243 POCT pH, Venous(!): 7.45 [AT]   1244 POCT pCO2, Venous: 44 [AT]   1244 POCT HCO3 Calculated, Venous(!): 30.6  Metabolic alkalosis  [AT]   1244 POCT Lactate, Venous(!): 2.2  Lactate elevated will give LR [AT]   1244 POCT Glucose, Venous(!): 68 [AT]   1249 Emergency Medicine Attending Attestation:     [unfilled]    The patient was seen by the resident/fellow.  I have personally performed a substantive portion of the encounter.  I have seen and examined the patient; agree with the workup, evaluation, MDM, management and diagnosis.  The care plan has been discussed with the resident; I have reviewed the resident's note and agree with the documented findings.      Patient with a past medical history of CAD (s/p PCI, with most recent left heart cath without any new changes), and GI bleed (s/p EGD last week with clipping of culprit ulcer) who presents to the emergency department for ongoing abdominal pain, melena, feelings of weakness, fatigue, chest pain and headache.  Patient states that none of her symptoms are new, she had all the symptoms when she was seen and admitted last week by GI.  She states that the chest pain is the same as before without any new features, and never resolved.  She also states that headaches been ongoing for weeks.  She states because of the ongoing symptoms and the ongoing melena, she was concerned with her persistent weakness and presented back to the ED.  She denies any fevers, nausea/vomiting, shortness of breath, and leg swelling.  Patient is sitting comfortably in the gurney no acute distress with symmetric chest rise and clear breath sounds bilaterally.  Her cardiac exam is notable for regular rate.  She has a mildly tender abdominal exam in the epigastric region without any significant rebound tenderness or guarding.  Will plan to evaluate for any ongoing sequelae of bleeding or possible rebleed from her ulcer.  We will treat  her symptoms and reassess.    I independently interpreted patient's EKG and agree with the above mentioned interpretation.      Reginald Hair MD   [AM]   1332 LIPASE: 51 [AT]   1332 Troponin I, High Sensitivity (CMC): 12 [AT]   1332 aPTT: 32 [AT]   1332 Protime(!): 18.7 [AT]   1332 INR(!): 1.7 [AT]   1332 LEUKOCYTES (10*3/UL) IN BLOOD BY AUTOMATED COUNT, Arabic: 5.8 [AT]   1332 HEMOGLOBIN(!): 7.8  Hgb downtrended [AT]   1332 GLUCOSE(!): 63 [AT]   1332 SODIUM: 144 [AT]   1332 POTASSIUM: 3.8 [AT]   1332 Anion Gap: 13 [AT]   1332 Blood Urea Nitrogen: 18 [AT]   1332 Creatinine(!): 1.28  Around similar to prior [AT]   1332 EGFR(!): 47 [AT]   1422 CT abdomen/pelvis:  IMPRESSION:  1. Findings suggestive of a gastroenteritis. No CT evidence for acute  intraluminal active GI bleeding.  2. Markedly enlarged heterogeneous uterus with presumed multiple  fibroids measuring up to 15 cm. Intrauterine device noted. Small  amount of gas in the endometrial canal.  Consider further evaluation  with pelvic ultrasound if clinically indicated.  3. Colonic diverticulosis without findings of diverticulitis.  4. Mild hepatic steatosis.  5. Coronary artery calcifications. [AT]      ED Course User Index  [AM] Reginald Hair MD  [AT] Steph Bell MD         Diagnoses as of 08/18/24 1424   Gastroenteritis                 No data recorded     [place] Coma Scale Score: 15 (08/18/24 1159 : Lowell Peters RN)                           Medical Decision Making  Patient currently hemodynamically stable, afebrile. Satting well on room air. Patient hypertensive likely due to pain. She reports she did take her medications today. Patient mainly endorsing abdominal pain, black tarry stools, hematuria.     Differential includes but is not limited to ACS, AAA,esophageal rupture, mesenteric ischemia, bowel perforation, SBO, diverticulitis, PUD, GERD, gastritis, pancreatitis, appendicitis, nephrolithiasis, cholelithiasis, cholecystitis.    Labs  ordered to assess for any anemia, electrolyte abnormalities, mesenteric ischemia, pancreatitis, ACS. UA ordered to rule out UTI.    CT angio abdomen pelvis ordered to rule out hematoma, gi bleed, other acute abdominal pathology. Patient given LR, meds for symptomatic treatment. Considered CT head but less concern for SAH, ICH, mass, TIA. Patient neuro exam unremarkable. Van negative. Rectal exam exam shows dark stool. No bright red blood, no active diffuse bleeding noted. CT abdomen pelvis shows gastroenteritis. Discussed with patient about findings of fibroid on CT abdomen pelvis, patient reports that she has had vaginal bleeding, has known fibroids. Patient admitted for gastroenteritis, hematochezia with hemoglobin drop.        Procedure  Procedures            Steph Bell MD  Resident  08/18/24 1128

## 2024-08-18 NOTE — SIGNIFICANT EVENT
Senior Staffing Note  To be staffed by attending tomorrow AM  Please see excellent AI H&P note for complete details    64-year-old woman with a past medical history of HFpEF (50-55% 9/2023), CAD s/p PCIs, TIA 9/2023, idiopathic SVC thrombus and remote DVT on Eliquis, T2DM, HLD, CKD, obesity, NAFLD, MICHAEL, recent UGIB (08/2024) 2/2 peptic ulcer who presents with dark tarry stools.     Of note, she was admitted in August 2024 for same complaint. Had Hb drop from 12 to 9. Vital signs stable. Recommended PPI BID based on EGD result: clean based ulcer in antrum with likely inflammatory polyp with adherent clot; epi injected and polyp removed and clip placed. BIOPSY RESULT PENDING    Vital signs stable in the ED. Hb is 7.8 (discharge hemoglobin on 8/13 was 8.9). LFTs wnl. INR 1.7. T&S obtained    Physical exam:  NAD  Regular rate and rhythm.  Soft non distended abdomen. Excoriations and scarring noted (chronic, longstanding)  Mild pitting edema bilaterally  Appropriate mood and affect    CT Angio abdomen pelvis 8/18  1. Findings suggestive of a gastroenteritis. No CT evidence for acute  intraluminal active GI bleeding.  2. Markedly enlarged heterogeneous uterus with presumed multiple  fibroids measuring up to 15 cm. Intrauterine device noted. Small  amount of gas in the endometrial canal.  Consider further evaluation  with pelvic ultrasound if clinically indicated.  3. Colonic diverticulosis without findings of diverticulitis.  4. Mild hepatic steatosis.  5. Coronary artery calcifications.    A&P:  Patient representing with upper GI bleed. She reports taking her PPI BID. She is also reports taking her apixaban for SVC thrombus. Etiology of bleeding could be known peptic ulcer vs polyp (same as prior or new) vs AVM vs duodenitis (thickening of small bowel loops seen on CT). Ulcer or polyp bleed could be refractory to solely PPI if this is due to h.pylori, which needs treatment. Biopsy result pending. Since she is on  anticoagulation, bleeding could be from previously identified source but another source in the upper GI tract is not excluded.      #UGIB  #Peptic ulcer   ::recent EGD at last admission 8/12: with clean based ulcer and adjacent inflammatory polyp with clot. Clip placed on clot. Biopsy result pending  -NPO for now. Would benefit from repeat EGD and if no source found, can proceed with capsule endoscopy and colonoscopy  -active T&S, consented (paper)  -hold eliquis, diuretics, beta blocker, anti hypertensives (pt stated her BP was 80/40 this morning but denied lightheadedness and orthostatic symptoms)  -PPI IV BID  -In light of patient's weakness, fatigue, and preexisting extensive cardiac history will transfuse for goal Hb>8    #HFpEF  #CAD c/b STEMI, PCI  #CKD  -continue asa 81. Hold home diuretics and beta blocker    #T2DM  -hold home SGLT2i, GLP1a  -continue with half of home basal insulin and q4h SSI while NPO (25u evening degludec)    Misc  -continue home duloxetine    Code status: FULL code  NOK: georgia Medina

## 2024-08-18 NOTE — ED TRIAGE NOTES
Pt arrived via EMS, C/O generalized weakness and continued dark tarry stools. Pt recently DC from here for same. Pt also reports bloody emesis following endoscopy. PT is A/O x4, Hypertensive, other VSS.

## 2024-08-18 NOTE — H&P
Subjective     Chief concern:   Chief Complaint   Patient presents with    Weakness, Gen    Black or Bloody Stool       History of present illness:  Nelida Mata is a 64-year-old woman with a past medical history of HFpEF (50-55% 9/2023), CAD s/p PCI w/ stent x 3 (2016, 2018) and STEMI (9/2023, no stents placed), TIA 9/2023, idiopathic SVC thrombus on Eliquis, remote DVT, T2DM, HLD, CKD, obesity, NAFLD, MICHAEL, and prior toe amputations for gangrene in 2020, who presented to the ED for abdominal pain and black tarry stools.     Of note, patient was recently admitted 08/11-08/13 for black emesis and black tarry stools c/w UGIB. Underwent an EGD 08/12 which revealed a clean based ulcer in antrum and likely inflammatory polyp with adherent clot. Biopsies were taken from the fundus of the stomach, incisura and antrum to rule out H. Pylori--path still pending. The polyp was the suspected cause of her UGIB and was removed with subsequent clip placement. GI recommendation for PPI BID and follow up within 1 week.     She states that she has been having continued black stools since discharge from her last hospital admission. The continued tarry stools along with generalized weakness experienced this morning and a BP check at home showing SBP in the 80s prompted her to present to the ED. Denies any dizziness, SOB, or syncope at that time. She does endorse a history of abdominal pain, nausea, constipation, and intermittent chills. Her abdominal pain is described as a dull ache in the epigastric region rated 8/10 in severity. She denies vomiting, diarrhea, back pain, flank pain, or recent trauma. She was prescribed protonix upon discharge and is not sure if she has been taking it. Endorses ongoing small-volume vaginal bleeding 2/2 fibroids. Does not note increase in this vaginal bleeding in the past week.     She also endorses chest pain unchanged from her last admission when it first started. Pain is located across her  chest on both sides. Worsened with movement and sometimes with breathing. No alleviating factors. No radiation. Denies recent trauma.         ED course:   Vitals:    08/18/24 1155   BP: (!) 160/113   Pulse: 93   Resp: 18   Temp: 36.3 °C (97.3 °F)   SpO2: 99%       ED Labs:  CBC: WBC 5.8 / Hb 7.8 (L) / Hct 23.7 (L) / Plt 331  BMP: Na 144 / K 3.8 / Cl 107 / HCO3 28 / BUN 18 / Cr 1.28 (H)  Coags: INR 1.7 (H) / PT 18.7 (H) / PTT 32  ABG/VBG: pH 7.45 (H) / pCO2 44 / pO2 21 (L) / HCO3 30.6 (H)  Lactate: 2.2 (H)   Troponin: 12  Lipase: 51    Imaging:    CTA 08/18:   IMPRESSION:  1. Findings suggestive of a gastroenteritis. No CT evidence for acute  intraluminal active GI bleeding.  2. Markedly enlarged heterogeneous uterus with presumed multiple  fibroids measuring up to 15 cm. Intrauterine device noted. Small  amount of gas in the endometrial canal.  Consider further evaluation  with pelvic ultrasound if clinically indicated.  3. Colonic diverticulosis without findings of diverticulitis.  4. Mild hepatic steatosis.  5. Coronary artery calcifications.     EGD 08/12:  Impression  The esophagus appeared normal.  The cardia, fundus of the stomach and incisura appeared normal.  Single ulcer in the antrum and prepyloric region with clean base (Waqas III)  Gale Ip polyp measuring 10-19 mm in the antrum; there was stigmata of recent hemorrhage, and bleeding occurred before intervention; lift performed; performed hot snare removal; placed 1 clip successfully; hemostasis achieved  The duodenal bulb appeared normal.  Performed forceps biopsies in the fundus of the stomach, incisura and antrum to rule out H. pylori       Coloscopy 12/2015:  - One 6 mm polyp in the transverse colon. Resected and   retrieved.   - Overdue for repeat--planned follow up in 5 years    Past Medical History:  She has a past medical history of Abnormal uterine and vaginal bleeding, unspecified (04/10/2018), Abnormal uterine and vaginal bleeding,  unspecified (02/22/2018), Acute candidiasis of vulva and vagina (04/10/2018), Acute candidiasis of vulva and vagina (02/22/2018), Acute vaginitis (04/10/2018), Acute vaginitis (02/22/2018), Alcohol abuse, in remission, Anxiety disorder, unspecified (04/10/2018), Anxiety disorder, unspecified (02/22/2018), Atherosclerotic heart disease of native coronary artery without angina pectoris (04/10/2018), Atherosclerotic heart disease of native coronary artery without angina pectoris (02/22/2018), Atherosclerotic heart disease of native coronary artery without angina pectoris (02/22/2018), Atherosclerotic heart disease of native coronary artery without angina pectoris (04/10/2018), Atypical facial pain (04/10/2018), Atypical facial pain (02/22/2018), Benign lipomatous neoplasm, unspecified (04/10/2018), Benign lipomatous neoplasm, unspecified (02/22/2018), Changes in skin texture (04/10/2018), Changes in skin texture (02/22/2018), Chest pain, unspecified (04/10/2018), Chest pain, unspecified (02/22/2018), Chronic cough (04/10/2018), Chronic cough (02/22/2018), Chronic pain syndrome (04/10/2018), Chronic pain syndrome (02/22/2018), Cramp and spasm (04/10/2018), Cramp and spasm (02/22/2018), Depression, unspecified (04/10/2018), Depression, unspecified (02/22/2018), Disorder of pigmentation, unspecified (04/10/2018), Disorder of pigmentation, unspecified (02/22/2018), Dysuria (04/10/2018), Dysuria (02/22/2018), Encounter for immunization (04/10/2018), Encounter for immunization (04/10/2018), Encounter for immunization (02/22/2018), Encounter for immunization (02/22/2018), Encounter for screening for infections with a predominantly sexual mode of transmission, Encounter for screening mammogram for malignant neoplasm of breast (04/10/2018), Encounter for screening mammogram for malignant neoplasm of breast (02/22/2018), Essential (primary) hypertension (04/10/2018), Essential (primary) hypertension (02/22/2018), Fatty (change  of) liver, not elsewhere classified (04/10/2018), Fatty (change of) liver, not elsewhere classified (02/22/2018), Fibromyalgia (04/10/2018), Fibromyalgia (02/22/2018), Gastro-esophageal reflux disease without esophagitis (04/10/2018), Generalized contraction of visual field, left eye (02/06/2017), Headache, unspecified (04/10/2018), Headache, unspecified (02/22/2018), Heart failure, unspecified (Multi) (02/22/2018), Heart failure, unspecified (Multi) (04/10/2018), Hereditary and idiopathic neuropathy, unspecified (04/10/2018), Hereditary and idiopathic neuropathy, unspecified (02/22/2018), Hydroureter (04/10/2018), Hydroureter (02/22/2018), Hyperlipidemia, unspecified, Hypertensive urgency (04/10/2018), Hypertensive urgency (02/22/2018), Hypokalemia (04/10/2018), Hypokalemia (02/22/2018), Insomnia, unspecified (04/10/2018), Insomnia, unspecified (02/22/2018), Localized edema (04/10/2018), Localized edema (02/22/2018), Macula scars of posterior pole (postinflammatory) (post-traumatic), left eye (04/10/2018), Morbid (severe) obesity due to excess calories (Multi) (04/10/2018), Morbid (severe) obesity due to excess calories (Multi) (02/22/2018), 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), Nail dystrophy (02/22/2018), Non-ST elevation (NSTEMI) myocardial infarction (Multi) (04/10/2018), Non-ST elevation (NSTEMI) myocardial infarction (Multi) (02/22/2018), Obstructive sleep apnea (adult) (pediatric) (04/10/2018), Obstructive sleep apnea (adult) (pediatric) (02/22/2018), Other chest pain (02/22/2018), Other chest pain (04/10/2018), Other conditions influencing health status (04/10/2018), Other conditions influencing health status (04/10/2018), Other conditions influencing health status (04/10/2018), Other conditions influencing health status (02/22/2018), Other conditions influencing health status (02/22/2018), Other conditions influencing health status (02/22/2018), Other fatigue (04/10/2018), Other fatigue (02/22/2018), Other fecal  abnormalities (04/10/2018), Other fecal abnormalities (02/22/2018), Other forms of dyspnea (04/10/2018), Other forms of dyspnea (02/22/2018), Other problems related to lifestyle, Other specified health status (04/10/2018), Other specified health status (02/22/2018), Pain in right knee (02/22/2018), Pain in right knee (04/10/2018), Peripheral vascular disease, unspecified (CMS-HCC) (04/10/2018), Peripheral vascular disease, unspecified (CMS-HCC) (02/22/2018), Personal history of other diseases of the digestive system, Personal history of other diseases of the digestive system, Personal history of other diseases of the nervous system and sense organs, Personal history of other endocrine, nutritional and metabolic disease, Personal history of other endocrine, nutritional and metabolic disease, Personal history of other endocrine, nutritional and metabolic disease, Polyarthritis, unspecified (04/10/2018), Polyarthritis, unspecified (02/22/2018), Polyp of colon (04/10/2018), Polyp of colon (02/22/2018), Postmenopausal bleeding (04/10/2018), Postmenopausal bleeding (02/22/2018), Presence of coronary angioplasty implant and graft (04/10/2018), Presence of coronary angioplasty implant and graft (02/22/2018), Presence of intraocular lens (04/10/2018), Presence of intraocular lens (03/18/2015), Primary open-angle glaucoma, unspecified eye, stage unspecified (04/10/2018), Proteinuria, unspecified (04/10/2018), Proteinuria, unspecified (02/22/2018), Pruritus, unspecified (04/10/2018), Pruritus, unspecified (02/22/2018), Psoriasis, unspecified (04/10/2018), Psoriasis, unspecified (02/22/2018), Rash and other nonspecific skin eruption (04/10/2018), Rash and other nonspecific skin eruption (02/22/2018), Repeated falls (04/10/2018), Repeated falls (02/22/2018), Retinal hemorrhage, right eye (02/06/2017), Shortness of breath (04/10/2018), Shortness of breath (02/22/2018), Sleep apnea, unspecified (04/10/2018), Sleep apnea,  unspecified (02/22/2018), Traction detachment of retina, right eye (02/06/2017), Type 2 diabetes mellitus with diabetic polyneuropathy (Multi) (04/10/2018), Type 2 diabetes mellitus with diabetic polyneuropathy (Multi) (02/22/2018), Type 2 diabetes mellitus with other skin ulcer (CODE) (Multi) (04/10/2018), Type 2 diabetes mellitus with other skin ulcer (CODE) (Multi) (02/22/2018), Type 2 diabetes mellitus with proliferative diabetic retinopathy without macular edema, unspecified eye (Multi) (04/10/2018), Type 2 diabetes mellitus with unspecified diabetic retinopathy without macular edema (Multi) (02/06/2017), Umbilical hernia without obstruction or gangrene, Unqualified visual loss, both eyes (02/06/2017), Unspecified glaucoma (06/11/2015), Unstable angina (Multi) (04/10/2018), Unstable angina (Multi) (02/22/2018), Vitamin D deficiency, unspecified (04/10/2018), Vitamin D deficiency, unspecified (02/22/2018), Xerosis cutis (04/10/2018), and Xerosis cutis (02/22/2018).    Past Surgical History:  She has a past surgical history that includes Coronary angioplasty with stent (03/06/2017); Cataract extraction (03/18/2015); Retinal detachment surgery (03/18/2015); MR angio head wo IV contrast (08/16/2013); MR angio neck wo IV contrast (08/16/2013); CT angio aorta and bilateral iliofemoral runoff w and or wo IV contrast (08/08/2022); Breast biopsy (Left); and Cardiac catheterization (N/A, 7/5/2024).     Social history:  Alcohol:   Alcohol Use: Not At Risk (8/12/2024)    AUDIT-C     Frequency of Alcohol Consumption: 2-4 times a month     Average Number of Drinks: 1 or 2     Frequency of Binge Drinking: Never     Tobacco:   Tobacco Use: Low Risk  (8/12/2024)    Patient History     Smoking Tobacco Use: Never     Smokeless Tobacco Use: Never     Passive Exposure: Never      Illicit Drugs:   Social History     Substance and Sexual Activity   Drug Use Never       She reports that she has never smoked. She has never been exposed  to tobacco smoke. She has never used smokeless tobacco. She reports that she does not currently use alcohol. She reports that she does not use drugs.    Family history:  Family History   Problem Relation Name Age of Onset    Diabetes Mother      Glaucoma Father      Hypertension Other FAMILY         Allergies:  Other, Penicillins, and Procaine    Home medications:  Prior to Admission medications    Medication Sig Start Date End Date Taking? Authorizing Provider   acetaminophen (Tylenol) 325 mg tablet Take 3 tablets (975 mg) by mouth every 8 hours if needed for mild pain (1 - 3) for up to 10 days. 8/13/24 8/23/24  Christie Nogueira MD   apixaban (Eliquis) 5 mg tablet Take 1 tablet (5 mg) by mouth every 12 hours. Do not RESUME until 7/6/24 evening dose. Do not fill before July 6, 2024. 7/6/24 7/6/25  Isidra Biggs, APRN-CNP   aspirin 81 mg EC tablet Take 1 tablet (81 mg) by mouth once daily. 7/22/24 7/22/25  Betty Boone MD   atorvastatin (Lipitor) 80 mg tablet TAKE 1 TABLET BY MOUTH ONCE DAILY 5/1/23 7/28/24  Darren Ventura MD   blood pressure monitor kit 1 each once daily. Use once daily to check blood pressure 5/6/24   Kassandra Lopez MD   blood sugar diagnostic (OneTouch Ultra Test) strip USE 1 STRIP(S) TO TEST BLOOD SUGAR 3 TIMES A DAY 5/20/24   Kassandra Lopez MD   brimonidine (AlphaGAN) 0.2 % ophthalmic solution Administer 1 drop into both eyes 2 times a day. PATIENT NEEDS PCP APPT FOR FURTHER REFILLS 3/22/24   Kassandra Lopez MD   calcium carbonate-vit D3-min 600 mg calcium- 400 unit tablet Take 1 tablet by mouth once daily. 5/1/23   Darren Ventura MD   carvedilol (Coreg) 12.5 mg tablet Take 1 tablet (12.5 mg) by mouth 2 times daily (morning and late afternoon). 6/28/24   Destiny Hurst MD PhD   dapagliflozin propanediol (Farxiga) 10 mg Take 1 tablet (10 mg) by mouth once daily. 12/8/23 12/7/24  Michael Hilario MD   diclofenac sodium (Voltaren) 1 % gel Apply 4.5 inches (4 g) topically 2  times a day. 8/13/24 11/21/24  Christie Nogueira MD   DULoxetine (Cymbalta) 30 mg DR capsule Take 1 capsule (30 mg) by mouth once daily in the morning AND 2 capsules (60 mg) once daily at bedtime. Do not crush or chew.. 7/22/24 9/20/24  Betty Boone MD   finerenone (Kerendia) 10 mg tablet tablet Take 1 tablet (10 mg) by mouth once daily. 2/16/24   Michael Hilario MD   FreeStyle Filipe 2 Sensor kit Apply 1 sensor to the back of the upper arm. Change sensor every 14 days. 3/8/24   Michael Hilario MD   FreeStyle Filipe reader (FreeStyle Filipe 2 [place]) misc Use as instructed 10/4/23   Marek Miles MD   furosemide (Lasix) 20 mg tablet Take 1 tablet (20 mg) by mouth once daily. 4/30/24   Najma Lang MD   glucose 4 gram chewable tablet Chew 4 tablets (16 g) 1 time for 1 dose as needed 8/13/24 8/14/24  Christie Nogueira MD   hydrOXYzine HCL (Atarax) 25 mg tablet Take 1 tablet (25 mg) by mouth 2 times a day as needed for itching for up to 7 days. 7/22/24 7/29/24  Betty Boone MD   insulin degludec (Tresiba FlexTouch U-200) 200 unit/mL (3 mL) injection Inject 52 Units under the skin once daily at bedtime. Take as directed per insulin instructions. 7/22/24 7/22/25  Betty Boone MD   insulin lispro (HumaLOG KwikPen Insulin) 200 unit/mL (3 mL) insulin pen pen Inject 22 Units under the skin 3 times daily (morning, midday, late afternoon). Take as directed per insulin instructions. 7/22/24   Betty Boone MD   isosorbide mononitrate ER (Imdur) 60 mg 24 hr tablet TAKE 1 TABLET BY MOUTH EVERY DAY IN THE MORNING 8/17/24   Betty Boone MD   latanoprost (Xalatan) 0.005 % ophthalmic solution ADMINISTER 1 DROP INTO BOTH EYES ONCE DAILY AT BEDTIME. 3/11/24   Historical Provider, MD   melatonin 5 mg tablet Take 1 tablet (5 mg) by mouth once daily at bedtime.    Historical Provider, MD   nitroglycerin (Nitrostat) 0.4 mg SL tablet PLACE 1 TABLET UNDER THE TONGUE EVERY 5 MINUTES FOR UP TO 3 DOSES AS NEEDED FOR  CHEST PAIN.CALL 911 IF PAIN PERSISTS. 7/20/16   Historical Provider, MD   pantoprazole (ProtoNix) 40 mg EC tablet Take 1 tablet (40 mg) by mouth once daily in the morning. Take before meals. 12/14/23 12/13/24  Kassandra Lopez MD   polyethylene glycol (Glycolax, Miralax) 17 gram/dose powder Take 1 capful (17 g) mixed in 4-8 ounces of liquid by mouth once daily as needed (constipation). 8/13/24   Christie Nogueira MD   semaglutide 2 mg/dose (8 mg/3 mL) pen injector Inject 2 mg under the skin 1 (one) time per week. 4/7/24   Michael Hilario MD   sennosides-docusate sodium (Zoila-Colace) 8.6-50 mg tablet Take 1 tablet by mouth 2 times a day. 8/13/24 9/12/24  Christie Nogueira MD   urea (Carmol) 20 % cream Apply 1 Application topically 2 times a day. 4/18/24   Historical Provider, MD   HumaLOG KwikPen Insulin 100 unit/mL injection Inject 22 Units under the skin 3 times a day. With meals 7/1/24 8/13/24  Historical Provider, MD   isosorbide mononitrate ER (Imdur) 60 mg 24 hr tablet TAKE 1 TABLET BY MOUTH EVERY DAY IN THE MORNING 2/13/24 8/17/24  Oral Schwarz MD          Objective     Review of systems  Review of Systems   Constitutional:  Positive for appetite change and fatigue. Negative for activity change and fever.   HENT:  Negative for congestion, hearing loss, sinus pressure, sinus pain, sore throat, trouble swallowing and voice change.    Eyes:  Negative for pain, discharge and visual disturbance.   Respiratory:  Positive for shortness of breath. Negative for cough, choking, chest tightness and wheezing.    Cardiovascular:  Positive for chest pain. Negative for palpitations and leg swelling.   Gastrointestinal:  Positive for abdominal pain, constipation and nausea. Negative for abdominal distention, anal bleeding, blood in stool, diarrhea, rectal pain and vomiting.   Endocrine: Negative for polydipsia and polyuria.   Genitourinary:  Negative for difficulty urinating, flank pain and frequency.   Musculoskeletal:   "Negative for arthralgias, back pain, gait problem and joint swelling.   Skin:  Negative for color change and pallor.   Neurological:  Positive for tremors. Negative for dizziness, syncope, speech difficulty, weakness, light-headedness, numbness and headaches.        Vital signs:  Blood pressure (!) 160/113, pulse 93, temperature 36.3 °C (97.3 °F), temperature source Temporal, resp. rate 18, height 1.499 m (4' 11\"), weight 88.5 kg (195 lb), SpO2 99%.      Physical Exam  Constitutional:       General: She is not in acute distress.     Appearance: Normal appearance. She is not ill-appearing or diaphoretic.   HENT:      Head: Normocephalic and atraumatic.      Nose: No congestion or rhinorrhea.      Mouth/Throat:      Mouth: Mucous membranes are moist.      Pharynx: Oropharynx is clear.   Eyes:      Extraocular Movements: Extraocular movements intact.      Conjunctiva/sclera: Conjunctivae normal.      Pupils: Pupils are equal, round, and reactive to light.   Cardiovascular:      Rate and Rhythm: Normal rate and regular rhythm.      Pulses: Normal pulses.      Heart sounds: Normal heart sounds. No murmur heard.     No friction rub. No gallop.   Pulmonary:      Effort: Pulmonary effort is normal.      Breath sounds: Normal breath sounds.   Chest:      Chest wall: Tenderness present.   Abdominal:      General: Bowel sounds are normal. There is no distension.      Palpations: Abdomen is soft. There is no mass.      Tenderness: There is abdominal tenderness (epigastrium, hypogastrium). There is no guarding or rebound.   Musculoskeletal:         General: Normal range of motion.      Cervical back: Normal range of motion.      Right lower leg: Trace edema in RLE present.      Left lower leg: No edema.   Skin:     General: Skin is warm and dry.   Neurological:      General: No focal deficit present.      Mental Status: She is alert and oriented to person, place, and time.      Sensory: No sensory deficit.      Motor: No " weakness.     Relevant Results        Meds:  No current facility-administered medications on file prior to encounter.     Current Outpatient Medications on File Prior to Encounter   Medication Sig Dispense Refill    acetaminophen (Tylenol) 325 mg tablet Take 3 tablets (975 mg) by mouth every 8 hours if needed for mild pain (1 - 3) for up to 10 days. 90 tablet 0    apixaban (Eliquis) 5 mg tablet Take 1 tablet (5 mg) by mouth every 12 hours. Do not RESUME until 7/6/24 evening dose. Do not fill before July 6, 2024. 60 tablet 11    aspirin 81 mg EC tablet Take 1 tablet (81 mg) by mouth once daily. 30 tablet 11    atorvastatin (Lipitor) 80 mg tablet TAKE 1 TABLET BY MOUTH ONCE DAILY 90 tablet 3    blood pressure monitor kit 1 each once daily. Use once daily to check blood pressure 1 kit 0    blood sugar diagnostic (OneTouch Ultra Test) strip USE 1 STRIP(S) TO TEST BLOOD SUGAR 3 TIMES A  strip 3    brimonidine (AlphaGAN) 0.2 % ophthalmic solution Administer 1 drop into both eyes 2 times a day. PATIENT NEEDS PCP APPT FOR FURTHER REFILLS 30 mL 0    calcium carbonate-vit D3-min 600 mg calcium- 400 unit tablet Take 1 tablet by mouth once daily. 90 each 3    carvedilol (Coreg) 12.5 mg tablet Take 1 tablet (12.5 mg) by mouth 2 times daily (morning and late afternoon). 60 tablet 11    dapagliflozin propanediol (Farxiga) 10 mg Take 1 tablet (10 mg) by mouth once daily. 30 tablet 11    diclofenac sodium (Voltaren) 1 % gel Apply 4.5 inches (4 g) topically 2 times a day. 800 g 0    DULoxetine (Cymbalta) 30 mg DR capsule Take 1 capsule (30 mg) by mouth once daily in the morning AND 2 capsules (60 mg) once daily at bedtime. Do not crush or chew.. 60 capsule 2    finerenone (Kerendia) 10 mg tablet tablet Take 1 tablet (10 mg) by mouth once daily. 30 tablet 11    FreeStyle Filipe 2 Sensor kit Apply 1 sensor to the back of the upper arm. Change sensor every 14 days. 2 each 11    FreeStyle Filipe reader (FreeStyle Filipe 2 [place])  misc Use as instructed 1 each 0    furosemide (Lasix) 20 mg tablet Take 1 tablet (20 mg) by mouth once daily. 90 tablet 2    glucose 4 gram chewable tablet Chew 4 tablets (16 g) 1 time for 1 dose as needed 20 tablet 0    hydrOXYzine HCL (Atarax) 25 mg tablet Take 1 tablet (25 mg) by mouth 2 times a day as needed for itching for up to 7 days. 14 tablet 0    insulin degludec (Tresiba FlexTouch U-200) 200 unit/mL (3 mL) injection Inject 52 Units under the skin once daily at bedtime. Take as directed per insulin instructions. 24 mL 3    insulin lispro (HumaLOG KwikPen Insulin) 200 unit/mL (3 mL) insulin pen pen Inject 22 Units under the skin 3 times daily (morning, midday, late afternoon). Take as directed per insulin instructions. 11 each 3    isosorbide mononitrate ER (Imdur) 60 mg 24 hr tablet TAKE 1 TABLET BY MOUTH EVERY DAY IN THE MORNING 90 tablet 1    latanoprost (Xalatan) 0.005 % ophthalmic solution ADMINISTER 1 DROP INTO BOTH EYES ONCE DAILY AT BEDTIME.      melatonin 5 mg tablet Take 1 tablet (5 mg) by mouth once daily at bedtime.      nitroglycerin (Nitrostat) 0.4 mg SL tablet PLACE 1 TABLET UNDER THE TONGUE EVERY 5 MINUTES FOR UP TO 3 DOSES AS NEEDED FOR CHEST PAIN.CALL 911 IF PAIN PERSISTS.      pantoprazole (ProtoNix) 40 mg EC tablet Take 1 tablet (40 mg) by mouth once daily in the morning. Take before meals. 90 tablet 3    polyethylene glycol (Glycolax, Miralax) 17 gram/dose powder Take 1 capful (17 g) mixed in 4-8 ounces of liquid by mouth once daily as needed (constipation). 510 g 0    semaglutide 2 mg/dose (8 mg/3 mL) pen injector Inject 2 mg under the skin 1 (one) time per week. 3 mL 11    sennosides-docusate sodium (Zoila-Colace) 8.6-50 mg tablet Take 1 tablet by mouth 2 times a day. 60 tablet 0    urea (Carmol) 20 % cream Apply 1 Application topically 2 times a day.      [DISCONTINUED] HumaLOG KwikPen Insulin 100 unit/mL injection Inject 22 Units under the skin 3 times a day. With meals       [DISCONTINUED] isosorbide mononitrate ER (Imdur) 60 mg 24 hr tablet TAKE 1 TABLET BY MOUTH EVERY DAY IN THE MORNING 90 tablet 1       Labs:  Last CBC:  Lab Results   Component Value Date    WBC 5.8 08/18/2024    HGB 7.8 (L) 08/18/2024    HCT 23.7 (L) 08/18/2024    MCV 84 08/18/2024     08/18/2024       Last RFP:  Lab Results   Component Value Date    GLUCOSE 63 (L) 08/18/2024    CALCIUM 9.7 08/18/2024     08/18/2024    K 3.8 08/18/2024    CO2 28 08/18/2024     08/18/2024    BUN 18 08/18/2024    CREATININE 1.28 (H) 08/18/2024       Last LFTs:  Lab Results   Component Value Date    ALT 9 08/18/2024    AST 13 08/18/2024    ALKPHOS 89 08/18/2024    BILITOT 0.3 08/18/2024       Last coags:  Lab Results   Component Value Date    INR 1.7 (H) 08/18/2024    APTT 32 08/18/2024       Micro/culture data:  No results found for the last 90 days.      Imaging:  CT angio abdomen pelvis w and or wo IV IV contrast  Narrative: STUDY:  CT Angiogram of the Abdomen and Pelvis; 8/18/2024 1:40 PM  INDICATION:  Evaluate for hematoma/GI bleed.  COMPARISON:  US liver 8/11/2024.  CTA chest 6/6/2024.  ACCESSION NUMBER(S):  [id number]  ORDERING CLINICIAN:  THAD MARIE  TECHNIQUE:  Helical CT is performed from the aortic diaphragmatic  hiatus through the symphysis pubis prior to and after bolus  administration of 100 mL of Omnipaque 350.  Images are reviewed and  processed at a workstation according to the CT angiogram protocol with  3-D and/or MIP post processing imaging generated.  Automated mA/kV exposure control was utilized and patient examination  was performed in strict accordance with principles of ALARA.  FINDINGS:   Vascular:  Mesenteric: The celiac, SMA, and TAMMI demonstrate no significant  stenosis.     Right renal: Single vessel demonstrates no significant stenosis.     Left renal: Single vessel demonstrates no significant stenosis.  Abdominal aorta: The abdominal aorta is not aneurysmal and  demonstrates no  significant occlusive disease.  Iliacs: The common, external, and internal iliac vessels demonstrate  no aneurysmal disease or significant stenosis.  Abdomen:  The lung bases are clear.  Coronary artery calcifications.  Mild fatty  infiltration of the liver.  Small flash filling hemangioma in the left  hepatic lobe.  The pancreas, spleen, adrenal glands, and kidneys  demonstrate no acute pathology.  Gallbladder is present without  gallstones.  No renal/ureteral calculi.  Mild gastric wall thickening.  There is no free air or lymph node enlargement.  Pelvis:  Mildly thickened small bowel loops in the abdomen and pelvis.  No CT  evidence for acute intraluminal active GI bleeding..  Appendix is  normal.  Colonic diverticulosis without findings of diverticulitis.   Markedly enlarged heterogeneous uterus with presumed multiple fibroids  measuring up to 15 cm.  Intrauterine device noted.  Small amount of  gas in the endometrial canal.  Trace pelvic free fluid in the left  perirectal region..  Lymph nodes are not enlarged.  Urinary bladder is  unremarkable.  Small fat-containing midline ventral hernia.  Skeleton:    There are no acute fractures.  No suspicious bony lesions.  Impression: 1. Findings suggestive of a gastroenteritis. No CT evidence for acute  intraluminal active GI bleeding.  2. Markedly enlarged heterogeneous uterus with presumed multiple  fibroids measuring up to 15 cm. Intrauterine device noted. Small  amount of gas in the endometrial canal.  Consider further evaluation  with pelvic ultrasound if clinically indicated.  3. Colonic diverticulosis without findings of diverticulitis.  4. Mild hepatic steatosis.  5. Coronary artery calcifications.  Signed by Jamie Blackman MD           Assessment/Plan     Nelida Mata is a 64-year-old woman with a past medical history of HFpEF (50-55% 9/2023), CAD s/p PCI w/ stent x 3 (2016, 2018) and STEMI (9/2023, no stents placed), TIA 9/2023, idiopathic SVC thrombus  on Eliquis, remote DVT, T2DM, HLD, CKD, obesity, NAFLD, MICHAEL, and prior toe amputations for gangrene in 2020, who presented to the ED for abdominal pain and black tarry stools.       #Acute blood loss anemia 2/2 suspected UGIB    #Vaginal bleeding   #FRANCO  She presents with a 5 day history of continued black, tarry stools after her last admission where she underwent polyp removal w/ clipping. Path is still pending. Hb drop  8.9>7.8 between last admission discharge on 08/13 and now. She does take eliquis for an idiopathic SVC thrombus and aspirin for coronary disease  Hx of ongoing vaginal bleeding (typically spotting) 2/2 fibroids not amenable to surgical intervention at this time due to poorly controlled blood diabetes. She does not endorse an increase in the vaginal bleeding in the past week that would explain her Hb drop.   INR 1.7, PT 18.7, aPTT 32  EGD 08/12 showing normal esophagus. Single ulcer in antrum and prepyloric region with clean base. Antral polyp s/p hot snare removal and clip with hemostasis. Biopsies taken--path pending   No CT evidence for acute intraluminal active GI bleeding  EKG 08/19 not concerning for ACS  Highest suspicion for post-procedural (polypectomy) bleeding vs. PUD vs. small bowel angioectasia. Less likely: gastritis, esophagitis, traumatic (Jolanta-Oconnor tear), malignancy. Pancreatitis less likely given lipase wnl and lack of suggestive CT image findings.     Plan  - Active T & S  - NPO  - 2 large bore PIV  - 1 unit pRBC   - 40 mg protonix BID   - Repeat EGD  - Consider colonoscopy   - Iron studies   - Transfuse to Hb >8 (given hx of cardiac disease) and plt>50  - Outpatient follow up for vaginal bleeding per OB/Gyn rec from prior admission       #HFpEF  #CAD s/p PCI  #Hx STEMI  #SVC Thrombus  CTA 08/18 showing opacification of the SVC which may represent mixing artifact from contrast. Unable to r/o thrombus  EKG 08/18 w/o signs of ACS. hsTNT wnl.   No signs of fluid overload on  exam    Plan:   - Consider vascular consult   - Hold Apixaban 5mg BID  - Hold carvedilol 12.5mg BID  - Hold furosemide 20mg  - Hold isosorbide mononitrate 60mg       #T2DM  Home regimen: degludec 52 units PM, Humalog 22 units with meals  Low BG in the setting of poor oral intake in the last 24 hours     Plan:   - Hold dapagliflozin 10mg, semaglutide 2mg weekly  - 25 units degludec nightly   - q4hr SSI-2 while NPO   - Will consider starting short-acting insulin with meals once no longer NPO      #CKD   Cr on admission 1.28 (recent baseline 1.1-1.4)  Plan:   - Hold Kerendia 10mg       #HLD  Plan:   - Continue atorvastatin 80mg        #Chronic fibromyalgia  Plan:   - Continue duloxetine 30mg AM, 60mg PM       #Glaucoma   Plan:   - Continue brimonidine, latanoprost eye drops  - Holding home calcium carbonate-vitamin D as not on formulary      #Misc  - Continue melatonin 6mg       F: Replete PRN  E: Replete PRN  N: NPO  DVT Ppx: Held  GI Ppx: Pantoprazole   Access/Lines: PIVx2  Abx: None   O2: None     Pain regimen: Tylenol PRN   GI Laxative: None     Code status: Full Code  Emergency contact: Brother (Joe) [phone]; Son (Glynn Medina) [phone]         Drew Reyes, MS5

## 2024-08-19 ENCOUNTER — APPOINTMENT (OUTPATIENT)
Dept: PRIMARY CARE | Facility: CLINIC | Age: 64
End: 2024-08-19
Payer: COMMERCIAL

## 2024-08-19 LAB
ALBUMIN SERPL BCP-MCNC: 3.7 G/DL (ref 3.4–5)
ANION GAP SERPL CALC-SCNC: 9 MMOL/L (ref 10–20)
APPEARANCE UR: CLEAR
BASOPHILS # BLD AUTO: 0.14 X10*3/UL (ref 0–0.1)
BASOPHILS NFR BLD AUTO: 1.6 %
BILIRUB UR STRIP.AUTO-MCNC: NEGATIVE MG/DL
BUN SERPL-MCNC: 16 MG/DL (ref 6–23)
CALCIUM SERPL-MCNC: 9.2 MG/DL (ref 8.6–10.6)
CHLORIDE SERPL-SCNC: 105 MMOL/L (ref 98–107)
CO2 SERPL-SCNC: 31 MMOL/L (ref 21–32)
COLOR UR: ABNORMAL
CREAT SERPL-MCNC: 1.19 MG/DL (ref 0.5–1.05)
EGFRCR SERPLBLD CKD-EPI 2021: 51 ML/MIN/1.73M*2
EOSINOPHIL # BLD AUTO: 0.22 X10*3/UL (ref 0–0.7)
EOSINOPHIL NFR BLD AUTO: 2.5 %
ERYTHROCYTE [DISTWIDTH] IN BLOOD BY AUTOMATED COUNT: 14.5 % (ref 11.5–14.5)
EST. AVERAGE GLUCOSE BLD GHB EST-MCNC: 171 MG/DL
GLUCOSE BLD MANUAL STRIP-MCNC: 101 MG/DL (ref 74–99)
GLUCOSE BLD MANUAL STRIP-MCNC: 101 MG/DL (ref 74–99)
GLUCOSE BLD MANUAL STRIP-MCNC: 145 MG/DL (ref 74–99)
GLUCOSE BLD MANUAL STRIP-MCNC: 162 MG/DL (ref 74–99)
GLUCOSE BLD MANUAL STRIP-MCNC: 162 MG/DL (ref 74–99)
GLUCOSE BLD MANUAL STRIP-MCNC: 188 MG/DL (ref 74–99)
GLUCOSE BLD MANUAL STRIP-MCNC: 70 MG/DL (ref 74–99)
GLUCOSE SERPL-MCNC: 148 MG/DL (ref 74–99)
GLUCOSE UR STRIP.AUTO-MCNC: ABNORMAL MG/DL
HBA1C MFR BLD: 7.6 %
HCT VFR BLD AUTO: 30.3 % (ref 36–46)
HGB BLD-MCNC: 9.7 G/DL (ref 12–16)
IMM GRANULOCYTES # BLD AUTO: 0.04 X10*3/UL (ref 0–0.7)
IMM GRANULOCYTES NFR BLD AUTO: 0.4 % (ref 0–0.9)
KETONES UR STRIP.AUTO-MCNC: NEGATIVE MG/DL
LEUKOCYTE ESTERASE UR QL STRIP.AUTO: NEGATIVE
LYMPHOCYTES # BLD AUTO: 1.66 X10*3/UL (ref 1.2–4.8)
LYMPHOCYTES NFR BLD AUTO: 18.6 %
MAGNESIUM SERPL-MCNC: 2.19 MG/DL (ref 1.6–2.4)
MCH RBC QN AUTO: 28 PG (ref 26–34)
MCHC RBC AUTO-ENTMCNC: 32 G/DL (ref 32–36)
MCV RBC AUTO: 87 FL (ref 80–100)
MONOCYTES # BLD AUTO: 0.71 X10*3/UL (ref 0.1–1)
MONOCYTES NFR BLD AUTO: 8 %
NEUTROPHILS # BLD AUTO: 6.14 X10*3/UL (ref 1.2–7.7)
NEUTROPHILS NFR BLD AUTO: 68.9 %
NITRITE UR QL STRIP.AUTO: NEGATIVE
NRBC BLD-RTO: 0 /100 WBCS (ref 0–0)
PH UR STRIP.AUTO: 8.5 [PH]
PHOSPHATE SERPL-MCNC: 3.6 MG/DL (ref 2.5–4.9)
PLATELET # BLD AUTO: 326 X10*3/UL (ref 150–450)
POTASSIUM SERPL-SCNC: 4.1 MMOL/L (ref 3.5–5.3)
PROT UR STRIP.AUTO-MCNC: NEGATIVE MG/DL
RBC # BLD AUTO: 3.47 X10*6/UL (ref 4–5.2)
RBC # UR STRIP.AUTO: NEGATIVE /UL
SODIUM SERPL-SCNC: 141 MMOL/L (ref 136–145)
SP GR UR STRIP.AUTO: 1.03
UROBILINOGEN UR STRIP.AUTO-MCNC: NORMAL MG/DL
WBC # BLD AUTO: 8.9 X10*3/UL (ref 4.4–11.3)

## 2024-08-19 PROCEDURE — 2500000004 HC RX 250 GENERAL PHARMACY W/ HCPCS (ALT 636 FOR OP/ED): Mod: JZ

## 2024-08-19 PROCEDURE — 99222 1ST HOSP IP/OBS MODERATE 55: CPT | Performed by: INTERNAL MEDICINE

## 2024-08-19 PROCEDURE — 1210000001 HC SEMI-PRIVATE ROOM DAILY

## 2024-08-19 PROCEDURE — 85025 COMPLETE CBC W/AUTO DIFF WBC: CPT

## 2024-08-19 PROCEDURE — 2500000005 HC RX 250 GENERAL PHARMACY W/O HCPCS

## 2024-08-19 PROCEDURE — 81003 URINALYSIS AUTO W/O SCOPE: CPT

## 2024-08-19 PROCEDURE — 2500000001 HC RX 250 WO HCPCS SELF ADMINISTERED DRUGS (ALT 637 FOR MEDICARE OP)

## 2024-08-19 PROCEDURE — 83735 ASSAY OF MAGNESIUM: CPT

## 2024-08-19 PROCEDURE — 82947 ASSAY GLUCOSE BLOOD QUANT: CPT

## 2024-08-19 PROCEDURE — 2500000004 HC RX 250 GENERAL PHARMACY W/ HCPCS (ALT 636 FOR OP/ED)

## 2024-08-19 PROCEDURE — 2500000002 HC RX 250 W HCPCS SELF ADMINISTERED DRUGS (ALT 637 FOR MEDICARE OP, ALT 636 FOR OP/ED)

## 2024-08-19 PROCEDURE — 84100 ASSAY OF PHOSPHORUS: CPT

## 2024-08-19 PROCEDURE — 99222 1ST HOSP IP/OBS MODERATE 55: CPT | Performed by: NURSE PRACTITIONER

## 2024-08-19 PROCEDURE — G0378 HOSPITAL OBSERVATION PER HR: HCPCS

## 2024-08-19 PROCEDURE — 83036 HEMOGLOBIN GLYCOSYLATED A1C: CPT

## 2024-08-19 PROCEDURE — 36415 COLL VENOUS BLD VENIPUNCTURE: CPT

## 2024-08-19 PROCEDURE — 80069 RENAL FUNCTION PANEL: CPT

## 2024-08-19 RX ORDER — ENOXAPARIN SODIUM 100 MG/ML
40 INJECTION SUBCUTANEOUS DAILY
Status: DISCONTINUED | OUTPATIENT
Start: 2024-08-20 | End: 2024-08-20 | Stop reason: HOSPADM

## 2024-08-19 RX ORDER — INSULIN LISPRO 100 [IU]/ML
0-10 INJECTION, SOLUTION INTRAVENOUS; SUBCUTANEOUS
Status: DISCONTINUED | OUTPATIENT
Start: 2024-08-19 | End: 2024-08-19

## 2024-08-19 RX ORDER — INSULIN LISPRO 100 [IU]/ML
15 INJECTION, SOLUTION INTRAVENOUS; SUBCUTANEOUS
Status: DISCONTINUED | OUTPATIENT
Start: 2024-08-19 | End: 2024-08-20 | Stop reason: HOSPADM

## 2024-08-19 RX ORDER — DEXTROSE 50 % IN WATER (D50W) INTRAVENOUS SYRINGE
25
Status: DISCONTINUED | OUTPATIENT
Start: 2024-08-19 | End: 2024-08-20 | Stop reason: HOSPADM

## 2024-08-19 RX ORDER — INSULIN DEGLUDEC 100 U/ML
25 INJECTION, SOLUTION SUBCUTANEOUS NIGHTLY
Status: DISCONTINUED | OUTPATIENT
Start: 2024-08-19 | End: 2024-08-20 | Stop reason: HOSPADM

## 2024-08-19 RX ORDER — INSULIN LISPRO 100 [IU]/ML
15 INJECTION, SOLUTION INTRAVENOUS; SUBCUTANEOUS
Status: CANCELLED | OUTPATIENT
Start: 2024-08-19 | End: 2024-08-20

## 2024-08-19 RX ORDER — INSULIN LISPRO 100 [IU]/ML
0-10 INJECTION, SOLUTION INTRAVENOUS; SUBCUTANEOUS 3 TIMES DAILY
Status: DISCONTINUED | OUTPATIENT
Start: 2024-08-19 | End: 2024-08-20 | Stop reason: HOSPADM

## 2024-08-19 RX ORDER — DEXTROSE 50 % IN WATER (D50W) INTRAVENOUS SYRINGE
12.5
Status: CANCELLED | OUTPATIENT
Start: 2024-08-19

## 2024-08-19 RX ORDER — DEXTROSE 50 % IN WATER (D50W) INTRAVENOUS SYRINGE
12.5
Status: DISCONTINUED | OUTPATIENT
Start: 2024-08-19 | End: 2024-08-20 | Stop reason: HOSPADM

## 2024-08-19 RX ORDER — POLYETHYLENE GLYCOL 3350 17 G/17G
17 POWDER, FOR SOLUTION ORAL DAILY
Status: DISCONTINUED | OUTPATIENT
Start: 2024-08-19 | End: 2024-08-20 | Stop reason: HOSPADM

## 2024-08-19 RX ORDER — DEXTROSE 50 % IN WATER (D50W) INTRAVENOUS SYRINGE
25
Status: CANCELLED | OUTPATIENT
Start: 2024-08-19

## 2024-08-19 RX ADMIN — INSULIN LISPRO 15 UNITS: 100 INJECTION, SOLUTION INTRAVENOUS; SUBCUTANEOUS at 13:47

## 2024-08-19 RX ADMIN — DULOXETINE HYDROCHLORIDE 30 MG: 30 CAPSULE, DELAYED RELEASE ORAL at 09:34

## 2024-08-19 RX ADMIN — INSULIN LISPRO 2 UNITS: 100 INJECTION, SOLUTION INTRAVENOUS; SUBCUTANEOUS at 13:47

## 2024-08-19 RX ADMIN — BRIMONIDINE TARTRATE 1 DROP: 2 SOLUTION/ DROPS OPHTHALMIC at 09:34

## 2024-08-19 RX ADMIN — INSULIN LISPRO 15 UNITS: 100 INJECTION, SOLUTION INTRAVENOUS; SUBCUTANEOUS at 18:49

## 2024-08-19 RX ADMIN — DULOXETINE HYDROCHLORIDE 60 MG: 60 CAPSULE, DELAYED RELEASE ORAL at 20:35

## 2024-08-19 RX ADMIN — Medication 6 MG: at 20:35

## 2024-08-19 RX ADMIN — LATANOPROST 1 DROP: 50 SOLUTION/ DROPS OPHTHALMIC at 20:36

## 2024-08-19 RX ADMIN — INSULIN LISPRO 2 UNITS: 100 INJECTION, SOLUTION INTRAVENOUS; SUBCUTANEOUS at 18:48

## 2024-08-19 RX ADMIN — FERUMOXYTOL 510 MG: 510 INJECTION INTRAVENOUS at 13:47

## 2024-08-19 RX ADMIN — POLYETHYLENE GLYCOL 3350 17 G: 17 POWDER, FOR SOLUTION ORAL at 20:35

## 2024-08-19 RX ADMIN — ACETAMINOPHEN 975 MG: 325 TABLET ORAL at 04:47

## 2024-08-19 RX ADMIN — PANTOPRAZOLE SODIUM 40 MG: 40 INJECTION, POWDER, FOR SOLUTION INTRAVENOUS at 20:36

## 2024-08-19 RX ADMIN — PANTOPRAZOLE SODIUM 40 MG: 40 INJECTION, POWDER, FOR SOLUTION INTRAVENOUS at 09:34

## 2024-08-19 RX ADMIN — DEXTROSE MONOHYDRATE 12.5 G: 25 INJECTION, SOLUTION INTRAVENOUS at 00:05

## 2024-08-19 RX ADMIN — INSULIN DEGLUDEC INJECTION 25 UNITS: 100 INJECTION, SOLUTION SUBCUTANEOUS at 20:59

## 2024-08-19 RX ADMIN — ACETAMINOPHEN 975 MG: 325 TABLET ORAL at 18:53

## 2024-08-19 RX ADMIN — ACETAMINOPHEN 975 MG: 325 TABLET ORAL at 23:55

## 2024-08-19 RX ADMIN — ASPIRIN 81 MG: 81 TABLET, COATED ORAL at 09:34

## 2024-08-19 RX ADMIN — ATORVASTATIN CALCIUM 80 MG: 80 TABLET, FILM COATED ORAL at 09:34

## 2024-08-19 ASSESSMENT — PAIN DESCRIPTION - LOCATION
LOCATION: ABDOMEN
LOCATION: ABDOMEN

## 2024-08-19 ASSESSMENT — ENCOUNTER SYMPTOMS
NECK STIFFNESS: 1
CONSTIPATION: 1
TROUBLE SWALLOWING: 0
PALPITATIONS: 0
PSYCHIATRIC NEGATIVE: 1
HEMATURIA: 1
NUMBNESS: 1
SEIZURES: 0
CHEST TIGHTNESS: 1
HEADACHES: 1
BRUISES/BLEEDS EASILY: 1
ALLERGIC/IMMUNOLOGIC NEGATIVE: 1
BLOOD IN STOOL: 1
FACIAL ASYMMETRY: 0
LIGHT-HEADEDNESS: 0
ABDOMINAL PAIN: 1
CHILLS: 1
SPEECH DIFFICULTY: 0
VOICE CHANGE: 0
SHORTNESS OF BREATH: 1
FACIAL SWELLING: 0
FATIGUE: 1
SORE THROAT: 0
EYE PAIN: 1

## 2024-08-19 ASSESSMENT — PAIN - FUNCTIONAL ASSESSMENT: PAIN_FUNCTIONAL_ASSESSMENT: 0-10

## 2024-08-19 ASSESSMENT — PAIN SCALES - GENERAL
PAINLEVEL_OUTOF10: 6
PAINLEVEL_OUTOF10: 8

## 2024-08-19 ASSESSMENT — PAIN DESCRIPTION - ORIENTATION: ORIENTATION: RIGHT;LEFT

## 2024-08-19 ASSESSMENT — ACTIVITIES OF DAILY LIVING (ADL): LACK_OF_TRANSPORTATION: NO

## 2024-08-19 NOTE — CARE PLAN
Problem: Pain - Adult  Goal: Verbalizes/displays adequate comfort level or baseline comfort level  Outcome: Progressing     Problem: Safety - Adult  Goal: Free from fall injury  Outcome: Progressing       The clinical goals for the shift include Patient's hgb will be maintained >7.0 throughout shift

## 2024-08-19 NOTE — SIGNIFICANT EVENT
Gyn Update    Pt is a 65yo with HFpEF (50-55% 9/2023), CAD s/p PCIs, TIA 9/2023, idiopathic SVC thrombus and remote DVT on Eliquis, T2DM, HLD, CKD, obesity, NAFLD, MICHAEL, recent UGIB (08/2024) 2/2 peptic ulcer admitted for GI bleed.    Gyn consulted for persistent anemia and vaginal bleeding. Per report, postmenopausal vaginal spotting for prolonged period of time. Known fibroids, LNG-IUD in place since 2018. Was evaluated for hyst 8/2023, not candidate at that time 2/2 A1c 14%.    History, images, labs reviewed    Longstanding PMB (spotting) with Hgb 12 prior to GI bleed. Given chronicity and low volume, low concern that this is contributing to her anemia especially given known new GI bleed. Bleeding could be 2/2 fibroids, IUD in place, atrophic endometrium. Malignancy remains on differential. IUD okay to remain in place for up to 8 years; but due to this and likelihood of atrophic endometrium from IUD exposure, adding progesterone will likely not help with pt's bleeding and would not recommend at this time. Pt is not a candidate for estrogen. Given minimal VB, no acute surgical intervention indicated. Rec outpatient eval with endometrial sampling and potential hyst if pt is a good surgical candidate.    Please obtain A1c to eval current glucose control    Gyn team will coordinate outpatient follow up. We will sign off, please page with any concerns    D/w Dr. Rcahel Hyde MD  PGY-4, Obstetrics and Gynecology  Gyn Pager: [phone]

## 2024-08-19 NOTE — PROGRESS NOTES
Daily Progress Note  Subjective     - NAEON. HDS  - Continues to endorse epigastric and hypogastric abdominal pain--unchanged from yesterday   - 1 small volume BM last night, well formed, black   - Gave 1u pRBC yesterday for Hb 7.8 given her hx of cardiac disease         Objective     Vitals:  Visit Vitals  /78 (BP Location: Left arm, Patient Position: Lying)   Pulse 79   Temp 36.4 °C (97.5 °F) (Temporal)   Resp 16       No intake/output data recorded.  Net IO Since Admission: 350 mL [08/19/24 0658]    Physical exam:  Constitutional: awake, alert, in no acute distress  HEENT: EOMI, normal external inspection of ears and nose, MMM  Cardio: RRR, S1/S2, no murmurs, rubs, or gallops, radial pulses +2  Pulm: CTAB, normal respiratory effort  GI: +BS, soft, abdominal tenderness (epigastrium, hypogastrium), nondistended, no guarding or rebound, no masses noted  MSK: chest wall tenderness (mild), no joint swelling, normal movements of all extremities  Skin: no lesions, contusions, erythema, or rashes  Extremities: no BLE swelling   Psych: appropriate mood and behavior    Medications:  Scheduled medications  aspirin, 81 mg, oral, Daily  atorvastatin, 80 mg, oral, Daily  brimonidine, 1 drop, Both Eyes, BID  DULoxetine, 30 mg, oral, Daily   And  DULoxetine, 60 mg, oral, Nightly  insulin degludec, 25 Units, subcutaneous, Nightly  insulin lispro, 0-10 Units, subcutaneous, q4h  latanoprost, 1 drop, Both Eyes, Nightly  melatonin, 6 mg, oral, Nightly  pantoprazole, 40 mg, intravenous, BID      Continuous medications     PRN medications  PRN medications: acetaminophen, dextrose, dextrose, dextrose, dextrose, glucagon, glucagon, glucagon, glucagon    Labs:  Most recent labs:   Results from last 7 days   Lab Units 08/18/24  1239 08/13/24  0800 08/12/24  1817   HEMOGLOBIN g/dL 7.8* 8.9* 9.3*   HEMATOCRIT % 23.7* 29.4* 31.9*   WBC AUTO x10*3/uL 5.8 8.6 11.6*   PLATELETS AUTO x10*3/uL 331 221 187     Results from last 7 days   Lab  "Units 08/18/24  1239 08/13/24  0800 08/12/24  1817   CO2 mmol/L 28 26 21   ANION GAP mmol/L 13 15 18   BUN mg/dL 18 22 27*   CREATININE mg/dL 1.28* 1.12* 1.08*   GLUCOSE mg/dL 63* 292* 222*   CALCIUM mg/dL 9.7 9.3 8.8   PHOSPHORUS mg/dL  --  3.7 4.1      Results from last 7 days   Lab Units 08/18/24  1239   ALT U/L 9   AST U/L 13   ALK PHOS U/L 89      Results from last 7 days   Lab Units 08/18/24  1239   INR  1.7*     Results from last 7 days   Lab Units 08/18/24  1239   FIO2 % 21     Results from last 7 days   Lab Units 08/18/24  1239   POCT PH, VENOUS pH 7.45*   POCT PCO2, VENOUS mm Hg 44           No lab exists for component: \"BNPRESU\", \"CPKT\"  Results from last 7 days   Lab Units 08/18/24  1239   LIPASE U/L 51       Imaging:  All images:   CT angio abdomen pelvis w and or wo IV IV contrast    Result Date: 8/18/2024  STUDY: CT Angiogram of the Abdomen and Pelvis; 8/18/2024 1:40 PM INDICATION: Evaluate for hematoma/GI bleed. COMPARISON: US liver 8/11/2024.  CTA chest 6/6/2024. ACCESSION NUMBER(S): [id number] ORDERING CLINICIAN: THAD MARIE TECHNIQUE:  Helical CT is performed from the aortic diaphragmatic hiatus through the symphysis pubis prior to and after bolus administration of 100 mL of Omnipaque 350.  Images are reviewed and processed at a workstation according to the CT angiogram protocol with 3-D and/or MIP post processing imaging generated. Automated mA/kV exposure control was utilized and patient examination was performed in strict accordance with principles of ALARA. FINDINGS: Vascular: Mesenteric: The celiac, SMA, and TAMMI demonstrate no significant stenosis.  Right renal: Single vessel demonstrates no significant stenosis.  Left renal: Single vessel demonstrates no significant stenosis. Abdominal aorta: The abdominal aorta is not aneurysmal and demonstrates no significant occlusive disease. Iliacs: The common, external, and internal iliac vessels demonstrate no aneurysmal disease or significant " stenosis. Abdomen: The lung bases are clear.  Coronary artery calcifications.  Mild fatty infiltration of the liver.  Small flash filling hemangioma in the left hepatic lobe.  The pancreas, spleen, adrenal glands, and kidneys demonstrate no acute pathology.  Gallbladder is present without gallstones.  No renal/ureteral calculi.  Mild gastric wall thickening. There is no free air or lymph node enlargement. Pelvis: Mildly thickened small bowel loops in the abdomen and pelvis.  No CT evidence for acute intraluminal active GI bleeding..  Appendix is normal.  Colonic diverticulosis without findings of diverticulitis. Markedly enlarged heterogeneous uterus with presumed multiple fibroids measuring up to 15 cm.  Intrauterine device noted.  Small amount of gas in the endometrial canal.  Trace pelvic free fluid in the left perirectal region..  Lymph nodes are not enlarged.  Urinary bladder is unremarkable.  Small fat-containing midline ventral hernia. Skeleton:  There are no acute fractures.  No suspicious bony lesions.    1. Findings suggestive of a gastroenteritis. No CT evidence for acute intraluminal active GI bleeding. 2. Markedly enlarged heterogeneous uterus with presumed multiple fibroids measuring up to 15 cm. Intrauterine device noted. Small amount of gas in the endometrial canal.  Consider further evaluation with pelvic ultrasound if clinically indicated. 3. Colonic diverticulosis without findings of diverticulitis. 4. Mild hepatic steatosis. 5. Coronary artery calcifications. Signed by Jamie Blackman MD           Assessment/Plan:  Nelida Mata is a 64-year-old woman with a past medical history of HFpEF (50-55% 9/2023), CAD s/p PCI w/ stent x 3 (2016, 2018) and STEMI (9/2023, no stents placed), TIA 9/2023, idiopathic SVC thrombus on Eliquis, remote DVT, T2DM, HLD, CKD, obesity, NAFLD, MICHAEL, and prior toe amputations for gangrene in 2020, who presented to the ED for abdominal pain and black tarry stools.        Update (8/19):   - NPO at midnight for push enteroscopy   - Gyn consulted regarding vaginal bleeding, recs pending   - Vascular consulted for anticoag management given hx SVC thrombus   - IV Feraheme 510 mg  -15 units lispro with meals   - Daily labs pending   - HbA1C   - UA showing 1+ glucosuria and elevated pH 8.5. Otherwise unremarkable.     #Acute blood loss anemia 2/2 suspected UGIB    #Vaginal bleeding   #FRANCO  She presents with a 5 day history of continued black, tarry stools after her last admission where she underwent polyp removal w/ clipping. Path is still pending. Hb drop  8.9>7.8 between last admission discharge on 08/13 and now. She does take eliquis for an idiopathic SVC thrombus and aspirin for coronary disease  Hx of ongoing vaginal bleeding (typically spotting) 2/2 fibroids not amenable to surgical intervention at this time due to poorly controlled blood diabetes. She does not endorse an increase in the vaginal bleeding in the past week that would explain her Hb drop.   INR 1.7, PT 18.7, aPTT 32  EGD 08/12 showing normal esophagus. Single ulcer in antrum and prepyloric region with clean base. Antral polyp s/p hot snare removal and clip with hemostasis. Biopsies taken--path pending   No CT evidence for acute intraluminal active GI bleeding  EKG 08/19 not concerning for ACS  Overdue for colonoscopy--last one in 2015 with 6mm polyp. Recommended f/u was in 5 years  Highest suspicion for post-procedural (polypectomy) bleeding vs. PUD vs. small bowel angioectasia. Less likely: gastritis, esophagitis, traumatic (Jolanta-Oconnor tear), malignancy. Pancreatitis less likely given lipase wnl and lack of suggestive CT image findings.   Iron studies suggest FRANCO--Iron 18, Iron sat 4%, TIBC 419, Ferritin 10     Plan  - Active T & S  - EGD w/ push enteroscopy tomorrow   - NPO at midnight  - 40 mg protonix BID   - Transfuse to Hb >8 (given hx of cardiac disease) and plt>50  - Gyn consulted for vaginal bleeding,  pending recs  - IV Feraheme 510 mg    - HbA1C per Gyn for vaginal bleeding        #HFpEF  #CAD s/p PCI  #Hx STEMI  #SVC Thrombus  CTA 08/18 showing opacification of the SVC which may represent mixing artifact from contrast. Unable to r/o thrombus  EKG 08/18 w/o signs of ACS. hsTNT wnl.   No signs of fluid overload on exam     Plan:   - Vascular consult   - Hold Apixaban 5mg BID  - Hold carvedilol 12.5mg BID  - Hold furosemide 20mg  - Hold isosorbide mononitrate 60mg        #T2DM  Home regimen: degludec 52 units PM, Humalog 22 units with meals  Low BG in the setting of poor oral intake in the last 24 hours      Plan:   - HbA1C  - Hold dapagliflozin 10mg, semaglutide 2mg weekly  - 25 units degludec nightly   - 15 units lispro with meals   - q4hr SSI-2 while NPO         #CKD   Cr on admission 1.28 (recent baseline 1.1-1.4)  Plan:   - Hold Kerendia 10mg        #HLD  Plan:   - Continue atorvastatin 80mg         #Chronic fibromyalgia  Plan:   - Continue duloxetine 30mg AM, 60mg PM         #Glaucoma   Plan:   - Continue brimonidine, latanoprost eye drops  - Holding home calcium carbonate-vitamin D as not on formulary        #Misc  - Continue melatonin 6mg        F: Replete PRN  E: Replete PRN  N: NPO  DVT Ppx: Held  GI Ppx: Pantoprazole   Access/Lines: PIVx2  Abx: None   O2: None      Pain regimen: Tylenol PRN   GI Laxative: None      Code status: Full Code  Emergency contact: Brother (Joe) [phone]; Son (Glynn Medina) [phone]            Drew Reyes, MS5  Please Haiku with any questions or concerns.

## 2024-08-19 NOTE — PROGRESS NOTES
08/19/24 1424   Discharge Planning   Living Arrangements Alone   Support Systems Family members   Assistance Needed Independent with ADL's   Type of Residence Private residence   Number of Stairs to Enter Residence 0   Number of Stairs Within Residence 0   Do you have animals or pets at home? No   Who is requesting discharge planning? Provider   Home or Post Acute Services None   Expected Discharge Disposition Home   Does the patient need discharge transport arranged? No   Financial Resource Strain   How hard is it for you to pay for the very basics like food, housing, medical care, and heating? Not hard   Housing Stability   In the last 12 months, was there a time when you were not able to pay the mortgage or rent on time? N   At any time in the past 12 months, were you homeless or living in a shelter (including now)? N   Transportation Needs   In the past 12 months, has lack of transportation kept you from medical appointments or from getting medications? no   In the past 12 months, has lack of transportation kept you from meetings, work, or from getting things needed for daily living? No     Assessment Note:  Met with patient and introduced myself as care coordinator and member of the Care Transitions team for discharge planning. Pt feels safe at home.   Transportation: Patient use insurance Transportation  Pharmacy: [facility]  DME: None  Previous home care: n/a  Falls: 0  PCP: Betty Boone MD seen last month  Dialysis: Yes     Address, phone number and emergency contact verified. All questions and concerns addressed. Will continue to follow patient for discharge needs.    Transitional Care Coordination Progress Note:  Patient discussed during interdisciplinary rounds.   Team members present: MD & TCC  Plan per Medical/Surgical team: GI bleed endoscopy tomorrow  Payor: [employer]  Discharge disposition:  Home  Potential Barriers: none  ADOD: 8/21/2024    ROSEANNE ReyesN-RN

## 2024-08-19 NOTE — CONSULTS
"Reason For Consult  Inpatient consult to Vascular Medicine  Consult performed by: Meme Thompson, [facility]-CNP  Consult ordered by: Wilton Lacey MD  Reason for consult: history of SVC thrombus; recs for anticoagulation and ? need for further workup          History Of Present Illness  Nelida Mata is a 64 y.o. female with complicated medical history significant for CAD, CKD, CVA 9/2023, DVT in 1982, HFpEF, HLD, idiopathic SVC thrombus on Eliquis, obesity with BMI 39, STEMI 9/2023, T2DM, toe amputation for gangrene 2020.     Now hospitalized for recurrent nausea, hematuria and passage of black tarry stools. Admitted to the [facility] for further evaluation. Eliquis anticoagulation currently held. Potential plan for repeat EGD, then capsule endoscopy and colonoscopy.     Vascular Medicine Service now consulted 8/18/24 for anticoagulation recommendations for history of SVC thrombus.   She reports continued vaginal \"spotting\" that she reports is due to uterine fibroids, and reports improvement of dark stools since hospital admission.  Received 1 unit of PRBC 8/18/24 due to hemoglobin of 7.8 grams with improvement to 9.7 grams, and received Feraheme IV today.  Eliquis has been held since admission.   Reports personal history of LLE DVT in 1982 that patient reports was due to a bug bite when she was on vacation in   \[facility]\"".  Does not recall treatment of this DVT.  Reports SVC thrombus found 1/2023 that has been treated with Eliquis.    Denies family history of VTE or bleeding.  Denies facial, arm, hand or neck swelling, but does report that she had neck pain several weeks ago that was bad enough that she was unable to get out of bed.   Age/gender cancer screening: has planned colonoscopy 10/2024; last mammogram 12/12/2023;      Patient with prior hospital admission 8/11/24 with complaints of dark emesis and stools, with report of abdominal pain.  Patient report of compliance with Eliquis 5mg BID for SVC " "thrombus. Patient seen by GI Team and underwent EGD that showed normal esophagus, with finding of a dameon deep linear benign-appearing ulcer in the antrum and prepyloric region with a clean base, as well as a pedunculated polyp in the antrum with stigmata of recent hemorrhage and bleeding prior to intervention, with an adherent clot; required placement of 1 clip to achieve hemostasis. DC to home with PPI and instructions to resume Eliquis 8/14/24. Pathology still pending.    Presented to [facility] 8/18/24 with hematuria and black tarry stools, along with epigastric abdominal pain that has continued since last hospitalization, as well as vaginal \"spotting\" that she attributes to uterine fibroids. Underwent CT a/p that showed findings suggestive of gastroenteritis, with an enlarge uterus with presumed multiple fibroids and observation of IUD device. Patient also reports continued chest pain that spans across her chest, and is aggravated with walking/movement.      Underwent  left and right heart catheterization 7/5/24 for chronic HFpEF  with findings of no obstructive CAD of LM, LAD with proximal stent, LCX with small AV groove LCX without significant disease, medium OM1 with 40-50% stenosis. Discharged to home same day.     Admitted to [facility] 6/6/24 due to chest pain and hypertension, dyspnea on exertion and at rest. CTA chest showed no evidence of PE, but did show opacification of SVC consistent with known SVC thrombus. EKG showed small ST elevation in V1-V2 (seen on prior EKGs) with mild elevation of troponin levels.  Discharge summary reports \"Suspect possible NSTEMI given symptoms and mild elevation of troponin levels\".      Patient admitted to [facility] 9/27/23-9/29/23 for report of right leg numbness and weakness that was concerning for stroke.  Underwent MRI of head with finding of infarction at the left thalamo-capsular junction and subcortical white matter of the left precentral gyrus, without evidence of mass " effect or hemorrhagic conversion. During this hospitalization she underwent LHC for STEMI on 9/27/23 that showed high lateral STEMI, ostial medium sized diagonal with 90% stenosis with NAT 3 flow (jailed by 2 layers of stents in LAD) and known occlusion of RCA.    Patient admitted to [facility] 9/7/23 - 9/12/24 for findings of NSTEMI.  Cardiac catheterization noted known RCA occlusion with no significant disease in the LAD except in the ostial first diagonal which has 80-90% stenosis.  During this admission patient reported some speech difficulties, and underwent CTH 9/8/23 that showed no evidence of acute ICH or mass effect.     Admitted to [facility] 6/28/23 - 7/2/23 for uncontrolled T2DM with hyperglycemia, complicated by CLARIBEL due to dehydration with improvement in renal function at the time of hospital discharge.   Hospitalized at [facility] 3/18/24 - 3/24/23 for acute heart failure exacerbation due to missed diuretics.    Followed up with Dr. Harvey 3/31/23 for finding of SVC thrombus 1/2023, with recommendations to continue Eliquis, age/gender cancer screening, GYN evaluation for vaginal bleeding, and return to clinic in 4 months.     Vascular Medicine Service consulted during hospitalization 1/8/2023 - 1/17/2023 for treatment of abdominal pain and UTI.  Underwent CT imaging of c/a/p with incidental finding of filling defect in the SVC.  Dedicated MRI was completed that showed a likely thrombus in the SVC at the level of the azygous/innominate veins.  Started on anticoagulation therapy and DC to home with Eliquis loading dose.         Past Medical History  She has a past medical history of Abnormal uterine and vaginal bleeding, unspecified (04/10/2018), Abnormal uterine and vaginal bleeding, unspecified (02/22/2018), Acute candidiasis of vulva and vagina (04/10/2018), Acute candidiasis of vulva and vagina (02/22/2018), Acute vaginitis (04/10/2018), Acute vaginitis (02/22/2018), Alcohol abuse, in remission, Anxiety disorder,  unspecified (04/10/2018), Anxiety disorder, unspecified (02/22/2018), Atherosclerotic heart disease of native coronary artery without angina pectoris (04/10/2018), Atherosclerotic heart disease of native coronary artery without angina pectoris (02/22/2018), Atherosclerotic heart disease of native coronary artery without angina pectoris (02/22/2018), Atherosclerotic heart disease of native coronary artery without angina pectoris (04/10/2018), Atypical facial pain (04/10/2018), Atypical facial pain (02/22/2018), Benign lipomatous neoplasm, unspecified (04/10/2018), Benign lipomatous neoplasm, unspecified (02/22/2018), Changes in skin texture (04/10/2018), Changes in skin texture (02/22/2018), Chest pain, unspecified (04/10/2018), Chest pain, unspecified (02/22/2018), Chronic cough (04/10/2018), Chronic cough (02/22/2018), Chronic pain syndrome (04/10/2018), Chronic pain syndrome (02/22/2018), Cramp and spasm (04/10/2018), Cramp and spasm (02/22/2018), Depression, unspecified (04/10/2018), Depression, unspecified (02/22/2018), Disorder of pigmentation, unspecified (04/10/2018), Disorder of pigmentation, unspecified (02/22/2018), Dysuria (04/10/2018), Dysuria (02/22/2018), Encounter for immunization (04/10/2018), Encounter for immunization (04/10/2018), Encounter for immunization (02/22/2018), Encounter for immunization (02/22/2018), Encounter for screening for infections with a predominantly sexual mode of transmission, Encounter for screening mammogram for malignant neoplasm of breast (04/10/2018), Encounter for screening mammogram for malignant neoplasm of breast (02/22/2018), Essential (primary) hypertension (04/10/2018), Essential (primary) hypertension (02/22/2018), Fatty (change of) liver, not elsewhere classified (04/10/2018), Fatty (change of) liver, not elsewhere classified (02/22/2018), Fibromyalgia (04/10/2018), Fibromyalgia (02/22/2018), Gastro-esophageal reflux disease without esophagitis (04/10/2018),  Generalized contraction of visual field, left eye (02/06/2017), Headache, unspecified (04/10/2018), Headache, unspecified (02/22/2018), Heart failure, unspecified (Multi) (02/22/2018), Heart failure, unspecified (Multi) (04/10/2018), Hereditary and idiopathic neuropathy, unspecified (04/10/2018), Hereditary and idiopathic neuropathy, unspecified (02/22/2018), Hydroureter (04/10/2018), Hydroureter (02/22/2018), Hyperlipidemia, unspecified, Hypertensive urgency (04/10/2018), Hypertensive urgency (02/22/2018), Hypokalemia (04/10/2018), Hypokalemia (02/22/2018), Insomnia, unspecified (04/10/2018), Insomnia, unspecified (02/22/2018), Localized edema (04/10/2018), Localized edema (02/22/2018), Macula scars of posterior pole (postinflammatory) (post-traumatic), left eye (04/10/2018), Morbid (severe) obesity due to excess calories (Multi) (04/10/2018), Morbid (severe) obesity due to excess calories (Multi) (02/22/2018), 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), Nail dystrophy (02/22/2018), Non-ST elevation (NSTEMI) myocardial infarction (Multi) (04/10/2018), Non-ST elevation (NSTEMI) myocardial infarction (Multi) (02/22/2018), Obstructive sleep apnea (adult) (pediatric) (04/10/2018), Obstructive sleep apnea (adult) (pediatric) (02/22/2018), Other chest pain (02/22/2018), Other chest pain (04/10/2018), Other conditions influencing health status (04/10/2018), Other conditions influencing health status (04/10/2018), Other conditions influencing health status (04/10/2018), Other conditions influencing health status (02/22/2018), Other conditions influencing health status (02/22/2018), Other conditions influencing health status (02/22/2018), Other fatigue (04/10/2018), Other fatigue (02/22/2018), Other fecal abnormalities (04/10/2018), Other fecal abnormalities (02/22/2018), Other forms of dyspnea (04/10/2018), Other forms of dyspnea (02/22/2018), Other problems related to lifestyle, Other specified health status (04/10/2018), Other  specified health status (02/22/2018), Pain in right knee (02/22/2018), Pain in right knee (04/10/2018), Peripheral vascular disease, unspecified (CMS-HCC) (04/10/2018), Peripheral vascular disease, unspecified (CMS-[facility]) (02/22/2018), Personal history of other diseases of the digestive system, Personal history of other diseases of the digestive system, Personal history of other diseases of the nervous system and sense organs, Personal history of other endocrine, nutritional and metabolic disease, Personal history of other endocrine, nutritional and metabolic disease, Personal history of other endocrine, nutritional and metabolic disease, Polyarthritis, unspecified (04/10/2018), Polyarthritis, unspecified (02/22/2018), Polyp of colon (04/10/2018), Polyp of colon (02/22/2018), Postmenopausal bleeding (04/10/2018), Postmenopausal bleeding (02/22/2018), Presence of coronary angioplasty implant and graft (04/10/2018), Presence of coronary angioplasty implant and graft (02/22/2018), Presence of intraocular lens (04/10/2018), Presence of intraocular lens (03/18/2015), Primary open-angle glaucoma, unspecified eye, stage unspecified (04/10/2018), Proteinuria, unspecified (04/10/2018), Proteinuria, unspecified (02/22/2018), Pruritus, unspecified (04/10/2018), Pruritus, unspecified (02/22/2018), Psoriasis, unspecified (04/10/2018), Psoriasis, unspecified (02/22/2018), Rash and other nonspecific skin eruption (04/10/2018), Rash and other nonspecific skin eruption (02/22/2018), Repeated falls (04/10/2018), Repeated falls (02/22/2018), Retinal hemorrhage, right eye (02/06/2017), Shortness of breath (04/10/2018), Shortness of breath (02/22/2018), Sleep apnea, unspecified (04/10/2018), Sleep apnea, unspecified (02/22/2018), Traction detachment of retina, right eye (02/06/2017), Type 2 diabetes mellitus with diabetic polyneuropathy (Multi) (04/10/2018), Type 2 diabetes mellitus with diabetic polyneuropathy (Multi) (02/22/2018), Type  "2 diabetes mellitus with other skin ulcer (CODE) (Multi) (04/10/2018), Type 2 diabetes mellitus with other skin ulcer (CODE) (Multi) (02/22/2018), Type 2 diabetes mellitus with proliferative diabetic retinopathy without macular edema, unspecified eye (Multi) (04/10/2018), Type 2 diabetes mellitus with unspecified diabetic retinopathy without macular edema (Multi) (02/06/2017), Umbilical hernia without obstruction or gangrene, Unqualified visual loss, both eyes (02/06/2017), Unspecified glaucoma (06/11/2015), Unstable angina (Multi) (04/10/2018), Unstable angina (Multi) (02/22/2018), Vitamin D deficiency, unspecified (04/10/2018), Vitamin D deficiency, unspecified (02/22/2018), Xerosis cutis (04/10/2018), and Xerosis cutis (02/22/2018).    Surgical History  She has a past surgical history that includes Coronary angioplasty with stent (03/06/2017); Cataract extraction (03/18/2015); Retinal detachment surgery (03/18/2015); MR angio head wo IV contrast (08/16/2013); MR angio neck wo IV contrast (08/16/2013); CT angio aorta and bilateral iliofemoral runoff w and or wo IV contrast (08/08/2022); Breast biopsy (Left); and Cardiac catheterization (N/A, 7/5/2024).     Social History  She reports that she has never smoked. She has never been exposed to tobacco smoke. She has never used smokeless tobacco. She reports that she does not currently use alcohol. She reports that she does not use drugs.    Family History  Family History   Problem Relation Name Age of Onset    Diabetes Mother      Glaucoma Father      Hypertension Other FAMILY    Denies family history of VTE or bleeding disorder.      Allergies  Other, Penicillins, and Procaine    Review of Systems  Review of Systems   Constitutional:  Positive for chills and fatigue.        Reports \"convulsions\" with shivering 8/18/24.  Reports feeling so cold that she was \"having convulsions\".     HENT:  Negative for facial swelling, nosebleeds, sore throat, trouble swallowing and " "voice change.    Eyes:  Positive for pain.        Reports blindness in the left eye, along with feeling that she has \"sand\" in the left eye.    Respiratory:  Positive for chest tightness and shortness of breath.         Chest tightness as a \"band across the chest\"; SOB with exertion.    Cardiovascular:  Positive for chest pain. Negative for palpitations and leg swelling.        Reports chest pain as a \"band across the chest\" aggravated by movement.   Gastrointestinal:  Positive for abdominal pain, blood in stool and constipation.        Epigastric abdominal pain. Chronic constipation.    Endocrine: Positive for cold intolerance.   Genitourinary:  Positive for hematuria and vaginal bleeding.        Reports vaginal \"spotting\" attributed to uterine fibroids.    Musculoskeletal:  Positive for neck stiffness.   Skin: Negative.    Allergic/Immunologic: Negative.    Neurological:  Positive for numbness and headaches. Negative for seizures, syncope, facial asymmetry, speech difficulty and light-headedness.        Reports frontal headache at this time, with increased frequency of headaches.   Reports bilateral neuropathy of feet.    Hematological:  Bruises/bleeds easily.   Psychiatric/Behavioral: Negative.     .     Physical Exam  Physical Exam  Constitutional:       Appearance: Normal appearance. She is obese.   HENT:      Head: Normocephalic and atraumatic.      Nose: Nose normal.      Mouth/Throat:      Mouth: Mucous membranes are moist.   Eyes:      Conjunctiva/sclera: Conjunctivae normal.   Neck:      Comments: Neck tenderness with gentle palpation at the base of the neck.   Cardiovascular:      Rate and Rhythm: Normal rate and regular rhythm.      Pulses: Normal pulses.      Heart sounds: Normal heart sounds. No murmur heard.     No friction rub. No gallop.   Pulmonary:      Effort: Pulmonary effort is normal.      Breath sounds: Normal breath sounds. No stridor. No wheezing, rhonchi or rales.   Chest:      Chest wall: " "No tenderness.   Abdominal:      Palpations: Abdomen is soft.      Tenderness: There is abdominal tenderness.   Musculoskeletal:         General: Swelling present.      Cervical back: Normal range of motion and neck supple. Tenderness present.      Right lower leg: Edema present.      Left lower leg: Edema present.      Comments: Mild bilateral leg edema from tops of feet to lower calf area bilaterally.    Skin:     General: Skin is warm and dry.      Capillary Refill: Capillary refill takes 2 to 3 seconds.   Neurological:      General: No focal deficit present.      Mental Status: She is alert and oriented to person, place, and time.      Sensory: No sensory deficit.   Psychiatric:         Mood and Affect: Mood normal.         Behavior: Behavior normal.         Thought Content: Thought content normal.         Judgment: Judgment normal.          Last Recorded Vitals  Blood pressure 166/78, pulse 79, temperature 36.4 °C (97.5 °F), temperature source Temporal, resp. rate 16, height 1.499 m (4' 11\"), weight 88.5 kg (195 lb), SpO2 98%.    Relevant Results  Scheduled medications  aspirin, 81 mg, oral, Daily  atorvastatin, 80 mg, oral, Daily  brimonidine, 1 drop, Both Eyes, BID  DULoxetine, 30 mg, oral, Daily   And  DULoxetine, 60 mg, oral, Nightly  insulin degludec, 25 Units, subcutaneous, Nightly  insulin lispro, 0-10 Units, subcutaneous, q4h  insulin lispro, 0-10 Units, subcutaneous, TID  insulin lispro, 15 Units, subcutaneous, TID  latanoprost, 1 drop, Both Eyes, Nightly  melatonin, 6 mg, oral, Nightly  pantoprazole, 40 mg, intravenous, BID      Results from last 7 days   Lab Units 08/19/24  1222 08/18/24  1239 08/13/24  0800   WBC AUTO x10*3/uL 8.9 5.8 8.6   HEMOGLOBIN g/dL 9.7* 7.8* 8.9*   HEMATOCRIT % 30.3* 23.7* 29.4*   PLATELETS AUTO x10*3/uL 326 331 221       Results from last 7 days   Lab Units 08/19/24  1222 08/18/24  1239 08/13/24  0800   SODIUM mmol/L 141 144 139   POTASSIUM mmol/L 4.1 3.8 4.6   CHLORIDE " mmol/L 105 107 103   CO2 mmol/L 31 28 26   BUN mg/dL 16 18 22   CREATININE mg/dL 1.19* 1.28* 1.12*   GLUCOSE mg/dL 148* 63* 292*   CALCIUM mg/dL 9.2 9.7 9.3       Results from last 7 days   Lab Units 08/18/24  1239   APTT seconds 32   INR  1.7*     CT Angiogram of the Abdomen and Pelvis; 8/18/2024 1:40 PM   INDICATION: Evaluate for hematoma/GI bleed.  IMPRESSION:  1. Findings suggestive of a gastroenteritis. No CT evidence for acute intraluminal active GI bleeding.  2. Markedly enlarged heterogeneous uterus with presumed multiple fibroids measuring up to 15 cm. Intrauterine device noted. Small amount of gas in the endometrial canal.  Consider further evaluation with pelvic ultrasound if clinically indicated.  3. Colonic diverticulosis without findings of diverticulitis.  4. Mild hepatic steatosis.  5. Coronary artery calcifications.    CT ANGIO CORONARY ART WITH HEARTFLOW IF SCORE >30%; 7/17/2024 10:40   IMPRESSION:  1. Stented proximal to mid LAD which is patent. The distal LAD appears to be well-visualized by contrast suggesting lack of significant stenosis proximally. The proximal RCA is diffusely diseased with 50% stenosis. Stented mid RCA. The distal RCA is diffusely diseased. High-grade stenosis at this level cannot be excluded.  2. Patent left circumflex without significant stenosis.      CT Angiogram of the Chest; 6/6/2024 at 7:50 PM   IMPRESSION:  1.  No definite CT evidence of an acute pulmonary embolus extending to level segmental branch vessels.  2.  No focal pulmonary consolidation, pleural effusions or pneumothorax.  3.  There is opacification of the SVC.  Mild low-density regions within the SVC may represent mixing artifact from contrast although thrombus cannot be excluded..        MR CARDIAC W AND WO IV CONTRAST W REGADENOSON STRESS FOR MORPH/FUNCT  AND VALVE DZ;  4/8/2024 3:35 pm  INDICATION: Signs/Symptoms:Heart failure.  IMPRESSION:  1. Normal LV size (EDVi 40 ml/m2) with low-normal systolic  function (LVEF 53%).  2. No regional wall motion abnormalities.  3. Concentric left ventricular hypertrophy.  4. Evidence of inducible myocardial ischemia of the basal-mid inferoseptal/inferior wall (moderate defect) and basal-mid anterior wall (mild defect).  5. No definite evidence of myocardial infarction or infiltration on LGE imaging. There is RV insertion point and mild diffuse septal fibrosis.  6. Normal RV size (EDVi-41 ml/m2) and systolic function (RVEF50%).      EXAMINATION: MR HEAD W/O 09/27/2023 12:18 PM   MPRESSION:   Small acute-subacute infarcts at the left thalamocapsular junction and subcortical white matter of the left precentral gyrus. No significant mass effect or hemorrhagic conversion.     CT HEAD WO CONTRAST;  9/8/2023 7:03 pm   INDICATION:  slurred speech,   Impression   No evidence of acute intracranial hemorrhage, or mass effect.     CT CHEST W CONTRAST; 1/8/2023 7:58 pm   IMPRESSION:  There is a patulous and fluid-filled esophagus with mildly asymmetric soft tissue thickening of the distal esophagus and a small hiatal hernia. Correlate with known symptoms of dysphagia. Further evaluation with fluoroscopic barium swallow suggested to evaluate for dysmotility or mucosal mass.     There is a new discrete hypoattenuating tubular/cylindrical filling defect in the otherwise contrast filled SVC, that was not present on the prior examination and is more hypoattenuating indiscrete than is usually seen with contrast mixing with unopacified blood alone, and is suspicious for nonocclusive free-floating thrombus, possibly emanating from the azygous and or right brachiocephalic vein (see series 205, images 93-97; series 206, images 62-64). Correlation with hypercoagulable workup is suggested and further evaluation with MR venography of the chest or conventional venography should be considered as indicated. No pulmonary embolus is seen to the segmental level on the current exam noting that technique is  not optimized for PE detection. This finding that was discrepant with the  radiology resident preliminary interpretation was discussed by Dr. Carias with Jose Luis Oliveira of the ED at 9:00 p.m. on 01/08/2023.     Severe coronary artery calcifications. If the patient has associated symptoms of coronary artery disease, recommend management as per chest pain guidelines    Assessment/Plan     64 year old female with complex medical history, readmitted for recurrent dark black stools, vaginal spotting and hematuria.   Vascular Medicine Service consulted for anticoagulation recommendations for known SVC thrombus, initially seen a.s an incidental finding on CT chest 1/8/23.  Patient with long term use of Eliquis for SVC thrombus.   Anticoagulation has been held this hospitalization due to concern for bleeding.   Patient has received 1 unit PRBC and IV infusion of Feraheme this admission with increase in hemoglobin to 9.7 grams.    Review of labs shows INR 1.7 on 8/18/24, with last dose of Eliquis given on 8/13/24.   Review of CT imaging showing improvement of known SVC thrombus since originally seen 1/2023. Wondering if continued inflammatory effect from esophagitis has been a cause of the SVC thrombus.     Recommendations:  ~OK to hold therapeutic anticoagulation therapy while patient is undergoing invasive GI testing  ~Start DVT chemoprophylaxis with either Heparin 5K q8 hours or Lovenox 40mg daily  ~Labs: please obtain Kappa and Lambda light chains to rule out true light chain process; obtain mixing studies as INR is 1.7 in the absence of anticoagulation therapy for 5 days; obtain Heparin assay (does not need to be a timed test).    Vascular Medicine Service will continue to follow along.      Patient seen and evaluated with Dr. Harvey  Plan of care discussed with Dr. Harvey  Plan of care discussed with the [facility] team., pager [phone]       [facility]-CNP  Vascular Medicine Service   Pager [phone]

## 2024-08-20 ENCOUNTER — PHARMACY VISIT (OUTPATIENT)
Dept: PHARMACY | Facility: CLINIC | Age: 64
End: 2024-08-20
Payer: COMMERCIAL

## 2024-08-20 ENCOUNTER — APPOINTMENT (OUTPATIENT)
Dept: GASTROENTEROLOGY | Facility: HOSPITAL | Age: 64
DRG: 378 | End: 2024-08-20
Payer: COMMERCIAL

## 2024-08-20 VITALS
HEIGHT: 59 IN | SYSTOLIC BLOOD PRESSURE: 126 MMHG | HEART RATE: 77 BPM | TEMPERATURE: 97.7 F | OXYGEN SATURATION: 99 % | RESPIRATION RATE: 16 BRPM | DIASTOLIC BLOOD PRESSURE: 82 MMHG | BODY MASS INDEX: 39.31 KG/M2 | WEIGHT: 195 LBS

## 2024-08-20 DIAGNOSIS — Z86.39 HISTORY OF UNCONTROLLED DIABETES: ICD-10-CM

## 2024-08-20 DIAGNOSIS — F33.40 RECURRENT MAJOR DEPRESSIVE DISORDER, IN REMISSION (CMS-HCC): Chronic | ICD-10-CM

## 2024-08-20 LAB
ALBUMIN SERPL BCP-MCNC: 4 G/DL (ref 3.4–5)
ANION GAP SERPL CALC-SCNC: 14 MMOL/L (ref 10–20)
APTT PPP: 29 SECONDS (ref 27–38)
BASOPHILS # BLD AUTO: 0.14 X10*3/UL (ref 0–0.1)
BASOPHILS NFR BLD AUTO: 1.6 %
BUN SERPL-MCNC: 17 MG/DL (ref 6–23)
CALCIUM SERPL-MCNC: 9.2 MG/DL (ref 8.6–10.6)
CHLORIDE SERPL-SCNC: 100 MMOL/L (ref 98–107)
CO2 SERPL-SCNC: 27 MMOL/L (ref 21–32)
CREAT SERPL-MCNC: 1.46 MG/DL (ref 0.5–1.05)
EGFRCR SERPLBLD CKD-EPI 2021: 40 ML/MIN/1.73M*2
EOSINOPHIL # BLD AUTO: 0.34 X10*3/UL (ref 0–0.7)
EOSINOPHIL NFR BLD AUTO: 4 %
ERYTHROCYTE [DISTWIDTH] IN BLOOD BY AUTOMATED COUNT: 14.1 % (ref 11.5–14.5)
GLUCOSE BLD MANUAL STRIP-MCNC: 166 MG/DL (ref 74–99)
GLUCOSE BLD MANUAL STRIP-MCNC: 200 MG/DL (ref 74–99)
GLUCOSE BLD MANUAL STRIP-MCNC: 222 MG/DL (ref 74–99)
GLUCOSE BLD MANUAL STRIP-MCNC: 241 MG/DL (ref 74–99)
GLUCOSE BLD MANUAL STRIP-MCNC: 311 MG/DL (ref 74–99)
GLUCOSE SERPL-MCNC: 289 MG/DL (ref 74–99)
HCT VFR BLD AUTO: 34.2 % (ref 36–46)
HGB BLD-MCNC: 10.8 G/DL (ref 12–16)
IMM GRANULOCYTES # BLD AUTO: 0.04 X10*3/UL (ref 0–0.7)
IMM GRANULOCYTES NFR BLD AUTO: 0.5 % (ref 0–0.9)
INR PPP: 1.1 (ref 0.9–1.1)
KAPPA LC SERPL-MCNC: 2.51 MG/DL (ref 0.33–1.94)
KAPPA LC/LAMBDA SER: 1.5 {RATIO} (ref 0.26–1.65)
LAMBDA LC SERPL-MCNC: 1.67 MG/DL (ref 0.57–2.63)
LYMPHOCYTES # BLD AUTO: 1.47 X10*3/UL (ref 1.2–4.8)
LYMPHOCYTES NFR BLD AUTO: 17.1 %
MCH RBC QN AUTO: 28.1 PG (ref 26–34)
MCHC RBC AUTO-ENTMCNC: 31.6 G/DL (ref 32–36)
MCV RBC AUTO: 89 FL (ref 80–100)
MONOCYTES # BLD AUTO: 0.73 X10*3/UL (ref 0.1–1)
MONOCYTES NFR BLD AUTO: 8.5 %
NEUTROPHILS # BLD AUTO: 5.88 X10*3/UL (ref 1.2–7.7)
NEUTROPHILS NFR BLD AUTO: 68.3 %
NRBC BLD-RTO: 0 /100 WBCS (ref 0–0)
PHOSPHATE SERPL-MCNC: 2.3 MG/DL (ref 2.5–4.9)
PLATELET # BLD AUTO: 313 X10*3/UL (ref 150–450)
POTASSIUM SERPL-SCNC: 4.2 MMOL/L (ref 3.5–5.3)
PROTHROMBIN TIME: 12.4 SECONDS (ref 9.8–12.8)
RBC # BLD AUTO: 3.85 X10*6/UL (ref 4–5.2)
SODIUM SERPL-SCNC: 137 MMOL/L (ref 136–145)
UFH PPP CHRO-ACNC: 0.5 IU/ML
WBC # BLD AUTO: 8.6 X10*3/UL (ref 4.4–11.3)

## 2024-08-20 PROCEDURE — 85520 HEPARIN ASSAY: CPT

## 2024-08-20 PROCEDURE — 85610 PROTHROMBIN TIME: CPT

## 2024-08-20 PROCEDURE — 2500000004 HC RX 250 GENERAL PHARMACY W/ HCPCS (ALT 636 FOR OP/ED)

## 2024-08-20 PROCEDURE — 0DJ08ZZ INSPECTION OF UPPER INTESTINAL TRACT, VIA NATURAL OR ARTIFICIAL OPENING ENDOSCOPIC: ICD-10-PCS | Performed by: INTERNAL MEDICINE

## 2024-08-20 PROCEDURE — 7100000009 HC PHASE TWO TIME - INITIAL BASE CHARGE

## 2024-08-20 PROCEDURE — 43235 EGD DIAGNOSTIC BRUSH WASH: CPT

## 2024-08-20 PROCEDURE — 99232 SBSQ HOSP IP/OBS MODERATE 35: CPT | Performed by: INTERNAL MEDICINE

## 2024-08-20 PROCEDURE — 85730 THROMBOPLASTIN TIME PARTIAL: CPT

## 2024-08-20 PROCEDURE — 82947 ASSAY GLUCOSE BLOOD QUANT: CPT

## 2024-08-20 PROCEDURE — 99152 MOD SED SAME PHYS/QHP 5/>YRS: CPT

## 2024-08-20 PROCEDURE — 7100000010 HC PHASE TWO TIME - EACH INCREMENTAL 1 MINUTE

## 2024-08-20 PROCEDURE — 2500000005 HC RX 250 GENERAL PHARMACY W/O HCPCS

## 2024-08-20 PROCEDURE — RXMED WILLOW AMBULATORY MEDICATION CHARGE

## 2024-08-20 PROCEDURE — 99239 HOSP IP/OBS DSCHRG MGMT >30: CPT | Performed by: INTERNAL MEDICINE

## 2024-08-20 PROCEDURE — G0378 HOSPITAL OBSERVATION PER HR: HCPCS

## 2024-08-20 PROCEDURE — 3700000012 HC SEDATION LEVEL 5+ TIME - INITIAL 15 MINUTES 5/> YEARS

## 2024-08-20 PROCEDURE — 2500000001 HC RX 250 WO HCPCS SELF ADMINISTERED DRUGS (ALT 637 FOR MEDICARE OP)

## 2024-08-20 PROCEDURE — 99152 MOD SED SAME PHYS/QHP 5/>YRS: CPT | Performed by: INTERNAL MEDICINE

## 2024-08-20 PROCEDURE — 2500000002 HC RX 250 W HCPCS SELF ADMINISTERED DRUGS (ALT 637 FOR MEDICARE OP, ALT 636 FOR OP/ED)

## 2024-08-20 PROCEDURE — 80069 RENAL FUNCTION PANEL: CPT

## 2024-08-20 PROCEDURE — 36415 COLL VENOUS BLD VENIPUNCTURE: CPT

## 2024-08-20 PROCEDURE — 2500000004 HC RX 250 GENERAL PHARMACY W/ HCPCS (ALT 636 FOR OP/ED): Performed by: INTERNAL MEDICINE

## 2024-08-20 PROCEDURE — 85025 COMPLETE CBC W/AUTO DIFF WBC: CPT

## 2024-08-20 PROCEDURE — 83521 IG LIGHT CHAINS FREE EACH: CPT

## 2024-08-20 RX ORDER — FENTANYL CITRATE 50 UG/ML
INJECTION, SOLUTION INTRAMUSCULAR; INTRAVENOUS AS NEEDED
Status: COMPLETED | OUTPATIENT
Start: 2024-08-20 | End: 2024-08-20

## 2024-08-20 RX ORDER — PANTOPRAZOLE SODIUM 40 MG/1
40 TABLET, DELAYED RELEASE ORAL 2 TIMES DAILY
Qty: 30 TABLET | Refills: 2 | Status: SHIPPED | OUTPATIENT
Start: 2024-08-20 | End: 2024-11-18

## 2024-08-20 RX ORDER — ENOXAPARIN SODIUM 100 MG/ML
40 INJECTION SUBCUTANEOUS DAILY
Qty: 12 ML | Refills: 3 | Status: SHIPPED | OUTPATIENT
Start: 2024-08-20 | End: 2024-12-18

## 2024-08-20 RX ORDER — ENOXAPARIN SODIUM 100 MG/ML
40 INJECTION SUBCUTANEOUS DAILY
Status: CANCELLED | OUTPATIENT
Start: 2024-08-20

## 2024-08-20 RX ORDER — MIDAZOLAM HYDROCHLORIDE 1 MG/ML
INJECTION, SOLUTION INTRAMUSCULAR; INTRAVENOUS AS NEEDED
Status: COMPLETED | OUTPATIENT
Start: 2024-08-20 | End: 2024-08-20

## 2024-08-20 RX ORDER — POLYETHYLENE GLYCOL 3350 17 G/17G
34 POWDER, FOR SOLUTION ORAL DAILY
Qty: 60 PACKET | Refills: 1 | Status: CANCELLED | OUTPATIENT
Start: 2024-08-21 | End: 2024-10-20

## 2024-08-20 RX ADMIN — INSULIN LISPRO 15 UNITS: 100 INJECTION, SOLUTION INTRAVENOUS; SUBCUTANEOUS at 14:01

## 2024-08-20 RX ADMIN — MIDAZOLAM 1 MG: 1 INJECTION INTRAMUSCULAR; INTRAVENOUS at 09:31

## 2024-08-20 RX ADMIN — BRIMONIDINE TARTRATE 1 DROP: 2 SOLUTION/ DROPS OPHTHALMIC at 11:19

## 2024-08-20 RX ADMIN — ASPIRIN 81 MG: 81 TABLET, COATED ORAL at 11:19

## 2024-08-20 RX ADMIN — SODIUM PHOSPHATE, MONOBASIC, MONOHYDRATE AND SODIUM PHOSPHATE, DIBASIC, ANHYDROUS 15 MMOL: 142; 276 INJECTION, SOLUTION INTRAVENOUS at 14:00

## 2024-08-20 RX ADMIN — PANTOPRAZOLE SODIUM 40 MG: 40 INJECTION, POWDER, FOR SOLUTION INTRAVENOUS at 08:43

## 2024-08-20 RX ADMIN — ATORVASTATIN CALCIUM 80 MG: 80 TABLET, FILM COATED ORAL at 11:19

## 2024-08-20 RX ADMIN — INSULIN LISPRO 4 UNITS: 100 INJECTION, SOLUTION INTRAVENOUS; SUBCUTANEOUS at 14:07

## 2024-08-20 RX ADMIN — ACETAMINOPHEN 975 MG: 325 TABLET ORAL at 11:19

## 2024-08-20 RX ADMIN — MIDAZOLAM 1 MG: 1 INJECTION INTRAMUSCULAR; INTRAVENOUS at 09:35

## 2024-08-20 RX ADMIN — FENTANYL CITRATE 25 MCG: 50 INJECTION, SOLUTION INTRAMUSCULAR; INTRAVENOUS at 09:31

## 2024-08-20 RX ADMIN — MIDAZOLAM 2 MG: 1 INJECTION INTRAMUSCULAR; INTRAVENOUS at 09:29

## 2024-08-20 RX ADMIN — FENTANYL CITRATE 25 MCG: 50 INJECTION, SOLUTION INTRAMUSCULAR; INTRAVENOUS at 09:35

## 2024-08-20 RX ADMIN — FENTANYL CITRATE 50 MCG: 50 INJECTION, SOLUTION INTRAMUSCULAR; INTRAVENOUS at 09:29

## 2024-08-20 RX ADMIN — DULOXETINE HYDROCHLORIDE 30 MG: 30 CAPSULE, DELAYED RELEASE ORAL at 11:19

## 2024-08-20 ASSESSMENT — PAIN SCALES - GENERAL
PAINLEVEL_OUTOF10: 0 - NO PAIN
PAINLEVEL_OUTOF10: 5 - MODERATE PAIN
PAINLEVEL_OUTOF10: 8

## 2024-08-20 ASSESSMENT — PAIN - FUNCTIONAL ASSESSMENT
PAIN_FUNCTIONAL_ASSESSMENT: 0-10

## 2024-08-20 NOTE — PROGRESS NOTES
Daily Progress Note  Subjective     - NAEON. HDS  - Continues to endorse epigastric and hypogastric abdominal pain--unchanged from yesterday   - Complains of ongoing constipation--No BM since 8/18   - Appropriate response to 1u pRBC given 8/18  (7.8>9.7). Today Hb10.8        Objective     Vitals:  Visit Vitals  /73   Pulse 79   Temp 36.4 °C (97.5 °F)   Resp 18       I/O last 3 completed shifts:  In: 1257 (14.2 mL/kg) [P.O.:780; Blood:350; IV Piggyback:127]  Out: 630 (7.1 mL/kg) [Urine:630 (0.2 mL/kg/hr)]  Weight: 88.5 kg   Net IO Since Admission: 627 mL [08/20/24 0801]    Physical exam:  Constitutional: awake, alert, in no acute distress  HEENT: EOMI, normal external inspection of ears and nose, MMM  Cardio: RRR, S1/S2, no murmurs, rubs, or gallops, radial pulses +2  Pulm: CTAB, normal respiratory effort  GI: +BS, soft, abdominal tenderness (epigastrium, hypogastrium), nondistended, no guarding or rebound, no masses noted  MSK: chest wall tenderness (mild), no joint swelling, normal movements of all extremities  Skin: no lesions, contusions, erythema, or rashes  Extremities: no BLE swelling   Psych: appropriate mood and behavior    Medications:  Scheduled medications  aspirin, 81 mg, oral, Daily  atorvastatin, 80 mg, oral, Daily  brimonidine, 1 drop, Both Eyes, BID  DULoxetine, 30 mg, oral, Daily   And  DULoxetine, 60 mg, oral, Nightly  [Held by provider] enoxaparin, 40 mg, subcutaneous, Daily  insulin degludec, 25 Units, subcutaneous, Nightly  insulin lispro, 0-10 Units, subcutaneous, q4h  insulin lispro, 0-10 Units, subcutaneous, TID  insulin lispro, 15 Units, subcutaneous, TID  latanoprost, 1 drop, Both Eyes, Nightly  melatonin, 6 mg, oral, Nightly  pantoprazole, 40 mg, intravenous, BID  polyethylene glycol, 17 g, oral, Daily      Continuous medications     PRN medications  PRN medications: acetaminophen, dextrose, dextrose, glucagon, glucagon    Labs:  Most recent labs:   Results from last 7 days   Lab  "Units 08/19/24  1222 08/18/24  1239   HEMOGLOBIN g/dL 9.7* 7.8*   HEMATOCRIT % 30.3* 23.7*   WBC AUTO x10*3/uL 8.9 5.8   PLATELETS AUTO x10*3/uL 326 331     Results from last 7 days   Lab Units 08/19/24  1222 08/18/24  1239   CO2 mmol/L 31 28   ANION GAP mmol/L 9* 13   BUN mg/dL 16 18   CREATININE mg/dL 1.19* 1.28*   GLUCOSE mg/dL 148* 63*   CALCIUM mg/dL 9.2 9.7   MAGNESIUM mg/dL 2.19  --    PHOSPHORUS mg/dL 3.6  --       Results from last 7 days   Lab Units 08/18/24  1239   ALT U/L 9   AST U/L 13   ALK PHOS U/L 89      Results from last 7 days   Lab Units 08/18/24  1239   INR  1.7*     Results from last 7 days   Lab Units 08/18/24  1239   FIO2 % 21     Results from last 7 days   Lab Units 08/18/24  1239   POCT PH, VENOUS pH 7.45*   POCT PCO2, VENOUS mm Hg 44           No lab exists for component: \"BNPRESU\", \"CPKT\"  Results from last 7 days   Lab Units 08/18/24  1239   LIPASE U/L 51       Imaging:  All images:   CT angio abdomen pelvis w and or wo IV IV contrast    Result Date: 8/18/2024  STUDY: CT Angiogram of the Abdomen and Pelvis; 8/18/2024 1:40 PM INDICATION: Evaluate for hematoma/GI bleed. COMPARISON: US liver 8/11/2024.  CTA chest 6/6/2024. ACCESSION NUMBER(S): [id number] ORDERING CLINICIAN: THAD MARIE TECHNIQUE:  Helical CT is performed from the aortic diaphragmatic hiatus through the symphysis pubis prior to and after bolus administration of 100 mL of Omnipaque 350.  Images are reviewed and processed at a workstation according to the CT angiogram protocol with 3-D and/or MIP post processing imaging generated. Automated mA/kV exposure control was utilized and patient examination was performed in strict accordance with principles of ALARA. FINDINGS: Vascular: Mesenteric: The celiac, SMA, and TAMMI demonstrate no significant stenosis.  Right renal: Single vessel demonstrates no significant stenosis.  Left renal: Single vessel demonstrates no significant stenosis. Abdominal aorta: The abdominal aorta is not " aneurysmal and demonstrates no significant occlusive disease. Iliacs: The common, external, and internal iliac vessels demonstrate no aneurysmal disease or significant stenosis. Abdomen: The lung bases are clear.  Coronary artery calcifications.  Mild fatty infiltration of the liver.  Small flash filling hemangioma in the left hepatic lobe.  The pancreas, spleen, adrenal glands, and kidneys demonstrate no acute pathology.  Gallbladder is present without gallstones.  No renal/ureteral calculi.  Mild gastric wall thickening. There is no free air or lymph node enlargement. Pelvis: Mildly thickened small bowel loops in the abdomen and pelvis.  No CT evidence for acute intraluminal active GI bleeding..  Appendix is normal.  Colonic diverticulosis without findings of diverticulitis. Markedly enlarged heterogeneous uterus with presumed multiple fibroids measuring up to 15 cm.  Intrauterine device noted.  Small amount of gas in the endometrial canal.  Trace pelvic free fluid in the left perirectal region..  Lymph nodes are not enlarged.  Urinary bladder is unremarkable.  Small fat-containing midline ventral hernia. Skeleton:  There are no acute fractures.  No suspicious bony lesions.    1. Findings suggestive of a gastroenteritis. No CT evidence for acute intraluminal active GI bleeding. 2. Markedly enlarged heterogeneous uterus with presumed multiple fibroids measuring up to 15 cm. Intrauterine device noted. Small amount of gas in the endometrial canal.  Consider further evaluation with pelvic ultrasound if clinically indicated. 3. Colonic diverticulosis without findings of diverticulitis. 4. Mild hepatic steatosis. 5. Coronary artery calcifications. Signed by Jamie Blackman MD           Assessment/Plan:  Nelida Mata is a 64-year-old woman with a past medical history of HFpEF (50-55% 9/2023), CAD s/p PCI w/ stent x 3 (2016, 2018) and STEMI (9/2023, no stents placed), TIA 9/2023, idiopathic SVC thrombus on Eliquis,  remote DVT, T2DM, HLD, CKD, obesity, NAFLD, MICHAEL, and prior toe amputations for gangrene in 2020, who presented to the ED for abdominal pain and black tarry stools.       Update (8/19):   - Switch AC to lovenox 40 mg daily per Vascular Med   - EGD w/ push enteroscopy: no signs of active bleeding within the limitations of the exam (technically challenging as there was food in the stomach and duodenum which precluded visualization)  - Outpatient workup for vaginal bleeding per Gyn   - SVC thrombus workup: Kappa and lambda light chains to r/o light chain process, Mixing studies  Heparin assay (does not need to be a timed test)  -- Mixing study: INR 1.1, PT 12.4, aPTT 29   --Heparin assay wnl 0.5   --Increased [place] free light chain 2.51. Normal lambda light chain and kappa/lambda ratio  -15 units lispro with meals   - Hb continues to improve 7.8>9.7>10.8  - Transfuse to Hb >8 (given hx of cardiac disease) and plt>50  - Start Miralax 34g daily and Senna 17.4 mg daily for constipation      #Acute blood loss anemia 2/2 suspected UGIB    #Vaginal bleeding   #FRANCO  She presents with a 5 day history of continued black, tarry stools after her last admission where she underwent polyp removal w/ clipping. Path is still pending. Hb drop  8.9>7.8 between last admission discharge on 08/13 and now. She does take eliquis for an idiopathic SVC thrombus and aspirin for coronary disease  Hx of ongoing vaginal bleeding (typically spotting) 2/2 fibroids not amenable to surgical intervention at this time due to poorly controlled blood diabetes. She does not endorse an increase in the vaginal bleeding in the past week that would explain her Hb drop.   INR 1.7, PT 18.7, aPTT 32  EGD 08/12 showing normal esophagus. Single ulcer in antrum and prepyloric region with clean base. Antral polyp s/p hot snare removal and clip with hemostasis. Biopsies taken--path pending   No CT evidence for acute intraluminal active GI bleeding  EKG 08/19 not  concerning for ACS  Overdue for colonoscopy--last one in 2015 with 6mm polyp. Recommended f/u was in 5 years  Highest suspicion for post-procedural (polypectomy) bleeding vs. PUD vs. small bowel angioectasia. Less likely: gastritis, esophagitis, traumatic (Jolanta-Oconnor tear), malignancy. Pancreatitis less likely given lipase wnl and lack of suggestive CT image findings.   Iron studies suggest FRANCO--Iron 18, Iron sat 4%, TIBC 419, Ferritin 10  Gyn consulted regarding vaginal bleeding. Limited benefit from progesterone given likelihood of atrophic endometrium from IUD exposure. Not a candidate for estrogen. Recommend outpatient eval with endometrial sampling and potential hysterectomy if pt is a good surgical candidate. HbA1C improved 8.5>7.6 in last 6 months on Ozempic   Received IV Feraheme 510 mg for FRANCO on 08/19  EGD with push enteroscopy (08/20): No signs of active bleeding within limitation of the exam (technically challenging as there was food in the stomach and duodenum which precluded visualization. )     Plan  - Continue 40 mg protonix BID   - Outpatient f/u with Gyn for ongoing vaginal bleeding   - Consider gastroparesis study as outpatient   - Follow up with GI for colonoscopy as outpt   - Follow up with GI in 3 months (November 2024) for repeat EGD for surveillance        #HFpEF  #CAD s/p PCI  #Hx STEMI  #SVC Thrombus  CTA 08/18 showing opacification of the SVC which may represent mixing artifact from contrast. Unable to r/o thrombus  EKG 08/18 w/o signs of ACS. hsTNT wnl.   No signs of fluid overload on exam  SVC thrombus workup per Vascular med: Kappa and lambda light chains to r/o light chain process, Mixing studies, Heparin assay (does not need to be a timed test)  Mixing study: INR 1.1, PT 12.4, aPTT 29   Heparin assay wnl 0.5   Increased Page free light chain 2.51. Normal lambda light chain and kappa/lambda ratio.      Plan:   - Outpatient follow up with vascular medicine   - Stop Apixaban 5mg  BID  - Start lovenox 40 mg daily   - Continue carvedilol 12.5mg BID  - Continue furosemide 20mg  - Continue isosorbide mononitrate 60mg        #T2DM  Home regimen: degludec 52 units PM, Humalog 22 units with meals  Low BG in the setting of poor oral intake in the last 24 hours   HbA1C improved to 7.6 (from 8.5 six months prior)    Plan:   - Resume home dapagliflozin 10mg, semaglutide 2mg weekly on discharge   - 25 units degludec nightly while inpatient  - 15 units lispro with meals while inpatient   - q4hr SSI-2 while NPO         #CKD   Cr on admission 1.28 (recent baseline labile, 1.1-1.5)  Plan:   - Resume on discharge--Kerendia 10mg        #HLD  Plan:   - Continue atorvastatin 80mg         #Chronic fibromyalgia  Plan:   - Continue duloxetine 30mg AM, 60mg PM         #Glaucoma   Plan:   - Continue brimonidine, latanoprost eye drops  - Holding home calcium carbonate-vitamin D as not on formulary      #Constipation  Plan:   - Start Miralax 34g daily and Senna 17.4 mg daily for constipation    #Misc  - Continue melatonin 6mg        F: Replete PRN  E: Replete PRN  N: NPO  DVT Ppx: Held  GI Ppx: Pantoprazole BID  Access/Lines: PIVx2  Abx: None   O2: None      Pain regimen: Tylenol PRN   GI Laxative: None      Code status: Full Code  Emergency contact: Brother (Joe) [phone]; Son (Glynn Medina) [phone]            Drew Reyes, MS5  Please Haiku with any questions or concerns.

## 2024-08-20 NOTE — INTERVAL H&P NOTE
H&P reviewed. The patient was examined and there are no changes to the H&P.    Mallampati III  ASA III  Proceed with push enteroscopy with moderate sedation.

## 2024-08-20 NOTE — DISCHARGE SUMMARY
Discharge Diagnosis  GI bleed    Issues Requiring Follow-Up  - Follow up with GI as outpt for a colonoscopy as soon as possible. Consider outpt eval for diabetic gastroparesis  - Follow up with GI as outpt for repeat EGD +/- push enteroscopy in 3 months for gastric ulcer surveillance  - Follow up with Gyn for outpt vaginal bleeding eval      Discharge Meds     Your medication list        CONTINUE taking these medications        Instructions Last Dose Given Next Dose Due   blood pressure monitor kit      1 each once daily. Use once daily to check blood pressure       FreeStyle Filipe 2 [facility]  Generic drug: flash glucose scanning reader      Use as instructed       FreeStyle Filipe 2 Sensor kit  Generic drug: flash glucose sensor kit      Apply 1 sensor to the back of the upper arm. Change sensor every 14 days.              ASK your doctor about these medications        Instructions Last Dose Given Next Dose Due   acetaminophen 325 mg tablet  Commonly known as: Tylenol      Take 3 tablets (975 mg) by mouth every 8 hours if needed for mild pain (1 - 3) for up to 10 days.       apixaban 5 mg tablet  Commonly known as: Eliquis      Take 1 tablet (5 mg) by mouth every 12 hours. Do not RESUME until 7/6/24 evening dose. Do not fill before July 6, 2024.       aspirin 81 mg EC tablet      Take 1 tablet (81 mg) by mouth once daily.       atorvastatin 80 mg tablet  Commonly known as: Lipitor      TAKE 1 TABLET BY MOUTH ONCE DAILY       brimonidine 0.2 % ophthalmic solution  Commonly known as: AlphaGAN      Administer 1 drop into both eyes 2 times a day. PATIENT NEEDS PCP APPT FOR FURTHER REFILLS       calcium carbonate-vit D3-min 600 mg calcium- 400 unit tablet      Take 1 tablet by mouth once daily.       carvedilol 12.5 mg tablet  Commonly known as: Coreg      Take 1 tablet (12.5 mg) by mouth 2 times daily (morning and late afternoon).       diclofenac sodium 1 % gel  Commonly known as: Voltaren      Apply 4.5 inches (4  g) topically 2 times a day.       DULoxetine 30 mg DR capsule  Commonly known as: Cymbalta      Take 1 capsule (30 mg) by mouth once daily in the morning AND 2 capsules (60 mg) once daily at bedtime. Do not crush or chew..       Farxiga 10 mg  Generic drug: dapagliflozin propanediol      Take 1 tablet (10 mg) by mouth once daily.       furosemide 20 mg tablet  Commonly known as: Lasix      Take 1 tablet (20 mg) by mouth once daily.       glucose 4 gram chewable tablet      Chew 4 tablets (16 g) 1 time for 1 dose as needed       HumaLOG KwikPen Insulin 200 unit/mL (3 mL) insulin pen pen  Generic drug: insulin lispro      Inject 22 Units under the skin 3 times daily (morning, midday, late afternoon). Take as directed per insulin instructions.       hydrOXYzine HCL 25 mg tablet  Commonly known as: Atarax      Take 1 tablet (25 mg) by mouth 2 times a day as needed for itching for up to 7 days.       isosorbide mononitrate ER 60 mg 24 hr tablet  Commonly known as: Imdur      TAKE 1 TABLET BY MOUTH EVERY DAY IN THE MORNING       Kerendia 10 mg tablet tablet  Generic drug: finerenone      Take 1 tablet (10 mg) by mouth once daily.       latanoprost 0.005 % ophthalmic solution  Commonly known as: Xalatan           nitroglycerin 0.4 mg SL tablet  Commonly known as: Nitrostat           OneTouch Ultra Test strip  Generic drug: blood sugar diagnostic      USE 1 STRIP(S) TO TEST BLOOD SUGAR 3 TIMES A DAY       Ozempic 2 mg/dose (8 mg/3 mL) pen injector  Generic drug: semaglutide      Inject 2 mg under the skin 1 (one) time per week.       pantoprazole 40 mg EC tablet  Commonly known as: ProtoNix      Take 1 tablet (40 mg) by mouth once daily in the morning. Take before meals.       polyethylene glycol 17 gram/dose powder  Commonly known as: Glycolax, Miralax      Take 1 capful (17 g) mixed in 4-8 ounces of liquid by mouth once daily as needed (constipation).       Senexon-S 8.6-50 mg tablet  Generic drug: sennosides-docusate  sodium      Take 1 tablet by mouth 2 times a day.       Tresiba FlexTouch U-200 200 unit/mL (3 mL) injection  Generic drug: insulin degludec      Inject 52 Units under the skin once daily at bedtime. Take as directed per insulin instructions.       urea 20 % cream  Commonly known as: Carmol                    Test Results Pending At Discharge  Pending Labs       Order Current Status    Immunoglobulin free LT chains blood In process            Hospital Course  Nelida Mata is a 64-year-old woman with a past medical history of HFpEF (50-55% 9/2023), CAD s/p PCI w/ stent x 3 (2016, 2018) and STEMI (9/2023, no stents placed), TIA 9/2023, idiopathic SVC thrombus on Eliquis, remote DVT, T2DM, HLD, CKD, obesity, NAFLD, MICHAEL, and prior toe amputations for gangrene in 2020, who presented to the ED for abdominal pain and black tarry stools.     She received a unit of blood in the ED for Hb 7.8. She received IV Feraheme for her FRANCO. She underwent a repeat EGD with push enteroscopy (08/20) that found no evidence of active bleeding or stigmata of recent bleeding. There was food in the stomach and duodenum which precluded some visualization. Benign gyn was consulted for management of her vaginal bleeding with plan for outpatient eval with endometrial sampling and potential hysterectomy if she is a good surgical candidate. Vascular medicine was consulted given her hx of SVC thrombus with recommendation to change AC to lovenox 40 mg daily with repeat imaging and further eval as outpt.     Pertinent Physical Exam At Time of Discharge  Constitutional: awake, alert, in no acute distress  HEENT: EOMI, normal external inspection of ears and nose, MMM  Cardio: RRR, S1/S2, no murmurs, rubs, or gallops, radial pulses +2  Pulm: CTAB, normal respiratory effort  GI: +BS, soft, abdominal tenderness (epigastrium, hypogastrium), nondistended, no guarding or rebound, no masses noted  MSK: chest wall tenderness (mild), no joint swelling,  normal movements of all extremities  Skin: no lesions, contusions, erythema, or rashes  Extremities: no BLE swelling   Psych: appropriate mood and behavior    Outpatient Follow-Up  Future Appointments   Date Time Provider Department Center   8/21/2024  3:45 PM [facility]   8/23/2024  1:00 PM Nima Santamaria, PharmD [id number] [facility]   8/30/2024  2:30 [facility]   9/12/2024  2:00 PM Gita Ferrell MD [id number] [place]   9/12/2024  2:30 PM Rosa Terrazas RD [id number] [place]   10/3/2024  8:30 AM Betty Boone MD [id number] [facility]   10/4/2024  8:40 AM Destiny Hurst MD PhD [id number] [facility]   10/11/2024  9:20 AM Michael Hilario MD [id number] [facility]   11/7/2024  8:15 AM Cyndie Cloud MD [id number] [facility]   2/5/2025  9:00 AM Jacinta Peraza MD [id number] [facility]         Drew Reyes MS5  [facility]

## 2024-08-20 NOTE — PROGRESS NOTES
"[facility]  [facility] [place]  CONSULT FOLLOW-UP     Reason For Consult: melena, persistent FRANCO    History Of Present Illness  Nelida Mata is a 64 y.o. female presenting with melena.    SUBJECTIVE     Patient s/p entersoscopy with results below. No melena. Hb stable after transfusion.    EXAM     Last Recorded Vitals  Blood pressure 136/69, pulse 76, temperature 36 °C (96.8 °F), temperature source Temporal, resp. rate 18, height 1.499 m (4' 11\"), weight 88.5 kg (195 lb), SpO2 98%.      Intake/Output Summary (Last 24 hours) at 8/20/2024 1101  Last data filed at 8/19/2024 1800  Gross per 24 hour   Intake 847 ml   Output 600 ml   Net 247 ml       Physical Exam  General: well-nourished, no acute distress  HEENT: EOM intact, no scleral icterus, moist MM, partially edentulous  Respiratory: nonlabored breathing on room air  Cardiovascular: Regular rate and rhythm  Abdomen: obese, soft, nontender, nondistended, bowel sounds present, no masses palpated  Extremities: no edema, no asterixis  Neuro: alert and oriented, CNII-XII grossly intact, moves all 4 extremities with no focal deficits      OBJECTIVE                                                                              Medications             Current Facility-Administered Medications:     acetaminophen (Tylenol) tablet 975 mg, 975 mg, oral, q8h PRN, Tobin Saleh MD, 975 mg at 08/19/24 5077    aspirin EC tablet 81 mg, 81 mg, oral, Daily, Tobin Saleh MD, 81 mg at 08/19/24 0934    atorvastatin (Lipitor) tablet 80 mg, 80 mg, oral, Daily, Tobin Saleh MD, 80 mg at 08/19/24 0934    brimonidine (AlphaGAN) 0.2 % ophthalmic solution 1 drop, 1 drop, Both Eyes, BID, Tobin Saleh MD, 1 drop at 08/19/24 0934    dextrose 50 % injection 12.5 g, 12.5 g, intravenous, q15 min PRN, Tobin Saleh MD    dextrose 50 % injection 25 g, 25 g, intravenous, q15 min PRN, Tobin Saleh MD    " DULoxetine (Cymbalta) DR capsule 30 mg, 30 mg, oral, Daily, 30 mg at 08/19/24 0934 **AND** DULoxetine (Cymbalta) DR capsule 60 mg, 60 mg, oral, Nightly, Tobin Saleh MD, 60 mg at 08/19/24 2035    [Held by provider] enoxaparin (Lovenox) syringe 40 mg, 40 mg, subcutaneous, Daily, Carol Hancock MD    glucagon (Glucagen) injection 1 mg, 1 mg, intramuscular, q15 min PRN, Tobin Saleh MD    glucagon (Glucagen) injection 1 mg, 1 mg, intramuscular, q15 min PRN, Tobin Saleh MD    insulin degludec (Tresiba) injection 25 Units, 25 Units, subcutaneous, Nightly, Tobin Saleh MD, 25 Units at 08/19/24 2059    insulin lispro (HumaLOG) injection 0-10 Units, 0-10 Units, subcutaneous, q4h, Tobin Saleh MD    insulin lispro (HumaLOG) injection 0-10 Units, 0-10 Units, subcutaneous, TID, Felicity Del Rosario MD, 2 Units at 08/19/24 1848    insulin lispro (HumaLOG) injection 15 Units, 15 Units, subcutaneous, TID, Tobin Saleh MD, 15 Units at 08/19/24 1849    latanoprost (Xalatan) 0.005 % ophthalmic solution 1 drop, 1 drop, Both Eyes, Nightly, Tobin Saleh MD, 1 drop at 08/19/24 2036    melatonin tablet 6 mg, 6 mg, oral, Nightly, Tobin Saleh MD, 6 mg at 08/19/24 2035    pantoprazole (ProtoNix) injection 40 mg, 40 mg, intravenous, BID, Tobin Saleh MD, 40 mg at 08/20/24 0843    polyethylene glycol (Glycolax, Miralax) packet 17 g, 17 g, oral, Daily, Felicity Del Rosario MD, 17 g at 08/19/24 2035                                                                            Labs     Results for orders placed or performed during the hospital encounter of 08/18/24 (from the past 24 hour(s))   POCT GLUCOSE   Result Value Ref Range    POCT Glucose 162 (H) 74 - 99 mg/dL   CBC and Auto Differential   Result Value Ref Range    WBC 8.9 4.4 - 11.3 x10*3/uL    nRBC 0.0 0.0 - 0.0 /100 WBCs    RBC 3.47 (L) 4.00 - 5.20 x10*6/uL    Hemoglobin 9.7 (L) 12.0 - 16.0 g/dL    Hematocrit  30.3 (L) 36.0 - 46.0 %    MCV 87 80 - 100 fL    MCH 28.0 26.0 - 34.0 pg    MCHC 32.0 32.0 - 36.0 g/dL    RDW 14.5 11.5 - 14.5 %    Platelets 326 150 - 450 x10*3/uL    Neutrophils % 68.9 40.0 - 80.0 %    Immature Granulocytes %, Automated 0.4 0.0 - 0.9 %    Lymphocytes % 18.6 13.0 - 44.0 %    Monocytes % 8.0 2.0 - 10.0 %    Eosinophils % 2.5 0.0 - 6.0 %    Basophils % 1.6 0.0 - 2.0 %    Neutrophils Absolute 6.14 1.20 - 7.70 x10*3/uL    Immature Granulocytes Absolute, Automated 0.04 0.00 - 0.70 x10*3/uL    Lymphocytes Absolute 1.66 1.20 - 4.80 x10*3/uL    Monocytes Absolute 0.71 0.10 - 1.00 x10*3/uL    Eosinophils Absolute 0.22 0.00 - 0.70 x10*3/uL    Basophils Absolute 0.14 (H) 0.00 - 0.10 x10*3/uL   Renal Function Panel   Result Value Ref Range    Glucose 148 (H) 74 - 99 mg/dL    Sodium 141 136 - 145 mmol/L    Potassium 4.1 3.5 - 5.3 mmol/L    Chloride 105 98 - 107 mmol/L    Bicarbonate 31 21 - 32 mmol/L    Anion Gap 9 (L) 10 - 20 mmol/L    Urea Nitrogen 16 6 - 23 mg/dL    Creatinine 1.19 (H) 0.50 - 1.05 mg/dL    eGFR 51 (L) >60 mL/min/1.73m*2    Calcium 9.2 8.6 - 10.6 mg/dL    Phosphorus 3.6 2.5 - 4.9 mg/dL    Albumin 3.7 3.4 - 5.0 g/dL   Magnesium   Result Value Ref Range    Magnesium 2.19 1.60 - 2.40 mg/dL   Hemoglobin A1c   Result Value Ref Range    Hemoglobin A1C 7.6 (H) see below %    Estimated Average Glucose 171 Not Established mg/dL   POCT GLUCOSE   Result Value Ref Range    POCT Glucose 162 (H) 74 - 99 mg/dL   POCT GLUCOSE   Result Value Ref Range    POCT Glucose 188 (H) 74 - 99 mg/dL   POCT GLUCOSE   Result Value Ref Range    POCT Glucose 222 (H) 74 - 99 mg/dL   POCT GLUCOSE   Result Value Ref Range    POCT Glucose 200 (H) 74 - 99 mg/dL   POCT GLUCOSE   Result Value Ref Range    POCT Glucose 311 (H) 74 - 99 mg/dL   Mixing Study, PT   Result Value Ref Range    Protime 12.4 9.8 - 12.8 seconds    INR 1.1 0.9 - 1.1   Mixing Study, APTT   Result Value Ref Range    aPTT 29 27 - 38 seconds   Heparin Assay    Result Value Ref Range    Heparin Unfractionated 0.5 See Comment Below for Therapeutic Ranges IU/mL   CBC and Auto Differential   Result Value Ref Range    WBC 8.6 4.4 - 11.3 x10*3/uL    nRBC 0.0 0.0 - 0.0 /100 WBCs    RBC 3.85 (L) 4.00 - 5.20 x10*6/uL    Hemoglobin 10.8 (L) 12.0 - 16.0 g/dL    Hematocrit 34.2 (L) 36.0 - 46.0 %    MCV 89 80 - 100 fL    MCH 28.1 26.0 - 34.0 pg    MCHC 31.6 (L) 32.0 - 36.0 g/dL    RDW 14.1 11.5 - 14.5 %    Platelets 313 150 - 450 x10*3/uL    Neutrophils % 68.3 40.0 - 80.0 %    Immature Granulocytes %, Automated 0.5 0.0 - 0.9 %    Lymphocytes % 17.1 13.0 - 44.0 %    Monocytes % 8.5 2.0 - 10.0 %    Eosinophils % 4.0 0.0 - 6.0 %    Basophils % 1.6 0.0 - 2.0 %    Neutrophils Absolute 5.88 1.20 - 7.70 x10*3/uL    Immature Granulocytes Absolute, Automated 0.04 0.00 - 0.70 x10*3/uL    Lymphocytes Absolute 1.47 1.20 - 4.80 x10*3/uL    Monocytes Absolute 0.73 0.10 - 1.00 x10*3/uL    Eosinophils Absolute 0.34 0.00 - 0.70 x10*3/uL    Basophils Absolute 0.14 (H) 0.00 - 0.10 x10*3/uL   Renal Function Panel   Result Value Ref Range    Glucose 289 (H) 74 - 99 mg/dL    Sodium 137 136 - 145 mmol/L    Potassium 4.2 3.5 - 5.3 mmol/L    Chloride 100 98 - 107 mmol/L    Bicarbonate 27 21 - 32 mmol/L    Anion Gap 14 10 - 20 mmol/L    Urea Nitrogen 17 6 - 23 mg/dL    Creatinine 1.46 (H) 0.50 - 1.05 mg/dL    eGFR 40 (L) >60 mL/min/1.73m*2    Calcium 9.2 8.6 - 10.6 mg/dL    Phosphorus 2.3 (L) 2.5 - 4.9 mg/dL    Albumin 4.0 3.4 - 5.0 g/dL                                                                            Imaging   CTA abdomen and pelvis 8/18/24  IMPRESSION:  1. Findings suggestive of a gastroenteritis. No CT evidence for acute intraluminal active GI bleeding.  2. Markedly enlarged heterogeneous uterus with presumed multiple fibroids measuring up to 15 cm. Intrauterine device noted. Small amount of gas in the endometrial canal.  Consider further evaluation  with pelvic ultrasound if clinically  indicated.  3. Colonic diverticulosis without findings of diverticulitis.  4. Mild hepatic steatosis.  5. Coronary artery calcifications.    US liver 8/11/24  IMPRESSION:  1. Findings suggestive of hepatic steatosis with no focal liver lesion identified.  2. Borderline elevated resistive indices of the hepatic arteries, which are nonspecific however may be secondary to chronic liver disease. Otherwise unremarkable Doppler evaluation of the hepatic vasculature.  3. Partially visualized enlarged leiomyomatous uterine fundus.                                                                         GI Procedures     Enteroscopy 8/20/24  Table formatting from the original result was not included.  Impression  The esophagus appeared normal.  Food bolus in the body of the stomach, antrum and duodenal bulb  Though partially obscured by overlying food, the post polypectomy site in   the gastric antrum appeared clean without evidence of ulceration or active   bleeding. Previously placed clip was not seen.  Previously seen ulcer was not seen on this exam, likely due to overlying   food.  Exam aborted in the duodenal bulb due to significant food leading to   increased aspiration risk.  No evidence of active or recent bleeding on this exam.    ASSESSMENT / PLAN     ASSESSMENT/PLAN:  Nelida Mata is a 64 y.o. female presenting with persistent FRANCO and melena. She is now s/p enteroscopy which was technically challenging as there was food in the stomach and duodenum which precluded visualization. Within the limitations of the exam, there was no evidence of active bleeding or stigmata of recent bleeding. No melena reported over the course of the admission.    Recommendations:  -Await pathology from prior EGD (Aug 12 , 2024)  -Continue twice daily PPI.  -Consider liquid diet prior to future EGDs.  -Resumption of therapeutic anticoagulation per primary team and vascular medicine.  -Resume diet as tolerated.  -Repeat EGD in  approximately 3 months for gastric ulcer surveillance.  -Consider outpatient evaluation for diabetic gastroparesis.    Patient was seen and discussed with Dr. Lacey.    Recommendations communicated with the primary team via secure chat.  Thank you for this consult. Gastroenterology will sign off.  -During weekday hours of 7am-5pm please do not hesitate to contact me on [employer] Chat or page [phone] if there are any further questions between the weekday hours of 7 AM - 5 PM.   -After hours, on weekends, and on holidays, please page the on-call GI fellow at [phone]. Thank you.      Jennifer Galeano MD  Gastroenterology and Hepatology Fellow  [facility] [place]

## 2024-08-20 NOTE — DISCHARGE INSTRUCTIONS
Dear Ms. Mata,     You were admitted to [facility] from 08/18/24 to 08/20/24 for black stools concerning for a bleeding in your upper stomach region and ongoing vaginal bleeding. While you were here, we gave you 1 unit of blood to replace what you had lost in the last week from the bleeding. We repeated the upper stomach scope you had last time you were here to look for any signs of bleeding and we did not see any active bleeding that was concerning at this time. We tried to go past your stomach to take a look at the first part of your intestines, but unfortunately there was still food there that prevented us from examining that area. We would like you to start taking the acid pill (Protonix 40mg) twice daily, once in the morning and once at night. It is very important that you take this pill every day. We want you to follow up with our stomach and intestine doctors so that you can get a colonoscopy since you are due for one. You will also need to follow up with these doctors so that they can repeat the upper stomach scope to check how well things are healing. We also think you might also have a condition called gastroparesis, which just means a slowing down of the movement of food from the stomach to the small intestine that sometimes happens to people that have diabetes so we want you to discuss this further with your stomach and intestine doctors.     We spoke to our women's reproductive health doctors and they want you to see them after you leave the hospital so that they can address your ongoing vaginal bleeding. Testing we did while you were hear show that your blood sugar values over the last several months have improved a lot. Your HbA1C level, a test that measures your blood sugars for the last 3 months, showed a lot of improvement from 8.5 back in February 2024 to 7.6 today--which is really good news!    Lastly, we are sending you home with a blood thinner injection (Lovenox). This is to treat the blood  clot in the vein near your esophagus and heart. This medication is less stronger than the eliquis (apixaban) you were taking before. The reason we made this switch is to lower your risk of having bleeding episodes. You will have follow up with a vascular medicine doctor to monitor this.       It was a pleasure taking care of you,    Your  Care Team

## 2024-08-20 NOTE — NURSING NOTE
8/20/2024 1858 Discharge Note  Patient discharged home with no needs. Discharge instructions discussed with patient, verbalized understanding. Aware of follow up appointments needed. Meds to beds delivered to bedside. Other meds sent to patient's pharmacy, patient aware. Patient provided with education regarding lovenox injections and verbalized understanding and ability to inject lovenox. Showed patient injection needles from box. Patient is on insulin and other injections at home and feels confident in process. Aware of medication changes. Peripheral IV was removed by staff prior to discharge. Belongings gathered per patient. Patient had to wait for infusion of electrolytes to complete prior to leaving hospital. Patient stated she did not have a ride home, advised patient to call insurance for a ride home. Patient was calling insurance for ride home and was placed in transport to [facility] per patient request. Upon leaving unit, patient was insistent on receiving a lyft. Patient stated she could not get in touch with her insurance and was unable to get a line. Hospital lyft approved by AN. Patient transported off unit via wheelchair at 1821. ---- Grace Law RN

## 2024-08-20 NOTE — PROGRESS NOTES
"Subjective:  No complaints. Planning on DC.   No overt bleeding    Objective:     Physical Exam  /69   Pulse 76   Temp 36 °C (96.8 °F) (Temporal)   Resp 18   Ht 1.499 m (4' 11\")   Wt 88.5 kg (195 lb)   BMI 39.39 kg/m²      OE:  Clinically stable sitting at the bedside  CV reg, Lungs CTA      Medications   Scheduled medications  aspirin, 81 mg, oral, Daily  atorvastatin, 80 mg, oral, Daily  brimonidine, 1 drop, Both Eyes, BID  DULoxetine, 30 mg, oral, Daily   And  DULoxetine, 60 mg, oral, Nightly  [Held by provider] enoxaparin, 40 mg, subcutaneous, Daily  insulin degludec, 25 Units, subcutaneous, Nightly  insulin lispro, 0-10 Units, subcutaneous, q4h  insulin lispro, 0-10 Units, subcutaneous, TID  insulin lispro, 15 Units, subcutaneous, TID  latanoprost, 1 drop, Both Eyes, Nightly  melatonin, 6 mg, oral, Nightly  pantoprazole, 40 mg, intravenous, BID  polyethylene glycol, 17 g, oral, Daily  sodium phosphate, 15 mmol, intravenous, Once      Continuous medications     PRN medications  PRN medications: acetaminophen, dextrose, dextrose, glucagon, glucagon     Lab Review   Results from last 7 days   Lab Units 08/20/24  0814 08/19/24  1222 08/18/24  1239   WBC AUTO x10*3/uL 8.6 8.9 5.8   HEMOGLOBIN g/dL 10.8* 9.7* 7.8*   HEMATOCRIT % 34.2* 30.3* 23.7*   PLATELETS AUTO x10*3/uL 313 326 331       Results from last 7 days   Lab Units 08/20/24  0814 08/19/24  1222 08/18/24  1239   SODIUM mmol/L 137 141 144   POTASSIUM mmol/L 4.2 4.1 3.8   CHLORIDE mmol/L 100 105 107   CO2 mmol/L 27 31 28   BUN mg/dL 17 16 18   CREATININE mg/dL 1.46* 1.19* 1.28*   GLUCOSE mg/dL 289* 148* 63*   CALCIUM mg/dL 9.2 9.2 9.7       Results from last 7 days   Lab Units 08/20/24  0814 08/18/24  1239   APTT seconds 29 32   INR  1.1 1.7*       Results from last 7 days   Lab Units 08/20/24  0814   ANTI XA UNFRACTIONATED IU/mL 0.5       PTT - 8/20/2024:  8:14 AM  1.1   12.4 29     Impression  The esophagus appeared normal.  Food bolus in the " body of the stomach, antrum and duodenal bulb  Though partially obscured by overlying food, the post polypectomy site in the gastric antrum appeared clean without evidence of ulceration or active bleeding. Previously placed clip was not seen.  Previously seen ulcer was not seen on this exam, likely due to overlying food.  Exam aborted in the duodenal bulb due to significant food leading to increased aspiration risk.  No evidence of active or recent bleeding on this exam.      Impression  The esophagus appeared normal.  The cardia, fundus of the stomach and incisura appeared normal.  Single ulcer in the antrum and prepyloric region with clean base (Waqas III)  Gale Ip polyp measuring 10-19 mm in the antrum; there was stigmata of recent hemorrhage, and bleeding occurred before intervention; lift performed; performed hot snare removal; placed 1 clip successfully; hemostasis achieved  The duodenal bulb appeared normal.  Performed forceps biopsies in the fundus of the stomach, incisura and antrum to rule out H. pylori     Impression:     - LA Grade B esophagitis with no bleeding.                         - 1 cm hiatal hernia.                         - No esophageal mass.                         - Erosive gastritis. Biopsied.                         - Normal examined duodenum.  A/P:  64 year old female with complex medical history, readmitted for recurrent dark black stools, vaginal spotting and hematuria.   Vascular Medicine Service consulted for anticoagulation recommendations for known SVC thrombus, initially seen a.s an incidental finding on CT chest 1/8/23. On review this seems to be from the azygous into the SVC. Has been persistent and likely chronic at this point.     Patient with long term use of Eliquis for SVC thrombus. Complicated by bleeding as noted. AC currently held.     Discussed c/f this event originating with the prev esophagitis - this seems resolved on EGD but has ongoing issues of ulcer and  inflammation and ?gastroparesis.    Discussed changing AC to lovenox 40 mg daily for now. Will plan repeat imaging and further eval in the OPD to determine ongoing needs for ac.    Please make sure she has coverage for lovenox. Needs injjection teaching. Needs drug provided meds-to-beds.    We will arrange  OPD follow up.      following  Please page [phone] for any concerns    Makayla Harvey MD

## 2024-08-20 NOTE — CARE PLAN
The patient's goals for the shift include      The clinical goals for the shift include Pts HGB will remain above 7 through shift    Over the shift, the patient did not make progress toward the following goals. Barriers to progression include tarry stools. Recommendations to address these barriers include holding anticoagulant.

## 2024-08-21 RX ORDER — DULOXETIN HYDROCHLORIDE 30 MG/1
CAPSULE, DELAYED RELEASE ORAL
Qty: 60 CAPSULE | Refills: 2 | Status: SHIPPED | OUTPATIENT
Start: 2024-08-21 | End: 2024-10-20

## 2024-08-23 ENCOUNTER — APPOINTMENT (OUTPATIENT)
Dept: PHARMACY | Facility: HOSPITAL | Age: 64
End: 2024-08-23
Payer: COMMERCIAL

## 2024-08-23 DIAGNOSIS — K92.2 GASTROINTESTINAL HEMORRHAGE, UNSPECIFIED GASTROINTESTINAL HEMORRHAGE TYPE: ICD-10-CM

## 2024-08-23 DIAGNOSIS — G47.00 INSOMNIA, UNSPECIFIED TYPE: Primary | ICD-10-CM

## 2024-08-23 DIAGNOSIS — I25.10 ARTERIOSCLEROTIC CARDIOVASCULAR DISEASE (ASCVD): ICD-10-CM

## 2024-08-23 RX ORDER — ATORVASTATIN CALCIUM 80 MG/1
80 TABLET, FILM COATED ORAL DAILY
Qty: 90 TABLET | Refills: 3 | Status: SHIPPED | OUTPATIENT
Start: 2024-08-23 | End: 2024-10-30

## 2024-08-23 RX ORDER — POLYETHYLENE GLYCOL 3350 17 G/17G
17 POWDER, FOR SOLUTION ORAL DAILY PRN
Qty: 510 G | Refills: 0 | Status: SHIPPED | OUTPATIENT
Start: 2024-08-23 | End: 2024-09-22

## 2024-08-23 ASSESSMENT — ENCOUNTER SYMPTOMS
POLYDIPSIA: 1
TREMORS: 1
SWEATS: 1

## 2024-08-23 NOTE — PROGRESS NOTES
Patient is sent at the request of Michael Hilario MD for medication management.  My final recommendations will be communicated back to the requesting provider by way of shared medical record.    Yvette Mata is a 63 y.o. year old female patient with  long-standing uncontrolled type two diabetes mellitus complicated by microalbuminuria, CKD, HFpEF, NAFLD, CAD s/p PCI, SVC thrombus (Apixaban), Hx DVT, MICHAEL, CVA referred for medication management following CINEMA visit. Since our previous visit patient was admitted from the emergency department to the hospital on 8/11/2024 and 8/18/2024. Patient presented to ED on 8/18/2024 abdominal pain and black tarry stools. She was switched to Lovenox 40 mg once daily versus Eliquis 5 mg BID. She will follow-up with heme/onc on 8/30/2024 for iron infusion then general surgery on 9/12/2024 and PCP on 10/3/2024.      She received a unit of blood in the ED for Hb 7.8. She received IV Feraheme for her FRANCO. She underwent a repeat EGD with push enteroscopy (08/20) that found no evidence of active bleeding or stigmata of recent bleeding. There was food in the stomach and duodenum which precluded some visualization. Benign gyn was consulted for management of her vaginal bleeding with plan for outpatient eval with endometrial sampling and potential hysterectomy if she is a good surgical candidate. Vascular medicine was consulted given her hx of SVC thrombus with recommendation to change AC to lovenox 40 mg daily with repeat imaging and further eval as outpt.     Diabetes  She presents for her follow-up diabetic visit. She has type 2 diabetes mellitus. Hypoglycemia symptoms include sweats and tremors. Associated symptoms include polydipsia. There are no hypoglycemic complications. Her overall blood glucose range is 140-180 mg/dl.     Allergies   Allergen Reactions    Other Rash     Novocaine Solution, reaction rash     Penicillins Rash and Hives    Procaine Rash      Cardiovascular risk factors include diabetes mellitus, hypertension, obesity (BMI >= 30 kg/m2), and CHF, CAD, clinical ASCVD . The patient is not on an ACE inhibitor or angiotensin II receptor blocker.      Lifestyle:   Meals Per day: 2   She does not use her insulin if she does not have a meal    Current monitoring regimen:   Patient is using: both glucometer and continuous glucose monitor   Freestyle Filipe 2 with Freestyle Filipe 2 reader   Currently out of sensors, she will pickup tomorrow (2024)    Not connected to [facility] portal     Currently monitoring via fingerstick glucose checks    Timing of Fingerstick Glucose checks: Morning, after lunch and at bedtime     No other readings available from memory     2024:   FB mg/dL   Mid-Day: 285 mg/dL (After lunch - <1 hour)  Evenin mg/dL     2024:   FBG : 114 mg/dL   No other glucose reading available today as of 1:25 PM     Medication Reconciliation:   Added:   Pantoprazole 40 mg: Take one tablet by mouth once daily   Stopped:   Eliqiuis 5 mg changed to Lovenox 40 mg daily  Not Taking Currently:   Furosmide 20 mg - Patient must refill     Adverse Effects:   She had a bowel movement yesterday that was normal pigment - no black or tarry appearance    Objective     BP Readings from Last 4 Encounters:   24 126/82   24 118/58   24 121/73   24 152/74     BMI Readings from Last 6 Encounters:   24 39.39 kg/m²   24 39.34 kg/m²   24 39.59 kg/m²   24 39.67 kg/m²   24 38.58 kg/m²   24 39.79 kg/m²     Wt Readings from Last 6 Encounters:   24 88.5 kg (195 lb)   24 88.4 kg (194 lb 14.2 oz)   24 88.9 kg (196 lb)   24 89.1 kg (196 lb 6.4 oz)   24 86.6 kg (191 lb)   24 89.4 kg (197 lb)      Diabetes Pharmacotherapy:    Tresiba Flextouch:  Inject 52 units under the skin once daily in the evening   Ozempic 8 mg/3 mL: Inject 2 mg under the skin once weekly on  Saturdays (Last injection 8/21/2024; 1 unopened box of Ozempic remaining at home; Estimates 2 additional doses remaining at home)  Farxiga 10 mg: Take one tablet by mouth once daily   Humalog kwikpen: Inject 22 units daily plus SSI daily     Sliding Scale:   <150 - 0 units  151-200 - 2 units   201-250 - 4 units   251-300 - 6 units   301-350 - 8 units   351-400 -10 units  401 - 12 units    Historical DM Pharmacotherapy:  Metformin - Not appropriate with renal function     Chronic Kidney Disease:   Kerendia 10 mg: Take one tablet by mouth once daily    Secondary Prevention:   - Statin? Yes  - ACE-I/ARB? No  - Aspirin? Yes    Pertinent PMH Review: Per chart review  - PMH of Pancreatitis: No  - PMH of Retinopathy: No  - PMH of Urinary Tract Infections: Yes  - PMH of MTC/MEN Type II: No    Labs:   Lab Results   Component Value Date    BILITOT 0.3 08/18/2024    CALCIUM 9.2 08/20/2024    CO2 27 08/20/2024     08/20/2024    CREATININE 1.46 (H) 08/20/2024    GLUCOSE 289 (H) 08/20/2024    ALKPHOS 89 08/18/2024    K 4.2 08/20/2024    PROT 7.1 08/18/2024     08/20/2024    AST 13 08/18/2024    ALT 9 08/18/2024    BUN 17 08/20/2024    ANIONGAP 14 08/20/2024    MG 2.19 08/19/2024    PHOS 2.3 (L) 08/20/2024     03/31/2023    ALBUMIN 4.0 08/20/2024    LIPASE 51 08/18/2024    GFRF 24 (A) 09/19/2023    GFRMALE CANCELED 03/22/2023     Lab Results   Component Value Date    TRIG 150 (H) 02/02/2024    CHOL 84 02/02/2024    LDLCALC 28 02/02/2024    HDL 26.5 02/02/2024     Lab Results   Component Value Date    HGBA1C 7.6 (H) 08/19/2024    HGBA1C 8.5 (H) 02/02/2024    HGBA1C 8.5 (H) 12/28/2023     Component      Latest Ref Rng 11/10/2023   Albumin, Urine Random      Not established mg/L 27.1    Creatinine, Urine Random      20.0 - 320.0 mg/dL 57.1    Albumin/Creatine Ratio      <30.0 ug/mg Creat 47.5 (H)       Component      Latest Ref Rng 11/10/2023   C-Peptide      0.7 - 3.9 ng/mL 1.5    Glutamic Acid Decarb Ab      0.0  - 5.0 IU/mL <5.0    Islet Antigen-2 Antibody      0.0 - 7.4 U/mL <5.4      The ASCVD Risk score (Adrianne DK, et al., 2019) failed to calculate for the following reasons:    The patient has a prior MI or stroke diagnosis    Health Maintenance:   Lipid Panel: 11/10/2023 - 7 mg/dL --> 2/2/2024 28 mg/dL   Urine Albumin/Creatinine Ratio: 47.5 mcg/mg (Microalbuminuria) --> WNL on3/15/2024    Assessment/Plan   Problem List Items Addressed This Visit       Arteriosclerotic cardiovascular disease (ASCVD)    Relevant Medications    atorvastatin (Lipitor) 80 mg tablet    Gastrointestinal hemorrhage, unspecified gastrointestinal hemorrhage type    Relevant Medications    polyethylene glycol (Glycolax, Miralax) 17 gram/dose powder     Patients diabetes is poorly controlled with most recent A1c of 7.6% on 8/19/2024 improved from previous elevation at 8.5% on 2/2/2024 and 12/28/2023 (Goal < 7%). Today we reviewed her ED to hospitalization admission on 8/18/2024 and 8/11/2024. A medication reconciliation was completed during today's visit.  Compliance at present is estimated to be fair.   Follow-up: I recommend diabetes care be  1 month .  CINEMA Follow-Up: 10/11/2024  Heart Failure Follow-Up: 10/4/2024  Bariatric New Patient Visit: 9/12/2024    Nima Santamaria, PharmD    Continue all meds under the continuation of care with the referring provider and clinical pharmacy team.

## 2024-08-26 RX ORDER — TALC
POWDER (GRAM) TOPICAL
Qty: 30 TABLET | Refills: 11 | Status: SHIPPED | OUTPATIENT
Start: 2024-08-26

## 2024-08-26 NOTE — PROGRESS NOTES
"Subjective     Date: 8/26/2024 Time: 10:31 AM  Name: Nelida Mata  MRN: [id number]    This is a 64 y.o. female with morbid obesity (There is no height or weight on file to calculate BMI.) who presents to clinic for consideration of bariatric surgery. she has attempted and failed multiple diet and exercise regimens for weight loss. Initial Onset of obesity was {Initial Obesity Onset:38321:::0}.  Their goal for surgery is to  {Weight Loss Interest:50266}. The patient has tried multiple diets to lose weight including {Previous Diet Trials:22909}. The patient was most successful with the {Most Successful Diet:94077}. The most pounds lost on this diet were *** lbs. The patient considers their dietary weakness to be {Dietary Weakness:05853} The patient reports a  {Weight Pattern:41346::\"highest weight ever of *** pounds\",\"lowest weight ever of *** pounds\"} {Distribution of Obesity:73770}. Current diet: {Diet:95456}. Compliance: {Compliance:74275} Diet Problems: {Diet Problems:45191} } Dietary Details Include:{Dietary Details:04341} The patient {Exercise Frequency:53089} {Excercise Duration (Optional):14027} {Types of Exercise (Optional):69515}    {Comorbidities:22370}  Patient Active Problem List   Diagnosis    Wound of foot    Wound of right foot    Type II or unspecified type diabetes mellitus with renal manifestations, uncontrolled(250.42) (Multi)    History of uncontrolled diabetes    (HFpEF) heart failure with preserved ejection fraction (Multi)    Abdominal hernia with obstruction and without gangrene    Abnormal uterine bleeding    Anxiety    Asymptomatic hypertensive urgency    Atypical face pain    Bariatric surgery status    Bilateral edema of lower extremity    Arteriosclerotic cardiovascular disease (ASCVD)    CAD (coronary artery disease)    Chest pain at rest    CHF, chronic (Multi)    Chronic, continuous use of opioids    Colon polyps    Cough, persistent    Cramp and spasm    Depression    Diabetic " leg ulcer (Multi)    Difficulty speaking    Discoloration of skin of lower leg    Dystrophia unguium    Dysuria    Elevated parathyroid hormone    Female pelvic pain    Gastroparesis    GERD (gastroesophageal reflux disease)    Fibromyalgia    Headache    Pain syndrome, chronic    History of anemia    Hydroureter on left    Hypokalemia    Idiopathic peripheral neuropathy    Insomnia    Knee pain, bilateral    Lateral knee pain, left    Lichen simplex chronicus    Lipoma    Localized edema    Loose stools    Lower back pain    Lower urinary tract infectious disease    Microalbuminuria    Morbid obesity (Multi)    Multiple falls    Myopia of both eyes with astigmatism and presbyopia    NAFLD (nonalcoholic fatty liver disease)    NSTEMI, initial episode of care (Multi)    Obesity, Class II, BMI 35-39.9    Other constipation    Hypertension, uncontrolled    Peripheral vascular disease (CMS-HCC)    Polyarthritis    Postmenopausal bleeding    Presence of drug coated stent in right coronary artery    Primary open angle glaucoma of both eyes, mild stage    Primary open angle glaucoma    Neurodermatitis    Pruritic dermatitis    Psoriasis    Thickening, skin    Vaginitis    Psychological factors affecting morbid obesity (Multi)    Retinal detachment, tractional, right eye    Retinal hemorrhage of right eye    S/P PTCA (percutaneous transluminal coronary angioplasty)    Dyspnea on exertion    Shortness of breath    Skin tag of labia    Obstructive sleep apnea    Thyroid nodule    Trichimoniasis    Unstable angina (Multi)    Fibroid uterus    Leiomyoma of uterus    Uterine fibroid    Vaginal candida    Vitamin D deficiency, unspecified    Weight loss, unintentional    Wound of left leg    Wound of right leg    Rash/skin eruption    Xerosis of skin    Diabetes mellitus type 2, controlled, without complications (Multi)    Hypoglycemia    Other hyperlipidemia    Osteoarthritis    Acquired absence of other right toe(s) (Multi)     Hypertensive heart disease with heart failure (Multi)    Major depressive disorder, single episode    Abnormal gynecological examination    Type 2 diabetes mellitus with hyperglycemia, with long-term current use of insulin (Multi)    Superior vena cava thrombosis (Multi)    History of DVT (deep vein thrombosis)    Recurrent major depressive disorder, in remission (CMS-HCC)    S/P coronary artery stent placement    Uncontrolled type 2 diabetes mellitus with hyperglycemia (Multi)    NSTEMI (non-ST elevated myocardial infarction) (Multi)    Iron deficiency    Gastrointestinal hemorrhage, unspecified gastrointestinal hemorrhage type    CVA (cerebral vascular accident) (Multi)    DVT (deep venous thrombosis) (Multi)    Iron deficiency anemia    GI bleed       {Procedure Preferred:05949}    {GERDQOL:68408}    PMH:   Past Medical History:   Diagnosis Date    Abnormal uterine and vaginal bleeding, unspecified 04/10/2018    Abnormal uterine bleeding    Abnormal uterine and vaginal bleeding, unspecified 02/22/2018    Abnormal uterine bleeding    Acute candidiasis of vulva and vagina 04/10/2018    Vaginal candida    Acute candidiasis of vulva and vagina 02/22/2018    Vaginal candida    Acute vaginitis 04/10/2018    Vaginitis    Acute vaginitis 02/22/2018    Vaginitis    Alcohol abuse, in remission     History of alcohol abuse    Anxiety disorder, unspecified 04/10/2018    Anxiety    Anxiety disorder, unspecified 02/22/2018    Anxiety    Atherosclerotic heart disease of native coronary artery without angina pectoris 04/10/2018    Arteriosclerotic cardiovascular disease (ASCVD)    Atherosclerotic heart disease of native coronary artery without angina pectoris 02/22/2018    Arteriosclerotic cardiovascular disease (ASCVD)    Atherosclerotic heart disease of native coronary artery without angina pectoris 02/22/2018    CAD (coronary artery disease)    Atherosclerotic heart disease of native coronary artery without angina pectoris  04/10/2018    CAD (coronary artery disease)    Atypical facial pain 04/10/2018    Atypical face pain    Atypical facial pain 02/22/2018    Atypical face pain    Benign lipomatous neoplasm, unspecified 04/10/2018    Lipoma    Benign lipomatous neoplasm, unspecified 02/22/2018    Lipoma    Changes in skin texture 04/10/2018    Thickening, skin    Changes in skin texture 02/22/2018    Thickening, skin    Chest pain, unspecified 04/10/2018    Chest pain at rest    Chest pain, unspecified 02/22/2018    Chest pain at rest    Chronic cough 04/10/2018    Cough, persistent    Chronic cough 02/22/2018    Cough, persistent    Chronic pain syndrome 04/10/2018    Pain syndrome, chronic    Chronic pain syndrome 02/22/2018    Pain syndrome, chronic    Cramp and spasm 04/10/2018    Cramp and spasm    Cramp and spasm 02/22/2018    Cramp and spasm    Depression, unspecified 04/10/2018    Depression    Depression, unspecified 02/22/2018    Depression    Disorder of pigmentation, unspecified 04/10/2018    Discoloration of skin of lower leg    Disorder of pigmentation, unspecified 02/22/2018    Discoloration of skin of lower leg    Dysuria 04/10/2018    Dysuria    Dysuria 02/22/2018    Dysuria    Encounter for immunization 04/10/2018    Flu vaccine need    Encounter for immunization 04/10/2018    Immunization due    Encounter for immunization 02/22/2018    Flu vaccine need    Encounter for immunization 02/22/2018    Immunization due    Encounter for screening for infections with a predominantly sexual mode of transmission     Routine screening for STI (sexually transmitted infection)    Encounter for screening mammogram for malignant neoplasm of breast 04/10/2018    Visit for screening mammogram    Encounter for screening mammogram for malignant neoplasm of breast 02/22/2018    Visit for screening mammogram    Essential (primary) hypertension 04/10/2018    Hypertension, uncontrolled    Essential (primary) hypertension 02/22/2018     Hypertension, uncontrolled    Fatty (change of) liver, not elsewhere classified 04/10/2018    NAFLD (nonalcoholic fatty liver disease)    Fatty (change of) liver, not elsewhere classified 02/22/2018    NAFLD (nonalcoholic fatty liver disease)    Fibromyalgia 04/10/2018    Fibromyalgia    Fibromyalgia 02/22/2018    Fibromyalgia    Gastro-esophageal reflux disease without esophagitis 04/10/2018    GERD (gastroesophageal reflux disease)    Generalized contraction of visual field, left eye 02/06/2017    Generalized contraction of visual field of left eye    Headache, unspecified 04/10/2018    Headache    Headache, unspecified 02/22/2018    Headache    Heart failure, unspecified (Multi) 02/22/2018    CHF, chronic    Heart failure, unspecified (Multi) 04/10/2018    CHF, chronic    Hereditary and idiopathic neuropathy, unspecified 04/10/2018    Idiopathic peripheral neuropathy    Hereditary and idiopathic neuropathy, unspecified 02/22/2018    Idiopathic peripheral neuropathy    Hydroureter 04/10/2018    Hydroureter on left    Hydroureter 02/22/2018    Hydroureter on left    Hyperlipidemia, unspecified     Dyslipidemia    Hypertensive urgency 04/10/2018    Asymptomatic hypertensive urgency    Hypertensive urgency 02/22/2018    Asymptomatic hypertensive urgency    Hypokalemia 04/10/2018    Hypokalemia    Hypokalemia 02/22/2018    Hypokalemia    Insomnia, unspecified 04/10/2018    Insomnia    Insomnia, unspecified 02/22/2018    Insomnia    Localized edema 04/10/2018    Bilateral edema of lower extremity    Localized edema 02/22/2018    Bilateral edema of lower extremity    Macula scars of posterior pole (postinflammatory) (post-traumatic), left eye 04/10/2018    Macula scar of posterior pole of left eye    Morbid (severe) obesity due to excess calories (Multi) 04/10/2018    Morbid obesity with BMI of 40.0-44.9, adult    Morbid (severe) obesity due to excess calories (Multi) 02/22/2018    Morbid obesity with BMI of 40.0-44.9,  adult    Nail dystrophy 04/10/2018    Dystrophia unguium    Nail dystrophy 02/22/2018    Dystrophia unguium    Non-ST elevation (NSTEMI) myocardial infarction (Multi) 04/10/2018    NSTEMI, initial episode of care    Non-ST elevation (NSTEMI) myocardial infarction (Multi) 02/22/2018    NSTEMI, initial episode of care    Obstructive sleep apnea (adult) (pediatric) 04/10/2018    Obstructive sleep apnea    Obstructive sleep apnea (adult) (pediatric) 02/22/2018    Obstructive sleep apnea    Other chest pain 02/22/2018    Chest tightness or pressure    Other chest pain 04/10/2018    Chest tightness or pressure    Other conditions influencing health status 04/10/2018    Insulin dependent type 2 diabetes mellitus, uncontrolled    Other conditions influencing health status 04/10/2018    Uncontrolled diabetes mellitus    Other conditions influencing health status 04/10/2018    DM (diabetes mellitus), type 2, uncontrolled, with renal complications    Other conditions influencing health status 02/22/2018    Insulin dependent type 2 diabetes mellitus, uncontrolled    Other conditions influencing health status 02/22/2018    Uncontrolled diabetes mellitus    Other conditions influencing health status 02/22/2018    DM (diabetes mellitus), type 2, uncontrolled, with renal complications    Other fatigue 04/10/2018    Fatigue    Other fatigue 02/22/2018    Fatigue    Other fecal abnormalities 04/10/2018    Loose stools    Other fecal abnormalities 02/22/2018    Loose stools    Other forms of dyspnea 04/10/2018    Dyspnea on exertion    Other forms of dyspnea 02/22/2018    Dyspnea on exertion    Other problems related to lifestyle     Other problems related to lifestyle    Other specified health status 04/10/2018    Medically complex patient    Other specified health status 02/22/2018    Medically complex patient    Pain in right knee 02/22/2018    Knee pain, bilateral    Pain in right knee 04/10/2018    Knee pain, bilateral     Peripheral vascular disease, unspecified (CMS-HCC) 04/10/2018    Peripheral vascular disease    Peripheral vascular disease, unspecified (CMS-HCC) 02/22/2018    Peripheral vascular disease    Personal history of other diseases of the digestive system     History of esophageal reflux    Personal history of other diseases of the digestive system     History of constipation    Personal history of other diseases of the nervous system and sense organs     History of glaucoma    Personal history of other endocrine, nutritional and metabolic disease     History of hypothyroidism    Personal history of other endocrine, nutritional and metabolic disease     History of hyperlipidemia    Personal history of other endocrine, nutritional and metabolic disease     History of diabetes mellitus    Polyarthritis, unspecified 04/10/2018    Polyarthritis    Polyarthritis, unspecified 02/22/2018    Polyarthritis    Polyp of colon 04/10/2018    Colon polyps    Polyp of colon 02/22/2018    Colon polyps    Postmenopausal bleeding 04/10/2018    Postmenopausal bleeding    Postmenopausal bleeding 02/22/2018    Postmenopausal bleeding    Presence of coronary angioplasty implant and graft 04/10/2018    Presence of drug coated stent in right coronary artery    Presence of coronary angioplasty implant and graft 02/22/2018    Presence of drug coated stent in right coronary artery    Presence of intraocular lens 04/10/2018    Pseudophakia, both eyes    Presence of intraocular lens 03/18/2015    Pseudophakia of left eye    Primary open-angle glaucoma, unspecified eye, stage unspecified 04/10/2018    Primary open angle glaucoma    Proteinuria, unspecified 04/10/2018    Microalbuminuria    Proteinuria, unspecified 02/22/2018    Microalbuminuria    Pruritus, unspecified 04/10/2018    Pruritic dermatitis    Pruritus, unspecified 02/22/2018    Pruritic dermatitis    Psoriasis, unspecified 04/10/2018    Psoriasis    Psoriasis, unspecified 02/22/2018     Psoriasis    Rash and other nonspecific skin eruption 04/10/2018    Rash/skin eruption    Rash and other nonspecific skin eruption 02/22/2018    Rash/skin eruption    Repeated falls 04/10/2018    Multiple falls    Repeated falls 02/22/2018    Multiple falls    Retinal hemorrhage, right eye 02/06/2017    Retinal hemorrhage of right eye    Shortness of breath 04/10/2018    Shortness of breath    Shortness of breath 02/22/2018    Shortness of breath    Sleep apnea, unspecified 04/10/2018    Sleep disorder breathing    Sleep apnea, unspecified 02/22/2018    Sleep disorder breathing    Traction detachment of retina, right eye 02/06/2017    Retinal detachment, tractional, right eye    Type 2 diabetes mellitus with diabetic polyneuropathy (Multi) 04/10/2018    Controlled type 2 diabetes mellitus with diabetic polyneuropathy, with long-term current use of insulin    Type 2 diabetes mellitus with diabetic polyneuropathy (Multi) 02/22/2018    Controlled type 2 diabetes mellitus with diabetic polyneuropathy, with long-term current use of insulin    Type 2 diabetes mellitus with other skin ulcer (CODE) (Multi) 04/10/2018    Diabetic leg ulcer    Type 2 diabetes mellitus with other skin ulcer (CODE) (Multi) 02/22/2018    Diabetic leg ulcer    Type 2 diabetes mellitus with proliferative diabetic retinopathy without macular edema, unspecified eye (Multi) 04/10/2018    Proliferative diabetic retinopathy    Type 2 diabetes mellitus with unspecified diabetic retinopathy without macular edema (Multi) 02/06/2017    Background diabetic retinopathy    Umbilical hernia without obstruction or gangrene     Umbilical hernia    Unqualified visual loss, both eyes 02/06/2017    Complete loss of vision in visual field    Unspecified glaucoma 06/11/2015    Glaucoma    Unstable angina (Multi) 04/10/2018    Unstable angina    Unstable angina (Multi) 02/22/2018    Unstable angina    Vitamin D deficiency, unspecified 04/10/2018    Vitamin D  deficiency, unspecified    Vitamin D deficiency, unspecified 02/22/2018    Vitamin D deficiency, unspecified    Xerosis cutis 04/10/2018    Xerosis of skin    Xerosis cutis 02/22/2018    Xerosis of skin        PSH:   Past Surgical History:   Procedure Laterality Date    BREAST BIOPSY Left     2007    CARDIAC CATHETERIZATION N/A 7/5/2024    Procedure: Left And Right Heart Cath, Without LV;  Surgeon: Loki Donis MD;  Location: [place] [address] [facility];  Service: Cardiovascular;  Laterality: N/A;    CATARACT EXTRACTION  03/18/2015    Cataract Surgery    CORONARY ANGIOPLASTY WITH STENT PLACEMENT  03/06/2017    Cath Stent Placement    CT AORTA AND BILATERAL ILIOFEMORAL RUNOFF ANGIOGRAM W AND/OR WO IV CONTRAST  08/08/2022    CT AORTA AND BILATERAL ILIOFEMORAL RUNOFF ANGIOGRAM W AND/OR WO IV CONTRAST 8/8/2022 [facility] EMERGENCY LEGACY    MR HEAD ANGIO WO IV CONTRAST  08/16/2013    MR HEAD ANGIO WO IV CONTRAST 8/16/2013 [facility] ANCILLARY LEGACY    MR NECK ANGIO WO IV CONTRAST  08/16/2013    MR NECK ANGIO WO IV CONTRAST 8/16/2013 [facility] ANCILLARY LEGACY    RETINAL DETACHMENT SURGERY  03/18/2015    Repair Of Retinal Detachment        STOPBANG *** (+ ***).   *** personal/family hx of VTE.    FAMILY HISTORY:  Family History   Problem Relation Name Age of Onset    Diabetes Mother      Glaucoma Father      Hypertension Other FAMILY         SOCIAL HISTORY:  Social History     Tobacco Use    Smoking status: Never     Passive exposure: Never    Smokeless tobacco: Never   Vaping Use    Vaping status: Never Used   Substance Use Topics    Alcohol use: Not Currently     Comment: occasionally    Drug use: Never       MEDICATIONS:  Prior to Admission Medications:  Medication Documentation Review Audit       Reviewed by Ruben Rojas (Technician) on 08/18/24 at [id number]      Medication Order Taking? Sig Documenting Provider Last Dose Status   acetaminophen (Tylenol) 325 mg tablet [id number] Yes Take 3 tablets (975 mg) by mouth every 8 hours if  needed for mild pain (1 - 3) for up to 10 days. Christie Nogueira MD 8/18/2024 Active   apixaban (Eliquis) 5 mg tablet [id number] Yes Take 1 tablet (5 mg) by mouth every 12 hours. Do not RESUME until 7/6/24 evening dose. Do not fill before July 6, 2024. Isidra E Kalyan, APRN-CNP 8/18/2024 Active   aspirin 81 mg EC tablet [id number] Yes Take 1 tablet (81 mg) by mouth once daily. Betty Boone MD 8/18/2024 patient request refills Active   atorvastatin (Lipitor) 80 mg tablet [id number] Yes TAKE 1 TABLET BY MOUTH ONCE DAILY Darren Ventura MD 8/18/2024 Active   blood pressure monitor kit [id number] No 1 each once daily. Use once daily to check blood pressure Kassandra Lopez MD Unknown Active   blood sugar diagnostic (OneTouch Ultra Test) strip [id number] No USE 1 STRIP(S) TO TEST BLOOD SUGAR 3 TIMES A DAY Kassandra Lopez MD Unknown Active   brimonidine (AlphaGAN) 0.2 % ophthalmic solution [id number] Yes Administer 1 drop into both eyes 2 times a day. PATIENT NEEDS PCP APPT FOR FURTHER REFILLS Kassandra Lopez MD 8/18/2024 patient request refills Active   calcium carbonate-vit D3-min 600 mg calcium- 400 unit tablet [id number] No Take 1 tablet by mouth once daily.   Patient not taking: Reported on 8/18/2024    Darren Ventura MD Not Taking Active   carvedilol (Coreg) 12.5 mg tablet [id number] Yes Take 1 tablet (12.5 mg) by mouth 2 times daily (morning and late afternoon). Destiny Hurst MD PhD 8/18/2024 patient request refills Active   dapagliflozin propanediol (Farxiga) 10 mg [id number] Yes Take 1 tablet (10 mg) by mouth once daily. Michael Hilario MD 8/18/2024 Active   diclofenac sodium (Voltaren) 1 % gel [id number] Yes Apply 4.5 inches (4 g) topically 2 times a day. Christie Nogueira MD 8/18/2024 Active   DULoxetine (Cymbalta) 30 mg DR capsule [id number] Yes Take 1 capsule (30 mg) by mouth once daily in the morning AND 2 capsules (60 mg) once daily at bedtime. Do not crush or chew.. Betty Boone MD 8/18/2024 Active    finerenone (Kerendia) 10 mg tablet tablet [id number] Yes Take 1 tablet (10 mg) by mouth once daily. Michael Hilario MD 2024 Active   FreeStyle Filipe 2 Sensor kit [id number] No Apply 1 sensor to the back of the upper arm. Change sensor every 14 days. Michael Hilario MD Unknown Active   FreeStyle Filipe reader (FreeStyle Filipe 2 Malcolm) misc [id number] No Use as instructed Marek Miles MD Unknown Active   furosemide (Lasix) 20 mg tablet [id number] Yes Take 1 tablet (20 mg) by mouth once daily. Najma Lang MD 2024 Active   glucose 4 gram chewable tablet [id number] No Chew 4 tablets (16 g) 1 time for 1 dose as needed   Patient not taking: Reported on 2024    Christie Nogueira MD Not Taking  24 0953   hydrOXYzine HCL (Atarax) 25 mg tablet [id number] Yes Take 1 tablet (25 mg) by mouth 2 times a day as needed for itching for up to 7 days. Betty Boone MD 2024 patient request refills Active   insulin degludec (Tresiba FlexTouch U-200) 200 unit/mL (3 mL) injection [id number] Yes Inject 52 Units under the skin once daily at bedtime. Take as directed per insulin instructions. Betty Boone MD 2024 Active   insulin lispro (HumaLOG KwikPen Insulin) 200 unit/mL (3 mL) insulin pen pen [id number] Yes Inject 22 Units under the skin 3 times daily (morning, midday, late afternoon). Take as directed per insulin instructions. Betty Boone MD 2024 Active   isosorbide mononitrate ER (Imdur) 60 mg 24 hr tablet [id number] Yes TAKE 1 TABLET BY MOUTH EVERY DAY IN THE MORNING Betty Boone MD 2024 Active   latanoprost (Xalatan) 0.005 % ophthalmic solution [id number] Yes ADMINISTER 1 DROP INTO BOTH EYES ONCE DAILY AT BEDTIME. Ethel Overton MD 2024 Active   nitroglycerin (Nitrostat) 0.4 mg SL tablet [id number] Yes PLACE 1 TABLET UNDER THE TONGUE EVERY 5 MINUTES FOR UP TO 3 DOSES AS NEEDED FOR CHEST PAIN.CALL 911 IF PAIN PERSISTS. Historical Provider, MD Past Week Active    pantoprazole (ProtoNix) 40 mg EC tablet [id number] Yes Take 1 tablet (40 mg) by mouth once daily in the morning. Take before meals. Kassandra Lopez MD 8/18/2024 patient request refills Active   polyethylene glycol (Glycolax, Miralax) 17 gram/dose powder [id number] Yes Take 1 capful (17 g) mixed in 4-8 ounces of liquid by mouth once daily as needed (constipation). Christie Nogueira MD 8/18/2024 Active   semaglutide 2 mg/dose (8 mg/3 mL) pen injector [id number] Yes Inject 2 mg under the skin 1 (one) time per week. Michael Hilario MD Past Week patient takes weekly  on fridays Active   sennosides-docusate sodium (Zoila-Colace) 8.6-50 mg tablet [id number] No Take 1 tablet by mouth 2 times a day. Christie Nogueira MD Unknown Active   urea (Carmol) 20 % cream [id number] No Apply 1 Application topically 2 times a day. Historical MD Tian Unknown Active                     ALLERGIES:  Allergies   Allergen Reactions    Other Rash     Novocaine Solution, reaction rash     Penicillins Rash and Hives    Procaine Rash       REVIEW OF SYSTEMS:  GENERAL: Negative for malaise, significant weight loss and fever  HEAD: Negative for headache, swelling.  NECK: Negative for lumps, goiter, pain and significant neck swelling  RESPIRATORY: Negative for cough, wheezing or shortness of breath.  CARDIOVASCULAR: Negative for chest pain, leg swelling or palpitations.  GI: Negative for abdominal discomfort, blood in stools or black stools or change in bowel habits  : No history of dysuria, frequency or incontinence  MUSCULOSKELETAL: Negative for joint pain or swelling, back pain or muscle pain.  SKIN: Negative for lesions, rash, and itching.  PSYCH: Negative for sleep disturbance, mood disorder and recent psychosocial stressors.  ENDOCRINE: Negative for cold or heat intolerance, polyuria, polydipsia and goiter.    Objective   PHYSICAL EXAM:  Visit Vitals  LMP  (LMP Unknown)   OB Status Postmenopausal   Smoking Status Never     General appearance:  obese, NAD  Neuro: AOx3  Head: EOMI; no swelling or lesions of scalp or face  ENT:  no lumps or lymphadenopathy, thyroid normal to palpation; oropharynx clear, no swelling or erythema  Skin: warm, no erythema or rashes  Lungs: clear to percussion and auscultation  Heart: regular rhythm and S1, S2 normal  Abdomen: soft, non-tender, no masses, no organomegaly  Extremities: Normal exam of the extremities. No swelling or pain.  Psych: no hurried speech, no flight of ideas, normal affect    Assessment/Plan   IMPRESSION:  Nelida Mata is a 64 y.o. female with a BMI of There is no height or weight on file to calculate BMI. with the following diagnoses and co-morbidities:     Past Medical History:   Diagnosis Date    Abnormal uterine and vaginal bleeding, unspecified 04/10/2018    Abnormal uterine bleeding    Abnormal uterine and vaginal bleeding, unspecified 02/22/2018    Abnormal uterine bleeding    Acute candidiasis of vulva and vagina 04/10/2018    Vaginal candida    Acute candidiasis of vulva and vagina 02/22/2018    Vaginal candida    Acute vaginitis 04/10/2018    Vaginitis    Acute vaginitis 02/22/2018    Vaginitis    Alcohol abuse, in remission     History of alcohol abuse    Anxiety disorder, unspecified 04/10/2018    Anxiety    Anxiety disorder, unspecified 02/22/2018    Anxiety    Atherosclerotic heart disease of native coronary artery without angina pectoris 04/10/2018    Arteriosclerotic cardiovascular disease (ASCVD)    Atherosclerotic heart disease of native coronary artery without angina pectoris 02/22/2018    Arteriosclerotic cardiovascular disease (ASCVD)    Atherosclerotic heart disease of native coronary artery without angina pectoris 02/22/2018    CAD (coronary artery disease)    Atherosclerotic heart disease of native coronary artery without angina pectoris 04/10/2018    CAD (coronary artery disease)    Atypical facial pain 04/10/2018    Atypical face pain    Atypical facial pain 02/22/2018     Atypical face pain    Benign lipomatous neoplasm, unspecified 04/10/2018    Lipoma    Benign lipomatous neoplasm, unspecified 02/22/2018    Lipoma    Changes in skin texture 04/10/2018    Thickening, skin    Changes in skin texture 02/22/2018    Thickening, skin    Chest pain, unspecified 04/10/2018    Chest pain at rest    Chest pain, unspecified 02/22/2018    Chest pain at rest    Chronic cough 04/10/2018    Cough, persistent    Chronic cough 02/22/2018    Cough, persistent    Chronic pain syndrome 04/10/2018    Pain syndrome, chronic    Chronic pain syndrome 02/22/2018    Pain syndrome, chronic    Cramp and spasm 04/10/2018    Cramp and spasm    Cramp and spasm 02/22/2018    Cramp and spasm    Depression, unspecified 04/10/2018    Depression    Depression, unspecified 02/22/2018    Depression    Disorder of pigmentation, unspecified 04/10/2018    Discoloration of skin of lower leg    Disorder of pigmentation, unspecified 02/22/2018    Discoloration of skin of lower leg    Dysuria 04/10/2018    Dysuria    Dysuria 02/22/2018    Dysuria    Encounter for immunization 04/10/2018    Flu vaccine need    Encounter for immunization 04/10/2018    Immunization due    Encounter for immunization 02/22/2018    Flu vaccine need    Encounter for immunization 02/22/2018    Immunization due    Encounter for screening for infections with a predominantly sexual mode of transmission     Routine screening for STI (sexually transmitted infection)    Encounter for screening mammogram for malignant neoplasm of breast 04/10/2018    Visit for screening mammogram    Encounter for screening mammogram for malignant neoplasm of breast 02/22/2018    Visit for screening mammogram    Essential (primary) hypertension 04/10/2018    Hypertension, uncontrolled    Essential (primary) hypertension 02/22/2018    Hypertension, uncontrolled    Fatty (change of) liver, not elsewhere classified 04/10/2018    NAFLD (nonalcoholic fatty liver disease)     Fatty (change of) liver, not elsewhere classified 02/22/2018    NAFLD (nonalcoholic fatty liver disease)    Fibromyalgia 04/10/2018    Fibromyalgia    Fibromyalgia 02/22/2018    Fibromyalgia    Gastro-esophageal reflux disease without esophagitis 04/10/2018    GERD (gastroesophageal reflux disease)    Generalized contraction of visual field, left eye 02/06/2017    Generalized contraction of visual field of left eye    Headache, unspecified 04/10/2018    Headache    Headache, unspecified 02/22/2018    Headache    Heart failure, unspecified (Multi) 02/22/2018    CHF, chronic    Heart failure, unspecified (Multi) 04/10/2018    CHF, chronic    Hereditary and idiopathic neuropathy, unspecified 04/10/2018    Idiopathic peripheral neuropathy    Hereditary and idiopathic neuropathy, unspecified 02/22/2018    Idiopathic peripheral neuropathy    Hydroureter 04/10/2018    Hydroureter on left    Hydroureter 02/22/2018    Hydroureter on left    Hyperlipidemia, unspecified     Dyslipidemia    Hypertensive urgency 04/10/2018    Asymptomatic hypertensive urgency    Hypertensive urgency 02/22/2018    Asymptomatic hypertensive urgency    Hypokalemia 04/10/2018    Hypokalemia    Hypokalemia 02/22/2018    Hypokalemia    Insomnia, unspecified 04/10/2018    Insomnia    Insomnia, unspecified 02/22/2018    Insomnia    Localized edema 04/10/2018    Bilateral edema of lower extremity    Localized edema 02/22/2018    Bilateral edema of lower extremity    Macula scars of posterior pole (postinflammatory) (post-traumatic), left eye 04/10/2018    Macula scar of posterior pole of left eye    Morbid (severe) obesity due to excess calories (Multi) 04/10/2018    Morbid obesity with BMI of 40.0-44.9, adult    Morbid (severe) obesity due to excess calories (Multi) 02/22/2018    Morbid obesity with BMI of 40.0-44.9, adult    Nail dystrophy 04/10/2018    Dystrophia unguium    Nail dystrophy 02/22/2018    Dystrophia unguium    Non-ST elevation (NSTEMI)  myocardial infarction (Multi) 04/10/2018    NSTEMI, initial episode of care    Non-ST elevation (NSTEMI) myocardial infarction (Multi) 02/22/2018    NSTEMI, initial episode of care    Obstructive sleep apnea (adult) (pediatric) 04/10/2018    Obstructive sleep apnea    Obstructive sleep apnea (adult) (pediatric) 02/22/2018    Obstructive sleep apnea    Other chest pain 02/22/2018    Chest tightness or pressure    Other chest pain 04/10/2018    Chest tightness or pressure    Other conditions influencing health status 04/10/2018    Insulin dependent type 2 diabetes mellitus, uncontrolled    Other conditions influencing health status 04/10/2018    Uncontrolled diabetes mellitus    Other conditions influencing health status 04/10/2018    DM (diabetes mellitus), type 2, uncontrolled, with renal complications    Other conditions influencing health status 02/22/2018    Insulin dependent type 2 diabetes mellitus, uncontrolled    Other conditions influencing health status 02/22/2018    Uncontrolled diabetes mellitus    Other conditions influencing health status 02/22/2018    DM (diabetes mellitus), type 2, uncontrolled, with renal complications    Other fatigue 04/10/2018    Fatigue    Other fatigue 02/22/2018    Fatigue    Other fecal abnormalities 04/10/2018    Loose stools    Other fecal abnormalities 02/22/2018    Loose stools    Other forms of dyspnea 04/10/2018    Dyspnea on exertion    Other forms of dyspnea 02/22/2018    Dyspnea on exertion    Other problems related to lifestyle     Other problems related to lifestyle    Other specified health status 04/10/2018    Medically complex patient    Other specified health status 02/22/2018    Medically complex patient    Pain in right knee 02/22/2018    Knee pain, bilateral    Pain in right knee 04/10/2018    Knee pain, bilateral    Peripheral vascular disease, unspecified (CMS-[facility]) 04/10/2018    Peripheral vascular disease    Peripheral vascular disease, unspecified  (CMS-[facility]) 02/22/2018    Peripheral vascular disease    Personal history of other diseases of the digestive system     History of esophageal reflux    Personal history of other diseases of the digestive system     History of constipation    Personal history of other diseases of the nervous system and sense organs     History of glaucoma    Personal history of other endocrine, nutritional and metabolic disease     History of hypothyroidism    Personal history of other endocrine, nutritional and metabolic disease     History of hyperlipidemia    Personal history of other endocrine, nutritional and metabolic disease     History of diabetes mellitus    Polyarthritis, unspecified 04/10/2018    Polyarthritis    Polyarthritis, unspecified 02/22/2018    Polyarthritis    Polyp of colon 04/10/2018    Colon polyps    Polyp of colon 02/22/2018    Colon polyps    Postmenopausal bleeding 04/10/2018    Postmenopausal bleeding    Postmenopausal bleeding 02/22/2018    Postmenopausal bleeding    Presence of coronary angioplasty implant and graft 04/10/2018    Presence of drug coated stent in right coronary artery    Presence of coronary angioplasty implant and graft 02/22/2018    Presence of drug coated stent in right coronary artery    Presence of intraocular lens 04/10/2018    Pseudophakia, both eyes    Presence of intraocular lens 03/18/2015    Pseudophakia of left eye    Primary open-angle glaucoma, unspecified eye, stage unspecified 04/10/2018    Primary open angle glaucoma    Proteinuria, unspecified 04/10/2018    Microalbuminuria    Proteinuria, unspecified 02/22/2018    Microalbuminuria    Pruritus, unspecified 04/10/2018    Pruritic dermatitis    Pruritus, unspecified 02/22/2018    Pruritic dermatitis    Psoriasis, unspecified 04/10/2018    Psoriasis    Psoriasis, unspecified 02/22/2018    Psoriasis    Rash and other nonspecific skin eruption 04/10/2018    Rash/skin eruption    Rash and other nonspecific skin eruption  02/22/2018    Rash/skin eruption    Repeated falls 04/10/2018    Multiple falls    Repeated falls 02/22/2018    Multiple falls    Retinal hemorrhage, right eye 02/06/2017    Retinal hemorrhage of right eye    Shortness of breath 04/10/2018    Shortness of breath    Shortness of breath 02/22/2018    Shortness of breath    Sleep apnea, unspecified 04/10/2018    Sleep disorder breathing    Sleep apnea, unspecified 02/22/2018    Sleep disorder breathing    Traction detachment of retina, right eye 02/06/2017    Retinal detachment, tractional, right eye    Type 2 diabetes mellitus with diabetic polyneuropathy (Multi) 04/10/2018    Controlled type 2 diabetes mellitus with diabetic polyneuropathy, with long-term current use of insulin    Type 2 diabetes mellitus with diabetic polyneuropathy (Multi) 02/22/2018    Controlled type 2 diabetes mellitus with diabetic polyneuropathy, with long-term current use of insulin    Type 2 diabetes mellitus with other skin ulcer (CODE) (Multi) 04/10/2018    Diabetic leg ulcer    Type 2 diabetes mellitus with other skin ulcer (CODE) (Multi) 02/22/2018    Diabetic leg ulcer    Type 2 diabetes mellitus with proliferative diabetic retinopathy without macular edema, unspecified eye (Multi) 04/10/2018    Proliferative diabetic retinopathy    Type 2 diabetes mellitus with unspecified diabetic retinopathy without macular edema (Multi) 02/06/2017    Background diabetic retinopathy    Umbilical hernia without obstruction or gangrene     Umbilical hernia    Unqualified visual loss, both eyes 02/06/2017    Complete loss of vision in visual field    Unspecified glaucoma 06/11/2015    Glaucoma    Unstable angina (Multi) 04/10/2018    Unstable angina    Unstable angina (Multi) 02/22/2018    Unstable angina    Vitamin D deficiency, unspecified 04/10/2018    Vitamin D deficiency, unspecified    Vitamin D deficiency, unspecified 02/22/2018    Vitamin D deficiency, unspecified    Xerosis cutis 04/10/2018     Xerosis of skin    Xerosis cutis 02/22/2018    Xerosis of skin       This patient does meet the criteria for a surgical weight loss procedure according to NIH guidelines.  The risks of sleeve gastrectomy, Leslie-en-Y gastric bypass, and duodenal switch surgery including but not limited to bleeding, leak along staple lines, infection, dehydration, ulcers, internal hernia, DVT/PE, pneumonia, myocardial infarction, prolonged nausea/vomiting, incomplete resolution of associated medical conditions, reflux, weight regain, vitamin/mineral deficiencies, and death have been explained to the patient and Nelida Mata has expressed understanding and acceptance of them.     We discussed the lifestyle changes necessary to be successful following surgery.    The increased risk of substance and alcohol abuse following bariatric surgery was discussed with the patient, along with the negative consequences of substance/alcohol use after surgery including addiction, worsening of mental health disorders, and injury to the stomach. The risk of smoking and vaping (tobacco or any other substance) after bariatric surgery was explained to the patient. This includes risk of anastamotic ulcers, gastritis, bleeding, perforation, stricture, and PO intolerance.  The patient expressed understanding and acceptance of these risks.    ***The patient was advised not to become pregnant within 12-18 months following bariatric surgery. She was educated on the increased risks to mother and fetus associated with pregnancy within 2 years of bariatric surgery.    The benefits of the above surgeries including weight loss, improvement/resolution of associated medical and mental health conditions, improved mobility, and decreased mortality have been explained the the patient and Nelida Mata has expressed understanding and acceptance of them.      PLAN:  The plan of treatment for Nelida Mata is to continue with the consultations and  tests ordered today in hopes of qualifying for pre-operative clearance for bariatric surgery. This includes:    Consult Nutrition for education   Consult Psychology  Consult Cardiology  Labs ordered  EGD - completed 8/12/24  PCP for medical optimization  Consult sleep medicine - CPAP compliance   Recommend at least 10-20 lbs of weight loss prior to surgery.  ***        *** minutes were spent with patient including history, physical exam, and education.

## 2024-08-28 LAB
LAB AP ASR DISCLAIMER: NORMAL
LABORATORY COMMENT REPORT: NORMAL
PATH REPORT.COMMENTS IMP SPEC: NORMAL
PATH REPORT.FINAL DX SPEC: NORMAL
PATH REPORT.GROSS SPEC: NORMAL
PATH REPORT.TOTAL CANCER: NORMAL

## 2024-08-28 NOTE — RESULT ENCOUNTER NOTE
Your recent upper endoscopy showed that you have evidence of irritation in the lining of the stomach. This was not caused by a bacterial infection with Helicobacter pylori.      I recommend that you avoid taking non-steroidal medications (NSAIDs) like Ibuprofen/Aleve/naproxen etc., avoid both smoking and drinking alcohol in order to treat this condition. If you are prescribed Aspirin, do not stop taking that medication without explicit approval of your primary care provider. Please continue to take acid suppressing medication such as Pantoprazole.    Please follow up with your primary care provider.

## 2024-08-30 ENCOUNTER — DOCUMENTATION (OUTPATIENT)
Dept: INFUSION THERAPY | Facility: CLINIC | Age: 64
End: 2024-08-30
Payer: COMMERCIAL

## 2024-08-30 ENCOUNTER — INFUSION (OUTPATIENT)
Dept: HEMATOLOGY/ONCOLOGY | Facility: HOSPITAL | Age: 64
End: 2024-08-30
Payer: COMMERCIAL

## 2024-08-30 VITALS
DIASTOLIC BLOOD PRESSURE: 58 MMHG | RESPIRATION RATE: 16 BRPM | SYSTOLIC BLOOD PRESSURE: 132 MMHG | OXYGEN SATURATION: 100 % | TEMPERATURE: 98.8 F | HEART RATE: 85 BPM | BODY MASS INDEX: 37.98 KG/M2 | WEIGHT: 188.05 LBS

## 2024-08-30 DIAGNOSIS — I50.32 CHRONIC HEART FAILURE WITH PRESERVED EJECTION FRACTION (MULTI): ICD-10-CM

## 2024-08-30 DIAGNOSIS — Z86.2 HISTORY OF ANEMIA: ICD-10-CM

## 2024-08-30 DIAGNOSIS — D50.9 IRON DEFICIENCY ANEMIA, UNSPECIFIED IRON DEFICIENCY ANEMIA TYPE: Primary | ICD-10-CM

## 2024-08-30 DIAGNOSIS — D50.9 IRON DEFICIENCY ANEMIA, UNSPECIFIED IRON DEFICIENCY ANEMIA TYPE: ICD-10-CM

## 2024-08-30 LAB — GLUCOSE BLD MANUAL STRIP-MCNC: 231 MG/DL (ref 74–99)

## 2024-08-30 PROCEDURE — 2500000004 HC RX 250 GENERAL PHARMACY W/ HCPCS (ALT 636 FOR OP/ED): Performed by: STUDENT IN AN ORGANIZED HEALTH CARE EDUCATION/TRAINING PROGRAM

## 2024-08-30 PROCEDURE — 96365 THER/PROPH/DIAG IV INF INIT: CPT | Mod: INF

## 2024-08-30 PROCEDURE — 82947 ASSAY GLUCOSE BLOOD QUANT: CPT

## 2024-08-30 RX ORDER — FAMOTIDINE 10 MG/ML
20 INJECTION INTRAVENOUS ONCE AS NEEDED
Status: CANCELLED | OUTPATIENT
Start: 2024-08-30

## 2024-08-30 RX ORDER — ALBUTEROL SULFATE 0.83 MG/ML
3 SOLUTION RESPIRATORY (INHALATION) AS NEEDED
Status: CANCELLED | OUTPATIENT
Start: 2024-08-30

## 2024-08-30 RX ORDER — FAMOTIDINE 10 MG/ML
20 INJECTION INTRAVENOUS ONCE AS NEEDED
OUTPATIENT
Start: 2024-09-03

## 2024-08-30 RX ORDER — FAMOTIDINE 10 MG/ML
20 INJECTION INTRAVENOUS ONCE AS NEEDED
Status: DISCONTINUED | OUTPATIENT
Start: 2024-08-30 | End: 2024-08-30 | Stop reason: HOSPADM

## 2024-08-30 RX ORDER — ALBUTEROL SULFATE 0.83 MG/ML
3 SOLUTION RESPIRATORY (INHALATION) AS NEEDED
OUTPATIENT
Start: 2024-09-03

## 2024-08-30 RX ORDER — ALBUTEROL SULFATE 0.83 MG/ML
3 SOLUTION RESPIRATORY (INHALATION) AS NEEDED
Status: DISCONTINUED | OUTPATIENT
Start: 2024-08-30 | End: 2024-08-30 | Stop reason: HOSPADM

## 2024-08-30 RX ORDER — DIPHENHYDRAMINE HYDROCHLORIDE 50 MG/ML
50 INJECTION INTRAMUSCULAR; INTRAVENOUS AS NEEDED
OUTPATIENT
Start: 2024-09-03

## 2024-08-30 RX ORDER — EPINEPHRINE 0.3 MG/.3ML
0.3 INJECTION SUBCUTANEOUS EVERY 5 MIN PRN
Status: CANCELLED | OUTPATIENT
Start: 2024-08-30

## 2024-08-30 RX ORDER — EPINEPHRINE 0.3 MG/.3ML
0.3 INJECTION SUBCUTANEOUS EVERY 5 MIN PRN
Status: DISCONTINUED | OUTPATIENT
Start: 2024-08-30 | End: 2024-08-30 | Stop reason: HOSPADM

## 2024-08-30 RX ORDER — DIPHENHYDRAMINE HYDROCHLORIDE 50 MG/ML
50 INJECTION INTRAMUSCULAR; INTRAVENOUS AS NEEDED
Status: CANCELLED | OUTPATIENT
Start: 2024-08-30

## 2024-08-30 RX ORDER — EPINEPHRINE 0.3 MG/.3ML
0.3 INJECTION SUBCUTANEOUS EVERY 5 MIN PRN
OUTPATIENT
Start: 2024-09-03

## 2024-08-30 RX ORDER — DIPHENHYDRAMINE HYDROCHLORIDE 50 MG/ML
50 INJECTION INTRAMUSCULAR; INTRAVENOUS AS NEEDED
Status: DISCONTINUED | OUTPATIENT
Start: 2024-08-30 | End: 2024-08-30 | Stop reason: HOSPADM

## 2024-08-30 NOTE — PROGRESS NOTES
Patient to be scheduled for venofer infusions. Venofer 300mg IV q4 days x 3 doses total. Currently, 8/30/24, getting dose 1 at [facility]    For Diagnosis: Iron Deficiency Anemia    Urine Hcg test ordered prior to first infusion?  Not applicable (If female pt <60 years of age and with reproductive capability)  (If urine Hcg test ordered please instruct pt upon scheduling to drink 32 ounces of water 1 hour before arrival so bladder is full for needed urine sample)    Review of Labs:  Lab Results   Component Value Date    HGB 10.8 (L) 08/20/2024    FERRITIN 10 08/18/2024    IRON 18 (L) 08/18/2024    TIBC 419 08/18/2024    MCHC 31.6 (L) 08/20/2024    MCV 89 08/20/2024    MCH 28.1 08/20/2024      Lab Results   Component Value Date    IRONSAT 4 (L) 08/18/2024        Do labs confirm diagnosis of iron deficiency? Yes    (If NO please reach out to prescribing provider prior to proceeding)      Okay to schedule for treatment as ordered by prescribing provider.      [facility])  [id number] [address], [place] [address]  Phone: [phone]

## 2024-08-30 NOTE — PROGRESS NOTES
Patient's glucose 231 (via fingerstick due to patient's sensor not reading), patient has insulin with her and will administer.     Patient tolerated iron infusion without incident; declined to stay for pot-infusion observation. Patient discharged in stable condition, assisted in wheelchair by significant other.

## 2024-09-03 ENCOUNTER — TELEPHONE (OUTPATIENT)
Dept: PRIMARY CARE | Facility: CLINIC | Age: 64
End: 2024-09-03
Payer: COMMERCIAL

## 2024-09-03 NOTE — TELEPHONE ENCOUNTER
Spoke with patient about recent PFT results.  Informed that I referred her to pulmonology for restrictive lung disease evaluation based on recent spirometry results.  Patient agreeable to schedule appointment.  Also recommended follow-up for recent hospital admission as recommended per discharge summary.

## 2024-09-04 ENCOUNTER — TELEPHONE (OUTPATIENT)
Dept: PRIMARY CARE | Facility: CLINIC | Age: 64
End: 2024-09-04
Payer: COMMERCIAL

## 2024-09-04 NOTE — TELEPHONE ENCOUNTER
Patient called about some results but I see you spoke with the patient yesterday but she called back today requesting her results.

## 2024-09-05 ENCOUNTER — DOCUMENTATION (OUTPATIENT)
Dept: INFUSION THERAPY | Facility: CLINIC | Age: 64
End: 2024-09-05
Payer: COMMERCIAL

## 2024-09-06 ENCOUNTER — APPOINTMENT (OUTPATIENT)
Dept: HEMATOLOGY/ONCOLOGY | Facility: HOSPITAL | Age: 64
End: 2024-09-06
Payer: COMMERCIAL

## 2024-09-11 ENCOUNTER — APPOINTMENT (OUTPATIENT)
Dept: RADIOLOGY | Facility: HOSPITAL | Age: 64
End: 2024-09-11
Payer: COMMERCIAL

## 2024-09-11 ENCOUNTER — APPOINTMENT (OUTPATIENT)
Dept: HEMATOLOGY/ONCOLOGY | Facility: HOSPITAL | Age: 64
End: 2024-09-11
Payer: COMMERCIAL

## 2024-09-11 ENCOUNTER — CLINICAL SUPPORT (OUTPATIENT)
Dept: EMERGENCY MEDICINE | Facility: HOSPITAL | Age: 64
End: 2024-09-11
Payer: COMMERCIAL

## 2024-09-11 ENCOUNTER — HOSPITAL ENCOUNTER (OUTPATIENT)
Facility: HOSPITAL | Age: 64
Setting detail: OBSERVATION
Discharge: HOME | End: 2024-09-12
Attending: EMERGENCY MEDICINE | Admitting: STUDENT IN AN ORGANIZED HEALTH CARE EDUCATION/TRAINING PROGRAM
Payer: COMMERCIAL

## 2024-09-11 DIAGNOSIS — N17.9 AKI (ACUTE KIDNEY INJURY) (CMS-HCC): Primary | ICD-10-CM

## 2024-09-11 DIAGNOSIS — R11.2 NAUSEA AND VOMITING, UNSPECIFIED VOMITING TYPE: ICD-10-CM

## 2024-09-11 LAB
ABO GROUP (TYPE) IN BLOOD: NORMAL
ALBUMIN SERPL BCP-MCNC: 3.8 G/DL (ref 3.4–5)
ALBUMIN SERPL BCP-MCNC: 4.8 G/DL (ref 3.4–5)
ALP SERPL-CCNC: 79 U/L (ref 33–136)
ALP SERPL-CCNC: 99 U/L (ref 33–136)
ALT SERPL W P-5'-P-CCNC: 13 U/L (ref 7–45)
ALT SERPL W P-5'-P-CCNC: 8 U/L (ref 7–45)
ANION GAP BLDV CALCULATED.4IONS-SCNC: 10 MMOL/L (ref 10–25)
ANION GAP SERPL CALC-SCNC: 16 MMOL/L (ref 10–20)
ANION GAP SERPL CALC-SCNC: 17 MMOL/L (ref 10–20)
ANTIBODY SCREEN: NORMAL
APTT PPP: 37 SECONDS (ref 27–38)
AST SERPL W P-5'-P-CCNC: 11 U/L (ref 9–39)
AST SERPL W P-5'-P-CCNC: 17 U/L (ref 9–39)
ATRIAL RATE: 85 BPM
BASE EXCESS BLDV CALC-SCNC: 6.7 MMOL/L (ref -2–3)
BASOPHILS # BLD AUTO: 0.11 X10*3/UL (ref 0–0.1)
BASOPHILS NFR BLD AUTO: 1.3 %
BILIRUB SERPL-MCNC: 0.5 MG/DL (ref 0–1.2)
BILIRUB SERPL-MCNC: 0.6 MG/DL (ref 0–1.2)
BNP SERPL-MCNC: 5 PG/ML (ref 0–99)
BODY TEMPERATURE: 37 DEGREES CELSIUS
BUN SERPL-MCNC: 28 MG/DL (ref 6–23)
BUN SERPL-MCNC: 32 MG/DL (ref 6–23)
CA-I BLDV-SCNC: 1.22 MMOL/L (ref 1.1–1.33)
CALCIUM SERPL-MCNC: 10.2 MG/DL (ref 8.6–10.6)
CALCIUM SERPL-MCNC: 8.8 MG/DL (ref 8.6–10.6)
CARDIAC TROPONIN I PNL SERPL HS: 12 NG/L (ref 0–34)
CARDIAC TROPONIN I PNL SERPL HS: 12 NG/L (ref 0–34)
CHLORIDE BLDV-SCNC: 92 MMOL/L (ref 98–107)
CHLORIDE SERPL-SCNC: 91 MMOL/L (ref 98–107)
CHLORIDE SERPL-SCNC: 98 MMOL/L (ref 98–107)
CO2 SERPL-SCNC: 25 MMOL/L (ref 21–32)
CO2 SERPL-SCNC: 30 MMOL/L (ref 21–32)
CREAT SERPL-MCNC: 2.39 MG/DL (ref 0.5–1.05)
CREAT SERPL-MCNC: 2.79 MG/DL (ref 0.5–1.05)
EGFRCR SERPLBLD CKD-EPI 2021: 18 ML/MIN/1.73M*2
EGFRCR SERPLBLD CKD-EPI 2021: 22 ML/MIN/1.73M*2
EOSINOPHIL # BLD AUTO: 0.37 X10*3/UL (ref 0–0.7)
EOSINOPHIL NFR BLD AUTO: 4.3 %
ERYTHROCYTE [DISTWIDTH] IN BLOOD BY AUTOMATED COUNT: 17.9 % (ref 11.5–14.5)
FLUAV RNA RESP QL NAA+PROBE: NOT DETECTED
FLUBV RNA RESP QL NAA+PROBE: NOT DETECTED
GLUCOSE BLD MANUAL STRIP-MCNC: 197 MG/DL (ref 74–99)
GLUCOSE BLD MANUAL STRIP-MCNC: 232 MG/DL (ref 74–99)
GLUCOSE BLD MANUAL STRIP-MCNC: 232 MG/DL (ref 74–99)
GLUCOSE BLDV-MCNC: 250 MG/DL (ref 74–99)
GLUCOSE SERPL-MCNC: 230 MG/DL (ref 74–99)
GLUCOSE SERPL-MCNC: 251 MG/DL (ref 74–99)
HCO3 BLDV-SCNC: 34.1 MMOL/L (ref 22–26)
HCT VFR BLD AUTO: 43.3 % (ref 36–46)
HCT VFR BLD EST: 44 % (ref 36–46)
HGB BLD-MCNC: 13.8 G/DL (ref 12–16)
HGB BLDV-MCNC: 14.5 G/DL (ref 12–16)
IMM GRANULOCYTES # BLD AUTO: 0.04 X10*3/UL (ref 0–0.7)
IMM GRANULOCYTES NFR BLD AUTO: 0.5 % (ref 0–0.9)
INHALED O2 CONCENTRATION: 21 %
INR PPP: 0.9 (ref 0.9–1.1)
LACTATE BLDV-SCNC: 2 MMOL/L (ref 0.4–2)
LIPASE SERPL-CCNC: 45 U/L (ref 9–82)
LYMPHOCYTES # BLD AUTO: 1.74 X10*3/UL (ref 1.2–4.8)
LYMPHOCYTES NFR BLD AUTO: 20.4 %
MAGNESIUM SERPL-MCNC: 3.3 MG/DL (ref 1.6–2.4)
MCH RBC QN AUTO: 29.7 PG (ref 26–34)
MCHC RBC AUTO-ENTMCNC: 31.9 G/DL (ref 32–36)
MCV RBC AUTO: 93 FL (ref 80–100)
MONOCYTES # BLD AUTO: 0.75 X10*3/UL (ref 0.1–1)
MONOCYTES NFR BLD AUTO: 8.8 %
NEUTROPHILS # BLD AUTO: 5.54 X10*3/UL (ref 1.2–7.7)
NEUTROPHILS NFR BLD AUTO: 64.7 %
NRBC BLD-RTO: 0 /100 WBCS (ref 0–0)
OXYHGB MFR BLDV: 20.6 % (ref 45–75)
P AXIS: 0 DEGREES
P OFFSET: 182 MS
P ONSET: 134 MS
PCO2 BLDV: 59 MM HG (ref 41–51)
PH BLDV: 7.37 PH (ref 7.33–7.43)
PLATELET # BLD AUTO: 216 X10*3/UL (ref 150–450)
PO2 BLDV: 22 MM HG (ref 35–45)
POTASSIUM BLDV-SCNC: 3.6 MMOL/L (ref 3.5–5.3)
POTASSIUM SERPL-SCNC: 3.2 MMOL/L (ref 3.5–5.3)
POTASSIUM SERPL-SCNC: 3.5 MMOL/L (ref 3.5–5.3)
PR INTERVAL: 138 MS
PROT SERPL-MCNC: 6.7 G/DL (ref 6.4–8.2)
PROT SERPL-MCNC: 8.5 G/DL (ref 6.4–8.2)
PROTHROMBIN TIME: 10.5 SECONDS (ref 9.8–12.8)
Q ONSET: 203 MS
QRS COUNT: 14 BEATS
QRS DURATION: 146 MS
QT INTERVAL: 430 MS
QTC CALCULATION(BAZETT): 511 MS
QTC FREDERICIA: 483 MS
R AXIS: -52 DEGREES
RBC # BLD AUTO: 4.65 X10*6/UL (ref 4–5.2)
RH FACTOR (ANTIGEN D): NORMAL
SAO2 % BLDV: 21 % (ref 45–75)
SARS-COV-2 RNA RESP QL NAA+PROBE: NOT DETECTED
SODIUM BLDV-SCNC: 132 MMOL/L (ref 136–145)
SODIUM SERPL-SCNC: 134 MMOL/L (ref 136–145)
SODIUM SERPL-SCNC: 136 MMOL/L (ref 136–145)
T AXIS: 83 DEGREES
T OFFSET: 418 MS
VENTRICULAR RATE: 85 BPM
WBC # BLD AUTO: 8.6 X10*3/UL (ref 4.4–11.3)

## 2024-09-11 PROCEDURE — 83880 ASSAY OF NATRIURETIC PEPTIDE: CPT | Performed by: EMERGENCY MEDICINE

## 2024-09-11 PROCEDURE — 96361 HYDRATE IV INFUSION ADD-ON: CPT

## 2024-09-11 PROCEDURE — 2500000004 HC RX 250 GENERAL PHARMACY W/ HCPCS (ALT 636 FOR OP/ED): Performed by: PHYSICIAN ASSISTANT

## 2024-09-11 PROCEDURE — 93005 ELECTROCARDIOGRAM TRACING: CPT

## 2024-09-11 PROCEDURE — G0378 HOSPITAL OBSERVATION PER HR: HCPCS

## 2024-09-11 PROCEDURE — 84484 ASSAY OF TROPONIN QUANT: CPT | Performed by: EMERGENCY MEDICINE

## 2024-09-11 PROCEDURE — 87636 SARSCOV2 & INF A&B AMP PRB: CPT | Performed by: PHYSICIAN ASSISTANT

## 2024-09-11 PROCEDURE — 36415 COLL VENOUS BLD VENIPUNCTURE: CPT

## 2024-09-11 PROCEDURE — 96360 HYDRATION IV INFUSION INIT: CPT | Mod: 59

## 2024-09-11 PROCEDURE — 82947 ASSAY GLUCOSE BLOOD QUANT: CPT | Mod: 59

## 2024-09-11 PROCEDURE — 99222 1ST HOSP IP/OBS MODERATE 55: CPT

## 2024-09-11 PROCEDURE — 99285 EMERGENCY DEPT VISIT HI MDM: CPT | Performed by: PHYSICIAN ASSISTANT

## 2024-09-11 PROCEDURE — 36415 COLL VENOUS BLD VENIPUNCTURE: CPT | Performed by: PHYSICIAN ASSISTANT

## 2024-09-11 PROCEDURE — 84132 ASSAY OF SERUM POTASSIUM: CPT | Performed by: PHYSICIAN ASSISTANT

## 2024-09-11 PROCEDURE — 84132 ASSAY OF SERUM POTASSIUM: CPT | Performed by: EMERGENCY MEDICINE

## 2024-09-11 PROCEDURE — 96372 THER/PROPH/DIAG INJ SC/IM: CPT

## 2024-09-11 PROCEDURE — 2500000001 HC RX 250 WO HCPCS SELF ADMINISTERED DRUGS (ALT 637 FOR MEDICARE OP): Performed by: STUDENT IN AN ORGANIZED HEALTH CARE EDUCATION/TRAINING PROGRAM

## 2024-09-11 PROCEDURE — 2500000005 HC RX 250 GENERAL PHARMACY W/O HCPCS: Performed by: STUDENT IN AN ORGANIZED HEALTH CARE EDUCATION/TRAINING PROGRAM

## 2024-09-11 PROCEDURE — 85025 COMPLETE CBC W/AUTO DIFF WBC: CPT | Performed by: EMERGENCY MEDICINE

## 2024-09-11 PROCEDURE — 83735 ASSAY OF MAGNESIUM: CPT | Performed by: PHYSICIAN ASSISTANT

## 2024-09-11 PROCEDURE — 83690 ASSAY OF LIPASE: CPT | Performed by: PHYSICIAN ASSISTANT

## 2024-09-11 PROCEDURE — 82947 ASSAY GLUCOSE BLOOD QUANT: CPT

## 2024-09-11 PROCEDURE — 99285 EMERGENCY DEPT VISIT HI MDM: CPT

## 2024-09-11 PROCEDURE — 71046 X-RAY EXAM CHEST 2 VIEWS: CPT

## 2024-09-11 PROCEDURE — 2500000001 HC RX 250 WO HCPCS SELF ADMINISTERED DRUGS (ALT 637 FOR MEDICARE OP)

## 2024-09-11 PROCEDURE — 2500000004 HC RX 250 GENERAL PHARMACY W/ HCPCS (ALT 636 FOR OP/ED)

## 2024-09-11 PROCEDURE — 86900 BLOOD TYPING SEROLOGIC ABO: CPT | Performed by: PHYSICIAN ASSISTANT

## 2024-09-11 PROCEDURE — 2500000002 HC RX 250 W HCPCS SELF ADMINISTERED DRUGS (ALT 637 FOR MEDICARE OP, ALT 636 FOR OP/ED)

## 2024-09-11 PROCEDURE — 93010 ELECTROCARDIOGRAM REPORT: CPT | Performed by: PHYSICIAN ASSISTANT

## 2024-09-11 PROCEDURE — 71046 X-RAY EXAM CHEST 2 VIEWS: CPT | Performed by: STUDENT IN AN ORGANIZED HEALTH CARE EDUCATION/TRAINING PROGRAM

## 2024-09-11 PROCEDURE — 85610 PROTHROMBIN TIME: CPT | Performed by: PHYSICIAN ASSISTANT

## 2024-09-11 PROCEDURE — 84132 ASSAY OF SERUM POTASSIUM: CPT

## 2024-09-11 RX ORDER — ACETAMINOPHEN 325 MG/1
3 TABLET ORAL EVERY 8 HOURS PRN
COMMUNITY

## 2024-09-11 RX ORDER — DEXTROSE 50 % IN WATER (D50W) INTRAVENOUS SYRINGE
25
Status: DISCONTINUED | OUTPATIENT
Start: 2024-09-11 | End: 2024-09-12 | Stop reason: HOSPADM

## 2024-09-11 RX ORDER — ISOSORBIDE MONONITRATE 30 MG/1
60 TABLET, EXTENDED RELEASE ORAL NIGHTLY
Status: DISCONTINUED | OUTPATIENT
Start: 2024-09-11 | End: 2024-09-12 | Stop reason: HOSPADM

## 2024-09-11 RX ORDER — INSULIN DEGLUDEC 100 U/ML
52 INJECTION, SOLUTION SUBCUTANEOUS NIGHTLY
Status: DISCONTINUED | OUTPATIENT
Start: 2024-09-11 | End: 2024-09-12 | Stop reason: HOSPADM

## 2024-09-11 RX ORDER — ISOSORBIDE MONONITRATE 30 MG/1
60 TABLET, EXTENDED RELEASE ORAL
Status: DISCONTINUED | OUTPATIENT
Start: 2024-09-12 | End: 2024-09-11

## 2024-09-11 RX ORDER — LIDOCAINE 560 MG/1
1 PATCH PERCUTANEOUS; TOPICAL; TRANSDERMAL DAILY
Status: DISCONTINUED | OUTPATIENT
Start: 2024-09-11 | End: 2024-09-12 | Stop reason: HOSPADM

## 2024-09-11 RX ORDER — DICLOFENAC SODIUM 10 MG/G
4 GEL TOPICAL 3 TIMES DAILY PRN
Status: DISCONTINUED | OUTPATIENT
Start: 2024-09-11 | End: 2024-09-12 | Stop reason: HOSPADM

## 2024-09-11 RX ORDER — CYANOCOBALAMIN (VITAMIN B-12) 500 MCG
400 TABLET ORAL DAILY
COMMUNITY

## 2024-09-11 RX ORDER — POLYETHYLENE GLYCOL 3350 17 G/17G
17 POWDER, FOR SOLUTION ORAL DAILY
Status: DISCONTINUED | OUTPATIENT
Start: 2024-09-11 | End: 2024-09-11

## 2024-09-11 RX ORDER — ACETAMINOPHEN 500 MG
5 TABLET ORAL NIGHTLY PRN
COMMUNITY

## 2024-09-11 RX ORDER — CARVEDILOL 12.5 MG/1
12.5 TABLET ORAL
Status: DISCONTINUED | OUTPATIENT
Start: 2024-09-11 | End: 2024-09-12 | Stop reason: HOSPADM

## 2024-09-11 RX ORDER — POLYETHYLENE GLYCOL 3350 17 G/17G
17 POWDER, FOR SOLUTION ORAL DAILY PRN
Status: DISCONTINUED | OUTPATIENT
Start: 2024-09-11 | End: 2024-09-12 | Stop reason: HOSPADM

## 2024-09-11 RX ORDER — ATORVASTATIN CALCIUM 80 MG/1
80 TABLET, FILM COATED ORAL DAILY
Status: DISCONTINUED | OUTPATIENT
Start: 2024-09-11 | End: 2024-09-12 | Stop reason: HOSPADM

## 2024-09-11 RX ORDER — HEPARIN SODIUM 5000 [USP'U]/ML
5000 INJECTION, SOLUTION INTRAVENOUS; SUBCUTANEOUS EVERY 8 HOURS
Status: DISCONTINUED | OUTPATIENT
Start: 2024-09-11 | End: 2024-09-12 | Stop reason: HOSPADM

## 2024-09-11 RX ORDER — ACETAMINOPHEN 500 MG
10 TABLET ORAL NIGHTLY
Status: DISCONTINUED | OUTPATIENT
Start: 2024-09-11 | End: 2024-09-12 | Stop reason: HOSPADM

## 2024-09-11 RX ORDER — ENOXAPARIN SODIUM 100 MG/ML
40 INJECTION SUBCUTANEOUS DAILY
Status: DISCONTINUED | OUTPATIENT
Start: 2024-09-11 | End: 2024-09-11 | Stop reason: ALTCHOICE

## 2024-09-11 RX ORDER — ACETAMINOPHEN 325 MG/1
975 TABLET ORAL EVERY 8 HOURS PRN
Status: DISCONTINUED | OUTPATIENT
Start: 2024-09-11 | End: 2024-09-12 | Stop reason: HOSPADM

## 2024-09-11 RX ORDER — DEXTROSE 50 % IN WATER (D50W) INTRAVENOUS SYRINGE
12.5
Status: DISCONTINUED | OUTPATIENT
Start: 2024-09-11 | End: 2024-09-12 | Stop reason: HOSPADM

## 2024-09-11 RX ORDER — INSULIN LISPRO 100 [IU]/ML
0-10 INJECTION, SOLUTION INTRAVENOUS; SUBCUTANEOUS
Status: DISCONTINUED | OUTPATIENT
Start: 2024-09-11 | End: 2024-09-12 | Stop reason: HOSPADM

## 2024-09-11 RX ORDER — ASPIRIN 81 MG/1
81 TABLET ORAL DAILY
Status: DISCONTINUED | OUTPATIENT
Start: 2024-09-11 | End: 2024-09-12 | Stop reason: HOSPADM

## 2024-09-11 RX ORDER — FUROSEMIDE 40 MG/1
20 TABLET ORAL DAILY
Status: DISCONTINUED | OUTPATIENT
Start: 2024-09-11 | End: 2024-09-12 | Stop reason: HOSPADM

## 2024-09-11 RX ORDER — SODIUM CHLORIDE 9 MG/ML
75 INJECTION, SOLUTION INTRAVENOUS CONTINUOUS
Status: DISCONTINUED | OUTPATIENT
Start: 2024-09-11 | End: 2024-09-11

## 2024-09-11 RX ORDER — PANTOPRAZOLE SODIUM 40 MG/1
40 TABLET, DELAYED RELEASE ORAL 2 TIMES DAILY
Status: DISCONTINUED | OUTPATIENT
Start: 2024-09-11 | End: 2024-09-12 | Stop reason: HOSPADM

## 2024-09-11 SDOH — SOCIAL STABILITY: SOCIAL INSECURITY: WERE YOU ABLE TO COMPLETE ALL THE BEHAVIORAL HEALTH SCREENINGS?: YES

## 2024-09-11 SDOH — SOCIAL STABILITY: SOCIAL INSECURITY: DOES ANYONE TRY TO KEEP YOU FROM HAVING/CONTACTING OTHER FRIENDS OR DOING THINGS OUTSIDE YOUR HOME?: NO

## 2024-09-11 SDOH — SOCIAL STABILITY: SOCIAL INSECURITY: DO YOU FEEL ANYONE HAS EXPLOITED OR TAKEN ADVANTAGE OF YOU FINANCIALLY OR OF YOUR PERSONAL PROPERTY?: NO

## 2024-09-11 SDOH — SOCIAL STABILITY: SOCIAL INSECURITY: DO YOU FEEL UNSAFE GOING BACK TO THE PLACE WHERE YOU ARE LIVING?: NO

## 2024-09-11 SDOH — SOCIAL STABILITY: SOCIAL INSECURITY: HAS ANYONE EVER THREATENED TO HURT YOUR FAMILY OR YOUR PETS?: NO

## 2024-09-11 SDOH — SOCIAL STABILITY: SOCIAL INSECURITY: ABUSE: ADULT

## 2024-09-11 SDOH — SOCIAL STABILITY: SOCIAL INSECURITY: HAVE YOU HAD THOUGHTS OF HARMING ANYONE ELSE?: NO

## 2024-09-11 SDOH — SOCIAL STABILITY: SOCIAL INSECURITY: ARE THERE ANY APPARENT SIGNS OF INJURIES/BEHAVIORS THAT COULD BE RELATED TO ABUSE/NEGLECT?: NO

## 2024-09-11 SDOH — SOCIAL STABILITY: SOCIAL INSECURITY: ARE YOU OR HAVE YOU BEEN THREATENED OR ABUSED PHYSICALLY, EMOTIONALLY, OR SEXUALLY BY ANYONE?: NO

## 2024-09-11 ASSESSMENT — ENCOUNTER SYMPTOMS
ABDOMINAL PAIN: 1
SHORTNESS OF BREATH: 0
POLYDIPSIA: 0
RECTAL PAIN: 0
COUGH: 0
NAUSEA: 1
EYE REDNESS: 0
WEAKNESS: 1
ABDOMINAL DISTENTION: 0
CONSTIPATION: 0
EYE PAIN: 0
DIFFICULTY URINATING: 0
DYSURIA: 0
CHILLS: 0
FLANK PAIN: 0
UNEXPECTED WEIGHT CHANGE: 0
AGITATION: 0
SINUS PAIN: 0
CHEST TIGHTNESS: 0
WHEEZING: 0
FREQUENCY: 0
CHOKING: 0
FEVER: 0
EYE DISCHARGE: 0
POLYPHAGIA: 0
HEMATURIA: 0
VOMITING: 1
SINUS PRESSURE: 0
APPETITE CHANGE: 1
PHOTOPHOBIA: 0
DIARRHEA: 0
LIGHT-HEADEDNESS: 1
SORE THROAT: 0

## 2024-09-11 ASSESSMENT — PATIENT HEALTH QUESTIONNAIRE - PHQ9
SUM OF ALL RESPONSES TO PHQ9 QUESTIONS 1 & 2: 5
2. FEELING DOWN, DEPRESSED OR HOPELESS: MORE THAN HALF THE DAYS
1. LITTLE INTEREST OR PLEASURE IN DOING THINGS: NEARLY EVERY DAY

## 2024-09-11 ASSESSMENT — COGNITIVE AND FUNCTIONAL STATUS - GENERAL
MOVING TO AND FROM BED TO CHAIR: A LITTLE
CLIMB 3 TO 5 STEPS WITH RAILING: A LOT
DAILY ACTIVITIY SCORE: 20
TOILETING: A LITTLE
MOBILITY SCORE: 19
DRESSING REGULAR UPPER BODY CLOTHING: A LITTLE
CLIMB 3 TO 5 STEPS WITH RAILING: A LOT
STANDING UP FROM CHAIR USING ARMS: A LITTLE
DRESSING REGULAR LOWER BODY CLOTHING: A LITTLE
WALKING IN HOSPITAL ROOM: A LITTLE
MOVING TO AND FROM BED TO CHAIR: A LITTLE
HELP NEEDED FOR BATHING: A LITTLE
DRESSING REGULAR LOWER BODY CLOTHING: A LITTLE
STANDING UP FROM CHAIR USING ARMS: A LITTLE
DAILY ACTIVITIY SCORE: 20
TOILETING: A LITTLE
MOBILITY SCORE: 19
WALKING IN HOSPITAL ROOM: A LITTLE
PATIENT BASELINE BEDBOUND: NO
HELP NEEDED FOR BATHING: A LITTLE
DRESSING REGULAR UPPER BODY CLOTHING: A LITTLE

## 2024-09-11 ASSESSMENT — LIFESTYLE VARIABLES
EVER FELT BAD OR GUILTY ABOUT YOUR DRINKING: NO
HOW OFTEN DO YOU HAVE 6 OR MORE DRINKS ON ONE OCCASION: NEVER
HAVE PEOPLE ANNOYED YOU BY CRITICIZING YOUR DRINKING: NO
TOTAL SCORE: 0
HOW OFTEN DO YOU HAVE A DRINK CONTAINING ALCOHOL: MONTHLY OR LESS
AUDIT-C TOTAL SCORE: 1
AUDIT-C TOTAL SCORE: 1
EVER HAD A DRINK FIRST THING IN THE MORNING TO STEADY YOUR NERVES TO GET RID OF A HANGOVER: NO
HOW MANY STANDARD DRINKS CONTAINING ALCOHOL DO YOU HAVE ON A TYPICAL DAY: 1 OR 2
HAVE YOU EVER FELT YOU SHOULD CUT DOWN ON YOUR DRINKING: NO
SKIP TO QUESTIONS 9-10: 1

## 2024-09-11 ASSESSMENT — PAIN DESCRIPTION - PAIN TYPE: TYPE: ACUTE PAIN

## 2024-09-11 ASSESSMENT — PAIN DESCRIPTION - LOCATION: LOCATION: CHEST

## 2024-09-11 ASSESSMENT — ACTIVITIES OF DAILY LIVING (ADL)
PATIENT'S MEMORY ADEQUATE TO SAFELY COMPLETE DAILY ACTIVITIES?: YES
TOILETING: NEEDS ASSISTANCE
ASSISTIVE_DEVICE: CANE;WALKER
FEEDING YOURSELF: INDEPENDENT
HEARING - RIGHT EAR: FUNCTIONAL
BATHING: NEEDS ASSISTANCE
GROOMING: INDEPENDENT
ADEQUATE_TO_COMPLETE_ADL: YES
DRESSING YOURSELF: INDEPENDENT
JUDGMENT_ADEQUATE_SAFELY_COMPLETE_DAILY_ACTIVITIES: YES
HEARING - LEFT EAR: FUNCTIONAL
WALKS IN HOME: NEEDS ASSISTANCE
LACK_OF_TRANSPORTATION: NO

## 2024-09-11 ASSESSMENT — PAIN SCALES - GENERAL
PAINLEVEL_OUTOF10: 7
PAINLEVEL_OUTOF10: 8
PAINLEVEL_OUTOF10: 0 - NO PAIN

## 2024-09-11 ASSESSMENT — PAIN - FUNCTIONAL ASSESSMENT: PAIN_FUNCTIONAL_ASSESSMENT: 0-10

## 2024-09-11 ASSESSMENT — PAIN SCALES - PAIN ASSESSMENT IN ADVANCED DEMENTIA (PAINAD): TOTALSCORE: MEDICATION (SEE MAR)

## 2024-09-11 NOTE — ED TRIAGE NOTES
"Pt to ED with c/o N/V, chest heaviness and SOB x Sunday. PMH \"everything.\" Pt states she feels weaker than normal when walking and her lungs hurt when she takes a deep breath. Denies any blood in vomit. Recent GI bleed, needs iron and blood infusions regularly. Unable to keep any fluids/food down. Was supposed to go get infusion today.   "

## 2024-09-11 NOTE — H&P
History and Physical        Subjective   Nelida is a 64 y.o. female who presented to ED for Nausea and Vomiting, admitted to Hospitalist Team D on 9/11/2024 for CLARIBEL (acute kidney injury) (CMS-[facility]).    HPI:    Nelida Mata is a 64-year-old woman with a past medical history of HFpEF (50-55% 9/2023), CAD s/p PCI w/ stent x 3 (2016, 2018) and STEMI (9/2023, no stents placed), TIA 9/2023, SVC thrombus on Lovenox, remote DVT, T2DM, HLD, CKD, obesity, NAFLD, MICHAEL, and prior toe amputations for gangrene in 2020, who presented to the ED today for nausea and vomiting that began on Sunday. Patient states she has had decreased PO intake since Sunday and has difficulty keeping food down. States he vomiting has been whatever she previously ate, denies any blood or red/black/green color in vomit. Patient denies any recent changes in medication, or potential food triggers. Patient denies fever, chills, chest pain, sob, dyspnea, however, she states she has had abdominal pain that is located in the upper/middle of her abdomen and has also noticed she has been urinating less in the past few days.     Of note, patient was recently admitted to the hospital on August 18 for GI bleed. Received 1 unit of PRBCs and has been receiving IV iron infusion as an outpatient, last was on 8/30.     In ED  CBC wnl, however Hgb 13.8 elevated from baseline 9-10, possibly hemoconcentration  CMP notable for glucose 230 and Cr 2.79, previous readings in the past month ranged from 1.2-1.5 range  BNP 5, Trop 12, Lipase 45  Covid and Flu negative  Cr Clearance 19  CXR no acute cardiopulmonary process    Patient Active Problem List   Diagnosis    Wound of foot    Wound of right foot    Type II or unspecified type diabetes mellitus with renal manifestations, uncontrolled(250.42) (Multi)    History of uncontrolled diabetes    (HFpEF) heart failure with preserved ejection fraction (Multi)    Abdominal hernia with obstruction and without gangrene     Abnormal uterine bleeding    Anxiety    Asymptomatic hypertensive urgency    Atypical face pain    Bariatric surgery status    Bilateral edema of lower extremity    Arteriosclerotic cardiovascular disease (ASCVD)    CAD (coronary artery disease)    Chest pain at rest    CHF, chronic (Multi)    Chronic, continuous use of opioids    Colon polyps    Cough, persistent    Cramp and spasm    Depression    Diabetic leg ulcer (Multi)    Difficulty speaking    Discoloration of skin of lower leg    Dystrophia unguium    Dysuria    Elevated parathyroid hormone    Female pelvic pain    Gastroparesis    GERD (gastroesophageal reflux disease)    Fibromyalgia    Headache    Pain syndrome, chronic    History of anemia    Hydroureter on left    Hypokalemia    Idiopathic peripheral neuropathy    Insomnia    Knee pain, bilateral    Lateral knee pain, left    Lichen simplex chronicus    Lipoma    Localized edema    Loose stools    Lower back pain    Lower urinary tract infectious disease    Microalbuminuria    Morbid obesity (Multi)    Multiple falls    Myopia of both eyes with astigmatism and presbyopia    NAFLD (nonalcoholic fatty liver disease)    NSTEMI, initial episode of care (Multi)    Obesity, Class II, BMI 35-39.9    Other constipation    Hypertension, uncontrolled    Peripheral vascular disease (CMS-HCC)    Polyarthritis    Postmenopausal bleeding    Presence of drug coated stent in right coronary artery    Primary open angle glaucoma of both eyes, mild stage    Primary open angle glaucoma    Neurodermatitis    Pruritic dermatitis    Psoriasis    Thickening, skin    Vaginitis    Psychological factors affecting morbid obesity (Multi)    Retinal detachment, tractional, right eye    Retinal hemorrhage of right eye    S/P PTCA (percutaneous transluminal coronary angioplasty)    Dyspnea on exertion    Shortness of breath    Skin tag of labia    Obstructive sleep apnea    Thyroid nodule    Trichimoniasis    Unstable angina (Multi)     Fibroid uterus    Leiomyoma of uterus    Uterine fibroid    Vaginal candida    Vitamin D deficiency, unspecified    Weight loss, unintentional    Wound of left leg    Wound of right leg    Rash/skin eruption    Xerosis of skin    Diabetes mellitus type 2, controlled, without complications (Multi)    Hypoglycemia    Other hyperlipidemia    Osteoarthritis    Acquired absence of other right toe(s) (Multi)    Hypertensive heart disease with heart failure (Multi)    Major depressive disorder, single episode    Abnormal gynecological examination    Type 2 diabetes mellitus with hyperglycemia, with long-term current use of insulin (Multi)    Superior vena cava thrombosis (Multi)    History of DVT (deep vein thrombosis)    Recurrent major depressive disorder, in remission (CMS-HCC)    S/P coronary artery stent placement    Uncontrolled type 2 diabetes mellitus with hyperglycemia (Multi)    NSTEMI (non-ST elevated myocardial infarction) (Multi)    Iron deficiency    Gastrointestinal hemorrhage, unspecified gastrointestinal hemorrhage type    CVA (cerebral vascular accident) (Multi)    DVT (deep venous thrombosis) (Multi)    Iron deficiency anemia    GI bleed    CLARIBEL (acute kidney injury) (CMS-HCC)      Past Medical History:   Diagnosis Date    Abnormal uterine and vaginal bleeding, unspecified 04/10/2018    Abnormal uterine bleeding    Abnormal uterine and vaginal bleeding, unspecified 02/22/2018    Abnormal uterine bleeding    Acute candidiasis of vulva and vagina 04/10/2018    Vaginal candida    Acute candidiasis of vulva and vagina 02/22/2018    Vaginal candida    Acute vaginitis 04/10/2018    Vaginitis    Acute vaginitis 02/22/2018    Vaginitis    Alcohol abuse, in remission     History of alcohol abuse    Anxiety disorder, unspecified 04/10/2018    Anxiety    Anxiety disorder, unspecified 02/22/2018    Anxiety    Atherosclerotic heart disease of native coronary artery without angina pectoris 04/10/2018     Arteriosclerotic cardiovascular disease (ASCVD)    Atherosclerotic heart disease of native coronary artery without angina pectoris 02/22/2018    Arteriosclerotic cardiovascular disease (ASCVD)    Atherosclerotic heart disease of native coronary artery without angina pectoris 02/22/2018    CAD (coronary artery disease)    Atherosclerotic heart disease of native coronary artery without angina pectoris 04/10/2018    CAD (coronary artery disease)    Atypical facial pain 04/10/2018    Atypical face pain    Atypical facial pain 02/22/2018    Atypical face pain    Benign lipomatous neoplasm, unspecified 04/10/2018    Lipoma    Benign lipomatous neoplasm, unspecified 02/22/2018    Lipoma    Changes in skin texture 04/10/2018    Thickening, skin    Changes in skin texture 02/22/2018    Thickening, skin    Chest pain, unspecified 04/10/2018    Chest pain at rest    Chest pain, unspecified 02/22/2018    Chest pain at rest    Chronic cough 04/10/2018    Cough, persistent    Chronic cough 02/22/2018    Cough, persistent    Chronic pain syndrome 04/10/2018    Pain syndrome, chronic    Chronic pain syndrome 02/22/2018    Pain syndrome, chronic    Cramp and spasm 04/10/2018    Cramp and spasm    Cramp and spasm 02/22/2018    Cramp and spasm    Depression, unspecified 04/10/2018    Depression    Depression, unspecified 02/22/2018    Depression    Disorder of pigmentation, unspecified 04/10/2018    Discoloration of skin of lower leg    Disorder of pigmentation, unspecified 02/22/2018    Discoloration of skin of lower leg    Dysuria 04/10/2018    Dysuria    Dysuria 02/22/2018    Dysuria    Encounter for immunization 04/10/2018    Flu vaccine need    Encounter for immunization 04/10/2018    Immunization due    Encounter for immunization 02/22/2018    Flu vaccine need    Encounter for immunization 02/22/2018    Immunization due    Encounter for screening for infections with a predominantly sexual mode of transmission     Routine  screening for STI (sexually transmitted infection)    Encounter for screening mammogram for malignant neoplasm of breast 04/10/2018    Visit for screening mammogram    Encounter for screening mammogram for malignant neoplasm of breast 02/22/2018    Visit for screening mammogram    Essential (primary) hypertension 04/10/2018    Hypertension, uncontrolled    Essential (primary) hypertension 02/22/2018    Hypertension, uncontrolled    Fatty (change of) liver, not elsewhere classified 04/10/2018    NAFLD (nonalcoholic fatty liver disease)    Fatty (change of) liver, not elsewhere classified 02/22/2018    NAFLD (nonalcoholic fatty liver disease)    Fibromyalgia 04/10/2018    Fibromyalgia    Fibromyalgia 02/22/2018    Fibromyalgia    Gastro-esophageal reflux disease without esophagitis 04/10/2018    GERD (gastroesophageal reflux disease)    Generalized contraction of visual field, left eye 02/06/2017    Generalized contraction of visual field of left eye    Headache, unspecified 04/10/2018    Headache    Headache, unspecified 02/22/2018    Headache    Heart failure, unspecified (Multi) 02/22/2018    CHF, chronic    Heart failure, unspecified (Multi) 04/10/2018    CHF, chronic    Hereditary and idiopathic neuropathy, unspecified 04/10/2018    Idiopathic peripheral neuropathy    Hereditary and idiopathic neuropathy, unspecified 02/22/2018    Idiopathic peripheral neuropathy    Hydroureter 04/10/2018    Hydroureter on left    Hydroureter 02/22/2018    Hydroureter on left    Hyperlipidemia, unspecified     Dyslipidemia    Hypertensive urgency 04/10/2018    Asymptomatic hypertensive urgency    Hypertensive urgency 02/22/2018    Asymptomatic hypertensive urgency    Hypokalemia 04/10/2018    Hypokalemia    Hypokalemia 02/22/2018    Hypokalemia    Insomnia, unspecified 04/10/2018    Insomnia    Insomnia, unspecified 02/22/2018    Insomnia    Localized edema 04/10/2018    Bilateral edema of lower extremity    Localized edema  02/22/2018    Bilateral edema of lower extremity    Macula scars of posterior pole (postinflammatory) (post-traumatic), left eye 04/10/2018    Macula scar of posterior pole of left eye    Morbid (severe) obesity due to excess calories (Multi) 04/10/2018    Morbid obesity with BMI of 40.0-44.9, adult    Morbid (severe) obesity due to excess calories (Multi) 02/22/2018    Morbid obesity with BMI of 40.0-44.9, adult    Nail dystrophy 04/10/2018    Dystrophia unguium    Nail dystrophy 02/22/2018    Dystrophia unguium    Non-ST elevation (NSTEMI) myocardial infarction (Multi) 04/10/2018    NSTEMI, initial episode of care    Non-ST elevation (NSTEMI) myocardial infarction (Multi) 02/22/2018    NSTEMI, initial episode of care    Obstructive sleep apnea (adult) (pediatric) 04/10/2018    Obstructive sleep apnea    Obstructive sleep apnea (adult) (pediatric) 02/22/2018    Obstructive sleep apnea    Other chest pain 02/22/2018    Chest tightness or pressure    Other chest pain 04/10/2018    Chest tightness or pressure    Other conditions influencing health status 04/10/2018    Insulin dependent type 2 diabetes mellitus, uncontrolled    Other conditions influencing health status 04/10/2018    Uncontrolled diabetes mellitus    Other conditions influencing health status 04/10/2018    DM (diabetes mellitus), type 2, uncontrolled, with renal complications    Other conditions influencing health status 02/22/2018    Insulin dependent type 2 diabetes mellitus, uncontrolled    Other conditions influencing health status 02/22/2018    Uncontrolled diabetes mellitus    Other conditions influencing health status 02/22/2018    DM (diabetes mellitus), type 2, uncontrolled, with renal complications    Other fatigue 04/10/2018    Fatigue    Other fatigue 02/22/2018    Fatigue    Other fecal abnormalities 04/10/2018    Loose stools    Other fecal abnormalities 02/22/2018    Loose stools    Other forms of dyspnea 04/10/2018    Dyspnea on  exertion    Other forms of dyspnea 02/22/2018    Dyspnea on exertion    Other problems related to lifestyle     Other problems related to lifestyle    Other specified health status 04/10/2018    Medically complex patient    Other specified health status 02/22/2018    Medically complex patient    Pain in right knee 02/22/2018    Knee pain, bilateral    Pain in right knee 04/10/2018    Knee pain, bilateral    Peripheral vascular disease, unspecified (CMS-[facility]) 04/10/2018    Peripheral vascular disease    Peripheral vascular disease, unspecified (CMS-HCC) 02/22/2018    Peripheral vascular disease    Personal history of other diseases of the digestive system     History of esophageal reflux    Personal history of other diseases of the digestive system     History of constipation    Personal history of other diseases of the nervous system and sense organs     History of glaucoma    Personal history of other endocrine, nutritional and metabolic disease     History of hypothyroidism    Personal history of other endocrine, nutritional and metabolic disease     History of hyperlipidemia    Personal history of other endocrine, nutritional and metabolic disease     History of diabetes mellitus    Polyarthritis, unspecified 04/10/2018    Polyarthritis    Polyarthritis, unspecified 02/22/2018    Polyarthritis    Polyp of colon 04/10/2018    Colon polyps    Polyp of colon 02/22/2018    Colon polyps    Postmenopausal bleeding 04/10/2018    Postmenopausal bleeding    Postmenopausal bleeding 02/22/2018    Postmenopausal bleeding    Presence of coronary angioplasty implant and graft 04/10/2018    Presence of drug coated stent in right coronary artery    Presence of coronary angioplasty implant and graft 02/22/2018    Presence of drug coated stent in right coronary artery    Presence of intraocular lens 04/10/2018    Pseudophakia, both eyes    Presence of intraocular lens 03/18/2015    Pseudophakia of left eye    Primary open-angle  glaucoma, unspecified eye, stage unspecified 04/10/2018    Primary open angle glaucoma    Proteinuria, unspecified 04/10/2018    Microalbuminuria    Proteinuria, unspecified 02/22/2018    Microalbuminuria    Pruritus, unspecified 04/10/2018    Pruritic dermatitis    Pruritus, unspecified 02/22/2018    Pruritic dermatitis    Psoriasis, unspecified 04/10/2018    Psoriasis    Psoriasis, unspecified 02/22/2018    Psoriasis    Rash and other nonspecific skin eruption 04/10/2018    Rash/skin eruption    Rash and other nonspecific skin eruption 02/22/2018    Rash/skin eruption    Repeated falls 04/10/2018    Multiple falls    Repeated falls 02/22/2018    Multiple falls    Retinal hemorrhage, right eye 02/06/2017    Retinal hemorrhage of right eye    Shortness of breath 04/10/2018    Shortness of breath    Shortness of breath 02/22/2018    Shortness of breath    Sleep apnea, unspecified 04/10/2018    Sleep disorder breathing    Sleep apnea, unspecified 02/22/2018    Sleep disorder breathing    Traction detachment of retina, right eye 02/06/2017    Retinal detachment, tractional, right eye    Type 2 diabetes mellitus with diabetic polyneuropathy (Multi) 04/10/2018    Controlled type 2 diabetes mellitus with diabetic polyneuropathy, with long-term current use of insulin    Type 2 diabetes mellitus with diabetic polyneuropathy (Multi) 02/22/2018    Controlled type 2 diabetes mellitus with diabetic polyneuropathy, with long-term current use of insulin    Type 2 diabetes mellitus with other skin ulcer (CODE) (Multi) 04/10/2018    Diabetic leg ulcer    Type 2 diabetes mellitus with other skin ulcer (CODE) (Multi) 02/22/2018    Diabetic leg ulcer    Type 2 diabetes mellitus with proliferative diabetic retinopathy without macular edema, unspecified eye (Multi) 04/10/2018    Proliferative diabetic retinopathy    Type 2 diabetes mellitus with unspecified diabetic retinopathy without macular edema (Multi) 02/06/2017    Background  diabetic retinopathy    Umbilical hernia without obstruction or gangrene     Umbilical hernia    Unqualified visual loss, both eyes 02/06/2017    Complete loss of vision in visual field    Unspecified glaucoma 06/11/2015    Glaucoma    Unstable angina (Multi) 04/10/2018    Unstable angina    Unstable angina (Multi) 02/22/2018    Unstable angina    Vitamin D deficiency, unspecified 04/10/2018    Vitamin D deficiency, unspecified    Vitamin D deficiency, unspecified 02/22/2018    Vitamin D deficiency, unspecified    Xerosis cutis 04/10/2018    Xerosis of skin    Xerosis cutis 02/22/2018    Xerosis of skin     Past Surgical History:   Procedure Laterality Date    BREAST BIOPSY Left     2007    CARDIAC CATHETERIZATION N/A 7/5/2024    Procedure: Left And Right Heart Cath, Without LV;  Surgeon: Loki Donis MD;  Location: [place] [id number] Cardiac Cath Lab;  Service: Cardiovascular;  Laterality: N/A;    CATARACT EXTRACTION  03/18/2015    Cataract Surgery    CORONARY ANGIOPLASTY WITH STENT PLACEMENT  03/06/2017    Cath Stent Placement    CT AORTA AND BILATERAL ILIOFEMORAL RUNOFF ANGIOGRAM W AND/OR WO IV CONTRAST  08/08/2022    CT AORTA AND BILATERAL ILIOFEMORAL RUNOFF ANGIOGRAM W AND/OR WO IV CONTRAST 8/8/2022 [facility] [place] EMERGENCY LEGACY    MR HEAD ANGIO WO IV CONTRAST  08/16/2013    MR HEAD ANGIO WO IV CONTRAST 8/16/2013 [facility] [place] ANCILLARY LEGACY    MR NECK ANGIO WO IV CONTRAST  08/16/2013    MR NECK ANGIO WO IV CONTRAST 8/16/2013 [facility] [place] ANCILLARY LEGACY    RETINAL DETACHMENT SURGERY  03/18/2015    Repair Of Retinal Detachment     Current Outpatient Medications   Medication Instructions    aspirin 81 mg, oral, Daily    atorvastatin (Lipitor) 80 mg tablet TAKE 1 TABLET BY MOUTH ONCE DAILY    blood pressure monitor kit 1 each, miscellaneous, Daily, Use once daily to check blood pressure    blood sugar diagnostic (OneTouch Ultra Test) strip USE 1 STRIP(S) TO TEST BLOOD SUGAR 3 TIMES A DAY    brimonidine (AlphaGAN) 0.2 % ophthalmic solution  1 drop, Both Eyes, 2 times daily, PATIENT NEEDS PCP APPT FOR FURTHER REFILLS    carvedilol (COREG) 12.5 mg, oral, 2 times daily (morning and late afternoon)    diclofenac sodium (VOLTAREN) 4 g, Topical, 2 times daily    DULoxetine (Cymbalta) 30 mg DR capsule Take 1 capsule (30 mg) by mouth once daily in the morning AND 2 capsules (60 mg) once daily at bedtime. Do not crush or chew..    enoxaparin (LOVENOX) 40 mg, subcutaneous, Daily    Farxiga 10 mg, oral, Daily    FreeStyle Filipe 2 Sensor kit Apply 1 sensor to the back of the upper arm. Change sensor every 14 days.    FreeStyle Filipe reader (FreeStyle Filipe 2 [place]) misc Use as instructed    furosemide (LASIX) 20 mg, oral, Daily    glucose 4 gram chewable tablet Chew 4 tablets (16 g) 1 time for 1 dose as needed    HumaLOG KwikPen Insulin 22 Units, subcutaneous, 3 times daily (morning, midday, late afternoon), Take as directed per insulin instructions.    hydrOXYzine HCL (ATARAX) 25 mg, oral, 2 times daily PRN    isosorbide mononitrate ER (IMDUR) 60 mg, oral, Daily before breakfast    Kerendia 10 mg, oral, Daily    latanoprost (Xalatan) 0.005 % ophthalmic solution ADMINISTER 1 DROP INTO BOTH EYES ONCE DAILY AT BEDTIME.    melatonin 3 mg tablet 1 TAB(S) ORALLY ONCE (AT BEDTIME), AS NEEDED -INSOMNIA    nitroglycerin (Nitrostat) 0.4 mg SL tablet PLACE 1 TABLET UNDER THE TONGUE EVERY 5 MINUTES FOR UP TO 3 DOSES AS NEEDED FOR CHEST PAIN.CALL 911 IF PAIN PERSISTS.    Ozempic 2 mg, subcutaneous, Once Weekly    pantoprazole (PROTONIX) 40 mg, oral, 2 times daily    polyethylene glycol (Glycolax, Miralax) 17 gram/dose powder Take 1 capful (17 g) mixed in 4-8 ounces of liquid by mouth once daily as needed (constipation).    sennosides-docusate sodium (Zoila-Colace) 8.6-50 mg tablet 1 tablet, oral, 2 times daily    Tresiba FlexTouch U-200 52 Units, subcutaneous, Nightly, Take as directed per insulin instructions.    urea (Carmol) 20 % cream 1 Application, Topical, 2 times daily       Allergies   Allergen Reactions    Other Rash     Novocaine Solution, reaction rash     Penicillins Rash and Hives    Procaine Rash      Social History     Tobacco Use    Smoking status: Never     Passive exposure: Never    Smokeless tobacco: Never   Vaping Use    Vaping status: Never Used   Substance Use Topics    Alcohol use: Not Currently     Comment: occasionally    Drug use: Never     Family History   Problem Relation Name Age of Onset    Diabetes Mother      Glaucoma Father      Hypertension Other FAMILY        Scheduled Medications:   aspirin, 81 mg, oral, Daily  atorvastatin, 80 mg, oral, Daily  carvedilol, 12.5 mg, oral, BID  enoxaparin, 40 mg, subcutaneous, Daily  [Held by provider] finerenone, 10 mg, oral, Daily  [Held by provider] furosemide, 20 mg, oral, Daily  [Held by provider] isosorbide mononitrate ER, 60 mg, oral, Daily before breakfast  pantoprazole, 40 mg, oral, BID  sodium chloride, 500 mL, intravenous, Once         Continuous Medications:         PRN Medications:   PRN medications: polyethylene glycol, trimethobenzamide    Review of Systems:  Review of Systems   Constitutional:  Positive for appetite change. Negative for chills, fever and unexpected weight change.   HENT:  Negative for congestion, nosebleeds, sinus pressure, sinus pain and sore throat.    Eyes:  Negative for photophobia, pain, discharge and redness.   Respiratory:  Negative for cough, choking, chest tightness, shortness of breath and wheezing.    Cardiovascular:  Negative for chest pain.   Gastrointestinal:  Positive for abdominal pain, nausea and vomiting. Negative for abdominal distention, constipation, diarrhea and rectal pain.   Endocrine: Negative for polydipsia, polyphagia and polyuria.   Genitourinary:  Negative for difficulty urinating, dyspareunia, dysuria, enuresis, flank pain, frequency and hematuria.   Neurological:  Positive for weakness and light-headedness.   Psychiatric/Behavioral:  Negative for agitation  "and behavioral problems.         Objective   Vitals:  Most Recent: /65   Pulse 73   Temp 36 °C (96.8 °F) (Temporal)   Resp 10   Ht 1.499 m (4' 11\")   Wt 85.3 kg (188 lb)   LMP  (LMP Unknown)   SpO2 100%   BMI 37.97 kg/m²     24hr Min/Max:  Temp  Min: 36 °C (96.8 °F)  Max: 36 °C (96.8 °F)  Pulse  Min: 73  Max: 87  BP  Min: 97/66  Max: 122/65  Resp  Min: 10  Max: 17  SpO2  Min: 99 %  Max: 100 %      Intake/Output Summary (Last 24 hours) at 9/11/2024 1648  Last data filed at 9/11/2024 1336  Gross per 24 hour   Intake 500 ml   Output --   Net 500 ml         Physical exam:    Physical Exam  Constitutional:       General: She is not in acute distress.     Appearance: Normal appearance. She is obese. She is not ill-appearing.   HENT:      Mouth/Throat:      Mouth: Mucous membranes are dry.      Pharynx: Oropharynx is clear.   Eyes:      General:         Right eye: No discharge.         Left eye: No discharge.      Extraocular Movements: Extraocular movements intact.      Pupils: Pupils are equal, round, and reactive to light.   Cardiovascular:      Rate and Rhythm: Normal rate and regular rhythm.      Heart sounds: No murmur heard.  Pulmonary:      Effort: Pulmonary effort is normal. No respiratory distress.      Breath sounds: Normal breath sounds. No wheezing.   Chest:      Chest wall: No tenderness.   Abdominal:      General: Abdomen is flat. Bowel sounds are normal. There is no distension.      Tenderness: There is abdominal tenderness (mild epigastric tenderness to palpation). There is no guarding or rebound.   Musculoskeletal:         General: No swelling.      Cervical back: Normal range of motion.      Right lower leg: No edema.      Left lower leg: No edema.   Skin:     Capillary Refill: Capillary refill takes less than 2 seconds.      Findings: No bruising or erythema.      Comments: Multiple chronic skin changes noted on bilateral lower legs, melanotic skin changes on upper back and neck "   Neurological:      General: No focal deficit present.      Mental Status: She is alert and oriented to person, place, and time.      Sensory: No sensory deficit.      Motor: No weakness.   Psychiatric:         Mood and Affect: Mood normal.         Behavior: Behavior normal.          Lab/Radiology/Diagnostic Review:  Results for orders placed or performed during the hospital encounter of 09/11/24 (from the past 24 hour(s))   CBC with Differential   Result Value Ref Range    WBC 8.6 4.4 - 11.3 x10*3/uL    nRBC 0.0 0.0 - 0.0 /100 WBCs    RBC 4.65 4.00 - 5.20 x10*6/uL    Hemoglobin 13.8 12.0 - 16.0 g/dL    Hematocrit 43.3 36.0 - 46.0 %    MCV 93 80 - 100 fL    MCH 29.7 26.0 - 34.0 pg    MCHC 31.9 (L) 32.0 - 36.0 g/dL    RDW 17.9 (H) 11.5 - 14.5 %    Platelets 216 150 - 450 x10*3/uL    Neutrophils % 64.7 40.0 - 80.0 %    Immature Granulocytes %, Automated 0.5 0.0 - 0.9 %    Lymphocytes % 20.4 13.0 - 44.0 %    Monocytes % 8.8 2.0 - 10.0 %    Eosinophils % 4.3 0.0 - 6.0 %    Basophils % 1.3 0.0 - 2.0 %    Neutrophils Absolute 5.54 1.20 - 7.70 x10*3/uL    Immature Granulocytes Absolute, Automated 0.04 0.00 - 0.70 x10*3/uL    Lymphocytes Absolute 1.74 1.20 - 4.80 x10*3/uL    Monocytes Absolute 0.75 0.10 - 1.00 x10*3/uL    Eosinophils Absolute 0.37 0.00 - 0.70 x10*3/uL    Basophils Absolute 0.11 (H) 0.00 - 0.10 x10*3/uL   Comprehensive Metabolic Panel   Result Value Ref Range    Glucose 230 (H) 74 - 99 mg/dL    Sodium 134 (L) 136 - 145 mmol/L    Potassium 3.5 3.5 - 5.3 mmol/L    Chloride 91 (L) 98 - 107 mmol/L    Bicarbonate 30 21 - 32 mmol/L    Anion Gap 17 10 - 20 mmol/L    Urea Nitrogen 32 (H) 6 - 23 mg/dL    Creatinine 2.79 (H) 0.50 - 1.05 mg/dL    eGFR 18 (L) >60 mL/min/1.73m*2    Calcium 10.2 8.6 - 10.6 mg/dL    Albumin 4.8 3.4 - 5.0 g/dL    Alkaline Phosphatase 99 33 - 136 U/L    Total Protein 8.5 (H) 6.4 - 8.2 g/dL    AST 17 9 - 39 U/L    Bilirubin, Total 0.6 0.0 - 1.2 mg/dL    ALT 13 7 - 45 U/L   Brain Natriuretic  Peptide   Result Value Ref Range    BNP 5 0 - 99 pg/mL   Troponin I, High Sensitivity, Initial   Result Value Ref Range    Troponin I, High Sensitivity (CMC) 12 0 - 34 ng/L   Magnesium   Result Value Ref Range    Magnesium 3.30 (H) 1.60 - 2.40 mg/dL   Lipase   Result Value Ref Range    Lipase 45 9 - 82 U/L   BLOOD GAS VENOUS FULL PANEL   Result Value Ref Range    POCT pH, Venous 7.37 7.33 - 7.43 pH    POCT pCO2, Venous 59 (H) 41 - 51 mm Hg    POCT pO2, Venous 22 (L) 35 - 45 mm Hg    POCT SO2, Venous 21 (L) 45 - 75 %    POCT Oxy Hemoglobin, Venous 20.6 (L) 45.0 - 75.0 %    POCT Hematocrit Calculated, Venous 44.0 36.0 - 46.0 %    POCT Sodium, Venous 132 (L) 136 - 145 mmol/L    POCT Potassium, Venous 3.6 3.5 - 5.3 mmol/L    POCT Chloride, Venous 92 (L) 98 - 107 mmol/L    POCT Ionized Calicum, Venous 1.22 1.10 - 1.33 mmol/L    POCT Glucose, Venous 250 (H) 74 - 99 mg/dL    POCT Lactate, Venous 2.0 0.4 - 2.0 mmol/L    POCT Base Excess, Venous 6.7 (H) -2.0 - 3.0 mmol/L    POCT HCO3 Calculated, Venous 34.1 (H) 22.0 - 26.0 mmol/L    POCT Hemoglobin, Venous 14.5 12.0 - 16.0 g/dL    POCT Anion Gap, Venous 10.0 10.0 - 25.0 mmol/L    Patient Temperature 37.0 degrees Celsius    FiO2 21 %   Type And Screen   Result Value Ref Range    ABO TYPE O     Rh TYPE POS     ANTIBODY SCREEN NEG    Coagulation Screen   Result Value Ref Range    Protime 10.5 9.8 - 12.8 seconds    INR 0.9 0.9 - 1.1    aPTT 37 27 - 38 seconds   Sars-CoV-2 PCR   Result Value Ref Range    Coronavirus 2019, PCR Not Detected Not Detected   Influenza A, and B PCR   Result Value Ref Range    Flu A Result Not Detected Not Detected    Flu B Result Not Detected Not Detected       Assessment     Nelida Mata is a 64 y.o. female with PMH of HFpEF (50-55% 9/2023), CAD s/p PCI w/ stent x 3 (2016, 2018) and STEMI (9/2023), TIA (9/2023), SVC thrombus on Lovenox, remote DVT, T2DM, HLD, CKD, obesity, NAFLD, MICHAEL, and prior toe amputations for gangrene in 2020 presenting  to ED on 9/11/24 for Nausea and Vomiting, admitted for CLARIBEL on CKD. Patient likely has pre-renal CLARIBEL on CKD given 4 days of vomiting, poor po intake, and decreased urination during this time. Patient will be given fluid bolus, started on full liquid diet and have repeat lab testing done. Low suspicion for appendicitis or diverticulitis at this time given absence of vital sign abnormalities, no leukocytosis, grossly benign physical exam. Determined to be hemodynamically stable and appropriate for regular nursing floor. To be admitted to Hospitalist Team D for further management. Detailed plan as follows:    Plan      Principal Problem:    CLARIBEL (acute kidney injury) (CMS-Formerly [facility])      #Stage 2, non-oliguric CLARIBEL on CKD  - Recent CR in past month has ranged from 1.2-1.5  - Cr on admission 2.79, with Cr Clearance of 19  - s/p 500 ml bolus in ED  - Will switch Lovenox to Heparin SubQ given Cr Clearance  - Ordered repeat 500 ml NS bolus  - Holding nephrotoxic mediations  - will order urine lytes  - order repeat CMP  - Start full liquid diet and advance as tolerated  [ ] Fu CMP  [ ] Fu Urine lytes    #Nausea & Vomiting  #Prolonged QTC  - QTC prolonged to 511  - Tigan PRN ordered  - Pt tolerating liquids     #HTN  #HFpEF  #CAD s/p stenting  :: TTE (6/7/24) LVEF 55-60%  - Holding home Imdur and Lasix in setting of CLARIBEL  - C/w home aspirin  - C/w Carvedilol 12.5 mg BID  - C/w Lipitor 80 mg    #GERD  - c/w Protonix 40 mg EC    #T2DM  - Last A1c 7.6% on 8/2024  - Holding home Kerendia, Farxiga, Semaglutide i/s/o CLARIBEL  - Continued home Degludec 52 units and SSI      Dietary Orders (From admission, onward)       Start     Ordered    09/11/24 1631  Adult diet Full Liquid  Diet effective now        Question:  Diet type  Answer:  Full Liquid    09/11/24 1632                   Fluids: 500 ml NS bolus  O2: RA  DVT ppx: Heparin SubQ  GI ppx: Protonix  Abx: N/a  Code Status: Full Code   NOK: Glynn Joe  [phone]   PCP: Betty Boone  MD       Patient seen and discussed with attending physician Dr. Ventura  Plan preliminary until cosigned by attending physician.    Duran Gonzalez MD  Family Medicine  PGY-2

## 2024-09-11 NOTE — PROGRESS NOTES
Pharmacy Medication History Review    Nelida Mata is a 64 y.o. female admitted for CLARIBEL (acute kidney injury) (CMS-Formerly [facility]). Pharmacy reviewed the patient's [id number] medications and allergies for accuracy.    Medications ADDED:  Melatonin 5 mg  Vitamin D3   Medications CHANGED:  Melatonin increased from 3 mg to 5 mg   Medications REMOVED/NO LONGER TAKING:   Eliquis  Metoprolol       The list below reflects the updated PTA list.   Prior to Admission Medications   Prescriptions Last Dose Informant   DULoxetine (Cymbalta) 30 mg DR capsule Past Week at Monday Night Self   Sig: Take 1 capsule (30 mg) by mouth once daily in the morning AND 2 capsules (60 mg) once daily at bedtime. Do not crush or chew..   FreeStyle Filipe 2 Sensor kit  Self   Sig: Apply 1 sensor to the back of the upper arm. Change sensor every 14 days.   FreeStyle Filipe reader (FreeStyle Filipe 2 Wells) misc  Self   Sig: Use as instructed   acetaminophen (Tylenol) 325 mg tablet Past Week Self   Sig: Take 3 tablets (975 mg) by mouth every 8 hours if needed for mild pain (1 - 3).   aspirin 81 mg EC tablet Past Week at Monday Self   Sig: Take 1 tablet (81 mg) by mouth once daily.   atorvastatin (Lipitor) 80 mg tablet Past Week at Monday Self   Sig: TAKE 1 TABLET BY MOUTH ONCE DAILY   blood pressure monitor kit     Si each once daily. Use once daily to check blood pressure   blood sugar diagnostic (OneTouch Ultra Test) strip     Sig: USE 1 STRIP(S) TO TEST BLOOD SUGAR 3 TIMES A DAY   brimonidine (AlphaGAN) 0.2 % ophthalmic solution 2024 at AM Self   Sig: Administer 1 drop into both eyes 2 times a day. PATIENT NEEDS PCP APPT FOR FURTHER REFILLS   carvedilol (Coreg) 12.5 mg tablet Past Week at Monday Night Self   Sig: Take 1 tablet (12.5 mg) by mouth 2 times daily (morning and late afternoon).   cholecalciferol (Vitamin D3) 10 MCG (400 UNIT) tablet Past Week at Monday Self   Sig: Take 1 tablet (10 mcg) by mouth once daily.    dapagliflozin propanediol (Farxiga) 10 mg Past Week at Monday Morning Self   Sig: Take 1 tablet (10 mg) by mouth once daily.   diclofenac sodium (Voltaren) 1 % gel Past Week at Monday Self   Sig: Apply 4.5 inches (4 g) topically 2 times a day.   enoxaparin (Lovenox) 40 mg/0.4 mL syringe 2024 at AM Self   Sig: Inject 0.4 mL (40 mg) under the skin once daily.   finerenone (Kerendia) 10 mg tablet tablet 2024 at AM Self   Sig: Take 1 tablet (10 mg) by mouth once daily.   furosemide (Lasix) 20 mg tablet Past Week at Monday Morning Self   Sig: Take 1 tablet (20 mg) by mouth once daily.   glucose 4 gram chewable tablet 9/10/2024 at Morning Self   Sig: Chew 4 tablets (16 g) 1 time for 1 dose as needed   hydrOXYzine HCL (Atarax) 25 mg tablet 9/10/2024 Self   Sig: Take 1 tablet (25 mg) by mouth 2 times a day as needed for itching for up to 7 days.   insulin degludec (Tresiba FlexTouch U-200) 200 unit/mL (3 mL) injection 9/10/2024 at Night Self   Sig: Inject 52 Units under the skin once daily at bedtime. Take as directed per insulin instructions.   insulin lispro (HumaLOG KwikPen Insulin) 200 unit/mL (3 mL) insulin pen pen 9/10/2024 Self   Sig: Inject 22 Units under the skin 3 times daily (morning, midday, late afternoon). Take as directed per insulin instructions.   isosorbide mononitrate ER (Imdur) 60 mg 24 hr tablet 9/10/2024 Self   Sig: TAKE 1 TABLET BY MOUTH EVERY DAY IN THE MORNING   latanoprost (Xalatan) 0.005 % ophthalmic solution 9/10/2024 at Night Self   Sig: ADMINISTER 1 DROP INTO BOTH EYES ONCE DAILY AT BEDTIME.   melatonin 3 mg tablet Not Taking Self   Si TAB(S) ORALLY ONCE (AT BEDTIME), AS NEEDED -INSOMNIA   Patient not taking: Reported on 2024   melatonin 5 mg tablet 9/10/2024 at Night Self   Sig: Take 1 tablet (5 mg) by mouth as needed at bedtime for sleep.   nitroglycerin (Nitrostat) 0.4 mg SL tablet Past Week Self   Sig: PLACE 1 TABLET UNDER THE TONGUE EVERY 5 MINUTES FOR UP TO 3 DOSES AS  "NEEDED FOR CHEST PAIN.CALL 911 IF PAIN PERSISTS.   pantoprazole (ProtoNix) 40 mg EC tablet 9/10/2024 Self   Sig: Take 1 tablet (40 mg) by mouth 2 times a day.   polyethylene glycol (Glycolax, Miralax) 17 gram/dose powder 9/10/2024 Self   Sig: Take 1 capful (17 g) mixed in 4-8 ounces of liquid by mouth once daily as needed (constipation).   semaglutide 2 mg/dose (8 mg/3 mL) pen injector Past Week at Sunday Self   Sig: Inject 2 mg under the skin 1 (one) time per week.   Patient taking differently: Inject 2 mg under the skin 1 (one) time per week. Patient takes on Sundays.   sennosides-docusate sodium (Zoila-Colace) 8.6-50 mg tablet Past Week Self   Sig: Take 1 tablet by mouth 2 times a day.   urea (Carmol) 20 % cream Past Week Self   Sig: Apply 1 Application topically 2 times a day.      Facility-Administered Medications: None        The list below reflects the updated allergy list. Please review each documented allergy for additional clarification and justification.  Allergies  Reviewed by Tori Phoenix on 9/11/2024        Severity Reactions Comments    Other Low Rash Novocaine Solution, reaction rash     Penicillins Low Rash, Hives     Procaine Low Rash             Patient accepts M2B at discharge.     Sources:   Sources were out patient fill history, OARRS, and patient interview (patient was a moderate med historian.).    Additional Comments:  Patient takes Protonix once daily   Patient needs a refill for Protonix     TORI BAHENA PHOENIX  Pharmacy Technician  09/11/24     Secure Chat preferred   If no response call [phone] or [facility] \"Med Rec\"   "

## 2024-09-11 NOTE — ED PROVIDER NOTES
Emergency Department Encounter  [facility] EMERGENCY MEDICINE    Patient: Nelida Mata  MRN: [id number]  : 1960  Date of Evaluation: 2024  ED Provider: Jacinta Burgos PA-C      Chief Complaint       Chief Complaint   Patient presents with    Weakness, Gen    Chest Pain    Vomiting     HPI    Nelida Mata is a 64 y.o. female who presents to the emergency department presenting for nausea, vomiting, chest pain and shortness of breath.  Patient states all of her symptoms started on .  Denies any known ill contacts COVID exposures.  Endorses nonbilious, nonbloody emesis.  Denies any diarrhea, dark tarry stools.  Endorses some epigastric abdominal pain that radiates up into her chest.  No radiation of chest pain to her extremities, jaw, back.  Reports that she feels short of breath with exertion.  PMH HFpEF, CAD status post PCI with stents, SVC thrombus on Lovenox.  Was recently admitted to the hospital on  for GI bleed.  Received 1 unit of PRBCs and has been receiving IV iron infusion as an outpatient, last was on . Denies any associated lower extremity pain, redness, swelling or increased warmth. No recent travel or surgery. Does not use any exogenous hormones. Denies fever/chills, HA/dizziness, changes in hearing/vision, dysuria, neck/back pain, LOC, new numbness/tingling, weakness, rash, cough and any other symptoms.    ROS:     Review of Systems  14 systems reviewed and otherwise acutely negative except as in the HPI.    Past History     Past Medical History:   Diagnosis Date    Abnormal uterine and vaginal bleeding, unspecified 04/10/2018    Abnormal uterine bleeding    Abnormal uterine and vaginal bleeding, unspecified 2018    Abnormal uterine bleeding    Acute candidiasis of vulva and vagina 04/10/2018    Vaginal candida    Acute candidiasis of vulva and vagina 2018    Vaginal candida    Acute vaginitis 04/10/2018    Vaginitis     Acute vaginitis 02/22/2018    Vaginitis    Alcohol abuse, in remission     History of alcohol abuse    Anxiety disorder, unspecified 04/10/2018    Anxiety    Anxiety disorder, unspecified 02/22/2018    Anxiety    Atherosclerotic heart disease of native coronary artery without angina pectoris 04/10/2018    Arteriosclerotic cardiovascular disease (ASCVD)    Atherosclerotic heart disease of native coronary artery without angina pectoris 02/22/2018    Arteriosclerotic cardiovascular disease (ASCVD)    Atherosclerotic heart disease of native coronary artery without angina pectoris 02/22/2018    CAD (coronary artery disease)    Atherosclerotic heart disease of native coronary artery without angina pectoris 04/10/2018    CAD (coronary artery disease)    Atypical facial pain 04/10/2018    Atypical face pain    Atypical facial pain 02/22/2018    Atypical face pain    Benign lipomatous neoplasm, unspecified 04/10/2018    Lipoma    Benign lipomatous neoplasm, unspecified 02/22/2018    Lipoma    Changes in skin texture 04/10/2018    Thickening, skin    Changes in skin texture 02/22/2018    Thickening, skin    Chest pain, unspecified 04/10/2018    Chest pain at rest    Chest pain, unspecified 02/22/2018    Chest pain at rest    Chronic cough 04/10/2018    Cough, persistent    Chronic cough 02/22/2018    Cough, persistent    Chronic pain syndrome 04/10/2018    Pain syndrome, chronic    Chronic pain syndrome 02/22/2018    Pain syndrome, chronic    Cramp and spasm 04/10/2018    Cramp and spasm    Cramp and spasm 02/22/2018    Cramp and spasm    Depression, unspecified 04/10/2018    Depression    Depression, unspecified 02/22/2018    Depression    Disorder of pigmentation, unspecified 04/10/2018    Discoloration of skin of lower leg    Disorder of pigmentation, unspecified 02/22/2018    Discoloration of skin of lower leg    Dysuria 04/10/2018    Dysuria    Dysuria 02/22/2018    Dysuria    Encounter for immunization 04/10/2018    Flu  vaccine need    Encounter for immunization 04/10/2018    Immunization due    Encounter for immunization 02/22/2018    Flu vaccine need    Encounter for immunization 02/22/2018    Immunization due    Encounter for screening for infections with a predominantly sexual mode of transmission     Routine screening for STI (sexually transmitted infection)    Encounter for screening mammogram for malignant neoplasm of breast 04/10/2018    Visit for screening mammogram    Encounter for screening mammogram for malignant neoplasm of breast 02/22/2018    Visit for screening mammogram    Essential (primary) hypertension 04/10/2018    Hypertension, uncontrolled    Essential (primary) hypertension 02/22/2018    Hypertension, uncontrolled    Fatty (change of) liver, not elsewhere classified 04/10/2018    NAFLD (nonalcoholic fatty liver disease)    Fatty (change of) liver, not elsewhere classified 02/22/2018    NAFLD (nonalcoholic fatty liver disease)    Fibromyalgia 04/10/2018    Fibromyalgia    Fibromyalgia 02/22/2018    Fibromyalgia    Gastro-esophageal reflux disease without esophagitis 04/10/2018    GERD (gastroesophageal reflux disease)    Generalized contraction of visual field, left eye 02/06/2017    Generalized contraction of visual field of left eye    Headache, unspecified 04/10/2018    Headache    Headache, unspecified 02/22/2018    Headache    Heart failure, unspecified (Multi) 02/22/2018    CHF, chronic    Heart failure, unspecified (Multi) 04/10/2018    CHF, chronic    Hereditary and idiopathic neuropathy, unspecified 04/10/2018    Idiopathic peripheral neuropathy    Hereditary and idiopathic neuropathy, unspecified 02/22/2018    Idiopathic peripheral neuropathy    Hydroureter 04/10/2018    Hydroureter on left    Hydroureter 02/22/2018    Hydroureter on left    Hyperlipidemia, unspecified     Dyslipidemia    Hypertensive urgency 04/10/2018    Asymptomatic hypertensive urgency    Hypertensive urgency 02/22/2018     Asymptomatic hypertensive urgency    Hypokalemia 04/10/2018    Hypokalemia    Hypokalemia 02/22/2018    Hypokalemia    Insomnia, unspecified 04/10/2018    Insomnia    Insomnia, unspecified 02/22/2018    Insomnia    Localized edema 04/10/2018    Bilateral edema of lower extremity    Localized edema 02/22/2018    Bilateral edema of lower extremity    Macula scars of posterior pole (postinflammatory) (post-traumatic), left eye 04/10/2018    Macula scar of posterior pole of left eye    Morbid (severe) obesity due to excess calories (Multi) 04/10/2018    Morbid obesity with BMI of 40.0-44.9, adult    Morbid (severe) obesity due to excess calories (Multi) 02/22/2018    Morbid obesity with BMI of 40.0-44.9, adult    Nail dystrophy 04/10/2018    Dystrophia unguium    Nail dystrophy 02/22/2018    Dystrophia unguium    Non-ST elevation (NSTEMI) myocardial infarction (Multi) 04/10/2018    NSTEMI, initial episode of care    Non-ST elevation (NSTEMI) myocardial infarction (Multi) 02/22/2018    NSTEMI, initial episode of care    Obstructive sleep apnea (adult) (pediatric) 04/10/2018    Obstructive sleep apnea    Obstructive sleep apnea (adult) (pediatric) 02/22/2018    Obstructive sleep apnea    Other chest pain 02/22/2018    Chest tightness or pressure    Other chest pain 04/10/2018    Chest tightness or pressure    Other conditions influencing health status 04/10/2018    Insulin dependent type 2 diabetes mellitus, uncontrolled    Other conditions influencing health status 04/10/2018    Uncontrolled diabetes mellitus    Other conditions influencing health status 04/10/2018    DM (diabetes mellitus), type 2, uncontrolled, with renal complications    Other conditions influencing health status 02/22/2018    Insulin dependent type 2 diabetes mellitus, uncontrolled    Other conditions influencing health status 02/22/2018    Uncontrolled diabetes mellitus    Other conditions influencing health status 02/22/2018    DM (diabetes  mellitus), type 2, uncontrolled, with renal complications    Other fatigue 04/10/2018    Fatigue    Other fatigue 02/22/2018    Fatigue    Other fecal abnormalities 04/10/2018    Loose stools    Other fecal abnormalities 02/22/2018    Loose stools    Other forms of dyspnea 04/10/2018    Dyspnea on exertion    Other forms of dyspnea 02/22/2018    Dyspnea on exertion    Other problems related to lifestyle     Other problems related to lifestyle    Other specified health status 04/10/2018    Medically complex patient    Other specified health status 02/22/2018    Medically complex patient    Pain in right knee 02/22/2018    Knee pain, bilateral    Pain in right knee 04/10/2018    Knee pain, bilateral    Peripheral vascular disease, unspecified (CMS-[facility]) 04/10/2018    Peripheral vascular disease    Peripheral vascular disease, unspecified (CMS-HCC) 02/22/2018    Peripheral vascular disease    Personal history of other diseases of the digestive system     History of esophageal reflux    Personal history of other diseases of the digestive system     History of constipation    Personal history of other diseases of the nervous system and sense organs     History of glaucoma    Personal history of other endocrine, nutritional and metabolic disease     History of hypothyroidism    Personal history of other endocrine, nutritional and metabolic disease     History of hyperlipidemia    Personal history of other endocrine, nutritional and metabolic disease     History of diabetes mellitus    Polyarthritis, unspecified 04/10/2018    Polyarthritis    Polyarthritis, unspecified 02/22/2018    Polyarthritis    Polyp of colon 04/10/2018    Colon polyps    Polyp of colon 02/22/2018    Colon polyps    Postmenopausal bleeding 04/10/2018    Postmenopausal bleeding    Postmenopausal bleeding 02/22/2018    Postmenopausal bleeding    Presence of coronary angioplasty implant and graft 04/10/2018    Presence of drug coated stent in right  coronary artery    Presence of coronary angioplasty implant and graft 02/22/2018    Presence of drug coated stent in right coronary artery    Presence of intraocular lens 04/10/2018    Pseudophakia, both eyes    Presence of intraocular lens 03/18/2015    Pseudophakia of left eye    Primary open-angle glaucoma, unspecified eye, stage unspecified 04/10/2018    Primary open angle glaucoma    Proteinuria, unspecified 04/10/2018    Microalbuminuria    Proteinuria, unspecified 02/22/2018    Microalbuminuria    Pruritus, unspecified 04/10/2018    Pruritic dermatitis    Pruritus, unspecified 02/22/2018    Pruritic dermatitis    Psoriasis, unspecified 04/10/2018    Psoriasis    Psoriasis, unspecified 02/22/2018    Psoriasis    Rash and other nonspecific skin eruption 04/10/2018    Rash/skin eruption    Rash and other nonspecific skin eruption 02/22/2018    Rash/skin eruption    Repeated falls 04/10/2018    Multiple falls    Repeated falls 02/22/2018    Multiple falls    Retinal hemorrhage, right eye 02/06/2017    Retinal hemorrhage of right eye    Shortness of breath 04/10/2018    Shortness of breath    Shortness of breath 02/22/2018    Shortness of breath    Sleep apnea, unspecified 04/10/2018    Sleep disorder breathing    Sleep apnea, unspecified 02/22/2018    Sleep disorder breathing    Traction detachment of retina, right eye 02/06/2017    Retinal detachment, tractional, right eye    Type 2 diabetes mellitus with diabetic polyneuropathy (Multi) 04/10/2018    Controlled type 2 diabetes mellitus with diabetic polyneuropathy, with long-term current use of insulin    Type 2 diabetes mellitus with diabetic polyneuropathy (Multi) 02/22/2018    Controlled type 2 diabetes mellitus with diabetic polyneuropathy, with long-term current use of insulin    Type 2 diabetes mellitus with other skin ulcer (CODE) (Multi) 04/10/2018    Diabetic leg ulcer    Type 2 diabetes mellitus with other skin ulcer (CODE) (Multi) 02/22/2018     Diabetic leg ulcer    Type 2 diabetes mellitus with proliferative diabetic retinopathy without macular edema, unspecified eye (Multi) 04/10/2018    Proliferative diabetic retinopathy    Type 2 diabetes mellitus with unspecified diabetic retinopathy without macular edema (Multi) 02/06/2017    Background diabetic retinopathy    Umbilical hernia without obstruction or gangrene     Umbilical hernia    Unqualified visual loss, both eyes 02/06/2017    Complete loss of vision in visual field    Unspecified glaucoma 06/11/2015    Glaucoma    Unstable angina (Multi) 04/10/2018    Unstable angina    Unstable angina (Multi) 02/22/2018    Unstable angina    Vitamin D deficiency, unspecified 04/10/2018    Vitamin D deficiency, unspecified    Vitamin D deficiency, unspecified 02/22/2018    Vitamin D deficiency, unspecified    Xerosis cutis 04/10/2018    Xerosis of skin    Xerosis cutis 02/22/2018    Xerosis of skin     Past Surgical History:   Procedure Laterality Date    BREAST BIOPSY Left     2007    CARDIAC CATHETERIZATION N/A 7/5/2024    Procedure: Left And Right Heart Cath, Without LV;  Surgeon: Loki Donis MD;  Location: [place] [id number] Cardiac Cath Lab;  Service: Cardiovascular;  Laterality: N/A;    CATARACT EXTRACTION  03/18/2015    Cataract Surgery    CORONARY ANGIOPLASTY WITH STENT PLACEMENT  03/06/2017    Cath Stent Placement    CT AORTA AND BILATERAL ILIOFEMORAL RUNOFF ANGIOGRAM W AND/OR WO IV CONTRAST  08/08/2022    CT AORTA AND BILATERAL ILIOFEMORAL RUNOFF ANGIOGRAM W AND/OR WO IV CONTRAST 8/8/2022 [facility] EMERGENCY LEGACY    MR HEAD ANGIO WO IV CONTRAST  08/16/2013    MR HEAD ANGIO WO IV CONTRAST 8/16/2013 [facility] ANCILLARY LEGACY    MR NECK ANGIO WO IV CONTRAST  08/16/2013    MR NECK ANGIO WO IV CONTRAST 8/16/2013 [facility] ANCILLARY LEGACY    RETINAL DETACHMENT SURGERY  03/18/2015    Repair Of Retinal Detachment     Social History     Socioeconomic History    Marital status: Single   Tobacco Use    Smoking status: Never      Passive exposure: Never    Smokeless tobacco: Never   Vaping Use    Vaping status: Never Used   Substance and Sexual Activity    Alcohol use: Not Currently     Comment: occasionally    Drug use: Never    Sexual activity: Defer     Social Determinants of Health     Financial Resource Strain: Low Risk  (8/19/2024)    Overall Financial Resource Strain (CARDIA)     Difficulty of Paying Living Expenses: Not hard at all   Food Insecurity: No Food Insecurity (9/28/2023)    Received from [employer] [employer]    Hunger Vital Sign     Worried About Running Out of Food in the Last Year: Never true     Ran Out of Food in the Last Year: Never true   Transportation Needs: No Transportation Needs (8/19/2024)    PRAPARE - Transportation     Lack of Transportation (Medical): No     Lack of Transportation (Non-Medical): No   Housing Stability: Low Risk  (8/19/2024)    Housing Stability Vital Sign     Unable to Pay for Housing in the Last Year: No     Number of Times Moved in the Last Year: 0     Homeless in the Last Year: No       Medications/Allergies     Previous Medications    ASPIRIN 81 MG EC TABLET    Take 1 tablet (81 mg) by mouth once daily.    ATORVASTATIN (LIPITOR) 80 MG TABLET    TAKE 1 TABLET BY MOUTH ONCE DAILY    BLOOD PRESSURE MONITOR KIT    1 each once daily. Use once daily to check blood pressure    BLOOD SUGAR DIAGNOSTIC (ONETOUCH ULTRA TEST) STRIP    USE 1 STRIP(S) TO TEST BLOOD SUGAR 3 TIMES A DAY    BRIMONIDINE (ALPHAGAN) 0.2 % OPHTHALMIC SOLUTION    Administer 1 drop into both eyes 2 times a day. PATIENT NEEDS PCP APPT FOR FURTHER REFILLS    CARVEDILOL (COREG) 12.5 MG TABLET    Take 1 tablet (12.5 mg) by mouth 2 times daily (morning and late afternoon).    DAPAGLIFLOZIN PROPANEDIOL (FARXIGA) 10 MG    Take 1 tablet (10 mg) by mouth once daily.    DICLOFENAC SODIUM (VOLTAREN) 1 % GEL    Apply 4.5 inches (4 g) topically 2 times a day.    DULOXETINE (CYMBALTA) 30 MG DR CAPSULE    Take 1 capsule (30 mg) by mouth  once daily in the morning AND 2 capsules (60 mg) once daily at bedtime. Do not crush or chew..    ENOXAPARIN (LOVENOX) 40 MG/0.4 ML SYRINGE    Inject 0.4 mL (40 mg) under the skin once daily.    FINERENONE (KERENDIA) 10 MG TABLET TABLET    Take 1 tablet (10 mg) by mouth once daily.    FREESTYLE GAUTAM 2 SENSOR KIT    Apply 1 sensor to the back of the upper arm. Change sensor every 14 days.    FREESTYLE GAUTAM READER (FREESTYLE GAUTAM 2 READER) MISC    Use as instructed    FUROSEMIDE (LASIX) 20 MG TABLET    Take 1 tablet (20 mg) by mouth once daily.    GLUCOSE 4 GRAM CHEWABLE TABLET    Chew 4 tablets (16 g) 1 time for 1 dose as needed    HYDROXYZINE HCL (ATARAX) 25 MG TABLET    Take 1 tablet (25 mg) by mouth 2 times a day as needed for itching for up to 7 days.    INSULIN DEGLUDEC (TRESIBA FLEXTOUCH U-200) 200 UNIT/ML (3 ML) INJECTION    Inject 52 Units under the skin once daily at bedtime. Take as directed per insulin instructions.    INSULIN LISPRO (HUMALOG KWIKPEN INSULIN) 200 UNIT/ML (3 ML) INSULIN PEN PEN    Inject 22 Units under the skin 3 times daily (morning, midday, late afternoon). Take as directed per insulin instructions.    ISOSORBIDE MONONITRATE ER (IMDUR) 60 MG 24 HR TABLET    TAKE 1 TABLET BY MOUTH EVERY DAY IN THE MORNING    LATANOPROST (XALATAN) 0.005 % OPHTHALMIC SOLUTION    ADMINISTER 1 DROP INTO BOTH EYES ONCE DAILY AT BEDTIME.    MELATONIN 3 MG TABLET    1 TAB(S) ORALLY ONCE (AT BEDTIME), AS NEEDED -INSOMNIA    NITROGLYCERIN (NITROSTAT) 0.4 MG SL TABLET    PLACE 1 TABLET UNDER THE TONGUE EVERY 5 MINUTES FOR UP TO 3 DOSES AS NEEDED FOR CHEST PAIN.CALL 911 IF PAIN PERSISTS.    PANTOPRAZOLE (PROTONIX) 40 MG EC TABLET    Take 1 tablet (40 mg) by mouth 2 times a day.    POLYETHYLENE GLYCOL (GLYCOLAX, MIRALAX) 17 GRAM/DOSE POWDER    Take 1 capful (17 g) mixed in 4-8 ounces of liquid by mouth once daily as needed (constipation).    SEMAGLUTIDE 2 MG/DOSE (8 MG/3 ML) PEN INJECTOR    Inject 2 mg under the  skin 1 (one) time per week.    SENNOSIDES-DOCUSATE SODIUM (KAREEM-COLACE) 8.6-50 MG TABLET    Take 1 tablet by mouth 2 times a day.    UREA (CARMOL) 20 % CREAM    Apply 1 Application topically 2 times a day.     Allergies   Allergen Reactions    Other Rash     Novocaine Solution, reaction rash     Penicillins Rash and Hives    Procaine Rash        Physical Exam       ED Triage Vitals [09/11/24 0946]   Temperature Heart Rate Respirations BP   36 °C (96.8 °F) 87 17 97/66      Pulse Ox Temp Source Heart Rate Source Patient Position   99 % Temporal Monitor Sitting      BP Location FiO2 (%)     Left arm --         Physical Exam    Physical Exam:     VS: As documented in the triage note and EMR flowsheet from this visit were reviewed.    Appearance: Alert, oriented, cooperative, in no acute distress. Well nourished & well hydrated.    Skin: Warm, intact and dry. No lesions, rash, or petechiae.    Neck: Supple, without meningismus. No lymphadenopathy.     Pulmonary: Clear bilaterally with good chest wall excursion. No rales, rhonchi or wheezing. No accessory muscle use or stridor. Nonlabored breathing, no supplemental oxygen.     Cardiac: Normal S1, S2 without murmur, rub, gallop or extrasystole. No reproducible chest tenderness.    Abdomen: Soft, nontender, active bowel sounds. Negative Pickett's sign. No point tenderness at McBurney's point, negative Rovsing and Psoas sign. No palpable organomegaly. No rebound or guarding.     Musculoskeletal: Spontaneously moving all extremities without limitation. Extremities warm and well-perfused, capillary refill less than 2 seconds. Pulses full and equal. No lower extremity erythema, edema or increased warmth.     Neurological:  Cranial nerves II through XII are grossly intact, finger-nose touch is normal, normal sensation, no weakness, no focal findings identified.     Psychiatric: Appropriate mood and affect. Kempt appearance.       Diagnostics   Labs:  Labs Reviewed   CBC WITH AUTO  DIFFERENTIAL - Abnormal       Result Value    WBC 8.6      nRBC 0.0      RBC 4.65      Hemoglobin 13.8      Hematocrit 43.3      MCV 93      MCH 29.7      MCHC 31.9 (*)     RDW 17.9 (*)     Platelets 216      Neutrophils % 64.7      Immature Granulocytes %, Automated 0.5      Lymphocytes % 20.4      Monocytes % 8.8      Eosinophils % 4.3      Basophils % 1.3      Neutrophils Absolute 5.54      Immature Granulocytes Absolute, Automated 0.04      Lymphocytes Absolute 1.74      Monocytes Absolute 0.75      Eosinophils Absolute 0.37      Basophils Absolute 0.11 (*)    COMPREHENSIVE METABOLIC PANEL - Abnormal    Glucose 230 (*)     Sodium 134 (*)     Potassium 3.5      Chloride 91 (*)     Bicarbonate 30      Anion Gap 17      Urea Nitrogen 32 (*)     Creatinine 2.79 (*)     eGFR 18 (*)     Calcium 10.2      Albumin 4.8      Alkaline Phosphatase 99      Total Protein 8.5 (*)     AST 17      Bilirubin, Total 0.6      ALT 13     MAGNESIUM - Abnormal    Magnesium 3.30 (*)    BLOOD GAS VENOUS FULL PANEL - Abnormal    POCT pH, Venous 7.37      POCT pCO2, Venous 59 (*)     POCT pO2, Venous 22 (*)     POCT SO2, Venous 21 (*)     POCT Oxy Hemoglobin, Venous 20.6 (*)     POCT Hematocrit Calculated, Venous 44.0      POCT Sodium, Venous 132 (*)     POCT Potassium, Venous 3.6      POCT Chloride, Venous 92 (*)     POCT Ionized Calicum, Venous 1.22      POCT Glucose, Venous 250 (*)     POCT Lactate, Venous 2.0      POCT Base Excess, Venous 6.7 (*)     POCT HCO3 Calculated, Venous 34.1 (*)     POCT Hemoglobin, Venous 14.5      POCT Anion Gap, Venous 10.0      Patient Temperature 37.0      FiO2 21     B-TYPE NATRIURETIC PEPTIDE - Normal    BNP 5      Narrative:        <100 pg/mL - Heart failure unlikely  100-299 pg/mL - Intermediate probability of acute heart                  failure exacerbation. Correlate with clinical                  context and patient history.    >=300 pg/mL - Heart Failure likely. Correlate with clinical                   context and patient history.     Biotin interference may cause falsely decreased results. Patients taking a Biotin dose of up to 5 mg/day should refrain from taking Biotin for 24 hours before sample  collection. Providers may contact their local laboratory for further information.   SERIAL TROPONIN-INITIAL - Normal    Troponin I, High Sensitivity (CMC) 12      Narrative:     Less than 99th percentile of normal range cutoff-  Female and children under 18 years old <35 ng/L; Male <54 ng/L: Negative  Repeat testing should be performed if clinically indicated.     Female and children under 18 years old  ng/L; Male  ng/L:  Consistent with possible cardiac damage and possible increased clinical   risk. Serial measurements may help to assess extent of myocardial damage.     >120 ng/L: Consistent with cardiac damage, increased clinical risk and  myocardial infarction. Serial measurements may help assess extent of   myocardial damage.      NOTE: Children less than 1 year old may have higher baseline troponin   levels and results should be interpreted in conjunction with the overall   clinical context.    NOTE: Troponin I testing is performed using a different   testing methodology at [facility] than at other   [facility]. Direct result comparisons should only   be made within the same method.     LIPASE - Normal    Lipase 45      Narrative:     Venipuncture immediately after or during the administration of Metamizole may lead to falsely low results. Testing should be performed immediately prior to Metamizole dosing.   COAGULATION SCREEN - Normal    Protime 10.5      INR 0.9      aPTT 37      Narrative:     The APTT is no longer used for monitoring Unfractionated Heparin Therapy. For monitoring Heparin Therapy, use the Heparin Assay.   SARS-COV-2 PCR - Normal    Coronavirus 2019, PCR Not Detected      Narrative:     This assay has received FDA Emergency Use Authorization (EUA) and is  only authorized for the duration of time that circumstances exist to justify the authorization of the emergency use of in vitro diagnostic tests for the detection of SARS-CoV-2 virus and/or diagnosis of COVID-19 infection under section 564(b)(1) of the Act, 21 U.S.C. 360bbb-3(b)(1). This assay is an in vitro diagnostic nucleic acid amplification test for the qualitative detection of SARS-CoV-2 from nasopharyngeal specimens and has been validated for use at [facility]. Negative results do not preclude COVID-19 infections and should not be used as the sole basis for diagnosis, treatment, or other management decisions.     INFLUENZA A AND B PCR - Normal    Flu A Result Not Detected      Flu B Result Not Detected      Narrative:     This assay is an in vitro diagnostic multiplex nucleic acid amplification test for the detection and discrimination of Influenza A & B from nasopharyngeal specimens, and has been validated for use at [facility]. Negative results do not preclude Influenza A/B infections, and should not be used as the sole basis for diagnosis, treatment, or other management decisions. If Influenza A/B and RSV PCR results are negative, testing for Parainfluenza virus, Adenovirus and Metapneumovirus is routinely performed for [facility] pediatric oncology and intensive care inpatients, and is available on other patients by placing an add-on request.   TYPE AND SCREEN    ABO TYPE O      Rh TYPE POS      ANTIBODY SCREEN NEG     TROPONIN SERIES- (INITIAL, 1 HR)    Narrative:     The following orders were created for panel order Troponin I Series, High Sensitivity (0, 1 HR).  Procedure                               Abnormality         Status                     ---------                               -----------         ------                     Troponin I, High Sensiti...[[id number]]  Normal              Final result               Troponin, High Sensitivi...[[id number]]        "                                            Please view results for these tests on the individual orders.   URINALYSIS WITH REFLEX CULTURE AND MICROSCOPIC    Narrative:     The following orders were created for panel order Urinalysis with Reflex Culture and Microscopic.  Procedure                               Abnormality         Status                     ---------                               -----------         ------                     Urinalysis with Reflex C...[[id number]]                                                 Extra Urine Gray Tube[[id number]]                                                         Please view results for these tests on the individual orders.   URINALYSIS WITH REFLEX CULTURE AND MICROSCOPIC   EXTRA URINE GRAY TUBE   SERIAL TROPONIN, 1 HOUR     Radiographs:  XR chest 2 views   Final Result   1.  Low lung volumes otherwise, no acute cardiopulmonary process.   2. Mild degenerative changes of bilateral AC joints and suggestion of   moderate to severe degenerative changes of the right shoulder.   3. Coronary calcifications are noted.        I personally reviewed the images/study and I agree with the findings   as stated by Davian Mckeon MD (resident). This study was   interpreted at [place], [place].        MACRO:   None        Signed by: Gio Francis 9/11/2024 11:21 AM   Dictation workstation:   [id number]        EKG #1 9/11/24 0948:  NSR. Slightly prolonged Qtc 511ms. No STEMI. No dynamic changes as compared to previous EKG on 8/11/24.    ED Course   Visit Vitals  /82   Pulse 85   Temp 36 °C (96.8 °F) (Temporal)   Resp 17   Ht 1.499 m (4' 11\")   Wt 85.3 kg (188 lb)   LMP  (LMP Unknown)   SpO2 99%   BMI 37.97 kg/m²   OB Status Postmenopausal   Smoking Status Never   BSA 1.88 m²     Medications   trimethobenzamide (Tigan) injection 200 mg (has no administration in time range)   sodium chloride 0.9% infusion (has no " administration in time range)   sodium chloride 0.9 % bolus 500 mL (0 mL intravenous Stopped 9/11/24 1336)       Medical Decision Making   EKG normal sinus rhythm, 85 bpm, with a slight prolonged QTc of 511 ms.  Tigan is ordered as needed for nausea and vomiting.  Given 500 cc bolus for nausea and vomiting in the setting of CHF.  Labs are stable, normal troponin.  No leukocytosis or acute anemia.  Patient does have significant CLARIBEL, creatinine is nearly doubled in the past 3 weeks.  Chest x-ray without acute cardiopulmonary processes. Staffed with supervising physician, Nadia Medel MD;Darren Anaya, who agrees with workup and treatment plan. Admitted to medicine.      Final Impression      1. CLARIBEL (acute kidney injury) (CMS-HCC)    2. Nausea and vomiting, unspecified vomiting type          DISPOSITION  Disposition: admit  Patient condition is: Stable    Comment: Please note this report has been produced using speech recognition software and may contain errors related to that system including errors in grammar, punctuation, and spelling, as well as words and phrases that may be inappropriate.  If there are any questions or concerns please feel free to contact the dictating provider for clarification.    ABDON Posadas PA-C  09/11/24 7679

## 2024-09-12 ENCOUNTER — APPOINTMENT (OUTPATIENT)
Dept: CARDIOLOGY | Facility: HOSPITAL | Age: 64
End: 2024-09-12
Payer: COMMERCIAL

## 2024-09-12 ENCOUNTER — APPOINTMENT (OUTPATIENT)
Dept: SURGERY | Facility: CLINIC | Age: 64
End: 2024-09-12
Payer: COMMERCIAL

## 2024-09-12 ENCOUNTER — TELEPHONE (OUTPATIENT)
Dept: SURGERY | Facility: CLINIC | Age: 64
End: 2024-09-12
Payer: COMMERCIAL

## 2024-09-12 VITALS
RESPIRATION RATE: 18 BRPM | SYSTOLIC BLOOD PRESSURE: 114 MMHG | HEART RATE: 88 BPM | OXYGEN SATURATION: 98 % | BODY MASS INDEX: 37.9 KG/M2 | DIASTOLIC BLOOD PRESSURE: 60 MMHG | TEMPERATURE: 97.7 F | HEIGHT: 59 IN | WEIGHT: 188 LBS

## 2024-09-12 DIAGNOSIS — Z79.4 CONTROLLED TYPE 2 DIABETES MELLITUS WITHOUT COMPLICATION, WITH LONG-TERM CURRENT USE OF INSULIN (MULTI): ICD-10-CM

## 2024-09-12 DIAGNOSIS — E11.9 CONTROLLED TYPE 2 DIABETES MELLITUS WITHOUT COMPLICATION, WITH LONG-TERM CURRENT USE OF INSULIN (MULTI): ICD-10-CM

## 2024-09-12 DIAGNOSIS — G47.33 OBSTRUCTIVE SLEEP APNEA: ICD-10-CM

## 2024-09-12 DIAGNOSIS — I20.0 UNSTABLE ANGINA (MULTI): ICD-10-CM

## 2024-09-12 DIAGNOSIS — I50.32 CHRONIC HEART FAILURE WITH PRESERVED EJECTION FRACTION (MULTI): ICD-10-CM

## 2024-09-12 DIAGNOSIS — Z86.718 HISTORY OF DVT (DEEP VEIN THROMBOSIS): Chronic | ICD-10-CM

## 2024-09-12 DIAGNOSIS — I25.118 CORONARY ARTERY DISEASE OF NATIVE ARTERY OF NATIVE HEART WITH STABLE ANGINA PECTORIS (CMS-HCC): ICD-10-CM

## 2024-09-12 DIAGNOSIS — K92.2 GASTROINTESTINAL HEMORRHAGE, UNSPECIFIED GASTROINTESTINAL HEMORRHAGE TYPE: ICD-10-CM

## 2024-09-12 DIAGNOSIS — I21.4 NSTEMI (NON-ST ELEVATED MYOCARDIAL INFARCTION) (MULTI): ICD-10-CM

## 2024-09-12 DIAGNOSIS — K21.9 GASTROESOPHAGEAL REFLUX DISEASE WITHOUT ESOPHAGITIS: ICD-10-CM

## 2024-09-12 DIAGNOSIS — E11.65 UNCONTROLLED TYPE 2 DIABETES MELLITUS WITH HYPERGLYCEMIA (MULTI): ICD-10-CM

## 2024-09-12 DIAGNOSIS — I73.9 PERIPHERAL VASCULAR DISEASE (CMS-HCC): ICD-10-CM

## 2024-09-12 DIAGNOSIS — E78.49 OTHER HYPERLIPIDEMIA: ICD-10-CM

## 2024-09-12 DIAGNOSIS — K76.0 NAFLD (NONALCOHOLIC FATTY LIVER DISEASE): ICD-10-CM

## 2024-09-12 DIAGNOSIS — Z98.84 BARIATRIC SURGERY STATUS: ICD-10-CM

## 2024-09-12 DIAGNOSIS — I10 HYPERTENSION, UNCONTROLLED: ICD-10-CM

## 2024-09-12 DIAGNOSIS — I63.9 CEREBROVASCULAR ACCIDENT (CVA), UNSPECIFIED MECHANISM (MULTI): ICD-10-CM

## 2024-09-12 DIAGNOSIS — E55.9 VITAMIN D DEFICIENCY, UNSPECIFIED: ICD-10-CM

## 2024-09-12 DIAGNOSIS — I11.0 HYPERTENSIVE HEART DISEASE WITH HEART FAILURE (MULTI): ICD-10-CM

## 2024-09-12 DIAGNOSIS — E66.9 OBESITY, CLASS II, BMI 35-39.9: ICD-10-CM

## 2024-09-12 LAB
ALBUMIN SERPL BCP-MCNC: 3.6 G/DL (ref 3.4–5)
ALBUMIN SERPL BCP-MCNC: 3.6 G/DL (ref 3.4–5)
ALP SERPL-CCNC: 86 U/L (ref 33–136)
ALT SERPL W P-5'-P-CCNC: 11 U/L (ref 7–45)
ANION GAP SERPL CALC-SCNC: 13 MMOL/L (ref 10–20)
AST SERPL W P-5'-P-CCNC: 17 U/L (ref 9–39)
ATRIAL RATE: 82 BPM
BASOPHILS # BLD AUTO: 0.07 X10*3/UL (ref 0–0.1)
BASOPHILS NFR BLD AUTO: 1.5 %
BILIRUB DIRECT SERPL-MCNC: 0.1 MG/DL (ref 0–0.3)
BILIRUB SERPL-MCNC: 0.5 MG/DL (ref 0–1.2)
BUN SERPL-MCNC: 23 MG/DL (ref 6–23)
CALCIUM SERPL-MCNC: 8.9 MG/DL (ref 8.6–10.6)
CHLORIDE SERPL-SCNC: 102 MMOL/L (ref 98–107)
CO2 SERPL-SCNC: 25 MMOL/L (ref 21–32)
CREAT SERPL-MCNC: 1.75 MG/DL (ref 0.5–1.05)
EGFRCR SERPLBLD CKD-EPI 2021: 32 ML/MIN/1.73M*2
EOSINOPHIL # BLD AUTO: 0.26 X10*3/UL (ref 0–0.7)
EOSINOPHIL NFR BLD AUTO: 5.6 %
ERYTHROCYTE [DISTWIDTH] IN BLOOD BY AUTOMATED COUNT: 18.1 % (ref 11.5–14.5)
GLUCOSE BLD MANUAL STRIP-MCNC: 195 MG/DL (ref 74–99)
GLUCOSE BLD MANUAL STRIP-MCNC: 208 MG/DL (ref 74–99)
GLUCOSE BLD MANUAL STRIP-MCNC: 214 MG/DL (ref 74–99)
GLUCOSE SERPL-MCNC: 185 MG/DL (ref 74–99)
HCT VFR BLD AUTO: 39 % (ref 36–46)
HGB BLD-MCNC: 11.7 G/DL (ref 12–16)
IMM GRANULOCYTES # BLD AUTO: 0.02 X10*3/UL (ref 0–0.7)
IMM GRANULOCYTES NFR BLD AUTO: 0.4 % (ref 0–0.9)
LIPASE SERPL-CCNC: 44 U/L (ref 9–82)
LYMPHOCYTES # BLD AUTO: 1.26 X10*3/UL (ref 1.2–4.8)
LYMPHOCYTES NFR BLD AUTO: 26.9 %
MAGNESIUM SERPL-MCNC: 2.92 MG/DL (ref 1.6–2.4)
MCH RBC QN AUTO: 29 PG (ref 26–34)
MCHC RBC AUTO-ENTMCNC: 30 G/DL (ref 32–36)
MCV RBC AUTO: 97 FL (ref 80–100)
MONOCYTES # BLD AUTO: 0.48 X10*3/UL (ref 0.1–1)
MONOCYTES NFR BLD AUTO: 10.3 %
NEUTROPHILS # BLD AUTO: 2.59 X10*3/UL (ref 1.2–7.7)
NEUTROPHILS NFR BLD AUTO: 55.3 %
NRBC BLD-RTO: 0 /100 WBCS (ref 0–0)
P AXIS: 1 DEGREES
P OFFSET: 179 MS
P ONSET: 134 MS
PHOSPHATE SERPL-MCNC: 3.9 MG/DL (ref 2.5–4.9)
PLATELET # BLD AUTO: 171 X10*3/UL (ref 150–450)
POTASSIUM SERPL-SCNC: 3.7 MMOL/L (ref 3.5–5.3)
PR INTERVAL: 140 MS
PROT SERPL-MCNC: 6.6 G/DL (ref 6.4–8.2)
Q ONSET: 204 MS
QRS COUNT: 14 BEATS
QRS DURATION: 140 MS
QT INTERVAL: 426 MS
QTC CALCULATION(BAZETT): 497 MS
QTC FREDERICIA: 472 MS
R AXIS: -46 DEGREES
RBC # BLD AUTO: 4.03 X10*6/UL (ref 4–5.2)
SODIUM SERPL-SCNC: 136 MMOL/L (ref 136–145)
T AXIS: 17 DEGREES
T OFFSET: 417 MS
VENTRICULAR RATE: 82 BPM
WBC # BLD AUTO: 4.7 X10*3/UL (ref 4.4–11.3)

## 2024-09-12 PROCEDURE — 93010 ELECTROCARDIOGRAM REPORT: CPT | Performed by: INTERNAL MEDICINE

## 2024-09-12 PROCEDURE — G0378 HOSPITAL OBSERVATION PER HR: HCPCS

## 2024-09-12 PROCEDURE — 96361 HYDRATE IV INFUSION ADD-ON: CPT

## 2024-09-12 PROCEDURE — 93005 ELECTROCARDIOGRAM TRACING: CPT

## 2024-09-12 PROCEDURE — 83735 ASSAY OF MAGNESIUM: CPT

## 2024-09-12 PROCEDURE — 99238 HOSP IP/OBS DSCHRG MGMT 30/<: CPT

## 2024-09-12 PROCEDURE — 36415 COLL VENOUS BLD VENIPUNCTURE: CPT

## 2024-09-12 PROCEDURE — 83690 ASSAY OF LIPASE: CPT | Performed by: STUDENT IN AN ORGANIZED HEALTH CARE EDUCATION/TRAINING PROGRAM

## 2024-09-12 PROCEDURE — 84100 ASSAY OF PHOSPHORUS: CPT

## 2024-09-12 PROCEDURE — 2500000001 HC RX 250 WO HCPCS SELF ADMINISTERED DRUGS (ALT 637 FOR MEDICARE OP)

## 2024-09-12 PROCEDURE — 82040 ASSAY OF SERUM ALBUMIN: CPT | Mod: 59 | Performed by: STUDENT IN AN ORGANIZED HEALTH CARE EDUCATION/TRAINING PROGRAM

## 2024-09-12 PROCEDURE — 82947 ASSAY GLUCOSE BLOOD QUANT: CPT

## 2024-09-12 PROCEDURE — 2500000004 HC RX 250 GENERAL PHARMACY W/ HCPCS (ALT 636 FOR OP/ED)

## 2024-09-12 PROCEDURE — 96372 THER/PROPH/DIAG INJ SC/IM: CPT

## 2024-09-12 PROCEDURE — 85025 COMPLETE CBC W/AUTO DIFF WBC: CPT

## 2024-09-12 PROCEDURE — 2500000002 HC RX 250 W HCPCS SELF ADMINISTERED DRUGS (ALT 637 FOR MEDICARE OP, ALT 636 FOR OP/ED)

## 2024-09-12 RX ORDER — POTASSIUM CHLORIDE 20 MEQ/1
40 TABLET, EXTENDED RELEASE ORAL ONCE
Status: COMPLETED | OUTPATIENT
Start: 2024-09-12 | End: 2024-09-12

## 2024-09-12 ASSESSMENT — PAIN SCALES - GENERAL: PAINLEVEL_OUTOF10: 6

## 2024-09-12 ASSESSMENT — ACTIVITIES OF DAILY LIVING (ADL): LACK_OF_TRANSPORTATION: NO

## 2024-09-12 NOTE — DISCHARGE SUMMARY
Discharge Diagnosis  CLARIBEL (acute kidney injury) (CMS-[facility])    Issues Requiring Follow-Up  PCP - Hospital Follow-up  Pulm - PFT's    Test Results Pending At Discharge  Pending Labs       No current pending labs.            Hospital Course  Nelida Mata is a 64-year-old woman with a past medical history of HFpEF (50-55% 9/2023), CAD s/p PCI w/ stent x 3 (2016, 2018) and STEMI (9/2023, no stents placed), TIA 9/2023, SVC thrombus on Lovenox, remote DVT, T2DM, HLD, CKD, obesity, NAFLD, MICHAEL, and prior toe amputations for gangrene in 2020, who presented to the ED today for nausea and vomiting that began on Sunday. Patient states she has had decreased PO intake since Sunday and has difficulty keeping food down. States he vomiting has been whatever she previously ate, denies any blood or red/black/green color in vomit. Patient denies any recent changes in medication, or potential food triggers. Patient denies fever, chills, chest pain, sob, dyspnea, however, she states she has had abdominal pain that is located in the upper/middle of her abdomen and has also noticed she has been urinating less in the past few days. Hgb 13.8 elevated from baseline 9-10, possibly hemoconcentration, CMP notable for glucose 230 and Cr 2.79, previous readings in the past month ranged from 1.2-1.5 range. CXR no acute cardiopulmonary process. Pt given 2x 500 ml NS bolus, started on full liquid diet and tolerated well. Repeat Cr down trended to 2.39. Patient advanced to full diet as she was no longer having nausea or vomiting and given another 500 ml NS bolus. Cr again decreased to 1.75. At this time patient is afebrile, hemodynamically stable and improving kidney function after increasing po intake. She will be discharged at this time with close follow-up with her PCP.    Pertinent Physical Exam At Time of Discharge  Physical Exam  Constitutional:       General: She is not in acute distress.     Appearance: Normal appearance. She is obese.  She is not ill-appearing.   HENT:      Mouth/Throat:      Mouth: Mucous membranes are moist.      Pharynx: Oropharynx is clear.   Eyes:      Extraocular Movements: Extraocular movements intact.      Pupils: Pupils are equal, round, and reactive to light.   Cardiovascular:      Rate and Rhythm: Normal rate and regular rhythm.      Heart sounds: No murmur heard.  Pulmonary:      Effort: Pulmonary effort is normal. No respiratory distress.      Breath sounds: No wheezing.   Abdominal:      General: Abdomen is flat. Bowel sounds are normal.      Palpations: Abdomen is soft.      Tenderness: There is no abdominal tenderness. There is no guarding.   Musculoskeletal:      Cervical back: Normal range of motion.   Skin:     Capillary Refill: Capillary refill takes less than 2 seconds.   Neurological:      General: No focal deficit present.      Mental Status: She is alert and oriented to person, place, and time.   Psychiatric:         Mood and Affect: Mood normal.         Behavior: Behavior normal.         Home Medications     Medication List      CHANGE how you take these medications     melatonin 5 mg tablet; What changed: Another medication with the same   name was removed. Continue taking this medication, and follow the   directions you see here.   Ozempic 2 mg/dose (8 mg/3 mL) pen injector; Generic drug: semaglutide;   Inject 2 mg under the skin 1 (one) time per week.; What changed:   additional instructions     CONTINUE taking these medications     acetaminophen 325 mg tablet; Commonly known as: Tylenol   aspirin 81 mg EC tablet; Take 1 tablet (81 mg) by mouth once daily.   atorvastatin 80 mg tablet; Commonly known as: Lipitor; TAKE 1 TABLET BY   MOUTH ONCE DAILY   blood pressure monitor kit; 1 each once daily. Use once daily to check   blood pressure   brimonidine 0.2 % ophthalmic solution; Commonly known as: AlphaGAN;   Administer 1 drop into both eyes 2 times a day. PATIENT NEEDS PCP APPT FOR   FURTHER REFILLS    carvedilol 12.5 mg tablet; Commonly known as: Coreg; Take 1 tablet (12.5   mg) by mouth 2 times daily (morning and late afternoon).   diclofenac sodium 1 % gel; Commonly known as: Voltaren; Apply 4.5 inches   (4 g) topically 2 times a day.   DULoxetine 30 mg DR capsule; Commonly known as: Cymbalta; Take 1 capsule   (30 mg) by mouth once daily in the morning AND 2 capsules (60 mg) once   daily at bedtime. Do not crush or chew..   enoxaparin 40 mg/0.4 mL syringe; Commonly known as: Lovenox; Inject 0.4   mL (40 mg) under the skin once daily.   Farxiga 10 mg; Generic drug: dapagliflozin propanediol; Take 1 tablet   (10 mg) by mouth once daily.   FreeStyle Filipe 2 [place] misc; Generic drug: flash glucose scanning   reader; Use as instructed   FreeStyle Filipe 2 Sensor kit; Generic drug: flash glucose sensor kit;   Apply 1 sensor to the back of the upper arm. Change sensor every 14 days.   furosemide 20 mg tablet; Commonly known as: Lasix; Take 1 tablet (20 mg)   by mouth once daily.   glucose 4 gram chewable tablet; Chew 4 tablets (16 g) 1 time for 1 dose   as needed   HumaLOG KwikPen Insulin 200 unit/mL (3 mL) insulin pen pen; Generic   drug: insulin lispro; Inject 22 Units under the skin 3 times daily   (morning, midday, late afternoon). Take as directed per insulin   instructions.   hydrOXYzine HCL 25 mg tablet; Commonly known as: Atarax; Take 1 tablet   (25 mg) by mouth 2 times a day as needed for itching for up to 7 days.   isosorbide mononitrate ER 60 mg 24 hr tablet; Commonly known as: Imdur;   TAKE 1 TABLET BY MOUTH EVERY DAY IN THE MORNING   Kerendia 10 mg tablet tablet; Generic drug: finerenone; Take 1 tablet   (10 mg) by mouth once daily.   latanoprost 0.005 % ophthalmic solution; Commonly known as: Xalatan   nitroglycerin 0.4 mg SL tablet; Commonly known as: Nitrostat   OneTouch Ultra Test strip; Generic drug: blood sugar diagnostic; USE 1   STRIP(S) TO TEST BLOOD SUGAR 3 TIMES A DAY   pantoprazole 40 mg EC  tablet; Commonly known as: ProtoNix; Take 1 tablet   (40 mg) by mouth 2 times a day.   polyethylene glycol 17 gram/dose powder; Commonly known as: Glycolax,   Miralax; Take 1 capful (17 g) mixed in 4-8 ounces of liquid by mouth once   daily as needed (constipation).   Senexon-S 8.6-50 mg tablet; Generic drug: sennosides-docusate sodium;   Take 1 tablet by mouth 2 times a day.   Tresiba FlexTouch U-200 200 unit/mL (3 mL) injection; Generic drug:   insulin degludec; Inject 52 Units under the skin once daily at bedtime.   Take as directed per insulin instructions.   urea 20 % cream; Commonly known as: Carmol   Vitamin D3 10 MCG (400 UNIT) tablet; Generic drug: cholecalciferol       Outpatient Follow-Up  Future Appointments   Date Time Provider Department Center   10/3/2024  8:30 AM Betty Boone MD [id number] Academic   10/4/2024  8:40 AM Destiny Hurst MD PhD [id number] Academic   10/8/2024  1:40 PM Makayla Harvey MD [id number] Academic   10/11/2024  9:20 AM Michael Hilario MD [id number] Academic   10/16/2024  8:40 AM Geeta Robins, APRN-CNP [id number] Academic   11/7/2024  8:15 AM Cyndie Cloud MD [id number] Academic   2/5/2025  9:00 AM Jacinta Peraza MD [id number] Academic       Duran Gonzalez MD

## 2024-09-12 NOTE — PROGRESS NOTES
Pharmacy Admission Order Reconciliation Review    Nelida Mata is a 64 y.o. female admitted for CLARIBEL (acute kidney injury) (CMS-[facility]). Pharmacy reviewed the patient's unreconciled admission medications.    Prior to admission medications that were reviewed and acted on by the pharmacist include:  Acetaminophen  Vitamin D3  Melatonin  These medications have been reconciled.     Any other unreconcilied medications have been addressed and will be ordered or held by the patient's medical team. Medications addressed by the pharmacist may be added or changed by the patient's medical team at any time.    Saira Bell, PharmD  Transitions of Care Pharmacist  [facility]  Please reach out via Secure Chat for questions

## 2024-09-12 NOTE — CARE PLAN
The patient's goals for the shift include      The clinical goals for the shift include patient will be free from injury and falls    Over the shift, the patient remained free from injury, was alert and oriented x4, and general assessment was unremarkable.

## 2024-09-12 NOTE — CONSULTS
Wound Care Consult     Visit Date: 9/12/2024      Patient Name: Nelida Mata         MRN: [id number]           YOB: 1960     Reason for Consult: Multiple areas with skin issues        Wound History: Nelida is a 64 y.o. female who presented to ED for Nausea and Vomiting, admitted to Hospitalist Team D on 9/11/2024 for CLARIBEL       Pertinent Labs:   Albumin   Date Value Ref Range Status   09/12/2024 3.6 3.4 - 5.0 g/dL Final   09/12/2024 3.6 3.4 - 5.0 g/dL Final   03/31/2023 3.8 3.4 - 5.0 g/dL Final     ALBUMIN (MG/L) IN URINE   Date Value Ref Range Status   10/28/2022 14.9 Not Established mg/L Final     Albumin, Urine Random   Date Value Ref Range Status   03/15/2024 15.9 Not established mg/L Final       Wound Assessment:  Wound 08/11/24 Other (comment) Breast Right;Upper (Active)   Site Assessment [place] 09/12/24 1213   [place]-Wound Assessment Moist ;Intact 09/12/24 1213       Wound 08/11/24 Other (comment) Chest Right;Upper (Active)   Wound Image   09/12/24 1210   Site Assessment [place] 09/12/24 1210   [place]-Wound Assessment Hyperpigmented 09/12/24 1210   Shape circular lesions 09/12/24 1210   Wound Length (cm) 0.5 cm 09/12/24 1210   Wound Width (cm) 0.5 cm 09/12/24 1210   Wound Surface Area (cm^2) 0.25 cm^2 09/12/24 1210   Wound Depth (cm) 0.2 cm 09/12/24 1210   Wound Volume (cm^3) 0.05 cm^3 09/12/24 1210   Margins Well-defined edges 09/12/24 1210   Drainage Description None 09/12/24 1210   Drainage Amount None 09/12/24 1210   Dressing Silicone border dressing 09/12/24 1210   Dressing Changed New 09/12/24 1210   Dressing Status Clean;Dry 09/12/24 1210       Wound 09/11/24 Other (comment) Groin Left (Active)   Wound Image    09/11/24 2239   Site Assessment [place] 09/12/24 1213   [place]-Wound Assessment Moist  09/12/24 1213   Shape moisture associated breakdown 09/11/24 2239   Wound Length (cm) 16 cm 09/11/24 2239   Dressing Changed New 09/11/24 2239       Wound 09/11/24 Other (comment) Groin Right  (Active)   Wound Image   09/11/24 2239   Site Assessment Pink;Intact 09/12/24 1213   [place]-Wound Assessment Moist  09/12/24 1213   Shape moisture associated breakdown 09/11/24 2239   Dressing Changed New 09/11/24 2239       Wound 09/11/24 Other (comment) Heel Right (Active)   Wound Image   09/12/24 1213   Site Assessment [place] 09/12/24 1213   [place]-Wound Assessment Dry 09/12/24 1213   Shape Oval 09/12/24 1213   Wound Length (cm) 2 cm 09/12/24 1213   Wound Width (cm) 2 cm 09/12/24 1213   Wound Surface Area (cm^2) 4 cm^2 09/12/24 1213   Wound Depth (cm) 0.1 cm 09/12/24 1213   Wound Volume (cm^3) 0.4 cm^3 09/12/24 1213   Margins Attached edges 09/12/24 1213   Drainage Description None 09/12/24 1213   Drainage Amount None 09/12/24 1213   Dressing Silicone border dressing 09/12/24 1213   Dressing Changed Changed 09/12/24 1213   Dressing Status Clean;Dry 09/12/24 1213       Wound Team Summary Assessment: Patient has scabbing to right chest and admits she scratches and picks scabs off. She has hyperpigmented areas/old scars to chest and abdomen and upper arms. She has moisture/intertrigo under breasts and pannus, but no open skin. She has a pink layer of epidermis removed from her right heel. States the podiatrist was removing dry skin from her foot and took that layer off. It is dry and intact, no drainage.    Recommendations:  - Bilateral breasts, Interdry  Interdry instructions (# [id number])  1.  Wash skin gently. Pat, do not rub.     2.  With clean scissors, cut enough fabric to cover the affected area, allowing for a minimum of 2 inches to extend beyond the skin fold for moisture evaporation.     3.  Lay a single layer of fabric in the skin fold, placing one edge into the base of the fold. Gently smooth the rest of the fabric over the skin, keeping it flat.     4.  Leave at least 2 inches of fabric exposed outside the skin fold.     5.  Secure the fabric in one of several ways: with the skin fold, witha small amount of  skin-friendly tape, or tucked under clothing.     6.  Remove the fabric before bathing and reuse it when finished. When removing, gently separate the skin fold and lift away.    -Right upper chest: Clean with Vashe, cover with mepilex and do not let patient pick at scabs. Change every other day.  - Right heel/ plantar foot. Clean daily and moisturize with lotion. Keep MAKEDA and offloaded.     Wound Team Plan: Wound tem will not need to follow.      SUZANNE GERHARDT, RN, BSN, CWOCN  9/12/2024  6:53 PM

## 2024-09-12 NOTE — TELEPHONE ENCOUNTER
Patient left a voicemail on RD Rosa Nini's line stating she needed to cancel her new patient visits because she is going to be hospitalized.  Visits canceled.  Note to the team that due to patients complex medical history, she would need to be seen at [facility] if she re-engages in the program.

## 2024-09-12 NOTE — NURSING NOTE
Patient admitted to [place] [address] from the ER this evening. She was oriented to the room and the call light system. Bed alarm turned on for safety precautions. Wound pictures completed. Admission screenigns completed. She lives at home alone but her boyfriend of 20 years stays with her often and lives in the same building. She has been needing some help with ADL's. Anticipate PT/OT evals. Patient would like to be discharged back home but may need home care.

## 2024-09-12 NOTE — SIGNIFICANT EVENT
Notified by RN that patient was having epigastric abdominal pain and left-sided chest pain.  Informed that she was wearing a lidocaine patch on her left chest earlier today but it fell off.  Pain has been constant and throbbing in both locations for approximately 15 minutes now.  Patient denies shortness of breath, headache, vision changes, palpitations, arrhythmias, loss of consciousness, or any other cardiac type symptoms.  She had a small well-formed bowel movement last night.  No nausea, vomiting, constipation, or other bowel movements this morning.  Patient reports that the pain in her chest is reproducible to touch.    Assessment:  Moderate tenderness to palpation of the epigastrium.  Positive S1, positive S2, no arrhythmias appreciated.  No murmurs, rubs, or gallops appreciated.  Lungs are clear to auscultation bilaterally.  On admission high sensitivity troponin was negative at 12.    Plan:  Spoke with nurse about as needed Tylenol use and compresses.  Recent phlebotomy draw for her a.m. labs; added liver function testing and lipase to these.  Will obtain twelve-lead EKG given her previous slight increased QRS complexes.

## 2024-09-12 NOTE — HOSPITAL COURSE
Nelida Mata is a 64-year-old woman with a past medical history of HFpEF (50-55% 9/2023), CAD s/p PCI w/ stent x 3 (2016, 2018) and STEMI (9/2023, no stents placed), TIA 9/2023, SVC thrombus on Lovenox, remote DVT, T2DM, HLD, CKD, obesity, NAFLD, MICHAEL, and prior toe amputations for gangrene in 2020, who presented to the ED today for nausea and vomiting that began on Sunday. Patient states she has had decreased PO intake since Sunday and has difficulty keeping food down. States he vomiting has been whatever she previously ate, denies any blood or red/black/green color in vomit. Patient denies any recent changes in medication, or potential food triggers. Patient denies fever, chills, chest pain, sob, dyspnea, however, she states she has had abdominal pain that is located in the upper/middle of her abdomen and has also noticed she has been urinating less in the past few days. Hgb 13.8 elevated from baseline 9-10, possibly hemoconcentration, CMP notable for glucose 230 and Cr 2.79, previous readings in the past month ranged from 1.2-1.5 range. CXR no acute cardiopulmonary process. Pt given 2x 500 ml NS bolus, started on full liquid diet and tolerated well. Repeat Cr down trended to 2.39. Patient advanced to full diet as she was no longer having nausea or vomiting and given another 500 ml NS bolus. Cr again decreased to 1.75. At this time patient is afebrile, hemodynamically stable and improving kidney function after increasing po intake. She will be discharged at this time with close follow-up with her PCP.

## 2024-09-12 NOTE — PROGRESS NOTES
09/12/24 1200   Discharge Planning   Living Arrangements Spouse/significant other   Support Systems Spouse/significant other   Assistance Needed uses walker/cane for ambulation assistance as needed   Type of Residence Private residence   Home or Post Acute Services None   Expected Discharge Disposition Home   Does the patient need discharge transport arranged? Yes   RoundTrip coordination needed? Yes   Financial Resource Strain   How hard is it for you to pay for the very basics like food, housing, medical care, and heating? Not very   Housing Stability   In the last 12 months, was there a time when you were not able to pay the mortgage or rent on time? N   In the past 12 months, how many times have you moved where you were living? 1   At any time in the past 12 months, were you homeless or living in a shelter (including now)? N   Transportation Needs   In the past 12 months, has lack of transportation kept you from medical appointments or from getting medications? no   In the past 12 months, has lack of transportation kept you from meetings, work, or from getting things needed for daily living? No       Plan per Medical/Surgical team: Patient is admitted for nausea/vomiting and CLARIBEL. Currently receiving IV fluids. Team will order repeat labs this afternoon and if creatinine is trending downward will discharge home.    Assessment Note: Patient stated she lives with her significant other. She uses a walker or cane for ambulation assistance as needed. She denies any active home care or social work needs. Patient will possibly need transportation assistance at discharge.    Home Care: denies  PCP:  Provider  Pharmacy: [facility] [address]  DME: cane and walker  Falls: denies  Dialysis: denies    Potential Barriers: none  Discharge Disposition: home  ADOD: today vs tomorrow

## 2024-09-13 NOTE — NURSING NOTE
Patient discharged to home this evening with her significant other at the bedside. She verbalized an understanding of the AVS after review with this nurse. She packed all her belongings. Her IV was removed with tip intact. She did not need any new medications or prescriptions. [facility] by this nurse through her [employer] insurance.

## 2024-09-16 ENCOUNTER — TELEPHONE (OUTPATIENT)
Dept: OBSTETRICS AND GYNECOLOGY | Facility: CLINIC | Age: 64
End: 2024-09-16
Payer: COMMERCIAL

## 2024-09-16 NOTE — TELEPHONE ENCOUNTER
----- Message from Amber Frank sent at 9/9/2024  7:25 PM EDT -----  Regarding: FUV  Hello, could we get this patient scheduled with Lani Hyde for an EMB in the next couple weeks.

## 2024-09-16 NOTE — TELEPHONE ENCOUNTER
TELEPHONE CALL TO PATIENT  Identified by name and date of birth.  Aware of need for EMB  Scheduled for appt. With Dr Hyde 9/24/24 0900  Patient verbalizes understanding

## 2024-09-24 ENCOUNTER — PROCEDURE VISIT (OUTPATIENT)
Dept: OBSTETRICS AND GYNECOLOGY | Facility: CLINIC | Age: 64
End: 2024-09-24
Payer: COMMERCIAL

## 2024-09-24 VITALS
DIASTOLIC BLOOD PRESSURE: 70 MMHG | SYSTOLIC BLOOD PRESSURE: 115 MMHG | WEIGHT: 177.3 LBS | HEIGHT: 59 IN | BODY MASS INDEX: 35.74 KG/M2 | HEART RATE: 92 BPM

## 2024-09-24 DIAGNOSIS — N95.0 POSTMENOPAUSAL BLEEDING: Primary | ICD-10-CM

## 2024-09-24 NOTE — PROGRESS NOTES
Flu,  vaccine per (provider)  Given IM into right deltoid  Supplied by office  Patient tolerated well  VIS given     LOT #:[id number]  Expiration date: 6/30/25

## 2024-09-26 ENCOUNTER — PHARMACY VISIT (OUTPATIENT)
Dept: PHARMACY | Facility: CLINIC | Age: 64
End: 2024-09-26
Payer: COMMERCIAL

## 2024-09-26 PROCEDURE — RXMED WILLOW AMBULATORY MEDICATION CHARGE

## 2024-09-27 ENCOUNTER — PHARMACY VISIT (OUTPATIENT)
Dept: PHARMACY | Facility: CLINIC | Age: 64
End: 2024-09-27

## 2024-09-27 NOTE — PROGRESS NOTES
"Patient ID: Nelida Mata is a 64 y.o. female.    Pt is a 63yo with HFpEF (50-55% 9/2023), CAD s/p PCIs, TIA 9/2023, idiopathic SVC thrombus and remote DVT on Eliquis, T2DM, HLD, CKD, obesity, NAFLD, MICHAEL, recent UGIB (08/2024) 2/2 peptic ulcer who presents for EMB for PMB.     Postmenopausal vaginal spotting for prolonged period of time - at least since 2018 Known fibroids, LNG-IUD in place since 2018. Was evaluated for hyst 8/2023, not candidate at that time 2/2 A1c 14%.    Endometrial biopsy    Date/Time: 9/27/2024 6:59 PM    Performed by: Lani Hyde MD  Authorized by: Connie Ken MD    Consent:     Consent obtained: written    Consent given by: patient    Risks discussed: bleeding, damage to other organs, infection and need for repeat procedure    Alternatives discussed: observation and alternative treatment    Patient agrees, verbalizes understanding, and wants to proceed: yes      Procedure explained and questions answered to patient or proxy's satisfaction: yes    Universal protocol:     Test results available and properly labeled: yes      Relevant documents present and verified: yes      Imaging studies available: yes      Required blood products, implants, devices, and special equipment available: yes      Site/side marked: yes      Immediately prior to procedure a time out was called: yes      Patient identity confirmed: verbally with patient  Indications:     Indications: postmenopausal bleeding      Chronicity of post-menopausal bleeding: chronic    Progression of post-menopausal bleeding: unchanged  Pre-procedure:     Urine pregnancy test: N/A    Procedure:     A bimanual exam was performed: yes      Uterus size: >14 weeks    Uterus position: midposition    Prepped with: Betadine  Comments:     Procedure comments: Cervix was unable to be visualized with speculum. Bimanual exam was again performed and attempt was made to perform a \"blind\" EMB, but unable to pass pipelle through " cervical os. Procedure was stopped.  IUD Removal    Performed by: Lani Hyde MD  Authorized by: Connie Ken MD    Procedure: IUD removal    Consent obtained by patient, parent, or legal power of  - including discussion of procedure risks and benefits, patient questions answered, and patient education provided: yes    Other reason for removal:  Postmenopausal bleeding, attempt to sample  Strings visualized: yes    Cervix cleaned with: iodopovidone    Tenaculum applied to cervix: no    IUD grasped by forceps: yes    Performed with ultrasound guidance: no    IUD removed: yes    Removed without complications: yes    IUD intact: yes    Removal comments: Cervix was unable to be visualized with speculum but IUD strings were seen. Vagina/cervix cleaned with betadine. Strings grasped and IUD removed.  Cervix manually dilated: no      IUD removal was performed in anticipation of EMB, which was unable to be performed. Patient was advised she may need endometrial sampling prior to definitive txt, would like to follow up with her primary Gyn Dr. Valenzuela - will coordinate. Advised to call clinic if bleeding worsens prior to follow up    Seen and d/w Dr. Jesus Manuel Hyde MD  PGY-4, Obstetrics and Gynecology

## 2024-10-02 ENCOUNTER — INFUSION (OUTPATIENT)
Dept: HEMATOLOGY/ONCOLOGY | Facility: HOSPITAL | Age: 64
End: 2024-10-02
Payer: COMMERCIAL

## 2024-10-02 VITALS
SYSTOLIC BLOOD PRESSURE: 107 MMHG | HEART RATE: 85 BPM | TEMPERATURE: 97.3 F | OXYGEN SATURATION: 97 % | DIASTOLIC BLOOD PRESSURE: 60 MMHG | BODY MASS INDEX: 37.3 KG/M2 | RESPIRATION RATE: 17 BRPM | WEIGHT: 184.7 LBS

## 2024-10-02 DIAGNOSIS — D50.9 IRON DEFICIENCY ANEMIA, UNSPECIFIED IRON DEFICIENCY ANEMIA TYPE: ICD-10-CM

## 2024-10-02 DIAGNOSIS — I50.32 CHRONIC HEART FAILURE WITH PRESERVED EJECTION FRACTION: ICD-10-CM

## 2024-10-02 DIAGNOSIS — Z86.2 HISTORY OF ANEMIA: ICD-10-CM

## 2024-10-02 PROCEDURE — 96365 THER/PROPH/DIAG IV INF INIT: CPT | Mod: INF

## 2024-10-02 PROCEDURE — 2500000004 HC RX 250 GENERAL PHARMACY W/ HCPCS (ALT 636 FOR OP/ED): Performed by: STUDENT IN AN ORGANIZED HEALTH CARE EDUCATION/TRAINING PROGRAM

## 2024-10-02 PROCEDURE — 96366 THER/PROPH/DIAG IV INF ADDON: CPT | Mod: INF

## 2024-10-02 PROCEDURE — RXMED WILLOW AMBULATORY MEDICATION CHARGE

## 2024-10-02 RX ORDER — DIPHENHYDRAMINE HYDROCHLORIDE 50 MG/ML
50 INJECTION INTRAMUSCULAR; INTRAVENOUS AS NEEDED
OUTPATIENT
Start: 2024-10-05

## 2024-10-02 RX ORDER — EPINEPHRINE 0.3 MG/.3ML
0.3 INJECTION SUBCUTANEOUS EVERY 5 MIN PRN
OUTPATIENT
Start: 2024-10-05

## 2024-10-02 RX ORDER — ALBUTEROL SULFATE 0.83 MG/ML
3 SOLUTION RESPIRATORY (INHALATION) AS NEEDED
OUTPATIENT
Start: 2024-10-05

## 2024-10-02 RX ORDER — FAMOTIDINE 10 MG/ML
20 INJECTION INTRAVENOUS ONCE AS NEEDED
OUTPATIENT
Start: 2024-10-05

## 2024-10-02 ASSESSMENT — PAIN SCALES - GENERAL: PAINLEVEL: 8

## 2024-10-02 NOTE — PROGRESS NOTES
Pt arrived for Venofer treatment #2. Assisted back to chair via wheelchair, transferred to chair without difficulty. IV started to right forearm, positive blood return, flushes without resistance. Venofer infusion completed without complication. IV removed, gauze and coban applied.

## 2024-10-03 ENCOUNTER — APPOINTMENT (OUTPATIENT)
Dept: PRIMARY CARE | Facility: CLINIC | Age: 64
End: 2024-10-03
Payer: COMMERCIAL

## 2024-10-03 ENCOUNTER — PHARMACY VISIT (OUTPATIENT)
Dept: PHARMACY | Facility: CLINIC | Age: 64
End: 2024-10-03
Payer: COMMERCIAL

## 2024-10-03 VITALS
SYSTOLIC BLOOD PRESSURE: 91 MMHG | HEIGHT: 59 IN | HEART RATE: 86 BPM | DIASTOLIC BLOOD PRESSURE: 60 MMHG | TEMPERATURE: 96.8 F | OXYGEN SATURATION: 98 % | WEIGHT: 185 LBS | BODY MASS INDEX: 37.29 KG/M2

## 2024-10-03 DIAGNOSIS — Z86.39 HISTORY OF UNCONTROLLED DIABETES: ICD-10-CM

## 2024-10-03 DIAGNOSIS — F33.40 RECURRENT MAJOR DEPRESSIVE DISORDER, IN REMISSION (CMS-HCC): Chronic | ICD-10-CM

## 2024-10-03 DIAGNOSIS — Z29.11 NEED FOR VACCINATION AGAINST RESPIRATORY SYNCYTIAL VIRUS: ICD-10-CM

## 2024-10-03 DIAGNOSIS — Z78.0 MENOPAUSE: ICD-10-CM

## 2024-10-03 DIAGNOSIS — I50.32 CHRONIC HEART FAILURE WITH PRESERVED EJECTION FRACTION: ICD-10-CM

## 2024-10-03 DIAGNOSIS — Z12.31 ENCOUNTER FOR SCREENING MAMMOGRAM FOR MALIGNANT NEOPLASM OF BREAST: ICD-10-CM

## 2024-10-03 DIAGNOSIS — G47.00 INSOMNIA, UNSPECIFIED TYPE: ICD-10-CM

## 2024-10-03 DIAGNOSIS — I10 HYPERTENSION, UNCONTROLLED: ICD-10-CM

## 2024-10-03 DIAGNOSIS — E55.9 VITAMIN D DEFICIENCY, UNSPECIFIED: ICD-10-CM

## 2024-10-03 DIAGNOSIS — Z79.4 CONTROLLED TYPE 2 DIABETES MELLITUS WITHOUT COMPLICATION, WITH LONG-TERM CURRENT USE OF INSULIN (MULTI): ICD-10-CM

## 2024-10-03 DIAGNOSIS — Z13.9 SCREENING DUE: Primary | ICD-10-CM

## 2024-10-03 DIAGNOSIS — Z13.820 ENCOUNTER FOR SCREENING FOR OSTEOPOROSIS: ICD-10-CM

## 2024-10-03 DIAGNOSIS — G89.4 PAIN SYNDROME, CHRONIC: ICD-10-CM

## 2024-10-03 DIAGNOSIS — E11.9 CONTROLLED TYPE 2 DIABETES MELLITUS WITHOUT COMPLICATION, WITH LONG-TERM CURRENT USE OF INSULIN (MULTI): ICD-10-CM

## 2024-10-03 DIAGNOSIS — K21.9 GASTROESOPHAGEAL REFLUX DISEASE WITHOUT ESOPHAGITIS: ICD-10-CM

## 2024-10-03 DIAGNOSIS — H40.1131 PRIMARY OPEN ANGLE GLAUCOMA OF BOTH EYES, MILD STAGE: ICD-10-CM

## 2024-10-03 DIAGNOSIS — E78.00 HYPERLIPIDEMIA, GROUP A: ICD-10-CM

## 2024-10-03 DIAGNOSIS — Z12.39 BREAST SCREENING: ICD-10-CM

## 2024-10-03 DIAGNOSIS — E44.1 MILD PROTEIN-CALORIE MALNUTRITION (MULTI): ICD-10-CM

## 2024-10-03 DIAGNOSIS — E11.65 UNCONTROLLED TYPE 2 DIABETES MELLITUS WITH HYPERGLYCEMIA: ICD-10-CM

## 2024-10-03 DIAGNOSIS — Z23 ENCOUNTER FOR IMMUNIZATION: ICD-10-CM

## 2024-10-03 DIAGNOSIS — E11.65 TYPE 2 DIABETES MELLITUS WITH HYPERGLYCEMIA, WITH LONG-TERM CURRENT USE OF INSULIN: Primary | ICD-10-CM

## 2024-10-03 DIAGNOSIS — D50.0 IRON DEFICIENCY ANEMIA DUE TO CHRONIC BLOOD LOSS: ICD-10-CM

## 2024-10-03 DIAGNOSIS — Z79.4 TYPE 2 DIABETES MELLITUS WITH HYPERGLYCEMIA, WITH LONG-TERM CURRENT USE OF INSULIN: Primary | ICD-10-CM

## 2024-10-03 RX ORDER — DULOXETIN HYDROCHLORIDE 30 MG/1
CAPSULE, DELAYED RELEASE ORAL
Qty: 60 CAPSULE | Refills: 11 | Status: SHIPPED | OUTPATIENT
Start: 2024-10-03 | End: 2025-05-31

## 2024-10-03 RX ORDER — DAPAGLIFLOZIN 10 MG/1
10 TABLET, FILM COATED ORAL DAILY
Qty: 30 TABLET | Refills: 11 | Status: SHIPPED | OUTPATIENT
Start: 2024-10-03 | End: 2025-10-03

## 2024-10-03 RX ORDER — LATANOPROST 50 UG/ML
1 SOLUTION/ DROPS OPHTHALMIC NIGHTLY
Qty: 2.5 ML | Refills: 3 | Status: SHIPPED | OUTPATIENT
Start: 2024-10-03

## 2024-10-03 RX ORDER — BRIMONIDINE TARTRATE 2 MG/ML
1 SOLUTION/ DROPS OPHTHALMIC 2 TIMES DAILY
Qty: 30 ML | Refills: 3 | Status: SHIPPED | OUTPATIENT
Start: 2024-10-03

## 2024-10-03 RX ORDER — FUROSEMIDE 20 MG/1
20 TABLET ORAL DAILY
Qty: 90 TABLET | Refills: 2 | Status: SHIPPED | OUTPATIENT
Start: 2024-10-03

## 2024-10-03 RX ORDER — ACETAMINOPHEN 325 MG/1
650 TABLET ORAL EVERY 8 HOURS PRN
Qty: 1080 TABLET | Refills: 0 | Status: SHIPPED | OUTPATIENT
Start: 2024-10-03 | End: 2025-04-01

## 2024-10-03 RX ORDER — PANTOPRAZOLE SODIUM 40 MG/1
40 TABLET, DELAYED RELEASE ORAL
Qty: 30 TABLET | Refills: 5 | Status: SHIPPED | OUTPATIENT
Start: 2024-10-03 | End: 2025-04-01

## 2024-10-03 RX ORDER — BISMUTH SUBSALICYLATE 262 MG
1 TABLET,CHEWABLE ORAL DAILY
Qty: 30 TABLET | Refills: 11 | Status: SHIPPED | OUTPATIENT
Start: 2024-10-03 | End: 2025-10-03

## 2024-10-03 RX ORDER — ACETAMINOPHEN 500 MG
5 TABLET ORAL NIGHTLY PRN
Qty: 30 TABLET | Refills: 11 | Status: SHIPPED | OUTPATIENT
Start: 2024-10-03

## 2024-10-03 RX ORDER — DICLOFENAC SODIUM 10 MG/G
4 GEL TOPICAL 2 TIMES DAILY
Qty: 800 G | Refills: 2 | Status: SHIPPED | OUTPATIENT
Start: 2024-10-03 | End: 2025-07-30

## 2024-10-03 RX ORDER — CYANOCOBALAMIN (VITAMIN B-12) 500 MCG
400 TABLET ORAL DAILY
Qty: 30 TABLET | Refills: 11 | Status: SHIPPED | OUTPATIENT
Start: 2024-10-03 | End: 2025-09-28

## 2024-10-03 ASSESSMENT — ENCOUNTER SYMPTOMS
SHORTNESS OF BREATH: 1
LOSS OF SENSATION IN FEET: 1
BACK PAIN: 1
DEPRESSION: 1
WEAKNESS: 1
APPETITE CHANGE: 1
NAUSEA: 1
ARTHRALGIAS: 1
CONSTIPATION: 1
OCCASIONAL FEELINGS OF UNSTEADINESS: 1

## 2024-10-03 ASSESSMENT — ACTIVITIES OF DAILY LIVING (ADL)
TAKING_MEDICATION: INDEPENDENT
DRESSING: INDEPENDENT
DOING_HOUSEWORK: TOTAL CARE
BATHING: INDEPENDENT
MANAGING_FINANCES: INDEPENDENT
GROCERY_SHOPPING: INDEPENDENT

## 2024-10-03 ASSESSMENT — PAIN SCALES - GENERAL: PAINLEVEL: 8

## 2024-10-03 ASSESSMENT — PATIENT HEALTH QUESTIONNAIRE - PHQ9
1. LITTLE INTEREST OR PLEASURE IN DOING THINGS: NOT AT ALL
SUM OF ALL RESPONSES TO PHQ9 QUESTIONS 1 AND 2: 1
2. FEELING DOWN, DEPRESSED OR HOPELESS: SEVERAL DAYS

## 2024-10-03 NOTE — PROGRESS NOTES
Subjective   Nelida Mata is a 64-year-old woman with a past medical history of HFpEF (50-55% 9/2023), CAD s/p PCI w/ stent x 3 (2016, 2018) and STEMI (9/2023, no stents placed), TIA 9/2023, SVC thrombus on Lovenox, remote DVT, T2DM, HLD, CKD, obesity, NAFLD, MICHAEL, and prior toe amputations for gangrene in 2020 presenting for annual wellness exam    Since discharge from the hospital, has been in pain. Requesting Percocets. Has not yet connected with pain management specialists    Has been seeing improvements in weight and A1c since starting Ozempic. She has lost about 20lbs since starting. However, her diet currently consists mainly of small amounts of fruit and soup. Does not currently take a multivitamin. Does not report adequate intake of protein.     Endorses SOB, hs upcoming appointment with Pulmonology. WEST on minimal exertion. Endorses fatigue.     # Medicare Wellness Questionnaire  - Age: 64 y.o.  - Gender: female   - During the past 4 weeks, how much of the been bothered by emotional problems such as feeling anxious, depressed, irritable, sad, or downhearted and blue? Moderately  - During the past 4 weeks, history of physical and emotional health limited your social activities with family, friends, neighbors, or groups? Moderately  - During the past 4 weeks, how much bodily pain have you generally had? Severe pain  - During the past 4 weeks, was someone available to help you if you needed and wanted help?  (For example, if you felt very nervous, lonely, or blue; got sick and had to stay in bed; needed someone to talk to; needed help with daily chores; or needed help just taking care of yourself.) Yes, quite a bit  - During the past 4 weeks, what was the hardest physical activity you could do for at least 2 minutes? Very light  - ADLS/IADLs:  --- Can you get to places out of walking distance without help?  (For example, can you travel alone on buses or taxis, or drive your own car?) No  --- Can you go  shopping for groceries or clothes without someone's help? No  --- Can you prepare your own meals? No  --- Can you do your housework without help? No  --- Because of any health problems, do you need the help of another person with your personal care needs such as eating, bathing, dressing, or getting around the house? No  --- Can you handle your own money without help? No  - During the past 4 weeks, how would you rate your health in general?Fair  - How have things been going for you during the past 4 weeks? Pretty bad  - Are you having difficulties driving your car? Not applicable, I do not use a car  - Do you always fasten your seatbelt when you are in a car? Yes, usually  - How often during the past 4 weeks have you been bothered by any of the following problems?  --- Falling or dizzy when standing up. Never  --- Sexual problems. Never  --- Trouble eating well. Seldom  --- Teeth or denture problems. Often  --- Problems using the telephone. Never  --- Tiredness or fatigue. Often  - Have you fallen 2 or more times in the past year? No  - Are you afraid of falling? Yes  - Are you a smoker? No  - During the past 4 weeks, how many drinks of wine, beer, or other alcoholic beverages did you have? No alcohol at all  - Do you exercise for about 20 minutes, 3 or more days a week? No, I usually do not exercise this much  - Have you been given any information to help you with the following:  --- Hazards in your house that might hurt you? No  --- Keeping track of your medications? No  - How often do you have trouble taking medicines the way you have been told to take them? I always take them as prescribed  - How confident are you that you can control and manage most of your health problems? Somewhat confident  - What is your race?  (Check all that apply) Black or -American    # Health Maintenance  - Dental Care: last prior dental visit 2022 // brushes teeth yes // flosses teeth no // endorses current tooth pain  -  Vision: last prior ophtho visit more than 2 years ago // corrective devices: no // recent vision: impaired in left eye  - Hearing: denies recent hearing loss   - Diet: decreased appetite with ozempic, no breakfast, strawberries and soup for lunch, fruit for dinner.  - Exercise: not able to exercise because chronic pain    - Weight: decreasing, down 20lbs since starting ozempic  - Smoking: never a smoker  - EtOH: Alcohol Use: No, patient does not drink alcohol.  - Illicit substances: denied.  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 No    WOMEN  - Menstrual Status: Menopausal  - Pregnancy history:   - Bladder dysfunction? has had 3x per week episodes of incontinence, leakage  - Cervical CA: last prior pap 2023 // h/o abnormal pap? No  - Breast CA: last prior mammo 2023 // h/o abnormal mammo? No        FamHx:  Family History   Problem Relation Name Age of Onset    Diabetes Mother      Glaucoma Father      Hypertension Other FAMILY     Uterine cancer Neg Hx      Ovarian cancer Neg Hx      Colon cancer Neg Hx      Breast cancer Neg Hx         PMH:  Patient Active Problem List   Diagnosis    Wound of foot    Wound of right foot    Type II or unspecified type diabetes mellitus with renal manifestations, uncontrolled(250.42) (Multi)    History of uncontrolled diabetes    (HFpEF) heart failure with preserved ejection fraction    Abdominal hernia with obstruction and without gangrene    Abnormal uterine bleeding    Anxiety    Asymptomatic hypertensive urgency    Atypical face pain    Bariatric surgery status    Bilateral edema of lower extremity    Arteriosclerotic cardiovascular disease (ASCVD)    CAD (coronary artery disease)    Chest pain at rest    CHF, chronic (Multi)    Chronic, continuous use of opioids    Colon  polyps    Cough, persistent    Cramp and spasm    Depression    Diabetic leg ulcer (Multi)    Difficulty speaking    Discoloration of skin of lower leg    Dystrophia unguium    Dysuria    Elevated parathyroid hormone    Female pelvic pain    Gastroparesis    GERD (gastroesophageal reflux disease)    Fibromyalgia    Headache    Pain syndrome, chronic    History of anemia    Hydroureter on left    Hypokalemia    Idiopathic peripheral neuropathy    Insomnia    Knee pain, bilateral    Lateral knee pain, left    Lichen simplex chronicus    Lipoma    Localized edema    Loose stools    Lower back pain    Lower urinary tract infectious disease    Microalbuminuria    Morbid obesity (Multi)    Multiple falls    Myopia of both eyes with astigmatism and presbyopia    NAFLD (nonalcoholic fatty liver disease)    NSTEMI, initial episode of care (Multi)    Obesity, Class II, BMI 35-39.9    Other constipation    Hypertension, uncontrolled    Peripheral vascular disease (CMS-HCC)    Polyarthritis    Postmenopausal bleeding    Presence of drug coated stent in right coronary artery    Primary open angle glaucoma of both eyes, mild stage    Primary open angle glaucoma    Neurodermatitis    Pruritic dermatitis    Psoriasis    Thickening, skin    Vaginitis    Psychological factors affecting morbid obesity (Multi)    Retinal detachment, tractional, right eye    Retinal hemorrhage of right eye    S/P PTCA (percutaneous transluminal coronary angioplasty)    Dyspnea on exertion    Shortness of breath    Skin tag of labia    Obstructive sleep apnea    Thyroid nodule    Trichimoniasis    Unstable angina (Multi)    Fibroid uterus    Leiomyoma of uterus    Uterine fibroid    Vaginal candida    Vitamin D deficiency, unspecified    Weight loss, unintentional    Wound of left leg    Wound of right leg    Rash/skin eruption    Xerosis of skin    Diabetes mellitus type 2, controlled, without complications (Multi)    Hypoglycemia    Other  hyperlipidemia    Osteoarthritis    Acquired absence of other right toe(s) (Multi)    Hypertensive heart disease with heart failure    Major depressive disorder, single episode    Abnormal gynecological examination    Type 2 diabetes mellitus with hyperglycemia, with long-term current use of insulin    Superior vena cava thrombosis (Multi)    History of DVT (deep vein thrombosis)    Recurrent major depressive disorder, in remission (CMS-HCC)    S/P coronary artery stent placement    Uncontrolled type 2 diabetes mellitus with hyperglycemia    NSTEMI (non-ST elevated myocardial infarction) (Multi)    Iron deficiency    Gastrointestinal hemorrhage, unspecified gastrointestinal hemorrhage type    CVA (cerebral vascular accident) (Multi)    DVT (deep venous thrombosis) (Multi)    Iron deficiency anemia    GI bleed    CLARIBEL (acute kidney injury) (CMS-HCC)     Past Medical History:   Diagnosis Date    Abnormal uterine and vaginal bleeding, unspecified 04/10/2018    Abnormal uterine bleeding    Abnormal uterine and vaginal bleeding, unspecified 02/22/2018    Abnormal uterine bleeding    Acute candidiasis of vulva and vagina 04/10/2018    Vaginal candida    Acute candidiasis of vulva and vagina 02/22/2018    Vaginal candida    Acute vaginitis 04/10/2018    Vaginitis    Acute vaginitis 02/22/2018    Vaginitis    Alcohol abuse, in remission     History of alcohol abuse    Anxiety disorder, unspecified 04/10/2018    Anxiety    Anxiety disorder, unspecified 02/22/2018    Anxiety    Atherosclerotic heart disease of native coronary artery without angina pectoris 04/10/2018    Arteriosclerotic cardiovascular disease (ASCVD)    Atherosclerotic heart disease of native coronary artery without angina pectoris 02/22/2018    Arteriosclerotic cardiovascular disease (ASCVD)    Atherosclerotic heart disease of native coronary artery without angina pectoris 02/22/2018    CAD (coronary artery disease)    Atherosclerotic heart disease of native  coronary artery without angina pectoris 04/10/2018    CAD (coronary artery disease)    Atypical facial pain 04/10/2018    Atypical face pain    Atypical facial pain 02/22/2018    Atypical face pain    Benign lipomatous neoplasm, unspecified 04/10/2018    Lipoma    Benign lipomatous neoplasm, unspecified 02/22/2018    Lipoma    Changes in skin texture 04/10/2018    Thickening, skin    Changes in skin texture 02/22/2018    Thickening, skin    Chest pain, unspecified 04/10/2018    Chest pain at rest    Chest pain, unspecified 02/22/2018    Chest pain at rest    Chronic cough 04/10/2018    Cough, persistent    Chronic cough 02/22/2018    Cough, persistent    Chronic pain syndrome 04/10/2018    Pain syndrome, chronic    Chronic pain syndrome 02/22/2018    Pain syndrome, chronic    Cramp and spasm 04/10/2018    Cramp and spasm    Cramp and spasm 02/22/2018    Cramp and spasm    Depression, unspecified 04/10/2018    Depression    Depression, unspecified 02/22/2018    Depression    Disorder of pigmentation, unspecified 04/10/2018    Discoloration of skin of lower leg    Disorder of pigmentation, unspecified 02/22/2018    Discoloration of skin of lower leg    Dysuria 04/10/2018    Dysuria    Dysuria 02/22/2018    Dysuria    Encounter for immunization 04/10/2018    Flu vaccine need    Encounter for immunization 04/10/2018    Immunization due    Encounter for immunization 02/22/2018    Flu vaccine need    Encounter for immunization 02/22/2018    Immunization due    Encounter for screening for infections with a predominantly sexual mode of transmission     Routine screening for STI (sexually transmitted infection)    Encounter for screening mammogram for malignant neoplasm of breast 04/10/2018    Visit for screening mammogram    Encounter for screening mammogram for malignant neoplasm of breast 02/22/2018    Visit for screening mammogram    Essential (primary) hypertension 04/10/2018    Hypertension, uncontrolled    Essential  (primary) hypertension 02/22/2018    Hypertension, uncontrolled    Fatty (change of) liver, not elsewhere classified 04/10/2018    NAFLD (nonalcoholic fatty liver disease)    Fatty (change of) liver, not elsewhere classified 02/22/2018    NAFLD (nonalcoholic fatty liver disease)    Fibromyalgia 04/10/2018    Fibromyalgia    Fibromyalgia 02/22/2018    Fibromyalgia    Gastro-esophageal reflux disease without esophagitis 04/10/2018    GERD (gastroesophageal reflux disease)    Generalized contraction of visual field, left eye 02/06/2017    Generalized contraction of visual field of left eye    Headache, unspecified 04/10/2018    Headache    Headache, unspecified 02/22/2018    Headache    Heart failure, unspecified (Multi) 02/22/2018    CHF, chronic    Heart failure, unspecified (Multi) 04/10/2018    CHF, chronic    Hereditary and idiopathic neuropathy, unspecified 04/10/2018    Idiopathic peripheral neuropathy    Hereditary and idiopathic neuropathy, unspecified 02/22/2018    Idiopathic peripheral neuropathy    Hydroureter 04/10/2018    Hydroureter on left    Hydroureter 02/22/2018    Hydroureter on left    Hyperlipidemia, unspecified     Dyslipidemia    Hypertensive urgency 04/10/2018    Asymptomatic hypertensive urgency    Hypertensive urgency 02/22/2018    Asymptomatic hypertensive urgency    Hypokalemia 04/10/2018    Hypokalemia    Hypokalemia 02/22/2018    Hypokalemia    Insomnia, unspecified 04/10/2018    Insomnia    Insomnia, unspecified 02/22/2018    Insomnia    Localized edema 04/10/2018    Bilateral edema of lower extremity    Localized edema 02/22/2018    Bilateral edema of lower extremity    Macula scars of posterior pole (postinflammatory) (post-traumatic), left eye 04/10/2018    Macula scar of posterior pole of left eye    Morbid (severe) obesity due to excess calories (Multi) 04/10/2018    Morbid obesity with BMI of 40.0-44.9, adult    Morbid (severe) obesity due to excess calories (Multi) 02/22/2018     Morbid obesity with BMI of 40.0-44.9, adult    Nail dystrophy 04/10/2018    Dystrophia unguium    Nail dystrophy 02/22/2018    Dystrophia unguium    Non-ST elevation (NSTEMI) myocardial infarction (Multi) 04/10/2018    NSTEMI, initial episode of care    Non-ST elevation (NSTEMI) myocardial infarction (Multi) 02/22/2018    NSTEMI, initial episode of care    Obstructive sleep apnea (adult) (pediatric) 04/10/2018    Obstructive sleep apnea    Obstructive sleep apnea (adult) (pediatric) 02/22/2018    Obstructive sleep apnea    Other chest pain 02/22/2018    Chest tightness or pressure    Other chest pain 04/10/2018    Chest tightness or pressure    Other conditions influencing health status 04/10/2018    Insulin dependent type 2 diabetes mellitus, uncontrolled    Other conditions influencing health status 04/10/2018    Uncontrolled diabetes mellitus    Other conditions influencing health status 04/10/2018    DM (diabetes mellitus), type 2, uncontrolled, with renal complications    Other conditions influencing health status 02/22/2018    Insulin dependent type 2 diabetes mellitus, uncontrolled    Other conditions influencing health status 02/22/2018    Uncontrolled diabetes mellitus    Other conditions influencing health status 02/22/2018    DM (diabetes mellitus), type 2, uncontrolled, with renal complications    Other fatigue 04/10/2018    Fatigue    Other fatigue 02/22/2018    Fatigue    Other fecal abnormalities 04/10/2018    Loose stools    Other fecal abnormalities 02/22/2018    Loose stools    Other forms of dyspnea 04/10/2018    Dyspnea on exertion    Other forms of dyspnea 02/22/2018    Dyspnea on exertion    Other problems related to lifestyle     Other problems related to lifestyle    Other specified health status 04/10/2018    Medically complex patient    Other specified health status 02/22/2018    Medically complex patient    Pain in right knee 02/22/2018    Knee pain, bilateral    Pain in right knee  04/10/2018    Knee pain, bilateral    Peripheral vascular disease, unspecified (CMS-[facility]) 04/10/2018    Peripheral vascular disease    Peripheral vascular disease, unspecified (CMS-HCC) 02/22/2018    Peripheral vascular disease    Personal history of other diseases of the digestive system     History of esophageal reflux    Personal history of other diseases of the digestive system     History of constipation    Personal history of other diseases of the nervous system and sense organs     History of glaucoma    Personal history of other endocrine, nutritional and metabolic disease     History of hypothyroidism    Personal history of other endocrine, nutritional and metabolic disease     History of hyperlipidemia    Personal history of other endocrine, nutritional and metabolic disease     History of diabetes mellitus    Polyarthritis, unspecified 04/10/2018    Polyarthritis    Polyarthritis, unspecified 02/22/2018    Polyarthritis    Polyp of colon 04/10/2018    Colon polyps    Polyp of colon 02/22/2018    Colon polyps    Postmenopausal bleeding 04/10/2018    Postmenopausal bleeding    Postmenopausal bleeding 02/22/2018    Postmenopausal bleeding    Presence of coronary angioplasty implant and graft 04/10/2018    Presence of drug coated stent in right coronary artery    Presence of coronary angioplasty implant and graft 02/22/2018    Presence of drug coated stent in right coronary artery    Presence of intraocular lens 04/10/2018    Pseudophakia, both eyes    Presence of intraocular lens 03/18/2015    Pseudophakia of left eye    Primary open-angle glaucoma, unspecified eye, stage unspecified 04/10/2018    Primary open angle glaucoma    Proteinuria, unspecified 04/10/2018    Microalbuminuria    Proteinuria, unspecified 02/22/2018    Microalbuminuria    Pruritus, unspecified 04/10/2018    Pruritic dermatitis    Pruritus, unspecified 02/22/2018    Pruritic dermatitis    Psoriasis, unspecified 04/10/2018    Psoriasis     Psoriasis, unspecified 02/22/2018    Psoriasis    Rash and other nonspecific skin eruption 04/10/2018    Rash/skin eruption    Rash and other nonspecific skin eruption 02/22/2018    Rash/skin eruption    Repeated falls 04/10/2018    Multiple falls    Repeated falls 02/22/2018    Multiple falls    Retinal hemorrhage, right eye 02/06/2017    Retinal hemorrhage of right eye    Shortness of breath 04/10/2018    Shortness of breath    Shortness of breath 02/22/2018    Shortness of breath    Sleep apnea, unspecified 04/10/2018    Sleep disorder breathing    Sleep apnea, unspecified 02/22/2018    Sleep disorder breathing    Traction detachment of retina, right eye 02/06/2017    Retinal detachment, tractional, right eye    Type 2 diabetes mellitus with diabetic polyneuropathy (Multi) 04/10/2018    Controlled type 2 diabetes mellitus with diabetic polyneuropathy, with long-term current use of insulin    Type 2 diabetes mellitus with diabetic polyneuropathy (Multi) 02/22/2018    Controlled type 2 diabetes mellitus with diabetic polyneuropathy, with long-term current use of insulin    Type 2 diabetes mellitus with other skin ulcer (CODE) (Multi) 04/10/2018    Diabetic leg ulcer    Type 2 diabetes mellitus with other skin ulcer (CODE) (Multi) 02/22/2018    Diabetic leg ulcer    Type 2 diabetes mellitus with proliferative diabetic retinopathy without macular edema, unspecified eye (Multi) 04/10/2018    Proliferative diabetic retinopathy    Type 2 diabetes mellitus with unspecified diabetic retinopathy without macular edema (Multi) 02/06/2017    Background diabetic retinopathy    Umbilical hernia without obstruction or gangrene     Umbilical hernia    Unqualified visual loss, both eyes 02/06/2017    Complete loss of vision in visual field    Unspecified glaucoma 06/11/2015    Glaucoma    Unstable angina (Multi) 04/10/2018    Unstable angina    Unstable angina (Multi) 02/22/2018    Unstable angina    Vitamin D deficiency,  unspecified 04/10/2018    Vitamin D deficiency, unspecified    Vitamin D deficiency, unspecified 02/22/2018    Vitamin D deficiency, unspecified    Xerosis cutis 04/10/2018    Xerosis of skin    Xerosis cutis 02/22/2018    Xerosis of skin       SurgHx:  Past Surgical History:   Procedure Laterality Date    BREAST BIOPSY Left     2007    CARDIAC CATHETERIZATION N/A 7/5/2024    Procedure: Left And Right Heart Cath, Without LV;  Surgeon: Loki Donis MD;  Location: [place] [address] Cardiac Cath Lab;  Service: Cardiovascular;  Laterality: N/A;    CATARACT EXTRACTION  03/18/2015    Cataract Surgery    CORONARY ANGIOPLASTY WITH STENT PLACEMENT  03/06/2017    Cath Stent Placement    CT AORTA AND BILATERAL ILIOFEMORAL RUNOFF ANGIOGRAM W AND/OR WO IV CONTRAST  08/08/2022    CT AORTA AND BILATERAL ILIOFEMORAL RUNOFF ANGIOGRAM W AND/OR WO IV CONTRAST 8/8/2022 [facility] [place] EMERGENCY LEGACY    MR HEAD ANGIO WO IV CONTRAST  08/16/2013    MR HEAD ANGIO WO IV CONTRAST 8/16/2013 [facility] [place] ANCILLARY LEGACY    MR NECK ANGIO WO IV CONTRAST  08/16/2013    MR NECK ANGIO WO IV CONTRAST 8/16/2013 [facility] [place] [facility] ANCILLARY LEGACY    RETINAL DETACHMENT SURGERY  03/18/2015    Repair Of Retinal Detachment       Meds:  Current Outpatient Medications   Medication Instructions    acetaminophen (Tylenol) 325 mg tablet 3 tablets, oral, Every 8 hours PRN    aspirin 81 mg, oral, Daily    atorvastatin (Lipitor) 80 mg tablet TAKE 1 TABLET BY MOUTH ONCE DAILY    blood pressure monitor kit 1 each, miscellaneous, Daily, Use once daily to check blood pressure    blood sugar diagnostic (OneTouch Ultra Test) strip USE 1 STRIP(S) TO TEST BLOOD SUGAR 3 TIMES A DAY    brimonidine (AlphaGAN) 0.2 % ophthalmic solution 1 drop, Both Eyes, 2 times daily, PATIENT NEEDS PCP APPT FOR FURTHER REFILLS    carvedilol (COREG) 12.5 mg, oral, 2 times daily (morning and late afternoon)    cholecalciferol (VITAMIN D3) 400 Units, oral, Daily    diclofenac sodium (VOLTAREN) 4 g, Topical, 2 times daily     DULoxetine (Cymbalta) 30 mg DR capsule Take 1 capsule (30 mg) by mouth once daily in the morning AND 2 capsules (60 mg) once daily at bedtime. Do not crush or chew..    enoxaparin (LOVENOX) 40 mg, subcutaneous, Daily    Farxiga 10 mg, oral, Daily    FreeStyle Filipe 2 Sensor kit Apply 1 sensor to the back of the upper arm. Change sensor every 14 days.    FreeStyle Filipe reader (FreeStyle Filipe 2 [place]) misc Use as instructed    furosemide (LASIX) 20 mg, oral, Daily    glucose 4 gram chewable tablet Chew 4 tablets (16 g) 1 time for 1 dose as needed    HumaLOG KwikPen Insulin 22 Units, subcutaneous, 3 times daily (morning, midday, late afternoon), Take as directed per insulin instructions.    hydrOXYzine HCL (ATARAX) 25 mg, oral, 2 times daily PRN    isosorbide mononitrate ER (IMDUR) 60 mg, oral, Daily before breakfast    Kerendia 10 mg, oral, Daily    latanoprost (Xalatan) 0.005 % ophthalmic solution ADMINISTER 1 DROP INTO BOTH EYES ONCE DAILY AT BEDTIME.    melatonin 5 mg, oral, Nightly PRN    nitroglycerin (Nitrostat) 0.4 mg SL tablet PLACE 1 TABLET UNDER THE TONGUE EVERY 5 MINUTES FOR UP TO 3 DOSES AS NEEDED FOR CHEST PAIN.CALL 911 IF PAIN PERSISTS.    Ozempic 2 mg, subcutaneous, Once Weekly    pantoprazole (PROTONIX) 40 mg, oral, 2 times daily    Tresiba FlexTouch U-200 52 Units, subcutaneous, Nightly, Take as directed per insulin instructions.    urea (Carmol) 20 % cream 1 Application, Topical, 2 times daily       Patient care team:  Patient Care Team:  Betty Boone MD as PCP - General (Family Medicine)     ROS:  Review of Systems   Constitutional:  Positive for appetite change.   Respiratory:  Positive for shortness of breath.    Gastrointestinal:  Positive for constipation and nausea.   Musculoskeletal:  Positive for arthralgias and back pain.   Neurological:  Positive for weakness.   All other systems reviewed and are negative.        Objective   Vitals:  LMP  (LMP Unknown)       Physical  Exam  Constitutional:       Appearance: Normal appearance. She is normal weight.      Comments: In Wheelchair   HENT:      Head: Normocephalic and atraumatic.      Nose: Nose normal.      Mouth/Throat:      Mouth: Mucous membranes are dry.      Pharynx: Oropharynx is clear.   Eyes:      Extraocular Movements: Extraocular movements intact.      Pupils: Pupils are equal, round, and reactive to light.   Cardiovascular:      Rate and Rhythm: Normal rate and regular rhythm.      Pulses: Normal pulses.      Heart sounds: Normal heart sounds.   Pulmonary:      Effort: Pulmonary effort is normal.      Breath sounds: Normal breath sounds.      Comments: Decreased air movement  Abdominal:      General: Abdomen is flat.      Palpations: Abdomen is soft.   Musculoskeletal:         General: Normal range of motion.   Skin:     General: Skin is warm and dry.      Comments: Hypopigmented  non-raised macules with scaling and excoriations overlying right shoulder and upper arm   Neurological:      General: No focal deficit present.      Mental Status: She is alert and oriented to person, place, and time. Mental status is at baseline.   Psychiatric:         Mood and Affect: Mood normal.         Behavior: Behavior normal.         Thought Content: Thought content normal.         Judgment: Judgment normal.         Social Determinants of Health     Tobacco Use: Low Risk  (9/24/2024)    Patient History     Smoking Tobacco Use: Never     Smokeless Tobacco Use: Never     Passive Exposure: Never   Alcohol Use: Not At Risk (9/11/2024)    AUDIT-C     Frequency of Alcohol Consumption: Monthly or less     Average Number of Drinks: 1 or 2     Frequency of Binge Drinking: Never   Financial Resource Strain: Low Risk  (9/12/2024)    Overall Financial Resource Strain (CARDIA)     Difficulty of Paying Living Expenses: Not very hard   Food Insecurity: No Food Insecurity (9/28/2023)    [facility]    Hunger Vital Sign      Worried About Running Out of Food in the Last Year: Never true     Ran Out of Food in the Last Year: Never true   Transportation Needs: No Transportation Needs (9/12/2024)    PRAPARE - Transportation     Lack of Transportation (Medical): No     Lack of Transportation (Non-Medical): No   Physical Activity: Not on file   Stress: Not on file   Social Connections: Not on file   Intimate Partner Violence: Not on file   Depression: Not at risk (9/24/2024)    PHQ-2     PHQ-2 Score: 0   Recent Concern: Depression - At risk (9/11/2024)    PHQ-2     PHQ-2 Score: 5   Housing Stability: Low Risk  (9/12/2024)    Housing Stability Vital Sign     Unable to Pay for Housing in the Last Year: No     Number of Times Moved in the Last Year: 1     Homeless in the Last Year: No   Utilities: Not on file   Digital Equity: Not on file   Health Literacy: Not on file        Assessment/Plan     Nelida Mata is a 64-year-old woman with a past medical history of HFpEF (50-55% 9/2023), CAD s/p PCI w/ stent x 3 (2016, 2018) and STEMI (9/2023, no stents placed), TIA 9/2023, SVC thrombus on Lovenox, remote DVT, T2DM, HLD, CKD, obesity, NAFLD, MICHAEL, and prior toe amputations for gangrene in 2020 presenting for annual wellness exam.  Blood pressure on the lower side but patient asymptomatic otherwise normal heart rate and vital signs.  Patient with assessment and plan below:      IMMUNIZATIONS  Immunization History   Administered Date(s) Administered    Flu vaccine (IIV4), preservative free *Check age/dose* 10/29/2020    Flu vaccine, trivalent, preservative free, age 6 months and greater (Fluarix/Fluzone/Flulaval) 09/24/2024    Hepatitis A vaccine, pediatric/adolescent (HAVRIX, VAQTA) 05/19/2017    Hepatitis B vaccine, adult *Check Product/Dose* 02/07/2008, 09/23/2008    Influenza, Unspecified 10/05/2012, 10/24/2013, 10/06/2014, 02/19/2016, 11/08/2017, 10/16/2018, 10/03/2019    Influenza, seasonal, injectable 12/09/2011    Pfizer Purple Cap  SARS-CoV-2 04/24/2021, 05/15/2021    Pneumococcal conjugate vaccine, 13-valent (PREVNAR 13) 02/19/2016    Pneumococcal polysaccharide vaccine, 23-valent, age 2 years and older (PNEUMOVAX 23) 11/07/2014    Td vaccine, age 7 years and older (TENIVAC) 12/26/2005    Tdap vaccine, age 7 year and older (BOOSTRIX, ADACEL) 12/09/2011    Zoster vaccine, recombinant, adult (SHINGRIX) 01/02/2020    Zoster, live 07/20/2016       Ability to perform ADLs: dependant for IADLs  Fall risk: high  Hearing impairment: absent  Home safety risk factors: Home Safety Risk Factors: None    - Flu vaccine: Received last week  - COVID vaccine: recommended completion of primary series and recommended boosters, discussed  - Prevnar (PCV20): Due, given today  - Tdap: next due today, discussed  - HPV (<45): N/A  - Shingrix (50+): Completed 2 doses  - STI screen: declined  - Lifetime HIV, HepC: Ordered Hep C, lifetime HIV negative  - Lipid Panel (35M,45F): Diagnosed CAD, HLD  - DM screening: Diagnosed DMII  - HTN screening: hypotensive today  - Food Insecurity screen: Negative  - Depression: PHQ-2 Negative  - Tobacco Cessation: Never smoker  - Last Dental: recommended follow up every 6mo   - Last Eye exam: recommended follow up annually   - Colonoscopy (45-75): Scheduled 02/05/2025  - Lung CA screening (50-80, >20py): N/A  - Pap smear (21-65F): N/A  - Breast CA screening (40-74F): Next due 12/19/2024  - Osteoporosis (65+F): Ordered today      # Asymptomatic hypotension  :: Patient is taking Imdur and carvedilol which are likely contributing to her low blood pressure.    :: Per history patient has not been drinking more than 500 mL of water daily which could also be contributing to her low blood pressure.   - Given patient is asymptomatic we will keep on same regimen of medications for now   - encourage hydration to a goal of 1 to 1.5 L daily.      #CKD stage IIIb  :: Likely complication of diabetes  -Ordered urine albumin  -Patient already on  Farxiga  -Was previously on losartan but stopped due to low blood pressure, if patient has albuminuria will need to discuss with cardiologist about risks versus benefits of ARB versus Imdur    #HM  -Med rec performed: Glaucoma eyedrops refilled and advised patient to follow-up with ophthalmology; advised to continue pantoprazole once daily given history of GI bleed.  -Advanced directive discussed, forms given to patient to fill out  -DEXA, mammogram ordered  -Flu shot at outside clinic  -RSV, Prevnar given today  -Hep B, C screening ordered    #Restrictive Lung Disease  ::Endorses dyspnea on minimal exertion, fatigue  ::Lungs clear to auscultation but with reduced inspiratory volume  -Initial visit with Pulmonology upcoming    #Chronic Pain  -Referred to pain management clinic  -Advised to avoid NSAIDs given hx of gastric bleed  -Refilled Tylenol and diclofenac gel    #Mild PCM  ::2/2 decreased apetite while on Ozempic  -Counselled to prioritize protein intake in diet  -Start Multivitamin to avoid vitamin deficiencies    #CLARIBEL  #Iron deficiency anemia  ::Recent hospitalizations for CLARIBEL and iron deficiency anemia 2/2 gastric bleed  -Counselled adequate hydration  -RFP, CBC to monitor    Problem List Items Addressed This Visit             ICD-10-CM    Pain syndrome, chronic G89.4    Relevant Orders    Referral to Pain Medicine    Uncontrolled type 2 diabetes mellitus with hyperglycemia E11.65    Relevant Orders    Renal function panel    Referral to Ophthalmology    Iron deficiency anemia D50.9    Relevant Orders    CBC and Auto Differential     Other Visit Diagnoses         Codes    Screening due    -  Primary Z13.9    Relevant Orders    Hepatitis C antibody    Albumin-Creatinine Ratio, Urine Random    Hepatitis B surface antigen    Hepatitis B surface antibody    Hepatitis B core antibody, total    Need for vaccination against respiratory syncytial virus     Z29.11    Relevant Orders    RSV, Recombinant, 60 years and  older (AREXVY) (Completed)    Encounter for screening for osteoporosis     Z13.820    Relevant Orders    XR DEXA bone density    Menopause     Z78.0    Relevant Orders    XR DEXA bone density    Mild protein-calorie malnutrition (Multi)     E44.1    Relevant Medications    multivitamin tablet    Breast screening     Z12.39    Relevant Orders    BI mammo bilateral screening tomosynthesis    Encounter for screening mammogram for malignant neoplasm of breast     Z12.31    Relevant Orders    BI mammo bilateral screening tomosynthesis            Patient seen and discussed with attending physician (cosigner listed on this note).    RTC in 3 months to follow-up on pain/MDD and shortness of breath after seeing specialist, or earlier as needed.    Reviewed and approved by TAMIKA LOBO on 10/3/24 at 8:24 AM.     I saw and evaluated the patient. I personally obtained the key and critical portions of the history and physical exam. I reviewed and modified the student's documentation and discussed patient with the medical student. I agree with the above documentation and medical decision making.     Patient seen and discussed with attending physician Dr. Joe      Plan preliminary until cosigned by attending physician.    Betty Boone M.D.  Family Medicine  PGY-2

## 2024-10-03 NOTE — PATIENT INSTRUCTIONS
- Please eat more proteins (eggs, chicken, fish, meats, beans, cheese and dairy)  - Please start taking a multivitamin daily   - Please complete your living will paperwork at your own pace  - Please drink 1-1.5 L of water daily  - Please go to the lab to get your blood drawn  - Please call to schedule an appointment for bone scan [phone]  - Please call to schedule an appointment for pain management [phone]  - Please call to schedule an appointment for eye doctor [phone]  - Please call to schedule an appointment for Mammogram: [phone]  - Please schedule an appointment in 2-3 months with me  - Please call my office at [phone] with any questions

## 2024-10-04 ENCOUNTER — LAB (OUTPATIENT)
Dept: LAB | Facility: LAB | Age: 64
End: 2024-10-04
Payer: COMMERCIAL

## 2024-10-04 ENCOUNTER — OFFICE VISIT (OUTPATIENT)
Dept: CARDIOLOGY | Facility: HOSPITAL | Age: 64
End: 2024-10-04
Payer: COMMERCIAL

## 2024-10-04 VITALS
SYSTOLIC BLOOD PRESSURE: 105 MMHG | OXYGEN SATURATION: 96 % | HEIGHT: 59 IN | HEART RATE: 89 BPM | DIASTOLIC BLOOD PRESSURE: 67 MMHG | WEIGHT: 182.8 LBS | BODY MASS INDEX: 36.85 KG/M2

## 2024-10-04 DIAGNOSIS — D50.9 IRON DEFICIENCY ANEMIA, UNSPECIFIED IRON DEFICIENCY ANEMIA TYPE: ICD-10-CM

## 2024-10-04 DIAGNOSIS — E11.65 UNCONTROLLED TYPE 2 DIABETES MELLITUS WITH HYPERGLYCEMIA: ICD-10-CM

## 2024-10-04 DIAGNOSIS — G47.33 OSA (OBSTRUCTIVE SLEEP APNEA): Primary | ICD-10-CM

## 2024-10-04 DIAGNOSIS — Z13.9 SCREENING DUE: ICD-10-CM

## 2024-10-04 DIAGNOSIS — I50.32 CHRONIC HEART FAILURE WITH PRESERVED EJECTION FRACTION: ICD-10-CM

## 2024-10-04 DIAGNOSIS — Z86.2 HISTORY OF ANEMIA: ICD-10-CM

## 2024-10-04 DIAGNOSIS — D50.0 IRON DEFICIENCY ANEMIA DUE TO CHRONIC BLOOD LOSS: ICD-10-CM

## 2024-10-04 LAB
ALBUMIN SERPL BCP-MCNC: 4 G/DL (ref 3.4–5)
ANION GAP SERPL CALC-SCNC: 13 MMOL/L (ref 10–20)
BASOPHILS # BLD AUTO: 0.15 X10*3/UL (ref 0–0.1)
BASOPHILS NFR BLD AUTO: 1.8 %
BUN SERPL-MCNC: 15 MG/DL (ref 6–23)
CALCIUM SERPL-MCNC: 10.1 MG/DL (ref 8.6–10.6)
CHLORIDE SERPL-SCNC: 103 MMOL/L (ref 98–107)
CO2 SERPL-SCNC: 32 MMOL/L (ref 21–32)
CREAT SERPL-MCNC: 1.25 MG/DL (ref 0.5–1.05)
EGFRCR SERPLBLD CKD-EPI 2021: 48 ML/MIN/1.73M*2
EOSINOPHIL # BLD AUTO: 0.23 X10*3/UL (ref 0–0.7)
EOSINOPHIL NFR BLD AUTO: 2.7 %
ERYTHROCYTE [DISTWIDTH] IN BLOOD BY AUTOMATED COUNT: 16.6 % (ref 11.5–14.5)
FERRITIN SERPL-MCNC: 696 NG/ML (ref 8–150)
GLUCOSE SERPL-MCNC: 49 MG/DL (ref 74–99)
HBV CORE AB SER QL: NONREACTIVE
HBV SURFACE AB SER-ACNC: 438.2 MIU/ML
HBV SURFACE AG SERPL QL IA: NONREACTIVE
HCT VFR BLD AUTO: 46.2 % (ref 36–46)
HCV AB SER QL: NONREACTIVE
HGB BLD-MCNC: 14.3 G/DL (ref 12–16)
IMM GRANULOCYTES # BLD AUTO: 0.02 X10*3/UL (ref 0–0.7)
IMM GRANULOCYTES NFR BLD AUTO: 0.2 % (ref 0–0.9)
LYMPHOCYTES # BLD AUTO: 1.57 X10*3/UL (ref 1.2–4.8)
LYMPHOCYTES NFR BLD AUTO: 18.7 %
MCH RBC QN AUTO: 29.5 PG (ref 26–34)
MCHC RBC AUTO-ENTMCNC: 31 G/DL (ref 32–36)
MCV RBC AUTO: 95 FL (ref 80–100)
MONOCYTES # BLD AUTO: 0.51 X10*3/UL (ref 0.1–1)
MONOCYTES NFR BLD AUTO: 6.1 %
NEUTROPHILS # BLD AUTO: 5.93 X10*3/UL (ref 1.2–7.7)
NEUTROPHILS NFR BLD AUTO: 70.5 %
NRBC BLD-RTO: 0 /100 WBCS (ref 0–0)
PHOSPHATE SERPL-MCNC: 3.6 MG/DL (ref 2.5–4.9)
PLATELET # BLD AUTO: 224 X10*3/UL (ref 150–450)
POTASSIUM SERPL-SCNC: 4 MMOL/L (ref 3.5–5.3)
RBC # BLD AUTO: 4.85 X10*6/UL (ref 4–5.2)
SODIUM SERPL-SCNC: 144 MMOL/L (ref 136–145)
WBC # BLD AUTO: 8.4 X10*3/UL (ref 4.4–11.3)

## 2024-10-04 PROCEDURE — 1036F TOBACCO NON-USER: CPT | Performed by: STUDENT IN AN ORGANIZED HEALTH CARE EDUCATION/TRAINING PROGRAM

## 2024-10-04 PROCEDURE — 3008F BODY MASS INDEX DOCD: CPT | Performed by: STUDENT IN AN ORGANIZED HEALTH CARE EDUCATION/TRAINING PROGRAM

## 2024-10-04 PROCEDURE — 3051F HG A1C>EQUAL 7.0%<8.0%: CPT | Performed by: STUDENT IN AN ORGANIZED HEALTH CARE EDUCATION/TRAINING PROGRAM

## 2024-10-04 PROCEDURE — 3061F NEG MICROALBUMINURIA REV: CPT | Performed by: STUDENT IN AN ORGANIZED HEALTH CARE EDUCATION/TRAINING PROGRAM

## 2024-10-04 PROCEDURE — 80069 RENAL FUNCTION PANEL: CPT

## 2024-10-04 PROCEDURE — 83036 HEMOGLOBIN GLYCOSYLATED A1C: CPT

## 2024-10-04 PROCEDURE — 80061 LIPID PANEL: CPT

## 2024-10-04 PROCEDURE — 85025 COMPLETE CBC W/AUTO DIFF WBC: CPT

## 2024-10-04 PROCEDURE — 1125F AMNT PAIN NOTED PAIN PRSNT: CPT | Performed by: STUDENT IN AN ORGANIZED HEALTH CARE EDUCATION/TRAINING PROGRAM

## 2024-10-04 PROCEDURE — 1159F MED LIST DOCD IN RCRD: CPT | Performed by: STUDENT IN AN ORGANIZED HEALTH CARE EDUCATION/TRAINING PROGRAM

## 2024-10-04 PROCEDURE — 86704 HEP B CORE ANTIBODY TOTAL: CPT

## 2024-10-04 PROCEDURE — 36415 COLL VENOUS BLD VENIPUNCTURE: CPT

## 2024-10-04 PROCEDURE — 82728 ASSAY OF FERRITIN: CPT

## 2024-10-04 PROCEDURE — 86706 HEP B SURFACE ANTIBODY: CPT

## 2024-10-04 PROCEDURE — 99215 OFFICE O/P EST HI 40 MIN: CPT | Performed by: STUDENT IN AN ORGANIZED HEALTH CARE EDUCATION/TRAINING PROGRAM

## 2024-10-04 PROCEDURE — 3078F DIAST BP <80 MM HG: CPT | Performed by: STUDENT IN AN ORGANIZED HEALTH CARE EDUCATION/TRAINING PROGRAM

## 2024-10-04 PROCEDURE — 86803 HEPATITIS C AB TEST: CPT

## 2024-10-04 PROCEDURE — 87340 HEPATITIS B SURFACE AG IA: CPT

## 2024-10-04 PROCEDURE — 3074F SYST BP LT 130 MM HG: CPT | Performed by: STUDENT IN AN ORGANIZED HEALTH CARE EDUCATION/TRAINING PROGRAM

## 2024-10-04 PROCEDURE — 3048F LDL-C <100 MG/DL: CPT | Performed by: STUDENT IN AN ORGANIZED HEALTH CARE EDUCATION/TRAINING PROGRAM

## 2024-10-04 ASSESSMENT — ENCOUNTER SYMPTOMS
HEADACHES: 1
DIARRHEA: 0
FREQUENCY: 1
PND: 0
OCCASIONAL FEELINGS OF UNSTEADINESS: 1
NAUSEA: 1
SHORTNESS OF BREATH: 1
DEPRESSION: 1
ORTHOPNEA: 1
FALLS: 0
CONSTIPATION: 1
VOMITING: 1
DYSPNEA ON EXERTION: 1
PALPITATIONS: 0
LOSS OF SENSATION IN FEET: 1
DIZZINESS: 1

## 2024-10-04 ASSESSMENT — PATIENT HEALTH QUESTIONNAIRE - PHQ9
SUM OF ALL RESPONSES TO PHQ9 QUESTIONS 1 AND 2: 1
2. FEELING DOWN, DEPRESSED OR HOPELESS: SEVERAL DAYS
1. LITTLE INTEREST OR PLEASURE IN DOING THINGS: NOT AT ALL

## 2024-10-04 ASSESSMENT — PAIN SCALES - GENERAL: PAINLEVEL: 8

## 2024-10-04 NOTE — PROGRESS NOTES
Referring Clinician: Dr. 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 Review of the electronic medical record shows a past medical history significant for HFpEF, GERD,  obesity with BMI 40 kg/m² (maintained on semaglutide), CAD status post PCI ( STEMI 9/2023 at ; NSTEMI 11/2023 care at [facility]), known SVC thrombus maintained on DOAC, TIA 9/2023, hyperlipidemia, CKD, MICHAEL non adherent with CPAP and does not follow the sleep medicine, status post toe amputations for gangrene, insomnia.  She is on pharmacotherapy for HFpEF (finerenone, dapagliflozin) blood pressure has been previously well controlled, but she reports hypotension recently.  Cardiac MRI 4/2024 there was evidence of inducible myocardial ischemia in the basal-mid inferior septal/inferior wall with a basal-mid anterior wall mild defects.  LVEF was 53% on this study.   was admitted to the hospital 6/2024 with NSTEMI.  TTE 6/2024 demonstrated normal biventricular systolic function with LVEF 55-60%.  She was hospitalized 9/2024 for 1 day with nausea and vomiting.    Since last visit she has completed R/LHC 7/5/2024: RA: 1 RVEDP: 1 PA: 23/7 (13) PCWP: 3 PA-SAT: 60.6 CO: 3.96/2.19. Low right and left sided filling pressures with mildly depressed CO/CI, likely from hypovolemia.    Minimally unchanged coronary artery disease with known RCA  and 40-50% LAD instent restenosis.    She denies chest pain but has significant exertional dyspnea after walking 20 m.  She now also reports having a 3 pillow orthopnea.  She avoids stairs actively because of her exertional dyspnea.  She denies  leg swelling, PND.  She reports significant orthopnea which she says is due to obstructive sleep apnea. He has not yet scheduled to see Sleep medicine as advised at last visit. She endorsed being somewhat overwhelmed with numbers of appointments she needs to keep    Review of  "Systems   Cardiovascular:  Positive for dyspnea on exertion and orthopnea. Negative for chest pain, leg swelling, palpitations and paroxysmal nocturnal dyspnea.   Respiratory:  Positive for shortness of breath.    Musculoskeletal:  Negative for falls.   Gastrointestinal:  Positive for constipation, nausea and vomiting. Negative for diarrhea.   Genitourinary:  Positive for bladder incontinence and frequency.   Neurological:  Positive for dizziness and headaches.      She is adherent with prescribed medications.    Surgical Hx:  - Toe amputations 2020    Past Obstetric Hx:  - G 1  - No  cardiac complications of pregnancy    Social Hx:  - Smoking-never  - ETOH-1 glass of wine twice a week, prev heavy use \" in early life\"  - Illicit drugs- never   - Lives alone, has a very supportive partner who lives nearby.    Family Hx:  Specifically, there is no family history of ICD, PPM, LVAD, OHT, arrhythmias, CVA, or sudden cardiac death.    Brother- \" Enlarged heart\"  Mother- CVA , \" heart attack\"  Multiple relatives - DM     Medication reconciliation completed, see below.     Medication Documentation Review Audit       Reviewed by Tonya Toledo MA (Medical Assistant) on 10/04/24 at 0759      Medication Order Taking? Sig Documenting Provider Last Dose Status     Discontinued 10/03/24 1404   acetaminophen (Tylenol) 325 mg tablet [id number] Yes Take 2 tablets (650 mg) by mouth every 8 hours if needed for mild pain (1 - 3). Betty Boone MD Taking Active   aspirin 81 mg EC tablet [id number] Yes Take 1 tablet (81 mg) by mouth once daily. Betty Boone MD Taking Active   atorvastatin (Lipitor) 80 mg tablet [id number] Yes TAKE 1 TABLET BY MOUTH ONCE DAILY Michael Hilario MD Taking Active   blood pressure monitor kit [id number] Yes 1 each once daily. Use once daily to check blood pressure Kassandra Lopez MD Taking Active   blood sugar diagnostic (OneTouch Ultra Test) strip [id number] Yes USE 1 STRIP(S) TO TEST BLOOD SUGAR " 3 TIMES A DAY Kassandra Lopez MD Taking Active     Discontinued 10/03/24 1410   brimonidine (AlphaGAN) 0.2 % ophthalmic solution [id number] Yes Administer 1 drop into both eyes 2 times a day. PATIENT NEEDS PCP APPT FOR FURTHER REFILLS Betty Boone MD Taking Active   carvedilol (Coreg) 12.5 mg tablet [id number] Yes Take 1 tablet (12.5 mg) by mouth 2 times daily (morning and late afternoon). Destiny Hurst MD PhD Taking Active     Discontinued 10/03/24 1404   cholecalciferol (Vitamin D3) 10 MCG (400 UNIT) tablet [id number] Yes Take 1 tablet (10 mcg) by mouth once daily. Betty Boone MD Taking Active     Discontinued 10/03/24 1404   dapagliflozin propanediol (Farxiga) 10 mg [id number] Yes Take 1 tablet (10 mg) by mouth once daily. Betty Boone MD Taking Active     Discontinued 10/03/24 1404   diclofenac sodium (Voltaren) 1 % gel [id number] Yes Apply 4.5 inches (4 g) topically 2 times a day. Betty Boone MD Taking Active     Discontinued 10/03/24 1404   DULoxetine (Cymbalta) 30 mg DR capsule [id number] Yes Take 1 capsule (30 mg) by mouth once daily in the morning AND 2 capsules (60 mg) once daily at bedtime. Do not crush or chew.. Betty Boone MD Taking Active   enoxaparin (Lovenox) 40 mg/0.4 mL syringe [id number] Yes Inject 0.4 mL (40 mg) under the skin once daily. Tobin Saleh MD Taking Active   finerenone (Kerendia) 10 mg tablet tablet [id number] Yes Take 1 tablet (10 mg) by mouth once daily. Michael Hilario MD Taking Active   FreeStyle Filipe 2 Sensor kit [id number] Yes Apply 1 sensor to the back of the upper arm. Change sensor every 14 days. Michael Hilario MD Taking Active   FreeStyle Filipe reader (FreeStyle Filipe 2 [place]) misc [id number] Yes Use as instructed Marek Miles MD Taking Active     Discontinued 10/03/24 1404   furosemide (Lasix) 20 mg tablet [id number] Yes Take 1 tablet (20 mg) by mouth once daily. Betty Boone MD Taking Active   Discontinued 10/03/24 3562    Discontinued 10/03/24 1353   insulin degludec (Tresiba FlexTouch U-200) 200 unit/mL (3 mL) injection [id number] Yes Inject 52 Units under the skin once daily at bedtime. Take as directed per insulin instructions. Betty Boone MD Taking Active   insulin lispro (HumaLOG KwikPen Insulin) 200 unit/mL (3 mL) insulin pen pen [id number] Yes Inject 22 Units under the skin 3 times daily (morning, midday, late afternoon). Take as directed per insulin instructions. Betty Boone MD Taking Active   isosorbide mononitrate ER (Imdur) 60 mg 24 hr tablet [id number] Yes TAKE 1 TABLET BY MOUTH EVERY DAY IN THE MORNING Betty Boone MD Taking Active     Discontinued 10/03/24 1410   latanoprost (Xalatan) 0.005 % ophthalmic solution [id number] Yes Administer 1 drop into both eyes once daily at bedtime. Betty Boone MD Taking Active     Discontinued 10/03/24 1404   melatonin 5 mg tablet [id number] Yes Take 1 tablet (5 mg) by mouth as needed at bedtime for sleep. Betty Boone MD Taking Active   multivitamin tablet [id number] Yes Take 1 tablet by mouth once daily. Betty Boone MD Taking Active   nitroglycerin (Nitrostat) 0.4 mg SL tablet [id number] Yes PLACE 1 TABLET UNDER THE TONGUE EVERY 5 MINUTES FOR UP TO 3 DOSES AS NEEDED FOR CHEST PAIN.CALL 911 IF PAIN PERSISTS. Historical Provider, MD Taking Active    Patient taking differently:   Discontinued 10/03/24 1414   pantoprazole (ProtoNix) 40 mg EC tablet [id number] Yes Take 1 tablet (40 mg) by mouth once daily in the morning. Take before meals. Betty Boone MD Taking Active   semaglutide 2 mg/dose (8 mg/3 mL) pen injector [id number] Yes Inject 2 mg under the skin 1 (one) time per week. Michael Hilario MD Taking Active   urea (Carmol) 20 % cream [id number] Yes Apply 1 Application topically 2 times a day. Historical Provider, MD Taking Active                      Allergies   Allergen Reactions    Other Rash     Novocaine Solution, reaction rash     Penicillins  "Rash and Hives    Procaine Rash and Hives     Investigations:    The electronic medical record has been reviewed by me for salient history. All cardiovascular imaging and testing available in the electronic medical record, and [facility] has been reviewed.     .Visit Vitals  /67 (BP Location: Left arm, Patient Position: Sitting, BP Cuff Size: Adult)   Pulse 89   Ht 1.499 m (4' 11\") Comment: pt reported   Wt 82.9 kg (182 lb 12.8 oz) Comment: with shoes on   LMP  (LMP Unknown)   SpO2 96%   BMI 36.92 kg/m²   OB Status Postmenopausal   Smoking Status Never   BSA 1.86 m²         On examination:    Very pleasant obese AA woman of short stature in no apparent CP or painful distress  Neck: No appreciable JVD or HJR  CVS: HS 1,2.   No added sounds  Resp: CTA bilaterally. Percussion note resonant   Abdomen: Obese, SNT, BS wnl  Extremities: no pedal oedema (2+ prev)  Skin: warm and dry  CNS: AO x 4    Lab Results   Component Value Date    WBC 4.7 09/12/2024    HGB 11.7 (L) 09/12/2024    HCT 39.0 09/12/2024    MCV 97 09/12/2024     09/12/2024     Lab Results   Component Value Date    GLUCOSE 185 (H) 09/12/2024    CALCIUM 8.9 09/12/2024     09/12/2024    K 3.7 09/12/2024    CO2 25 09/12/2024     09/12/2024    BUN 23 09/12/2024    CREATININE 1.75 (H) 09/12/2024       cMRI 4/2024   1. Normal LV size (EDVi 40 ml/m2) with low-normal systolic function (LVEF 53%).  2. No regional wall motion abnormalities.  3. Concentric left ventricular hypertrophy.  4. Evidence of inducible myocardial ischemia of the basal-mid inferoseptal/inferior wall (moderate defect) and basal-mid anterior wall (mild defect).  5. No definite evidence of myocardial infarction or infiltration on LGE imaging. There is RV insertion point and mild diffuse septal  fibrosis.  6. Normal RV size (EDVi-41 ml/m2) and systolic function (RVEF50%).    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 " Review of the electronic medical record shows a past medical history significant for HFpEF, GERD,  obesity with BMI 40 kg/m² (maintained on semaglutide), CAD status post PCI ( STEMI 9/2023 at ; NSTEMI 11/2023 care at [facility]), known SVC thrombus maintained on DOAC, TIA 9/2023, hyperlipidemia, CKD, MICHAEL non adherent with CPAP and does not follow the sleep medicine, status post toe amputations for gangrene, insomnia.  Cardiac MRI 4/2024 demonstrated inducible ischemic changes.  She was last admitted with an NSTEMI 6/2024, and has been having exertional symptoms.  Since last visit she has completed R/LHC 7/5/2024: RA: 1 RVEDP: 1 PA: 23/7 (13) PCWP: 3 PA-SAT: 60.6 CO: 3.96/2.19. Low right and left sided filling pressures with mildly depressed CO/CI, likely from hypovolemia.  Minimally unchanged coronary artery disease with known RCA  and 40-50% LAD instent restenosis.    NYHA Functional Class: 3  ACC/AHA Stage C heart failure  Volume status: Euvolaemic  Perfusion status: Warm to touch  Aetiology: Ischemic    PLAN:    #HFpEF  -Continue medical mx for CAD  -Needs MICHAEL management, see # MICHAEL below  - Continue finerenone, SGLT2 inhibition  -Continue halved dose of carvedilol 2.5 mg twice daily    #Obesity  We discussed the importance of weight loss, she is doing her best with this, and is on semaglutide.    #Obstructive sleep apnea  She is non-adherent with MICHAEL management (finds CPAP uncomfortable); management needed for optimized HFpEF care.  She has been re-referred to the sleep medicine team for discussion of alternative treatments for MICHAEL. Encouraged to  make the appointment    Follow up with Dr Hilario is planned    Happy to see again future, as needed    This note was transcribed using the Dragon Dictation system. There may be grammatical, punctuation, or verbiage errors that can occur with voice recognition programs.    Destiny Hurst MD PhD

## 2024-10-04 NOTE — PATIENT INSTRUCTIONS
Thank you for coming in today. If you have any questions or concerns, you may call the Heart Failure Office at [phone] option 6, or [phone].  You may also contact our heart failure nursing team via email on [email or web address].    For quicker results set-up your  [employer] account to receive results and other correspondence directly to your phone.    Please bring all your pills/medications to your Cardiology appointments.    **  - No new medication changes today    - You will be referred to the following teams, CALL  [phone] to schedule your appointments with:  1.  Sleep Medicine (MICHAEL management)    - Please continue your heart care with Dr Hilario    - I would be happy to see you again in future, as needed

## 2024-10-07 LAB
CHOLEST SERPL-MCNC: 91 MG/DL (ref 0–199)
CHOLESTEROL/HDL RATIO: 2.4
EST. AVERAGE GLUCOSE BLD GHB EST-MCNC: 157 MG/DL
HBA1C MFR BLD: 7.1 %
HDLC SERPL-MCNC: 37.9 MG/DL
LDLC SERPL CALC-MCNC: 33 MG/DL
NON HDL CHOLESTEROL: 53 MG/DL (ref 0–149)
TRIGL SERPL-MCNC: 102 MG/DL (ref 0–149)
VLDL: 20 MG/DL (ref 0–40)

## 2024-10-08 NOTE — PROGRESS NOTES
I reviewed the resident/fellow's documentation and discussed the patient with the resident/fellow. I agree with the resident/fellow's medical decision making as documented in the note.    Africa Joe MD

## 2024-10-09 ENCOUNTER — TELEPHONE (OUTPATIENT)
Dept: PRIMARY CARE | Facility: CLINIC | Age: 64
End: 2024-10-09
Payer: COMMERCIAL

## 2024-10-09 NOTE — TELEPHONE ENCOUNTER
Called patient about lab results.  Discussed the following assessment and plan of her results:  - she is immune to hepatitis B.   - anemia has resolved and we will defer to her cardiologist on whether she still needs iron infusions.  -Lipid panel within normal limits  -RFP consistent with CKD stage IIIb, advised patient to pass by  lab to give urine for urine albumin, if having albuminuria might need to look discussed with cardiologist benefit of resuming ARB versus Imdur (refer to last office visit plan)  -A1c is at goal of 7.1, however glucose on RFP was 48; reviewed with patient a.m. fasting glucose and she mentions that she was having numbers in the 60s.  Patient is currently on Farxiga, Tresiba 52 units nightly and lispro 3 times daily 22 units with meals.  Plan  -decrease Tresiba to 49 units nightly and record a.m. blood sugars  -If a.m. blood sugars consistently less than 70, patient instructed to decrease Tresiba to 47 units nightly after 1 week  - Sent message to staff to schedule virtual follow-up in 1 to 2 weeks to follow-up

## 2024-10-10 ENCOUNTER — TELEMEDICINE (OUTPATIENT)
Dept: PRIMARY CARE | Facility: CLINIC | Age: 64
End: 2024-10-10
Payer: COMMERCIAL

## 2024-10-10 DIAGNOSIS — E11.65 UNCONTROLLED TYPE 2 DIABETES MELLITUS WITH HYPERGLYCEMIA: ICD-10-CM

## 2024-10-10 PROCEDURE — 99213 OFFICE O/P EST LOW 20 MIN: CPT

## 2024-10-10 PROCEDURE — 3051F HG A1C>EQUAL 7.0%<8.0%: CPT

## 2024-10-10 PROCEDURE — 3048F LDL-C <100 MG/DL: CPT

## 2024-10-10 PROCEDURE — 3061F NEG MICROALBUMINURIA REV: CPT

## 2024-10-10 NOTE — PROGRESS NOTES
Subjective   Patient ID: Nelida Mata is a 64 y.o. female who presents for Diabetes.    HPI    The encounter below was completed via telephone communication due to patient preference and/or scheduling necessity. Patient lacked appropriate connectivity for video telemedicine. Confirmed with patient that they are in a private setting before discussing sensitive health information. Consent for treatment and billing for this visit was obtained during the visit. Patient was appropriately identified using two identifiers.    Diabetes  Patient was having fasting hypoglycemia, as evidenced by recent labs and history obtained over the phone on 10/9.  Patient has just started lower dose of Tresiba at 49 units nightly instead of 52 units.  She is aware that she needs to record her a.m. blood sugars for follow-up.    OBGYN  Patient inquiring about OB/GYN procedure done recently and mentioned that she did not hear back about the results.  Per chart review patient underwent IUD removal with endometrial biopsy on 9/24 for postmenopausal vaginal spotting.  Informed patient that IUD was removed but they were not able to complete the endometrial biopsy and she will have to follow-up with Dr. Valenzuela for ongoing plans and management.        Review of Systems  All other systems reviewed were negative    Current Outpatient Medications on File Prior to Visit   Medication Sig Dispense Refill    acetaminophen (Tylenol) 325 mg tablet Take 2 tablets (650 mg) by mouth every 8 hours if needed for mild pain (1 - 3). 1080 tablet 0    aspirin 81 mg EC tablet Take 1 tablet (81 mg) by mouth once daily. 30 tablet 11    atorvastatin (Lipitor) 80 mg tablet TAKE 1 TABLET BY MOUTH ONCE DAILY 90 tablet 3    blood pressure monitor kit 1 each once daily. Use once daily to check blood pressure 1 kit 0    blood sugar diagnostic (OneTouch Ultra Test) strip USE 1 STRIP(S) TO TEST BLOOD SUGAR 3 TIMES A  strip 3    brimonidine (AlphaGAN) 0.2 %  ophthalmic solution Administer 1 drop into both eyes 2 times a day. PATIENT NEEDS PCP APPT FOR FURTHER REFILLS 30 mL 3    carvedilol (Coreg) 12.5 mg tablet Take 1 tablet (12.5 mg) by mouth 2 times daily (morning and late afternoon). 60 tablet 11    cholecalciferol (Vitamin D3) 10 MCG (400 UNIT) tablet Take 1 tablet (10 mcg) by mouth once daily. 30 tablet 11    dapagliflozin propanediol (Farxiga) 10 mg Take 1 tablet (10 mg) by mouth once daily. 30 tablet 11    diclofenac sodium (Voltaren) 1 % gel Apply 4.5 inches (4 g) topically 2 times a day. 800 g 2    DULoxetine (Cymbalta) 30 mg DR capsule Take 1 capsule (30 mg) by mouth once daily in the morning AND 2 capsules (60 mg) once daily at bedtime. Do not crush or chew.. 60 capsule 11    enoxaparin (Lovenox) 40 mg/0.4 mL syringe Inject 0.4 mL (40 mg) under the skin once daily. 12 mL 3    finerenone (Kerendia) 10 mg tablet tablet Take 1 tablet (10 mg) by mouth once daily. 30 tablet 11    FreeStyle Filipe 2 Sensor kit Apply 1 sensor to the back of the upper arm. Change sensor every 14 days. 2 each 11    FreeStyle Filipe reader (FreeStyle Filipe 2 [place]) misc Use as instructed 1 each 0    furosemide (Lasix) 20 mg tablet Take 1 tablet (20 mg) by mouth once daily. 90 tablet 2    insulin lispro (HumaLOG KwikPen Insulin) 200 unit/mL (3 mL) insulin pen pen Inject 22 Units under the skin 3 times daily (morning, midday, late afternoon). Take as directed per insulin instructions. 11 each 3    isosorbide mononitrate ER (Imdur) 60 mg 24 hr tablet TAKE 1 TABLET BY MOUTH EVERY DAY IN THE MORNING 90 tablet 1    latanoprost (Xalatan) 0.005 % ophthalmic solution Administer 1 drop into both eyes once daily at bedtime. 2.5 mL 3    melatonin 5 mg tablet Take 1 tablet (5 mg) by mouth as needed at bedtime for sleep. 30 tablet 11    multivitamin tablet Take 1 tablet by mouth once daily. 30 tablet 11    nitroglycerin (Nitrostat) 0.4 mg SL tablet PLACE 1 TABLET UNDER THE TONGUE EVERY 5 MINUTES FOR  UP TO 3 DOSES AS NEEDED FOR CHEST PAIN.CALL 911 IF PAIN PERSISTS.      pantoprazole (ProtoNix) 40 mg EC tablet Take 1 tablet (40 mg) by mouth once daily in the morning. Take before meals. 30 tablet 5    semaglutide 2 mg/dose (8 mg/3 mL) pen injector Inject 2 mg under the skin 1 (one) time per week. 3 mL 11    urea (Carmol) 20 % cream Apply 1 Application topically 2 times a day.      [DISCONTINUED] insulin degludec (Tresiba FlexTouch U-200) 200 unit/mL (3 mL) injection Inject 52 Units under the skin once daily at bedtime. Take as directed per insulin instructions. 24 mL 3     No current facility-administered medications on file prior to visit.        Objective   Vitals: LMP  (LMP Unknown)      Physical Exam  Pleasant over the phone. OA x4.      Assessment/Plan     64-year-old woman with a past medical history of HFpEF (50-55% 9/2023), CAD s/p PCI w/ stent x 3 (2016, 2018) and STEMI (9/2023, no stents placed), TIA 9/2023, SVC thrombus on Lovenox, remote DVT, T2DM, HLD, CKD, obesity, NAFLD, MICHAEL, and prior toe amputations for gangrene in 2020 presenting for diabetes follow-up.   Plan  -decrease Tresiba to 49 units nightly and record a.m. blood sugars  -If a.m. blood sugars consistently less than 70, patient instructed to decrease Tresiba to 47 units nightly after 1 week  -Follow-up in 2 weeks    Problem List Items Addressed This Visit             ICD-10-CM    Uncontrolled type 2 diabetes mellitus with hyperglycemia E11.65    Relevant Medications    insulin degludec (Tresiba FlexTouch U-200) 200 unit/mL (3 mL) injection       Attending Supervision: discussed with attending physician (cosigner listed on this note).    RTC in 2 weeks to follow-up on hypoglycemia, or earlier as needed.    Patient seen and discussed with attending physician Dr. Eliceo Cruz preliminary until cosigned by attending physician.    Betty Boone M.D.  Family Medicine  PGY-2

## 2024-10-11 RX ORDER — INSULIN DEGLUDEC 200 U/ML
49 INJECTION, SOLUTION SUBCUTANEOUS NIGHTLY
Start: 2024-10-11 | End: 2025-10-11

## 2024-10-13 PROBLEM — E66.01 MORBID OBESITY (MULTI): Status: RESOLVED | Noted: 2023-03-25 | Resolved: 2024-10-13

## 2024-10-13 PROBLEM — E66.01 PSYCHOLOGICAL FACTORS AFFECTING MORBID OBESITY (MULTI): Status: RESOLVED | Noted: 2023-03-25 | Resolved: 2024-10-13

## 2024-10-13 PROBLEM — F54 PSYCHOLOGICAL FACTORS AFFECTING MORBID OBESITY (MULTI): Status: RESOLVED | Noted: 2023-03-25 | Resolved: 2024-10-13

## 2024-10-13 PROBLEM — F33.40 RECURRENT MAJOR DEPRESSIVE DISORDER, IN REMISSION (CMS-HCC): Chronic | Status: ACTIVE | Noted: 2023-03-25

## 2024-10-15 ENCOUNTER — INFUSION (OUTPATIENT)
Dept: HEMATOLOGY/ONCOLOGY | Facility: HOSPITAL | Age: 64
End: 2024-10-15
Payer: COMMERCIAL

## 2024-10-15 ENCOUNTER — APPOINTMENT (OUTPATIENT)
Dept: RADIOLOGY | Facility: HOSPITAL | Age: 64
End: 2024-10-15
Payer: COMMERCIAL

## 2024-10-15 ENCOUNTER — CLINICAL SUPPORT (OUTPATIENT)
Dept: EMERGENCY MEDICINE | Facility: HOSPITAL | Age: 64
End: 2024-10-15
Payer: COMMERCIAL

## 2024-10-15 ENCOUNTER — HOSPITAL ENCOUNTER (EMERGENCY)
Facility: HOSPITAL | Age: 64
Discharge: HOME | End: 2024-10-15
Payer: COMMERCIAL

## 2024-10-15 VITALS
WEIGHT: 176.9 LBS | SYSTOLIC BLOOD PRESSURE: 131 MMHG | DIASTOLIC BLOOD PRESSURE: 64 MMHG | RESPIRATION RATE: 18 BRPM | BODY MASS INDEX: 35.73 KG/M2 | OXYGEN SATURATION: 94 % | HEART RATE: 82 BPM | TEMPERATURE: 97.7 F

## 2024-10-15 VITALS
HEART RATE: 85 BPM | WEIGHT: 176 LBS | OXYGEN SATURATION: 98 % | DIASTOLIC BLOOD PRESSURE: 92 MMHG | SYSTOLIC BLOOD PRESSURE: 168 MMHG | HEIGHT: 59 IN | RESPIRATION RATE: 16 BRPM | TEMPERATURE: 97.5 F | BODY MASS INDEX: 35.48 KG/M2

## 2024-10-15 DIAGNOSIS — E16.2 HYPOGLYCEMIA: ICD-10-CM

## 2024-10-15 DIAGNOSIS — R11.2 NAUSEA AND VOMITING, UNSPECIFIED VOMITING TYPE: Primary | ICD-10-CM

## 2024-10-15 DIAGNOSIS — D50.9 IRON DEFICIENCY ANEMIA, UNSPECIFIED IRON DEFICIENCY ANEMIA TYPE: ICD-10-CM

## 2024-10-15 DIAGNOSIS — Z86.2 HISTORY OF ANEMIA: ICD-10-CM

## 2024-10-15 DIAGNOSIS — I50.32 CHRONIC HEART FAILURE WITH PRESERVED EJECTION FRACTION: ICD-10-CM

## 2024-10-15 LAB
ALBUMIN SERPL BCP-MCNC: 4.2 G/DL (ref 3.4–5)
ALP SERPL-CCNC: 98 U/L (ref 33–136)
ALT SERPL W P-5'-P-CCNC: 12 U/L (ref 7–45)
ANION GAP SERPL CALC-SCNC: 15 MMOL/L (ref 10–20)
AST SERPL W P-5'-P-CCNC: 24 U/L (ref 9–39)
ATRIAL RATE: 84 BPM
BASOPHILS # BLD AUTO: 0.13 X10*3/UL (ref 0–0.1)
BASOPHILS NFR BLD AUTO: 1.7 %
BILIRUB SERPL-MCNC: 0.4 MG/DL (ref 0–1.2)
BUN SERPL-MCNC: 12 MG/DL (ref 6–23)
CALCIUM SERPL-MCNC: 9.7 MG/DL (ref 8.6–10.6)
CARDIAC TROPONIN I PNL SERPL HS: 8 NG/L (ref 0–34)
CHLORIDE SERPL-SCNC: 96 MMOL/L (ref 98–107)
CO2 SERPL-SCNC: 31 MMOL/L (ref 21–32)
CREAT SERPL-MCNC: 1.31 MG/DL (ref 0.5–1.05)
EGFRCR SERPLBLD CKD-EPI 2021: 45 ML/MIN/1.73M*2
EOSINOPHIL # BLD AUTO: 0.12 X10*3/UL (ref 0–0.7)
EOSINOPHIL NFR BLD AUTO: 1.5 %
ERYTHROCYTE [DISTWIDTH] IN BLOOD BY AUTOMATED COUNT: 16.2 % (ref 11.5–14.5)
GLUCOSE BLD MANUAL STRIP-MCNC: 117 MG/DL (ref 74–99)
GLUCOSE BLD MANUAL STRIP-MCNC: 132 MG/DL (ref 74–99)
GLUCOSE BLD MANUAL STRIP-MCNC: 137 MG/DL (ref 74–99)
GLUCOSE BLD MANUAL STRIP-MCNC: 96 MG/DL (ref 74–99)
GLUCOSE SERPL-MCNC: 164 MG/DL (ref 74–99)
HCT VFR BLD AUTO: 49.5 % (ref 36–46)
HGB BLD-MCNC: 15.6 G/DL (ref 12–16)
IMM GRANULOCYTES # BLD AUTO: 0.03 X10*3/UL (ref 0–0.7)
IMM GRANULOCYTES NFR BLD AUTO: 0.4 % (ref 0–0.9)
LIPASE SERPL-CCNC: 51 U/L (ref 9–82)
LYMPHOCYTES # BLD AUTO: 2.02 X10*3/UL (ref 1.2–4.8)
LYMPHOCYTES NFR BLD AUTO: 25.8 %
MCH RBC QN AUTO: 29.7 PG (ref 26–34)
MCHC RBC AUTO-ENTMCNC: 31.5 G/DL (ref 32–36)
MCV RBC AUTO: 94 FL (ref 80–100)
MONOCYTES # BLD AUTO: 0.5 X10*3/UL (ref 0.1–1)
MONOCYTES NFR BLD AUTO: 6.4 %
NEUTROPHILS # BLD AUTO: 5.03 X10*3/UL (ref 1.2–7.7)
NEUTROPHILS NFR BLD AUTO: 64.2 %
NRBC BLD-RTO: 0 /100 WBCS (ref 0–0)
P AXIS: 7 DEGREES
P OFFSET: 183 MS
P ONSET: 128 MS
PLATELET # BLD AUTO: 219 X10*3/UL (ref 150–450)
POTASSIUM SERPL-SCNC: 4.9 MMOL/L (ref 3.5–5.3)
PR INTERVAL: 156 MS
PROT SERPL-MCNC: 7.6 G/DL (ref 6.4–8.2)
Q ONSET: 206 MS
QRS COUNT: 14 BEATS
QRS DURATION: 132 MS
QT INTERVAL: 430 MS
QTC CALCULATION(BAZETT): 508 MS
QTC FREDERICIA: 480 MS
R AXIS: -46 DEGREES
RBC # BLD AUTO: 5.25 X10*6/UL (ref 4–5.2)
SODIUM SERPL-SCNC: 137 MMOL/L (ref 136–145)
T AXIS: 39 DEGREES
T OFFSET: 421 MS
VENTRICULAR RATE: 84 BPM
WBC # BLD AUTO: 7.8 X10*3/UL (ref 4.4–11.3)

## 2024-10-15 PROCEDURE — 2500000004 HC RX 250 GENERAL PHARMACY W/ HCPCS (ALT 636 FOR OP/ED): Performed by: STUDENT IN AN ORGANIZED HEALTH CARE EDUCATION/TRAINING PROGRAM

## 2024-10-15 PROCEDURE — 71046 X-RAY EXAM CHEST 2 VIEWS: CPT | Performed by: RADIOLOGY

## 2024-10-15 PROCEDURE — 2500000004 HC RX 250 GENERAL PHARMACY W/ HCPCS (ALT 636 FOR OP/ED): Performed by: PHYSICIAN ASSISTANT

## 2024-10-15 PROCEDURE — 82947 ASSAY GLUCOSE BLOOD QUANT: CPT | Mod: 59

## 2024-10-15 PROCEDURE — 83690 ASSAY OF LIPASE: CPT | Performed by: PHYSICIAN ASSISTANT

## 2024-10-15 PROCEDURE — 96374 THER/PROPH/DIAG INJ IV PUSH: CPT

## 2024-10-15 PROCEDURE — 36415 COLL VENOUS BLD VENIPUNCTURE: CPT | Performed by: PHYSICIAN ASSISTANT

## 2024-10-15 PROCEDURE — 93005 ELECTROCARDIOGRAM TRACING: CPT

## 2024-10-15 PROCEDURE — 96365 THER/PROPH/DIAG IV INF INIT: CPT | Mod: INF

## 2024-10-15 PROCEDURE — 2550000001 HC RX 255 CONTRASTS: Performed by: PHYSICIAN ASSISTANT

## 2024-10-15 PROCEDURE — 74177 CT ABD & PELVIS W/CONTRAST: CPT | Performed by: SURGERY

## 2024-10-15 PROCEDURE — 96375 TX/PRO/DX INJ NEW DRUG ADDON: CPT

## 2024-10-15 PROCEDURE — 84484 ASSAY OF TROPONIN QUANT: CPT | Performed by: PHYSICIAN ASSISTANT

## 2024-10-15 PROCEDURE — 82565 ASSAY OF CREATININE: CPT | Performed by: EMERGENCY MEDICINE

## 2024-10-15 PROCEDURE — 74177 CT ABD & PELVIS W/CONTRAST: CPT

## 2024-10-15 PROCEDURE — 85025 COMPLETE CBC W/AUTO DIFF WBC: CPT | Performed by: EMERGENCY MEDICINE

## 2024-10-15 PROCEDURE — 36415 COLL VENOUS BLD VENIPUNCTURE: CPT | Performed by: EMERGENCY MEDICINE

## 2024-10-15 PROCEDURE — 71046 X-RAY EXAM CHEST 2 VIEWS: CPT

## 2024-10-15 PROCEDURE — 99285 EMERGENCY DEPT VISIT HI MDM: CPT | Mod: 25

## 2024-10-15 RX ORDER — EPINEPHRINE 0.3 MG/.3ML
0.3 INJECTION SUBCUTANEOUS EVERY 5 MIN PRN
Status: DISCONTINUED | OUTPATIENT
Start: 2024-10-15 | End: 2024-10-15 | Stop reason: HOSPADM

## 2024-10-15 RX ORDER — FAMOTIDINE 10 MG/ML
20 INJECTION INTRAVENOUS ONCE AS NEEDED
OUTPATIENT
Start: 2024-10-18

## 2024-10-15 RX ORDER — ALBUTEROL SULFATE 0.83 MG/ML
3 SOLUTION RESPIRATORY (INHALATION) AS NEEDED
OUTPATIENT
Start: 2024-10-18

## 2024-10-15 RX ORDER — FAMOTIDINE 10 MG/ML
20 INJECTION INTRAVENOUS ONCE AS NEEDED
Status: DISCONTINUED | OUTPATIENT
Start: 2024-10-15 | End: 2024-10-15 | Stop reason: HOSPADM

## 2024-10-15 RX ORDER — EPINEPHRINE 0.3 MG/.3ML
0.3 INJECTION SUBCUTANEOUS EVERY 5 MIN PRN
OUTPATIENT
Start: 2024-10-18

## 2024-10-15 RX ORDER — DIPHENHYDRAMINE HYDROCHLORIDE 50 MG/ML
50 INJECTION INTRAMUSCULAR; INTRAVENOUS AS NEEDED
OUTPATIENT
Start: 2024-10-18

## 2024-10-15 RX ORDER — ONDANSETRON 4 MG/1
4-8 TABLET, ORALLY DISINTEGRATING ORAL EVERY 8 HOURS PRN
Qty: 30 TABLET | Refills: 0 | Status: SHIPPED | OUTPATIENT
Start: 2024-10-15

## 2024-10-15 RX ORDER — PANTOPRAZOLE SODIUM 40 MG/10ML
40 INJECTION, POWDER, LYOPHILIZED, FOR SOLUTION INTRAVENOUS DAILY
Status: DISCONTINUED | OUTPATIENT
Start: 2024-10-15 | End: 2024-10-15 | Stop reason: HOSPADM

## 2024-10-15 RX ORDER — DIPHENHYDRAMINE HYDROCHLORIDE 50 MG/ML
50 INJECTION INTRAMUSCULAR; INTRAVENOUS AS NEEDED
Status: DISCONTINUED | OUTPATIENT
Start: 2024-10-15 | End: 2024-10-15 | Stop reason: HOSPADM

## 2024-10-15 RX ORDER — ONDANSETRON HYDROCHLORIDE 2 MG/ML
4 INJECTION, SOLUTION INTRAVENOUS ONCE
Status: COMPLETED | OUTPATIENT
Start: 2024-10-15 | End: 2024-10-15

## 2024-10-15 RX ORDER — ALBUTEROL SULFATE 0.83 MG/ML
3 SOLUTION RESPIRATORY (INHALATION) AS NEEDED
Status: DISCONTINUED | OUTPATIENT
Start: 2024-10-15 | End: 2024-10-15 | Stop reason: HOSPADM

## 2024-10-15 ASSESSMENT — PAIN - FUNCTIONAL ASSESSMENT: PAIN_FUNCTIONAL_ASSESSMENT: 0-10

## 2024-10-15 ASSESSMENT — PAIN SCALES - GENERAL
PAINLEVEL: 0-NO PAIN
PAINLEVEL_OUTOF10: 3

## 2024-10-15 NOTE — ED PROVIDER NOTES
Emergency Department Provider Note        History of Present Illness     64-year-old female with history of HFpEF (50-55% 9/2023), CAD s/p PCI w/ stent x 3 (2016, 2018) and STEMI (9/2023, no stents placed), TIA 9/2023, SVC thrombus on Lovenox, remote DVT, IDDM2, HLD, CKD, obesity, NAFLD, MICHAEL, and prior toe amputations for gangrene in 2020 presents complaining of feeling well.  States that she has been vomiting for 3 days now.  Last meal was yesterday however did have some vomiting.  Had her regularly scheduled iron infusion today and felt off after some time.  Checked her blood sugar and it was 53.  Was given some orange juice and repeat was 132, does feel some improvement however still complaining of nausea and vomiting and not feeling generally well.  Denies any current ataxia or vertigo.  Denies any weakness or paresthesias.  States she does have burning in her chest/upper abdomen in the midline.  States that she has been on Ozempic almost a year and has decreased appetite since then however much worse over the past few days.  Uses 49 units Lantus nightly, 22 units lispro 3 times daily, has not adjusted her insulin despite 3 days of vomiting.  States she had normal bowel meant 2 days ago, nothing yesterday or today which is not abnormal for her.  Is passing flatulence.  She denies any fevers chills night sweats or rigors.    External Records Reviewed including ED notes, H&P, Discharge Summary, outpatient PCP/specialist notes.  Physical Exam     Triage Vitals: T 36.2 °C (97.2 °F)  HR 86  /66  RR 16  O2 98 % None (Room air)  GEN: NAD  EYES:  EOMs grossly intact, anicteric sclera  ALO: Mucosa appears dry.  NECK: Supple.  CARD: RRR, nontender chest  PULMONARY: Moving air well. Clear all lung fields.  ABDOMEN: Soft, no guarding, no rigidity.  Mild epigastric tenderness. NABS  EXTREMITIES: Full ROM, no pitting edema,   SKIN: Intact, warm and dry  NEURO: Alert and oriented x 3, speech is clear, no obvious  deficits noted.         Medical Decision Making & ED Course     64-year-old female with history as above presenting for nausea and vomiting with generalized feeling unwell treated in waiting room with orange juice for hypoglycemia with adequate improvement.  On exam she is nontoxic ambulating comfortably.  Vital signs stable.  Lungs CTABL.  CV RRR.  ABD soft with mild epigastric tenderness, NABS.  Does appear clinically dry.  IV was established in triage and labs are drawn.  Will give Zofran, Protonix, perform CT A/P, perform EKG.    ED Course as of 10/15/24 2056   e Oct 15, 2024   1719 XR chest 2 views  Independent interpretation of chest x-ray is without consolidation effusion or pneumothorax, unremarkable radiology report [MIESHA]   1752 ECG 12 lead  ED EKG interpretation:  Sinus rhythm at rate of 84, LAD with RBBB, no ST segment change, no change from prior EKGs [MIESHA]   1930 CT abdomen pelvis w IV contrast  Independent interpretation is with severely enlarged fibrous uterus.  Review of CTA 8/18 this does not appear significantly changed. [MIESHA]   2053 CT abdomen pelvis w IV contrast  Radiology report of CT is without acute findings, incidental findings communicated to the patient, she is aware and will be following up with her OB/GYN as previously planned [MIESHA]   2054 On reevaluation she is improved appearing, sitting up comfortably, has not had any vomiting throughout her ED stay, tolerating p.o.  She is informed of her results, she is recommended to follow-up with PCP soon.  She is advised that she needs to maintain adequate blood glucose and adjusting her insulin as appropriate if she is not eating adequately.  She verbalized understanding results and discharge plan she agreed with plan all questions were answered. [MIESHA]      ED Course User Index  [MIESHA] Juan Francisco Reid PA-C         Diagnoses as of 10/15/24 2056   Nausea and vomiting, unspecified vomiting type   Hypoglycemia     CT abdomen pelvis w IV contrast    Final Result   No definite evidence of acute abnormality in the abdomen or pelvis.        Similar appearance of the enlarged leiomyomatous uterus which causes   compression of the bladder and superior displacement of bowel loops.        Submucosal edema in the distal esophagus suggesting reflux   esophagitis.        Atherosclerosis, including coronary artery disease.        Additional findings as discussed above.        I personally reviewed the images/study and I agree with the findings   as stated by Raquel Lo DO, PGY-3. This study was interpreted   at [facility], [place], [place].        MACRO:   None.        Signed by: Te Carias 10/15/2024 8:08 PM   Dictation workstation:   [id number]      XR chest 2 views   Final Result   1.  No evidence of acute cardiopulmonary process.                  MACRO:   None        Signed by: Rk Talbot 10/15/2024 5:08 PM   Dictation workstation:   [id number]        Labs Reviewed   COMPREHENSIVE METABOLIC PANEL - Abnormal       Result Value    Glucose 164 (*)     Sodium 137      Potassium 4.9      Chloride 96 (*)     Bicarbonate 31      Anion Gap 15      Urea Nitrogen 12      Creatinine 1.31 (*)     eGFR 45 (*)     Calcium 9.7      Albumin 4.2      Alkaline Phosphatase 98      Total Protein 7.6      AST 24      Bilirubin, Total 0.4      ALT 12     CBC WITH AUTO DIFFERENTIAL - Abnormal    WBC 7.8      nRBC 0.0      RBC 5.25 (*)     Hemoglobin 15.6      Hematocrit 49.5 (*)     MCV 94      MCH 29.7      MCHC 31.5 (*)     RDW 16.2 (*)     Platelets 219      Neutrophils % 64.2      Immature Granulocytes %, Automated 0.4      Lymphocytes % 25.8      Monocytes % 6.4      Eosinophils % 1.5      Basophils % 1.7      Neutrophils Absolute 5.03      Immature Granulocytes Absolute, Automated 0.03      Lymphocytes Absolute 2.02      Monocytes Absolute 0.50      Eosinophils Absolute 0.12      Basophils Absolute 0.13 (*)    POCT GLUCOSE - Abnormal     POCT Glucose 132 (*)    POCT GLUCOSE - Abnormal    POCT Glucose 137 (*)    POCT GLUCOSE - Abnormal    POCT Glucose 117 (*)    LIPASE - Normal    Lipase 51      Narrative:     Venipuncture immediately after or during the administration of Metamizole may lead to falsely low results. Testing should be performed immediately prior to Metamizole dosing.   SERIAL TROPONIN-INITIAL - Normal    Troponin I, High Sensitivity ([facility]) 8      Narrative:     Less than 99th percentile of normal range cutoff-  Female and children under 18 years old <35 ng/L; Male <54 ng/L: Negative  Repeat testing should be performed if clinically indicated.     Female and children under 18 years old  ng/L; Male  ng/L:  Consistent with possible cardiac damage and possible increased clinical   risk. Serial measurements may help to assess extent of myocardial damage.     >120 ng/L: Consistent with cardiac damage, increased clinical risk and  myocardial infarction. Serial measurements may help assess extent of   myocardial damage.      NOTE: Children less than 1 year old may have higher baseline troponin   levels and results should be interpreted in conjunction with the overall   clinical context.    NOTE: Troponin I testing is performed using a different   testing methodology at [facility] than at other   [facility]. Direct result comparisons should only   be made within the same method.     POCT GLUCOSE - Normal    POCT Glucose 96     TROPONIN SERIES- (INITIAL, 1 HR)    Narrative:     The following orders were created for panel order Troponin I Series, High Sensitivity (0, 1 HR).  Procedure                               Abnormality         Status                     ---------                               -----------         ------                     Troponin I, High Sensiti...[[id number]]  Normal              Final result                 Please view results for these tests on the individual orders.   POCT GLUCOSE  METER   POCT GLUCOSE METER   POCT GLUCOSE METER       ----------------------------------------------------------------------------------------------------------------------------    This note was dictated using a speech recognition program.  While an attempt was made at proof reading to minimize errors, minor errors in transcription may be present call for questions.     Juan Francisco Reid PA-C  10/15/24 2054

## 2024-10-15 NOTE — SIGNIFICANT EVENT
I was asked to evaluate patient to assess for BAT potential activation with patient having had gait instability and slurred speech after iron infusion, found to be hypoglycemic, and improved with OJ. I evaluated patient and found her to have an NIHSS 0. Alert and oriented, answers questions right, follows directions, no vision cuts or gaze palsy, normal symmetric face, no drift in upper or lower extremities (left leg is always a little weaker she reports, but does not have drift at 5 seconds). No ataxia, patient able to walk with my assistance, reports that this is her baseline gait since she had a stroke a year prior (was 9/2023 at [facility] per chart review with small acute-subacute infarcts involving posterior limb of left IC per my review of MRI at that time). Normal sensation, no aphasia. Patient reports her slurred speech is now improved, no extinction/neglect. Therefore, patient not sent as BAT, was likely stroke mimic with hypoglycemia that has resolved. On lovenox 40 mg subQ daily.

## 2024-10-15 NOTE — SIGNIFICANT EVENT
VSS, end of 30 min post observation period, no side effects noted, RN instructed patient to go to ED if she develops any new symptoms.

## 2024-10-15 NOTE — SIGNIFICANT EVENT
End of iron infusion, no side effects noted, VSS, patient staying for 30 min post observation period.

## 2024-10-15 NOTE — ED TRIAGE NOTES
Pt checked sugar after iron infusion. States it was 53 and she was feeling off balance. Also states she was vomiting this morning. Drank OJ after glucose check. 132 in glucose but pt states she still doesn't feel right

## 2024-10-16 ENCOUNTER — DOCUMENTATION (OUTPATIENT)
Dept: PRIMARY CARE | Facility: CLINIC | Age: 64
End: 2024-10-16
Payer: COMMERCIAL

## 2024-10-16 DIAGNOSIS — E11.65 UNCONTROLLED TYPE 2 DIABETES MELLITUS WITH HYPERGLYCEMIA: Primary | ICD-10-CM

## 2024-10-16 DIAGNOSIS — E16.2 HYPOGLYCEMIA: ICD-10-CM

## 2024-10-16 NOTE — PROGRESS NOTES
Called patient about recent ED visit for hypoglycemia.  Patient is currently on Tresiba 49 units nightly, insulin lispro sliding scale with meals daily, Farxiga 10 mg daily, and semaglutide 2 mg weekly.  Patient informed that she is not eating as much during the day but is aware to use sliding scale for her insulin only after eating.  In that scenario short acting insulin is less likely the cause of her hypoglycemia and long-acting insulin is most probable cause.  Will plan to decrease long-acting insulin by 10% and follow-up on 10/21, if persistent hypoglycemia will have to reduce by 20 to 50%.   Will send glucagon for BG <60

## 2024-10-16 NOTE — DISCHARGE INSTRUCTIONS
It is very important that you maintain adequate blood sugar.  If you are not eating appropriately you need to adjust your insulin appropriately.  Take the Zofran up to 2 tablets up to 3 times a day for nausea/vomiting.  Follow-up with your primary care doctor soon.  Return to ED for new concerning or worsening symptoms that require emergent evaluation.

## 2024-10-21 ENCOUNTER — APPOINTMENT (OUTPATIENT)
Dept: PRIMARY CARE | Facility: CLINIC | Age: 64
End: 2024-10-21
Payer: COMMERCIAL

## 2024-10-21 DIAGNOSIS — E11.65 UNCONTROLLED TYPE 2 DIABETES MELLITUS WITH HYPERGLYCEMIA: ICD-10-CM

## 2024-10-21 RX ORDER — INSULIN DEGLUDEC 200 U/ML
40 INJECTION, SOLUTION SUBCUTANEOUS NIGHTLY
Start: 2024-10-21 | End: 2025-10-21

## 2024-10-21 NOTE — PROGRESS NOTES
Subjective   Patient ID: Nelida Mata is a 64 y.o. female with who presents for No chief complaint on file..    HPI    The encounter below was completed via telephone communication due to patient preference and/or scheduling necessity. Patient lacked appropriate connectivity for video telemedicine. Confirmed with patient that they are in a private setting before discussing sensitive health information. Consent for treatment and billing for this visit was obtained during the visit. Patient was appropriately identified using two identifiers.    Reviewed AM sugar reads  10/14 - 167,   10/15 - 90,  10/16 137,  10/17 128,  10/18 161,  10/19 150,  10/20 156,  10/21 182  Currently on Tresiba 44 units, insulin lispro sliding scale with meals daily, Farxiga 10 mg daily, and semaglutide 2 mg weekly.       Review of Systems  All other systems reviewed were negative    Current Outpatient Medications on File Prior to Visit   Medication Sig Dispense Refill    acetaminophen (Tylenol) 325 mg tablet Take 2 tablets (650 mg) by mouth every 8 hours if needed for mild pain (1 - 3). 1080 tablet 0    aspirin 81 mg EC tablet Take 1 tablet (81 mg) by mouth once daily. 30 tablet 11    atorvastatin (Lipitor) 80 mg tablet TAKE 1 TABLET BY MOUTH ONCE DAILY 90 tablet 3    blood pressure monitor kit 1 each once daily. Use once daily to check blood pressure 1 kit 0    blood sugar diagnostic (OneTouch Ultra Test) strip USE 1 STRIP(S) TO TEST BLOOD SUGAR 3 TIMES A  strip 3    brimonidine (AlphaGAN) 0.2 % ophthalmic solution Administer 1 drop into both eyes 2 times a day. PATIENT NEEDS PCP APPT FOR FURTHER REFILLS 30 mL 3    carvedilol (Coreg) 12.5 mg tablet Take 1 tablet (12.5 mg) by mouth 2 times daily (morning and late afternoon). 60 tablet 11    cholecalciferol (Vitamin D3) 10 MCG (400 UNIT) tablet Take 1 tablet (10 mcg) by mouth once daily. 30 tablet 11    dapagliflozin propanediol (Farxiga) 10 mg Take 1 tablet (10 mg) by mouth  once daily. 30 tablet 11    diclofenac sodium (Voltaren) 1 % gel Apply 4.5 inches (4 g) topically 2 times a day. 800 g 2    DULoxetine (Cymbalta) 30 mg DR capsule Take 1 capsule (30 mg) by mouth once daily in the morning AND 2 capsules (60 mg) once daily at bedtime. Do not crush or chew.. 60 capsule 11    enoxaparin (Lovenox) 40 mg/0.4 mL syringe Inject 0.4 mL (40 mg) under the skin once daily. 12 mL 3    finerenone (Kerendia) 10 mg tablet tablet Take 1 tablet (10 mg) by mouth once daily. 30 tablet 11    FreeStyle Filipe 2 Sensor kit Apply 1 sensor to the back of the upper arm. Change sensor every 14 days. 2 each 11    FreeStyle Filipe reader (FreeStyle Filipe 2 [place]) misc Use as instructed 1 each 0    furosemide (Lasix) 20 mg tablet Take 1 tablet (20 mg) by mouth once daily. 90 tablet 2    glucagon 3 mg/actuation spray,non-aerosol Administer 1 spray into affected nostril(s) if needed (please  apply into a single nostril for blood sugar <60). 2 each 0    insulin degludec (Tresiba FlexTouch U-200) 200 unit/mL (3 mL) injection Inject 50 Units under the skin once daily at bedtime. Take as directed per insulin instructions.      insulin lispro (HumaLOG KwikPen Insulin) 200 unit/mL (3 mL) insulin pen pen Inject 22 Units under the skin 3 times daily (morning, midday, late afternoon). Take as directed per insulin instructions. 11 each 3    isosorbide mononitrate ER (Imdur) 60 mg 24 hr tablet TAKE 1 TABLET BY MOUTH EVERY DAY IN THE MORNING 90 tablet 1    latanoprost (Xalatan) 0.005 % ophthalmic solution Administer 1 drop into both eyes once daily at bedtime. 2.5 mL 3    melatonin 5 mg tablet Take 1 tablet (5 mg) by mouth as needed at bedtime for sleep. 30 tablet 11    multivitamin tablet Take 1 tablet by mouth once daily. 30 tablet 11    nitroglycerin (Nitrostat) 0.4 mg SL tablet PLACE 1 TABLET UNDER THE TONGUE EVERY 5 MINUTES FOR UP TO 3 DOSES AS NEEDED FOR CHEST PAIN.CALL 911 IF PAIN PERSISTS.      ondansetron ODT  (Zofran-ODT) 4 mg disintegrating tablet Take 1-2 tablets (4-8 mg) by mouth every 8 hours if needed for nausea or vomiting. 30 tablet 0    pantoprazole (ProtoNix) 40 mg EC tablet Take 1 tablet (40 mg) by mouth once daily in the morning. Take before meals. 30 tablet 5    semaglutide 2 mg/dose (8 mg/3 mL) pen injector Inject 2 mg under the skin 1 (one) time per week. 3 mL 11    urea (Carmol) 20 % cream Apply 1 Application topically 2 times a day.       No current facility-administered medications on file prior to visit.        Objective   Vitals: LMP  (LMP Unknown)      Physical Exam  Pleasant, OAx3      Assessment/Plan     64-year-old woman with a past medical history of HFpEF (50-55% 9/2023), CAD s/p PCI w/ stent x 3 (2016, 2018) and STEMI (9/2023, no stents placed), TIA 9/2023, SVC thrombus on Lovenox, remote DVT, T2DM, HLD, CKD, obesity, NAFLD, MICHAEL, and prior toe amputations for gangrene in 2020 presenting for diabetes follow-up. AM blood sugars better but on lower side for patient age and co morbidities would prefer > 140 especially that patient is forgetting to eat occasionally. Last A1c 7.1 (10/4), Goal <9.    Plan:  - less stringent sugar control  - decrease Tresiba to 40 units nightly and record a.m. blood sugars   - c/w insulin lispro sliding scale with meals daily, Farxiga 10 mg daily, and semaglutide 2 mg weekly.   - Follow-up in 2 weeks  - Has glucagon as needed for hypoglycemia <60    Problem List Items Addressed This Visit             ICD-10-CM    Uncontrolled type 2 diabetes mellitus with hyperglycemia E11.65    Relevant Medications    insulin degludec (Tresiba FlexTouch U-200) 200 unit/mL (3 mL) injection       Attending Supervision: discussed with attending physician (cosigner listed on this note).    RTC in 2 weeks, or earlier as needed.    Patient seen and discussed with attending physician Dr. Reed    Plan preliminary until cosigned by attending physician.    Betty Boone M.D.  Family  Medicine  PGY-2

## 2024-10-22 PROCEDURE — RXMED WILLOW AMBULATORY MEDICATION CHARGE

## 2024-10-24 ENCOUNTER — PHARMACY VISIT (OUTPATIENT)
Dept: PHARMACY | Facility: CLINIC | Age: 64
End: 2024-10-24
Payer: COMMERCIAL

## 2024-10-24 DIAGNOSIS — R11.2 NAUSEA AND VOMITING, UNSPECIFIED VOMITING TYPE: ICD-10-CM

## 2024-10-24 RX ORDER — ONDANSETRON 4 MG/1
4-8 TABLET, ORALLY DISINTEGRATING ORAL EVERY 8 HOURS PRN
Qty: 30 TABLET | Refills: 0 | Status: SHIPPED | OUTPATIENT
Start: 2024-10-24

## 2024-10-24 NOTE — TELEPHONE ENCOUNTER
Pt called requesting refills of Freestyle 2 sensors and nausea medication. Noted available sensor refills, ondansetron pended and routed to provider. Call placed to pt to inform of sensor refills. Pt informed nurse pharmacy stated new order needed. Call placed to pharmacy for clarification. Spoke with pharmacist. Stated pt had multiple orders on profile. Confirms order from March 8th with 11 refills. Confirms will begin processing for pt. Call placed to pt to make aware.

## 2024-10-25 ENCOUNTER — APPOINTMENT (OUTPATIENT)
Dept: CARDIOLOGY | Facility: HOSPITAL | Age: 64
End: 2024-10-25
Payer: COMMERCIAL

## 2024-10-27 ENCOUNTER — CLINICAL SUPPORT (OUTPATIENT)
Dept: EMERGENCY MEDICINE | Facility: HOSPITAL | Age: 64
End: 2024-10-27
Payer: COMMERCIAL

## 2024-10-27 ENCOUNTER — HOSPITAL ENCOUNTER (OUTPATIENT)
Facility: HOSPITAL | Age: 64
Setting detail: OBSERVATION
Discharge: HOME | End: 2024-10-29
Attending: STUDENT IN AN ORGANIZED HEALTH CARE EDUCATION/TRAINING PROGRAM | Admitting: STUDENT IN AN ORGANIZED HEALTH CARE EDUCATION/TRAINING PROGRAM
Payer: COMMERCIAL

## 2024-10-27 ENCOUNTER — APPOINTMENT (OUTPATIENT)
Dept: RADIOLOGY | Facility: HOSPITAL | Age: 64
End: 2024-10-27
Payer: COMMERCIAL

## 2024-10-27 DIAGNOSIS — R25.2 CRAMP AND SPASM: ICD-10-CM

## 2024-10-27 DIAGNOSIS — R07.89 OTHER CHEST PAIN: Primary | ICD-10-CM

## 2024-10-27 LAB
ALBUMIN SERPL BCP-MCNC: 4.1 G/DL (ref 3.4–5)
ALP SERPL-CCNC: 101 U/L (ref 33–136)
ALT SERPL W P-5'-P-CCNC: 13 U/L (ref 7–45)
ANION GAP SERPL CALC-SCNC: 14 MMOL/L (ref 10–20)
AST SERPL W P-5'-P-CCNC: 20 U/L (ref 9–39)
ATRIAL RATE: 84 BPM
BASOPHILS # BLD AUTO: 0.11 X10*3/UL (ref 0–0.1)
BASOPHILS NFR BLD AUTO: 1.9 %
BILIRUB SERPL-MCNC: 0.7 MG/DL (ref 0–1.2)
BUN SERPL-MCNC: 9 MG/DL (ref 6–23)
CALCIUM SERPL-MCNC: 9.9 MG/DL (ref 8.6–10.6)
CARDIAC TROPONIN I PNL SERPL HS: 10 NG/L (ref 0–34)
CARDIAC TROPONIN I PNL SERPL HS: 11 NG/L (ref 0–34)
CHLORIDE SERPL-SCNC: 100 MMOL/L (ref 98–107)
CO2 SERPL-SCNC: 30 MMOL/L (ref 21–32)
CREAT SERPL-MCNC: 1.1 MG/DL (ref 0.5–1.05)
EGFRCR SERPLBLD CKD-EPI 2021: 56 ML/MIN/1.73M*2
EOSINOPHIL # BLD AUTO: 0.2 X10*3/UL (ref 0–0.7)
EOSINOPHIL NFR BLD AUTO: 3.4 %
ERYTHROCYTE [DISTWIDTH] IN BLOOD BY AUTOMATED COUNT: 16.2 % (ref 11.5–14.5)
GLUCOSE BLD MANUAL STRIP-MCNC: 103 MG/DL (ref 74–99)
GLUCOSE SERPL-MCNC: 83 MG/DL (ref 74–99)
HCT VFR BLD AUTO: 46.8 % (ref 36–46)
HGB BLD-MCNC: 15.4 G/DL (ref 12–16)
IMM GRANULOCYTES # BLD AUTO: 0.01 X10*3/UL (ref 0–0.7)
IMM GRANULOCYTES NFR BLD AUTO: 0.2 % (ref 0–0.9)
LIPASE SERPL-CCNC: 28 U/L (ref 9–82)
LYMPHOCYTES # BLD AUTO: 1.74 X10*3/UL (ref 1.2–4.8)
LYMPHOCYTES NFR BLD AUTO: 29.5 %
MCH RBC QN AUTO: 30.2 PG (ref 26–34)
MCHC RBC AUTO-ENTMCNC: 32.9 G/DL (ref 32–36)
MCV RBC AUTO: 92 FL (ref 80–100)
MONOCYTES # BLD AUTO: 0.54 X10*3/UL (ref 0.1–1)
MONOCYTES NFR BLD AUTO: 9.2 %
NEUTROPHILS # BLD AUTO: 3.29 X10*3/UL (ref 1.2–7.7)
NEUTROPHILS NFR BLD AUTO: 55.8 %
NRBC BLD-RTO: 0 /100 WBCS (ref 0–0)
P AXIS: 1 DEGREES
P OFFSET: 186 MS
P ONSET: 141 MS
PLATELET # BLD AUTO: 171 X10*3/UL (ref 150–450)
POTASSIUM SERPL-SCNC: 5.2 MMOL/L (ref 3.5–5.3)
PR INTERVAL: 138 MS
PROT SERPL-MCNC: 7.1 G/DL (ref 6.4–8.2)
Q ONSET: 210 MS
QRS COUNT: 14 BEATS
QRS DURATION: 124 MS
QT INTERVAL: 416 MS
QTC CALCULATION(BAZETT): 491 MS
QTC FREDERICIA: 465 MS
R AXIS: -45 DEGREES
RBC # BLD AUTO: 5.1 X10*6/UL (ref 4–5.2)
SODIUM SERPL-SCNC: 139 MMOL/L (ref 136–145)
T AXIS: 43 DEGREES
T OFFSET: 418 MS
VENTRICULAR RATE: 84 BPM
WBC # BLD AUTO: 5.9 X10*3/UL (ref 4.4–11.3)

## 2024-10-27 PROCEDURE — 71046 X-RAY EXAM CHEST 2 VIEWS: CPT | Mod: FOREIGN READ | Performed by: RADIOLOGY

## 2024-10-27 PROCEDURE — 80053 COMPREHEN METABOLIC PANEL: CPT

## 2024-10-27 PROCEDURE — 2500000004 HC RX 250 GENERAL PHARMACY W/ HCPCS (ALT 636 FOR OP/ED): Mod: SE | Performed by: STUDENT IN AN ORGANIZED HEALTH CARE EDUCATION/TRAINING PROGRAM

## 2024-10-27 PROCEDURE — 96374 THER/PROPH/DIAG INJ IV PUSH: CPT | Mod: 59

## 2024-10-27 PROCEDURE — 99285 EMERGENCY DEPT VISIT HI MDM: CPT | Mod: 25

## 2024-10-27 PROCEDURE — 83690 ASSAY OF LIPASE: CPT

## 2024-10-27 PROCEDURE — 82947 ASSAY GLUCOSE BLOOD QUANT: CPT

## 2024-10-27 PROCEDURE — 85025 COMPLETE CBC W/AUTO DIFF WBC: CPT

## 2024-10-27 PROCEDURE — 74177 CT ABD & PELVIS W/CONTRAST: CPT | Mod: FOREIGN READ | Performed by: RADIOLOGY

## 2024-10-27 PROCEDURE — 96375 TX/PRO/DX INJ NEW DRUG ADDON: CPT

## 2024-10-27 PROCEDURE — 84484 ASSAY OF TROPONIN QUANT: CPT

## 2024-10-27 PROCEDURE — 71046 X-RAY EXAM CHEST 2 VIEWS: CPT

## 2024-10-27 PROCEDURE — 36415 COLL VENOUS BLD VENIPUNCTURE: CPT

## 2024-10-27 PROCEDURE — 2500000001 HC RX 250 WO HCPCS SELF ADMINISTERED DRUGS (ALT 637 FOR MEDICARE OP): Mod: SE | Performed by: STUDENT IN AN ORGANIZED HEALTH CARE EDUCATION/TRAINING PROGRAM

## 2024-10-27 PROCEDURE — 2550000001 HC RX 255 CONTRASTS: Mod: SE | Performed by: STUDENT IN AN ORGANIZED HEALTH CARE EDUCATION/TRAINING PROGRAM

## 2024-10-27 PROCEDURE — 93005 ELECTROCARDIOGRAM TRACING: CPT

## 2024-10-27 PROCEDURE — 74177 CT ABD & PELVIS W/CONTRAST: CPT

## 2024-10-27 RX ORDER — ALUMINUM HYDROXIDE, MAGNESIUM HYDROXIDE, AND SIMETHICONE 1200; 120; 1200 MG/30ML; MG/30ML; MG/30ML
30 SUSPENSION ORAL ONCE
Status: COMPLETED | OUTPATIENT
Start: 2024-10-27 | End: 2024-10-27

## 2024-10-27 RX ORDER — PANTOPRAZOLE SODIUM 40 MG/10ML
40 INJECTION, POWDER, LYOPHILIZED, FOR SOLUTION INTRAVENOUS DAILY
Status: DISCONTINUED | OUTPATIENT
Start: 2024-10-27 | End: 2024-10-29 | Stop reason: HOSPADM

## 2024-10-27 RX ORDER — ONDANSETRON HYDROCHLORIDE 2 MG/ML
4 INJECTION, SOLUTION INTRAVENOUS ONCE
Status: COMPLETED | OUTPATIENT
Start: 2024-10-27 | End: 2024-10-27

## 2024-10-27 RX ORDER — FAMOTIDINE 10 MG/ML
20 INJECTION INTRAVENOUS ONCE
Status: COMPLETED | OUTPATIENT
Start: 2024-10-27 | End: 2024-10-27

## 2024-10-27 ASSESSMENT — PAIN DESCRIPTION - DESCRIPTORS: DESCRIPTORS: BURNING

## 2024-10-28 PROBLEM — R07.89 OTHER CHEST PAIN: Status: ACTIVE | Noted: 2024-10-28

## 2024-10-28 LAB
ALBUMIN SERPL BCP-MCNC: 4 G/DL (ref 3.4–5)
AMPHETAMINES UR QL SCN: NORMAL
ANION GAP SERPL CALC-SCNC: 16 MMOL/L (ref 10–20)
BARBITURATES UR QL SCN: NORMAL
BENZODIAZ UR QL SCN: NORMAL
BUN SERPL-MCNC: 10 MG/DL (ref 6–23)
BZE UR QL SCN: NORMAL
CALCIUM SERPL-MCNC: 9.5 MG/DL (ref 8.6–10.6)
CANNABINOIDS UR QL SCN: NORMAL
CHLORIDE SERPL-SCNC: 100 MMOL/L (ref 98–107)
CO2 SERPL-SCNC: 27 MMOL/L (ref 21–32)
CREAT SERPL-MCNC: 1.24 MG/DL (ref 0.5–1.05)
EGFRCR SERPLBLD CKD-EPI 2021: 48 ML/MIN/1.73M*2
FENTANYL+NORFENTANYL UR QL SCN: NORMAL
GLUCOSE BLD MANUAL STRIP-MCNC: 102 MG/DL (ref 74–99)
GLUCOSE BLD MANUAL STRIP-MCNC: 147 MG/DL (ref 74–99)
GLUCOSE BLD MANUAL STRIP-MCNC: 167 MG/DL (ref 74–99)
GLUCOSE SERPL-MCNC: 80 MG/DL (ref 74–99)
MAGNESIUM SERPL-MCNC: 2.46 MG/DL (ref 1.6–2.4)
METHADONE UR QL SCN: NORMAL
OPIATES UR QL SCN: NORMAL
OXYCODONE+OXYMORPHONE UR QL SCN: NORMAL
PCP UR QL SCN: NORMAL
PHOSPHATE SERPL-MCNC: 4 MG/DL (ref 2.5–4.9)
POTASSIUM SERPL-SCNC: 5 MMOL/L (ref 3.5–5.3)
SODIUM SERPL-SCNC: 138 MMOL/L (ref 136–145)

## 2024-10-28 PROCEDURE — 80307 DRUG TEST PRSMV CHEM ANLYZR: CPT | Performed by: STUDENT IN AN ORGANIZED HEALTH CARE EDUCATION/TRAINING PROGRAM

## 2024-10-28 PROCEDURE — 36415 COLL VENOUS BLD VENIPUNCTURE: CPT | Performed by: STUDENT IN AN ORGANIZED HEALTH CARE EDUCATION/TRAINING PROGRAM

## 2024-10-28 PROCEDURE — 2500000001 HC RX 250 WO HCPCS SELF ADMINISTERED DRUGS (ALT 637 FOR MEDICARE OP)

## 2024-10-28 PROCEDURE — 80069 RENAL FUNCTION PANEL: CPT | Performed by: STUDENT IN AN ORGANIZED HEALTH CARE EDUCATION/TRAINING PROGRAM

## 2024-10-28 PROCEDURE — 82947 ASSAY GLUCOSE BLOOD QUANT: CPT | Mod: 59

## 2024-10-28 PROCEDURE — G0378 HOSPITAL OBSERVATION PER HR: HCPCS

## 2024-10-28 PROCEDURE — 99222 1ST HOSP IP/OBS MODERATE 55: CPT | Performed by: STUDENT IN AN ORGANIZED HEALTH CARE EDUCATION/TRAINING PROGRAM

## 2024-10-28 PROCEDURE — 83735 ASSAY OF MAGNESIUM: CPT | Performed by: STUDENT IN AN ORGANIZED HEALTH CARE EDUCATION/TRAINING PROGRAM

## 2024-10-28 PROCEDURE — 96372 THER/PROPH/DIAG INJ SC/IM: CPT | Performed by: STUDENT IN AN ORGANIZED HEALTH CARE EDUCATION/TRAINING PROGRAM

## 2024-10-28 PROCEDURE — 96376 TX/PRO/DX INJ SAME DRUG ADON: CPT | Mod: 59

## 2024-10-28 PROCEDURE — 2500000001 HC RX 250 WO HCPCS SELF ADMINISTERED DRUGS (ALT 637 FOR MEDICARE OP): Mod: SE | Performed by: STUDENT IN AN ORGANIZED HEALTH CARE EDUCATION/TRAINING PROGRAM

## 2024-10-28 PROCEDURE — 2500000004 HC RX 250 GENERAL PHARMACY W/ HCPCS (ALT 636 FOR OP/ED): Mod: SE | Performed by: STUDENT IN AN ORGANIZED HEALTH CARE EDUCATION/TRAINING PROGRAM

## 2024-10-28 PROCEDURE — 2500000002 HC RX 250 W HCPCS SELF ADMINISTERED DRUGS (ALT 637 FOR MEDICARE OP, ALT 636 FOR OP/ED): Performed by: STUDENT IN AN ORGANIZED HEALTH CARE EDUCATION/TRAINING PROGRAM

## 2024-10-28 PROCEDURE — 2500000002 HC RX 250 W HCPCS SELF ADMINISTERED DRUGS (ALT 637 FOR MEDICARE OP, ALT 636 FOR OP/ED)

## 2024-10-28 RX ORDER — ACETAMINOPHEN 325 MG/1
650 TABLET ORAL EVERY 6 HOURS PRN
Status: DISCONTINUED | OUTPATIENT
Start: 2024-10-28 | End: 2024-10-29 | Stop reason: HOSPADM

## 2024-10-28 RX ORDER — DICYCLOMINE HYDROCHLORIDE 10 MG/1
10 CAPSULE ORAL 2 TIMES DAILY PRN
Status: DISCONTINUED | OUTPATIENT
Start: 2024-10-28 | End: 2024-10-29

## 2024-10-28 RX ORDER — INSULIN LISPRO 100 [IU]/ML
0-5 INJECTION, SOLUTION INTRAVENOUS; SUBCUTANEOUS
Status: DISCONTINUED | OUTPATIENT
Start: 2024-10-28 | End: 2024-10-29 | Stop reason: HOSPADM

## 2024-10-28 RX ORDER — INSULIN DEGLUDEC 100 U/ML
52 INJECTION, SOLUTION SUBCUTANEOUS NIGHTLY
Status: DISCONTINUED | OUTPATIENT
Start: 2024-10-28 | End: 2024-10-28

## 2024-10-28 RX ORDER — ENOXAPARIN SODIUM 100 MG/ML
40 INJECTION SUBCUTANEOUS DAILY
Status: DISCONTINUED | OUTPATIENT
Start: 2024-10-28 | End: 2024-10-29 | Stop reason: HOSPADM

## 2024-10-28 RX ORDER — LATANOPROST 50 UG/ML
1 SOLUTION/ DROPS OPHTHALMIC NIGHTLY
Status: DISCONTINUED | OUTPATIENT
Start: 2024-10-28 | End: 2024-10-29 | Stop reason: HOSPADM

## 2024-10-28 RX ORDER — ACETAMINOPHEN 500 MG
10 TABLET ORAL NIGHTLY PRN
Status: DISCONTINUED | OUTPATIENT
Start: 2024-10-28 | End: 2024-10-29 | Stop reason: HOSPADM

## 2024-10-28 RX ORDER — ASPIRIN 81 MG/1
81 TABLET ORAL DAILY
Status: DISCONTINUED | OUTPATIENT
Start: 2024-10-28 | End: 2024-10-29 | Stop reason: HOSPADM

## 2024-10-28 RX ORDER — FUROSEMIDE 40 MG/1
20 TABLET ORAL DAILY
Status: DISCONTINUED | OUTPATIENT
Start: 2024-10-28 | End: 2024-10-28

## 2024-10-28 RX ORDER — DICLOFENAC SODIUM 10 MG/G
4 GEL TOPICAL 3 TIMES DAILY PRN
Status: DISCONTINUED | OUTPATIENT
Start: 2024-10-28 | End: 2024-10-29 | Stop reason: HOSPADM

## 2024-10-28 RX ORDER — ERYTHROMYCIN 250 MG/1
250 TABLET, DELAYED RELEASE ORAL
Status: DISCONTINUED | OUTPATIENT
Start: 2024-10-28 | End: 2024-10-29

## 2024-10-28 RX ORDER — POLYETHYLENE GLYCOL 3350 17 G/17G
17 POWDER, FOR SOLUTION ORAL DAILY PRN
Status: DISCONTINUED | OUTPATIENT
Start: 2024-10-28 | End: 2024-10-29 | Stop reason: HOSPADM

## 2024-10-28 RX ORDER — INSULIN DEGLUDEC 100 U/ML
40 INJECTION, SOLUTION SUBCUTANEOUS NIGHTLY
Status: DISCONTINUED | OUTPATIENT
Start: 2024-10-28 | End: 2024-10-29 | Stop reason: HOSPADM

## 2024-10-28 RX ORDER — ATORVASTATIN CALCIUM 20 MG/1
80 TABLET, FILM COATED ORAL NIGHTLY
Status: DISCONTINUED | OUTPATIENT
Start: 2024-10-28 | End: 2024-10-29 | Stop reason: HOSPADM

## 2024-10-28 RX ORDER — DAPAGLIFLOZIN 10 MG/1
10 TABLET, FILM COATED ORAL DAILY
Status: DISCONTINUED | OUTPATIENT
Start: 2024-10-28 | End: 2024-10-29 | Stop reason: HOSPADM

## 2024-10-28 RX ORDER — NITROGLYCERIN 0.4 MG/1
0.4 TABLET SUBLINGUAL EVERY 5 MIN PRN
Status: DISCONTINUED | OUTPATIENT
Start: 2024-10-28 | End: 2024-10-29 | Stop reason: HOSPADM

## 2024-10-28 RX ORDER — CARVEDILOL 12.5 MG/1
12.5 TABLET ORAL 2 TIMES DAILY
Status: DISCONTINUED | OUTPATIENT
Start: 2024-10-28 | End: 2024-10-29 | Stop reason: HOSPADM

## 2024-10-28 RX ORDER — ISOSORBIDE MONONITRATE 30 MG/1
60 TABLET, EXTENDED RELEASE ORAL
Status: DISCONTINUED | OUTPATIENT
Start: 2024-10-29 | End: 2024-10-29 | Stop reason: HOSPADM

## 2024-10-28 RX ORDER — BRIMONIDINE TARTRATE 2 MG/ML
1 SOLUTION/ DROPS OPHTHALMIC 2 TIMES DAILY
Status: DISCONTINUED | OUTPATIENT
Start: 2024-10-28 | End: 2024-10-29 | Stop reason: HOSPADM

## 2024-10-28 RX ORDER — HYDROXYZINE HYDROCHLORIDE 25 MG/1
25 TABLET, FILM COATED ORAL EVERY 8 HOURS PRN
Status: DISCONTINUED | OUTPATIENT
Start: 2024-10-28 | End: 2024-10-29 | Stop reason: HOSPADM

## 2024-10-28 RX ORDER — DULOXETIN HYDROCHLORIDE 30 MG/1
30 CAPSULE, DELAYED RELEASE ORAL DAILY
Status: DISCONTINUED | OUTPATIENT
Start: 2024-10-28 | End: 2024-10-29 | Stop reason: HOSPADM

## 2024-10-28 ASSESSMENT — LIFESTYLE VARIABLES
TOTAL SCORE: 0
HAVE PEOPLE ANNOYED YOU BY CRITICIZING YOUR DRINKING: NO
EVER HAD A DRINK FIRST THING IN THE MORNING TO STEADY YOUR NERVES TO GET RID OF A HANGOVER: NO
HAVE YOU EVER FELT YOU SHOULD CUT DOWN ON YOUR DRINKING: NO
EVER FELT BAD OR GUILTY ABOUT YOUR DRINKING: NO

## 2024-10-28 ASSESSMENT — ACTIVITIES OF DAILY LIVING (ADL): LACK_OF_TRANSPORTATION: NO

## 2024-10-28 ASSESSMENT — PAIN DESCRIPTION - PROGRESSION: CLINICAL_PROGRESSION: NOT CHANGED

## 2024-10-28 ASSESSMENT — PAIN DESCRIPTION - PAIN TYPE: TYPE: ACUTE PAIN

## 2024-10-28 ASSESSMENT — PAIN DESCRIPTION - LOCATION: LOCATION: ABDOMEN

## 2024-10-28 ASSESSMENT — PAIN DESCRIPTION - DESCRIPTORS: DESCRIPTORS: ACHING

## 2024-10-28 ASSESSMENT — PAIN - FUNCTIONAL ASSESSMENT: PAIN_FUNCTIONAL_ASSESSMENT: 0-10

## 2024-10-29 ENCOUNTER — APPOINTMENT (OUTPATIENT)
Dept: RADIOLOGY | Facility: HOSPITAL | Age: 64
End: 2024-10-29
Payer: COMMERCIAL

## 2024-10-29 ENCOUNTER — PHARMACY VISIT (OUTPATIENT)
Dept: PHARMACY | Facility: CLINIC | Age: 64
End: 2024-10-29
Payer: COMMERCIAL

## 2024-10-29 VITALS
HEIGHT: 59 IN | BODY MASS INDEX: 34.68 KG/M2 | HEART RATE: 85 BPM | OXYGEN SATURATION: 98 % | TEMPERATURE: 97.5 F | DIASTOLIC BLOOD PRESSURE: 76 MMHG | SYSTOLIC BLOOD PRESSURE: 138 MMHG | WEIGHT: 172 LBS | RESPIRATION RATE: 18 BRPM

## 2024-10-29 DIAGNOSIS — I25.10 ARTERIOSCLEROTIC CARDIOVASCULAR DISEASE (ASCVD): ICD-10-CM

## 2024-10-29 LAB
ALBUMIN SERPL BCP-MCNC: 4.4 G/DL (ref 3.4–5)
ANION GAP SERPL CALC-SCNC: 18 MMOL/L (ref 10–20)
BUN SERPL-MCNC: 17 MG/DL (ref 6–23)
CALCIUM SERPL-MCNC: 9.8 MG/DL (ref 8.6–10.6)
CHLORIDE SERPL-SCNC: 96 MMOL/L (ref 98–107)
CO2 SERPL-SCNC: 28 MMOL/L (ref 21–32)
CREAT SERPL-MCNC: 1.82 MG/DL (ref 0.5–1.05)
EGFRCR SERPLBLD CKD-EPI 2021: 31 ML/MIN/1.73M*2
GLUCOSE BLD MANUAL STRIP-MCNC: 161 MG/DL (ref 74–99)
GLUCOSE SERPL-MCNC: 195 MG/DL (ref 74–99)
HOLD SPECIMEN: NORMAL
MAGNESIUM SERPL-MCNC: 2.62 MG/DL (ref 1.6–2.4)
PHOSPHATE SERPL-MCNC: 4.5 MG/DL (ref 2.5–4.9)
POTASSIUM SERPL-SCNC: 4.5 MMOL/L (ref 3.5–5.3)
SODIUM SERPL-SCNC: 137 MMOL/L (ref 136–145)

## 2024-10-29 PROCEDURE — 2500000001 HC RX 250 WO HCPCS SELF ADMINISTERED DRUGS (ALT 637 FOR MEDICARE OP)

## 2024-10-29 PROCEDURE — 96376 TX/PRO/DX INJ SAME DRUG ADON: CPT | Mod: 59

## 2024-10-29 PROCEDURE — 2500000004 HC RX 250 GENERAL PHARMACY W/ HCPCS (ALT 636 FOR OP/ED)

## 2024-10-29 PROCEDURE — 2500000004 HC RX 250 GENERAL PHARMACY W/ HCPCS (ALT 636 FOR OP/ED): Performed by: INTERNAL MEDICINE

## 2024-10-29 PROCEDURE — 2500000001 HC RX 250 WO HCPCS SELF ADMINISTERED DRUGS (ALT 637 FOR MEDICARE OP): Performed by: INTERNAL MEDICINE

## 2024-10-29 PROCEDURE — 78264 GASTRIC EMPTYING IMG STUDY: CPT | Performed by: NUCLEAR MEDICINE

## 2024-10-29 PROCEDURE — 78264 GASTRIC EMPTYING IMG STUDY: CPT

## 2024-10-29 PROCEDURE — 99239 HOSP IP/OBS DSCHRG MGMT >30: CPT

## 2024-10-29 PROCEDURE — 83735 ASSAY OF MAGNESIUM: CPT

## 2024-10-29 PROCEDURE — 82947 ASSAY GLUCOSE BLOOD QUANT: CPT | Mod: 59

## 2024-10-29 PROCEDURE — 36415 COLL VENOUS BLD VENIPUNCTURE: CPT

## 2024-10-29 PROCEDURE — 2500000002 HC RX 250 W HCPCS SELF ADMINISTERED DRUGS (ALT 637 FOR MEDICARE OP, ALT 636 FOR OP/ED)

## 2024-10-29 PROCEDURE — 3430000001 HC RX 343 DIAGNOSTIC RADIOPHARMACEUTICALS: Performed by: STUDENT IN AN ORGANIZED HEALTH CARE EDUCATION/TRAINING PROGRAM

## 2024-10-29 PROCEDURE — 97161 PT EVAL LOW COMPLEX 20 MIN: CPT | Mod: GP

## 2024-10-29 PROCEDURE — 80069 RENAL FUNCTION PANEL: CPT

## 2024-10-29 PROCEDURE — RXMED WILLOW AMBULATORY MEDICATION CHARGE

## 2024-10-29 PROCEDURE — G0378 HOSPITAL OBSERVATION PER HR: HCPCS

## 2024-10-29 PROCEDURE — 96372 THER/PROPH/DIAG INJ SC/IM: CPT | Mod: 59

## 2024-10-29 RX ORDER — DICYCLOMINE HYDROCHLORIDE 10 MG/1
10 CAPSULE ORAL 3 TIMES DAILY
Status: DISCONTINUED | OUTPATIENT
Start: 2024-10-29 | End: 2024-10-29 | Stop reason: HOSPADM

## 2024-10-29 RX ORDER — SCOLOPAMINE TRANSDERMAL SYSTEM 1 MG/1
1 PATCH, EXTENDED RELEASE TRANSDERMAL
Status: DISCONTINUED | OUTPATIENT
Start: 2024-10-29 | End: 2024-10-29 | Stop reason: HOSPADM

## 2024-10-29 RX ORDER — HYOSCYAMINE SULFATE 0.12 MG/1
0.12 TABLET, ORALLY DISINTEGRATING ORAL EVERY 8 HOURS PRN
Qty: 30 TABLET | Refills: 0 | Status: SHIPPED | OUTPATIENT
Start: 2024-10-29

## 2024-10-29 RX ORDER — HYOSCYAMINE SULFATE 0.125 MG
0.12 TABLET ORAL EVERY 8 HOURS PRN
Qty: 30 TABLET | Refills: 0 | Status: SHIPPED | OUTPATIENT
Start: 2024-10-29

## 2024-10-29 RX ORDER — DICYCLOMINE HYDROCHLORIDE 10 MG/1
10 CAPSULE ORAL 3 TIMES DAILY
Qty: 8 CAPSULE | Refills: 0 | Status: SHIPPED | OUTPATIENT
Start: 2024-10-29 | End: 2024-11-01

## 2024-10-29 RX ORDER — SCOLOPAMINE TRANSDERMAL SYSTEM 1 MG/1
1 PATCH, EXTENDED RELEASE TRANSDERMAL
Qty: 24 PATCH | Refills: 1 | Status: SHIPPED | OUTPATIENT
Start: 2024-11-01

## 2024-10-29 RX ORDER — HYOSCYAMINE SULFATE 0.125 MG
0.12 TABLET ORAL EVERY 8 HOURS PRN
Status: DISCONTINUED | OUTPATIENT
Start: 2024-10-29 | End: 2024-10-29 | Stop reason: HOSPADM

## 2024-10-29 ASSESSMENT — COGNITIVE AND FUNCTIONAL STATUS - GENERAL
MOVING TO AND FROM BED TO CHAIR: A LITTLE
MOVING FROM LYING ON BACK TO SITTING ON SIDE OF FLAT BED WITH BEDRAILS: A LITTLE
TURNING FROM BACK TO SIDE WHILE IN FLAT BAD: A LITTLE
MOBILITY SCORE: 17
CLIMB 3 TO 5 STEPS WITH RAILING: A LOT
STANDING UP FROM CHAIR USING ARMS: A LITTLE
WALKING IN HOSPITAL ROOM: A LITTLE

## 2024-10-29 ASSESSMENT — PAIN - FUNCTIONAL ASSESSMENT
PAIN_FUNCTIONAL_ASSESSMENT: 0-10
PAIN_FUNCTIONAL_ASSESSMENT: 0-10

## 2024-10-29 ASSESSMENT — PAIN SCALES - GENERAL
PAINLEVEL_OUTOF10: 8
PAINLEVEL_OUTOF10: 3

## 2024-10-29 ASSESSMENT — PAIN DESCRIPTION - DESCRIPTORS: DESCRIPTORS: ACHING

## 2024-10-29 ASSESSMENT — ACTIVITIES OF DAILY LIVING (ADL): ADL_ASSISTANCE: INDEPENDENT

## 2024-10-30 ENCOUNTER — TELEPHONE (OUTPATIENT)
Dept: PRIMARY CARE | Facility: CLINIC | Age: 64
End: 2024-10-30
Payer: COMMERCIAL

## 2024-10-30 DIAGNOSIS — I82.210 SUPERIOR VENA CAVA THROMBOSIS (MULTI): ICD-10-CM

## 2024-10-30 RX ORDER — ATORVASTATIN CALCIUM 80 MG/1
80 TABLET, FILM COATED ORAL DAILY
Qty: 90 TABLET | Refills: 1 | Status: SHIPPED | OUTPATIENT
Start: 2024-10-30

## 2024-10-30 RX ORDER — ENOXAPARIN SODIUM 100 MG/ML
40 INJECTION SUBCUTANEOUS DAILY
Qty: 12 ML | Refills: 3 | Status: SHIPPED | OUTPATIENT
Start: 2024-10-30 | End: 2025-02-27

## 2024-11-04 ENCOUNTER — APPOINTMENT (OUTPATIENT)
Dept: OPHTHALMOLOGY | Facility: CLINIC | Age: 64
End: 2024-11-04
Payer: COMMERCIAL

## 2024-11-04 DIAGNOSIS — Z96.1 PSEUDOPHAKIA OF BOTH EYES: Primary | ICD-10-CM

## 2024-11-04 DIAGNOSIS — H40.1131 PRIMARY OPEN ANGLE GLAUCOMA OF BOTH EYES, MILD STAGE: ICD-10-CM

## 2024-11-04 DIAGNOSIS — H40.10X0 OPEN-ANGLE GLAUCOMA OF BOTH EYES, UNSPECIFIED GLAUCOMA STAGE, UNSPECIFIED OPEN-ANGLE GLAUCOMA TYPE: ICD-10-CM

## 2024-11-04 DIAGNOSIS — Z98.890 HISTORY OF PANRETINAL PHOTOCOAGULATION: ICD-10-CM

## 2024-11-04 DIAGNOSIS — E11.9 CONTROLLED TYPE 2 DIABETES MELLITUS WITHOUT COMPLICATION, UNSPECIFIED WHETHER LONG TERM INSULIN USE (MULTI): ICD-10-CM

## 2024-11-04 PROCEDURE — 3048F LDL-C <100 MG/DL: CPT | Performed by: OPHTHALMOLOGY

## 2024-11-04 PROCEDURE — 1159F MED LIST DOCD IN RCRD: CPT | Performed by: OPHTHALMOLOGY

## 2024-11-04 PROCEDURE — 92134 CPTRZ OPH DX IMG PST SGM RTA: CPT | Performed by: OPHTHALMOLOGY

## 2024-11-04 PROCEDURE — 3051F HG A1C>EQUAL 7.0%<8.0%: CPT | Performed by: OPHTHALMOLOGY

## 2024-11-04 PROCEDURE — 3061F NEG MICROALBUMINURIA REV: CPT | Performed by: OPHTHALMOLOGY

## 2024-11-04 PROCEDURE — 99203 OFFICE O/P NEW LOW 30 MIN: CPT | Performed by: OPHTHALMOLOGY

## 2024-11-04 RX ORDER — LATANOPROST 50 UG/ML
1 SOLUTION/ DROPS OPHTHALMIC NIGHTLY
Qty: 2.5 ML | Refills: 3 | Status: SHIPPED | OUTPATIENT
Start: 2024-11-04

## 2024-11-04 RX ORDER — BRIMONIDINE TARTRATE 2 MG/ML
1 SOLUTION/ DROPS OPHTHALMIC 2 TIMES DAILY
Qty: 30 ML | Refills: 3 | Status: SHIPPED | OUTPATIENT
Start: 2024-11-04

## 2024-11-04 ASSESSMENT — ENCOUNTER SYMPTOMS
ALLERGIC/IMMUNOLOGIC NEGATIVE: 0
ENDOCRINE NEGATIVE: 0
MUSCULOSKELETAL NEGATIVE: 0
NEUROLOGICAL NEGATIVE: 0
RESPIRATORY NEGATIVE: 0
HEMATOLOGIC/LYMPHATIC NEGATIVE: 0
EYES NEGATIVE: 0
PSYCHIATRIC NEGATIVE: 0
CARDIOVASCULAR NEGATIVE: 0
GASTROINTESTINAL NEGATIVE: 0
CONSTITUTIONAL NEGATIVE: 0

## 2024-11-04 ASSESSMENT — CONF VISUAL FIELD
METHOD: COUNTING FINGERS
OS_INFERIOR_NASAL_RESTRICTION: 1
OS_INFERIOR_TEMPORAL_RESTRICTION: 1
OD_SUPERIOR_NASAL_RESTRICTION: 3
OS_SUPERIOR_NASAL_RESTRICTION: 1
OS_SUPERIOR_TEMPORAL_RESTRICTION: 1
OD_INFERIOR_NASAL_RESTRICTION: 0
OD_INFERIOR_TEMPORAL_RESTRICTION: 3

## 2024-11-04 ASSESSMENT — EXTERNAL EXAM - RIGHT EYE: OD_EXAM: NORMAL

## 2024-11-04 ASSESSMENT — EXTERNAL EXAM - LEFT EYE: OS_EXAM: NORMAL

## 2024-11-04 ASSESSMENT — TONOMETRY
OS_IOP_MMHG: 19
IOP_METHOD: GOLDMANN APPLANATION
OD_IOP_MMHG: 18

## 2024-11-04 ASSESSMENT — VISUAL ACUITY
METHOD: SNELLEN - LINEAR
OD_PH_SC: 20/50
OD_SC: 20/60
OS_SC: NLP

## 2024-11-04 ASSESSMENT — CUP TO DISC RATIO
OS_RATIO: 0.7
OD_RATIO: 0.6

## 2024-11-04 ASSESSMENT — PACHYMETRY
OD_CT(UM): 533
OS_CT(UM): 544

## 2024-11-04 ASSESSMENT — SLIT LAMP EXAM - LIDS
COMMENTS: NORMAL
COMMENTS: NORMAL

## 2024-11-04 NOTE — PROGRESS NOTES
Assessment/Plan   Diagnoses and all orders for this visit:  Pseudophakia of both eyes  History of panretinal photocoagulation  -     Referral to Ophthalmology    Open-angle glaucoma of both eyes, unspecified glaucoma stage, unspecified open-angle glaucoma type  Continue brimonidine bid and latanaprost OU  Glaucoma consult  Controlled type 2 diabetes mellitus without complication, unspecified whether long term insulin use (Multi)  Retina consult

## 2024-11-07 ENCOUNTER — APPOINTMENT (OUTPATIENT)
Dept: DERMATOLOGY | Facility: CLINIC | Age: 64
End: 2024-11-07
Payer: COMMERCIAL

## 2024-11-11 ENCOUNTER — OFFICE VISIT (OUTPATIENT)
Dept: OBSTETRICS AND GYNECOLOGY | Facility: CLINIC | Age: 64
End: 2024-11-11
Payer: COMMERCIAL

## 2024-11-11 VITALS
HEIGHT: 59 IN | DIASTOLIC BLOOD PRESSURE: 74 MMHG | SYSTOLIC BLOOD PRESSURE: 111 MMHG | BODY MASS INDEX: 35.92 KG/M2 | WEIGHT: 178.2 LBS | HEART RATE: 88 BPM

## 2024-11-11 DIAGNOSIS — D25.1 INTRAMURAL AND SUBMUCOUS LEIOMYOMA OF UTERUS: Primary | ICD-10-CM

## 2024-11-11 DIAGNOSIS — R10.84 GENERALIZED ABDOMINAL PAIN: ICD-10-CM

## 2024-11-11 DIAGNOSIS — D25.0 INTRAMURAL AND SUBMUCOUS LEIOMYOMA OF UTERUS: Primary | ICD-10-CM

## 2024-11-11 DIAGNOSIS — Z78.9 MEDICALLY COMPLEX PATIENT: ICD-10-CM

## 2024-11-11 PROCEDURE — 1036F TOBACCO NON-USER: CPT | Performed by: OBSTETRICS & GYNECOLOGY

## 2024-11-11 PROCEDURE — 99215 OFFICE O/P EST HI 40 MIN: CPT | Performed by: OBSTETRICS & GYNECOLOGY

## 2024-11-11 PROCEDURE — 1160F RVW MEDS BY RX/DR IN RCRD: CPT | Performed by: OBSTETRICS & GYNECOLOGY

## 2024-11-11 PROCEDURE — 3078F DIAST BP <80 MM HG: CPT | Performed by: OBSTETRICS & GYNECOLOGY

## 2024-11-11 PROCEDURE — 3074F SYST BP LT 130 MM HG: CPT | Performed by: OBSTETRICS & GYNECOLOGY

## 2024-11-11 PROCEDURE — 3051F HG A1C>EQUAL 7.0%<8.0%: CPT | Performed by: OBSTETRICS & GYNECOLOGY

## 2024-11-11 PROCEDURE — 3008F BODY MASS INDEX DOCD: CPT | Performed by: OBSTETRICS & GYNECOLOGY

## 2024-11-11 PROCEDURE — 4010F ACE/ARB THERAPY RXD/TAKEN: CPT | Performed by: OBSTETRICS & GYNECOLOGY

## 2024-11-11 PROCEDURE — 1159F MED LIST DOCD IN RCRD: CPT | Performed by: OBSTETRICS & GYNECOLOGY

## 2024-11-11 PROCEDURE — 3061F NEG MICROALBUMINURIA REV: CPT | Performed by: OBSTETRICS & GYNECOLOGY

## 2024-11-11 PROCEDURE — 1125F AMNT PAIN NOTED PAIN PRSNT: CPT | Performed by: OBSTETRICS & GYNECOLOGY

## 2024-11-11 PROCEDURE — 3048F LDL-C <100 MG/DL: CPT | Performed by: OBSTETRICS & GYNECOLOGY

## 2024-11-11 RX ORDER — NYSTATIN 100000 [USP'U]/G
POWDER TOPICAL
COMMUNITY
Start: 2022-11-17

## 2024-11-11 RX ORDER — LOSARTAN POTASSIUM 50 MG/1
1 TABLET ORAL
COMMUNITY
Start: 2024-01-06

## 2024-11-11 RX ORDER — POLYETHYLENE GLYCOL-3350 AND ELECTROLYTES WITH FLAVOR PACK 240; 5.84; 2.98; 6.72; 22.72 G/278.26G; G/278.26G; G/278.26G; G/278.26G; G/278.26G
POWDER, FOR SOLUTION ORAL
COMMUNITY
Start: 2024-07-25

## 2024-11-11 ASSESSMENT — PAIN SCALES - GENERAL: PAINLEVEL_OUTOF10: 8

## 2024-11-12 NOTE — PROGRESS NOTES
Nelida Mata presents today with:     Here for follow-up after recent admission for nausea/vomiting, abd pain  Known enlarged fibroid uterus, with large posterior fibroid burden with compression/displacement of surrounding structures.    Was scheduled for hysterectomy last year, but just 1-2 wks prior, developed an SVC thrombus.  Hyst was postponed at that time.      Has complicated medical history:  HFpEF (50-55% 9/2023)  CAD, s/p PCI with stent x 3  STEMI 9/2023  TIA 9/2023  SVC thrombus, now on ppx Lovenox, remote DVT  DM2 - improved hgbA1c; gastroparesis  NAFLD  MICHAEL  Umbilical hernia    Desires to proceed with hysterectomy due to concern that pain is 2ndary to large fibroid uterus.  Had previously discussed significant risks of surgery and immediate postop period.  Size/bulk of uterus would require laparotomy, which confers greater risks intraoperatively and during recovery.  Ms. Mata has had significant weight loss and improvement in DM control, which risk-reduces.  Will plan for preop consult with gyn onc and PAT.      Unsure if able to update endometrial sampling based on exam today - cervix above/behind pubic symphysis, and unlikely able to reach that via pipelle or hysteroscope.  Previously had attempt (after removal of LNG IUD) in the office with another provider.            Prep time on date of patient encounter:  3  Time spent directly with patient/family/caregiver:  30  Coordination of care, ordering of labs, prescriptions:  5  Documentation time: 20 (spent considerable amount of time cleaning up problem list and medical history)  Total time on date of patient encounter: 58        -----------------------------------------------------------------------  HPI    Presents today with  After hospitalization for abdominal pain, nausea/vomiting to continue discussion of definitive management of abdominal/pelvic pain of fibroid uterus.      Recently had in office attempt at endometrial  "biopsy, also with removal of LNG IUD  IUD was placed at time of vaginoscopy in 2018 - cervix located behind pubic symphysis, unable to reach/visualize in the office.      Has had significant weight loss since starting Ozempic in 12/2023          Visit Vitals  /74   Pulse 88   Ht 1.499 m (4' 11\")   Wt 80.8 kg (178 lb 3.2 oz)   LMP  (LMP Unknown)   BMI 35.99 kg/m²   OB Status Menopausal   Smoking Status Never   BSA 1.83 m²       Physical Exam  Constitutional:       Appearance: Normal appearance.   Genitourinary:      Vulva normal.      Uterus exam comments: Enlarged, approximately 22 cm, nodular.   HENT:      Head: Normocephalic and atraumatic.      Mouth/Throat:      Comments: Poor dentition  Pulmonary:      Effort: Pulmonary effort is normal.   Neurological:      Mental Status: She is alert.   Skin:     General: Skin is warm and dry.      Comments: Multiple areas of hyperpigmentation along abdomen  Hard, nodular area on right side of abdomen, lateral and inferior to level of umbilicus, maybe a hematoma at site of Lovenox injection   Psychiatric:         Mood and Affect: Mood normal.         Thought Content: Thought content normal.             "

## 2024-11-14 ENCOUNTER — APPOINTMENT (OUTPATIENT)
Dept: PRIMARY CARE | Facility: CLINIC | Age: 64
End: 2024-11-14
Payer: COMMERCIAL

## 2024-11-14 DIAGNOSIS — E16.2 HYPOGLYCEMIA: ICD-10-CM

## 2024-11-14 DIAGNOSIS — R11.2 NAUSEA AND VOMITING, UNSPECIFIED VOMITING TYPE: ICD-10-CM

## 2024-11-14 ASSESSMENT — PATIENT HEALTH QUESTIONNAIRE - PHQ9
2. FEELING DOWN, DEPRESSED OR HOPELESS: SEVERAL DAYS
SUM OF ALL RESPONSES TO PHQ9 QUESTIONS 1 AND 2: 2
10. IF YOU CHECKED OFF ANY PROBLEMS, HOW DIFFICULT HAVE THESE PROBLEMS MADE IT FOR YOU TO DO YOUR WORK, TAKE CARE OF THINGS AT HOME, OR GET ALONG WITH OTHER PEOPLE: NOT DIFFICULT AT ALL
1. LITTLE INTEREST OR PLEASURE IN DOING THINGS: SEVERAL DAYS

## 2024-11-14 ASSESSMENT — ENCOUNTER SYMPTOMS
LOSS OF SENSATION IN FEET: 1
OCCASIONAL FEELINGS OF UNSTEADINESS: 1
DEPRESSION: 1

## 2024-11-14 ASSESSMENT — PAIN SCALES - GENERAL: PAINLEVEL_OUTOF10: 8

## 2024-11-14 NOTE — PROGRESS NOTES
[facility] ([facility])      Reason for Visit: Follow up    Type 2 DM but long standing diabetic. ZOHAIB and Islet cell antobodies are negative. Most recent HbA1c was 7.1. Glargine 15 units  and Tresiba 40 U daily. She is on an SGLT2 inhibitor and 2 mg Ozempic a GLP-1 analogue  Class 3 Obesity. Most recent BMI of 35.  She is lost a fair amount of weight.  She weighed approximately 213 pounds exactly a year ago and currently weighs 176 pounds. Ozempic 2 mg  CKD. Stage 4. Last GFR 31. K value was 4.5. She should be able to tolerate Spironolactone. No evidence of Proteinuria  CAD. NSTEMI recently. Prior PCI in 2016 and 2018. Cath in 2024 LAD: proximal stent with 40-50% ISR LCX: small AV groove LCX without significant disease, medium OM1 with 40-50% stenosis. RCA  (robust left to right collaterals seen)  HFPEF. Stage C. She was seen by Heart Failure  Remote SVC Thrombus on [facility]  Cerebrovascular disease with admission in September 27 to 29, 2023 at [facility] for right-sided weakness felt to be secondary to cardioembolic.  An MRI at that time revealed small acute/subacute infarction of the thalamocapsular junction and subcortical white matter of the left precentral gyrus.  Hyperlipidemia. Most recent LDL was <40      History of Present Illness: Nelida Mata is a 64 y.o. female who presents for a follow up evaluation in the [facility] program.  She was admitted on 10/27/2024 with symptoms of epigastric and chest discomfort was ruled out for myocardial infarction and underwent workup for gastroparesis that revealed delayed gastric emptying and physiology suggestive of diabetic gastroparesis.  She is being considered for hysterectomy.  And was seen as an outpatient in one of the surgical clinics  Most recent heart failure with she is appear to be appropriate therapies underwent right heart cath did not have any evidence of pulmonary hypertension she is on 40 units  Her LDL Is  well under good control.     She remains at high risk for heart failure.  Her most recent echocardiogram revealed an LVEF between 50 to 55% with diastolic dysfunction.  She is short of breath with minimal exertion and often has to use a wheelchair. She was seen by Dr. Hurst in heart failure and was felt to be an appropriate therapies.  She also underwent right heart catheterization that did not reveal any evidence of pulmonary hypertension    Her insulin regimen has been progressively lowered.  We discussed her dosing of Humalog which is at approximately 10 to 15 units a day on a sliding scale.  This will be the first objective to try and get her off this if at all possible      She has a history of a SVC thrombus for which she is on Eliquis diagnosed in January 2023.  She reported some bleeding recently for which she is currently on Lovenox till she undergoes surgery.  We will have her see vascular medicine involved to sort out her anticoagulation regimen.      Past Medical History:  Recently hospitalized.  64yo female with a history of HFpEF (EF 60-65% 2023), IDDM (A1c 9.0 in 9/2023), CAD s/p PCI w/ stent x3 (2016, 2018), SVC   thrombus on Eliquis (dx 1/2023) who presented with chest pain, N/V.     Past Surgical History:  She has a past surgical history that includes Coronary angioplasty with stent (03/06/2017); Cataract extraction (03/18/2015); Retinal detachment surgery (03/18/2015); MR angio head wo IV contrast (08/16/2013); MR angio neck wo IV contrast (08/16/2013); CT angio aorta and bilateral iliofemoral runoff including without contrast if performed (08/08/2022); Breast biopsy (Left); and Cardiac catheterization (N/A, 7/5/2024).    Social History:  She reports that she has never smoked. She has never been exposed to tobacco smoke. She has never used smokeless tobacco. She reports current alcohol use. She reports that she does not use drugs.    Family History:  Family History   Problem Relation Name Age  of Onset    Diabetes Mother      Glaucoma Father      Hypertension Other FAMILY     Uterine cancer Neg Hx      Ovarian cancer Neg Hx      Colon cancer Neg Hx      Breast cancer Neg Hx         Allergies:  Other, Penicillins, and Procaine    Outpatient Medications:  Current Outpatient Medications   Medication Instructions    aspirin 81 mg, oral, Daily    atorvastatin (LIPITOR) 80 mg, oral, Daily    blood pressure monitor kit 1 each, miscellaneous, Daily, Use once daily to check blood pressure    blood sugar diagnostic (OneTouch Ultra Test) strip USE 1 STRIP(S) TO TEST BLOOD SUGAR 3 TIMES A DAY    brimonidine (AlphaGAN) 0.2 % ophthalmic solution 1 drop, Both Eyes, 2 times daily, PATIENT NEEDS PCP APPT FOR FURTHER REFILLS    carvedilol (COREG) 12.5 mg, oral, 2 times daily (morning and late afternoon)    cholecalciferol (VITAMIN D3) 10 mcg, oral, Daily    dapagliflozin propanediol (FARXIGA) 10 mg, oral, Daily    diclofenac sodium (VOLTAREN) 4 g, Topical, 2 times daily    diphenhydramine/Maalox/lidocaine 1:1:1 Swish and swallow 10 mL every 8 hours as needed for epigastric pain    DULoxetine (Cymbalta) 30 mg DR capsule Take 1 capsule (30 mg) by mouth once daily in the morning AND 2 capsules (60 mg) once daily at bedtime. Do not crush or chew..    enoxaparin (LOVENOX) 40 mg, subcutaneous, Daily    FreeStyle Filipe 2 Sensor kit Apply 1 sensor to the back of the upper arm. Change sensor every 14 days.    FreeStyle Filipe reader (FreeStyle Filipe 2 [place]) misc Use as instructed    furosemide (LASIX) 20 mg, oral, Daily    GaviLyte-C 240-22.72-6.72 -5.84 gram solution TAKE 4,000 ML BY MOUTH 1 TIME FOR 1 DOSE. DRINK 8 OZ EVERY 10-15 MINUTES UNTIL SOLUTION IS GONE    glucagon 3 mg/actuation spray,non-aerosol 1 spray, nasal, As needed    HumaLOG KwikPen Insulin 22 Units, subcutaneous, 3 times daily (morning, midday, late afternoon), Take as directed per insulin instructions.    hyoscyamine (ANASPAZ) 0.125 mg, oral, Every 8 hours PRN     hyoscyamine (ANASPAZ, LEVSIN) 0.125 mg, oral, Every 8 hours PRN    isosorbide mononitrate ER (IMDUR) 60 mg, oral, Daily before breakfast    Kerendia 10 mg, oral, Daily    latanoprost (Xalatan) 0.005 % ophthalmic solution 1 drop, Both Eyes, Nightly    losartan (Cozaar) 50 mg tablet 1 tablet, Daily (0630)    melatonin 5 mg, oral, Nightly PRN    multivitamin tablet 1 tablet, oral, Daily    nitroglycerin (Nitrostat) 0.4 mg SL tablet PLACE 1 TABLET UNDER THE TONGUE EVERY 5 MINUTES FOR UP TO 3 DOSES AS NEEDED FOR CHEST PAIN.CALL 911 IF PAIN PERSISTS.    nystatin (Mycostatin) 100,000 unit/gram powder Apply topically.    Ozempic 2 mg, subcutaneous, Once Weekly    pantoprazole (PROTONIX) 40 mg, oral, Daily before breakfast    scopolamine (Transderm-Scop) 1 mg over 3 days patch 3 day Place 1 patch behind the ear and leave on for  72 hours. Change patch every 3rd day.    urea (Carmol) 20 % cream 1 Application, 2 times daily       Review of Systems:  ROS    Last Recorded Vitals:  There were no vitals filed for this visit.    There is no height or weight on file to calculate BMI.     Physical Examination:  General: Well appearing, well-nourished, in no acute distress.  HEENT: Normocephalic atraumatic, pupils equal and reactive to light, extraocular muscles intact, no conjunctival injection, oropharynx clear without exudates.  Neck: Normal carotid arterial pulses, no arterial bruits, no thyromegaly.  Cardiac: Regular rhythm and normal heart rate.  S1, S2 present and normal.  No murmurs, rubs, or gallops.   Jugular venous pressure and pulsations are normal  Pulmonary: Normal breath sounds, no increased work of breathing, no wheezes or crackles.  GI: Normal bowel sounds, no organomegaly appreciated;  no abdominal bruits.  Lower extremities: No cyanosis, clubbing, or edema.  No xanthelasma present. Normal distal pulses.  Skin: Skin intact. No significant rashes or lesions present.  Neuro: Alert and oriented x 3, normal attention  "and cognition, no focal motor or sensory neurologic deficits.  Psych: Normal affect and mood.  Musculoskeletal: Normal gait normal muscle tone.    Laboratory Studies:  Lab Results   Component Value Date    GLUCOSE 195 (H) 10/29/2024    CALCIUM 9.8 10/29/2024     10/29/2024    K 4.5 10/29/2024    CO2 28 10/29/2024    CL 96 (L) 10/29/2024    BUN 17 10/29/2024    CREATININE 1.82 (H) 10/29/2024     Lab Results   Component Value Date    ALT 13 10/27/2024    AST 20 10/27/2024    ALKPHOS 101 10/27/2024    BILITOT 0.7 10/27/2024     Lab Results   Component Value Date    CHOL 91 10/04/2024    CHOL 84 02/02/2024    CHOL 109 11/10/2023     Lab Results   Component Value Date    HDL 37.9 10/04/2024    HDL 26.5 02/02/2024    HDL 39.8 11/10/2023     Lab Results   Component Value Date    LDLCALC 33 10/04/2024    LDLCALC 28 02/02/2024    LDLCALC 7 11/10/2023     Lab Results   Component Value Date    TRIG 102 10/04/2024    TRIG 150 (H) 02/02/2024    TRIG 312 (H) 11/10/2023     No components found for: \"CHOLHDL\"  No components found for: \"UACR\"  Lab Results   Component Value Date    HGBA1C 7.1 (H) 10/04/2024    HGBA1C 7.6 (H) 08/19/2024    HGBA1C 8.5 (H) 02/02/2024     Lab Results   Component Value Date    TSH 2.15 03/21/2024     Lab Results   Component Value Date    WBC 5.9 10/27/2024    HGB 15.4 10/27/2024    HCT 46.8 (H) 10/27/2024    MCV 92 10/27/2024     10/27/2024            Assessment and Plan:  63-year-old complex vasculopath with longstanding type 2 diabetes mellitus with target organ complications in the form of stage IV CKD.  She is improving very well having lost approximately 20% of her body weight.  Her insulin dosage is reduced from 50 units of long-acting to 40 units.  She is still on sliding scale insulin regimen that additionally provides 10 to 15 units a day.  Evaluation of his CGM reveals glucoses in target range at 79% of the time.  Her glucoses are in the high range approximately 18% of the time.  " Her physical examination is unremarkable.  I am overall very pleased with the trajectory.  I am also pleased that she saw heart failure and she is on adequate guideline directed medical therapy for this.  At this present time we will add metformin given that her GFR should allow additional oral antiglycemic drugs.  This will accelerate further down titration of her insulin and weight loss.  PLAN  Type 2 DM.  Her glycemic control is much improved with a hemoglobin A1c of 7.1.  She is on 40 units of Tresiba in conjunction with Humalog.  Ozempic is 2 mg and Farxiga at 10 mg.  We will add metformin 1000 mg once daily.  There is no contraindication to proceeding and her GFR will allow this.  This will enable us to lower her regular insulin dosage further.  This will undoubtedly accelerate her weight loss.  CKD.  She is on Kerendia once daily.  She is also being seen by the nephrologist.  She is extremely motivated to avoid renal replacement therapy.  She remains at high risk for progression to renal replacement therapy.  HFpEF.  Stage C heart failure ACC AHA.  She is in all guideline based treatment for heart failure including beta-blocker, SGLT2 inhibitor, and MRA but is not on an ARNI.  However the latter may be problematic given her CKD.  Will follow-up with this patient approximately 6 months.      Problem List Items Addressed This Visit    None    Education: Reviewed ‘ABCs’ of diabetes management (respective goals in parentheses):  A1C (<6), blood pressure (<130/80), and cholesterol (LDL <70).  Compliance at present is estimated to be fair. Efforts to improve compliance (if necessary) will be directed.  Follow up: 3 month        Michael Hilario MD, FACC, LAVELLE.  Chief, Cardiovascular Medicine  [facility] [place]  Chief Academic and Scientific Officer  [facility] Professor of Medicine  Professor of Medicine, Radiology and Biomedical Engineering  [facility]  [facility], [place]

## 2024-11-14 NOTE — PROGRESS NOTES
Subjective   Patient ID: Nelida Mata is a 64 y.o. female who presents for Follow-up (Right side of stomach possible blood vessel and theres a big knot. Theres also pain and its hard like a brick.).    HPI  The encounter below was completed via telephone communication due to patient preference and/or scheduling necessity. Patient lacked appropriate connectivity for video telemedicine. Confirmed with patient that they are in a private setting before discussing sensitive health information. Consent for treatment and billing for this visit was obtained during the visit. Patient was appropriately identified using two identifiers.      On Tresiba to 40 units nightly; c/w insulin lispro sliding scale with meals daily 22u, Farxiga 10 mg daily, and semaglutide 2 mg weekly.     11/14 141  11/13 179  11/12 124  11/11 191       Review of Systems  All other systems reviewed were negative    Current Outpatient Medications on File Prior to Visit   Medication Sig Dispense Refill    aspirin 81 mg EC tablet Take 1 tablet (81 mg) by mouth once daily. 30 tablet 11    atorvastatin (Lipitor) 80 mg tablet 1 TAB(S) ORALLY ONCE A DAY -.MEDS TO BEDS 90 tablet 1    blood pressure monitor kit 1 each once daily. Use once daily to check blood pressure 1 kit 0    blood sugar diagnostic (OneTouch Ultra Test) strip USE 1 STRIP(S) TO TEST BLOOD SUGAR 3 TIMES A  strip 3    brimonidine (AlphaGAN) 0.2 % ophthalmic solution Administer 1 drop into both eyes 2 times a day. PATIENT NEEDS PCP APPT FOR FURTHER REFILLS 30 mL 3    carvedilol (Coreg) 12.5 mg tablet Take 1 tablet (12.5 mg) by mouth 2 times daily (morning and late afternoon). 60 tablet 11    cholecalciferol (Vitamin D3) 10 MCG (400 UNIT) tablet Take 1 tablet (10 mcg) by mouth once daily. 30 tablet 11    dapagliflozin propanediol (Farxiga) 10 mg Take 1 tablet (10 mg) by mouth once daily. 30 tablet 11    diclofenac sodium (Voltaren) 1 % gel Apply 4.5 inches (4 g) topically 2 times  a day. 800 g 2    diphenhydramine/Maalox/lidocaine 1:1:1 Swish and swallow 10 mL every 8 hours as needed for epigastric pain 120 mL 0    DULoxetine (Cymbalta) 30 mg DR capsule Take 1 capsule (30 mg) by mouth once daily in the morning AND 2 capsules (60 mg) once daily at bedtime. Do not crush or chew.. 60 capsule 11    enoxaparin (Lovenox) 40 mg/0.4 mL syringe Inject 0.4 mL (40 mg) under the skin once daily. 12 mL 3    finerenone (Kerendia) 10 mg tablet tablet Take 1 tablet (10 mg) by mouth once daily. 30 tablet 11    FreeStyle Filipe 2 Sensor kit Apply 1 sensor to the back of the upper arm. Change sensor every 14 days. 2 each 11    FreeStyle Filipe reader (FreeStyle Filipe 2 [place]) misc Use as instructed 1 each 0    furosemide (Lasix) 20 mg tablet Take 1 tablet (20 mg) by mouth once daily. 90 tablet 2    GaviLyte-C 240-22.72-6.72 -5.84 gram solution TAKE 4,000 ML BY MOUTH 1 TIME FOR 1 DOSE. DRINK 8 OZ EVERY 10-15 MINUTES UNTIL SOLUTION IS GONE      glucagon 3 mg/actuation spray,non-aerosol Administer 1 spray into affected nostril(s) if needed (please  apply into a single nostril for blood sugar <60). 2 each 0    hyoscyamine (Anaspaz) 0.125 mg disintegrating tablet Take 1 tablet (0.125 mg) by mouth every 8 hours if needed (cramping or nausea). 30 tablet 0    hyoscyamine (Anaspaz, Levsin) 0.125 mg tablet Take 1 tablet (0.125 mg) by mouth every 8 hours if needed for cramping. 30 tablet 0    insulin lispro (HumaLOG KwikPen Insulin) 200 unit/mL (3 mL) insulin pen pen Inject 22 Units under the skin 3 times daily (morning, midday, late afternoon). Take as directed per insulin instructions. 11 each 3    isosorbide mononitrate ER (Imdur) 60 mg 24 hr tablet TAKE 1 TABLET BY MOUTH EVERY DAY IN THE MORNING 90 tablet 1    latanoprost (Xalatan) 0.005 % ophthalmic solution Administer 1 drop into both eyes once daily at bedtime. 2.5 mL 3    losartan (Cozaar) 50 mg tablet Take 1 tablet (50 mg) by mouth early in the morning..       melatonin 5 mg tablet Take 1 tablet (5 mg) by mouth as needed at bedtime for sleep. 30 tablet 11    multivitamin tablet Take 1 tablet by mouth once daily. 30 tablet 11    nitroglycerin (Nitrostat) 0.4 mg SL tablet PLACE 1 TABLET UNDER THE TONGUE EVERY 5 MINUTES FOR UP TO 3 DOSES AS NEEDED FOR CHEST PAIN.CALL 911 IF PAIN PERSISTS.      nystatin (Mycostatin) 100,000 unit/gram powder Apply topically.      pantoprazole (ProtoNix) 40 mg EC tablet Take 1 tablet (40 mg) by mouth once daily in the morning. Take before meals. 30 tablet 5    scopolamine (Transderm-Scop) 1 mg over 3 days patch 3 day Place 1 patch behind the ear and leave on for  72 hours. Change patch every 3rd day. 24 patch 1    semaglutide 2 mg/dose (8 mg/3 mL) pen injector Inject 2 mg under the skin 1 (one) time per week. 3 mL 11    urea (Carmol) 20 % cream Apply 1 Application topically 2 times a day.      [DISCONTINUED] acetaminophen (Tylenol) 325 mg tablet Take 2 tablets (650 mg) by mouth every 8 hours if needed for mild pain (1 - 3). 1080 tablet 0    [DISCONTINUED] insulin degludec (Tresiba FlexTouch U-200) 200 unit/mL (3 mL) injection Inject 40 Units under the skin once daily at bedtime. Take as directed per insulin instructions.       No current facility-administered medications on file prior to visit.        Objective   Vitals: LMP  (LMP Unknown)      Physical Exam  Pleasant, OAX3      Assessment/Plan     HFpEF (50-55% 9/2023), CAD s/p PCI w/ stent x 3 (2016, 2018) and STEMI (9/2023, no stents placed), TIA 9/2023, SVC thrombus on Lovenox, remote DVT, T2DM, HLD, CKD, obesity, NAFLD, MICHAEL, and prior toe amputations for gangrene in 2020 here for follow-up on diabetes.    A/P:  No hypoglycemia  C/w Tresiba to 40 units nightly; c/w insulin lispro sliding scale with meals daily 22u, Farxiga 10 mg daily, and semaglutide 2 mg weekly.   Problem List Items Addressed This Visit             ICD-10-CM    Hypoglycemia E16.2     Other Visit Diagnoses         Codes     Nausea and vomiting, unspecified vomiting type     R11.2            Attending Supervision: discussed with attending physician (cosigner listed on this note).    RTC in 3 months for chronic conditions (MDD/ pain after seeing specialist), or earlier as needed.    Patient seen and discussed with attending physician Dr. Joe    Plan preliminary until cosigned by attending physician.    Betty Boone M.D.  Family Medicine  PGY-2

## 2024-11-15 ENCOUNTER — APPOINTMENT (OUTPATIENT)
Dept: CARDIOLOGY | Facility: HOSPITAL | Age: 64
End: 2024-11-15
Payer: COMMERCIAL

## 2024-11-15 VITALS
WEIGHT: 174 LBS | DIASTOLIC BLOOD PRESSURE: 73 MMHG | OXYGEN SATURATION: 98 % | BODY MASS INDEX: 35.08 KG/M2 | SYSTOLIC BLOOD PRESSURE: 107 MMHG | HEIGHT: 59 IN | HEART RATE: 87 BPM

## 2024-11-15 DIAGNOSIS — I25.10 CORONARY ARTERY DISEASE INVOLVING NATIVE CORONARY ARTERY OF NATIVE HEART WITHOUT ANGINA PECTORIS: ICD-10-CM

## 2024-11-15 DIAGNOSIS — Z79.4 TYPE 2 DIABETES MELLITUS WITH HYPERGLYCEMIA, WITH LONG-TERM CURRENT USE OF INSULIN: ICD-10-CM

## 2024-11-15 DIAGNOSIS — I13.11 BENIGN HYPERTENSIVE HEART AND KIDNEY DISEASE WITHOUT HEART FAILURE AND WITH CHRONIC KIDNEY DISEASE STAGE V OR END STAGE RENAL DISEASE(404.12) (MULTI): ICD-10-CM

## 2024-11-15 DIAGNOSIS — N18.4 CHRONIC KIDNEY DISEASE WITH ACTIVE MEDICAL MANAGEMENT WITHOUT DIALYSIS, STAGE 4 (SEVERE) (MULTI): ICD-10-CM

## 2024-11-15 DIAGNOSIS — I50.22 CHRONIC SYSTOLIC CONGESTIVE HEART FAILURE: ICD-10-CM

## 2024-11-15 DIAGNOSIS — E78.2 MIXED HYPERLIPIDEMIA: ICD-10-CM

## 2024-11-15 DIAGNOSIS — Z86.718 HISTORY OF DVT (DEEP VEIN THROMBOSIS): ICD-10-CM

## 2024-11-15 DIAGNOSIS — E11.65 TYPE 2 DIABETES MELLITUS WITH HYPERGLYCEMIA, WITH LONG-TERM CURRENT USE OF INSULIN: ICD-10-CM

## 2024-11-15 PROCEDURE — 4010F ACE/ARB THERAPY RXD/TAKEN: CPT | Performed by: INTERNAL MEDICINE

## 2024-11-15 PROCEDURE — 99215 OFFICE O/P EST HI 40 MIN: CPT | Performed by: INTERNAL MEDICINE

## 2024-11-15 PROCEDURE — 3048F LDL-C <100 MG/DL: CPT | Performed by: INTERNAL MEDICINE

## 2024-11-15 PROCEDURE — 1159F MED LIST DOCD IN RCRD: CPT | Performed by: INTERNAL MEDICINE

## 2024-11-15 PROCEDURE — 3061F NEG MICROALBUMINURIA REV: CPT | Performed by: INTERNAL MEDICINE

## 2024-11-15 PROCEDURE — 1125F AMNT PAIN NOTED PAIN PRSNT: CPT | Performed by: INTERNAL MEDICINE

## 2024-11-15 PROCEDURE — RXMED WILLOW AMBULATORY MEDICATION CHARGE

## 2024-11-15 PROCEDURE — 3074F SYST BP LT 130 MM HG: CPT | Performed by: INTERNAL MEDICINE

## 2024-11-15 PROCEDURE — 1036F TOBACCO NON-USER: CPT | Performed by: INTERNAL MEDICINE

## 2024-11-15 PROCEDURE — 3008F BODY MASS INDEX DOCD: CPT | Performed by: INTERNAL MEDICINE

## 2024-11-15 PROCEDURE — 3051F HG A1C>EQUAL 7.0%<8.0%: CPT | Performed by: INTERNAL MEDICINE

## 2024-11-15 PROCEDURE — 3078F DIAST BP <80 MM HG: CPT | Performed by: INTERNAL MEDICINE

## 2024-11-15 RX ORDER — METFORMIN HYDROCHLORIDE 500 MG/1
500 TABLET, EXTENDED RELEASE ORAL
Qty: 30 TABLET | Refills: 11 | Status: SHIPPED | OUTPATIENT
Start: 2024-11-15 | End: 2025-11-15

## 2024-11-15 ASSESSMENT — PAIN SCALES - GENERAL: PAINLEVEL_OUTOF10: 6

## 2024-11-19 ENCOUNTER — APPOINTMENT (OUTPATIENT)
Dept: CARDIOLOGY | Facility: HOSPITAL | Age: 64
End: 2024-11-19
Payer: COMMERCIAL

## 2024-11-19 NOTE — PROGRESS NOTES
I reviewed the resident/fellow's documentation and discussed the patient with the resident/fellow. I agree with the resident/fellow's medical decision making as documented in the note.  The nausea and vomiting is a chronic, intermittent condition and was not worsening at this visit.  NO longer having hypoglycemia on reduced dose of insulin.  [place] Joe MD

## 2024-11-20 ENCOUNTER — PHARMACY VISIT (OUTPATIENT)
Dept: PHARMACY | Facility: CLINIC | Age: 64
End: 2024-11-20
Payer: COMMERCIAL

## 2024-11-27 ENCOUNTER — APPOINTMENT (OUTPATIENT)
Dept: SLEEP MEDICINE | Facility: CLINIC | Age: 64
End: 2024-11-27
Payer: COMMERCIAL

## 2024-11-30 DIAGNOSIS — Z79.4 CONTROLLED TYPE 2 DIABETES MELLITUS WITHOUT COMPLICATION, WITH LONG-TERM CURRENT USE OF INSULIN (MULTI): ICD-10-CM

## 2024-11-30 DIAGNOSIS — E11.9 CONTROLLED TYPE 2 DIABETES MELLITUS WITHOUT COMPLICATION, WITH LONG-TERM CURRENT USE OF INSULIN (MULTI): ICD-10-CM

## 2024-11-30 RX ORDER — DAPAGLIFLOZIN 10 MG/1
10 TABLET, FILM COATED ORAL DAILY
Qty: 30 TABLET | Refills: 11 | Status: CANCELLED | OUTPATIENT
Start: 2024-11-30 | End: 2025-11-30

## 2024-12-03 ENCOUNTER — TELEPHONE (OUTPATIENT)
Dept: SLEEP MEDICINE | Facility: CLINIC | Age: 64
End: 2024-12-03

## 2024-12-04 ENCOUNTER — OFFICE VISIT (OUTPATIENT)
Dept: PULMONOLOGY | Facility: HOSPITAL | Age: 64
End: 2024-12-04
Payer: COMMERCIAL

## 2024-12-04 ENCOUNTER — TELEPHONE (OUTPATIENT)
Dept: CARDIOLOGY | Facility: CLINIC | Age: 64
End: 2024-12-04

## 2024-12-04 VITALS
SYSTOLIC BLOOD PRESSURE: 123 MMHG | TEMPERATURE: 97 F | HEART RATE: 87 BPM | RESPIRATION RATE: 17 BRPM | OXYGEN SATURATION: 100 % | DIASTOLIC BLOOD PRESSURE: 65 MMHG

## 2024-12-04 DIAGNOSIS — R80.9 MICROALBUMINURIA: ICD-10-CM

## 2024-12-04 DIAGNOSIS — G47.33 OBSTRUCTIVE SLEEP APNEA: Primary | ICD-10-CM

## 2024-12-04 DIAGNOSIS — J98.4 RESTRICTIVE LUNG DISEASE: ICD-10-CM

## 2024-12-04 DIAGNOSIS — E11.65 UNCONTROLLED TYPE 2 DIABETES MELLITUS WITH HYPERGLYCEMIA: ICD-10-CM

## 2024-12-04 PROCEDURE — 1125F AMNT PAIN NOTED PAIN PRSNT: CPT | Performed by: NURSE PRACTITIONER

## 2024-12-04 PROCEDURE — 3061F NEG MICROALBUMINURIA REV: CPT | Performed by: NURSE PRACTITIONER

## 2024-12-04 PROCEDURE — 1159F MED LIST DOCD IN RCRD: CPT | Performed by: NURSE PRACTITIONER

## 2024-12-04 PROCEDURE — 3074F SYST BP LT 130 MM HG: CPT | Performed by: NURSE PRACTITIONER

## 2024-12-04 PROCEDURE — 4010F ACE/ARB THERAPY RXD/TAKEN: CPT | Performed by: NURSE PRACTITIONER

## 2024-12-04 PROCEDURE — 99213 OFFICE O/P EST LOW 20 MIN: CPT | Performed by: NURSE PRACTITIONER

## 2024-12-04 PROCEDURE — 3078F DIAST BP <80 MM HG: CPT | Performed by: NURSE PRACTITIONER

## 2024-12-04 PROCEDURE — 3051F HG A1C>EQUAL 7.0%<8.0%: CPT | Performed by: NURSE PRACTITIONER

## 2024-12-04 PROCEDURE — 3048F LDL-C <100 MG/DL: CPT | Performed by: NURSE PRACTITIONER

## 2024-12-04 ASSESSMENT — ENCOUNTER SYMPTOMS
AGITATION: 0
EYE PAIN: 0
NAUSEA: 0
NUMBNESS: 0
PALPITATIONS: 0
ARTHRALGIAS: 1
VOICE CHANGE: 0
RHINORRHEA: 0
ABDOMINAL PAIN: 0
VOMITING: 1
DIZZINESS: 0
FATIGUE: 1
NERVOUS/ANXIOUS: 0
DIARRHEA: 0
BACK PAIN: 1
MYALGIAS: 0
HEADACHES: 1
FEVER: 0
WEAKNESS: 0
JOINT SWELLING: 0
SINUS PRESSURE: 0

## 2024-12-04 ASSESSMENT — PAIN SCALES - GENERAL: PAINLEVEL_OUTOF10: 8

## 2024-12-04 NOTE — PATIENT INSTRUCTIONS
Dyspnea on exertion: CAD with stents, HFpEF - diastolic dysfunction. PFTs with mild restriction - likely in the setting of fluid/ obesity. She continues to lose weight. She did not do cardiac rehab after MI/ stents   - likely related to CAD/ diastolic dysfunction - discuss cardiac rehab with cardiologist     2. MICHAEL: unable to tolerate CPAP   - interested in advanced therapies - referral to sleep medicine    Thank you for visiting the Pulmonary clinic today!   Return to clinic - as needed   Geeta Robins CNP  My office -  [phone]- Raghu is my . Bonnie is my nurse [phone].   Radiology scheduling [phone]   Appointment scheduling [phone]   Pulmonary function testing - [phone]

## 2024-12-04 NOTE — PROGRESS NOTES
Patient: Nelida Mata    [id number]  : 1960 -- AGE 64 y.o.    Provider: UMU Cervantes-CNP     Location [facility]   Service Date: 2024              [facility]  New Visit Note      HISTORY OF PRESENT ILLNESS     The patient's referring provider is: Geeta Robins A*    HISTORY OF PRESENT ILLNESS   Nelida Mata is a 64 y.o. female who presents to a [facility] for an evaluation with concerns of No chief complaint on file.. I have independently interviewed and examined the patient in the office and reviewed available records.    Current History    On today's visit, the patient reports WEST with walking on level ground. She states legs get tired as well as SOB. She has an occasional dry cough. She states rare wheezing. She denies ay SOB at rest. She has occasional chest pains - states a lot of burning in her chest. She has GERD and possible gastroparesis. She feels the pantoprazole has helped. She has had a runny nose over the last few days - had a nosebleed. She feels she might be fighting a cold.  She has been on lasix that has helped with her LE swelling. She is on ozempic and has lost 212-> 172.      Previous pulmonary history: She has no history of recurrent infections, or lung disease as a child.  She had .    Inhalers/nebulized medications: none     Hospitalization History: She has not been hospitalized over the last year for breathing related problem.    Sleep history: MICHAEL - not able to tolerate CPAP. Interested in advanced therapies.       ALLERGIES AND MEDICATIONS     ALLERGIES  Allergies   Allergen Reactions    Other Rash     Novocaine Solution, reaction rash     Penicillins Rash and Hives    Procaine Rash and Hives       MEDICATIONS  Current Outpatient Medications   Medication Sig Dispense Refill    aspirin 81 mg EC tablet Take 1 tablet (81 mg) by mouth once daily. 30 tablet  11    atorvastatin (Lipitor) 80 mg tablet 1 TAB(S) ORALLY ONCE A DAY -.MEDS TO BEDS 90 tablet 1    blood pressure monitor kit 1 each once daily. Use once daily to check blood pressure 1 kit 0    blood sugar diagnostic (OneTouch Ultra Test) strip USE 1 STRIP(S) TO TEST BLOOD SUGAR 3 TIMES A  strip 3    brimonidine (AlphaGAN) 0.2 % ophthalmic solution Administer 1 drop into both eyes 2 times a day. PATIENT NEEDS PCP APPT FOR FURTHER REFILLS 30 mL 3    carvedilol (Coreg) 12.5 mg tablet Take 1 tablet (12.5 mg) by mouth 2 times daily (morning and late afternoon). 60 tablet 11    cholecalciferol (Vitamin D3) 10 MCG (400 UNIT) tablet Take 1 tablet (10 mcg) by mouth once daily. 30 tablet 11    dapagliflozin propanediol (Farxiga) 10 mg Take 1 tablet (10 mg) by mouth once daily. 30 tablet 11    diclofenac sodium (Voltaren) 1 % gel Apply 4.5 inches (4 g) topically 2 times a day. 800 g 2    diphenhydramine/Maalox/lidocaine 1:1:1 Swish and swallow 10 mL every 8 hours as needed for epigastric pain 120 mL 0    DULoxetine (Cymbalta) 30 mg DR capsule Take 1 capsule (30 mg) by mouth once daily in the morning AND 2 capsules (60 mg) once daily at bedtime. Do not crush or chew.. 60 capsule 11    enoxaparin (Lovenox) 40 mg/0.4 mL syringe Inject 0.4 mL (40 mg) under the skin once daily. 12 mL 3    finerenone (Kerendia) 10 mg tablet tablet Take 1 tablet (10 mg) by mouth once daily. 30 tablet 11    FreeStyle Filipe 2 Sensor kit Apply 1 sensor to the back of the upper arm. Change sensor every 14 days. 2 each 11    FreeStyle Filipe reader (FreeStyle Filipe 2 [place]) misc Use as instructed 1 each 0    furosemide (Lasix) 20 mg tablet Take 1 tablet (20 mg) by mouth once daily. 90 tablet 2    GaviLyte-C 240-22.72-6.72 -5.84 gram solution TAKE 4,000 ML BY MOUTH 1 TIME FOR 1 DOSE. DRINK 8 OZ EVERY 10-15 MINUTES UNTIL SOLUTION IS GONE      glucagon 3 mg/actuation spray,non-aerosol Administer 1 spray into affected nostril(s) if needed (please   apply into a single nostril for blood sugar <60). 2 each 0    hyoscyamine (Anaspaz) 0.125 mg disintegrating tablet Take 1 tablet (0.125 mg) by mouth every 8 hours if needed (cramping or nausea). 30 tablet 0    hyoscyamine (Anaspaz, Levsin) 0.125 mg tablet Take 1 tablet (0.125 mg) by mouth every 8 hours if needed for cramping. 30 tablet 0    insulin lispro (HumaLOG KwikPen Insulin) 200 unit/mL (3 mL) insulin pen pen Inject 22 Units under the skin 3 times daily (morning, midday, late afternoon). Take as directed per insulin instructions. 11 each 3    isosorbide mononitrate ER (Imdur) 60 mg 24 hr tablet TAKE 1 TABLET BY MOUTH EVERY DAY IN THE MORNING 90 tablet 1    latanoprost (Xalatan) 0.005 % ophthalmic solution Administer 1 drop into both eyes once daily at bedtime. 2.5 mL 3    losartan (Cozaar) 50 mg tablet Take 1 tablet (50 mg) by mouth early in the morning..      melatonin 5 mg tablet Take 1 tablet (5 mg) by mouth as needed at bedtime for sleep. 30 tablet 11    metFORMIN XR (Glucophage-XR) 500 mg 24 hr tablet Take 1 tablet (500 mg) by mouth once daily in the evening. Take with meals. Do not crush, chew, or split. 30 tablet 11    multivitamin tablet Take 1 tablet by mouth once daily. 30 tablet 11    nitroglycerin (Nitrostat) 0.4 mg SL tablet PLACE 1 TABLET UNDER THE TONGUE EVERY 5 MINUTES FOR UP TO 3 DOSES AS NEEDED FOR CHEST PAIN.CALL 911 IF PAIN PERSISTS.      nystatin (Mycostatin) 100,000 unit/gram powder Apply topically.      pantoprazole (ProtoNix) 40 mg EC tablet Take 1 tablet (40 mg) by mouth once daily in the morning. Take before meals. 30 tablet 5    scopolamine (Transderm-Scop) 1 mg over 3 days patch 3 day Place 1 patch behind the ear and leave on for  72 hours. Change patch every 3rd day. 24 patch 1    semaglutide 2 mg/dose (8 mg/3 mL) pen injector Inject 2 mg under the skin 1 (one) time per week. 3 mL 11    urea (Carmol) 20 % cream Apply 1 Application topically 2 times a day.       No current  facility-administered medications for this visit.         PAST HISTORY     PAST MEDICAL HISTORY  - HFpEF (50-55% 9/2023)  - CAD s/p PCI w/ stent x 3 (2016, 2018) and STEMI (9/2023, no stents placed)  - TIA 9/2023  - SVC thrombus on Lovenox, remote DVT  - IDDM2  - HLD  - CKD  - MASLD  - MICHAEL     PAST SURGICAL HISTORY  Past Surgical History:   Procedure Laterality Date    BREAST BIOPSY Left     2007    CARDIAC CATHETERIZATION N/A 7/5/2024    Procedure: Left And Right Heart Cath, Without LV;  Surgeon: Loki Donis MD;  Location: [place] [address] Cardiac Cath Lab;  Service: Cardiovascular;  Laterality: N/A;    CATARACT EXTRACTION  03/18/2015    Cataract Surgery    CORONARY ANGIOPLASTY WITH STENT PLACEMENT  03/06/2017    Cath Stent Placement    CT ANGIO AORTA AND BILATERAL ILIOFEMORAL RUN OFF INCLUDING WITHOUT CONTRAST IF PERFORMED  08/08/2022    CT AORTA AND BILATERAL ILIOFEMORAL RUNOFF ANGIOGRAM W AND/OR WO IV CONTRAST 8/8/2022 [facility] [place] [facility]    MR HEAD ANGIO WO IV CONTRAST  08/16/2013    MR HEAD ANGIO WO IV CONTRAST 8/16/2013 [facility] [place] [facility]    MR NECK ANGIO WO IV CONTRAST  08/16/2013    MR NECK ANGIO WO IV CONTRAST 8/16/2013 [facility] [place] [facility]    RETINAL DETACHMENT SURGERY  03/18/2015    Repair Of Retinal Detachment       IMMUNIZATION HISTORY  Immunization History   Administered Date(s) Administered    Flu vaccine (IIV4), preservative free *Check age/dose* 10/29/2020    Flu vaccine, trivalent, preservative free, age 6 months and greater (Fluarix/Fluzone/Flulaval) 09/24/2024    Hepatitis A vaccine, pediatric/adolescent (HAVRIX, VAQTA) 05/19/2017    Hepatitis B vaccine, adult *Check Product/Dose* 02/07/2008, 09/23/2008    Influenza, Unspecified 10/05/2012, 10/24/2013, 10/06/2014, 02/19/2016, 11/08/2017, 10/16/2018, 10/03/2019    Influenza, seasonal, injectable 12/09/2011    Pfizer Purple Cap SARS-CoV-2 04/24/2021, 05/15/2021    Pneumococcal conjugate vaccine, 13-valent (PREVNAR 13) 02/19/2016     Pneumococcal conjugate vaccine, 20-valent (PREVNAR 20) 10/03/2024    Pneumococcal polysaccharide vaccine, 23-valent, age 2 years and older (PNEUMOVAX 23) 11/07/2014    RSV, 60 Years And Older (AREXVY) 10/03/2024    Td vaccine, age 7 years and older (TENIVAC) 12/26/2005    Tdap vaccine, age 7 year and older (BOOSTRIX, ADACEL) 12/09/2011    Zoster vaccine, recombinant, adult (SHINGRIX) 01/02/2020    Zoster, live 07/20/2016       SOCIAL HISTORY  Smoking: never  Alcohol: here and there   Illicit drugs:  none     OCCUPATIONAL/ENVIRONMENTAL HISTORY  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      FAMILY HISTORY  FAMILY HISTORY: No family Hx of lung disease or lung cancer.    RESULTS/DATA     Pulmonary Function Test Results     PFTs: 8/1/24 - FEV1/FVC 0.97/ FEV1 1.73 (89%)/ FVC 1.77 (73%)/ TLC 3.30 (74%)/ DLCO 74%       Chest Radiograph     XR chest 2 views 10/27/2024  Impression  No active pulmonary disease.  Signed by Shane Leslie MD      No orders to display        Chest CT Scan     CT chest:   1/8/23 - IMPRESSION:  There is a patulous and fluid-filled esophagus with mildly asymmetric  soft tissue thickening of the distal esophagus and a small hiatal  hernia. Correlate with known symptoms of dysphagia. Further  evaluation with fluoroscopic barium swallow suggested to evaluate for dysmotility or mucosal mass.  There is a new discrete hypoattenuating tubular/cylindrical filling  defect in the otherwise contrast filled SVC, that was not present on  the prior examination and is more hypoattenuating indiscrete than is usually seen with contrast mixing with unopacified blood alone, and is suspicious for nonocclusive free-floating thrombus, possibly emanating from the azygous and or right brachiocephalic vein (see  series 205, images 93-97; series 206, images 62-64). Correlation with hypercoagulable workup is suggested and further evaluation with MR venography of the chest or conventional venography should be  considered as indicated. No pulmonary embolus is seen to the  segmental level on the current exam noting that technique is not optimized for PE detection. This finding that was discrepant with the  radiology resident preliminary interpretation was discussed by Dr. Carias with Jose Luis Oliveira of the ED at 9:00 p.m. on 01/08/2023.  Severe coronary artery calcifications. If the patient has associated  symptoms of coronary artery disease, recommend management as per  chest pain guidelines (e.g.  https://doi.org/10.1161/CIR.9538949901223195). If the patient is  asymptomatic, consider reviewing modifiable cardiovascular risk  factors and managing as per guidelines for primary prevention (e.g.  Https://doi.org/10.1161/CIR.7027872451754756).  Additional findings as discussed above. Please see separately  dictated report for contemporaneous CT of the abdomen and pelvis for  details regarding the abdomen visible on the current exam.    9/17/23 - Impression  No acute pulmonary embolism   No acute pulmonary process.       6/6/24 - IMPRESSION:  1. No definite CT evidence of an acute pulmonary embolus extending to  level segmental branch vessels.  2. No focal pulmonary consolidation, pleural effusions or  pneumothorax.  3. There is opacification of the SVC. Mild low-density regions  within the SVC may represent mixing artifact from contrast although  thrombus cannot be excluded..      Echocardiogram     Echo: 6/7/24 - Left ventricular systolic function is normal with a 55-60% estimated ejection fraction.  2. Spectral Doppler shows an impaired relaxation pattern of left ventricular diastolic filling.  3. There is an elevated mean left atrial pressure.  4. There is no evidence of left ventricular hypertrophy.  5. The pulmonary artery is not well visualized.  RA normal size, RV normal size and funciton     Other testing/ Labs      Eosinophils Absolute (x10*3/uL)   Date Value   10/27/2024 0.20   10/15/2024 0.12   10/04/2024 0.23     No  "results found for: \"IGE\"    REVIEW OF SYSTEMS     REVIEW OF SYSTEMS  Review of Systems   Constitutional:  Positive for fatigue. Negative for fever.   HENT:  Negative for congestion, postnasal drip, rhinorrhea, sinus pressure and voice change.    Eyes:  Negative for pain and visual disturbance.   Cardiovascular:  Positive for chest pain. Negative for palpitations and leg swelling.   Gastrointestinal:  Positive for vomiting. Negative for abdominal pain, diarrhea and nausea.   Endocrine: Negative for cold intolerance and heat intolerance.   Musculoskeletal:  Positive for arthralgias and back pain. Negative for joint swelling and myalgias.   Skin:  Negative for rash.   Neurological:  Positive for headaches. Negative for dizziness, weakness and numbness.   Psychiatric/Behavioral:  Negative for agitation. The patient is not nervous/anxious.          PHYSICAL EXAM     VITAL SIGNS: There were no vitals taken for this visit.     CURRENT WEIGHT: [unfilled]  BMI: [unfilled]  PREVIOUS WEIGHTS:  Wt Readings from Last 3 Encounters:   11/15/24 78.9 kg (174 lb)   11/11/24 80.8 kg (178 lb 3.2 oz)   10/27/24 78 kg (172 lb)       Physical Exam  Vitals reviewed.   Constitutional:       General: She is not in acute distress.     Appearance: Normal appearance. She is obese. She is not ill-appearing or toxic-appearing.   HENT:      Head: Normocephalic.      Nose: No rhinorrhea.   Cardiovascular:      Rate and Rhythm: Normal rate and regular rhythm.      Heart sounds: Normal heart sounds.   Pulmonary:      Effort: Pulmonary effort is normal. No respiratory distress.      Breath sounds: Normal breath sounds. No stridor. No wheezing, rhonchi or rales.   Abdominal:      General: Abdomen is flat.   Musculoskeletal:         General: Normal range of motion.      Right lower leg: No edema.      Left lower leg: No edema.   Skin:     General: Skin is warm and dry.      Nails: There is no clubbing.   Neurological:      General: No focal deficit present. "      Mental Status: She is alert and oriented to person, place, and time.   Psychiatric:         Mood and Affect: Mood normal.         Behavior: Behavior normal.         Judgment: Judgment normal.         ASSESSMENT/PLAN     Dyspnea on exertion: CAD with stents, HFpEF - diastolic dysfunction. PFTs with mild restriction - likely in the setting of fluid/ obesity. She continues to lose weight. She did not do cardiac rehab after MI/ stents   - likely related to CAD/ diastolic dysfunction - discuss cardiac rehab with cardiologist     2. MICHAEL: unable to tolerate CPAP   - interested in advanced therapies - referral to sleep medicine     Thank you for visiting the Pulmonary clinic today!   Return to clinic - as needed   Geeta Robins CNP  My office -  [phone]- Raghu is my . Bonnie is my nurse [phone].   Radiology scheduling [phone]   Appointment scheduling [phone]   Pulmonary function testing - [phone]

## 2024-12-10 ENCOUNTER — OFFICE VISIT (OUTPATIENT)
Dept: GYNECOLOGIC ONCOLOGY | Facility: HOSPITAL | Age: 64
End: 2024-12-10
Payer: COMMERCIAL

## 2024-12-10 ENCOUNTER — PHARMACY VISIT (OUTPATIENT)
Dept: PHARMACY | Facility: CLINIC | Age: 64
End: 2024-12-10
Payer: COMMERCIAL

## 2024-12-10 VITALS
SYSTOLIC BLOOD PRESSURE: 139 MMHG | BODY MASS INDEX: 36.36 KG/M2 | HEART RATE: 85 BPM | DIASTOLIC BLOOD PRESSURE: 81 MMHG | WEIGHT: 180 LBS | TEMPERATURE: 97.5 F

## 2024-12-10 DIAGNOSIS — D25.1 INTRAMURAL AND SUBMUCOUS LEIOMYOMA OF UTERUS: Primary | ICD-10-CM

## 2024-12-10 DIAGNOSIS — I82.210 SUPERIOR VENA CAVA THROMBOSIS (MULTI): ICD-10-CM

## 2024-12-10 DIAGNOSIS — I50.9 CHRONIC CONGESTIVE HEART FAILURE, UNSPECIFIED HEART FAILURE TYPE: ICD-10-CM

## 2024-12-10 DIAGNOSIS — N93.9 ABNORMAL UTERINE BLEEDING: ICD-10-CM

## 2024-12-10 DIAGNOSIS — D25.0 INTRAMURAL AND SUBMUCOUS LEIOMYOMA OF UTERUS: Primary | ICD-10-CM

## 2024-12-10 DIAGNOSIS — Z86.718 HISTORY OF DVT (DEEP VEIN THROMBOSIS): Chronic | ICD-10-CM

## 2024-12-10 PROCEDURE — 99214 OFFICE O/P EST MOD 30 MIN: CPT | Performed by: OBSTETRICS & GYNECOLOGY

## 2024-12-10 PROCEDURE — 3061F NEG MICROALBUMINURIA REV: CPT | Performed by: OBSTETRICS & GYNECOLOGY

## 2024-12-10 PROCEDURE — 1160F RVW MEDS BY RX/DR IN RCRD: CPT | Performed by: OBSTETRICS & GYNECOLOGY

## 2024-12-10 PROCEDURE — 1159F MED LIST DOCD IN RCRD: CPT | Performed by: OBSTETRICS & GYNECOLOGY

## 2024-12-10 PROCEDURE — RXMED WILLOW AMBULATORY MEDICATION CHARGE

## 2024-12-10 PROCEDURE — 3051F HG A1C>EQUAL 7.0%<8.0%: CPT | Performed by: OBSTETRICS & GYNECOLOGY

## 2024-12-10 PROCEDURE — 4010F ACE/ARB THERAPY RXD/TAKEN: CPT | Performed by: OBSTETRICS & GYNECOLOGY

## 2024-12-10 PROCEDURE — 3048F LDL-C <100 MG/DL: CPT | Performed by: OBSTETRICS & GYNECOLOGY

## 2024-12-10 PROCEDURE — 3075F SYST BP GE 130 - 139MM HG: CPT | Performed by: OBSTETRICS & GYNECOLOGY

## 2024-12-10 PROCEDURE — 1125F AMNT PAIN NOTED PAIN PRSNT: CPT | Performed by: OBSTETRICS & GYNECOLOGY

## 2024-12-10 PROCEDURE — 3079F DIAST BP 80-89 MM HG: CPT | Performed by: OBSTETRICS & GYNECOLOGY

## 2024-12-10 ASSESSMENT — ENCOUNTER SYMPTOMS
VOMITING: 1
ABDOMINAL PAIN: 1
UNEXPECTED WEIGHT CHANGE: 0
HEADACHES: 1
NAUSEA: 1
CHILLS: 0
CONSTIPATION: 1
BLOOD IN STOOL: 1
FEVER: 0
LEG SWELLING: 0
DIARRHEA: 0
TROUBLE SWALLOWING: 1
EYE PROBLEMS: 1
SHORTNESS OF BREATH: 1
BRUISES/BLEEDS EASILY: 1
ABDOMINAL DISTENTION: 0
PALPITATIONS: 0

## 2024-12-10 ASSESSMENT — PAIN SCALES - GENERAL: PAINLEVEL_OUTOF10: 7

## 2024-12-10 NOTE — PROGRESS NOTES
Patient ID: Nelida Mata is a 64 y.o. female.  Referring Physician: No referring provider defined for this encounter.  Primary Care Provider: Betty Boone MD      Subjective    63 y/o female with pmhx of HFpEF (50-55% 2023), CAD s/p PCI w/ stent x 3 (, ) and STEMI (2023, no stents placed), TIA 2023, SVC thrombus on Lovenox, remote DVT, IDDM2, HLD, CKD, MICHAEL, NAFLD and obesity presenting for evaluation of uterine fibroids and heavy uterine bleeding. She reports a year and a half of constant, cramping lower abdominal pain without radiation along with recurrent heavy uterine bleeding and spotting every 3 months. Tylenol does not help the pain. Percoset alleviates the pain but she has not taken it in a while. She reports and episode a month ago of uterine bleeding, spotting, hematemesis, and hematochezia for which she had to be hospitalized and received a transfusion.  Since then, she has had no changes in symptom quality and severity.   She had previously been scheduled for a hysterectomy last year but was postponed for SVC thrombus 1-2 weeks prior to durgery.   Also reports easy bruising and itching along with a skin rash. Also says that she has had a 32 pound weight loss on Ozemipic.     Gyn history:    Postmenopausal, previous reported history of heavy menstrual bleeding    Social history:   Lives at home with boyfriend  Does not smoke      Review of Systems   Constitutional:  Negative for chills, fever and unexpected weight change.   HENT:   Positive for trouble swallowing.    Eyes:  Positive for eye problems.   Respiratory:  Positive for shortness of breath.    Cardiovascular:  Negative for chest pain, leg swelling and palpitations.   Gastrointestinal:  Positive for abdominal pain, blood in stool, constipation, nausea and vomiting. Negative for abdominal distention and diarrhea.   Skin:  Positive for itching and rash.   Neurological:  Positive for headaches.   Hematological:   Bruises/bleeds easily.        Objective   BSA: 1.84 meters squared  /81 (BP Location: Right arm, Patient Position: Sitting, BP Cuff Size: Adult)   Pulse 85   Temp 36.4 °C (97.5 °F)   Wt 81.6 kg (180 lb)   BMI 36.36 kg/m²      Family History   Problem Relation Name Age of Onset    Diabetes Mother      Glaucoma Father      Hypertension Other FAMILY     Uterine cancer Neg Hx      Ovarian cancer Neg Hx      Colon cancer Neg Hx      Breast cancer Neg Hx         Nelida Mata  reports that she has never smoked. She has never been exposed to tobacco smoke. She has never used smokeless tobacco.  She  reports current alcohol use.  She  reports no history of drug use.    Physical Exam  Constitutional:       Appearance: She is obese.   HENT:      Head: Normocephalic and atraumatic.      Nose: Nose normal.      Mouth/Throat:      Mouth: Mucous membranes are moist.   Cardiovascular:      Rate and Rhythm: Normal rate.      Heart sounds: Murmur heard.   Pulmonary:      Breath sounds: Normal breath sounds.   Abdominal:      Tenderness: There is abdominal tenderness.      Hernia: A hernia is present.   Musculoskeletal:         General: Swelling present.   Skin:     Findings: Rash present.   Neurological:      General: No focal deficit present.     CTAP 10/27/24 shows markedly enlarged multi fibroid uterus which exerts mass effect on the  surrounding structures.  Similar to the prior exam.  Lobulated soft tissue densities in the right anterior subcutaneous fat  with some associated air presumably reflecting injection sites.  Approximately 3 cm umbilical hernia defect with a large fat-containing  hernia sac extending inferiorly.    Performance Status:  Symptomatic; fully ambulatory    Assessment/Plan    63 y/o female with pmhx of HFpEF (50-55% 9/2023), CAD s/p PCI w/ stent x 3 (2016, 2018) and STEMI (9/2023, no stents placed), TIA 9/2023, SVC thrombus on Lovenox, remote DVT, IDDM2, HLD, CKD, MICHAEL, NAFLD and obesity  presenting for evaluation of uterine fibroids. Discussed heavy menstrual bleeding may be due to benign fibroids vs uterine or cervical malignancy. Discussed risks and benefits of hysterectomy and that the surgery will have to be done through open incision given location and size of the uterine mass. Patient understands the risks and benefits of surgery and would like to proceed with hysterectomy.    Oncology History    No history exists.   Herb Andres, MS3 Medical Student    I, or a resident under my supervision, was present with the medical student who participated in the documentation of this note.  I have personally seen and examined the patient and performed the medical decision-making components. I have reviewed the medical student documentation and/or resident documentation and verified the findings in the note as written with additions or exceptions as stated in the body of the note.    Reviewed natural history of abnormal uterine bleeding and fibroid uterus.  Discussed differential diagnosis including both malignant and nonmalignant causes.  Discussed surgical intervention in the context of her significant medical comorbidities.  She wishes to go ahead with surgery to alleviate pain and for definitive diagnosis.  Will be coordinated case with general gynecology.  She will require preadmission testing  Risks of surgery were extensively reviewed including risks of infection, bleeding , damage nearby structures, or medical complications/event related to her medical comorbidities.

## 2024-12-11 ENCOUNTER — TELEPHONE (OUTPATIENT)
Dept: PRIMARY CARE | Facility: CLINIC | Age: 64
End: 2024-12-11
Payer: COMMERCIAL

## 2024-12-11 DIAGNOSIS — D25.1 INTRAMURAL AND SUBMUCOUS LEIOMYOMA OF UTERUS: Primary | ICD-10-CM

## 2024-12-11 DIAGNOSIS — D25.0 INTRAMURAL AND SUBMUCOUS LEIOMYOMA OF UTERUS: Primary | ICD-10-CM

## 2024-12-11 PROCEDURE — RXMED WILLOW AMBULATORY MEDICATION CHARGE

## 2024-12-11 NOTE — TELEPHONE ENCOUNTER
Pt called requesting refill of enoxaparin to [facility]. Noted available refills at [facility] on pt chart. Call placed to clarify pt need. Pt informed nurse she prefers to have sent to [facility] for home delivery. Nurse informed pt of 72 hr policy for refills, and to inform will contact pharmacy r/t transfer inquiry. Pt confirmed understanding. Call placed to [facility] to inquire if transfer possible. Pharmacist confirms will call [facility] in [place] to have order transferred, and will F/U with pt to set up account for home delivery.

## 2024-12-12 ENCOUNTER — PHARMACY VISIT (OUTPATIENT)
Dept: PHARMACY | Facility: CLINIC | Age: 64
End: 2024-12-12
Payer: COMMERCIAL

## 2024-12-12 PROCEDURE — RXMED WILLOW AMBULATORY MEDICATION CHARGE

## 2024-12-12 RX ORDER — ENOXAPARIN SODIUM 100 MG/ML
INJECTION SUBCUTANEOUS
Qty: 12 ML | Refills: 3 | OUTPATIENT
Start: 2024-11-01

## 2024-12-17 ENCOUNTER — PHARMACY VISIT (OUTPATIENT)
Dept: PHARMACY | Facility: CLINIC | Age: 64
End: 2024-12-17
Payer: COMMERCIAL

## 2024-12-18 ENCOUNTER — APPOINTMENT (OUTPATIENT)
Dept: DERMATOLOGY | Facility: CLINIC | Age: 64
End: 2024-12-18
Payer: COMMERCIAL

## 2024-12-26 DIAGNOSIS — Z79.4 CONTROLLED TYPE 2 DIABETES MELLITUS WITHOUT COMPLICATION, WITH LONG-TERM CURRENT USE OF INSULIN (MULTI): ICD-10-CM

## 2024-12-26 DIAGNOSIS — E11.9 CONTROLLED TYPE 2 DIABETES MELLITUS WITHOUT COMPLICATION, WITH LONG-TERM CURRENT USE OF INSULIN (MULTI): ICD-10-CM

## 2024-12-27 ENCOUNTER — APPOINTMENT (OUTPATIENT)
Dept: PAIN MEDICINE | Facility: CLINIC | Age: 64
End: 2024-12-27
Payer: COMMERCIAL

## 2024-12-30 PROCEDURE — RXMED WILLOW AMBULATORY MEDICATION CHARGE

## 2024-12-31 RX ORDER — DAPAGLIFLOZIN 10 MG/1
10 TABLET, FILM COATED ORAL DAILY
Qty: 30 TABLET | Refills: 11 | Status: SHIPPED | OUTPATIENT
Start: 2024-12-31 | End: 2025-12-31

## 2025-01-02 ENCOUNTER — PHARMACY VISIT (OUTPATIENT)
Dept: PHARMACY | Facility: CLINIC | Age: 65
End: 2025-01-02
Payer: COMMERCIAL

## 2025-01-02 NOTE — PROGRESS NOTES
I saw and evaluated the patient. I personally obtained the key and critical portions of the history and physical exam or was physically present for key and critical portions performed by the resident/fellow. I reviewed the resident/fellow's documentation and discussed the patient with the resident/fellow. I agree with the resident/fellow's medical decision making as documented in the note.    Connie Ken MD

## 2025-01-07 ENCOUNTER — OFFICE VISIT (OUTPATIENT)
Dept: ORTHOPEDIC SURGERY | Facility: CLINIC | Age: 65
End: 2025-01-07
Payer: COMMERCIAL

## 2025-01-07 DIAGNOSIS — M17.0 OSTEOARTHRITIS OF BOTH KNEES, UNSPECIFIED OSTEOARTHRITIS TYPE: Primary | ICD-10-CM

## 2025-01-07 PROCEDURE — 20610 DRAIN/INJ JOINT/BURSA W/O US: CPT | Mod: 50 | Performed by: ORTHOPAEDIC SURGERY

## 2025-01-07 PROCEDURE — 2500000004 HC RX 250 GENERAL PHARMACY W/ HCPCS (ALT 636 FOR OP/ED): Performed by: ORTHOPAEDIC SURGERY

## 2025-01-07 RX ORDER — LIDOCAINE HYDROCHLORIDE 20 MG/ML
2 INJECTION, SOLUTION INFILTRATION; PERINEURAL
Status: COMPLETED | OUTPATIENT
Start: 2025-01-07 | End: 2025-01-07

## 2025-01-07 RX ORDER — METHYLPREDNISOLONE ACETATE 40 MG/ML
40 INJECTION, SUSPENSION INTRA-ARTICULAR; INTRALESIONAL; INTRAMUSCULAR; SOFT TISSUE
Status: COMPLETED | OUTPATIENT
Start: 2025-01-07 | End: 2025-01-07

## 2025-01-07 RX ADMIN — LIDOCAINE HYDROCHLORIDE 2 ML: 20 INJECTION, SOLUTION INFILTRATION; PERINEURAL at 13:07

## 2025-01-07 RX ADMIN — METHYLPREDNISOLONE ACETATE 40 MG: 40 INJECTION, SUSPENSION INTRALESIONAL; INTRAMUSCULAR; INTRASYNOVIAL; SOFT TISSUE at 13:07

## 2025-01-07 NOTE — PROGRESS NOTES
L Inj/Asp: bilateral knee on 1/7/2025 1:07 PM  Indications: pain  Details: 21 G needle, anteromedial approach  Medications (Right): 2 mL lidocaine 20 mg/mL (2 %); 40 mg methylPREDNISolone acetate 40 mg/mL  Medications (Left): 2 mL lidocaine 20 mg/mL (2 %); 40 mg methylPREDNISolone acetate 40 mg/mL  Procedure, treatment alternatives, risks and benefits explained, specific risks discussed. Consent was given by the patient. Immediately prior to procedure a time out was called to verify the correct patient, procedure, equipment, support staff and site/side marked as required. Patient was prepped and draped in the usual sterile fashion.

## 2025-01-09 ENCOUNTER — CLINICAL SUPPORT (OUTPATIENT)
Dept: PREADMISSION TESTING | Facility: HOSPITAL | Age: 65
End: 2025-01-09
Payer: COMMERCIAL

## 2025-01-09 NOTE — CPM/PAT H&P
CPM/PAT Evaluation       Name: Nelida Mata (Nelida Mata)  /Age: 1960/64 y.o.     { PAT Visit Type:77579}      Chief Complaint: ***    HPI  Nelida Mata is scheduled for laparotomy, abdominal hysterectomy, bilateral salpingo-oophorectomy - Bilateral, Dr. Valenzuela to assist with Dr. Davila on 25.  Past Medical History:   Diagnosis Date    (HFpEF) heart failure with preserved ejection fraction     (50-55% 2023)    Abnormal uterine and vaginal bleeding, unspecified     Abnormal uterine bleeding    Acute vaginitis 2018    Vaginitis    Alcohol abuse, in remission     History of alcohol abuse    Anxiety     Arthritis     Polyarthritis    Blindness     left eye    Cerebral vascular accident (Multi) 2023    Changes in skin texture 04/10/2018    Thickening, skin    Chest pain, unspecified 2018    Chest pain at rest    Chronic pain disorder     CKD (chronic kidney disease)     stage 4    Coronary artery disease     s/p PCI w/ stent x 3 (, )    Depression     Disorder of pigmentation, unspecified 2018    Discoloration of skin of lower leg    Fibromyalgia     Generalized contraction of visual field, left eye 2017    Generalized contraction of visual field of left eye    GERD (gastroesophageal reflux disease)     Glaucoma     Headache, unspecified 04/10/2018    Headache    Hereditary and idiopathic neuropathy, unspecified 04/10/2018    Idiopathic peripheral neuropathy    Hydroureter 2018    Hydroureter on left    Hyperlipidemia, unspecified     Dyslipidemia    Hypertension     Hypokalemia 2018    Hypokalemia    Insomnia, unspecified     Lipoma     Localized edema 2018    Bilateral edema of lower extremity    Macula scars of posterior pole (postinflammatory) (post-traumatic), left eye 04/10/2018    Macula scar of posterior pole of left eye    NAFLD (nonalcoholic fatty liver disease)     NSTEMI (non-ST elevated myocardial infarction)  (Multi) 06/2024    Obesity     Obstructive sleep apnea (adult) (pediatric)     not able to tolerate CPAP    Other chest pain 04/10/2018    Chest tightness or pressure    Other fatigue 02/22/2018    Fatigue    Other fecal abnormalities 02/22/2018    Loose stools    Other forms of dyspnea     Dyspnea on exertion- takes breaks when ambulating    Peripheral vascular disease (CMS-HCC)     Polyp of colon 02/22/2018    Colon polyps    Postmenopausal bleeding 04/10/2018    Postmenopausal bleeding    Presence of coronary angioplasty implant and graft 02/22/2018    Presence of drug coated stent in right coronary artery    Presence of intraocular lens 03/18/2015    Pseudophakia of left eye    Psoriasis, unspecified     Repeated falls 02/22/2018    Multiple falls    Retinal hemorrhage, right eye 02/06/2017    Retinal hemorrhage of right eye    Shortness of breath     STEMI (ST elevation myocardial infarction) (Multi) 09/2023    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    SVC thrombus on Lovenox    Traction detachment of retina, right eye 02/06/2017    Retinal detachment, tractional, right eye    Type 2 diabetes mellitus with other skin ulcer (CODE) 02/22/2018    Diabetic leg ulcer    Type 2 diabetes mellitus with proliferative diabetic retinopathy without macular edema, unspecified eye 04/10/2018    Proliferative diabetic retinopathy    Type 2 diabetes mellitus with unspecified diabetic retinopathy without macular edema 02/06/2017    Background diabetic retinopathy    Umbilical hernia without obstruction or gangrene     Umbilical hernia    Unqualified visual loss, both eyes 02/06/2017    Complete loss of vision in visual field    Unstable angina (Multi) 04/10/2018    Unstable angina    Vitamin D deficiency, unspecified     Xerosis cutis 02/22/2018    Xerosis of skin       Past Surgical History:   Procedure Laterality Date    BREAST BIOPSY Left     2007    CARDIAC CATHETERIZATION N/A 07/05/2024    Procedure: Left And Right  Heart Cath, Without LV;  Surgeon: Loki Donis MD;  Location: [place] [address] Cardiac Cath Lab;  Service: Cardiovascular;  Laterality: N/A;    CATARACT EXTRACTION  03/18/2015    Cataract Surgery    CORONARY ANGIOPLASTY WITH STENT PLACEMENT  03/06/2017    Cath Stent Placement    CT ANGIO AORTA AND BILATERAL ILIOFEMORAL RUN OFF INCLUDING WITHOUT CONTRAST IF PERFORMED  08/08/2022    CT AORTA AND BILATERAL ILIOFEMORAL RUNOFF ANGIOGRAM W AND/OR WO IV CONTRAST 8/8/2022 [facility] EMERGENCY LEGACY    MR HEAD ANGIO WO IV CONTRAST  08/16/2013    MR HEAD ANGIO WO IV CONTRAST 8/16/2013 [facility] ANCILLARY LEGACY    MR NECK ANGIO WO IV CONTRAST  08/16/2013    MR NECK ANGIO WO IV CONTRAST 8/16/2013 [facility] ANCILLARY LEGACY    RETINAL DETACHMENT SURGERY  03/18/2015    Repair Of Retinal Detachment    TOE AMPUTATION Right        Patient Sexual activity questions deferred to the physician.    Family History   Problem Relation Name Age of Onset    Diabetes Mother      Glaucoma Father      Hypertension Other FAMILY     Uterine cancer Neg Hx      Ovarian cancer Neg Hx      Colon cancer Neg Hx      Breast cancer Neg Hx         Allergies   Allergen Reactions    Other Rash     Novocaine Solution, reaction rash     Penicillins Rash and Hives    Procaine Rash and Hives       Prior to Admission medications    Medication Sig Start Date End Date Taking? Authorizing Provider   aspirin 81 mg EC tablet Take 1 tablet (81 mg) by mouth once daily. 7/22/24 7/22/25  Betty Boone MD   atorvastatin (Lipitor) 80 mg tablet 1 TAB(S) ORALLY ONCE A DAY -.MEDS TO BEDS 10/30/24   Betty Boone MD   blood pressure monitor kit 1 each once daily. Use once daily to check blood pressure 5/6/24   Kassandra Lopez MD   blood sugar diagnostic (OneTouch Ultra Test) strip USE 1 STRIP(S) TO TEST BLOOD SUGAR 3 TIMES A DAY 5/20/24   Kassandra Lopez MD   brimonidine (AlphaGAN) 0.2 % ophthalmic solution Administer 1 drop into both eyes 2 times a day. PATIENT NEEDS PCP APPT FOR  FURTHER REFILLS 11/4/24   Nadine Terrazas MD   carvedilol (Coreg) 12.5 mg tablet Take 1 tablet (12.5 mg) by mouth 2 times daily (morning and late afternoon). 6/28/24   Destiny Hurst MD PhD   cholecalciferol (Vitamin D3) 10 MCG (400 UNIT) tablet Take 1 tablet (10 mcg) by mouth once daily. 10/3/24 9/28/25  Betty Boone MD   dapagliflozin propanediol (Farxiga) 10 mg Take 1 tablet (10 mg) by mouth once daily. 12/31/24 12/31/25  Nile Lopez MD   diclofenac sodium (Voltaren) 1 % gel Apply 4.5 inches (4 g) topically 2 times a day. 10/3/24 7/30/25  Betty Boone MD   diphenhydramine/Maalox/lidocaine 1:1:1 Swish and swallow 10 mL every 8 hours as needed for epigastric pain 10/29/24   Melinda Marino MD   DULoxetine (Cymbalta) 30 mg DR capsule Take 1 capsule (30 mg) by mouth once daily in the morning AND 2 capsules (60 mg) once daily at bedtime. Do not crush or chew.. 10/3/24 5/31/25  Betty Boone MD   enoxaparin (Lovenox) 40 mg/0.4 mL syringe Inject 0.4 mL (40 mg) under the skin once daily. 10/30/24 2/27/25  Betty Boone MD   enoxaparin (Lovenox) 40 mg/0.4 mL syringe Inject 0.4ml under the skin once daily. 11/1/24   Betty Boone MD   finerenone (Kerendia) 10 mg tablet tablet Take 1 tablet (10 mg) by mouth once daily. 12/5/24 12/5/25  MD Ana SkaggsStyle Filipe 2 Sensor kit Apply 1 sensor to the back of the upper arm. Change sensor every 14 days. 3/8/24   MD Ana SkaggsStyle Filipe reader (FreeStyle Filipe 2 Jaffrey) misc Use as instructed 10/4/23   Marek Miles MD   furosemide (Lasix) 20 mg tablet Take 1 tablet (20 mg) by mouth once daily. 10/3/24   Betty Boone MD   GaviLyte-C 240-22.72-6.72 -5.84 gram solution TAKE 4,000 ML BY MOUTH 1 TIME FOR 1 DOSE. DRINK 8 OZ EVERY 10-15 MINUTES UNTIL SOLUTION IS GONE 7/25/24   Historical Provider, MD   glucagon 3 mg/actuation spray,non-aerosol Administer 1 spray into affected nostril(s) if needed (please  apply into a  single nostril for blood sugar <60). 10/16/24   Betty Boone MD   hyoscyamine (Anaspaz) 0.125 mg disintegrating tablet Take 1 tablet (0.125 mg) by mouth every 8 hours if needed (cramping or nausea). 10/29/24   Melinda Marino MD   hyoscyamine (Anaspaz, Levsin) 0.125 mg tablet Take 1 tablet (0.125 mg) by mouth every 8 hours if needed for cramping. 10/29/24   Melinda Marino MD   insulin lispro (HumaLOG KwikPen Insulin) 200 unit/mL (3 mL) insulin pen pen Inject 22 Units under the skin 3 times daily (morning, midday, late afternoon). Take as directed per insulin instructions. 7/22/24   Betty Boone MD   isosorbide mononitrate ER (Imdur) 60 mg 24 hr tablet TAKE 1 TABLET BY MOUTH EVERY DAY IN THE MORNING 8/17/24   Betty Boone MD   latanoprost (Xalatan) 0.005 % ophthalmic solution Administer 1 drop into both eyes once daily at bedtime. 11/4/24   Nadine Terrazas MD   losartan (Cozaar) 50 mg tablet Take 1 tablet (50 mg) by mouth early in the morning.. 1/6/24   Historical Provider, MD   melatonin 5 mg tablet Take 1 tablet (5 mg) by mouth as needed at bedtime for sleep. 10/3/24   Betty Boone MD   metFORMIN XR (Glucophage-XR) 500 mg 24 hr tablet Take 1 tablet (500 mg) by mouth once daily in the evening. Take with meals. Do not crush, chew, or split. 11/15/24 11/15/25  Michael Hilario MD   multivitamin tablet Take 1 tablet by mouth once daily. 10/3/24 10/3/25  Betty Boone MD   nitroglycerin (Nitrostat) 0.4 mg SL tablet PLACE 1 TABLET UNDER THE TONGUE EVERY 5 MINUTES FOR UP TO 3 DOSES AS NEEDED FOR CHEST PAIN.CALL 911 IF PAIN PERSISTS. 7/20/16   Historical Provider, MD   nystatin (Mycostatin) 100,000 unit/gram powder Apply topically. 11/17/22   Historical Provider, MD   pantoprazole (ProtoNix) 40 mg EC tablet Take 1 tablet (40 mg) by mouth once daily in the morning. Take before meals. 10/3/24 4/1/25  Betty Boone MD   scopolamine (Transderm-Scop) 1 mg over 3 days patch 3 day Place 1 patch  behind the ear and leave on for  72 hours. Change patch every 3rd day. 11/1/24   Melinda Marino MD   semaglutide 2 mg/dose (8 mg/3 mL) pen injector Inject 2 mg under the skin 1 (one) time per week. 4/7/24   Michael Hilario MD   urea (Carmol) 20 % cream Apply 1 Application topically 2 times a day. 4/18/24   Historical Provider, MD MAAME ISABEL Physical Exam     Airway    Testing/Diagnostic:   ECG: 10/27/24  Normal sinus rhythm  Left axis deviation  RSR' or QR pattern in V1 suggests right ventricular conduction delay  Left ventricular hypertrophy with QRS widening ( R in aVL , Machias product )  Abnormal ECG    PFTs: 8/1/24 - FEV1/FVC 0.97/ FEV1 1.73 (89%)/ FVC 1.77 (73%)/ TLC 3.30 (74%)/ DLCO 74%     Cardiac Cath; 7/5/24  Transthoracic Echo; 6/7/24  CONCLUSIONS:   1. Left ventricular systolic function is normal with a 55-60% estimated ejection fraction.   2. Spectral Doppler shows an impaired relaxation pattern of left ventricular diastolic filling.   3. There is an elevated mean left atrial pressure.   4. There is no evidence of left ventricular hypertrophy.   5. The pulmonary artery is not well visualized.    MR CARDIAC W AND WO IV CONTRAST W REGADENOSON STRESS FOR MORPH/FUNCT  AND VALVE DZ;  4/8/2024  IMPRESSION:  1. Normal LV size (EDVi 40 ml/m2) with low-normal systolic function  (LVEF 53%).  2. No regional wall motion abnormalities.  3. Concentric left ventricular hypertrophy.  4. Evidence of inducible myocardial ischemia of the basal-mid  inferoseptal/inferior wall (moderate defect) and basal-mid anterior  wall (mild defect).  5. No definite evidence of myocardial infarction or infiltration on  LGE imaging. There is RV insertion point and mild diffuse septal  fibrosis.  6. Normal RV size (EDVi-41 ml/m2) and systolic function (RVEF50%).      Cardiac Cath; 9/7/23  CONCLUSIONS:  1. Indication: STEMI (high lateral).  2. Ostial medium size diagonal has 90% stenosis with NAT 3 flow (Jailed by 2  layers of stents in LAD).  3. Known occluded RCA (not injected today).  4. Otherwise, mild to moderate left coronary artery disease.    Nuclear Stress Test; 2/14/2020  IMPRESSION:  1. Normal stress myocardial perfusion imaging in response to  pharmacological stress without evidence of stress-induced ischemia or  prior infarction.  2. Normal wall motion with well-maintained left ventricular function.  3. Normal LV size.    Patient Specialist/PCP:   PCP: Betty Boone MD (Resident) LOV 11/14/24 seen for follow-up on diabetes.     Oncology: Maureen Davila MD LOV 12/10/24 presenting for evaluation of uterine fibroids and heavy uterine bleeding.      Pulmonology: Geeta Robins, APRN-CNP LOV 12/4/24seen for an evaluation with concerns of WEST with walking on level ground. She states legs get tired as well as SOB. H/O MICHAEL - not able to tolerate CPAP.   PLAN  Dyspnea on exertion: CAD with stents, HFpEF - diastolic dysfunction. PFTs with mild restriction - likely in the setting of fluid/ obesity. She continues to lose weight. She did not do cardiac rehab after MI/ stents   - likely related to CAD/ diastolic dysfunction - discuss cardiac rehab with cardiologist      2. MICHAEL: unable to tolerate CPAP   - interested in advanced therapies - referral to sleep medicine     Cardiology- Vascular Medicine: Michael Hilario MD LOV 11/15/24 presents for a follow up evaluation in the [facility] program. H/O HFpEF (EF 60-65% 2023), IDDM (A1c 9.0 in 9/2023), CAD s/p PCI w/ stent x3 (2016, 2018), SVC thrombus on Eliquis (dx 1/2023  She has a history of a SVC thrombus for which she is on Eliquis diagnosed in January 2023.  She reported some bleeding recently for which she is currently on Lovenox till she undergoes surgery.  We will have her see vascular medicine involved to sort out her anticoagulation regimen. Vascular Appointment is on 1/10/25 with Dr. Harvey.    Cardiology- Advanced Heart Failure: Destiny Hurst MD LOV  10/4/24 presents for advanced heart failure care.   Risk Stratification faxed to cardiology office on 1/9/25.  Visit Vitals  OB Status Menopausal   Smoking Status Never       DASI Risk Score    No data to display       Caprini DVT Assessment      Flowsheet Row ED to Hosp-Admission (Discharged) from 9/11/2024 in [facility] 59 with Darren Ventura MD and Nadia Medel MD ED to Hosp-Admission (Discharged) from 8/18/2024 in [facility] 54 with Carlos Nagel MD and Reginald Hair MD   DVT Score (IF A SCORE IS NOT CALCULATING, MUST SELECT A BMI TO COMPLETE) 9 filed at 09/11/2024 1629 9 filed at 08/18/2024 1631   BMI (BMI MUST BE CHOSEN) 31-40 (Obesity) filed at 09/11/2024 1629 31-40 (Obesity) filed at 08/18/2024 1631   RETIRED: Current Status -- Swollen legs filed at 08/18/2024 1631   RETIRED: History Congestive heart failure, SVT, DVT/PE filed at 09/11/2024 1629 SVT, DVT/PE filed at 08/18/2024 1631   RETIRED: Age 60-75 years filed at 09/11/2024 1629 60-75 years filed at 08/18/2024 1631          Modified Frailty Index    No data to display       CHADS2 Stroke Risk  Current as of 46 minutes ago        N/A 3 to 100%: High Risk   2 to < 3%: Medium Risk   0 to < 2%: Low Risk     Last Change: N/A          This score determines the patient's risk of having a stroke if the patient has atrial fibrillation.        This score is not applicable to this patient. Components are not calculated.          Revised Cardiac Risk Index    No data to display       Apfel Simplified Score    No data to display       Risk Analysis Index Results This Encounter    No data found in the last 10 encounters.       Prodigy: High Risk  Total Score: 15              Prodigy Age Score      Prodigy CHF score          ARISCAT Score for Postoperative Pulmonary Complications    No data to display       Buenrostro Perioperative Risk for Myocardial Infarction or Cardiac Arrest (MARELY)    No data to  display         Assessment and Plan:    ONLY  Risk Stratification faxed to cardiology office on 1/9/25.  Sabina Seaman RN  Pre-Admission Testing   {[facility] EMBEDDED ASSESSMENT AND PLAN:[id number]}

## 2025-01-10 ENCOUNTER — APPOINTMENT (OUTPATIENT)
Dept: CARDIOLOGY | Facility: HOSPITAL | Age: 65
End: 2025-01-10
Payer: COMMERCIAL

## 2025-01-14 ENCOUNTER — PRE-ADMISSION TESTING (OUTPATIENT)
Dept: PREADMISSION TESTING | Facility: HOSPITAL | Age: 65
End: 2025-01-14
Payer: COMMERCIAL

## 2025-01-14 VITALS
TEMPERATURE: 97.6 F | HEART RATE: 90 BPM | BODY MASS INDEX: 35.08 KG/M2 | WEIGHT: 174 LBS | DIASTOLIC BLOOD PRESSURE: 77 MMHG | HEIGHT: 59 IN | SYSTOLIC BLOOD PRESSURE: 116 MMHG | RESPIRATION RATE: 16 BRPM

## 2025-01-14 DIAGNOSIS — D25.0 INTRAMURAL AND SUBMUCOUS LEIOMYOMA OF UTERUS: Primary | ICD-10-CM

## 2025-01-14 DIAGNOSIS — D25.1 INTRAMURAL AND SUBMUCOUS LEIOMYOMA OF UTERUS: Primary | ICD-10-CM

## 2025-01-14 LAB
ABO GROUP (TYPE) IN BLOOD: NORMAL
ANION GAP SERPL CALC-SCNC: 14 MMOL/L (ref 10–20)
ANTIBODY SCREEN: NORMAL
BUN SERPL-MCNC: 27 MG/DL (ref 6–23)
CALCIUM SERPL-MCNC: 9.9 MG/DL (ref 8.6–10.6)
CHLORIDE SERPL-SCNC: 95 MMOL/L (ref 98–107)
CO2 SERPL-SCNC: 36 MMOL/L (ref 21–32)
CREAT SERPL-MCNC: 1.64 MG/DL (ref 0.5–1.05)
EGFRCR SERPLBLD CKD-EPI 2021: 35 ML/MIN/1.73M*2
ERYTHROCYTE [DISTWIDTH] IN BLOOD BY AUTOMATED COUNT: 13.3 % (ref 11.5–14.5)
GLUCOSE SERPL-MCNC: 164 MG/DL (ref 74–99)
HCT VFR BLD AUTO: 48.7 % (ref 36–46)
HGB BLD-MCNC: 16 G/DL (ref 12–16)
MCH RBC QN AUTO: 29 PG (ref 26–34)
MCHC RBC AUTO-ENTMCNC: 32.9 G/DL (ref 32–36)
MCV RBC AUTO: 88 FL (ref 80–100)
NRBC BLD-RTO: 0 /100 WBCS (ref 0–0)
PLATELET # BLD AUTO: 178 X10*3/UL (ref 150–450)
POTASSIUM SERPL-SCNC: 4.6 MMOL/L (ref 3.5–5.3)
RBC # BLD AUTO: 5.52 X10*6/UL (ref 4–5.2)
RH FACTOR (ANTIGEN D): NORMAL
SODIUM SERPL-SCNC: 140 MMOL/L (ref 136–145)
WBC # BLD AUTO: 6.8 X10*3/UL (ref 4.4–11.3)

## 2025-01-14 PROCEDURE — 36415 COLL VENOUS BLD VENIPUNCTURE: CPT

## 2025-01-14 PROCEDURE — 99204 OFFICE O/P NEW MOD 45 MIN: CPT | Performed by: NURSE PRACTITIONER

## 2025-01-14 PROCEDURE — 86901 BLOOD TYPING SEROLOGIC RH(D): CPT

## 2025-01-14 PROCEDURE — 85027 COMPLETE CBC AUTOMATED: CPT

## 2025-01-14 PROCEDURE — 86923 COMPATIBILITY TEST ELECTRIC: CPT | Mod: MUE

## 2025-01-14 PROCEDURE — 80048 BASIC METABOLIC PNL TOTAL CA: CPT

## 2025-01-14 RX ORDER — CHLORHEXIDINE GLUCONATE 40 MG/ML
SOLUTION TOPICAL DAILY PRN
Qty: 473 ML | Refills: 0 | Status: SHIPPED | OUTPATIENT
Start: 2025-01-14 | End: 2025-01-19

## 2025-01-14 ASSESSMENT — ENCOUNTER SYMPTOMS
NEUROLOGICAL NEGATIVE: 1
VISUAL CHANGE: 1
DYSPNEA WITH EXERTION: 1
NECK NEGATIVE: 1
CONSTITUTIONAL NEGATIVE: 1
ENDOCRINE NEGATIVE: 1
CONSTIPATION: 1
RESPIRATORY NEGATIVE: 1
MUSCULOSKELETAL NEGATIVE: 1

## 2025-01-14 ASSESSMENT — DUKE ACTIVITY SCORE INDEX (DASI)
CAN YOU PARTICIPATE IN STRENOUS SPORTS LIKE SWIMMING, SINGLES TENNIS, FOOTBALL, BASKETBALL, OR SKIING: NO
CAN YOU DO HEAVY WORK AROUND THE HOUSE LIKE SCRUBBING FLOORS OR LIFTING AND MOVING HEAVY FURNITURE: NO
CAN YOU PARTICIPATE IN MODERATE RECREATIONAL ACTIVITIES LIKE GOLF, BOWLING, DANCING, DOUBLES TENNIS OR THROWING A BASEBALL OR FOOTBALL: NO
CAN YOU DO YARD WORK LIKE RAKING LEAVES, WEEDING OR PUSHING A MOWER: NO
CAN YOU TAKE CARE OF YOURSELF (EAT, DRESS, BATHE, OR USE TOILET): YES
CAN YOU HAVE SEXUAL RELATIONS: NO
CAN YOU RUN A SHORT DISTANCE: NO
CAN YOU WALK INDOORS, SUCH AS AROUND YOUR HOUSE: YES
CAN YOU WALK A BLOCK OR TWO ON LEVEL GROUND: NO
TOTAL_SCORE: 10.7
CAN YOU DO LIGHT WORK AROUND THE HOUSE LIKE DUSTING OR WASHING DISHES: YES
DASI METS SCORE: 4.1
CAN YOU CLIMB A FLIGHT OF STAIRS OR WALK UP A HILL: NO
CAN YOU DO MODERATE WORK AROUND THE HOUSE LIKE VACUUMING, SWEEPING FLOORS OR CARRYING GROCERIES: YES

## 2025-01-14 ASSESSMENT — LIFESTYLE VARIABLES: SMOKING_STATUS: NONSMOKER

## 2025-01-14 NOTE — HOSPITAL COURSE
[ ] consent - needs  [x] team  [x] orders    65 yo with pmhx of HFpEF (50-55% 9/2023), CAD s/p PCI w/ stent x 3 (2016, 2018) and STEMI (9/2023, no stents placed), TIA 9/2023, SVC thrombus on Lovenox, remote DVT, IDDM2, HLD, CKD, MICHAEL, and NAFLD, here for GIBSON, BSO in the setting of known fibroid uterus and chronic PMB.    Recently had in office attempt at endometrial biopsy, also with removal of LNG IUD  IUD was placed at time of vaginoscopy in 2018 - cervix located behind pubic symphysis, unable to reach/visualize in the office.    Lab Results   Component Value Date    WBC 6.8 01/14/2025    HGB 16.0 01/14/2025    HCT 48.7 (H) 01/14/2025    MCV 88 01/14/2025     01/14/2025     Lab Results   Component Value Date    GLUCOSE 164 (H) 01/14/2025    CALCIUM 9.9 01/14/2025     01/14/2025    K 4.6 01/14/2025    CO2 36 (H) 01/14/2025    CL 95 (L) 01/14/2025    BUN 27 (H) 01/14/2025    CREATININE 1.64 (H) 01/14/2025     10/2024 CTAP:  Markedly enlarged multi fibroid uterus (24 x 10 x 16 cm ) which exerts mass effect on the surrounding structures.  Similar to the prior exam.  Lobulated soft tissue densities in the right anterior subcutaneous fat  with some associated air presumably reflecting injection sites.  Approximately 3 cm umbilical hernia defect with a large fat-containing hernia sac extending inferiorly.    PMH:  HFpEF (50-55% 9/2023)  CAD s/p PCI w/ stent x 3 (2016, 2018)  STEMI (9/2023, no stents placed)  TIA 9/2023  SVC thrombus on Lovenox, remote DVT  IDDM2  HLD  CKD  MICHAEL  NAFLD   obesity     PSH:  Past Surgical History:   Procedure Laterality Date    BREAST BIOPSY Left     2007    CARDIAC CATHETERIZATION N/A 07/05/2024    Procedure: Left And Right Heart Cath, Without LV;  Surgeon: Loki Donis MD;  Location: [place] [address] Cardiac Cath Lab;  Service: Cardiovascular;  Laterality: N/A;    CATARACT EXTRACTION  03/18/2015    Cataract Surgery    CORONARY ANGIOPLASTY WITH STENT PLACEMENT  03/06/2017    Cath  Stent Placement    CT ANGIO AORTA AND BILATERAL ILIOFEMORAL RUN OFF INCLUDING WITHOUT CONTRAST IF PERFORMED  2022    CT AORTA AND BILATERAL ILIOFEMORAL RUNOFF ANGIOGRAM W AND/OR WO IV CONTRAST 2022 [facility] EMERGENCY LEGACY    MR HEAD ANGIO WO IV CONTRAST  2013    MR HEAD ANGIO WO IV CONTRAST 2013 [facility] ANCILLARY LEGACY    MR NECK ANGIO WO IV CONTRAST  2013    MR NECK ANGIO WO IV CONTRAST 2013 [facility] ANCILLARY LEGACY    RETINAL DETACHMENT SURGERY  2015    Repair Of Retinal Detachment    TOE AMPUTATION Right     VULVA SURGERY       OBGyn history:    Postmenopausal, previous reported history of heavy menstrual bleeding     Social history:   Lives at home with boyfriend  Does not smoke    Family History   Problem Relation Name Age of Onset    Diabetes Mother      Glaucoma Father      Hypertension Other FAMILY     Uterine cancer Neg Hx      Ovarian cancer Neg Hx      Colon cancer Neg Hx      Breast cancer Neg Hx       Meds: ASA, atorvastatin, brimonidine, carvedilol, Vit D3, dapagliflozin, diclofenac, BMX, duloxetine, Lovenox 40 daily, finerenone, lasix 20mg daily, hyoscyamine, insulin lispro 22 TID, isorbide mononitrate, latanoprost, losartan, metformin 500 QPM, MVI, nystatin, pantoprazole, semaglutide, urea topical    Allergies   Allergen Reactions    Other Rash     Novocaine Solution, reaction rash     Penicillins Rash and Hives    Procaine Rash and Hives

## 2025-01-14 NOTE — CPM/PAT H&P
CPM/PAT Evaluation       Name: Nelida Mata (Nelida Mata)  /Age: 1960/64 y.o.     Visit Type:   In-Person       Chief Complaint: Intramural and submucous leiomyoma of uterus    HPI  Patient is a 64-year-old female with uterine fibroids and abnormal uterine bleeding. Pt is being evaluated in CPM in anticipation of a laparotomy, abdominal hysterectomy, and bilateral salpingo-oophorectomy with Dr. Davila on 25 with the assistance of Dr. Erin Valenzuela.  Past Medical History:   Diagnosis Date    (HFpEF) heart failure with preserved ejection fraction     (50-55% 2023)    Abnormal uterine and vaginal bleeding, unspecified     Abnormal uterine bleeding    Acute vaginitis 2018    Vaginitis    Alcohol abuse, in remission     History of alcohol abuse    Anxiety     Arthritis     Polyarthritis    Blindness     left eye    Cerebral vascular accident (Multi) 2023    Changes in skin texture 04/10/2018    Thickening, skin    Chest pain, unspecified 2018    Chest pain at rest    Chronic pain disorder     CKD (chronic kidney disease)     stage 4    Coronary artery disease     s/p PCI w/ stent x 3 (, )    Depression     Disorder of pigmentation, unspecified 2018    Discoloration of skin of lower leg    Fibroid     Fibromyalgia     Generalized contraction of visual field, left eye 2017    Generalized contraction of visual field of left eye    GERD (gastroesophageal reflux disease)     Glaucoma     Headache, unspecified 04/10/2018    Headache    Hereditary and idiopathic neuropathy, unspecified 04/10/2018    Idiopathic peripheral neuropathy    History of blood transfusion     Hydroureter 2018    Hydroureter on left    Hyperlipidemia, unspecified     Dyslipidemia    Hypertension     Hypokalemia 2018    Hypokalemia    Insomnia, unspecified     Lipoma     Localized edema 2018    Bilateral edema of lower extremity    Macula scars of posterior pole  (postinflammatory) (post-traumatic), left eye 04/10/2018    Macula scar of posterior pole of left eye    NAFLD (nonalcoholic fatty liver disease)     NSTEMI (non-ST elevated myocardial infarction) (Multi) 06/2024    Obesity     Obstructive sleep apnea (adult) (pediatric)     not able to tolerate CPAP    Other chest pain 04/10/2018    Chest tightness or pressure    Other fatigue 02/22/2018    Fatigue    Other fecal abnormalities 02/22/2018    Loose stools    Other forms of dyspnea     Dyspnea on exertion- takes breaks when ambulating    Peripheral vascular disease (CMS-HCC)     Polyp of colon 02/22/2018    Colon polyps    Postmenopausal bleeding 04/10/2018    Postmenopausal bleeding    Presence of coronary angioplasty implant and graft 02/22/2018    Presence of drug coated stent in right coronary artery    Presence of intraocular lens 03/18/2015    Pseudophakia of left eye    Psoriasis, unspecified     Repeated falls 02/22/2018    Multiple falls    Retinal hemorrhage, right eye 02/06/2017    Retinal hemorrhage of right eye    Shortness of breath     STEMI (ST elevation myocardial infarction) (Multi) 09/2023    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    SVC thrombus on Lovenox    Traction detachment of retina, right eye 02/06/2017    Retinal detachment, tractional, right eye    Type 2 diabetes mellitus with other skin ulcer (CODE) 02/22/2018    Diabetic leg ulcer    Type 2 diabetes mellitus with proliferative diabetic retinopathy without macular edema, unspecified eye 04/10/2018    Proliferative diabetic retinopathy    Type 2 diabetes mellitus with unspecified diabetic retinopathy without macular edema 02/06/2017    Background diabetic retinopathy    Umbilical hernia without obstruction or gangrene     Umbilical hernia    Unqualified visual loss, both eyes 02/06/2017    Complete loss of vision in visual field    Unstable angina (Multi) 04/10/2018    Unstable angina    Vitamin D deficiency, unspecified      Xerosis cutis 02/22/2018    Xerosis of skin       Past Surgical History:   Procedure Laterality Date    BREAST BIOPSY Left     2007    CARDIAC CATHETERIZATION N/A 07/05/2024    Procedure: Left And Right Heart Cath, Without LV;  Surgeon: Loki Donis MD;  Location: [place] [id number] Cardiac Cath Lab;  Service: Cardiovascular;  Laterality: N/A;    CATARACT EXTRACTION  03/18/2015    Cataract Surgery    CORONARY ANGIOPLASTY WITH STENT PLACEMENT  03/06/2017    Cath Stent Placement    CT ANGIO AORTA AND BILATERAL ILIOFEMORAL RUN OFF INCLUDING WITHOUT CONTRAST IF PERFORMED  08/08/2022    CT AORTA AND BILATERAL ILIOFEMORAL RUNOFF ANGIOGRAM W AND/OR WO IV CONTRAST 8/8/2022 [facility] [place] [facility]    MR HEAD ANGIO WO IV CONTRAST  08/16/2013    MR HEAD ANGIO WO IV CONTRAST 8/16/2013 [facility] [place] [facility]    MR NECK ANGIO WO IV CONTRAST  08/16/2013    MR NECK ANGIO WO IV CONTRAST 8/16/2013 [facility] [place] [facility]    RETINAL DETACHMENT SURGERY  03/18/2015    Repair Of Retinal Detachment    TOE AMPUTATION Right     VULVA SURGERY         Patient Sexual activity questions deferred to the physician.    Family History   Problem Relation Name Age of Onset    Diabetes Mother      Glaucoma Father      Hypertension Other FAMILY     Uterine cancer Neg Hx      Ovarian cancer Neg Hx      Colon cancer Neg Hx      Breast cancer Neg Hx         Allergies   Allergen Reactions    Other Rash     Novocaine Solution, reaction rash     Penicillins Rash and Hives    Procaine Rash and Hives       Prior to Admission medications    Medication Sig Start Date End Date Taking? Authorizing Provider   aspirin 81 mg EC tablet Take 1 tablet (81 mg) by mouth once daily. 7/22/24 7/22/25 Yes Betty Boone MD   atorvastatin (Lipitor) 80 mg tablet 1 TAB(S) ORALLY ONCE A DAY -.MEDS TO BEDS 10/30/24  Yes Betty Boone MD   blood pressure monitor kit 1 each once daily. Use once daily to check blood pressure 5/6/24  Yes Kassandra Lopez MD   blood sugar diagnostic  (OneTouch Ultra Test) strip USE 1 STRIP(S) TO TEST BLOOD SUGAR 3 TIMES A DAY 5/20/24  Yes Kassandra Lopez MD   brimonidine (AlphaGAN) 0.2 % ophthalmic solution Administer 1 drop into both eyes 2 times a day. PATIENT NEEDS PCP APPT FOR FURTHER REFILLS 11/4/24  Yes Nadine Terrazas MD   carvedilol (Coreg) 12.5 mg tablet Take 1 tablet (12.5 mg) by mouth 2 times daily (morning and late afternoon). 6/28/24  Yes Destiny Hurst MD PhD   cholecalciferol (Vitamin D3) 10 MCG (400 UNIT) tablet Take 1 tablet (10 mcg) by mouth once daily. 10/3/24 9/28/25 Yes Betty Boone MD   dapagliflozin propanediol (Farxiga) 10 mg Take 1 tablet (10 mg) by mouth once daily. 12/31/24 12/31/25 Yes Nile Lopez MD   diclofenac sodium (Voltaren) 1 % gel Apply 4.5 inches (4 g) topically 2 times a day. 10/3/24 7/30/25 Yes Betty Boone MD   diphenhydramine/Maalox/lidocaine 1:1:1 Swish and swallow 10 mL every 8 hours as needed for epigastric pain 10/29/24  Yes Melinda Marino MD   DULoxetine (Cymbalta) 30 mg DR capsule Take 1 capsule (30 mg) by mouth once daily in the morning AND 2 capsules (60 mg) once daily at bedtime. Do not crush or chew.. 10/3/24 5/31/25 Yes Betty Boone MD   enoxaparin (Lovenox) 40 mg/0.4 mL syringe Inject 0.4 mL (40 mg) under the skin once daily. 10/30/24 2/27/25 Yes Betty Boone MD   enoxaparin (Lovenox) 40 mg/0.4 mL syringe Inject 0.4ml under the skin once daily. 11/1/24  Yes Betty Boone MD   finerenone (Kerendia) 10 mg tablet tablet Take 1 tablet (10 mg) by mouth once daily. 12/5/24 12/5/25 Yes Michael Hilario MD   FreeStyle Filipe 2 Sensor kit Apply 1 sensor to the back of the upper arm. Change sensor every 14 days. 3/8/24  Yes Michael Hilario MD   FreeStyle Filipe reader (FreeStyle Filipe 2 [place]) misc Use as instructed 10/4/23  Yes Marek Miles MD   furosemide (Lasix) 20 mg tablet Take 1 tablet (20 mg) by mouth once daily. 10/3/24  Yes MD Sonali Osorio  240-22.72-6.72 -5.84 gram solution TAKE 4,000 ML BY MOUTH 1 TIME FOR 1 DOSE. DRINK 8 OZ EVERY 10-15 MINUTES UNTIL SOLUTION IS GONE 7/25/24  Yes Historical Provider, MD   glucagon 3 mg/actuation spray,non-aerosol Administer 1 spray into affected nostril(s) if needed (please  apply into a single nostril for blood sugar <60). 10/16/24  Yes Betty Boone MD   hyoscyamine (Anaspaz) 0.125 mg disintegrating tablet Take 1 tablet (0.125 mg) by mouth every 8 hours if needed (cramping or nausea). 10/29/24  Yes Melinda Marino MD   hyoscyamine (Anaspaz, Levsin) 0.125 mg tablet Take 1 tablet (0.125 mg) by mouth every 8 hours if needed for cramping. 10/29/24  Yes Melinda Marino MD   insulin lispro (HumaLOG KwikPen Insulin) 200 unit/mL (3 mL) insulin pen pen Inject 22 Units under the skin 3 times daily (morning, midday, late afternoon). Take as directed per insulin instructions. 7/22/24  Yes Betty Boone MD   isosorbide mononitrate ER (Imdur) 60 mg 24 hr tablet TAKE 1 TABLET BY MOUTH EVERY DAY IN THE MORNING 8/17/24  Yes Betty Boone MD   latanoprost (Xalatan) 0.005 % ophthalmic solution Administer 1 drop into both eyes once daily at bedtime. 11/4/24  Yes Nadine Terrazas MD   losartan (Cozaar) 50 mg tablet Take 1 tablet (50 mg) by mouth early in the morning.. 1/6/24  Yes Historical Provider, MD   melatonin 5 mg tablet Take 1 tablet (5 mg) by mouth as needed at bedtime for sleep. 10/3/24  Yes Betty Boone MD   metFORMIN XR (Glucophage-XR) 500 mg 24 hr tablet Take 1 tablet (500 mg) by mouth once daily in the evening. Take with meals. Do not crush, chew, or split. 11/15/24 11/15/25 Yes Michael Hilario MD   multivitamin tablet Take 1 tablet by mouth once daily. 10/3/24 10/3/25 Yes Betty Boone MD   nitroglycerin (Nitrostat) 0.4 mg SL tablet PLACE 1 TABLET UNDER THE TONGUE EVERY 5 MINUTES FOR UP TO 3 DOSES AS NEEDED FOR CHEST PAIN.CALL 911 IF PAIN PERSISTS. 7/20/16  Yes Historical Provider, MD   nystatin  (Mycostatin) 100,000 unit/gram powder Apply topically. 11/17/22  Yes Historical Provider, MD   pantoprazole (ProtoNix) 40 mg EC tablet Take 1 tablet (40 mg) by mouth once daily in the morning. Take before meals. 10/3/24 4/1/25 Yes Betty Boone MD   scopolamine (Transderm-Scop) 1 mg over 3 days patch 3 day Place 1 patch behind the ear and leave on for  72 hours. Change patch every 3rd day. 11/1/24  Yes Melinda Marino MD   semaglutide 2 mg/dose (8 mg/3 mL) pen injector Inject 2 mg under the skin 1 (one) time per week. 4/7/24  Yes Michael Hilario MD   urea (Carmol) 20 % cream Apply 1 Application topically 2 times a day. 4/18/24  Yes Historical Provider, MD ISABEL ROS:   Constitutional:   neg    Neuro/Psych:   neg    Eyes:    vision loss (left eye)  Ears:   neg    Nose:   neg    Mouth:   neg    Throat:   neg    Neck:   neg    Cardio:    WEST  Respiratory:   neg    Endocrine:   neg    GI:    constipation  :   neg    Musculoskeletal:   neg    Hematologic:   neg    Skin:  neg        Physical Exam  Vitals reviewed.   Constitutional:       Appearance: Normal appearance.   HENT:      Head: Normocephalic and atraumatic.      Nose: Nose normal.      Mouth/Throat:      Mouth: Mucous membranes are moist.   Cardiovascular:      Rate and Rhythm: Normal rate.      Pulses: Normal pulses.   Pulmonary:      Effort: Pulmonary effort is normal.   Abdominal:      Palpations: Abdomen is soft.   Musculoskeletal:         General: Normal range of motion.      Cervical back: Normal range of motion.   Skin:     General: Skin is warm.   Neurological:      General: No focal deficit present.      Mental Status: She is alert and oriented to person, place, and time.      Gait: Gait abnormal.   Psychiatric:         Mood and Affect: Mood normal.         Behavior: Behavior normal.          PAT AIRWAY:   Airway:     Mallampati::  II    TM distance::  >3 FB    Neck ROM::  Full   upper dentures and lower dentures      Testing/Diagnostic:  "    Patient Specialist/PCP:     Visit Vitals  /77   Pulse 90   Temp 36.4 °C (97.6 °F)   Resp 16   Ht 1.499 m (4' 11\")   Wt 78.9 kg (174 lb)   BMI 35.14 kg/m²   OB Status Menopausal   Smoking Status Never   BSA 1.81 m²       DASI Risk Score      Flowsheet Row Pre-Admission Testing from 1/14/2025 in [facility]   Can you take care of yourself (eat, dress, bathe, or use toilet)?  2.75 filed at 01/14/2025 0757   Can you walk indoors, such as around your house? 1.75 filed at 01/14/2025 0757   Can you walk a block or two on level ground?  0 filed at 01/14/2025 0757   Can you climb a flight of stairs or walk up a hill? 0 filed at 01/14/2025 0757   Can you run a short distance? 0 filed at 01/14/2025 0757   Can you do light work around the house like dusting or washing dishes? 2.7 filed at 01/14/2025 0757   Can you do moderate work around the house like vacuuming, sweeping floors or carrying groceries? 3.5 filed at 01/14/2025 0757   Can you do heavy work around the house like scrubbing floors or lifting and moving heavy furniture?  0 filed at 01/14/2025 0757   Can you do yard work like raking leaves, weeding or pushing a mower? 0 filed at 01/14/2025 0757   Can you have sexual relations? 0 filed at 01/14/2025 0757   Can you participate in moderate recreational activities like golf, bowling, dancing, doubles tennis or throwing a baseball or football? 0 filed at 01/14/2025 0757   Can you participate in strenous sports like swimming, singles tennis, football, basketball, or skiing? 0 filed at 01/14/2025 0757   DASI SCORE 10.7 filed at 01/14/2025 0757   METS Score (Will be calculated only when all the questions are answered) 4.1 filed at 01/14/2025 0757          Caprini DVT Assessment      Flowsheet Row Pre-Admission Testing from 1/14/2025 in [facility] ED to Hosp-Admission (Discharged) from 9/11/2024 in [facility] 59 with Darren Ventura MD and Nadia Medel MD "   DVT Score (IF A SCORE IS NOT CALCULATING, MUST SELECT A BMI TO COMPLETE) 10 filed at 01/14/2025 0826 9 filed at 09/11/2024 1629   Surgical Factors Major surgery planned, lasting over 3 hours filed at 01/14/2025 0826 --   BMI (BMI MUST BE CHOSEN) 31-40 (Obesity) filed at 01/14/2025 0826 31-40 (Obesity) filed at 09/11/2024 1629   RETIRED: History -- Congestive heart failure, SVT, DVT/PE filed at 09/11/2024 1629   RETIRED: Age -- 60-75 years filed at 09/11/2024 1629          Modified Frailty Index      Flowsheet Row Pre-Admission Testing from 1/14/2025 in [facility]   Non-independent functional status (problems with dressing, bathing, personal grooming, or cooking) 0 filed at 01/14/2025 0827   History of diabetes mellitus  0.0909 filed at 01/14/2025 0827   History of COPD 0 filed at 01/14/2025 0827   History of CHF 0.0909 filed at 01/14/2025 0827   History of MI 0.0909 filed at 01/14/2025 0827   History of Percutaneous Coronary Intervention, Cardiac Surgery, or Angina 0.0909 filed at 01/14/2025 0827   Hypertension requiring the use of medication  0.0909 filed at 01/14/2025 0827   Peripheral vascular disease 0 filed at 01/14/2025 0827   Impaired sensorium (cognitive impairement or loss, clouding, or delirium) 0 filed at 01/14/2025 0827   TIA or CVA withouy residual deficit 0.0909 filed at 01/14/2025 0827   Cerebrovascular accident with deficit 0 filed at 01/14/2025 0827   Modified Frailty Index Calculator .5454 filed at 01/14/2025 0827          CHADS2 Stroke Risk  Current as of 15 minutes ago        N/A 3 to 100%: High Risk   2 to < 3%: Medium Risk   0 to < 2%: Low Risk     Last Change: N/A          This score determines the patient's risk of having a stroke if the patient has atrial fibrillation.        This score is not applicable to this patient. Components are not calculated.          Revised Cardiac Risk Index      Flowsheet Row Pre-Admission Testing from 1/14/2025 in [facility]    High-Risk Surgery (Intraperitoneal, Intrathoracic,Suprainguinal vascular) 0 filed at 01/14/2025 0827   History of ischemic heart disease (History of MI, History of positive exercuse test, Current chest paint considered due to myocardial ischemia, Use of nitrate therapy, ECG with pathological Q Waves) 1 filed at 01/14/2025 0827   History of congestive heart failure (pulmonary edemia, bilateral rales or S3 gallop, Paroxysmal nocturnal dyspnea, CXR showing pulmonary vascular redistribution) 1 filed at 01/14/2025 0827   History of cerebrovascular disease (Prior TIA or stroke) 1 filed at 01/14/2025 0827   Pre-operative insulin treatment 0 filed at 01/14/2025 0827   Pre-operative creatinine>2 mg/dl 0 filed at 01/14/2025 0827   Revised Cardiac Risk Calculator 3 filed at 01/14/2025 0827          Apfel Simplified Score      Flowsheet Row Pre-Admission Testing from 1/14/2025 in [facility]   Smoking status 1 filed at 01/14/2025 0827   History of motion sickness or PONV  0 filed at 01/14/2025 0827   Use of postoperative opioids 1 filed at 01/14/2025 0827   Gender - Female 1=Yes filed at 01/14/2025 0827   Apfel Simplified Score Calculator 3 filed at 01/14/2025 0827          Risk Analysis Index Results This Encounter    No data found in the last 10 encounters.       Stop Bang Score      Flowsheet Row Pre-Admission Testing from 1/14/2025 in [facility]   Do you snore loudly? 0 filed at 01/14/2025 0757   Do you often feel tired or fatigued after your sleep? 1 filed at 01/14/2025 0757   Has anyone ever observed you stop breathing in your sleep? 1 filed at 01/14/2025 0757   Do you have or are you being treated for high blood pressure? 1 filed at 01/14/2025 0757   Recent BMI (Calculated) 35.1 filed at 01/14/2025 0757   Is BMI greater than 35 kg/m2? 1=Yes filed at 01/14/2025 0757   Age older than 50 years old? 1=Yes filed at 01/14/2025 0757   Is your neck circumference greater than 17 inches (Male) or  16 inches (Female)? 0 filed at 01/14/2025 0757   Gender - Male 0=No filed at 01/14/2025 0757   STOP-BANG Total Score 5 filed at 01/14/2025 0757          Prodigy: High Risk  Total Score: 15              Prodigy Age Score      Prodigy CHF score          ARISCAT Score for Postoperative Pulmonary Complications    No data to display       Buenrostro Perioperative Risk for Myocardial Infarction or Cardiac Arrest (MARELY)    No data to display         Assessment and Plan:     Anesthesia  The patient denies problems with anesthesia in the past such as PONV, prolonged sedation, awareness, dental damage, aspiration, cardiac arrest, difficult intubation, or unexpected hospital admissions.     Neurology  The patient has h/o CVA. The patient is at increased risk for postoperative delirium secondary to age 65 or older, sensory Impairment, renal Insufficiency. The patient is at increased risk for perioperative stroke secondary to prior CVA/TIA, cardiac disease, hypertension , increased age, hyperlipidemia, female gender, diabetes mellitus, general anesthesia, operative time >2.5 hours. Handouts for preoperative brain exercises given to patient.    HEENT/Airway  No diagnoses, significant findings on chart review, clinical presentation, or evaluation. No documented or reported history of airway difficulty.     Cardiovascular  The patient is scheduled for non-cardiac surgery associated with elevated risk. The patient has no major cardiac contraindications to non- cardiac surgery.  RCRI  The patient meets 3 or more RCRI criteria and therefore is at high risk (15%) for major adverse cardiac complications.  METS  The patient's functional capacity is greater than 4 METS.  EKG  The patient has no EKG or echocardiographic changes concerning for myocardial ischemia.   Heart Failure  The patient has a known history of heart failure, which is currently compensated, due to diastolic dysfunction  Hypertension Evaluation  The patient has a known  history of hypertension that is controlled.  Patient's hypertension is most consistent with stage 1.  Heart Rhythm Evaluation  The patient has no history of arrhythmias.  Heart Valve Evaluation  The patient has no known history of valvular heart disease. The patient has no symptoms or physical exam findings to suggest valvular heart disease.  Cardiology Evaluation  The patient follows with cardiology, Dr. Hurst. Patient was last seen ***. Per note, ***.    The patient has a 30-day risk for MACE of 3 or greater predictors, 15% risk for cardiac death, nonfatal myocardial infarction, and nonfatal cardiac arrest.  MARELY score which indicates a 0.3% risk of intraoperative or 30-day postoperative MACE (major adverse cardiac event).    Pulmonary  The patient has MICHAEL and WEST. The patient is at increased risk of perioperative pulmonary complications secondary to MICHAEL, advanced age greater than 60, morbid obesity.    The patient has a stop bang score of 5, which places patient at high risk for having MICHAEL.    ARISCAT 26, Intermediate, 13.3% risk of in-hospital postoperative pulmonary complications  PRODIGY 20, high risk of respiratory depression episode. Patient given PI sheet for preoperative deep breathing exercises.    Hematology  No diagnoses or significant findings on chart review or clinical presentation and evaluation.  Antiplatelet management   The patient is currently receiving antiplatelet therapy for CAD.  Anticoagulation management  The patient is currently receiving anticoagulation therapy for SVC thrombus. The patient has been instructed to administer Lovenox 1-22 by 10 am Patient provided with DVT educational handout.      Caprini score 10, high risk of perioperative VTE.     Patient instructed to ambulate as soon as possible postoperatively to decrease thromboembolic risk. Initiate mechanical DVT prophylaxis as soon as possible and initiate chemical prophylaxis when deemed safe from a bleeding standpoint post  surgery.     Transfusion Evaluation  A type and screen was obtained given the likelihood for perioperative transfusion of blood or blood products.    Gastrointestinal  The patient has GERD    Eat 10- 0,  self-perceived oropharyngeal dysphagia scale (0-40)     Genitourinary  No diagnoses or significant findings on chart review or clinical presentation and evaluation.    Renal  The patient has a history of chronic kidney disease most consistent with stage 4.  Patient's renal function appears unchanged when compared to prior labs. The patient has specific risk factors associated with increased risk of perioperative renal complications related to age greater than 55, hypertension, diabetes mellitus, CKD, cerebrovascular disease.    Musculoskeletal  The patient has osteoarthritis    Endocrine  Diabetes Evaluation  The patient has a history of diabetes mellitus that currently appears controlled.  Thyroid Disease Evaluation  The patient has no history of thyroid disease.    ID  No diagnoses or significant findings on chart review or clinical presentation and evaluation. MRSA screening obtained. Prescriptions and instructions given for Hibiclens and Peridex.    -Preoperative medication instructions were provided and reviewed with the patient.  Any additional testing or evaluation was explained to the patient.  NPO Instructions were discussed, and the patient's questions were answered prior to conclusion of this encounter. Patient verbalized understanding of preoperative instructions. After Visit Summary given.      Recent Results (from the past week)   CBC    Collection Time: 01/14/25  8:51 AM   Result Value Ref Range    WBC 6.8 4.4 - 11.3 x10*3/uL    nRBC 0.0 0.0 - 0.0 /100 WBCs    RBC 5.52 (H) 4.00 - 5.20 x10*6/uL    Hemoglobin 16.0 12.0 - 16.0 g/dL    Hematocrit 48.7 (H) 36.0 - 46.0 %    MCV 88 80 - 100 fL    MCH 29.0 26.0 - 34.0 pg    MCHC 32.9 32.0 - 36.0 g/dL    RDW 13.3 11.5 - 14.5 %    Platelets 178 150 - 450  x10*3/uL   Basic Metabolic Panel    Collection Time: 01/14/25  8:51 AM   Result Value Ref Range    Glucose 164 (H) 74 - 99 mg/dL    Sodium 140 136 - 145 mmol/L    Potassium 4.6 3.5 - 5.3 mmol/L    Chloride 95 (L) 98 - 107 mmol/L    Bicarbonate 36 (H) 21 - 32 mmol/L    Anion Gap 14 10 - 20 mmol/L    Urea Nitrogen 27 (H) 6 - 23 mg/dL    Creatinine 1.64 (H) 0.50 - 1.05 mg/dL    eGFR 35 (L) >60 mL/min/1.73m*2    Calcium 9.9 8.6 - 10.6 mg/dL   Type And Screen    Collection Time: 01/14/25  8:51 AM   Result Value Ref Range    ABO TYPE O     Rh TYPE POS     ANTIBODY SCREEN NEG

## 2025-01-14 NOTE — PREPROCEDURE INSTRUCTIONS
Fasting Guidelines    Why must I stop eating and drinking near surgery time?  With sedation, food or liquid in your stomach can enter your lungs causing serious complications  Increases nausea and vomiting    When do I need to stop eating and drinking before my surgery?  Do not eat any food or drink any liquids after midnight the night before your surgery/procedure.  You may have sips of water to take medications.    Additional Instructions:     We have sent a prescription for Hibiclens soap and Peridex mouth wash to your preferred pharmacy.  If you have not already, Please  your prescription and start using as directed before surgery.  Follow the instruction sheet provided to you at your CPM/PAT appointment.    Avoid herbal supplements, multivitamins and NSAIDS (non-steroidal anti-inflammatory drugs) such as Advil, Aleve, Ibuprofen, Naproxen, Excedrin, Meloxicam or Celebrex for at least 7 days prior to surgery. May take Tylenol as needed.    Avoid tobacco and alcohol products for 24 hours prior to surgery.    CONTACT SURGEON'S OFFICE IF YOU DEVELOP:  * Fever = 100.4 F   * New respiratory symptoms (e.g. cough, shortness of breath, respiratory distress, sore throat)  * Recent loss of taste or smell  *Flu like symptoms such as headache, fatigue or gastrointestinal symptoms  * You develop any open sores, shingles, burning or painful urination   AND/OR:  * You no longer wish to have the surgery.  * Any other personal circumstances change that may lead to the need to cancel or defer this surgery.  *You were admitted to any hospital within one week of your planned procedure.    Seven/Six Days before Surgery:  Review your medication instructions, stop indicated medications    Day of Surgery:  Review your medication instructions, take indicated medications  Wear comfortable loose fitting clothing  Do not use moisturizers, creams, lotions or perfume  All jewelry and valuables should be left at home      [facility]  [phone]           Patient Information: Pre-Operative Infection Prevention Measures     Why did I have my nose, under my arms, and groin swabbed?  The purpose of the swab is to identify Staphylococcus aureus inside your nose or on your skin.  The swab was sent to the laboratory for culture.  A positive swab/culture for Staphylococcus aureus is called colonization or carriage.      What is Staphylococcus aureus?  Staphylococcus aureus, also known as “staph”, is a germ found on the skin or in the nose of healthy people.  Sometimes Staphylococcus aureus can get into the body and cause an infection.  This can be minor (such as pimples, boils, or other skin problems).  It might also be serious (such as a blood infection, pneumonia, or a surgical site infection).    What is Staphylococcus aureus colonization or carriage?  Colonization or carriage means that a person has the germ but is not sick from it.  These bacteria can be spread on the hands or when breathing or sneezing.    How is Staphylococcus aureus spread?  It is most often spread by close contact with a person or item that carries it.    What happens if my culture is positive for Staphylococcus aureus?  Your doctor/medical team will use this information to guide any antibiotic treatment which may be necessary.  Regardless of the culture results, we will clean the inside of your nose with a betadine swab just before you have your surgery.      Will I get an infection if I have Staphylococcus aureus in my nose or on my skin?  Anyone can get an infection with Staphylococcus aureus.  However, the best way to reduce your risk of infection is to follow the instructions provided to you for the use of your CHG soap and dental rinse.        Patient Information: Oral/Dental Rinse    What is oral/dental rinse?   It is a mouthwash. It is a way of cleaning the mouth with a germ-killing solution before your surgery.  The solution contains  chlorhexidine, commonly known as CHG.   It is used inside the mouth to kill a bacteria known as Staphylococcus aureus.  Let your doctor know if you are allergic to Chlorhexidine.    Why do I need to use CHG oral/dental rinse?  The CHG oral/dental rinse helps to kill a bacteria in your mouth known as Staphylococcus aureus.     This reduces the risk of infection at the surgical site.      Using your CHG oral/dental rinse  STEPS:  Use your CHG oral/dental rinse after you brush your teeth the night before (at bedtime) and the morning of your surgery.  Follow all directions on your prescription label.    Use the cap on the container to measure 15ml   Swish (gargle if you can) the mouthwash in your mouth for at least 30 seconds, (do not swallow) and spit out  After you use your CHG rinse, do not rinse your mouth with water, drink or eat.  Please refer to the prescription label for the appropriate time to resume oral intake      What side effects might I have using the CHG oral/dental rinse?  CHG rinse will stick to plaque on the teeth.  Brush and floss just before use.  Teeth brushing will help avoid staining of plaque during use.      Patient Information: Home Preoperative Antibacterial Shower      What is a home preoperative antibacterial shower?  This shower is a way of cleaning the skin with a germ-killing solution before surgery.  The solution contains chlorhexidine, commonly known as CHG.  CHG is a skin cleanser with germ-killing ability.  Let your doctor know if you are allergic to chlorhexidine.    Why do I need to take a preoperative antibacterial shower?  Skin is not sterile.  It is best to try to make your skin as free of germs as possible before surgery.  Proper cleansing with a germ-killing soap before surgery can lower the number of germs on your skin.  This helps to reduce the risk of infection at the surgical site.  Following the instructions listed below will help you prepare your skin for surgery.       How do I use the solution?  Steps:  Begin using your CHG soap 5 days before your scheduled surgery on ________________________.    First, wash and rinse your hair using the CHG soap. Keep CHG soap away from ear canals and eyes.  Rinse completely, do not condition.  Hair extensions should be removed.  Wash your face with your normal soap and rinse.    Apply the CHG solution to a clean wet washcloth.  Turn the water off or move away from the water spray to avoid premature rinsing of the CHG soap as you are applying.   Firmly lather your entire body from the neck down.  Do not use on your face.  Pay special attention to the area(s) where your incision(s) will be located unless they are on your face.  Avoid scrubbing your skin too hard.  The important point is to have the CHG soap sit on your skin for 3 minutes.    When the 3 minutes are up, turn on the water and rinse the CHG solution off your body completely.   DO NOT wash with regular soap after you have used the CHG soap solution  Pat yourself dry with a clean, freshly-laundered towel.  DO NOT apply powders, deodorants, or lotions.  Dress in clean, freshly laundered nightclothes.    Be sure to sleep with clean, freshly laundered sheets.  Be aware that CHG will cause stains on fabrics; if you wash them with bleach after use.  Rinse your washcloth and other linens that have contact with CHG completely.  Use only non-chlorine detergents to launder the items used.   The morning of surgery is the fifth day.  Repeat the above steps and dress in clean comfortable clothing     Whom should I contact if I have any questions regarding the use of CHG soap?  Call the [facility], [facility] at [phone] if you have any questions.               Preoperative Brain Exercises    What are brain exercises?  A brain exercise is any activity that engages your thinking (cognitive) skills.    What types of activities are  considered brain exercises?  Jigsaw puzzles, crossword puzzles, word jumble, memory games, word search, and many more.  Many can be found free online or on your phone via a mobile sudhir.    Why should I do brain exercises before my surgery?  More recent research has shown brain exercise before surgery can lower the risk of postoperative delirium (confusion) which can be especially important for older adults.  Patients who did brain exercises for 5 to 10 hours the days before surgery, cut their risk of postoperative delirium in half up to 1 week after surgery.         [facility]    Preoperative Deep Breathing Exercises    Why it is important to do deep breathing exercises before my surgery?  Deep breathing exercises strengthen your breathing muscles.  This helps you to recover after your surgery and decreases the chance of breathing complications.      How are the deep breathing exercises done?  Sit straight with your back supported.  Breathe in deeply and slowly through your nose. Your lower rib cage should expand and your abdomen may move forward.  Hold that breath for 3 to 5 seconds.  Breathe out through pursed lips, slowly and completely.  Rest and repeat 10 times every hour while awake.  Rest longer if you become dizzy or lightheaded.         Patient and Family Education             Ways You Can Help Prevent Blood Clots             This handout explains some simple things you can do to help prevent blood clots.      Blood clots are blockages that can form in the body's veins. When a blood clot forms in your deep veins, it may be called a deep vein thrombosis, or DVT for short. Blood clots can happen in any part of the body where blood flows, but they are most common in the arms and legs. If a piece of a blood clot breaks free and travels to the lungs, it is called a pulmonary embolus (PE). A PE can be a very serious problem.         Being in the hospital or having surgery can raise your  chances of getting a blood clot because you may not be well enough to move around as much as you normally do.         Ways you can help prevent blood clots in the hospital         Wearing SCDs. SCDs stands for Sequential Compression Devices.   SCDs are special sleeves that wrap around your legs  They attach to a pump that fills them with air to gently squeeze your legs every few minutes.   This helps return the blood in your legs to your heart.   SCDs should only be taken off when walking or bathing.   SCDs may not be comfortable, but they can help save your life.               Wearing compression stockings - if your doctor orders them. These special snug fitting stockings gently squeeze your legs to help blood flow.       Walking. Walking helps move the blood in your legs.   If your doctor says it is ok, try walking the halls at least   5 times a day. Ask us to help you get up, so you don't fall.      Taking any blood thinning medicines your doctor orders.        Page 1 of 2     [facility]; 3/23   Ways you can help prevent blood clots at home       Wearing compression stockings - if your doctor orders them. ? Walking - to help move the blood in your legs.       Taking any blood thinning medicines your doctor orders.      Signs of a blood clot or PE      Tell your doctor or nurse know right away if you have of the problems listed below.    If you are at home, seek medical care right away. Call 911 for chest pain or problems breathing.               Signs of a blood clot (DVT) - such as pain,  swelling, redness or warmth in your arm or leg      Signs of a pulmonary embolism (PE) - such as chest     pain or feeling short of breath

## 2025-01-22 ENCOUNTER — TELEPHONE (OUTPATIENT)
Dept: GYNECOLOGIC ONCOLOGY | Facility: HOSPITAL | Age: 65
End: 2025-01-22
Payer: COMMERCIAL

## 2025-01-22 ENCOUNTER — ANESTHESIA EVENT (OUTPATIENT)
Dept: OPERATING ROOM | Facility: HOSPITAL | Age: 65
End: 2025-01-22
Payer: COMMERCIAL

## 2025-01-22 NOTE — PROGRESS NOTES
Pharmacy Medication History Review    Nelida Mata is a 64 y.o. female who is planned to be admitted for Leiomyoma of uterus. Pharmacy called the patient prior to their scheduled procedure and reviewed the patient's azdwu-ew-fibqgfoom medications for accuracy.    Medications ADDED:  tresiba 200u/ml  Medications CHANGED:  none  Medications REMOVED:   Gavilyte-C    Please review updated prior to admission medication list and comments regarding how patient may be taking medications differently by going to Admission tab --> Admission Orders --> Admit Orders / Review prior to admission medications.     Preferred pharmacy, last doses of medications, and allergies to be confirmed with patient by nursing the day of procedure.     Sources used to complete the med history include:  [facility] [employer]  Pharmacy dispense history  Patient interview  Chart Review  Care Everywhere     Below are additional concerns with the patient's PTA list.  Patient states they are injecting 40 units of tresiba 200u/ml at bedtime. L.F. 11/27/24 18ml/69d. Prescription states 52 units at bedtime    Loyda Birmingham  [facility]  Please reach out via Secure Chat for questions or call [employer] or [facility]

## 2025-01-22 NOTE — TELEPHONE ENCOUNTER
Patient called asking what type of incision she will have following GIBSON/BSO scheduled on 1/23.    Notified patient she will have a vertical abdominal incision.  Also notified patient that surgery scheduler will call her this afternoon with arrival time and surgery time for tomorrow.    Patient verbalized her understanding of information given.

## 2025-01-23 ENCOUNTER — ANESTHESIA (OUTPATIENT)
Dept: OPERATING ROOM | Facility: HOSPITAL | Age: 65
End: 2025-01-23
Payer: COMMERCIAL

## 2025-01-23 ENCOUNTER — OFFICE VISIT (OUTPATIENT)
Dept: SURGICAL ONCOLOGY | Facility: HOSPITAL | Age: 65
DRG: 742 | End: 2025-01-23
Payer: COMMERCIAL

## 2025-01-23 ENCOUNTER — APPOINTMENT (OUTPATIENT)
Dept: CARDIOLOGY | Facility: HOSPITAL | Age: 65
DRG: 742 | End: 2025-01-23
Payer: COMMERCIAL

## 2025-01-23 ENCOUNTER — ANESTHESIA EVENT (OUTPATIENT)
Dept: OPERATING ROOM | Facility: HOSPITAL | Age: 65
End: 2025-01-23
Payer: COMMERCIAL

## 2025-01-23 ENCOUNTER — HOSPITAL ENCOUNTER (INPATIENT)
Facility: HOSPITAL | Age: 65
DRG: 742 | End: 2025-01-23
Attending: OBSTETRICS & GYNECOLOGY | Admitting: OBSTETRICS & GYNECOLOGY
Payer: COMMERCIAL

## 2025-01-23 ENCOUNTER — APPOINTMENT (OUTPATIENT)
Dept: RADIOLOGY | Facility: HOSPITAL | Age: 65
DRG: 742 | End: 2025-01-23
Payer: COMMERCIAL

## 2025-01-23 DIAGNOSIS — D25.1 INTRAMURAL AND SUBMUCOUS LEIOMYOMA OF UTERUS: ICD-10-CM

## 2025-01-23 DIAGNOSIS — R57.8 HEMORRHAGIC SHOCK (MULTI): ICD-10-CM

## 2025-01-23 DIAGNOSIS — K66.1 HEMOPERITONEUM: ICD-10-CM

## 2025-01-23 DIAGNOSIS — D25.0 INTRAMURAL AND SUBMUCOUS LEIOMYOMA OF UTERUS: ICD-10-CM

## 2025-01-23 DIAGNOSIS — I95.81 POSTPROCEDURAL HYPOTENSION: ICD-10-CM

## 2025-01-23 DIAGNOSIS — Z74.09 IMPAIRED MOBILITY: ICD-10-CM

## 2025-01-23 DIAGNOSIS — Z98.890 POSTOPERATIVE STATE: ICD-10-CM

## 2025-01-23 DIAGNOSIS — D25.9 FIBROID UTERUS: Primary | ICD-10-CM

## 2025-01-23 DIAGNOSIS — G89.18 POSTOPERATIVE PAIN: ICD-10-CM

## 2025-01-23 DIAGNOSIS — R60.0 EDEMA OF LEFT UPPER ARM: ICD-10-CM

## 2025-01-23 LAB
ABO GROUP (TYPE) IN BLOOD: NORMAL
ALBUMIN SERPL BCP-MCNC: 3.2 G/DL (ref 3.4–5)
ALBUMIN SERPL BCP-MCNC: 3.2 G/DL (ref 3.4–5)
ALP SERPL-CCNC: 38 U/L (ref 33–136)
ALT SERPL W P-5'-P-CCNC: 8 U/L (ref 7–45)
ANION GAP BLDA CALCULATED.4IONS-SCNC: 8 MMO/L (ref 10–25)
ANION GAP BLDA CALCULATED.4IONS-SCNC: 9 MMO/L (ref 10–25)
ANION GAP BLDA CALCULATED.4IONS-SCNC: 9 MMO/L (ref 10–25)
ANION GAP BLDV CALCULATED.4IONS-SCNC: 8 MMOL/L (ref 10–25)
ANION GAP SERPL CALC-SCNC: 12 MMOL/L (ref 10–20)
ANION GAP SERPL CALC-SCNC: 12 MMOL/L (ref 10–20)
ANION GAP SERPL CALC-SCNC: 23 MMOL/L (ref 10–20)
ANTIBODY SCREEN: NORMAL
APTT PPP: 29 SECONDS (ref 27–38)
APTT PPP: 29 SECONDS (ref 27–38)
APTT PPP: 31 SECONDS (ref 27–38)
AST SERPL W P-5'-P-CCNC: 11 U/L (ref 9–39)
BASE EXCESS BLDA CALC-SCNC: 1.7 MMOL/L (ref -2–3)
BASE EXCESS BLDA CALC-SCNC: 1.7 MMOL/L (ref -2–3)
BASE EXCESS BLDA CALC-SCNC: 2.3 MMOL/L (ref -2–3)
BASE EXCESS BLDV CALC-SCNC: 0.5 MMOL/L (ref -2–3)
BASE EXCESS BLDV CALC-SCNC: 1.2 MMOL/L (ref -2–3)
BILIRUB SERPL-MCNC: 0.8 MG/DL (ref 0–1.2)
BLOOD EXPIRATION DATE: NORMAL
BNP SERPL-MCNC: 9 PG/ML (ref 0–99)
BODY TEMPERATURE: 37 DEGREES CELSIUS
BUN SERPL-MCNC: 20 MG/DL (ref 6–23)
BUN SERPL-MCNC: 23 MG/DL (ref 6–23)
BUN SERPL-MCNC: 23 MG/DL (ref 6–23)
CA-I BLD-SCNC: 1.13 MMOL/L (ref 1.1–1.33)
CA-I BLDA-SCNC: 1.11 MMOL/L (ref 1.1–1.33)
CA-I BLDA-SCNC: 1.12 MMOL/L (ref 1.1–1.33)
CA-I BLDA-SCNC: 1.19 MMOL/L (ref 1.1–1.33)
CA-I BLDV-SCNC: 1.15 MMOL/L (ref 1.1–1.33)
CALCIUM SERPL-MCNC: 8.2 MG/DL (ref 8.6–10.6)
CALCIUM SERPL-MCNC: 8.2 MG/DL (ref 8.6–10.6)
CALCIUM SERPL-MCNC: 9.2 MG/DL (ref 8.6–10.6)
CARDIAC TROPONIN I PNL SERPL HS: 13 NG/L (ref 0–34)
CHLORIDE BLDA-SCNC: 103 MMOL/L (ref 98–107)
CHLORIDE BLDA-SCNC: 105 MMOL/L (ref 98–107)
CHLORIDE BLDA-SCNC: 105 MMOL/L (ref 98–107)
CHLORIDE BLDV-SCNC: 104 MMOL/L (ref 98–107)
CHLORIDE SERPL-SCNC: 103 MMOL/L (ref 98–107)
CHLORIDE SERPL-SCNC: 103 MMOL/L (ref 98–107)
CHLORIDE SERPL-SCNC: 104 MMOL/L (ref 98–107)
CO2 SERPL-SCNC: 20 MMOL/L (ref 21–32)
CO2 SERPL-SCNC: 31 MMOL/L (ref 21–32)
CO2 SERPL-SCNC: 31 MMOL/L (ref 21–32)
CREAT SERPL-MCNC: 1.35 MG/DL (ref 0.5–1.05)
CREAT SERPL-MCNC: 1.5 MG/DL (ref 0.5–1.05)
CREAT SERPL-MCNC: 1.52 MG/DL (ref 0.5–1.05)
DISPENSE STATUS: NORMAL
EGFRCR SERPLBLD CKD-EPI 2021: 38 ML/MIN/1.73M*2
EGFRCR SERPLBLD CKD-EPI 2021: 39 ML/MIN/1.73M*2
EGFRCR SERPLBLD CKD-EPI 2021: 44 ML/MIN/1.73M*2
ERYTHROCYTE [DISTWIDTH] IN BLOOD BY AUTOMATED COUNT: 13.3 % (ref 11.5–14.5)
GLUCOSE BLD MANUAL STRIP-MCNC: 104 MG/DL (ref 74–99)
GLUCOSE BLD MANUAL STRIP-MCNC: 107 MG/DL (ref 74–99)
GLUCOSE BLD MANUAL STRIP-MCNC: 116 MG/DL (ref 74–99)
GLUCOSE BLD MANUAL STRIP-MCNC: 122 MG/DL (ref 74–99)
GLUCOSE BLD MANUAL STRIP-MCNC: 142 MG/DL (ref 74–99)
GLUCOSE BLD MANUAL STRIP-MCNC: 142 MG/DL (ref 74–99)
GLUCOSE BLDA-MCNC: 129 MG/DL (ref 74–99)
GLUCOSE BLDA-MCNC: 155 MG/DL (ref 74–99)
GLUCOSE BLDA-MCNC: 183 MG/DL (ref 74–99)
GLUCOSE BLDV-MCNC: 124 MG/DL (ref 74–99)
GLUCOSE SERPL-MCNC: 109 MG/DL (ref 74–99)
GLUCOSE SERPL-MCNC: 131 MG/DL (ref 74–99)
GLUCOSE SERPL-MCNC: 132 MG/DL (ref 74–99)
HCO3 BLDA-SCNC: 27.9 MMOL/L (ref 22–26)
HCO3 BLDA-SCNC: 28.6 MMOL/L (ref 22–26)
HCO3 BLDA-SCNC: 28.7 MMOL/L (ref 22–26)
HCO3 BLDV-SCNC: 30.3 MMOL/L (ref 22–26)
HCO3 BLDV-SCNC: 31 MMOL/L (ref 22–26)
HCT VFR BLD AUTO: 21.4 % (ref 36–46)
HCT VFR BLD AUTO: 27 % (ref 36–46)
HCT VFR BLD AUTO: 46.5 % (ref 36–46)
HCT VFR BLD EST: 22 % (ref 36–46)
HCT VFR BLD EST: 24 % (ref 36–46)
HCT VFR BLD EST: 28 % (ref 36–46)
HCT VFR BLD EST: 42 % (ref 36–46)
HGB BLD-MCNC: 14.7 G/DL (ref 12–16)
HGB BLD-MCNC: 7 G/DL (ref 12–16)
HGB BLD-MCNC: 8.9 G/DL (ref 12–16)
HGB BLDA-MCNC: 7.2 G/DL (ref 12–16)
HGB BLDA-MCNC: 8.1 G/DL (ref 12–16)
HGB BLDA-MCNC: 9.3 G/DL (ref 12–16)
HGB BLDV-MCNC: 14.1 G/DL (ref 12–16)
INHALED O2 CONCENTRATION: 24 %
INHALED O2 CONCENTRATION: 28 %
INHALED O2 CONCENTRATION: 28 %
INHALED O2 CONCENTRATION: 30 %
INHALED O2 CONCENTRATION: 30 %
INR PPP: 1 (ref 0.9–1.1)
INR PPP: 1.2 (ref 0.9–1.1)
INR PPP: 1.2 (ref 0.9–1.1)
LACTATE BLDA-SCNC: 1 MMOL/L (ref 0.4–2)
LACTATE BLDA-SCNC: 1.2 MMOL/L (ref 0.4–2)
LACTATE BLDA-SCNC: 1.5 MMOL/L (ref 0.4–2)
LACTATE BLDV-SCNC: 1.7 MMOL/L (ref 0.4–2)
MAGNESIUM SERPL-MCNC: 1.59 MG/DL (ref 1.6–2.4)
MAGNESIUM SERPL-MCNC: 2.26 MG/DL (ref 1.6–2.4)
MCH RBC QN AUTO: 29.8 PG (ref 26–34)
MCH RBC QN AUTO: 29.9 PG (ref 26–34)
MCH RBC QN AUTO: 30.2 PG (ref 26–34)
MCHC RBC AUTO-ENTMCNC: 31.6 G/DL (ref 32–36)
MCHC RBC AUTO-ENTMCNC: 32.7 G/DL (ref 32–36)
MCHC RBC AUTO-ENTMCNC: 33 G/DL (ref 32–36)
MCV RBC AUTO: 91 FL (ref 80–100)
MCV RBC AUTO: 92 FL (ref 80–100)
MCV RBC AUTO: 94 FL (ref 80–100)
NRBC BLD-RTO: 0 /100 WBCS (ref 0–0)
OXYHGB MFR BLDA: 96.5 % (ref 94–98)
OXYHGB MFR BLDA: 96.7 % (ref 94–98)
OXYHGB MFR BLDA: 96.8 % (ref 94–98)
OXYHGB MFR BLDV: 37.6 % (ref 45–75)
OXYHGB MFR BLDV: 53 % (ref 45–75)
PCO2 BLDA: 53 MM HG (ref 38–42)
PCO2 BLDA: 53 MM HG (ref 38–42)
PCO2 BLDA: 57 MM HG (ref 38–42)
PCO2 BLDV: 74 MM HG (ref 41–51)
PCO2 BLDV: 74 MM HG (ref 41–51)
PH BLDA: 7.31 PH (ref 7.38–7.42)
PH BLDA: 7.33 PH (ref 7.38–7.42)
PH BLDA: 7.34 PH (ref 7.38–7.42)
PH BLDV: 7.22 PH (ref 7.33–7.43)
PH BLDV: 7.23 PH (ref 7.33–7.43)
PHOSPHATE SERPL-MCNC: 5.5 MG/DL (ref 2.5–4.9)
PLATELET # BLD AUTO: 139 X10*3/UL (ref 150–450)
PLATELET # BLD AUTO: 143 X10*3/UL (ref 150–450)
PLATELET # BLD AUTO: 167 X10*3/UL (ref 150–450)
PO2 BLDA: 115 MM HG (ref 85–95)
PO2 BLDA: 173 MM HG (ref 85–95)
PO2 BLDA: 195 MM HG (ref 85–95)
PO2 BLDV: 32 MM HG (ref 35–45)
PO2 BLDV: 41 MM HG (ref 35–45)
POTASSIUM BLDA-SCNC: 5 MMOL/L (ref 3.5–5.3)
POTASSIUM BLDA-SCNC: 5.2 MMOL/L (ref 3.5–5.3)
POTASSIUM BLDA-SCNC: 5.2 MMOL/L (ref 3.5–5.3)
POTASSIUM BLDV-SCNC: 5.7 MMOL/L (ref 3.5–5.3)
POTASSIUM SERPL-SCNC: 4.8 MMOL/L (ref 3.5–5.3)
POTASSIUM SERPL-SCNC: 5 MMOL/L (ref 3.5–5.3)
POTASSIUM SERPL-SCNC: 5.1 MMOL/L (ref 3.5–5.3)
PRODUCT BLOOD TYPE: 6200
PRODUCT CODE: NORMAL
PROT SERPL-MCNC: 4.5 G/DL (ref 6.4–8.2)
PROTHROMBIN TIME: 11.5 SECONDS (ref 9.8–12.8)
PROTHROMBIN TIME: 13 SECONDS (ref 9.8–12.8)
PROTHROMBIN TIME: 14 SECONDS (ref 9.8–12.8)
RBC # BLD AUTO: 2.32 X10*6/UL (ref 4–5.2)
RBC # BLD AUTO: 2.98 X10*6/UL (ref 4–5.2)
RBC # BLD AUTO: 4.94 X10*6/UL (ref 4–5.2)
RH FACTOR (ANTIGEN D): NORMAL
SAO2 % BLDA: 98 % (ref 94–100)
SAO2 % BLDV: 38 % (ref 45–75)
SAO2 % BLDV: 54 % (ref 45–75)
SODIUM BLDA-SCNC: 135 MMOL/L (ref 136–145)
SODIUM BLDA-SCNC: 136 MMOL/L (ref 136–145)
SODIUM BLDA-SCNC: 137 MMOL/L (ref 136–145)
SODIUM BLDV-SCNC: 137 MMOL/L (ref 136–145)
SODIUM SERPL-SCNC: 141 MMOL/L (ref 136–145)
SODIUM SERPL-SCNC: 141 MMOL/L (ref 136–145)
SODIUM SERPL-SCNC: 142 MMOL/L (ref 136–145)
UNIT ABO: NORMAL
UNIT NUMBER: NORMAL
UNIT RH: NORMAL
UNIT VOLUME: 225
WBC # BLD AUTO: 11.5 X10*3/UL (ref 4.4–11.3)
WBC # BLD AUTO: 11.9 X10*3/UL (ref 4.4–11.3)
WBC # BLD AUTO: 14.7 X10*3/UL (ref 4.4–11.3)

## 2025-01-23 PROCEDURE — P9045 ALBUMIN (HUMAN), 5%, 250 ML: HCPCS | Mod: JZ,TB

## 2025-01-23 PROCEDURE — 2500000004 HC RX 250 GENERAL PHARMACY W/ HCPCS (ALT 636 FOR OP/ED)

## 2025-01-23 PROCEDURE — 93005 ELECTROCARDIOGRAM TRACING: CPT

## 2025-01-23 PROCEDURE — 82330 ASSAY OF CALCIUM: CPT

## 2025-01-23 PROCEDURE — 84132 ASSAY OF SERUM POTASSIUM: CPT

## 2025-01-23 PROCEDURE — 30243N1 TRANSFUSION OF NONAUTOLOGOUS RED BLOOD CELLS INTO CENTRAL VEIN, PERCUTANEOUS APPROACH: ICD-10-PCS | Performed by: ANESTHESIOLOGY

## 2025-01-23 PROCEDURE — 85610 PROTHROMBIN TIME: CPT | Performed by: NURSE PRACTITIONER

## 2025-01-23 PROCEDURE — 2720000007 HC OR 272 NO HCPCS: Performed by: OBSTETRICS & GYNECOLOGY

## 2025-01-23 PROCEDURE — P9040 RBC LEUKOREDUCED IRRADIATED: HCPCS

## 2025-01-23 PROCEDURE — 2780000003 HC OR 278 NO HCPCS: Performed by: OBSTETRICS & GYNECOLOGY

## 2025-01-23 PROCEDURE — 74177 CT ABD & PELVIS W/CONTRAST: CPT | Performed by: RADIOLOGY

## 2025-01-23 PROCEDURE — 0UT70ZZ RESECTION OF BILATERAL FALLOPIAN TUBES, OPEN APPROACH: ICD-10-PCS | Performed by: OBSTETRICS & GYNECOLOGY

## 2025-01-23 PROCEDURE — 83880 ASSAY OF NATRIURETIC PEPTIDE: CPT

## 2025-01-23 PROCEDURE — 3700000001 HC GENERAL ANESTHESIA TIME - INITIAL BASE CHARGE: Performed by: OBSTETRICS & GYNECOLOGY

## 2025-01-23 PROCEDURE — 0WQF0ZZ REPAIR ABDOMINAL WALL, OPEN APPROACH: ICD-10-PCS | Performed by: OBSTETRICS & GYNECOLOGY

## 2025-01-23 PROCEDURE — 3700000002 HC GENERAL ANESTHESIA TIME - EACH INCREMENTAL 1 MINUTE: Performed by: OBSTETRICS & GYNECOLOGY

## 2025-01-23 PROCEDURE — 36430 TRANSFUSION BLD/BLD COMPNT: CPT | Mod: GC | Performed by: STUDENT IN AN ORGANIZED HEALTH CARE EDUCATION/TRAINING PROGRAM

## 2025-01-23 PROCEDURE — 99291 CRITICAL CARE FIRST HOUR: CPT | Performed by: ANESTHESIOLOGY

## 2025-01-23 PROCEDURE — P9045 ALBUMIN (HUMAN), 5%, 250 ML: HCPCS | Mod: JZ,TB | Performed by: NURSE PRACTITIONER

## 2025-01-23 PROCEDURE — 37799 UNLISTED PX VASCULAR SURGERY: CPT

## 2025-01-23 PROCEDURE — 0UT20ZZ RESECTION OF BILATERAL OVARIES, OPEN APPROACH: ICD-10-PCS | Performed by: OBSTETRICS & GYNECOLOGY

## 2025-01-23 PROCEDURE — 30243R1 TRANSFUSION OF NONAUTOLOGOUS PLATELETS INTO CENTRAL VEIN, PERCUTANEOUS APPROACH: ICD-10-PCS | Performed by: ANESTHESIOLOGY

## 2025-01-23 PROCEDURE — P9037 PLATE PHERES LEUKOREDU IRRAD: HCPCS

## 2025-01-23 PROCEDURE — 84132 ASSAY OF SERUM POTASSIUM: CPT | Performed by: STUDENT IN AN ORGANIZED HEALTH CARE EDUCATION/TRAINING PROGRAM

## 2025-01-23 PROCEDURE — 3600000004 HC OR TIME - INITIAL BASE CHARGE - PROCEDURE LEVEL FOUR: Performed by: OBSTETRICS & GYNECOLOGY

## 2025-01-23 PROCEDURE — 2500000005 HC RX 250 GENERAL PHARMACY W/O HCPCS: Performed by: STUDENT IN AN ORGANIZED HEALTH CARE EDUCATION/TRAINING PROGRAM

## 2025-01-23 PROCEDURE — 93325 DOPPLER ECHO COLOR FLOW MAPG: CPT | Performed by: INTERNAL MEDICINE

## 2025-01-23 PROCEDURE — 36415 COLL VENOUS BLD VENIPUNCTURE: CPT | Performed by: NURSE PRACTITIONER

## 2025-01-23 PROCEDURE — 82805 BLOOD GASES W/O2 SATURATION: CPT

## 2025-01-23 PROCEDURE — 0W9G0ZZ DRAINAGE OF PERITONEAL CAVITY, OPEN APPROACH: ICD-10-PCS | Performed by: OBSTETRICS & GYNECOLOGY

## 2025-01-23 PROCEDURE — 3600000009 HC OR TIME - EACH INCREMENTAL 1 MINUTE - PROCEDURE LEVEL FOUR: Performed by: OBSTETRICS & GYNECOLOGY

## 2025-01-23 PROCEDURE — 2500000005 HC RX 250 GENERAL PHARMACY W/O HCPCS: Performed by: OBSTETRICS & GYNECOLOGY

## 2025-01-23 PROCEDURE — 5A09357 ASSISTANCE WITH RESPIRATORY VENTILATION, LESS THAN 24 CONSECUTIVE HOURS, CONTINUOUS POSITIVE AIRWAY PRESSURE: ICD-10-PCS | Performed by: OBSTETRICS & GYNECOLOGY

## 2025-01-23 PROCEDURE — 36620 INSERTION CATHETER ARTERY: CPT | Mod: GC

## 2025-01-23 PROCEDURE — 58150 TOTAL HYSTERECTOMY: CPT | Performed by: OBSTETRICS & GYNECOLOGY

## 2025-01-23 PROCEDURE — 31500 INSERT EMERGENCY AIRWAY: CPT | Mod: GC

## 2025-01-23 PROCEDURE — 85027 COMPLETE CBC AUTOMATED: CPT

## 2025-01-23 PROCEDURE — 93308 TTE F-UP OR LMTD: CPT | Performed by: INTERNAL MEDICINE

## 2025-01-23 PROCEDURE — 74177 CT ABD & PELVIS W/CONTRAST: CPT

## 2025-01-23 PROCEDURE — 93010 ELECTROCARDIOGRAM REPORT: CPT | Performed by: INTERNAL MEDICINE

## 2025-01-23 PROCEDURE — 36415 COLL VENOUS BLD VENIPUNCTURE: CPT

## 2025-01-23 PROCEDURE — 36620 INSERTION CATHETER ARTERY: CPT

## 2025-01-23 PROCEDURE — 88307 TISSUE EXAM BY PATHOLOGIST: CPT | Mod: TC,SUR | Performed by: OBSTETRICS & GYNECOLOGY

## 2025-01-23 PROCEDURE — 85610 PROTHROMBIN TIME: CPT

## 2025-01-23 PROCEDURE — 82947 ASSAY GLUCOSE BLOOD QUANT: CPT

## 2025-01-23 PROCEDURE — 05HM33Z INSERTION OF INFUSION DEVICE INTO RIGHT INTERNAL JUGULAR VEIN, PERCUTANEOUS APPROACH: ICD-10-PCS | Performed by: ANESTHESIOLOGY

## 2025-01-23 PROCEDURE — 2500000004 HC RX 250 GENERAL PHARMACY W/ HCPCS (ALT 636 FOR OP/ED): Performed by: STUDENT IN AN ORGANIZED HEALTH CARE EDUCATION/TRAINING PROGRAM

## 2025-01-23 PROCEDURE — 2550000001 HC RX 255 CONTRASTS: Performed by: OBSTETRICS & GYNECOLOGY

## 2025-01-23 PROCEDURE — 0W3H0ZZ CONTROL BLEEDING IN RETROPERITONEUM, OPEN APPROACH: ICD-10-PCS | Performed by: OBSTETRICS & GYNECOLOGY

## 2025-01-23 PROCEDURE — 2020000001 HC ICU ROOM DAILY

## 2025-01-23 PROCEDURE — 0UT90ZZ RESECTION OF UTERUS, OPEN APPROACH: ICD-10-PCS | Performed by: OBSTETRICS & GYNECOLOGY

## 2025-01-23 PROCEDURE — 99223 1ST HOSP IP/OBS HIGH 75: CPT

## 2025-01-23 PROCEDURE — 96372 THER/PROPH/DIAG INJ SC/IM: CPT

## 2025-01-23 PROCEDURE — 2500000005 HC RX 250 GENERAL PHARMACY W/O HCPCS

## 2025-01-23 PROCEDURE — 86901 BLOOD TYPING SEROLOGIC RH(D): CPT

## 2025-01-23 PROCEDURE — 2500000004 HC RX 250 GENERAL PHARMACY W/ HCPCS (ALT 636 FOR OP/ED): Mod: JZ,TB

## 2025-01-23 PROCEDURE — 85384 FIBRINOGEN ACTIVITY: CPT

## 2025-01-23 PROCEDURE — 93325 DOPPLER ECHO COLOR FLOW MAPG: CPT

## 2025-01-23 PROCEDURE — 83735 ASSAY OF MAGNESIUM: CPT | Performed by: NURSE PRACTITIONER

## 2025-01-23 PROCEDURE — P9017 PLASMA 1 DONOR FRZ W/IN 8 HR: HCPCS

## 2025-01-23 PROCEDURE — 2500000004 HC RX 250 GENERAL PHARMACY W/ HCPCS (ALT 636 FOR OP/ED): Mod: JZ,TB | Performed by: NURSE PRACTITIONER

## 2025-01-23 PROCEDURE — 0BH17EZ INSERTION OF ENDOTRACHEAL AIRWAY INTO TRACHEA, VIA NATURAL OR ARTIFICIAL OPENING: ICD-10-PCS | Performed by: ANESTHESIOLOGY

## 2025-01-23 PROCEDURE — 7100000002 HC RECOVERY ROOM TIME - EACH INCREMENTAL 1 MINUTE: Performed by: OBSTETRICS & GYNECOLOGY

## 2025-01-23 PROCEDURE — 94660 CPAP INITIATION&MGMT: CPT

## 2025-01-23 PROCEDURE — 30243M1 TRANSFUSION OF NONAUTOLOGOUS PLASMA CRYOPRECIPITATE INTO CENTRAL VEIN, PERCUTANEOUS APPROACH: ICD-10-PCS | Performed by: ANESTHESIOLOGY

## 2025-01-23 PROCEDURE — 85027 COMPLETE CBC AUTOMATED: CPT | Performed by: NURSE PRACTITIONER

## 2025-01-23 PROCEDURE — 2500000001 HC RX 250 WO HCPCS SELF ADMINISTERED DRUGS (ALT 637 FOR MEDICARE OP)

## 2025-01-23 PROCEDURE — 49593 RPR AA HRN 1ST 3-10 RDC: CPT | Performed by: OBSTETRICS & GYNECOLOGY

## 2025-01-23 PROCEDURE — 36556 INSERT NON-TUNNEL CV CATH: CPT | Mod: GC

## 2025-01-23 PROCEDURE — 83735 ASSAY OF MAGNESIUM: CPT

## 2025-01-23 PROCEDURE — 84484 ASSAY OF TROPONIN QUANT: CPT

## 2025-01-23 PROCEDURE — 84132 ASSAY OF SERUM POTASSIUM: CPT | Performed by: NURSE PRACTITIONER

## 2025-01-23 PROCEDURE — 7100000001 HC RECOVERY ROOM TIME - INITIAL BASE CHARGE: Performed by: OBSTETRICS & GYNECOLOGY

## 2025-01-23 RX ORDER — ATORVASTATIN CALCIUM 80 MG/1
80 TABLET, FILM COATED ORAL DAILY
Status: DISCONTINUED | OUTPATIENT
Start: 2025-01-24 | End: 2025-01-25

## 2025-01-23 RX ORDER — ASPIRIN 81 MG/1
81 TABLET ORAL DAILY
Status: DISCONTINUED | OUTPATIENT
Start: 2025-01-23 | End: 2025-01-25

## 2025-01-23 RX ORDER — MIDAZOLAM HYDROCHLORIDE 1 MG/ML
INJECTION INTRAMUSCULAR; INTRAVENOUS AS NEEDED
Status: DISCONTINUED | OUTPATIENT
Start: 2025-01-23 | End: 2025-01-23

## 2025-01-23 RX ORDER — WATER 1 ML/ML
IRRIGANT IRRIGATION AS NEEDED
Status: DISCONTINUED | OUTPATIENT
Start: 2025-01-23 | End: 2025-01-23 | Stop reason: HOSPADM

## 2025-01-23 RX ORDER — FENTANYL CITRATE 50 UG/ML
INJECTION, SOLUTION INTRAMUSCULAR; INTRAVENOUS AS NEEDED
Status: DISCONTINUED | OUTPATIENT
Start: 2025-01-23 | End: 2025-01-23

## 2025-01-23 RX ORDER — GABAPENTIN 300 MG/1
300 CAPSULE ORAL ONCE
Status: COMPLETED | OUTPATIENT
Start: 2025-01-23 | End: 2025-01-23

## 2025-01-23 RX ORDER — KETAMINE HYDROCHLORIDE 10 MG/ML
INJECTION, SOLUTION INTRAMUSCULAR; INTRAVENOUS AS NEEDED
Status: DISCONTINUED | OUTPATIENT
Start: 2025-01-23 | End: 2025-01-23

## 2025-01-23 RX ORDER — ALBUMIN HUMAN 50 G/1000ML
12.5 SOLUTION INTRAVENOUS ONCE
Status: COMPLETED | OUTPATIENT
Start: 2025-01-23 | End: 2025-01-23

## 2025-01-23 RX ORDER — CEFAZOLIN 1 G/1
INJECTION, POWDER, FOR SOLUTION INTRAVENOUS AS NEEDED
Status: DISCONTINUED | OUTPATIENT
Start: 2025-01-23 | End: 2025-01-24

## 2025-01-23 RX ORDER — DULOXETIN HYDROCHLORIDE 60 MG/1
60 CAPSULE, DELAYED RELEASE ORAL EVERY EVENING
Status: DISCONTINUED | OUTPATIENT
Start: 2025-01-23 | End: 2025-01-25

## 2025-01-23 RX ORDER — DEXTROSE 50 % IN WATER (D50W) INTRAVENOUS SYRINGE
25
Status: DISCONTINUED | OUTPATIENT
Start: 2025-01-23 | End: 2025-01-27 | Stop reason: HOSPADM

## 2025-01-23 RX ORDER — DEXTROSE 50 % IN WATER (D50W) INTRAVENOUS SYRINGE
12.5
Status: DISCONTINUED | OUTPATIENT
Start: 2025-01-23 | End: 2025-01-27 | Stop reason: HOSPADM

## 2025-01-23 RX ORDER — LIDOCAINE HYDROCHLORIDE 20 MG/ML
INJECTION, SOLUTION INFILTRATION; PERINEURAL AS NEEDED
Status: DISCONTINUED | OUTPATIENT
Start: 2025-01-23 | End: 2025-01-23

## 2025-01-23 RX ORDER — PROPOFOL 10 MG/ML
INJECTION, EMULSION INTRAVENOUS
Status: COMPLETED
Start: 2025-01-23 | End: 2025-01-23

## 2025-01-23 RX ORDER — PANTOPRAZOLE SODIUM 40 MG/10ML
40 INJECTION, POWDER, LYOPHILIZED, FOR SOLUTION INTRAVENOUS DAILY
Status: DISCONTINUED | OUTPATIENT
Start: 2025-01-24 | End: 2025-01-25

## 2025-01-23 RX ORDER — SODIUM CHLORIDE, SODIUM LACTATE, POTASSIUM CHLORIDE, CALCIUM CHLORIDE 600; 310; 30; 20 MG/100ML; MG/100ML; MG/100ML; MG/100ML
50 INJECTION, SOLUTION INTRAVENOUS CONTINUOUS
Status: ACTIVE | OUTPATIENT
Start: 2025-01-23 | End: 2025-01-24

## 2025-01-23 RX ORDER — NALOXONE HYDROCHLORIDE 4 MG/.1ML
4 SPRAY NASAL AS NEEDED
Qty: 2 EACH | Refills: 1 | Status: SHIPPED | OUTPATIENT
Start: 2025-01-23

## 2025-01-23 RX ORDER — MIDAZOLAM HYDROCHLORIDE 1 MG/ML
INJECTION INTRAMUSCULAR; INTRAVENOUS AS NEEDED
Status: DISCONTINUED | OUTPATIENT
Start: 2025-01-23 | End: 2025-01-24

## 2025-01-23 RX ORDER — ROCURONIUM BROMIDE 10 MG/ML
INJECTION, SOLUTION INTRAVENOUS AS NEEDED
Status: DISCONTINUED | OUTPATIENT
Start: 2025-01-23 | End: 2025-01-24

## 2025-01-23 RX ORDER — BRIMONIDINE TARTRATE 2 MG/ML
1 SOLUTION/ DROPS OPHTHALMIC 2 TIMES DAILY
Status: DISCONTINUED | OUTPATIENT
Start: 2025-01-23 | End: 2025-01-25

## 2025-01-23 RX ORDER — MAGNESIUM SULFATE HEPTAHYDRATE 40 MG/ML
4 INJECTION, SOLUTION INTRAVENOUS EVERY 6 HOURS PRN
Status: DISCONTINUED | OUTPATIENT
Start: 2025-01-23 | End: 2025-01-25

## 2025-01-23 RX ORDER — TRAMADOL HYDROCHLORIDE 50 MG/1
100 TABLET ORAL EVERY 8 HOURS PRN
Status: DISCONTINUED | OUTPATIENT
Start: 2025-01-23 | End: 2025-01-23

## 2025-01-23 RX ORDER — HYDROMORPHONE HYDROCHLORIDE 0.2 MG/ML
0.2 INJECTION INTRAMUSCULAR; INTRAVENOUS; SUBCUTANEOUS EVERY 5 MIN PRN
Status: DISCONTINUED | OUTPATIENT
Start: 2025-01-23 | End: 2025-01-23 | Stop reason: HOSPADM

## 2025-01-23 RX ORDER — TRAMADOL HYDROCHLORIDE 50 MG/1
50 TABLET ORAL EVERY 6 HOURS PRN
Qty: 24 TABLET | Refills: 0 | Status: SHIPPED | OUTPATIENT
Start: 2025-01-23 | End: 2025-01-29

## 2025-01-23 RX ORDER — PANTOPRAZOLE SODIUM 40 MG/1
40 TABLET, DELAYED RELEASE ORAL
Status: DISCONTINUED | OUTPATIENT
Start: 2025-01-24 | End: 2025-01-24

## 2025-01-23 RX ORDER — TRAMADOL HYDROCHLORIDE 50 MG/1
50 TABLET ORAL EVERY 6 HOURS PRN
Status: DISCONTINUED | OUTPATIENT
Start: 2025-01-23 | End: 2025-01-25

## 2025-01-23 RX ORDER — PROPOFOL 10 MG/ML
INJECTION, EMULSION INTRAVENOUS AS NEEDED
Status: DISCONTINUED | OUTPATIENT
Start: 2025-01-23 | End: 2025-01-23

## 2025-01-23 RX ORDER — TRAMADOL HYDROCHLORIDE 50 MG/1
100 TABLET ORAL EVERY 6 HOURS PRN
Status: DISCONTINUED | OUTPATIENT
Start: 2025-01-23 | End: 2025-01-23

## 2025-01-23 RX ORDER — PHENYLEPHRINE HYDROCHLORIDE 10 MG/ML
INJECTION INTRAVENOUS AS NEEDED
Status: DISCONTINUED | OUTPATIENT
Start: 2025-01-23 | End: 2025-01-23

## 2025-01-23 RX ORDER — SIMETHICONE 80 MG
80 TABLET,CHEWABLE ORAL 4 TIMES DAILY PRN
Status: DISCONTINUED | OUTPATIENT
Start: 2025-01-23 | End: 2025-01-27 | Stop reason: HOSPADM

## 2025-01-23 RX ORDER — POLYETHYLENE GLYCOL 3350 17 G/17G
17 POWDER, FOR SOLUTION ORAL DAILY
Status: DISCONTINUED | OUTPATIENT
Start: 2025-01-23 | End: 2025-01-25

## 2025-01-23 RX ORDER — SODIUM CHLORIDE, SODIUM LACTATE, POTASSIUM CHLORIDE, CALCIUM CHLORIDE 600; 310; 30; 20 MG/100ML; MG/100ML; MG/100ML; MG/100ML
40 INJECTION, SOLUTION INTRAVENOUS CONTINUOUS
Status: DISCONTINUED | OUTPATIENT
Start: 2025-01-23 | End: 2025-01-23

## 2025-01-23 RX ORDER — ACETAMINOPHEN 325 MG/1
650 TABLET ORAL EVERY 4 HOURS PRN
Status: DISCONTINUED | OUTPATIENT
Start: 2025-01-23 | End: 2025-01-23 | Stop reason: HOSPADM

## 2025-01-23 RX ORDER — SIMETHICONE 80 MG
80 TABLET,CHEWABLE ORAL 4 TIMES DAILY PRN
COMMUNITY
Start: 2025-01-23

## 2025-01-23 RX ORDER — INSULIN LISPRO 100 [IU]/ML
0-5 INJECTION, SOLUTION INTRAVENOUS; SUBCUTANEOUS EVERY 4 HOURS
Status: DISCONTINUED | OUTPATIENT
Start: 2025-01-23 | End: 2025-01-24

## 2025-01-23 RX ORDER — LIDOCAINE HYDROCHLORIDE 10 MG/ML
INJECTION, SOLUTION INFILTRATION; PERINEURAL
Status: COMPLETED
Start: 2025-01-23 | End: 2025-01-23

## 2025-01-23 RX ORDER — NALOXONE HYDROCHLORIDE 0.4 MG/ML
INJECTION, SOLUTION INTRAMUSCULAR; INTRAVENOUS; SUBCUTANEOUS AS NEEDED
Status: DISCONTINUED | OUTPATIENT
Start: 2025-01-23 | End: 2025-01-23

## 2025-01-23 RX ORDER — ACETAMINOPHEN 500 MG
5 TABLET ORAL NIGHTLY PRN
Status: DISCONTINUED | OUTPATIENT
Start: 2025-01-23 | End: 2025-01-27 | Stop reason: HOSPADM

## 2025-01-23 RX ORDER — LATANOPROST 50 UG/ML
1 SOLUTION/ DROPS OPHTHALMIC NIGHTLY
Status: DISCONTINUED | OUTPATIENT
Start: 2025-01-23 | End: 2025-01-25

## 2025-01-23 RX ORDER — CALCIUM GLUCONATE 20 MG/ML
2 INJECTION, SOLUTION INTRAVENOUS EVERY 6 HOURS PRN
Status: DISCONTINUED | OUTPATIENT
Start: 2025-01-23 | End: 2025-01-25

## 2025-01-23 RX ORDER — CARVEDILOL 12.5 MG/1
12.5 TABLET ORAL
Status: DISCONTINUED | OUTPATIENT
Start: 2025-01-23 | End: 2025-01-27 | Stop reason: HOSPADM

## 2025-01-23 RX ORDER — HEPARIN SODIUM 5000 [USP'U]/ML
5000 INJECTION, SOLUTION INTRAVENOUS; SUBCUTANEOUS EVERY 8 HOURS
Status: DISCONTINUED | OUTPATIENT
Start: 2025-01-23 | End: 2025-01-25

## 2025-01-23 RX ORDER — CALCIUM GLUCONATE 20 MG/ML
1 INJECTION, SOLUTION INTRAVENOUS EVERY 6 HOURS PRN
Status: DISCONTINUED | OUTPATIENT
Start: 2025-01-23 | End: 2025-01-25

## 2025-01-23 RX ORDER — ROCURONIUM BROMIDE 10 MG/ML
INJECTION, SOLUTION INTRAVENOUS AS NEEDED
Status: DISCONTINUED | OUTPATIENT
Start: 2025-01-23 | End: 2025-01-23

## 2025-01-23 RX ORDER — ONDANSETRON HYDROCHLORIDE 2 MG/ML
4 INJECTION, SOLUTION INTRAVENOUS EVERY 6 HOURS PRN
Status: DISCONTINUED | OUTPATIENT
Start: 2025-01-23 | End: 2025-01-27 | Stop reason: HOSPADM

## 2025-01-23 RX ORDER — ROCURONIUM BROMIDE 10 MG/ML
INJECTION, SOLUTION INTRAVENOUS
Status: DISPENSED
Start: 2025-01-23 | End: 2025-01-24

## 2025-01-23 RX ORDER — PHENYLEPHRINE 10 MG/250 ML(40 MCG/ML)IN 0.9 % SOD.CHLORIDE INTRAVENOUS
0-2 CONTINUOUS
Status: DISCONTINUED | OUTPATIENT
Start: 2025-01-23 | End: 2025-01-24

## 2025-01-23 RX ORDER — AMOXICILLIN 250 MG
2 CAPSULE ORAL 2 TIMES DAILY
Status: DISCONTINUED | OUTPATIENT
Start: 2025-01-23 | End: 2025-01-23

## 2025-01-23 RX ORDER — HYDROMORPHONE HYDROCHLORIDE 0.2 MG/ML
0.2 INJECTION INTRAMUSCULAR; INTRAVENOUS; SUBCUTANEOUS EVERY 4 HOURS PRN
Status: DISCONTINUED | OUTPATIENT
Start: 2025-01-23 | End: 2025-01-24

## 2025-01-23 RX ORDER — DULOXETIN HYDROCHLORIDE 30 MG/1
30 CAPSULE, DELAYED RELEASE ORAL
Status: DISCONTINUED | OUTPATIENT
Start: 2025-01-24 | End: 2025-01-25

## 2025-01-23 RX ORDER — INSULIN LISPRO 100 [IU]/ML
0-5 INJECTION, SOLUTION INTRAVENOUS; SUBCUTANEOUS
Status: DISCONTINUED | OUTPATIENT
Start: 2025-01-23 | End: 2025-01-23

## 2025-01-23 RX ORDER — CALCIUM CHLORIDE INJECTION 100 MG/ML
INJECTION, SOLUTION INTRAVENOUS
Status: DISPENSED
Start: 2025-01-23 | End: 2025-01-24

## 2025-01-23 RX ORDER — ACETAMINOPHEN 325 MG/1
975 TABLET ORAL ONCE
Status: COMPLETED | OUTPATIENT
Start: 2025-01-23 | End: 2025-01-23

## 2025-01-23 RX ORDER — ETOMIDATE 2 MG/ML
INJECTION INTRAVENOUS
Status: DISCONTINUED
Start: 2025-01-23 | End: 2025-01-23 | Stop reason: WASHOUT

## 2025-01-23 RX ORDER — EPINEPHRINE 0.1 MG/ML
INJECTION INTRACARDIAC; INTRAVENOUS
Status: DISPENSED
Start: 2025-01-23 | End: 2025-01-24

## 2025-01-23 RX ORDER — NOREPINEPHRINE BITARTRATE/D5W 8 MG/250ML
PLASTIC BAG, INJECTION (ML) INTRAVENOUS
Status: COMPLETED
Start: 2025-01-23 | End: 2025-01-24

## 2025-01-23 RX ORDER — ONDANSETRON HYDROCHLORIDE 2 MG/ML
INJECTION, SOLUTION INTRAVENOUS AS NEEDED
Status: DISCONTINUED | OUTPATIENT
Start: 2025-01-23 | End: 2025-01-23

## 2025-01-23 RX ORDER — ALBUMIN HUMAN 50 G/1000ML
SOLUTION INTRAVENOUS
Status: COMPLETED
Start: 2025-01-23 | End: 2025-01-23

## 2025-01-23 RX ORDER — DEXMEDETOMIDINE HYDROCHLORIDE 4 UG/ML
INJECTION, SOLUTION INTRAVENOUS CONTINUOUS PRN
Status: DISCONTINUED | OUTPATIENT
Start: 2025-01-23 | End: 2025-01-23

## 2025-01-23 RX ORDER — MAGNESIUM SULFATE HEPTAHYDRATE 40 MG/ML
2 INJECTION, SOLUTION INTRAVENOUS EVERY 6 HOURS PRN
Status: DISCONTINUED | OUTPATIENT
Start: 2025-01-23 | End: 2025-01-25

## 2025-01-23 RX ORDER — ACETAMINOPHEN 325 MG/1
975 TABLET ORAL EVERY 6 HOURS
Qty: 84 TABLET | Refills: 0 | Status: SHIPPED | OUTPATIENT
Start: 2025-01-23 | End: 2025-01-30

## 2025-01-23 RX ORDER — POLYETHYLENE GLYCOL 3350 17 G/17G
17 POWDER, FOR SOLUTION ORAL DAILY PRN
COMMUNITY
Start: 2025-01-23 | End: 2025-03-24

## 2025-01-23 RX ORDER — PROPOFOL 10 MG/ML
INJECTION, EMULSION INTRAVENOUS AS NEEDED
Status: DISCONTINUED | OUTPATIENT
Start: 2025-01-23 | End: 2025-01-24

## 2025-01-23 RX ORDER — ALBUMIN HUMAN 250 G/1000ML
12.5 SOLUTION INTRAVENOUS ONCE
Status: DISCONTINUED | OUTPATIENT
Start: 2025-01-23 | End: 2025-01-24

## 2025-01-23 RX ORDER — SODIUM CHLORIDE 0.9 G/100ML
INJECTION, SOLUTION IRRIGATION AS NEEDED
Status: DISCONTINUED | OUTPATIENT
Start: 2025-01-23 | End: 2025-01-23 | Stop reason: HOSPADM

## 2025-01-23 RX ORDER — ONDANSETRON HYDROCHLORIDE 2 MG/ML
4 INJECTION, SOLUTION INTRAVENOUS ONCE AS NEEDED
Status: DISCONTINUED | OUTPATIENT
Start: 2025-01-23 | End: 2025-01-23 | Stop reason: HOSPADM

## 2025-01-23 RX ORDER — HEPARIN SODIUM 5000 [USP'U]/ML
5000 INJECTION, SOLUTION INTRAVENOUS; SUBCUTANEOUS ONCE
Status: COMPLETED | OUTPATIENT
Start: 2025-01-23 | End: 2025-01-23

## 2025-01-23 RX ORDER — ACETAMINOPHEN 325 MG/1
975 TABLET ORAL EVERY 6 HOURS
Status: DISCONTINUED | OUTPATIENT
Start: 2025-01-23 | End: 2025-01-23

## 2025-01-23 RX ORDER — NALOXONE HYDROCHLORIDE 0.4 MG/ML
0.1 INJECTION, SOLUTION INTRAMUSCULAR; INTRAVENOUS; SUBCUTANEOUS EVERY 5 MIN PRN
Status: DISCONTINUED | OUTPATIENT
Start: 2025-01-23 | End: 2025-01-27 | Stop reason: HOSPADM

## 2025-01-23 RX ORDER — METHADONE IN SOD CHLOR,ISO-OSM 10 MG/ML
SYRINGE (ML) INTRAVENOUS AS NEEDED
Status: DISCONTINUED | OUTPATIENT
Start: 2025-01-23 | End: 2025-01-23

## 2025-01-23 RX ORDER — AMOXICILLIN 250 MG
2 CAPSULE ORAL 2 TIMES DAILY
Qty: 120 TABLET | Refills: 0 | Status: SHIPPED | OUTPATIENT
Start: 2025-01-23 | End: 2025-02-22

## 2025-01-23 RX ORDER — PHENYLEPHRINE HYDROCHLORIDE 10 MG/ML
INJECTION INTRAVENOUS
Status: DISPENSED
Start: 2025-01-23 | End: 2025-01-24

## 2025-01-23 RX ORDER — ONDANSETRON 4 MG/1
4 TABLET, ORALLY DISINTEGRATING ORAL EVERY 6 HOURS PRN
Status: DISCONTINUED | OUTPATIENT
Start: 2025-01-23 | End: 2025-01-27 | Stop reason: HOSPADM

## 2025-01-23 RX ORDER — LIDOCAINE HYDROCHLORIDE 10 MG/ML
0.1 INJECTION, SOLUTION EPIDURAL; INFILTRATION; INTRACAUDAL; PERINEURAL ONCE
Status: DISCONTINUED | OUTPATIENT
Start: 2025-01-23 | End: 2025-01-23 | Stop reason: HOSPADM

## 2025-01-23 RX ORDER — CEFAZOLIN 1 G/1
INJECTION, POWDER, FOR SOLUTION INTRAVENOUS AS NEEDED
Status: DISCONTINUED | OUTPATIENT
Start: 2025-01-23 | End: 2025-01-23

## 2025-01-23 RX ADMIN — PROPOFOL 50 MG: 10 INJECTION, EMULSION INTRAVENOUS at 23:53

## 2025-01-23 RX ADMIN — NOREPINEPHRINE BITARTRATE 8000 MCG: 8 INJECTION, SOLUTION INTRAVENOUS at 23:00

## 2025-01-23 RX ADMIN — PROPOFOL 10 MG: 10 INJECTION, EMULSION INTRAVENOUS at 09:30

## 2025-01-23 RX ADMIN — Medication 10 MG: at 07:49

## 2025-01-23 RX ADMIN — PHENYLEPHRINE HYDROCHLORIDE 80 MCG: 10 INJECTION INTRAVENOUS at 10:07

## 2025-01-23 RX ADMIN — SODIUM CHLORIDE 250 ML: 9 INJECTION, SOLUTION INTRAVENOUS at 15:33

## 2025-01-23 RX ADMIN — PHENYLEPHRINE HYDROCHLORIDE 80 MCG: 10 INJECTION INTRAVENOUS at 08:05

## 2025-01-23 RX ADMIN — ROCURONIUM BROMIDE 50 MG: 10 INJECTION INTRAVENOUS at 07:49

## 2025-01-23 RX ADMIN — GABAPENTIN 300 MG: 300 CAPSULE ORAL at 07:03

## 2025-01-23 RX ADMIN — HEPARIN SODIUM 5000 UNITS: 5000 INJECTION, SOLUTION INTRAVENOUS; SUBCUTANEOUS at 07:16

## 2025-01-23 RX ADMIN — Medication 1 MCG/KG/MIN: at 21:43

## 2025-01-23 RX ADMIN — IOHEXOL 85 ML: 350 INJECTION, SOLUTION INTRAVENOUS at 22:37

## 2025-01-23 RX ADMIN — ACETAMINOPHEN 975 MG: 325 TABLET ORAL at 07:03

## 2025-01-23 RX ADMIN — ONDANSETRON 4 MG: 2 INJECTION INTRAMUSCULAR; INTRAVENOUS at 10:11

## 2025-01-23 RX ADMIN — CEFAZOLIN 2 G: 1 INJECTION, POWDER, FOR SOLUTION INTRAMUSCULAR; INTRAVENOUS at 23:38

## 2025-01-23 RX ADMIN — SUGAMMADEX 200 MG: 100 INJECTION, SOLUTION INTRAVENOUS at 10:27

## 2025-01-23 RX ADMIN — PROPOFOL 1000 MG: 10 INJECTION, EMULSION INTRAVENOUS at 23:55

## 2025-01-23 RX ADMIN — ALBUMIN HUMAN 12.5 G: 0.05 INJECTION, SOLUTION INTRAVENOUS at 21:30

## 2025-01-23 RX ADMIN — Medication 20 MG: at 07:49

## 2025-01-23 RX ADMIN — MIDAZOLAM HYDROCHLORIDE 2 MG: 1 INJECTION, SOLUTION INTRAMUSCULAR; INTRAVENOUS at 07:29

## 2025-01-23 RX ADMIN — ACETAMINOPHEN 975 MG: 325 TABLET ORAL at 14:07

## 2025-01-23 RX ADMIN — Medication 2 L/MIN: at 11:09

## 2025-01-23 RX ADMIN — PHENYLEPHRINE HYDROCHLORIDE 80 MCG: 10 INJECTION INTRAVENOUS at 09:35

## 2025-01-23 RX ADMIN — PHENYLEPHRINE HYDROCHLORIDE 80 MCG: 10 INJECTION INTRAVENOUS at 09:11

## 2025-01-23 RX ADMIN — LIDOCAINE HYDROCHLORIDE: 10 INJECTION, SOLUTION INFILTRATION; PERINEURAL at 21:37

## 2025-01-23 RX ADMIN — SODIUM CHLORIDE, POTASSIUM CHLORIDE, SODIUM LACTATE AND CALCIUM CHLORIDE: 600; 310; 30; 20 INJECTION, SOLUTION INTRAVENOUS at 23:34

## 2025-01-23 RX ADMIN — SODIUM CHLORIDE, POTASSIUM CHLORIDE, SODIUM LACTATE AND CALCIUM CHLORIDE 40 ML/HR: 600; 310; 30; 20 INJECTION, SOLUTION INTRAVENOUS at 15:25

## 2025-01-23 RX ADMIN — ALBUMIN HUMAN 12.5 G: 0.05 INJECTION, SOLUTION INTRAVENOUS at 17:29

## 2025-01-23 RX ADMIN — SODIUM CHLORIDE, POTASSIUM CHLORIDE, SODIUM LACTATE AND CALCIUM CHLORIDE 40 ML/HR: 600; 310; 30; 20 INJECTION, SOLUTION INTRAVENOUS at 14:11

## 2025-01-23 RX ADMIN — DEXMEDETOMIDINE HYDROCHLORIDE 0.3 MCG/KG/HR: 4 INJECTION, SOLUTION INTRAVENOUS at 08:30

## 2025-01-23 RX ADMIN — ROCURONIUM BROMIDE 10 MG: 10 INJECTION INTRAVENOUS at 08:29

## 2025-01-23 RX ADMIN — HYDROMORPHONE HYDROCHLORIDE 0.2 MG: 0.2 INJECTION, SOLUTION INTRAMUSCULAR; INTRAVENOUS; SUBCUTANEOUS at 21:43

## 2025-01-23 RX ADMIN — PROPOFOL 50 MG: 10 INJECTION, EMULSION INTRAVENOUS at 23:59

## 2025-01-23 RX ADMIN — CEFAZOLIN 2 G: 1 INJECTION, POWDER, FOR SOLUTION INTRAMUSCULAR; INTRAVENOUS at 08:10

## 2025-01-23 RX ADMIN — Medication 10 MG: at 09:15

## 2025-01-23 RX ADMIN — LIDOCAINE HYDROCHLORIDE 60 MG: 20 INJECTION, SOLUTION INFILTRATION; PERINEURAL at 07:48

## 2025-01-23 RX ADMIN — TRAMADOL HYDROCHLORIDE 100 MG: 50 TABLET, COATED ORAL at 14:07

## 2025-01-23 RX ADMIN — Medication 2 L/MIN: at 21:09

## 2025-01-23 RX ADMIN — DEXAMETHASONE SODIUM PHOSPHATE 4 MG: 4 INJECTION INTRA-ARTICULAR; INTRALESIONAL; INTRAMUSCULAR; INTRAVENOUS; SOFT TISSUE at 08:20

## 2025-01-23 RX ADMIN — PHENYLEPHRINE HYDROCHLORIDE 120 MCG: 10 INJECTION INTRAVENOUS at 10:30

## 2025-01-23 RX ADMIN — SODIUM CHLORIDE, POTASSIUM CHLORIDE, SODIUM LACTATE AND CALCIUM CHLORIDE 40 ML/HR: 600; 310; 30; 20 INJECTION, SOLUTION INTRAVENOUS at 16:23

## 2025-01-23 RX ADMIN — FENTANYL CITRATE 50 MCG: 50 INJECTION, SOLUTION INTRAMUSCULAR; INTRAVENOUS at 07:48

## 2025-01-23 RX ADMIN — ALBUMIN HUMAN 12.5 G: 0.05 INJECTION, SOLUTION INTRAVENOUS at 18:36

## 2025-01-23 RX ADMIN — SODIUM CHLORIDE, SODIUM LACTATE, POTASSIUM CHLORIDE, AND CALCIUM CHLORIDE: 600; 310; 30; 20 INJECTION, SOLUTION INTRAVENOUS at 07:30

## 2025-01-23 RX ADMIN — SODIUM CHLORIDE 500 ML: 9 INJECTION, SOLUTION INTRAVENOUS at 16:24

## 2025-01-23 RX ADMIN — SODIUM CHLORIDE, POTASSIUM CHLORIDE, SODIUM LACTATE AND CALCIUM CHLORIDE 100 ML/HR: 600; 310; 30; 20 INJECTION, SOLUTION INTRAVENOUS at 18:53

## 2025-01-23 RX ADMIN — HYDROCORTISONE SODIUM SUCCINATE 100 MG: 100 INJECTION, POWDER, FOR SOLUTION INTRAMUSCULAR; INTRAVENOUS at 18:31

## 2025-01-23 RX ADMIN — PROPOFOL 10 MG: 10 INJECTION, EMULSION INTRAVENOUS at 08:46

## 2025-01-23 RX ADMIN — PHENYLEPHRINE HYDROCHLORIDE 80 MCG: 10 INJECTION INTRAVENOUS at 07:56

## 2025-01-23 RX ADMIN — NALOXONE HYDROCHLORIDE 0.1 MG: 0.4 INJECTION, SOLUTION INTRAMUSCULAR; INTRAVENOUS; SUBCUTANEOUS at 10:57

## 2025-01-23 RX ADMIN — PROPOFOL 130 MG: 10 INJECTION, EMULSION INTRAVENOUS at 07:48

## 2025-01-23 RX ADMIN — ROCURONIUM 30 MG: 50 INJECTION, SOLUTION INTRAVENOUS at 23:50

## 2025-01-23 RX ADMIN — PHENYLEPHRINE HYDROCHLORIDE 80 MCG: 10 INJECTION INTRAVENOUS at 09:22

## 2025-01-23 RX ADMIN — SODIUM CHLORIDE, POTASSIUM CHLORIDE, SODIUM LACTATE AND CALCIUM CHLORIDE 40 ML/HR: 600; 310; 30; 20 INJECTION, SOLUTION INTRAVENOUS at 15:08

## 2025-01-23 RX ADMIN — MIDAZOLAM HYDROCHLORIDE 2 MG: 1 INJECTION, SOLUTION INTRAMUSCULAR; INTRAVENOUS at 23:45

## 2025-01-23 RX ADMIN — ASPIRIN 81 MG: 81 TABLET, COATED ORAL at 14:07

## 2025-01-23 SDOH — SOCIAL STABILITY: SOCIAL INSECURITY
WITHIN THE LAST YEAR, HAVE YOU BEEN KICKED, HIT, SLAPPED, OR OTHERWISE PHYSICALLY HURT BY YOUR PARTNER OR EX-PARTNER?: NO

## 2025-01-23 SDOH — SOCIAL STABILITY: SOCIAL INSECURITY: WITHIN THE LAST YEAR, HAVE YOU BEEN HUMILIATED OR EMOTIONALLY ABUSED IN OTHER WAYS BY YOUR PARTNER OR EX-PARTNER?: NO

## 2025-01-23 SDOH — SOCIAL STABILITY: SOCIAL INSECURITY: ARE YOU OR HAVE YOU BEEN THREATENED OR ABUSED PHYSICALLY, EMOTIONALLY, OR SEXUALLY BY ANYONE?: NO

## 2025-01-23 SDOH — ECONOMIC STABILITY: FOOD INSECURITY: WITHIN THE PAST 12 MONTHS, YOU WORRIED THAT YOUR FOOD WOULD RUN OUT BEFORE YOU GOT THE MONEY TO BUY MORE.: NEVER TRUE

## 2025-01-23 SDOH — SOCIAL STABILITY: SOCIAL INSECURITY: DO YOU FEEL UNSAFE GOING BACK TO THE PLACE WHERE YOU ARE LIVING?: NO

## 2025-01-23 SDOH — SOCIAL STABILITY: SOCIAL INSECURITY: DO YOU FEEL ANYONE HAS EXPLOITED OR TAKEN ADVANTAGE OF YOU FINANCIALLY OR OF YOUR PERSONAL PROPERTY?: NO

## 2025-01-23 SDOH — SOCIAL STABILITY: SOCIAL INSECURITY: ARE THERE ANY APPARENT SIGNS OF INJURIES/BEHAVIORS THAT COULD BE RELATED TO ABUSE/NEGLECT?: NO

## 2025-01-23 SDOH — SOCIAL STABILITY: SOCIAL INSECURITY: HAS ANYONE EVER THREATENED TO HURT YOUR FAMILY OR YOUR PETS?: NO

## 2025-01-23 SDOH — SOCIAL STABILITY: SOCIAL INSECURITY: HAVE YOU HAD THOUGHTS OF HARMING ANYONE ELSE?: NO

## 2025-01-23 SDOH — ECONOMIC STABILITY: INCOME INSECURITY: IN THE PAST 12 MONTHS HAS THE ELECTRIC, GAS, OIL, OR WATER COMPANY THREATENED TO SHUT OFF SERVICES IN YOUR HOME?: NO

## 2025-01-23 SDOH — SOCIAL STABILITY: SOCIAL INSECURITY: WERE YOU ABLE TO COMPLETE ALL THE BEHAVIORAL HEALTH SCREENINGS?: YES

## 2025-01-23 SDOH — SOCIAL STABILITY: SOCIAL INSECURITY: DOES ANYONE TRY TO KEEP YOU FROM HAVING/CONTACTING OTHER FRIENDS OR DOING THINGS OUTSIDE YOUR HOME?: NO

## 2025-01-23 SDOH — SOCIAL STABILITY: SOCIAL INSECURITY: WITHIN THE LAST YEAR, HAVE YOU BEEN AFRAID OF YOUR PARTNER OR EX-PARTNER?: NO

## 2025-01-23 SDOH — SOCIAL STABILITY: SOCIAL INSECURITY
WITHIN THE LAST YEAR, HAVE YOU BEEN RAPED OR FORCED TO HAVE ANY KIND OF SEXUAL ACTIVITY BY YOUR PARTNER OR EX-PARTNER?: NO

## 2025-01-23 SDOH — SOCIAL STABILITY: SOCIAL INSECURITY: ABUSE: ADULT

## 2025-01-23 SDOH — HEALTH STABILITY: MENTAL HEALTH: CURRENT SMOKER: 0

## 2025-01-23 SDOH — ECONOMIC STABILITY: FOOD INSECURITY: WITHIN THE PAST 12 MONTHS, THE FOOD YOU BOUGHT JUST DIDN'T LAST AND YOU DIDN'T HAVE MONEY TO GET MORE.: NEVER TRUE

## 2025-01-23 ASSESSMENT — PAIN SCALES - GENERAL
PAINLEVEL_OUTOF10: 0 - NO PAIN
PAINLEVEL_OUTOF10: 7
PAINLEVEL_OUTOF10: 0 - NO PAIN
PAINLEVEL_OUTOF10: 0 - NO PAIN
PAINLEVEL_OUTOF10: 2
PAINLEVEL_OUTOF10: 8
PAINLEVEL_OUTOF10: 8
PAINLEVEL_OUTOF10: 0 - NO PAIN

## 2025-01-23 ASSESSMENT — PATIENT HEALTH QUESTIONNAIRE - PHQ9
2. FEELING DOWN, DEPRESSED OR HOPELESS: NOT AT ALL
1. LITTLE INTEREST OR PLEASURE IN DOING THINGS: NOT AT ALL
SUM OF ALL RESPONSES TO PHQ9 QUESTIONS 1 & 2: 0

## 2025-01-23 ASSESSMENT — LIFESTYLE VARIABLES
AUDIT-C TOTAL SCORE: 0
HOW OFTEN DO YOU HAVE A DRINK CONTAINING ALCOHOL: NEVER
HOW MANY STANDARD DRINKS CONTAINING ALCOHOL DO YOU HAVE ON A TYPICAL DAY: PATIENT DOES NOT DRINK
AUDIT-C TOTAL SCORE: 0
SKIP TO QUESTIONS 9-10: 1
HOW OFTEN DO YOU HAVE 6 OR MORE DRINKS ON ONE OCCASION: NEVER

## 2025-01-23 ASSESSMENT — COGNITIVE AND FUNCTIONAL STATUS - GENERAL
DRESSING REGULAR UPPER BODY CLOTHING: A LITTLE
DAILY ACTIVITIY SCORE: 18
EATING MEALS: A LITTLE
PERSONAL GROOMING: A LITTLE
TOILETING: A LITTLE
TURNING FROM BACK TO SIDE WHILE IN FLAT BAD: A LITTLE
CLIMB 3 TO 5 STEPS WITH RAILING: A LITTLE
MOBILITY SCORE: 18
PATIENT BASELINE BEDBOUND: NO
DRESSING REGULAR LOWER BODY CLOTHING: A LITTLE
MOVING TO AND FROM BED TO CHAIR: A LITTLE
STANDING UP FROM CHAIR USING ARMS: A LITTLE
MOVING FROM LYING ON BACK TO SITTING ON SIDE OF FLAT BED WITH BEDRAILS: A LITTLE
HELP NEEDED FOR BATHING: A LITTLE
WALKING IN HOSPITAL ROOM: A LITTLE

## 2025-01-23 ASSESSMENT — ENCOUNTER SYMPTOMS
MUSCULOSKELETAL NEGATIVE: 1
LIGHT-HEADEDNESS: 1
DIZZINESS: 1
ABDOMINAL PAIN: 1

## 2025-01-23 ASSESSMENT — ACTIVITIES OF DAILY LIVING (ADL)
HEARING - LEFT EAR: FUNCTIONAL
LACK_OF_TRANSPORTATION: NO
GROOMING: INDEPENDENT
TOILETING: NEEDS ASSISTANCE
BATHING: INDEPENDENT
FEEDING YOURSELF: INDEPENDENT
HEARING - RIGHT EAR: FUNCTIONAL
ADEQUATE_TO_COMPLETE_ADL: YES
PATIENT'S MEMORY ADEQUATE TO SAFELY COMPLETE DAILY ACTIVITIES?: YES
JUDGMENT_ADEQUATE_SAFELY_COMPLETE_DAILY_ACTIVITIES: YES
WALKS IN HOME: INDEPENDENT
DRESSING YOURSELF: INDEPENDENT

## 2025-01-23 ASSESSMENT — PAIN DESCRIPTION - LOCATION
LOCATION: BACK
LOCATION: ABDOMEN

## 2025-01-23 ASSESSMENT — PAIN DESCRIPTION - ORIENTATION: ORIENTATION: LOWER

## 2025-01-23 NOTE — SIGNIFICANT EVENT
.Rapid Response Nurse Note: Rapid Response    Pager time:   Arrival time:   Event end time:   Location: [place] [address]  [] Triage by phone or secure messaging    Rapid response initiated by:  [] Rapid response RN [] Family [] Nursing Supervisor [] Physician   [] RADAR auto page [] Sepsis auto-page [x] RN [] RT   [] NP/PA [] Other:     Primary reason for call:   [] BAT [] New CPAP/BiPAP [] Bleeding [] Change in mental status   [] Chest pain [] Code blue [] FiO2 >/= 50% [] HR </= 40 bpm   [] HR >/= 130 bpm [] Hyperglycemia [] Hypoglycemia [] RADAR    [] RR </= 8 bpm [] RR >/= 30 bpm [x] SBP </= 90 mmHg [] SpO2 < 90%   [] Seizure [] Sepsis [] Shortness of breath  [x] Staff concern: see comments     Initial VS and/or RADAR VS: T 35.5 °C; HR 98; RR 14; BP 95/58; SPO2 98%.    Provider Notification: Provider Notification: Primary Team GYN-ONC     Interventions:  [] None [] ABG/VBG [] Assist w/ICU transfer [] BAT paged    [] Bag mask [] Blood [] Cardioversion [] Code Blue   [] Code blue for intubation [] Code status changed [] Chest x-ray [] EKG   [x] IV fluid/bolus [] KUB x-ray [x] Labs/cultures [] Medication   [] Nebulizer treatment [x] NIPPV (CPAP/BiPAP) [] Oxygen [] Oral airway   [] Peripheral IV [] Palliative care consult [] CT/MRI [] Sepsis protocol    [] Suctioned [] Other:     Outcome:  [] Coded and  [] Code blue for intubation [] Coded and transferred to ICU []  on division   [x] Remained on division (no change) [] Remained on division + additional monitoring [] Remained in ED [] Transferred to ED   [] Transferred to ICU [] Transferred to inpatient status [] Transferred for interventions (procedure) [] Transferred to ICU stepdown    [] Transferred to surgery [] Transferred to telemetry [] Sepsis protocol [] STEMI protocol   [] Stroke protocol [x] Bedside nurse instructed to page rapid response for any concerns or acute change in condition/VS     Additional Comments:    Upon arrival patient found  sitting up in bed, eyes closed , quiet. Per Staff the patient recently arrived from PACU (ex lap, hysterectomy, hernia repair) and was being assisted out of bed when she became very dizzy, almost passing out.  Patient now hypotensive and BL UE's noted to be very cold. The patient is oriented x4.     GYN ONC attending Dr. Valenzuela and team to bedside.   12 lead ECG and STAT Labs obtained including VBG.  500 mls bolus x2 administered as ordered with transient improvement in blood pressure.  VBG result 7.23/74/32.  Bipap therapy initiated on patient per Dr. Valenzuela- recheck VBG scheduled for 1800.  Rapid Response Team to follow up on VBG recheck- Nursing to contact RRT with any further issues or patient deterioration.    ADDENDUM- Rapid Response @ 6322- Patient hypotensive 63/45-IV fluid bolus wide open. SICU notified of patient and to bedside to examine. Patient accepted to SICU- bed assignment and SICU Charge RN notified.  Patient safely transported to SICU room 8 with all personal belongings- significant other aware of transfer.

## 2025-01-23 NOTE — CONSULTS
Nelida Mata is a 64 y.o. year old female patient who presents for Procedure(s):  laparotomy, abdominal hysterectomy, bilateral salpingo-oophorectomy.  Dr. Erin Valenzuela to assist. with Maureen Davila MD on 1/23/2025.  Acute Pain consulted for assistance with pain control.     Anticipated Postop Pain Issues -   Palliative: typically relieved with IV analgesics and regional local anesthetics  Provocative: typically with movement  Quality: typically burning and aching  Radiation: typically none  Severity: typically severe 8-10/10  Timing: typically constant    Past Medical History:   Diagnosis Date    (HFpEF) heart failure with preserved ejection fraction     (50-55% 9/2023)    Abnormal uterine and vaginal bleeding, unspecified     Abnormal uterine bleeding    Acute vaginitis 02/22/2018    Vaginitis    Alcohol abuse, in remission     History of alcohol abuse    Anxiety     Arthritis     Polyarthritis    Blindness     left eye    Cerebral vascular accident (Multi) 09/2023    Changes in skin texture 04/10/2018    Thickening, skin    Chest pain, unspecified 02/22/2018    Chest pain at rest    Chronic pain disorder     CKD (chronic kidney disease)     stage 4    Coronary artery disease     s/p PCI w/ stent x 3 (2016, 2018)    Depression     Disorder of pigmentation, unspecified 02/22/2018    Discoloration of skin of lower leg    Fibroid     Fibromyalgia     Generalized contraction of visual field, left eye 02/06/2017    Generalized contraction of visual field of left eye    GERD (gastroesophageal reflux disease)     Glaucoma     Headache, unspecified 04/10/2018    Headache    Hereditary and idiopathic neuropathy, unspecified 04/10/2018    Idiopathic peripheral neuropathy    History of blood transfusion     Hydroureter 02/22/2018    Hydroureter on left    Hyperlipidemia, unspecified     Dyslipidemia    Hypertension     Hypokalemia 02/22/2018    Hypokalemia    Insomnia, unspecified     Lipoma     Localized edema  The patient was draped. 02/22/2018    Bilateral edema of lower extremity    Macula scars of posterior pole (postinflammatory) (post-traumatic), left eye 04/10/2018    Macula scar of posterior pole of left eye    NAFLD (nonalcoholic fatty liver disease)     NSTEMI (non-ST elevated myocardial infarction) (Multi) 06/2024    Obesity     Obstructive sleep apnea (adult) (pediatric)     not able to tolerate CPAP    Other chest pain 04/10/2018    Chest tightness or pressure    Other fatigue 02/22/2018    Fatigue    Other fecal abnormalities 02/22/2018    Loose stools    Other forms of dyspnea     Dyspnea on exertion- takes breaks when ambulating    Peripheral vascular disease (CMS-HCC)     Polyp of colon 02/22/2018    Colon polyps    Postmenopausal bleeding 04/10/2018    Postmenopausal bleeding    Presence of coronary angioplasty implant and graft 02/22/2018    Presence of drug coated stent in right coronary artery    Presence of intraocular lens 03/18/2015    Pseudophakia of left eye    Psoriasis, unspecified     Repeated falls 02/22/2018    Multiple falls    Retinal hemorrhage, right eye 02/06/2017    Retinal hemorrhage of right eye    Shortness of breath     STEMI (ST elevation myocardial infarction) (Multi) 09/2023    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    SVC thrombus on Lovenox    Traction detachment of retina, right eye 02/06/2017    Retinal detachment, tractional, right eye    Type 2 diabetes mellitus with other skin ulcer (CODE) 02/22/2018    Diabetic leg ulcer    Type 2 diabetes mellitus with proliferative diabetic retinopathy without macular edema, unspecified eye 04/10/2018    Proliferative diabetic retinopathy    Type 2 diabetes mellitus with unspecified diabetic retinopathy without macular edema 02/06/2017    Background diabetic retinopathy    Umbilical hernia without obstruction or gangrene     Umbilical hernia    Unqualified visual loss, both eyes 02/06/2017    Complete loss of vision in visual field    Unstable angina  (Multi) 04/10/2018    Unstable angina    Vision loss     Vitamin D deficiency, unspecified     Xerosis cutis 02/22/2018    Xerosis of skin        Past Surgical History:   Procedure Laterality Date    BREAST BIOPSY Left     2007    CARDIAC CATHETERIZATION N/A 07/05/2024    Procedure: Left And Right Heart Cath, Without LV;  Surgeon: Loki Donis MD;  Location: [place] [id number] Cardiac Cath [facility];  Service: Cardiovascular;  Laterality: N/A;    CATARACT EXTRACTION  03/18/2015    Cataract Surgery    CORONARY ANGIOPLASTY WITH STENT PLACEMENT  03/06/2017    Cath Stent Placement    CT ANGIO AORTA AND BILATERAL ILIOFEMORAL RUN OFF INCLUDING WITHOUT CONTRAST IF PERFORMED  08/08/2022    CT AORTA AND BILATERAL ILIOFEMORAL RUNOFF ANGIOGRAM W AND/OR WO IV CONTRAST 8/8/2022 [facility] EMERGENCY LEGACY    MR HEAD ANGIO WO IV CONTRAST  08/16/2013    MR HEAD ANGIO WO IV CONTRAST 8/16/2013 [facility] ANCILLARY LEGACY    MR NECK ANGIO WO IV CONTRAST  08/16/2013    MR NECK ANGIO WO IV CONTRAST 8/16/2013 [facility] ANCILLARY LEGACY    RETINAL DETACHMENT SURGERY  03/18/2015    Repair Of Retinal Detachment    TOE AMPUTATION Right     VULVA SURGERY          Family History   Problem Relation Name Age of Onset    Diabetes Mother      Glaucoma Father      Hypertension Other FAMILY     Uterine cancer Neg Hx      Ovarian cancer Neg Hx      Colon cancer Neg Hx      Breast cancer Neg Hx          Social History     Socioeconomic History    Marital status: Single     Spouse name: Not on file    Number of children: Not on file    Years of education: Not on file    Highest education level: Not on file   Occupational History    Not on file   Tobacco Use    Smoking status: Never     Passive exposure: Never    Smokeless tobacco: Never   Vaping Use    Vaping status: Never Used   Substance and Sexual Activity    Alcohol use: Not Currently     Comment: 1-2 times a week    Drug use: Never    Sexual activity: Defer   Other Topics Concern    Not on file   Social History Narrative     Not on file     Social Drivers of Health     Financial Resource Strain: Low Risk  (9/12/2024)    Overall Financial Resource Strain (CARDIA)     Difficulty of Paying Living Expenses: Not very hard   Food Insecurity: No Food Insecurity (9/28/2023)    [facility]    Hunger Vital Sign     Worried About Running Out of Food in the Last Year: Never true     Ran Out of Food in the Last Year: Never true   Transportation Needs: No Transportation Needs (10/28/2024)    PRAPARE - Transportation     Lack of Transportation (Medical): No     Lack of Transportation (Non-Medical): No   Physical Activity: Not on file   Stress: Not on file   Social Connections: Not on file   Intimate Partner Violence: Not on file   Housing Stability: Low Risk  (10/28/2024)    Housing Stability Vital Sign     Unable to Pay for Housing in the Last Year: No     Number of Times Moved in the Last Year: 1     Homeless in the Last Year: No        Allergies   Allergen Reactions    Other Rash     Novocaine Solution, reaction rash     Penicillins Rash and Hives    Procaine Rash and Hives         Review of Systems  Gen: No fatigue, anorexia, insomnia, fever.   Eyes: No vision loss, double vision, drainage, eye pain.   ENT: No pharyngitis, dry mouth, no hearing changes or ear discharge  Cardiac: No chest pain, palpitations, syncope, near syncope.   Pulmonary: No shortness of breath, cough, hemoptysis.   Heme/lymph: No swollen glands, fever, bleeding.   GI: No abdominal pain, change in bowel habits, melena, hematemesis, hematochezia, nausea, vomiting, diarrhea.   : No discharge, dysuria, frequency, urgency, hematuria.  Endo: No polyuria or weight loss.   Musculoskeletal: Negative for any pain or loss of ROM/weakness  Skin: No rashes or lesions  Neuro: Normal speech, no numbness or weakness. No gait difficulties  Review of systems is otherwise negative unless stated above or in history of present illness.    Physical  Exam:  Constitutional:  no distress, alert and cooperative  Eyes: clear sclera  Head/Neck: No apparent injury, trachea midline  Respiratory/Thorax: Patent airways, thorax symmetric, breathing comfortably  Cardiovascular: no pitting edema  Gastrointestinal: Nondistended  Musculoskeletal: ROM intact  Extremities: no clubbing  Neurological: alert, ruiz x4  Psychological: Appropriate affect    Results for orders placed or performed during the hospital encounter of 01/23/25 (from the past 24 hours)   POCT GLUCOSE   Result Value Ref Range    POCT Glucose 122 (H) 74 - 99 mg/dL     *Note: Due to a large number of results and/or encounters for the requested time period, some results have not been displayed. A complete set of results can be found in Results Review.        Nelida Mata is a 64 y.o. year old female patient who presents for Procedure(s):  laparotomy, abdominal hysterectomy, bilateral salpingo-oophorectomy.  Dr. Erin Valenzuela to assist. with Maureen Davila MD on 1/23/2025. Acute Pain consulted for assistance with pain control.     Plan:    - No block as allergy to procaine. Recommend multimodal pain management approach.  - Oxycodone 5mg/10mg for moderate/severe pain  - Breakthrough dilaudid 0.2mg q4hr  - Tylenol 975mg Q8H scheduled  - Consider gabapentin 300mg at bedtime  - Lidocaine patches around affected site as needed  - IV Mg 1g x1 dose - can be given in addition to any repletion today  - Continuous pulse ox  - Acute Pain Service will follow patient for multimodal pain regimen recommendations    Acute Pain Resident  pg [phone]  [phone]

## 2025-01-23 NOTE — H&P
SICU History & Physical    Subjective   HPI:  Nelida is a 64 y.o. female  presents to ICU for management of hypotension and dizziness after total lap hysterectomy and BSO with inguinal hernia repair today 1/23/25. PMHx of HFpEF (50-55% 9/2023), HLD, CAD s/p PCI w/ stent x 3 (2016, 2018), STEMI (9/2023, no stents placed), TIA 9/2023, SVC thrombus on Lovenox, remote DVT, IDDM2 on Farxiga, CKD, MICHAEL noncompliant with CPAP at home and NAFLD. There were no intra-op complications; EBL was 300 ml. Patient was stable in PACU. Code white was called after she went to floor for increased abdominal pain, lightheadedness, some bleeding from incisional site, hypotension 50s/40s with cold clammy extremities. She did not have AMS and remained conversant through this time and during transfer to SICU. Patient responded well to liter of crystalloid on the floor.     Past Medical History:   Diagnosis Date    (HFpEF) heart failure with preserved ejection fraction     (50-55% 9/2023)    Abnormal uterine and vaginal bleeding, unspecified     Abnormal uterine bleeding    Acute vaginitis 02/22/2018    Vaginitis    Alcohol abuse, in remission     History of alcohol abuse    Anxiety     Arthritis     Polyarthritis    Blindness     left eye    Cerebral vascular accident (Multi) 09/2023    Changes in skin texture 04/10/2018    Thickening, skin    Chest pain, unspecified 02/22/2018    Chest pain at rest    Chronic pain disorder     CKD (chronic kidney disease)     stage 4    Coronary artery disease     s/p PCI w/ stent x 3 (2016, 2018)    Depression     Disorder of pigmentation, unspecified 02/22/2018    Discoloration of skin of lower leg    Fibroid     Fibromyalgia     Generalized contraction of visual field, left eye 02/06/2017    Generalized contraction of visual field of left eye    GERD (gastroesophageal reflux disease)     Glaucoma     Headache, unspecified 04/10/2018    Headache    Hereditary and idiopathic neuropathy, unspecified  04/10/2018    Idiopathic peripheral neuropathy    History of blood transfusion     Hydroureter 02/22/2018    Hydroureter on left    Hyperlipidemia, unspecified     Dyslipidemia    Hypertension     Hypokalemia 02/22/2018    Hypokalemia    Insomnia, unspecified     Lipoma     Localized edema 02/22/2018    Bilateral edema of lower extremity    Macula scars of posterior pole (postinflammatory) (post-traumatic), left eye 04/10/2018    Macula scar of posterior pole of left eye    NAFLD (nonalcoholic fatty liver disease)     NSTEMI (non-ST elevated myocardial infarction) (Multi) 06/2024    Obesity     Obstructive sleep apnea (adult) (pediatric)     not able to tolerate CPAP    Other chest pain 04/10/2018    Chest tightness or pressure    Other fatigue 02/22/2018    Fatigue    Other fecal abnormalities 02/22/2018    Loose stools    Other forms of dyspnea     Dyspnea on exertion- takes breaks when ambulating    Peripheral vascular disease (CMS-HCC)     Polyp of colon 02/22/2018    Colon polyps    Postmenopausal bleeding 04/10/2018    Postmenopausal bleeding    Presence of coronary angioplasty implant and graft 02/22/2018    Presence of drug coated stent in right coronary artery    Presence of intraocular lens 03/18/2015    Pseudophakia of left eye    Psoriasis, unspecified     Repeated falls 02/22/2018    Multiple falls    Retinal hemorrhage, right eye 02/06/2017    Retinal hemorrhage of right eye    Shortness of breath     STEMI (ST elevation myocardial infarction) (Multi) 09/2023    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    SVC thrombus on Lovenox    Traction detachment of retina, right eye 02/06/2017    Retinal detachment, tractional, right eye    Type 2 diabetes mellitus with other skin ulcer (CODE) 02/22/2018    Diabetic leg ulcer    Type 2 diabetes mellitus with proliferative diabetic retinopathy without macular edema, unspecified eye 04/10/2018    Proliferative diabetic retinopathy    Type 2 diabetes mellitus  with unspecified diabetic retinopathy without macular edema 02/06/2017    Background diabetic retinopathy    Umbilical hernia without obstruction or gangrene     Umbilical hernia    Unqualified visual loss, both eyes 02/06/2017    Complete loss of vision in visual field    Unstable angina (Multi) 04/10/2018    Unstable angina    Vision loss     Vitamin D deficiency, unspecified     Xerosis cutis 02/22/2018    Xerosis of skin     Past Surgical History:   Procedure Laterality Date    BREAST BIOPSY Left     2007    CARDIAC CATHETERIZATION N/A 07/05/2024    Procedure: Left And Right Heart Cath, Without LV;  Surgeon: Loki Donis MD;  Location: [place] [address] [facility];  Service: Cardiovascular;  Laterality: N/A;    CATARACT EXTRACTION  03/18/2015    Cataract Surgery    CORONARY ANGIOPLASTY WITH STENT PLACEMENT  03/06/2017    Cath Stent Placement    CT ANGIO AORTA AND BILATERAL ILIOFEMORAL RUN OFF INCLUDING WITHOUT CONTRAST IF PERFORMED  08/08/2022    CT AORTA AND BILATERAL ILIOFEMORAL RUNOFF ANGIOGRAM W AND/OR WO IV CONTRAST 8/8/2022 [facility] [place] [facility]    MR HEAD ANGIO WO IV CONTRAST  08/16/2013    MR HEAD ANGIO WO IV CONTRAST 8/16/2013 [facility] [place] [facility]    MR NECK ANGIO WO IV CONTRAST  08/16/2013    MR NECK ANGIO WO IV CONTRAST 8/16/2013 [facility] [place] [facility]    RETINAL DETACHMENT SURGERY  03/18/2015    Repair Of Retinal Detachment    TOE AMPUTATION Right     VULVA SURGERY       Medications Prior to Admission   Medication Sig Dispense Refill Last Dose/Taking    aspirin 81 mg EC tablet Take 1 tablet (81 mg) by mouth once daily. 30 tablet 11 Past Week    atorvastatin (Lipitor) 80 mg tablet 1 TAB(S) ORALLY ONCE A DAY -.MEDS TO BEDS 90 tablet 1 1/23/2025 Morning    brimonidine (AlphaGAN) 0.2 % ophthalmic solution Administer 1 drop into both eyes 2 times a day. PATIENT NEEDS PCP APPT FOR FURTHER REFILLS 30 mL 3 1/22/2025    carvedilol (Coreg) 12.5 mg tablet Take 1 tablet (12.5 mg) by mouth 2 times daily  (morning and late afternoon). 60 tablet 11 1/23/2025 Morning    dapagliflozin propanediol (Farxiga) 10 mg Take 1 tablet (10 mg) by mouth once daily. 30 tablet 11 Past Week    DULoxetine (Cymbalta) 30 mg DR capsule Take 1 capsule (30 mg) by mouth once daily in the morning AND 2 capsules (60 mg) once daily at bedtime. Do not crush or chew.. 60 capsule 11 Past Week    enoxaparin (Lovenox) 40 mg/0.4 mL syringe Inject 0.4 mL (40 mg) under the skin once daily. 12 mL 3 Past Week    enoxaparin (Lovenox) 40 mg/0.4 mL syringe Inject 0.4ml under the skin once daily. 12 mL 3 Past Week    furosemide (Lasix) 20 mg tablet Take 1 tablet (20 mg) by mouth once daily. 90 tablet 2 1/22/2025    insulin degludec (TRESIBA FLEXTOUCH U-200 SUBQ) Inject 40 Units under the skin once daily at bedtime. Take as directed per insulin instructions.   1/22/2025    insulin lispro (HumaLOG KwikPen Insulin) 200 unit/mL (3 mL) insulin pen pen Inject 22 Units under the skin 3 times daily (morning, midday, late afternoon). Take as directed per insulin instructions. 11 each 3 1/22/2025    latanoprost (Xalatan) 0.005 % ophthalmic solution Administer 1 drop into both eyes once daily at bedtime. 2.5 mL 3 1/22/2025    losartan (Cozaar) 50 mg tablet Take 1 tablet (50 mg) by mouth early in the morning..   1/22/2025    metFORMIN XR (Glucophage-XR) 500 mg 24 hr tablet Take 1 tablet (500 mg) by mouth once daily in the evening. Take with meals. Do not crush, chew, or split. 30 tablet 11 1/22/2025    multivitamin tablet Take 1 tablet by mouth once daily. 30 tablet 11 Past Week    pantoprazole (ProtoNix) 40 mg EC tablet Take 1 tablet (40 mg) by mouth once daily in the morning. Take before meals. 30 tablet 5 1/22/2025    semaglutide 2 mg/dose (8 mg/3 mL) pen injector Inject 2 mg under the skin 1 (one) time per week. 3 mL 11 Past Week    blood pressure monitor kit 1 each once daily. Use once daily to check blood pressure 1 kit 0 Unknown    blood sugar diagnostic  (OneTouch Ultra Test) strip USE 1 STRIP(S) TO TEST BLOOD SUGAR 3 TIMES A  strip 3 Unknown    [] chlorhexidine (Hibiclens) 4 % external liquid Apply topically once daily as needed for wound care for up to 5 days. (Patient not taking: Reported on 2025) 473 mL 0 Not Taking    cholecalciferol (Vitamin D3) 10 MCG (400 UNIT) tablet Take 1 tablet (10 mcg) by mouth once daily. 30 tablet 11 Unknown    diclofenac sodium (Voltaren) 1 % gel Apply 4.5 inches (4 g) topically 2 times a day. 800 g 2 Unknown    diphenhydramine/Maalox/lidocaine 1:1:1 Swish and swallow 10 mL every 8 hours as needed for epigastric pain 120 mL 0 Unknown    finerenone (Kerendia) 10 mg tablet tablet Take 1 tablet (10 mg) by mouth once daily. 90 tablet 3 Unknown    FreeStyle Filipe 2 Sensor kit Apply 1 sensor to the back of the upper arm. Change sensor every 14 days. (Patient not taking: Reported on 2025) 2 each 11 Not Taking    FreeStyle Filipe reader (FreeStyle Filipe 2 [place]) misc Use as instructed (Patient not taking: Reported on 2025) 1 each 0 Not Taking    glucagon 3 mg/actuation spray,non-aerosol Administer 1 spray into affected nostril(s) if needed (please  apply into a single nostril for blood sugar <60). (Patient not taking: Reported on 2025) 2 each 0 Unknown    hyoscyamine (Anaspaz) 0.125 mg disintegrating tablet Take 1 tablet (0.125 mg) by mouth every 8 hours if needed (cramping or nausea). (Patient taking differently: Dissolve 1 tablet (0.125 mg) in the mouth every 8 hours if needed (cramping or nausea). Takes PRN) 30 tablet 0 Unknown    isosorbide mononitrate ER (Imdur) 60 mg 24 hr tablet TAKE 1 TABLET BY MOUTH EVERY DAY IN THE MORNING 90 tablet 1 Unknown    melatonin 5 mg tablet Take 1 tablet (5 mg) by mouth as needed at bedtime for sleep. 30 tablet 11 Unknown    nitroglycerin (Nitrostat) 0.4 mg SL tablet PLACE 1 TABLET UNDER THE TONGUE EVERY 5 MINUTES FOR UP TO 3 DOSES AS NEEDED FOR CHEST PAIN.CALL 911 IF  PAIN PERSISTS.   Unknown    nystatin (Mycostatin) 100,000 unit/gram powder Apply topically.       urea (Carmol) 20 % cream Apply 1 Application topically 2 times a day.   Unknown     Other, Penicillins, and Procaine  Social History     Tobacco Use    Smoking status: Never     Passive exposure: Never    Smokeless tobacco: Never   Vaping Use    Vaping status: Never Used   Substance Use Topics    Alcohol use: Not Currently     Comment: 1-2 times a week    Drug use: Never     Family History   Problem Relation Name Age of Onset    Diabetes Mother      Glaucoma Father      Hypertension Other FAMILY     Uterine cancer Neg Hx      Ovarian cancer Neg Hx      Colon cancer Neg Hx      Breast cancer Neg Hx         Review of Systems:  Review of Systems   Constitutional:         Feels lightheaded and weak   HENT: Negative.     Respiratory:          Respiratory distress, on BIPAP   Gastrointestinal:  Positive for abdominal pain.   Genitourinary: Negative.    Musculoskeletal: Negative.    Neurological:  Positive for dizziness and light-headedness.       Scheduled Medications:   albumin human, 12.5 g, intravenous, Once  [Held by provider] aspirin, 81 mg, oral, Daily  [Held by provider] atorvastatin, 80 mg, oral, Daily  brimonidine, 1 drop, Both Eyes, BID  [Held by provider] carvedilol, 12.5 mg, oral, BID  [Held by provider] DULoxetine, 30 mg, oral, Daily before breakfast   And  [Held by provider] DULoxetine, 60 mg, oral, q PM  [Held by provider] finerenone, 10 mg, oral, Daily  [Held by provider] heparin (porcine), 5,000 Units, subcutaneous, q8h  insulin lispro, 0-5 Units, subcutaneous, q4h  latanoprost, 1 drop, Both Eyes, Nightly  [Held by provider] pantoprazole, 40 mg, oral, Daily before breakfast  [START ON 1/24/2025] pantoprazole, 40 mg, intravenous, Daily  phenylephrine, , ,   [Held by provider] polyethylene glycol, 17 g, oral, Daily         Continuous Medications:   lactated Ringer's, 100 mL/hr         PRN Medications:   PRN  medications: calcium gluconate, calcium gluconate, dextrose, dextrose, glucagon, glucagon, HYDROmorphone, magnesium sulfate, magnesium sulfate, [Held by provider] melatonin, naloxone, ondansetron ODT **OR** ondansetron, phenylephrine, [Held by provider] simethicone, [Held by provider] traMADol    Objective   Vitals:  Most Recent:  Vitals:    01/23/25 1806   BP: 104/59   Pulse: 95   Resp: 19   Temp:    SpO2: 100%       24hr Min/Max:  Temp  Min: 33.6 °C (92.5 °F)  Max: 36.6 °C (97.9 °F)  Pulse  Min: 76  Max: 98  BP  Min: 63/45  Max: 142/71  Resp  Min: 11  Max: 19  SpO2  Min: 93 %  Max: 100 %    I/O:    Intake/Output Summary (Last 24 hours) at 1/23/2025 1938  Last data filed at 1/23/2025 1806  Gross per 24 hour   Intake 3070 ml   Output 665 ml   Net 2405 ml       Physical Exam:   Physical Exam  Constitutional:       Comments: Tired appearing but conversant    HENT:      Head: Normocephalic and atraumatic.   Eyes:      Extraocular Movements: Extraocular movements intact.      Conjunctiva/sclera: Conjunctivae normal.   Cardiovascular:      Rate and Rhythm: Normal rate and regular rhythm.   Pulmonary:      Effort: Pulmonary effort is normal.      Breath sounds: Normal breath sounds.   Abdominal:      Tenderness: There is abdominal tenderness. There is no guarding or rebound.      Comments: Some pain to palpation lower abdomen bilaterally, a few nodular structures felt on left and right (could be lipomas), abdominal incision does show some blood, no elías bleeding. No peritonitic signs.    Musculoskeletal:         General: No swelling.      Cervical back: Normal range of motion and neck supple.   Skin:     Comments: Extremities cool to touch    Neurological:      General: No focal deficit present.      Mental Status: She is alert and oriented to person, place, and time.   Psychiatric:         Mood and Affect: Mood normal.         Behavior: Behavior normal.       Lab/Radiology/Diagnostic Review:  Results for orders placed  or performed during the hospital encounter of 01/23/25 (from the past 24 hours)   POCT GLUCOSE   Result Value Ref Range    POCT Glucose 122 (H) 74 - 99 mg/dL   POCT GLUCOSE   Result Value Ref Range    POCT Glucose 116 (H) 74 - 99 mg/dL   POCT GLUCOSE   Result Value Ref Range    POCT Glucose 104 (H) 74 - 99 mg/dL   POCT GLUCOSE   Result Value Ref Range    POCT Glucose 107 (H) 74 - 99 mg/dL   CBC   Result Value Ref Range    WBC 14.7 (H) 4.4 - 11.3 x10*3/uL    nRBC 0.0 0.0 - 0.0 /100 WBCs    RBC 4.94 4.00 - 5.20 x10*6/uL    Hemoglobin 14.7 12.0 - 16.0 g/dL    Hematocrit 46.5 (H) 36.0 - 46.0 %    MCV 94 80 - 100 fL    MCH 29.8 26.0 - 34.0 pg    MCHC 31.6 (L) 32.0 - 36.0 g/dL    RDW 13.3 11.5 - 14.5 %    Platelets 167 150 - 450 x10*3/uL   Basic Metabolic Panel   Result Value Ref Range    Glucose 109 (H) 74 - 99 mg/dL    Sodium 142 136 - 145 mmol/L    Potassium 4.8 3.5 - 5.3 mmol/L    Chloride 104 98 - 107 mmol/L    Bicarbonate 20 (L) 21 - 32 mmol/L    Anion Gap 23 (H) 10 - 20 mmol/L    Urea Nitrogen 20 6 - 23 mg/dL    Creatinine 1.35 (H) 0.50 - 1.05 mg/dL    eGFR 44 (L) >60 mL/min/1.73m*2    Calcium 9.2 8.6 - 10.6 mg/dL   Magnesium   Result Value Ref Range    Magnesium 2.26 1.60 - 2.40 mg/dL   BLOOD GAS VENOUS   Result Value Ref Range    POCT pH, Venous 7.22 (LL) 7.33 - 7.43 pH    POCT pCO2, Venous 74 (HH) 41 - 51 mm Hg    POCT pO2, Venous 41 35 - 45 mm Hg    POCT SO2, Venous 54 45 - 75 %    POCT Oxy Hemoglobin, Venous 53.0 45.0 - 75.0 %    POCT Base Excess, Venous 0.5 -2.0 - 3.0 mmol/L    POCT HCO3 Calculated, Venous 30.3 (H) 22.0 - 26.0 mmol/L    Patient Temperature 37.0 degrees Celsius    FiO2 28 %   Coagulation Screen   Result Value Ref Range    Protime 11.5 9.8 - 12.8 seconds    INR 1.0 0.9 - 1.1    aPTT 29 27 - 38 seconds   BLOOD GAS VENOUS FULL PANEL   Result Value Ref Range    POCT pH, Venous 7.23 (LL) 7.33 - 7.43 pH    POCT pCO2, Venous 74 (HH) 41 - 51 mm Hg    POCT pO2, Venous 32 (L) 35 - 45 mm Hg    POCT SO2,  Venous 38 (L) 45 - 75 %    POCT Oxy Hemoglobin, Venous 37.6 (L) 45.0 - 75.0 %    POCT Hematocrit Calculated, Venous 42.0 36.0 - 46.0 %    POCT Sodium, Venous 137 136 - 145 mmol/L    POCT Potassium, Venous 5.7 (H) 3.5 - 5.3 mmol/L    POCT Chloride, Venous 104 98 - 107 mmol/L    POCT Ionized Calicum, Venous 1.15 1.10 - 1.33 mmol/L    POCT Glucose, Venous 124 (H) 74 - 99 mg/dL    POCT Lactate, Venous 1.7 0.4 - 2.0 mmol/L    POCT Base Excess, Venous 1.2 -2.0 - 3.0 mmol/L    POCT HCO3 Calculated, Venous 31.0 (H) 22.0 - 26.0 mmol/L    POCT Hemoglobin, Venous 14.1 12.0 - 16.0 g/dL    POCT Anion Gap, Venous 8.0 (L) 10.0 - 25.0 mmol/L    Patient Temperature 37.0 degrees Celsius    FiO2 28 %   Troponin I, High Sensitivity   Result Value Ref Range    Troponin I, High Sensitivity (CMC) 13 0 - 34 ng/L   POCT GLUCOSE   Result Value Ref Range    POCT Glucose 142 (H) 74 - 99 mg/dL     *Note: Due to a large number of results and/or encounters for the requested time period, some results have not been displayed. A complete set of results can be found in Results Review.       ECG upon arrival to ICU: NSR, with prolonged     Bedside Echo by Cardiology upon arrival to ICU: Normal function, preserved EF, no acute abnormalities (see final read)     Assessment/Plan    Assessment:  Nelida is a 64 y.o. female  presents to ICU for management of hypotension and dizziness after total lap hysterectomy and BSO with inguinal hernia repair today 1/23/25.     Plan:  NEURO:  History of depression, glaucoma and TIA 9/23. Acute post-op pain. Continues to complain of abdominal pain after surgery. Got Narcan in OR however did not get long acting opioid intra-op after 20 mg Methadone.   - Ongoing neuro and pain assessments  - PRN hydromorphone for pain control  - Has Procaine allergy (hives) but no reaction to lidocaine intra-op   - PT/OT consult tomorrow   - Home meds: AlphaGAN drops and lantoprost for glaucoma, Cymbalta      CV: History of  HFpEF (50-55% 9/2023), HLD, CAD s/p PCI w/ stent x 3 (2016, 2018), STEMI (9/2023, no stents placed), SVC thrombus on Lovenox. Bedside echo and ECG on arrival to ICU were unremarkable. Denies chest pain. Was hypotensive on floor 50s/40s. OR EBL today 300cc. Got total 1.5L fluids (crystalloid and albumin) from code white to stabilization in ICU at sign-out. BP improving and responsive to fluids. DDx include post-op hemorrhage, sepsis, decompensated HF, PE, reaction to anesthesia, inadequate volume resuscitation.   - Continuous EKG/abp monitoring  - Started stress dose steroids Hydrocortisone 50 mg q6h  - Volume resuscitate as appropriate   - Goal map range 65-90  - Home meds: ASA, Statin, Coreg, Furosamide, Losartan, Kerendia (finerenone), Imdur, NTG - holding for now    PULM: History of MICHAEL non-compliant with CPAP at home. Hypercarbia noted on labs at time of code-white. Arrived to SICU on BIPAP. VBG on floor suggests respiratory acidosis. Euglycemic DKA can be considered but less likely. On BIPAP 10/5 saturating well.   - Continue BIPAP   - Additional pulm toilet prn   - F/U post admit CXR     GI: Hx GERD on PPI at home. Pain to palpation of lower abdomen bilaterally, no rebound tenderness. Some oozing from incisional site.   - Low threshold for CT scan; await intake labs and notify Gyn if any concerns   - Follow-up post-admit KUB  - NPO for now  - PPI for GI prophylaxis    : No history of CDK, baseline Cr. ~2.5. Good UOP in OR and post-op. K 5.2 on gas.  - Maintenance IVF   - Volume resuscitation as needed  - Maintain U/O >0.5ml/kg/hr  - Check renal function panel post op and daily  - Replete electrolytes to goal K>4, Mg>2, Phos>2.5, ionized Ca>1.10.    HEME: Acute blood loss anemia. OR  ml; Hb stable 16, 14 post-op  - Check CBC and coags post op and daily  - SCDs for DVT prophylaxis  - holding SC heparin for now  - ongoing monitoring for s/s bleeding    ENDO: Hx IDDM2 on Farxiga, Insulin, Metformin,  Semaglutide  - Q4h BG   - SSI Lispro per ICU protocol.    ID: Afebrile. Mild leukocytosis. No lactate.   - Temp q4h, wbc daily  - ongoing monitoring for s/s infection    Lines:  - R radial arterial line  - 2PIVs    Dispo: Admit to ICU. Patient seen and discussed with ICU attending Dr. Mykel Gaytan MD   Anesthesiology, CA-2  SICU phone [phone]

## 2025-01-23 NOTE — ANESTHESIA PROCEDURE NOTES
Airway  Date/Time: 1/23/2025 7:55 AM  Urgency: elective    Airway not difficult    Staffing  Performed: resident   Authorized by: Edna Prabhakar MD    Performed by: Sindhu Rosa MD  Patient location during procedure: OR    Indications and Patient Condition  Indications for airway management: anesthesia and airway protection  Spontaneous ventilation: present  Sedation level: deep  Preoxygenated: yes  Patient position: sniffing  Mask difficulty assessment: 1 - vent by mask (modified RSI conducted)  Planned trial extubation    Final Airway Details  Final airway type: endotracheal airway      Successful airway: ETT  Cuffed: yes   Successful intubation technique: video laryngoscopy (Egan blade 3)  Blade type: Egan.  Blade size: #3  ETT size (mm): 7.0  Cormack-Lehane Classification: grade I - full view of glottis  Placement verified by: chest auscultation and capnometry   Measured from: lips  ETT to lips (cm): 21  Number of attempts at approach: 1  Ventilation between attempts: none  Number of other approaches attempted: 0

## 2025-01-23 NOTE — DISCHARGE INSTRUCTIONS
Post-operative Discharge Instructions  If you have any questions about your care, please contact the Gynecologic Oncology office at [phone].    Activity  No driving for 2 weeks.    No lifting over 10 lbs for 6 weeks  Use the railing for support when going up and down stairs.  Activity as tolerated  You may shower.   Do not take tub baths, go swimming or use a hot tub until instructed to do so  Pelvic Rest: You should not resume sexual activity or place anything inside your vagina at this time.  Your doctor will discuss recommendations at your follow-up appointment.  Notify Your Doctor or Nurse if you have any of the following  Wound Infection Symptoms  Call your doctor or nurse right away if you have signs of infection at your wound such as:  More pain around the wound  Change in the amount , color and odor of drainage  The skin around the wound feels warm or has red streaks  The wound separates or opens up  You have a temperature greater than 100.4  Deep Vein Thrombosis Symptoms  Call your doctor or nurse right away if you have any signs of blood clots such as:  Tender, swollen or reddened areas anywhere in your leg.  Numbness or tingling in your lower leg or calf, or at the top of your leg or groin  Skin on you leg looks pale or blue or feels cold to touch  Chest pain or have trouble breathing  Fever or chills    Fever or Chills  Call your doctor or nurse if you have a temperature greater than 100.4 degrees F that lasts more than 1 hour and/or chills.  Nausea and Vomiting     Call your doctor or nurse if you have nausea and vomiting that will not let you keep medicine down and will not let you keep fluids down  Vaginal Discharge or Bleeding  Call your doctor or nurse if foul smelling discharge form your vagina and heavy bleeding from your vagina that soaks 2 to 3 pads in 1 hour.  Unrelieved Pain  Call your doctor or nurse if your pain gets worse or is not eased 1 hour after taking your pain  medicine.  Urinary Tract Infection Symptoms  Call your doctor or nurse right away if you have signs of a urinary tract infection such as:  Urine is cloudy, bloody or has a bad odor  You leak urine or you have to urinate more often  You feel burning when you urinate  You have a temperature greater than 100.4  Incision Care  You do not need to cover your incision.  Clean it each day with mild soap and water.  Wash the incision daily and pat it dry. You may shower, but do not soak incision  even after staple removal until the wound is healed.  Do not apply any ointments, medications, creams or lotions to the wound.  Staple Care  Your incision may be closed with staples that should remain in place for 10 to 14 days after surgery. Staples will be removed at your follow-up appointment.      In an emergency, call 911 or go to an Emergency Department at a nearby hospital

## 2025-01-23 NOTE — H&P
History Of Present Illness  63 yo with pmhx of HFpEF (50-55% 9/2023), CAD s/p PCI w/ stent x 3 (2016, 2018) and STEMI (9/2023, no stents placed), TIA 9/2023, SVC thrombus on Lovenox, remote DVT, IDDM2, HLD, CKD, MICHAEL, and NAFLD, here for GIBSON, BSO in the setting of known fibroid uterus and chronic PMB.    Recently had in office attempt at endometrial biopsy, also with removal of LNG IUD  IUD was placed at time of vaginoscopy in 2018 - cervix located behind pubic symphysis, unable to reach/visualize in the office.    Lab Results   Component Value Date    WBC 6.8 01/14/2025    HGB 16.0 01/14/2025    HCT 48.7 (H) 01/14/2025    MCV 88 01/14/2025     01/14/2025     Lab Results   Component Value Date    GLUCOSE 164 (H) 01/14/2025    CALCIUM 9.9 01/14/2025     01/14/2025    K 4.6 01/14/2025    CO2 36 (H) 01/14/2025    CL 95 (L) 01/14/2025    BUN 27 (H) 01/14/2025    CREATININE 1.64 (H) 01/14/2025     10/2024 CTAP:  Markedly enlarged multi fibroid uterus (24 x 10 x 16 cm ) which exerts mass effect on the surrounding structures.  Similar to the prior exam.  Lobulated soft tissue densities in the right anterior subcutaneous fat  with some associated air presumably reflecting injection sites.  Approximately 3 cm umbilical hernia defect with a large fat-containing hernia sac extending inferiorly.    PMH:  HFpEF (50-55% 9/2023)  CAD s/p PCI w/ stent x 3 (2016, 2018)  STEMI (9/2023, no stents placed)  TIA 9/2023  SVC thrombus on Lovenox, remote DVT  IDDM2  HLD  CKD  MICHAEL  NAFLD   obesity     PSH:  Past Surgical History:   Procedure Laterality Date    BREAST BIOPSY Left     2007    CARDIAC CATHETERIZATION N/A 07/05/2024    Procedure: Left And Right Heart Cath, Without LV;  Surgeon: Loki Dnois MD;  Location: [place] [address] Cardiac Cath Lab;  Service: Cardiovascular;  Laterality: N/A;    CATARACT EXTRACTION  03/18/2015    Cataract Surgery    CORONARY ANGIOPLASTY WITH STENT PLACEMENT  03/06/2017    Cath Stent Placement     CT ANGIO AORTA AND BILATERAL ILIOFEMORAL RUN OFF INCLUDING WITHOUT CONTRAST IF PERFORMED  2022    CT AORTA AND BILATERAL ILIOFEMORAL RUNOFF ANGIOGRAM W AND/OR WO IV CONTRAST 2022 [facility] [place] EMERGENCY LEGACY    MR HEAD ANGIO WO IV CONTRAST  2013    MR HEAD ANGIO WO IV CONTRAST 2013 [facility] [place] ANCILLARY LEGACY    MR NECK ANGIO WO IV CONTRAST  2013    MR NECK ANGIO WO IV CONTRAST 2013 [facility] [place] ANCILLARY LEGACY    RETINAL DETACHMENT SURGERY  2015    Repair Of Retinal Detachment    TOE AMPUTATION Right     VULVA SURGERY       OBGyn history:    Postmenopausal, previous reported history of heavy menstrual bleeding     Social history:   Lives at home with boyfriend  Does not smoke    Family History   Problem Relation Name Age of Onset    Diabetes Mother      Glaucoma Father      Hypertension Other FAMILY     Uterine cancer Neg Hx      Ovarian cancer Neg Hx      Colon cancer Neg Hx      Breast cancer Neg Hx       Meds: ASA, atorvastatin, brimonidine, carvedilol, Vit D3, dapagliflozin, diclofenac, BMX, duloxetine, Lovenox 40 daily, finerenone, lasix 20mg daily, hyoscyamine, insulin lispro 22 TID, isorbide mononitrate, latanoprost, losartan, metformin 500 QPM, MVI, nystatin, pantoprazole, semaglutide, urea topical    Allergies  Other, Penicillins, and Procaine    Physical Exam  Last Recorded Vitals  To be obtained. Will fu    General: no acute distress  HEENT: normocephalic, atraumatic  Heart: warm and well perfused  Lungs: breathing comfortably on room air  Abdomen: nondistended  Extremities: moving all extremities  Neuro: awake and conversant  Psych: appropriate mood and affect        Assessment/Plan   Assessment & Plan  Leiomyoma of uterus    Fibroid uterus    Nelida Mata 65 yo with known fibroid uterus and chronic PMB presenting for scheduled surgery    Plan to proceed with Total abdominal hysterectomy, bilateral salpingo-oophorectomy, and any other indicated procedures.  Surgical  consent was reviewed.       To be d/w Dr. Lola Lopez MD PGY-1   Gyn Onc, Pager [phone]

## 2025-01-23 NOTE — SIGNIFICANT EVENT
"Significant Event Note   65y/o POD 0 from XL, GIBSON, BSO, and umbilical hernia repair    Subjective  Called to bedside by nursing. Code white also called by nursing at this time. Patient experiencing significant dizziness and noted increased bleeding from the incisional site. Patient otherwise denies CP/SOB or increased abdominal pain different from her immediate post op baseline. She states she just feels \"clammy\" and lightheaded    Of note pt has an extensive PMHx:  -HFpEF (50-55% 9/2023)  -CAD s/p PCI w/ stent x 3 (2016, 2018)   -STEMI (9/2023, no stents placed)  -TIA 9/2023  -SVC thrombus on Lovenox, remote DVT,   -IDDM2  -HLD  -CKD   -MICHAEL  -NAFLD    Objective   Initial BP: 108/69; HR 98; O2 sat- 92%; RR- 14  General: pt appears groggy however in no acute distress  HEENT: normocephalic, atraumatic  Heart: RRR  Lungs: breathing comfortably on room air  Abdomen: non distended, soft, appropriately tender, incisional bandage soaked with red blood- some oozing from around the bandage edges. However, bleeding is not currently active or brisk  Extremities: moving all extremities, bilateral UE cold to the touch   : castellano in place draining yellow urine  Neuro: awake and appropriate, conversing with the team   Psych: appropriate mood and affect     A/P  65y/o POD 0 from XL, GIBSON, BSO, and umbilical hernia repair    Dizziness   Hypotension  -pt initially only symptomatically dizzy with benign vitals and exam; however, shortly after re-dressing the incision site, pt was noted to be hypotensive w/ BP 77/52   -500 NS fluid bolus was given with improvements. BP 90s/60s with MAP 70s  -EKG obtained. Normal sinus, No ST elevations noted  -Labs collected included  Type & screen  POCT glucose- 104  Troponin- 13  VBG: indicative of respiratory acidosis (pH: 7.23, pCO2: 74, HCO3-: 31)  CBC: hgb 14.7  BMP: pending  At this time, symptoms thought to be related to anesthesia pre and intra-op meds. The pt was given a number of pre-op pain " meds including IV methadone for pain control as she was unable to receive the usual local abdominal blocks secondary to an allergy concern.   Etiology of current symptoms thought to be anesthesia effects causing some sort of respiratory depression vs underlying cardiac issue in s/o of known extensive cardiac PMH  -will cont to monitor at this time. Pt remains stable. Overall asymptomatic except mild continued dizziness. Conversing with the team appropriately and making jokes. Will re-eavaluate in the next hr or sooner if needed    Incisional Bleeding   -Dr. Sal PGY-3 who was present during the patient's surgery was called up to assess, per Dr. Sal the case was uncomplicated and no concerns with the facial or sub Q closure  -on exam, there was noted bleeding from 2 superior superficial locations along the incision site. Exam otherwise benign. No distension or peritonitic signs  -original dressing replaced with pressure bandages without issue  -CBC collected as above with stable hgb. Coag collected and wnl  -will cont to monitor    Pt seen w/ Dr. Sal and Dr. Bianca Lopze MD PGY-1  Gyn Onc, Pager [phone]

## 2025-01-23 NOTE — OP NOTE
Date: 2025  OR Location: [facility] OR    Name: Nelida Mata, : 1960, Age: 64 y.o., MRN: [id number], Sex: female    Diagnosis  Pre-op Diagnosis   Intramural and submucous leiomyoma of uterus [D25.1, D25.0]  Postmenopausal bleeding, recurrent  Pelvic pain Post-op Diagnosis  Intramural and submucous leiomyoma of uterus [D25.1, D25.0]  Postmenopausal bleeding, recurrent  Pelvic pain     Procedures  Total laparatomy hysterectomy  Bilateral salpingo-oophorectomy  Umbilical hernia repair  87369 - MI TOTAL ABDOMINAL HYSTERECT W/WO RMVL TUBE OVARY      Surgeons   Maureen Davila - Primary  Erin Valenzuela MD    Resident/Fellow/Other Assistant:  Surgeons and Role:     * Gavi Sal MD - Resident - Assisting    Staff:   Circulator: [place]  Scrub Person: Lobar  Relief Scrub: Jessica  Relief Circulator: Heather    Anesthesia Staff: Anesthesiologist: Edna Prabhakar MD  Anesthesia Resident: Sindhu Rosa MD    Procedure Summary  Anesthesia: General  ASA: III  Estimated Blood Loss: 300 mL  Intra-op Medications:   Administrations occurring from 0650 to 1120 on 25:   Medication Name Total Dose   sodium chloride 0.9 % irrigation solution 2,000 mL   sterile water irrigation solution 2,000 mL   oxygen (O2) therapy 22 L   acetaminophen (Tylenol) tablet 975 mg 975 mg   gabapentin (Neurontin) capsule 300 mg 300 mg   heparin (porcine) injection 5,000 Units 5,000 Units   ceFAZolin (Ancef) vial 1 g 2 g   dexAMETHasone (Decadron) injection 4 mg/mL 4 mg   dexmedeTOMIDine 4 mcg/mL in 100 mL NS infusion 36.45 mcg   fentaNYL (Sublimaze) injection 50 mcg/mL 50 mcg   ketamine (Ketalar) 10 mg/mL injection 20 mg   LR bolus Cannot be calculated   lidocaine (Xylocaine) injection 2 % 60 mg   methadone (Dolophine) injection 20 mg   midazolam PF (Versed) injection 1 mg/mL 2 mg   naloxone (Narcan) 0.4 mg/mL 0.1 mg   ondansetron (Zofran) 2 mg/mL injection 4 mg   phenylephrine (Yannick-Synephrine) injection 600 mcg   propofol (Diprivan)  injection 10 mg/mL 150 mg   rocuronium (ZeMuron) 50 mg/5 mL injection 60 mg   sugammadex (Bridion) 200 mg/2 mL injection 200 mg              Anesthesia Record               Intraprocedure I/O Totals          Intake    Dexmedetomidine 0.00 mL    The total shown is the total volume documented since Anesthesia Start was filed.    Ketamine 0.00 mL    The total shown is the total volume documented since Anesthesia Start was filed.    LR bolus 1100.00 mL    Total Intake 1100 mL       Output    Urine 235 mL    Total Output 235 mL       Net    Net Volume 865 mL          Specimen:   ID Type Source Tests Collected by Time   1 : UTERUS, CERVIX, FALLPIAN TUBES AND OVARIES Tissue UTERUS, CERVIX, FALLOPIAN TUBES AND OVARIES BILATERAL SURGICAL PATHOLOGY EXAM Maureen Davila MD 1/23/2025 0858   2 : HERNIA SAC Tissue HERNIA SAC SURGICAL PATHOLOGY EXAM Maureen Davila MD 1/23/2025 0929   3 : OMENTUM Tissue OMENTUM RESECTION SURGICAL PATHOLOGY EXAM Maureen Davila MD 1/23/2025 0951      Indications:  Ms. Nelida Mata is a 64 year-old with a long-standing history of a known fibroid uterus.  She has a history of postmenopausal bleeding and in 2016 and 2018, she had undergone D&C hysteroscopies with polypectomies and myomectomies.  In 2018, a levonorgestrel IUD had been placed.  Initially, the IUD was helpful in managing/preventing any AUB, but in 2022, we revisited the possibility of surgical management due to the back and pelvic pain that she was experiencing.  However, surgery had to be cancelled due to the development of an SVC thrombus.  Since that time, she has been working on medical optimizaiton of her comorbidities with significant weight loss an improved glycemic control of DM2.  She was sent for a consult to Dr. Maureen Davila due to the size/volume of her uterus, as well as her medical conditions to determine whether she was a surgical candidate.  Additionally, she had been experiencing brownish discharge, which was  likely a recurrence of bleeding.  Re-sampling of the endometrium was not a possibility due to the inability to reach the cervix.  She had the LNG IUD in place until 9/2024 at which point it was removed to attempt an in-office endometrial biopsy.  The risks and benefits of surgery were discussed with the patient.  We had also reviewed that her back/pelvic pain may not improve with a hysterectomy, but due to the size of the posterior fibroid, it was possible that surgery could be beneficial.  Surgical consent was obtained.      Findings:  large umbilical hernia, approximately 5 cm in size.  Smaller 9 cm fibroid uterus, with large, 20+ cm in cranio-caudal dimension fibroid arising from the posterior aspect and filling the posterior cul-de-sac.  This was encased in peritoneum with filmy adhesions.  Normal appearing ovaries and fallopian tubes bilaterally.  Otherwise normal abdominal and pelvic surveys.      Procedure Details:  This was a combined case with Dr. Davila as no qualified resident was available at the time.    The patient was taken to the OR and transferred to the OR bed.  She was initially placed in supine position.  A time out was performed, identifying the patient by name medical record number and date of birth.  Prophylactic antibiotics were administered.  General anesthesia was induced without difficulty.  She was then placed in the dorsal lithotomy position, a bimanual exam was performed with the above noted findings and then prepped and draped in usual sterile fashion.  A Jose catheter was inserted with clear, yellow urine draining.      A vertical midline incision was made above the umbilicus down to the level of the pubic symphysis with a scalpel.  Due to the size of the umbilical hernia we encountered, we were avoiding this area with our dissection initially and turned our focus superior to the level of the umbilical hernia.  Our dissection was carried down to the fascia which was incised in the  midline.  This revealed the peritoneum, which was tented up with hemostats and incised with Metzenbaum scissors.  The incision was then able to be carried inferiorly with the Bovie electrocautery.  At the level of the umbilicus the hernia was carefully explored.  The hernia sac was identified and excised after the omentum was reduced from the hernia.  The remainder of the vertical incision was made with Bovie electrocautery down inferiorly.    The uterus was visualized.  There was a large mass emanating posteriorly and down into the posterior cul-de-sac.  The Bookwalter was utilized for self retraction to aid with visualization.    We identified the round ligament on the right side.  This was coagulated and transected.  This incision was carried cranially parallel to the infundibulopelvic ligament to open up the retroperitoneal space.  We identified the ureter on the right side which was well away from the level of our dissection.  A window was created in the posterior broad ligament and the infundibulopelvic ligament was coagulated and transected on the right side.    We then focused our dissection anteriorly through the anterior leaflet of the broad ligament on the right side.  A bladder flap was created.  Along the anterior aspect of the cervix this dissection was carefully taken to drop the bladder down caudally.  The posterior leaflet of the broad ligament was also transected to further isolate the uterine vessels on the right side.  The vessels were coagulated and transected.  We then turned our attention to the left side.  The round ligament was coagulated and transected, and in a similar fashion the infundibulopelvic ligament on the left side was isolated.  We identified the ureter on the left side as well.  The infundibulopelvic ligament was coagulated and transected.  We created a bladder flap anteriorly.  The posterior leaflet of the broad ligament was also transected to isolate the uterine vessels on the  left side.  We further developed her bladder fat flap anteriorly, and the uterine vessels were coagulated and transected.  We then clamped, transected and suture-ligated the parametrial tissue and cardinal ligaments on both sides down to the level of the external os.  We placed our sharply curved Zeppelins across the superior aspect of the vagina just inferior to the external os and amputated the cervix from the vagina.  The vaginal cuff was closed with 0 Vicryl.    We irrigated the posterior cul-de-sac and inspected for hemostasis.  There were edges of peritoneal dissection that were oozing.  We used procoagulants to obtain hemostasis, both Surgicel powder and fibrillar.  We again inspected for hemostasis which was noted to be excellent.  The Bookwalter retractor was removed.      We turned our attention to the hernia sac.  Which was primarily repaired using Prolene sutures.  There was noted to be defects within the omentum, so this portion was coagulated and excised to remove the potential for internal hernias.  The fascia was reapproximated with  PDS starting at each angle of the incision.  The subcutaneous tissue was reapproximated.  The skin was closed with staples.  All sponge lap needle and instrument counts were correct.  Anesthesia was reversed and she was returned to recovery room in stable condition.    Complications:  None; patient tolerated the procedure well.     Disposition: PACU - hemodynamically stable.  Condition: stable

## 2025-01-23 NOTE — PERIOPERATIVE NURSING NOTE
1109 Pt arrived to PACU, alert and able to answer questions and follow commands. Pt denies pain and discomfort, no distress noted. Will continue to monitor.     1200  at bedside to assess pt. Pt stable.     1230 Report called to nurse on [facility] 6. Pt stable. Transport requested.     1245 Pt in route to room on [facility] 6. Pt stable.     Heidi Castro RN

## 2025-01-23 NOTE — NURSING NOTE
Nelida Mata admitted to [facility] from _PACU__ on 01/23/25 at 1:11 PM   PT verified by _name and DOB___.  Anticipated discharge disposition: home  RN signature: Arelis MAYFIELD

## 2025-01-23 NOTE — ANESTHESIA PREPROCEDURE EVALUATION
Patient: Nelida Mata    Procedure Information       Date/Time: 01/23/25 0650    Procedure: laparotomy, abdominal hysterectomy, bilateral salpingo-oophorectomy.  Dr. Erin Valenzuela to assist. (Bilateral)    Location: [facility] OR 03 / Virtual [facility] OR    Surgeons: Maureen Davila MD            Relevant Problems   Cardiac   (+) Asymptomatic hypertensive urgency   (+) CAD (coronary artery disease)   (+) CHF, chronic (Multi)   (+) Chest pain at rest   (+) Hypertension, uncontrolled   (+) NSTEMI (non-ST elevated myocardial infarction) (Multi)   (+) NSTEMI, initial episode of care (Multi)   (+) Other chest pain   (+) Other hyperlipidemia   (+) Peripheral vascular disease (CMS-[facility])   (+) Presence of drug coated stent in right coronary artery   (+) Unstable angina (Multi)      Pulmonary   (+) Dyspnea on exertion      Neuro   (+) Anxiety   (+) CVA (cerebral vascular accident) (Multi)   (+) Idiopathic peripheral neuropathy   (+) Major depressive disorder, single episode   (+) Recurrent major depressive disorder, in remission (CMS-[facility])      GI   (+) GERD (gastroesophageal reflux disease)   (+) GI bleed   (+) Gastrointestinal hemorrhage, unspecified gastrointestinal hemorrhage type      /Renal   (+) Lower urinary tract infectious disease      Liver   (+) NAFLD (nonalcoholic fatty liver disease)      Endocrine   (+) Diabetes mellitus type 2, controlled, without complications (Multi)   (+) Type 2 diabetes mellitus with hyperglycemia, with long-term current use of insulin   (+) Uncontrolled type 2 diabetes mellitus with hyperglycemia      Hematology   (+) DVT (deep venous thrombosis) (Multi)   (+) Iron deficiency anemia      Musculoskeletal   (+) Fibromyalgia   (+) Osteoarthritis   (+) Pain syndrome, chronic      HEENT   (+) Primary open angle glaucoma of both eyes, mild stage      ID   (+) Lower urinary tract infectious disease   (+) Trichimoniasis   (+) Vaginal candida      Skin   (+) Lichen simplex chronicus       GYN   (+) Abnormal uterine bleeding   (+) Fibroid uterus   (+) Leiomyoma of uterus   (+) Uterine fibroid       Clinical information reviewed:   Tobacco  Allergies  Meds   Med Hx  Surg Hx   Fam Hx  Soc Hx        NPO Detail:  NPO/Void Status  Date of Last Liquid: 01/22/25  Time of Last Liquid: 2359  Date of Last Solid: 01/22/25  Time of Last Solid: 2359         Physical Exam    Airway  Mallampati: II  TM distance: >3 FB  Neck ROM: full  Comments: Can almost bite upper lip; very decreased neck motion   Cardiovascular   Rhythm: regular  Rate: normal     Dental        Pulmonary   Breath sounds clear to auscultation     Abdominal          Anesthesia Plan    History of general anesthesia?: yes  History of complications of general anesthesia?: no    ASA 3     general     The patient is not a current smoker.  Patient was not previously instructed to abstain from smoking on day of procedure.  Patient did not smoke on day of procedure.    intravenous induction   Postoperative administration of opioids is intended.  Trial extubation is planned.  Anesthetic plan and risks discussed with patient.  Use of blood products discussed with patient who consented to blood products.    Plan discussed with resident, attending and medical student.

## 2025-01-23 NOTE — SIGNIFICANT EVENT
Rapid Response Respiratory Therapy Note        Respiratory Concerns:  []  None []  Increased WOB []  Shallow respirations []  Irregular Respirations   []  Tachypnea []  Bradypnea []  Oxygen desaturation []  Cyanosis   []  Poor Secretion Clearance []  Impaired/Weak Cough []  Copious Secretions []  Thick Secretions   []  Inability to Protect Airway []  Apnea []  Respiratory Arrest []  Shortness of Breath   [] Hemodynamic Instability []  Asymmetric Chest Rise []  Adventitious Breath Sounds []  Abnormal CXR   []  Aspiration [x]  Other: Abnormal VB.23//      Interventions:  []  None [x]  ABG/VBG []  Assist w/ICU transfer []  Bag-mask ventilation   []  Bronchial Hygiene []  Trach care/Suction []  ETCO2 []  CPR   []  Assist Intubation []  Venti Mask []  Chest x-ray []  CT/MRI transport    []  High Flow Therapy []  Igel  []  Nasal Airway []  NT Suctioning   []  Nebulizer treatment [x]  NIPPV (CPAP/BiPAP) []  Oxygen  Type:   FiO2:  Liter Flow: []  Oral Airway   []  Oral Suctioning []  Other:       Outcome:  []  Maintain on Division [x]  Transferred to ICU []  Transferred to ED []  Bedside nurse instructed to page Rapid Response for any concerns or acute change in condition/VS     Additional Comments: Pt rapid initially called early afternoon

## 2025-01-23 NOTE — SIGNIFICANT EVENT
Significant Event Note/Post-operative Check    Nelida Mata is a 64 y.o. female now POD 0 from XL, GIBSON, BSO, and umbilical hernia repair for known fibroid uterus and chronic PMB.     Subjective   Pt now s/p code white for dizziness, hypotension, and incisional bleeding as detailed in previous significant event note.   Now on Bipap. Otherwise denies CP/SOB and remains overall asymptomatic. Bleeding from incisional site now scant and minimal. Was drinking liquids without issue prior to bipap placement.    Objective   Visit Vitals  BP 83/62   Pulse 96   Temp 36.1 °C (96.9 °F) (Temporal)   Resp 16      General: no acute distress, BiPAP in place  HEENT: normocephalic, atraumatic  Heart: warm and well perfused  Lungs: BiPAP in place  Abdomen: non-distended, soft, pressure dressing clean dry and intact, scant blood  : castellano in place draining yellow urine  Extremities: moving all extremities  Neuro: awake and conversant  Psych: appropriate mood and affect     Assessment/Plan     Nelida Mata is a 64 y.o. female now POD 0 from XL, GIBSON, BSO, and umbilical hernia repair for known fibroid uterus and chronic PMB. S/p code white for dizziness, hypotension, and incisional bleeding.    Respiratory Acidosis  -Pt w/ respiratory acidosis on VBG: (pH: 7.23, pCO2: 74, HCO3: 31) but otherwise was without clinical symptoms physically.    -Placed on BiPAP per code white team based on lab results   -Etiology of current issues remain unclear. Still considering anesthesia related complications vs underlying cardiac pathology in s/o of known extensive cardiac h/x.  -Cont BiPAP at this time. (Notified by RN that pt can only stay on the floor with bipap for 4 hrs. May need ICU transfer if requires extended use)  -repeat VBG in next hr. Will fu.     Hypotension  Dizziness  -BP initially stable following 500ml NS bolus but again dropped to 70s/50s.  -Additional 500ml bolus given at that time but pressures continue to  fluctuate between 70/50s - 100s/70s  -IV albumin administered  -Cards and acute pain teams contacted by Dr. Valenzuela with recs to hold coreg and kerendia. Will hold  -cont to monitor    Incisional Bleeding  -pressure dressing in place. Stable   -post-op hgb stable - 14.7    Routine Postoperative Care  - follow ERAS protocol, pain well controlled  - preop Hgb 16 , .  Post-op hgb as above. Will fu overnight/morning labs  - on BiPAP as above  - regular diet, IV antiemetics ordered PRN. Fluids at 40ml/hr   - strict Is/Os ; castellano in place  - DVT ppx - SCDs, heparin ppx     Comorbidities  - HFpEF (50-55% 9/2023)/CAD s/p PCI w/ stent x 3/ hx of STEMI:  ASA cont'd, Carvedilol held. Losartan & lasix held. Tele  - SVC thrombus/remote DVT: Lovenox held, on hep ppx  - T2DM/gastroparesis: Farxiga, metformin, semaglutide, Tresiba 40u at bedtime & lispro 22u TID held. Protonix cont'd. POCT & SSI  - HLD: statin cont'd  - CKD: NO NSAIDs. Kerendia HELD.  - MICHAEL  - Fibromyalgia: Cymbalta cont'd    Pt d/w Dr. Davila and Dr. Bianca Lopez MD PGY-1   GYN Onc, Pager [phone]

## 2025-01-23 NOTE — PROCEDURES
Insert arterial line    Date/Time: 1/23/2025 6:44 PM    Performed by: Berlin Partida DO  Authorized by: Erin Valenzuela MD    Consent:     Consent obtained:  Verbal and written    Consent given by:  Patient    Risks, benefits, and alternatives were discussed: yes      Risks discussed:  Bleeding, ischemia, repeat procedure, infection and pain  Universal protocol:     Procedure explained and questions answered to patient or proxy's satisfaction: yes      Relevant documents present and verified: yes      Patient identity confirmed:  Verbally with patient and arm band  Indications:     Indications: hemodynamic monitoring and multiple ABGs    Pre-procedure details:     Skin preparation:  Chlorhexidine  Sedation:     Sedation type:  None  Anesthesia:     Anesthesia method:  Local infiltration    Local anesthetic:  Lidocaine 1% w/o epi  Procedure details:     Location:  R radial    Needle gauge:  20 G    Placement technique:  Seldinger and ultrasound guided    Number of attempts:  1    Transducer: waveform confirmed    Post-procedure details:     Post-procedure:  Biopatch applied, secured with tape and sterile dressing applied    Procedure completion:  Tolerated well, no immediate complications    Berlin Partida DO  Anesthesiology, PGY1

## 2025-01-23 NOTE — ANESTHESIA POSTPROCEDURE EVALUATION
Patient: Nelida Mata    Procedure Summary       Date: 01/23/25 Room / Location: [facility] OR 03 / [facility] OR    Anesthesia Start: 0730 Anesthesia Stop: 1115    Procedure: Total laparatomy hysterectomy, Bilateral salpingectomy-oophorectomy, umbilical hernia repair (Bilateral) Diagnosis:       Intramural and submucous leiomyoma of uterus      (Intramural and submucous leiomyoma of uterus [D25.1, D25.0])    Surgeons: Maureen Davila MD Responsible Provider: Edna Prabhakar MD    Anesthesia Type: general ASA Status: 3            Anesthesia Type: general    Vitals Value Taken Time   /75 01/23/25 1118   Temp 36.0 01/23/25 1118   Pulse 79 01/23/25 1114   Resp 13 01/23/25 1114   SpO2 91 % 01/23/25 1114   Vitals shown include unfiled device data.    Anesthesia Post Evaluation    Patient location during evaluation: PACU  Patient participation: complete - patient participated  Level of consciousness: awake  Pain management: adequate  Airway patency: patent  Cardiovascular status: acceptable  Respiratory status: acceptable and face mask  Hydration status: acceptable  Postoperative Nausea and Vomiting: none      Discussed having to give Narcan 0.1mg prior to extubation with PACU nurse. Discussed monitoring oxygenation/ventilation throughout PACU stay.    No notable events documented.

## 2025-01-23 NOTE — OP NOTE
Date: 2025  OR Location: [facility] OR    Name: Nelida Mata, : 1960, Age: 64 y.o., MRN: [id number], Sex: female    Diagnosis  Pre-op Diagnosis      * Intramural and submucous leiomyoma of uterus [D25.1, D25.0] Post-op Diagnosis     * Intramural and submucous leiomyoma of uterus [D25.1, D25.0]     Procedures  Exploratory laparotomy  Total abdominal hysterectomy  Bilateral salpingoophorectomy  Umbilical hernia repair    Surgeons      * Maureen Davila - Primary    Resident/Fellow/Other Assistant:  Surgeons and Role:     * Erin Valenzuela MD - Assisting     * Gavi Sal MD - Resident - Assisting     * Manju Pickard MD - Resident - Assisting    Staff:   Circulator: Montana  Scrub Person: Lobar  Relief Scrub: Jessica  Relief Circulator: Heather    Anesthesia Staff: Anesthesiologist: Edna Prabhakar MD  Anesthesia Resident: Sindhu Rosa MD    Procedure Summary  Anesthesia: General  ASA: III  Estimated Blood Loss: 300 mL  Intra-op Medications:   Administrations occurring from 0650 to 1120 on 25:   Medication Name Total Dose   sodium chloride 0.9 % irrigation solution 2,000 mL   sterile water irrigation solution 2,000 mL   acetaminophen (Tylenol) tablet 975 mg 975 mg   gabapentin (Neurontin) capsule 300 mg 300 mg   heparin (porcine) injection 5,000 Units 5,000 Units   ceFAZolin (Ancef) vial 1 g 2 g   dexAMETHasone (Decadron) injection 4 mg/mL 4 mg   dexmedeTOMIDine 4 mcg/mL in 100 mL NS infusion 36.45 mcg   fentaNYL (Sublimaze) injection 50 mcg/mL 50 mcg   ketamine (Ketalar) 10 mg/mL injection 20 mg   LR bolus Cannot be calculated   lidocaine (Xylocaine) injection 2 % 60 mg   methadone (Dolophine) injection 20 mg   midazolam PF (Versed) injection 1 mg/mL 2 mg   naloxone (Narcan) 0.4 mg/mL 0.1 mg   ondansetron (Zofran) 2 mg/mL injection 4 mg   phenylephrine (Yannick-Synephrine) injection 600 mcg   propofol (Diprivan) injection 10 mg/mL 150 mg   rocuronium (ZeMuron) 50 mg/5 mL injection 60 mg    sugammadex (Bridion) 200 mg/2 mL injection 200 mg              Anesthesia Record               Intraprocedure I/O Totals          Intake    Dexmedetomidine 0.00 mL    The total shown is the total volume documented since Anesthesia Start was filed.    Ketamine 0.00 mL    The total shown is the total volume documented since Anesthesia Start was filed.    LR bolus 1100.00 mL    Total Intake 1100 mL       Output    Urine 235 mL    Total Output 235 mL       Net    Net Volume 865 mL          Specimen:   ID Type Source Tests Collected by Time   1 : UTERUS, CERVIX, FALLPIAN TUBES AND OVARIES Tissue UTERUS, CERVIX, FALLOPIAN TUBES AND OVARIES BILATERAL SURGICAL PATHOLOGY EXAM Maureen Davila MD 1/23/2025 0858   2 : HERNIA SAC Tissue HERNIA SAC SURGICAL PATHOLOGY EXAM Maureen Davila MD 1/23/2025 0929   3 : OMENTUM Tissue OMENTUM RESECTION SURGICAL PATHOLOGY EXAM Maureen Davila MD 1/23/2025 0951           Procedure Details:  The patient was seen in the preoperative area. The site of surgery was properly noted/marked if necessary per policy. The patient has been actively warmed in preoperative area. Preoperative antibiotics were administered. Venous thrombosis prophylaxis have been ordered including bilateral sequential compression devices and chemical prophylaxis    Findings: Large posterior uterine mass consistent with a fibroid, approximately 15cm in greatest dimension. Otherwise normal appearing uterus, bilateral fallopian tubes, and bilateral ovaries. Normal appearing small bowel and mesentery. Umbilical hernia approx 3x3cm with omentum inside.    Details:  After informed consent was obtained, the patient was taken to the operating room. A pre-procedure timeout was performed and the patient was given a prophylactic dose of antibiotics. General anesthesia was administered. She was placed in the dorsal lithotomy position and then prepped and draped in the usual sterile fashion.     A vertical midline incision was made  just above the umbilicus down to the pubic symphysis with a scalpel. There was a palpable umbilical hernia, and care was taken to avoid this during entry. The incision was carried down to the fascia with Bovie cautery. The fascia was scored and opened using the Bovie cautery. The rectus muscles were then  at the midline, and the peritoneum was then tented up and entered with Metzenbaum scissors. The peritoneum was opened to the length of the skin incision. There was an umbilical hernia present with omentum inside, which was carefully reduced. One area of omentum had to be coagulated and transected with the ligasure device to remove it from the hernia. A large mass originating from the posterior uterus was appreciated on entry into the peritoneal cavity. The upper abdomen was explored, and there was no disease noted on the hemidiaphragm or omentum. The bowel was run and no disease was noted. The bowels were then packed. A Bookwalter retractor was placed.    The left pelvic sidewall was opened using electrosurgery. The left ureter was identified and noted to peristalse. Ureterolysis was performed to so that the ureter was well away from the left IP ligament. The left IP ligament was then isolated and ligated with the Ligasure. The left round ligament was then cauterized with the Ligasure and divided. The left pelvic sidewall was then further opened. The bladder flap was created with Bovie cautery. The posterior peritoneum was then taken down with Bovie cautery.     The right round ligament was then cauterized with the Ligasure, divided, and the right pelvic sidewall was opened. Then right ureterolysis was then performed so that the right utereter was well away from the IP ligament. The right IP ligament was then isolated, and ligated with the Ligasure. The bladder flap was then completed with Bovie cautery. The posterior peritoneum was taken down. The uterine vessels on the right were then skeletonized and  subsequently cauterized and ligated with the Ligasure. Straight bites were taken down the right cardinal ligament using a Zeppelin clamp, scalpel for dissection, and 0-Vicryl suture for ligation.    The left uterine vessels were then also skeletonized and subsequently cauterized and ligated with the Ligasure.  Straight bites were taken down the right cardinal ligament using a Zeppelin clamp, scalpel for dissection, and 0-Vicryl suture for ligation.    Once we were just past the level of the cervix, we came across with curved Zeppelin clamps. The uterus and cervix and adnexa were then amputated using Makenna scissors and sent to Pathology for permanent section. The vaginal cuff was suture ligated with interrupted figure-of-eight sutures of 0-Vicryl. The pelvis was then irrigated. There were several edges of dissected peritoneum that were oozing, and they were carefully cauterized. Surgicel powder was placed on the cuff and pelvic sidewalls. Fibrillar was applied to the area of peritoneum was was bleeding earlier to ensure hemostasis. The Bookwalter retractor was then removed. The bowels were unpacked. The umbilical hernia was then repaired by carefully dissecting the hernia sac and then closing the remaining defect with prolene. The fascia was closed with 2 running sutures of 0-Looped PDS. The subcutaneous tissue was reapproximated with interrupted 2-0 Vicryl. The skin was then closed with staples. Bandage was placed. The castellano was left in place. The patient tolerated the procedure well. Sponge, lap, and needle counts were correct x2. Dr. Davila was present for the entire procedure. The patient was taken to the recovery room in good condition.      Complications:  None; patient tolerated the procedure well.     Disposition: PACU - hemodynamically stable.  Condition: stable

## 2025-01-24 ENCOUNTER — APPOINTMENT (OUTPATIENT)
Dept: RADIOLOGY | Facility: HOSPITAL | Age: 65
DRG: 742 | End: 2025-01-24
Payer: COMMERCIAL

## 2025-01-24 LAB
ALBUMIN SERPL BCP-MCNC: 3.7 G/DL (ref 3.4–5)
ALBUMIN SERPL BCP-MCNC: 3.9 G/DL (ref 3.4–5)
ALBUMIN SERPL BCP-MCNC: 4 G/DL (ref 3.4–5)
ALBUMIN SERPL BCP-MCNC: 4.5 G/DL (ref 3.4–5)
ANION GAP BLDA CALCULATED.4IONS-SCNC: 10 MMO/L (ref 10–25)
ANION GAP BLDA CALCULATED.4IONS-SCNC: 10 MMO/L (ref 10–25)
ANION GAP BLDA CALCULATED.4IONS-SCNC: 13 MMO/L (ref 10–25)
ANION GAP BLDA CALCULATED.4IONS-SCNC: 13 MMO/L (ref 10–25)
ANION GAP BLDA CALCULATED.4IONS-SCNC: 15 MMO/L (ref 10–25)
ANION GAP BLDA CALCULATED.4IONS-SCNC: 15 MMO/L (ref 10–25)
ANION GAP BLDA CALCULATED.4IONS-SCNC: 16 MMO/L (ref 10–25)
ANION GAP BLDA CALCULATED.4IONS-SCNC: 17 MMO/L (ref 10–25)
ANION GAP BLDA CALCULATED.4IONS-SCNC: 20 MMO/L (ref 10–25)
ANION GAP BLDA CALCULATED.4IONS-SCNC: 4 MMO/L (ref 10–25)
ANION GAP SERPL CALC-SCNC: 15 MMOL/L (ref 10–20)
ANION GAP SERPL CALC-SCNC: 17 MMOL/L (ref 10–20)
ANION GAP SERPL CALC-SCNC: 17 MMOL/L (ref 10–20)
ANION GAP SERPL CALC-SCNC: 22 MMOL/L (ref 10–20)
APTT PPP: 21 SECONDS (ref 27–38)
APTT PPP: 27 SECONDS (ref 27–38)
APTT PPP: 31 SECONDS (ref 27–38)
BASE EXCESS BLDA CALC-SCNC: -0.4 MMOL/L (ref -2–3)
BASE EXCESS BLDA CALC-SCNC: 0.6 MMOL/L (ref -2–3)
BASE EXCESS BLDA CALC-SCNC: 1.6 MMOL/L (ref -2–3)
BASE EXCESS BLDA CALC-SCNC: 1.6 MMOL/L (ref -2–3)
BASE EXCESS BLDA CALC-SCNC: 1.9 MMOL/L (ref -2–3)
BASE EXCESS BLDA CALC-SCNC: 2.2 MMOL/L (ref -2–3)
BASE EXCESS BLDA CALC-SCNC: 2.6 MMOL/L (ref -2–3)
BASE EXCESS BLDA CALC-SCNC: 3.2 MMOL/L (ref -2–3)
BASE EXCESS BLDA CALC-SCNC: 3.8 MMOL/L (ref -2–3)
BASE EXCESS BLDA CALC-SCNC: 7.5 MMOL/L (ref -2–3)
BLOOD EXPIRATION DATE: NORMAL
BODY TEMPERATURE: 37 DEGREES CELSIUS
BUN SERPL-MCNC: 22 MG/DL (ref 6–23)
BUN SERPL-MCNC: 25 MG/DL (ref 6–23)
BUN SERPL-MCNC: 25 MG/DL (ref 6–23)
BUN SERPL-MCNC: 27 MG/DL (ref 6–23)
CA-I BLD-SCNC: 1.18 MMOL/L (ref 1.1–1.33)
CA-I BLD-SCNC: 1.21 MMOL/L (ref 1.1–1.33)
CA-I BLD-SCNC: 1.24 MMOL/L (ref 1.1–1.33)
CA-I BLDA-SCNC: 1 MMOL/L (ref 1.1–1.33)
CA-I BLDA-SCNC: 1.07 MMOL/L (ref 1.1–1.33)
CA-I BLDA-SCNC: 1.2 MMOL/L (ref 1.1–1.33)
CA-I BLDA-SCNC: 1.2 MMOL/L (ref 1.1–1.33)
CA-I BLDA-SCNC: 1.22 MMOL/L (ref 1.1–1.33)
CA-I BLDA-SCNC: 1.24 MMOL/L (ref 1.1–1.33)
CA-I BLDA-SCNC: 1.25 MMOL/L (ref 1.1–1.33)
CA-I BLDA-SCNC: 1.27 MMOL/L (ref 1.1–1.33)
CA-I BLDA-SCNC: 1.31 MMOL/L (ref 1.1–1.33)
CA-I BLDA-SCNC: 1.31 MMOL/L (ref 1.1–1.33)
CALCIUM SERPL-MCNC: 10.3 MG/DL (ref 8.6–10.6)
CALCIUM SERPL-MCNC: 8.2 MG/DL (ref 8.6–10.6)
CALCIUM SERPL-MCNC: 9.9 MG/DL (ref 8.6–10.6)
CALCIUM SERPL-MCNC: 9.9 MG/DL (ref 8.6–10.6)
CFT FORM KAOLIN IND BLD RES TEG: 1.2 MIN (ref 0.8–2.1)
CFT FORM KAOLIN IND BLD RES TEG: 1.2 MIN (ref 0.8–2.1)
CFT FORM KAOLIN IND BLD RES TEG: 1.3 MIN (ref 0.8–2.1)
CFT FORM KAOLIN IND BLD RES TEG: 1.4 MIN (ref 0.8–2.1)
CFT FORM KAOLIN IND BLD RES TEG: 1.5 MIN (ref 0.8–2.1)
CFT FORM KAOLIN IND BLD RES TEG: 1.5 MIN (ref 0.8–2.1)
CHLORIDE BLDA-SCNC: 100 MMOL/L (ref 98–107)
CHLORIDE BLDA-SCNC: 101 MMOL/L (ref 98–107)
CHLORIDE BLDA-SCNC: 101 MMOL/L (ref 98–107)
CHLORIDE BLDA-SCNC: 102 MMOL/L (ref 98–107)
CHLORIDE BLDA-SCNC: 103 MMOL/L (ref 98–107)
CHLORIDE BLDA-SCNC: 104 MMOL/L (ref 98–107)
CHLORIDE BLDA-SCNC: 99 MMOL/L (ref 98–107)
CHLORIDE SERPL-SCNC: 100 MMOL/L (ref 98–107)
CHLORIDE SERPL-SCNC: 100 MMOL/L (ref 98–107)
CHLORIDE SERPL-SCNC: 103 MMOL/L (ref 98–107)
CHLORIDE SERPL-SCNC: 99 MMOL/L (ref 98–107)
CLOT ANGLE.KAOLIN INDUCED BLD RES TEG: 69.4 DEG (ref 63–78)
CLOT ANGLE.KAOLIN INDUCED BLD RES TEG: 72.2 DEG (ref 63–78)
CLOT ANGLE.KAOLIN INDUCED BLD RES TEG: 72.2 DEG (ref 63–78)
CLOT ANGLE.KAOLIN INDUCED BLD RES TEG: 73.8 DEG (ref 63–78)
CLOT ANGLE.KAOLIN INDUCED BLD RES TEG: 75.2 DEG (ref 63–78)
CLOT ANGLE.KAOLIN INDUCED BLD RES TEG: 75.5 DEG (ref 63–78)
CLOT INIT KAO IND P HEP NEUT BLD RES TEG: 2.6 MIN (ref 4.3–8.3)
CLOT INIT KAO IND P HEP NEUT BLD RES TEG: 3.1 MIN (ref 4.6–9.1)
CLOT INIT KAO IND P HEP NEUT BLD RES TEG: 5.7 MIN (ref 4.3–8.3)
CLOT INIT KAO IND P HEP NEUT BLD RES TEG: 5.9 MIN (ref 4.6–9.1)
CLOT INIT KAO IND P HEP NEUT BLD RES TEG: 6.3 MIN (ref 4.6–9.1)
CLOT INIT KAO IND P HEP NEUT BLD RES TEG: 6.7 MIN (ref 4.3–8.3)
CLOT INIT KAO IND P HEP NEUT BLD RES TEG: 6.7 MIN (ref 4.3–8.3)
CLOT INIT KAO IND P HEP NEUT BLD RES TEG: 6.7 MIN (ref 4.6–9.1)
CLOT INIT KAO IND P HEP NEUT BLD RES TEG: 7.3 MIN (ref 4.6–9.1)
CLOT INIT KAO IND P HEP NEUT BLD RES TEG: 7.4 MIN (ref 4.6–9.1)
CLOT INIT KAO IND P HEP NEUT BLD RES TEG: 7.6 MIN (ref 4.3–8.3)
CLOT INIT KAO IND P HEP NEUT BLD RES TEG: 8.2 MIN (ref 4.3–8.3)
CO2 SERPL-SCNC: 25 MMOL/L (ref 21–32)
CO2 SERPL-SCNC: 29 MMOL/L (ref 21–32)
CO2 SERPL-SCNC: 30 MMOL/L (ref 21–32)
CO2 SERPL-SCNC: 30 MMOL/L (ref 21–32)
CREAT SERPL-MCNC: 1.31 MG/DL (ref 0.5–1.05)
CREAT SERPL-MCNC: 1.57 MG/DL (ref 0.5–1.05)
CREAT SERPL-MCNC: 1.61 MG/DL (ref 0.5–1.05)
CREAT SERPL-MCNC: 1.64 MG/DL (ref 0.5–1.05)
DISPENSE STATUS: NORMAL
EGFRCR SERPLBLD CKD-EPI 2021: 35 ML/MIN/1.73M*2
EGFRCR SERPLBLD CKD-EPI 2021: 35 ML/MIN/1.73M*2
EGFRCR SERPLBLD CKD-EPI 2021: 36 ML/MIN/1.73M*2
EGFRCR SERPLBLD CKD-EPI 2021: 45 ML/MIN/1.73M*2
EJECTION FRACTION: 75 %
ERYTHROCYTE [DISTWIDTH] IN BLOOD BY AUTOMATED COUNT: 14.1 % (ref 11.5–14.5)
ERYTHROCYTE [DISTWIDTH] IN BLOOD BY AUTOMATED COUNT: 15.1 % (ref 11.5–14.5)
ERYTHROCYTE [DISTWIDTH] IN BLOOD BY AUTOMATED COUNT: 15.1 % (ref 11.5–14.5)
ERYTHROCYTE [DISTWIDTH] IN BLOOD BY AUTOMATED COUNT: 15.4 % (ref 11.5–14.5)
FIBRINOGEN BLD CALC-MCNC: 354 MG/DL (ref 278–581)
FIBRINOGEN BLD CALC-MCNC: 354 MG/DL (ref 278–581)
FIBRINOGEN BLD CALC-MCNC: 363 MG/DL (ref 278–581)
FIBRINOGEN BLD CALC-MCNC: 429 MG/DL (ref 278–581)
FIBRINOGEN BLD CALC-MCNC: 434 MG/DL (ref 278–581)
FIBRINOGEN BLD CALC-MCNC: 485 MG/DL (ref 278–581)
GLUCOSE BLD MANUAL STRIP-MCNC: 151 MG/DL (ref 74–99)
GLUCOSE BLD MANUAL STRIP-MCNC: 205 MG/DL (ref 74–99)
GLUCOSE BLD MANUAL STRIP-MCNC: 216 MG/DL (ref 74–99)
GLUCOSE BLD MANUAL STRIP-MCNC: 231 MG/DL (ref 74–99)
GLUCOSE BLD MANUAL STRIP-MCNC: 239 MG/DL (ref 74–99)
GLUCOSE BLDA-MCNC: 178 MG/DL (ref 74–99)
GLUCOSE BLDA-MCNC: 195 MG/DL (ref 74–99)
GLUCOSE BLDA-MCNC: 219 MG/DL (ref 74–99)
GLUCOSE BLDA-MCNC: 227 MG/DL (ref 74–99)
GLUCOSE BLDA-MCNC: 228 MG/DL (ref 74–99)
GLUCOSE BLDA-MCNC: 232 MG/DL (ref 74–99)
GLUCOSE BLDA-MCNC: 248 MG/DL (ref 74–99)
GLUCOSE BLDA-MCNC: 254 MG/DL (ref 74–99)
GLUCOSE BLDA-MCNC: 272 MG/DL (ref 74–99)
GLUCOSE BLDA-MCNC: 278 MG/DL (ref 74–99)
GLUCOSE SERPL-MCNC: 194 MG/DL (ref 74–99)
GLUCOSE SERPL-MCNC: 205 MG/DL (ref 74–99)
GLUCOSE SERPL-MCNC: 238 MG/DL (ref 74–99)
GLUCOSE SERPL-MCNC: 264 MG/DL (ref 74–99)
HCO3 BLDA-SCNC: 24.6 MMOL/L (ref 22–26)
HCO3 BLDA-SCNC: 24.6 MMOL/L (ref 22–26)
HCO3 BLDA-SCNC: 25.5 MMOL/L (ref 22–26)
HCO3 BLDA-SCNC: 26 MMOL/L (ref 22–26)
HCO3 BLDA-SCNC: 26.3 MMOL/L (ref 22–26)
HCO3 BLDA-SCNC: 28.2 MMOL/L (ref 22–26)
HCO3 BLDA-SCNC: 29.3 MMOL/L (ref 22–26)
HCO3 BLDA-SCNC: 29.5 MMOL/L (ref 22–26)
HCO3 BLDA-SCNC: 30.5 MMOL/L (ref 22–26)
HCO3 BLDA-SCNC: 33.2 MMOL/L (ref 22–26)
HCT VFR BLD AUTO: 34.8 % (ref 36–46)
HCT VFR BLD AUTO: 35.6 % (ref 36–46)
HCT VFR BLD AUTO: 36 % (ref 36–46)
HCT VFR BLD AUTO: 40.5 % (ref 36–46)
HCT VFR BLD EST: 37 % (ref 36–46)
HCT VFR BLD EST: 38 % (ref 36–46)
HCT VFR BLD EST: 39 % (ref 36–46)
HCT VFR BLD EST: 40 % (ref 36–46)
HCT VFR BLD EST: 41 % (ref 36–46)
HCT VFR BLD EST: 44 % (ref 36–46)
HCT VFR BLD EST: ABNORMAL %
HGB BLD-MCNC: 12.4 G/DL (ref 12–16)
HGB BLD-MCNC: 12.7 G/DL (ref 12–16)
HGB BLD-MCNC: 12.9 G/DL (ref 12–16)
HGB BLD-MCNC: 14.3 G/DL (ref 12–16)
HGB BLDA-MCNC: 12.3 G/DL (ref 12–16)
HGB BLDA-MCNC: 12.5 G/DL (ref 12–16)
HGB BLDA-MCNC: 13 G/DL (ref 12–16)
HGB BLDA-MCNC: 13.3 G/DL (ref 12–16)
HGB BLDA-MCNC: 13.4 G/DL (ref 12–16)
HGB BLDA-MCNC: 13.5 G/DL (ref 12–16)
HGB BLDA-MCNC: 14.8 G/DL (ref 12–16)
HGB BLDA-MCNC: ABNORMAL G/DL
INHALED O2 CONCENTRATION: 100 %
INHALED O2 CONCENTRATION: 30 %
INHALED O2 CONCENTRATION: 30 %
INHALED O2 CONCENTRATION: 32 %
INHALED O2 CONCENTRATION: 36 %
INHALED O2 CONCENTRATION: 38 %
INHALED O2 CONCENTRATION: 38 %
INHALED O2 CONCENTRATION: 40 %
INHALED O2 CONCENTRATION: 40 %
INHALED O2 CONCENTRATION: 50 %
INR PPP: 1 (ref 0.9–1.1)
LACTATE BLDA-SCNC: 1.3 MMOL/L (ref 0.4–2)
LACTATE BLDA-SCNC: 1.9 MMOL/L (ref 0.4–2)
LACTATE BLDA-SCNC: 1.9 MMOL/L (ref 0.4–2)
LACTATE BLDA-SCNC: 2 MMOL/L (ref 0.4–2)
LACTATE BLDA-SCNC: 2.4 MMOL/L (ref 0.4–2)
LACTATE BLDA-SCNC: 2.4 MMOL/L (ref 0.4–2)
LACTATE BLDA-SCNC: 2.5 MMOL/L (ref 0.4–2)
LACTATE BLDA-SCNC: 2.5 MMOL/L (ref 0.4–2)
LACTATE BLDA-SCNC: 3.1 MMOL/L (ref 0.4–2)
LACTATE BLDA-SCNC: 4.6 MMOL/L (ref 0.4–2)
MA KAOLIN BLD RES TEG: 58.1 MM (ref 52–69)
MA KAOLIN BLD RES TEG: 58.7 MM (ref 52–69)
MA KAOLIN BLD RES TEG: 60.8 MM (ref 52–69)
MA KAOLIN BLD RES TEG: 61.6 MM (ref 52–69)
MA KAOLIN BLD RES TEG: 63.3 MM (ref 52–69)
MA KAOLIN BLD RES TEG: 63.5 MM (ref 52–69)
MA KAOLIN+TF BLD RES TEG: 58.4 MM (ref 52–70)
MA KAOLIN+TF BLD RES TEG: 58.9 MM (ref 52–70)
MA KAOLIN+TF BLD RES TEG: 61.6 MM (ref 52–70)
MA KAOLIN+TF BLD RES TEG: 62.2 MM (ref 52–70)
MA KAOLIN+TF BLD RES TEG: 62.8 MM (ref 52–70)
MA KAOLIN+TF BLD RES TEG: 63.1 MM (ref 52–70)
MA TF IND+IIB-IIIA INH BLD RES TEG: 19.4 MM (ref 15–32)
MA TF IND+IIB-IIIA INH BLD RES TEG: 19.4 MM (ref 15–32)
MA TF IND+IIB-IIIA INH BLD RES TEG: 19.9 MM (ref 15–32)
MA TF IND+IIB-IIIA INH BLD RES TEG: 23.5 MM (ref 15–32)
MA TF IND+IIB-IIIA INH BLD RES TEG: 23.8 MM (ref 15–32)
MA TF IND+IIB-IIIA INH BLD RES TEG: 26.6 MM (ref 15–32)
MAGNESIUM SERPL-MCNC: 1.61 MG/DL (ref 1.6–2.4)
MAGNESIUM SERPL-MCNC: 1.68 MG/DL (ref 1.6–2.4)
MAGNESIUM SERPL-MCNC: 2.93 MG/DL (ref 1.6–2.4)
MAGNESIUM SERPL-MCNC: 3.01 MG/DL (ref 1.6–2.4)
MCH RBC QN AUTO: 28.7 PG (ref 26–34)
MCH RBC QN AUTO: 28.7 PG (ref 26–34)
MCH RBC QN AUTO: 28.9 PG (ref 26–34)
MCH RBC QN AUTO: 29 PG (ref 26–34)
MCHC RBC AUTO-ENTMCNC: 35.3 G/DL (ref 32–36)
MCHC RBC AUTO-ENTMCNC: 35.6 G/DL (ref 32–36)
MCHC RBC AUTO-ENTMCNC: 35.7 G/DL (ref 32–36)
MCHC RBC AUTO-ENTMCNC: 35.8 G/DL (ref 32–36)
MCV RBC AUTO: 80 FL (ref 80–100)
MCV RBC AUTO: 81 FL (ref 80–100)
MCV RBC AUTO: 81 FL (ref 80–100)
MCV RBC AUTO: 82 FL (ref 80–100)
NRBC BLD-RTO: 0 /100 WBCS (ref 0–0)
NRBC BLD-RTO: 0.2 /100 WBCS (ref 0–0)
OXYHGB MFR BLDA: 94.7 % (ref 94–98)
OXYHGB MFR BLDA: 95.4 % (ref 94–98)
OXYHGB MFR BLDA: 95.6 % (ref 94–98)
OXYHGB MFR BLDA: 95.9 % (ref 94–98)
OXYHGB MFR BLDA: 96 % (ref 94–98)
OXYHGB MFR BLDA: 96.2 % (ref 94–98)
OXYHGB MFR BLDA: 97.1 % (ref 94–98)
OXYHGB MFR BLDA: 97.2 % (ref 94–98)
OXYHGB MFR BLDA: 97.7 % (ref 94–98)
OXYHGB MFR BLDA: ABNORMAL %
PCO2 BLDA: 33 MM HG (ref 38–42)
PCO2 BLDA: 33 MM HG (ref 38–42)
PCO2 BLDA: 35 MM HG (ref 38–42)
PCO2 BLDA: 44 MM HG (ref 38–42)
PCO2 BLDA: 50 MM HG (ref 38–42)
PCO2 BLDA: 50 MM HG (ref 38–42)
PCO2 BLDA: 51 MM HG (ref 38–42)
PCO2 BLDA: 51 MM HG (ref 38–42)
PCO2 BLDA: 53 MM HG (ref 38–42)
PCO2 BLDA: 54 MM HG (ref 38–42)
PEEP CMH2O: 5 CM H2O
PH BLDA: 7.32 PH (ref 7.38–7.42)
PH BLDA: 7.35 PH (ref 7.38–7.42)
PH BLDA: 7.36 PH (ref 7.38–7.42)
PH BLDA: 7.36 PH (ref 7.38–7.42)
PH BLDA: 7.37 PH (ref 7.38–7.42)
PH BLDA: 7.38 PH (ref 7.38–7.42)
PH BLDA: 7.43 PH (ref 7.38–7.42)
PH BLDA: 7.47 PH (ref 7.38–7.42)
PH BLDA: 7.48 PH (ref 7.38–7.42)
PH BLDA: 7.48 PH (ref 7.38–7.42)
PHOSPHATE SERPL-MCNC: 4.5 MG/DL (ref 2.5–4.9)
PHOSPHATE SERPL-MCNC: 5.1 MG/DL (ref 2.5–4.9)
PHOSPHATE SERPL-MCNC: 5.3 MG/DL (ref 2.5–4.9)
PHOSPHATE SERPL-MCNC: 5.6 MG/DL (ref 2.5–4.9)
PLATELET # BLD AUTO: 110 X10*3/UL (ref 150–450)
PLATELET # BLD AUTO: 118 X10*3/UL (ref 150–450)
PLATELET # BLD AUTO: 118 X10*3/UL (ref 150–450)
PLATELET # BLD AUTO: 126 X10*3/UL (ref 150–450)
PO2 BLDA: 100 MM HG (ref 85–95)
PO2 BLDA: 108 MM HG (ref 85–95)
PO2 BLDA: 109 MM HG (ref 85–95)
PO2 BLDA: 115 MM HG (ref 85–95)
PO2 BLDA: 120 MM HG (ref 85–95)
PO2 BLDA: 126 MM HG (ref 85–95)
PO2 BLDA: 177 MM HG (ref 85–95)
PO2 BLDA: 183 MM HG (ref 85–95)
PO2 BLDA: 201 MM HG (ref 85–95)
PO2 BLDA: 88 MM HG (ref 85–95)
POTASSIUM BLDA-SCNC: 3.9 MMOL/L (ref 3.5–5.3)
POTASSIUM BLDA-SCNC: 4 MMOL/L (ref 3.5–5.3)
POTASSIUM BLDA-SCNC: 4.1 MMOL/L (ref 3.5–5.3)
POTASSIUM BLDA-SCNC: 4.3 MMOL/L (ref 3.5–5.3)
POTASSIUM BLDA-SCNC: 4.4 MMOL/L (ref 3.5–5.3)
POTASSIUM BLDA-SCNC: 4.5 MMOL/L (ref 3.5–5.3)
POTASSIUM BLDA-SCNC: 6 MMOL/L (ref 3.5–5.3)
POTASSIUM BLDA-SCNC: 6.4 MMOL/L (ref 3.5–5.3)
POTASSIUM SERPL-SCNC: 4.1 MMOL/L (ref 3.5–5.3)
POTASSIUM SERPL-SCNC: 4.1 MMOL/L (ref 3.5–5.3)
POTASSIUM SERPL-SCNC: 4.2 MMOL/L (ref 3.5–5.3)
POTASSIUM SERPL-SCNC: 4.8 MMOL/L (ref 3.5–5.3)
PRODUCT BLOOD TYPE: 5100
PRODUCT BLOOD TYPE: 600
PRODUCT BLOOD TYPE: 600
PRODUCT BLOOD TYPE: 6200
PRODUCT CODE: NORMAL
PROTHROMBIN TIME: 11.4 SECONDS (ref 9.8–12.8)
PROTHROMBIN TIME: 11.5 SECONDS (ref 9.8–12.8)
PROTHROMBIN TIME: 11.8 SECONDS (ref 9.8–12.8)
RBC # BLD AUTO: 4.32 X10*6/UL (ref 4–5.2)
RBC # BLD AUTO: 4.4 X10*6/UL (ref 4–5.2)
RBC # BLD AUTO: 4.5 X10*6/UL (ref 4–5.2)
RBC # BLD AUTO: 4.93 X10*6/UL (ref 4–5.2)
SAO2 % BLDA: 100 % (ref 94–100)
SAO2 % BLDA: 97 % (ref 94–100)
SAO2 % BLDA: 98 % (ref 94–100)
SAO2 % BLDA: 98 % (ref 94–100)
SAO2 % BLDA: 99 % (ref 94–100)
SAO2 % BLDA: ABNORMAL %
SODIUM BLDA-SCNC: 135 MMOL/L (ref 136–145)
SODIUM BLDA-SCNC: 136 MMOL/L (ref 136–145)
SODIUM BLDA-SCNC: 137 MMOL/L (ref 136–145)
SODIUM BLDA-SCNC: 138 MMOL/L (ref 136–145)
SODIUM BLDA-SCNC: 138 MMOL/L (ref 136–145)
SODIUM BLDA-SCNC: 139 MMOL/L (ref 136–145)
SODIUM BLDA-SCNC: 140 MMOL/L (ref 136–145)
SODIUM BLDA-SCNC: 140 MMOL/L (ref 136–145)
SODIUM SERPL-SCNC: 139 MMOL/L (ref 136–145)
SODIUM SERPL-SCNC: 142 MMOL/L (ref 136–145)
SODIUM SERPL-SCNC: 143 MMOL/L (ref 136–145)
SODIUM SERPL-SCNC: 146 MMOL/L (ref 136–145)
TRICUSPID ANNULAR PLANE SYSTOLIC EXCURSION: 1.3 CM
UNIT ABO: NORMAL
UNIT NUMBER: NORMAL
UNIT RH: NORMAL
UNIT VOLUME: 171
UNIT VOLUME: 203
UNIT VOLUME: 207
UNIT VOLUME: 211
UNIT VOLUME: 274
UNIT VOLUME: 285
UNIT VOLUME: 287
UNIT VOLUME: 310
UNIT VOLUME: 312
UNIT VOLUME: 315
UNIT VOLUME: 316
UNIT VOLUME: 319
UNIT VOLUME: 320
UNIT VOLUME: 337
UNIT VOLUME: 342
UNIT VOLUME: 350
UNIT VOLUME: 350
WBC # BLD AUTO: 10 X10*3/UL (ref 4.4–11.3)
WBC # BLD AUTO: 10.7 X10*3/UL (ref 4.4–11.3)
WBC # BLD AUTO: 11.3 X10*3/UL (ref 4.4–11.3)
WBC # BLD AUTO: 8 X10*3/UL (ref 4.4–11.3)
XM INTEP: NORMAL

## 2025-01-24 PROCEDURE — 82330 ASSAY OF CALCIUM: CPT

## 2025-01-24 PROCEDURE — 99231 SBSQ HOSP IP/OBS SF/LOW 25: CPT | Performed by: STUDENT IN AN ORGANIZED HEALTH CARE EDUCATION/TRAINING PROGRAM

## 2025-01-24 PROCEDURE — P9040 RBC LEUKOREDUCED IRRADIATED: HCPCS

## 2025-01-24 PROCEDURE — P9045 ALBUMIN (HUMAN), 5%, 250 ML: HCPCS | Mod: JZ,TB

## 2025-01-24 PROCEDURE — 2500000004 HC RX 250 GENERAL PHARMACY W/ HCPCS (ALT 636 FOR OP/ED): Mod: TB

## 2025-01-24 PROCEDURE — 94002 VENT MGMT INPAT INIT DAY: CPT

## 2025-01-24 PROCEDURE — 74018 RADEX ABDOMEN 1 VIEW: CPT

## 2025-01-24 PROCEDURE — 2500000004 HC RX 250 GENERAL PHARMACY W/ HCPCS (ALT 636 FOR OP/ED): Performed by: STUDENT IN AN ORGANIZED HEALTH CARE EDUCATION/TRAINING PROGRAM

## 2025-01-24 PROCEDURE — 37799 UNLISTED PX VASCULAR SURGERY: CPT

## 2025-01-24 PROCEDURE — 84132 ASSAY OF SERUM POTASSIUM: CPT

## 2025-01-24 PROCEDURE — 99291 CRITICAL CARE FIRST HOUR: CPT | Performed by: ANESTHESIOLOGY

## 2025-01-24 PROCEDURE — 85384 FIBRINOGEN ACTIVITY: CPT

## 2025-01-24 PROCEDURE — 2500000004 HC RX 250 GENERAL PHARMACY W/ HCPCS (ALT 636 FOR OP/ED)

## 2025-01-24 PROCEDURE — 2500000001 HC RX 250 WO HCPCS SELF ADMINISTERED DRUGS (ALT 637 FOR MEDICARE OP)

## 2025-01-24 PROCEDURE — 37799 UNLISTED PX VASCULAR SURGERY: CPT | Performed by: STUDENT IN AN ORGANIZED HEALTH CARE EDUCATION/TRAINING PROGRAM

## 2025-01-24 PROCEDURE — 2020000001 HC ICU ROOM DAILY

## 2025-01-24 PROCEDURE — 85610 PROTHROMBIN TIME: CPT

## 2025-01-24 PROCEDURE — 82947 ASSAY GLUCOSE BLOOD QUANT: CPT

## 2025-01-24 PROCEDURE — 84132 ASSAY OF SERUM POTASSIUM: CPT | Performed by: STUDENT IN AN ORGANIZED HEALTH CARE EDUCATION/TRAINING PROGRAM

## 2025-01-24 PROCEDURE — 2500000005 HC RX 250 GENERAL PHARMACY W/O HCPCS: Performed by: STUDENT IN AN ORGANIZED HEALTH CARE EDUCATION/TRAINING PROGRAM

## 2025-01-24 PROCEDURE — 71045 X-RAY EXAM CHEST 1 VIEW: CPT

## 2025-01-24 PROCEDURE — 2500000002 HC RX 250 W HCPCS SELF ADMINISTERED DRUGS (ALT 637 FOR MEDICARE OP, ALT 636 FOR OP/ED)

## 2025-01-24 PROCEDURE — 85027 COMPLETE CBC AUTOMATED: CPT

## 2025-01-24 PROCEDURE — 2500000005 HC RX 250 GENERAL PHARMACY W/O HCPCS: Performed by: OBSTETRICS & GYNECOLOGY

## 2025-01-24 PROCEDURE — 5A1935Z RESPIRATORY VENTILATION, LESS THAN 24 CONSECUTIVE HOURS: ICD-10-PCS

## 2025-01-24 PROCEDURE — 97162 PT EVAL MOD COMPLEX 30 MIN: CPT | Mod: GP

## 2025-01-24 PROCEDURE — 83735 ASSAY OF MAGNESIUM: CPT

## 2025-01-24 PROCEDURE — 85384 FIBRINOGEN ACTIVITY: CPT | Performed by: STUDENT IN AN ORGANIZED HEALTH CARE EDUCATION/TRAINING PROGRAM

## 2025-01-24 RX ORDER — HYDROMORPHONE HYDROCHLORIDE 1 MG/ML
INJECTION, SOLUTION INTRAMUSCULAR; INTRAVENOUS; SUBCUTANEOUS CONTINUOUS PRN
Status: DISCONTINUED | OUTPATIENT
Start: 2025-01-24 | End: 2025-01-24

## 2025-01-24 RX ORDER — HYDROMORPHONE HYDROCHLORIDE 1 MG/ML
0.2 INJECTION, SOLUTION INTRAMUSCULAR; INTRAVENOUS; SUBCUTANEOUS EVERY 4 HOURS PRN
Status: CANCELLED | OUTPATIENT
Start: 2025-01-24

## 2025-01-24 RX ORDER — HYDROMORPHONE HYDROCHLORIDE 1 MG/ML
0.5 INJECTION, SOLUTION INTRAMUSCULAR; INTRAVENOUS; SUBCUTANEOUS ONCE
Status: DISCONTINUED | OUTPATIENT
Start: 2025-01-24 | End: 2025-01-24

## 2025-01-24 RX ORDER — PHENYLEPHRINE HCL IN 0.9% NACL 0.4MG/10ML
SYRINGE (ML) INTRAVENOUS AS NEEDED
Status: DISCONTINUED | OUTPATIENT
Start: 2025-01-24 | End: 2025-01-24

## 2025-01-24 RX ORDER — OXYCODONE HYDROCHLORIDE 5 MG/1
5 TABLET ORAL EVERY 6 HOURS PRN
Status: DISCONTINUED | OUTPATIENT
Start: 2025-01-24 | End: 2025-01-25

## 2025-01-24 RX ORDER — ACETAMINOPHEN 10 MG/ML
1000 INJECTION, SOLUTION INTRAVENOUS ONCE
Status: CANCELLED | OUTPATIENT
Start: 2025-01-24 | End: 2025-01-24

## 2025-01-24 RX ORDER — ONDANSETRON HYDROCHLORIDE 2 MG/ML
INJECTION, SOLUTION INTRAVENOUS AS NEEDED
Status: DISCONTINUED | OUTPATIENT
Start: 2025-01-24 | End: 2025-01-24

## 2025-01-24 RX ORDER — NEOSTIGMINE METHYLSULFATE 1 MG/ML
4 INJECTION INTRAVENOUS ONCE
Status: COMPLETED | OUTPATIENT
Start: 2025-01-24 | End: 2025-01-24

## 2025-01-24 RX ORDER — MAGNESIUM SULFATE HEPTAHYDRATE 40 MG/ML
2 INJECTION, SOLUTION INTRAVENOUS EVERY 6 HOURS PRN
Status: CANCELLED | OUTPATIENT
Start: 2025-01-24

## 2025-01-24 RX ORDER — INSULIN LISPRO 100 [IU]/ML
0-5 INJECTION, SOLUTION INTRAVENOUS; SUBCUTANEOUS EVERY 4 HOURS
Status: CANCELLED | OUTPATIENT
Start: 2025-01-24

## 2025-01-24 RX ORDER — LABETALOL HYDROCHLORIDE 5 MG/ML
10 INJECTION, SOLUTION INTRAVENOUS ONCE
Status: COMPLETED | OUTPATIENT
Start: 2025-01-24 | End: 2025-01-24

## 2025-01-24 RX ORDER — METHOCARBAMOL 500 MG/1
500 TABLET, FILM COATED ORAL EVERY 8 HOURS SCHEDULED
Status: COMPLETED | OUTPATIENT
Start: 2025-01-24 | End: 2025-01-24

## 2025-01-24 RX ORDER — ALBUMIN HUMAN 50 G/1000ML
12.5 SOLUTION INTRAVENOUS ONCE
Status: COMPLETED | OUTPATIENT
Start: 2025-01-24 | End: 2025-01-24

## 2025-01-24 RX ORDER — ACETAMINOPHEN 10 MG/ML
1000 INJECTION, SOLUTION INTRAVENOUS EVERY 6 HOURS SCHEDULED
Status: DISCONTINUED | OUTPATIENT
Start: 2025-01-24 | End: 2025-01-24

## 2025-01-24 RX ORDER — HYDROMORPHONE HYDROCHLORIDE 1 MG/ML
0.4 INJECTION, SOLUTION INTRAMUSCULAR; INTRAVENOUS; SUBCUTANEOUS EVERY 4 HOURS PRN
Status: CANCELLED | OUTPATIENT
Start: 2025-01-24

## 2025-01-24 RX ORDER — INSULIN LISPRO 100 [IU]/ML
0-10 INJECTION, SOLUTION INTRAVENOUS; SUBCUTANEOUS EVERY 4 HOURS
Status: DISCONTINUED | OUTPATIENT
Start: 2025-01-24 | End: 2025-01-25

## 2025-01-24 RX ORDER — SODIUM CHLORIDE 0.9 G/100ML
INJECTION, SOLUTION IRRIGATION AS NEEDED
Status: DISCONTINUED | OUTPATIENT
Start: 2025-01-24 | End: 2025-01-24 | Stop reason: HOSPADM

## 2025-01-24 RX ORDER — CALCIUM CHLORIDE INJECTION 100 MG/ML
INJECTION, SOLUTION INTRAVENOUS AS NEEDED
Status: DISCONTINUED | OUTPATIENT
Start: 2025-01-24 | End: 2025-01-24

## 2025-01-24 RX ORDER — ACETAMINOPHEN 10 MG/ML
1000 INJECTION, SOLUTION INTRAVENOUS EVERY 6 HOURS SCHEDULED
Status: DISCONTINUED | OUTPATIENT
Start: 2025-01-24 | End: 2025-01-25

## 2025-01-24 RX ORDER — HYDROMORPHONE HYDROCHLORIDE 0.2 MG/ML
0.2 INJECTION INTRAMUSCULAR; INTRAVENOUS; SUBCUTANEOUS EVERY 4 HOURS PRN
Status: DISCONTINUED | OUTPATIENT
Start: 2025-01-24 | End: 2025-01-25

## 2025-01-24 RX ORDER — FENTANYL CITRATE 50 UG/ML
INJECTION, SOLUTION INTRAMUSCULAR; INTRAVENOUS AS NEEDED
Status: DISCONTINUED | OUTPATIENT
Start: 2025-01-24 | End: 2025-01-24

## 2025-01-24 RX ORDER — HYDROMORPHONE HYDROCHLORIDE 0.2 MG/ML
0.2 INJECTION INTRAMUSCULAR; INTRAVENOUS; SUBCUTANEOUS EVERY 4 HOURS PRN
Status: DISCONTINUED | OUTPATIENT
Start: 2025-01-24 | End: 2025-01-24

## 2025-01-24 RX ORDER — LABETALOL HYDROCHLORIDE 5 MG/ML
INJECTION, SOLUTION INTRAVENOUS
Status: COMPLETED
Start: 2025-01-24 | End: 2025-01-24

## 2025-01-24 RX ORDER — PANTOPRAZOLE SODIUM 40 MG/10ML
40 INJECTION, POWDER, LYOPHILIZED, FOR SOLUTION INTRAVENOUS DAILY
Status: CANCELLED | OUTPATIENT
Start: 2025-01-24

## 2025-01-24 RX ORDER — PROPOFOL 10 MG/ML
0-50 INJECTION, EMULSION INTRAVENOUS CONTINUOUS
Status: DISCONTINUED | OUTPATIENT
Start: 2025-01-24 | End: 2025-01-24

## 2025-01-24 RX ORDER — OXYCODONE HYDROCHLORIDE 5 MG/1
10 TABLET ORAL EVERY 6 HOURS PRN
Status: DISCONTINUED | OUTPATIENT
Start: 2025-01-24 | End: 2025-01-25

## 2025-01-24 RX ORDER — ACETAMINOPHEN 10 MG/ML
1000 INJECTION, SOLUTION INTRAVENOUS ONCE
Status: COMPLETED | OUTPATIENT
Start: 2025-01-24 | End: 2025-01-24

## 2025-01-24 RX ORDER — GLYCOPYRROLATE 0.2 MG/ML
0.8 INJECTION INTRAMUSCULAR; INTRAVENOUS ONCE
Status: COMPLETED | OUTPATIENT
Start: 2025-01-24 | End: 2025-01-24

## 2025-01-24 RX ADMIN — ACETAMINOPHEN 1000 MG: 10 INJECTION INTRAVENOUS at 09:18

## 2025-01-24 RX ADMIN — ACETAMINOPHEN 1000 MG: 10 INJECTION INTRAVENOUS at 17:21

## 2025-01-24 RX ADMIN — PROPOFOL 25 MCG/KG/MIN: 10 INJECTION, EMULSION INTRAVENOUS at 00:46

## 2025-01-24 RX ADMIN — METHOCARBAMOL 500 MG: 500 TABLET ORAL at 21:05

## 2025-01-24 RX ADMIN — HYDROMORPHONE HYDROCHLORIDE 0.4 MG: 0.5 INJECTION, SOLUTION INTRAMUSCULAR; INTRAVENOUS; SUBCUTANEOUS at 20:16

## 2025-01-24 RX ADMIN — INSULIN LISPRO 2 UNITS: 100 INJECTION, SOLUTION INTRAVENOUS; SUBCUTANEOUS at 08:09

## 2025-01-24 RX ADMIN — Medication 160 MCG: at 00:28

## 2025-01-24 RX ADMIN — HYDROMORPHONE HYDROCHLORIDE 0.4 MG: 0.5 INJECTION, SOLUTION INTRAMUSCULAR; INTRAVENOUS; SUBCUTANEOUS at 02:03

## 2025-01-24 RX ADMIN — METHOCARBAMOL 500 MG: 500 TABLET ORAL at 16:30

## 2025-01-24 RX ADMIN — ONDANSETRON 4 MG: 2 INJECTION, SOLUTION INTRAMUSCULAR; INTRAVENOUS at 01:06

## 2025-01-24 RX ADMIN — GLYCOPYRROLATE 0.8 MG: 0.2 INJECTION, SOLUTION INTRAMUSCULAR; INTRAVENOUS at 02:50

## 2025-01-24 RX ADMIN — LABETALOL HYDROCHLORIDE 10 MG: 5 INJECTION, SOLUTION INTRAVENOUS at 01:46

## 2025-01-24 RX ADMIN — HYDROMORPHONE HYDROCHLORIDE 0.4 MG: 0.5 INJECTION, SOLUTION INTRAMUSCULAR; INTRAVENOUS; SUBCUTANEOUS at 15:59

## 2025-01-24 RX ADMIN — CALCIUM CHLORIDE 0.25 G: 100 INJECTION, SOLUTION INTRAVENOUS at 00:16

## 2025-01-24 RX ADMIN — PANTOPRAZOLE SODIUM 40 MG: 40 INJECTION, POWDER, FOR SOLUTION INTRAVENOUS at 08:09

## 2025-01-24 RX ADMIN — OXYCODONE HYDROCHLORIDE 10 MG: 5 TABLET ORAL at 15:41

## 2025-01-24 RX ADMIN — CALCIUM CHLORIDE 0.25 G: 100 INJECTION, SOLUTION INTRAVENOUS at 00:18

## 2025-01-24 RX ADMIN — ACETAMINOPHEN 1000 MG: 10 INJECTION INTRAVENOUS at 11:05

## 2025-01-24 RX ADMIN — NEOSTIGMINE METHYLSULFATE 4 MG: 1 INJECTION INTRAVENOUS at 02:50

## 2025-01-24 RX ADMIN — INSULIN LISPRO 2 UNITS: 100 INJECTION, SOLUTION INTRAVENOUS; SUBCUTANEOUS at 20:42

## 2025-01-24 RX ADMIN — MAGNESIUM SULFATE IN WATER 4 G: 4 INJECTION, SOLUTION INTRAVENOUS at 05:30

## 2025-01-24 RX ADMIN — Medication 120 MCG: at 00:25

## 2025-01-24 RX ADMIN — INSULIN LISPRO 2 UNITS: 100 INJECTION, SOLUTION INTRAVENOUS; SUBCUTANEOUS at 04:30

## 2025-01-24 RX ADMIN — BRIMONIDINE TARTRATE 1 DROP: 2 SOLUTION/ DROPS OPHTHALMIC at 21:04

## 2025-01-24 RX ADMIN — Medication 5 MG: at 20:26

## 2025-01-24 RX ADMIN — LABETALOL HYDROCHLORIDE 10 MG: 5 INJECTION, SOLUTION INTRAVENOUS at 03:16

## 2025-01-24 RX ADMIN — HYDROMORPHONE HYDROCHLORIDE 0.4 MG: 0.5 INJECTION, SOLUTION INTRAMUSCULAR; INTRAVENOUS; SUBCUTANEOUS at 13:49

## 2025-01-24 RX ADMIN — INSULIN LISPRO 4 UNITS: 100 INJECTION, SOLUTION INTRAVENOUS; SUBCUTANEOUS at 16:17

## 2025-01-24 RX ADMIN — CALCIUM CHLORIDE 0.25 G: 100 INJECTION, SOLUTION INTRAVENOUS at 00:09

## 2025-01-24 RX ADMIN — FENTANYL CITRATE 25 MCG: 50 INJECTION, SOLUTION INTRAMUSCULAR; INTRAVENOUS at 00:20

## 2025-01-24 RX ADMIN — CALCIUM CHLORIDE 0.25 G: 100 INJECTION, SOLUTION INTRAVENOUS at 00:00

## 2025-01-24 RX ADMIN — HYDROMORPHONE HYDROCHLORIDE 0.5 MG: 0.5 INJECTION, SOLUTION INTRAMUSCULAR; INTRAVENOUS; SUBCUTANEOUS at 05:10

## 2025-01-24 RX ADMIN — ROCURONIUM 30 MG: 50 INJECTION, SOLUTION INTRAVENOUS at 00:04

## 2025-01-24 RX ADMIN — DEXAMETHASONE SODIUM PHOSPHATE 4 MG: 4 INJECTION INTRA-ARTICULAR; INTRALESIONAL; INTRAMUSCULAR; INTRAVENOUS; SOFT TISSUE at 00:10

## 2025-01-24 RX ADMIN — ALBUMIN HUMAN 12.5 G: 0.05 INJECTION, SOLUTION INTRAVENOUS at 05:30

## 2025-01-24 RX ADMIN — BRIMONIDINE TARTRATE 1 DROP: 2 SOLUTION/ DROPS OPHTHALMIC at 11:05

## 2025-01-24 RX ADMIN — DULOXETINE HYDROCHLORIDE 60 MG: 60 CAPSULE, DELAYED RELEASE ORAL at 20:26

## 2025-01-24 RX ADMIN — HYDROCORTISONE SODIUM SUCCINATE 50 MG: 100 INJECTION, POWDER, FOR SOLUTION INTRAMUSCULAR; INTRAVENOUS at 02:50

## 2025-01-24 RX ADMIN — ACETAMINOPHEN 1000 MG: 10 INJECTION INTRAVENOUS at 03:16

## 2025-01-24 RX ADMIN — SODIUM CHLORIDE, POTASSIUM CHLORIDE, SODIUM LACTATE AND CALCIUM CHLORIDE 100 ML/HR: 600; 310; 30; 20 INJECTION, SOLUTION INTRAVENOUS at 02:30

## 2025-01-24 RX ADMIN — FENTANYL CITRATE 50 MCG: 50 INJECTION, SOLUTION INTRAMUSCULAR; INTRAVENOUS at 00:37

## 2025-01-24 RX ADMIN — INSULIN LISPRO 4 UNITS: 100 INJECTION, SOLUTION INTRAVENOUS; SUBCUTANEOUS at 12:04

## 2025-01-24 RX ADMIN — FENTANYL CITRATE 25 MCG: 50 INJECTION, SOLUTION INTRAMUSCULAR; INTRAVENOUS at 00:33

## 2025-01-24 ASSESSMENT — PAIN - FUNCTIONAL ASSESSMENT
PAIN_FUNCTIONAL_ASSESSMENT: CPOT (CRITICAL CARE PAIN OBSERVATION TOOL)
PAIN_FUNCTIONAL_ASSESSMENT: 0-10

## 2025-01-24 ASSESSMENT — COGNITIVE AND FUNCTIONAL STATUS - GENERAL
CLIMB 3 TO 5 STEPS WITH RAILING: TOTAL
MOVING TO AND FROM BED TO CHAIR: A LOT
WALKING IN HOSPITAL ROOM: A LOT
MOVING FROM LYING ON BACK TO SITTING ON SIDE OF FLAT BED WITH BEDRAILS: A LOT
MOBILITY SCORE: 11
STANDING UP FROM CHAIR USING ARMS: A LOT
TURNING FROM BACK TO SIDE WHILE IN FLAT BAD: A LOT

## 2025-01-24 ASSESSMENT — PAIN SCALES - GENERAL
PAINLEVEL_OUTOF10: 9
PAINLEVEL_OUTOF10: 10 - WORST POSSIBLE PAIN
PAINLEVEL_OUTOF10: 8
PAINLEVEL_OUTOF10: 10 - WORST POSSIBLE PAIN
PAINLEVEL_OUTOF10: 9
PAINLEVEL_OUTOF10: 9
PAINLEVEL_OUTOF10: 8
PAINLEVEL_OUTOF10: 9

## 2025-01-24 ASSESSMENT — PAIN DESCRIPTION - LOCATION
LOCATION: ABDOMEN
LOCATION: ABDOMEN

## 2025-01-24 NOTE — SIGNIFICANT EVENT
1930 labs reviewed with Hgb drop to 8.9, plt 143, PT/INR trending up. CT with IV contrast ordered in the setting of concern for intra-abdominal bleed. In the meantime, pressor support started by ICU team. CT images reviewed (completed at 2236) with large volume hemoperitoneum noted, wet read confirmed by radiology with concerns for active process. In the setting of worsening labs, increasing pressor support and imaging, decision made to proceed to OR for exploration. ICU began transfusing product and placing central line.     Prior to OR: 4u PRBC, 4u FFP

## 2025-01-24 NOTE — PROCEDURES
Intubation    Date/Time: 1/23/2025 11:52 PM    Performed by: Bi Wolf MD  Authorized by: Bi Wolf MD    Consent:     Consent obtained:  Emergent situation  Universal protocol:     Required blood products, implants, devices, and special equipment available: yes      Patient identity confirmed:  Arm band and hospital-assigned identification number  Pre-procedure details:     Indications: airway protection      Indications comment:  Vomiting    Patient status:  Awake    Look externally: no concerns      Mallampati score: unable to assess due to emergent situation.    Obstruction: none      Neck mobility: normal      Pharmacologic strategy: RSI and sedation      Induction agents:  Etomidate (14mg)    Paralytics:  Succinylcholine (200mg)  Procedure details:     Preoxygenation:  Nonrebreather mask    CPR in progress: no      Number of attempts:  1  Successful intubation attempt details:     Intubation method:  Oral    Intubation technique: video assisted      Laryngoscope size: Glidescope S3.    Grade view: I      Tube size (mm):  7.5    Tube type:  Cuffed    Tube visualized through cords: yes    Placement assessment:     ETT at teeth/gumline (cm):  23    Tube secured with: tube tie.    Placement verification: chest rise and colorimetric ETCO2    Post-procedure details:     Procedure completion:  Tolerated  Comments:      Pt vomiting prior to intubation though no emesis noted between cords on intubation and tube suctioned with no return of emesis. Low concern for aspiration at this time    Procedure performed by Dr. Mott, Dr. Vasquez present for entirety of procedure

## 2025-01-24 NOTE — OP NOTE
LAPAROTOMY, EXPLORATORY, FOR TRAUMA, EVACUATION HEMATOMA IN ABDOMINAL CAVITY, CONTROL OF PELVIC HEMORRHAGE Operative Note     Date: 2025 - 2025  OR Location: [facility] OR    Name: Nelida Mata, : 1960, Age: 64 y.o., MRN: [id number], Sex: female    Diagnosis  Pre-op Diagnosis      * Hemoperitoneum [K66.1] Post-op Diagnosis     * Hemoperitoneum [K66.1]     Procedures  LAPAROTOMY, EXPLORATORY, FOR TRAUMA  41791 - IN EXPLORATORY LAPAROTOMY CELIOTOMY W/WO BIOPSY SPX    EVACUATION HEMATOMA IN ABDOMINAL CAVITY, CONTROL OF PELVIC HEMORRHAGE      Surgeons      * Maureen Davila - Primary    Resident/Fellow/Other Assistant:  Surgeons and Role:     * Erin Valenzuela MD - Assisting     * Keisha Monson MD - Resident - Assisting     * Ashlyn Kahn MD - Fellow    Staff:   Circulator: Jacquelyn  Circulator: Larry  Circulator: Jessica Guevara Person: Clare  Circulator: Jenn  Circulator: Inocencia    Anesthesia Staff: Anesthesiologist: Dustin Andrade MD  Anesthesia Resident: Lyndsey Juan MD    Procedure Summary  Anesthesia: General  ASA: III  Estimated Blood Loss: 700mL  Intra-op Medications:   Administrations occurring from 25 2300 to 25 0150:   Medication Name Total Dose   sodium chloride 0.9 % irrigation solution 3,000 mL   calcium gluconate 1 g in sodium chloride (iso) IV 50 mL Cannot be calculated   calcium gluconate 2 g in sodium chloride (iso)  mL Cannot be calculated   EPINEPHrine (Adrenalin) injection 1 mg/10mL  - Omnicell Override Pull Cannot be calculated   hydrocortisone sodium succinate (PF) (Solu-CORTEF) injection 50 mg Cannot be calculated   insulin lispro injection 0-5 Units Cannot be calculated   lactated Ringer's infusion 195 mL   magnesium sulfate 2 g in sterile water for injection 50 mL Cannot be calculated   magnesium sulfate 4 g in sterile water for injection 100 mL Cannot be calculated   pantoprazole (ProtoNix) injection 40 mg Cannot be calculated    phenylephrine (Yannick-Synephrine) 10 mg in sodium chloride 0.9% 250 mL (0.04 mg/mL) infusion (premix) 0.09 mg   polyethylene glycol (Glycolax, Miralax) packet 17 g Cannot be calculated   labetaloL (Normodyne,Trandate) injection 10 mg 10 mg   norepinephrine in dextrose 5 % (Levophed) infusion 32 mcg/mL  - Omnicell Override Pull 8,000 mcg   propofol (Diprivan) infusion 10 mg/mL  - Omnicell Override Pull 1,000 mg   albumin human 25 % solution 12.5 g Cannot be calculated   calcium chloride 10% 1 g   ceFAZolin (Ancef) vial 1 g 2 g   dexAMETHasone (Decadron) 4 mg/mL 4 mg   fentaNYL (Sublimaze) injection 50 mcg/mL 100 mcg   midazolam PF (Versed) injection 1 mg/mL 2 mg   ondansetron 2 mg/mL 4 mg   pantoprazole (ProtoNix) EC tablet 40 mg Cannot be calculated   phenylephrine 40 mcg/mL syringe 10 mL 280 mcg   propofol (Diprivan) injection 10 mg/mL 197.53 mg   rocuronium (ZeMuron) 50 mg/5 mL injection 60 mg              Anesthesia Record               Intraprocedure I/O Totals          Intake    EMERGENCY PLATELETS 250.00 mL    PRBC 350.00 mL    Phenylephrine Drip 0.00 mL    The total shown is the total volume documented since Anesthesia Start was filed.    lactated Ringer's infusion 1355.00 mL    Total Intake 1955 mL       Output    Urine 1565 mL    Est. Blood Loss 700 mL    Total Output 2265 mL       Net    Net Volume -310 mL          Specimen: No specimens collected              Drains and/or Catheters:   Closed/Suction Drain Midline;Right RUQ Bulb 10 Fr. (Active)       NG/OG/Feeding Tube OG - Schleicher sump 14 Fr Center mouth (Active)       Urethral Catheter Non-latex 16 Fr. (Active)   Site Assessment Clean;Skin intact 01/23/25 2000   Collection Container Standard drainage bag 01/23/25 2000   Securement Method Securing device (Describe) 01/23/25 2758   Reason for Continuing Urinary Catheterization accurate hourly measurement of urine volume in a critically ill patient that cannot be assessed by other volumes and urine collection  strategies 01/23/25 2000   Output (mL) 100 mL 01/23/25 2200               Findings: Several areas of oozing from subcutaneous fat layer upon staple removal.  Large volume hemoperitoneum, both active and clotted. No arterial bleeding noted. Bilateral IP ligaments without active bleed. Mild oozing at anterior aspect of vaginal cuff.  Omentum and bowel normal in appearance. Deep retroperitoneal venous bleeding noted in multiple areas. Good hemostasis at the end of the procedure.     Indications: Nelida Mata is an 64 y.o. female who is having surgery for Hemoperitoneum [K66.1].     The patient was taken directly from the ICU intubated to the OR, likely need for OR was discussed with patient prior to intubation.   Preoperative antibiotics have been ordered and given within 1 hours of incision. Venous thrombosis prophylaxis have been ordered including bilateral sequential compression devices    Procedure Details:   The patient was taken to the operating room and placed in the dorsal lithotomy position with legs in the stirrups. A timeout was performed to confirm correct patient and administration of preoperative antibiotics. Prior to the OR the patient had received 4u PRBC and 4u FFP. We then removed the staples from her incision and the subcutaneous sutures, noting generalized oozing from the subcutaneous fat layer. The prior hernia repair site was left intact.     We then prepped the incision with betadine and draped in a sterile fashion.     We began by removing her fascial sutures. At this time, blood was noted to be coming through the incision. We suctioned approximately 350 ml upon entry to the abdominal cavity while noting several large clots that were removed. We examined the omentum and did not note any areas of active bleeding,     We then placed the Bookwalter retractor for better visualization. We examined both IP ligament pedicles. While no active bleeding was noted, the left was reinforced with a  0-vicryl tie. The right was well cauterized. We then examined the vaginal cuff and noted a several small areas of oozing at the anterior cuff which we both sutured and cauterized without issue. After careful examination, we noted the primary source of bleeding to be coming from the deep retroperitoneum at the site of her mass excision from the original procedure. Using both medium and large clips, we were able to tamper several areas of venous bleeding. Once we had addressed several areas of venous bleeding, there was minimal remaining ooze that was felt to be appropriate for hemostatic agents. We irrigated thoroughly, again taking care to address any active bleeding. At this time, we placed both Surgiflow and Fibrillar deep in the pelvis as well as the left retroperitoneum. There was good hemostasis with the agents. At this time, we felt that any active bleeding had been stopped. We then re-examined the abdominal quadrants to ensure hemostasis. The Bookwalter was removed. We placed a DREW drain in the left lateral abdomen and sutured it to the skin with Prolene suture. The fascia was closed with two 0-looped PDS sutures and the skin closed with staples.     The patient tolerated the procedure well. She was given 1u PRBC and 1u plt additionally in the OR. All counts were correct x2. Dr. Davila was present for the entire procedure. The patient remained intubated and was taken to the ICU in stable condition.      Complications:  None; patient tolerated the procedure well.    Disposition: ICU - intubated and hemodynamically stable.  Condition: stable       Ashlyn Kahn MD  Gynecologic Oncology Fellow      Additional Details: None    Attending Attestation:     Maureen Davila  Phone Number: [phone]

## 2025-01-24 NOTE — PROCEDURES
Central Line    Date/Time: 1/23/2025 11:00 PM    Performed by: Bi Wolf MD  Authorized by: Will Vasquez MD    Consent:     Consent obtained:  Written (see media tab)    Consent given by:  Patient    Risks, benefits, and alternatives were discussed: yes      Risks discussed:  Arterial puncture, incorrect placement, pneumothorax, infection and bleeding    Alternatives discussed:  No treatment and delayed treatment  Universal protocol:     Procedure explained and questions answered to patient or proxy's satisfaction: yes      Required blood products, implants, devices, and special equipment available: yes      Immediately prior to procedure, a time out was called: yes      Patient identity confirmed:  Verbally with patient and arm band  Pre-procedure details:     Indication(s): central venous access, hemodynamic monitoring and insufficient peripheral access      Hand hygiene: Hand hygiene performed prior to insertion      Sterile barrier technique: All elements of maximal sterile technique followed      Skin preparation:  Chlorhexidine with alcohol  Sedation:     Sedation type:  None  Anesthesia:     Anesthesia method:  Local infiltration    Local anesthetic:  Lidocaine 1% w/o epi  Procedure details:     Location:  R internal jugular    Patient position:  Reverse Trendelenburg    Procedural supplies:  Double lumen (MAC Line)    Catheter size:  9 Fr    Landmarks identified: yes      Ultrasound guidance: yes      Ultrasound guidance timing: prior to insertion and real time      Sterile ultrasound techniques: Sterile gel and sterile probe covers were used      Number of attempts:  1    Successful placement: yes    Post-procedure details:     Post-procedure:  Dressing applied and line sutured    Assessment:  Blood return through all ports    Procedure completion:  Tolerated well, no immediate complications  Comments:      Venous catheter placement confirmed by manometry and visualization of wire in vein prior to  dilation

## 2025-01-24 NOTE — NURSING NOTE
Change in vitals signs, Bi CALDERA SICU team notified.    2300 - Worsening MAP on arterial line, pt started on Phenylephrine and Norepinephrine, team at bedside.  2311 - MTP protocol initiated - Patient received 5 PRBC, 5 FFP, 1 Platelet via rapid transfusion.   2315 - time out initiated via Augustin Mott MD in prep for CVC placement with Bi GARSIA MD.  2323 - while in sterile field, patient had a large amount of green emesis suctioned, RT at bedside and RSI started.  2325 - patient hyper oxygenated via ambu bag and 12 mg Etomidate and 200mg succinylcholine administered via MD Andrade, positive color change on capnography using glidescope, RT at bedside   2340 - transfer to OR with anesthesia and surgical team.

## 2025-01-24 NOTE — SIGNIFICANT EVENT
BRIEF SICU NOTE      Patient admitted to SICU on 1/23 for RRT due to hypotension following GIBSON/BSO earlier that day. Persistent hypotension with escalating vasoactive requirement despite aggressive fluid resuscitation. Axial imaging obtained with concern for active hemorrhage near surgical site. Central venous access obtained and patient urgently intubated due to nausea/emesis from hypotension. MTP initiated. Patient taken to OR emergently with ex-lap for hemorrhage control; venous oozing noted, which were clipped and packed with hemostatic agent. Patient returned from OR intubated, sedated, hemodynamically appropriate off vasoactives. Plan for close monitoring of hemostasis and hemodynamics, SAT/SBT in AM.    Family updated at bedside.    Please see resident significant event note for further details.      Will Vasquez MD, MS  Critical Care Medicine  Department of Anesthesiology

## 2025-01-24 NOTE — PROGRESS NOTES
"Nelida Mata is a 64 y.o. female on day 1 of admission presenting with Leiomyoma of uterus.    Subjective   Patient with acutely worsening hypotension necessitating intubation. Taken to the OR overnight after acute drop in hgb from 14 -> 8, large hemoperitoneum evacuated. Patient intubated this AM and unable to talk, but able to nod in response to questions. Patient nodded yes to chest pain, shortness of breath, nausea, and abdominal pain.        Objective   Last Recorded Vitals  Blood pressure (!) 140/120, pulse 97, temperature 37.2 °C (99 °F), resp. rate 18, height 1.499 m (4' 11\"), weight 76 kg (167 lb 8.8 oz), SpO2 98%.  Intake/Output last 3 Shifts:    Intake/Output Summary (Last 24 hours) at 1/24/2025 0620  Last data filed at 1/24/2025 0500  Gross per 24 hour   Intake 15737.88 ml   Output 5980 ml   Net 4797.88 ml       Physical Exam  General: Alert. In NAD.  : castellano draining light yellow urine  Skin: No rashes/lesions/erythema  Neuro: Becomes agitated during encounter  CV: Regular rate and rhythm  Respiratory: Even and unlabored on NC. Coarse breath sounds bilaterally  Abdominal: soft, mildly tender without rebound or guarding, nondistended. Midline vertical incision covered with island dressing with minimal strikethrough. LLQ drain with bloody output  Extremities: Full ROM  Psych: appropriate mood and affect      Assessment/Plan     Nelida Mata is a 64 y.o. female now s/p XL, GIBSON, BSO, and umbilical hernia repair (1/23) for known fibroid uterus and chronic PMB. S/p code white for dizziness, hypotension, and incisional bleeding with subsequent ICU admission and takeback for exploratory XL and evacuation of hemoperitoneum 1/23      Hypotension  Dizziness  Respiratory Acidosis  -Pt w/ respiratory acidosis on VBG post op: (pH: 7.23, pCO2: 74, HCO3: 31) but otherwise was without clinical symptoms physically.    -BP initially stable following 500ml NS bolus but again dropped to 70s/50s. After " subsequent drop in BP, patient transferred to ICU. Overnight, N/V precipitated concern for airway protection and patient was intubated. She remains intubated this AM  -s/p fluid resuscitation and IV albumin   -Cards and acute pain teams contacted by Dr. Valenzuela with recs to hold coreg and kerendia. On stress dose steroids.  - Coarse breath sounds on exam. Consider repeat CXR  - Appreciate excellent ICU care     S/P GIBSON, BSO, umbilical hernia repair  Hemoperitoneum  - Drop in hgb froom 14 -> 8 overnight with subsequent takeback for exploratory XL and evacuation of hemoperitoneum 1/23. Patient received MTP and 1u plt, and 1u rbc. Hgb this AM 14.  - follow ERAS protocol  - intubated as above  - Can resume regular diet after extubation  - strict Is/Os ; castellano in place  - DVT ppx - SCDs, heparin ppx HELD given concern for bleeding     Comorbidities  - HFpEF (50-55% 9/2023)/CAD s/p PCI w/ stent x 3/ hx of STEMI:  ASA held, Carvedilol held. Losartan & lasix held. Tele  - SVC thrombus/remote DVT: Lovenox held  - T2DM/gastroparesis: Farxiga, metformin, semaglutide, Tresiba 40u at bedtime & lispro 22u TID held. Protonix cont'd. POCT & SSI  - HLD: statin cont'd  - CKD: NO NSAIDs. Kerendia HELD.  - MICHAEL  - Fibromyalgia: Cymbalta cont'd    To be Discussed with Dr. Lola Pickard MD, PGY-2  [facility] P[phone]

## 2025-01-24 NOTE — ANESTHESIA PREPROCEDURE EVALUATION
Patient: Nelida Mata    Procedure Information       Anesthesia Start Date/Time: 01/23/25 7121    Procedure: LAPAROTOMY, EXPLORATORY, FOR TRAUMA    Location: [facility] OR 12 / Virtual [facility]    Surgeons: Maureen Davila MD            Relevant Problems   Cardiac   (+) Asymptomatic hypertensive urgency   (+) CAD (coronary artery disease)   (+) CHF, chronic (Multi)   (+) Chest pain at rest   (+) Hypertension, uncontrolled   (+) NSTEMI (non-ST elevated myocardial infarction) (Multi)   (+) NSTEMI, initial episode of care (Multi)   (+) Other chest pain   (+) Other hyperlipidemia   (+) Peripheral vascular disease (CMS-HCC)   (+) Presence of drug coated stent in right coronary artery   (+) Unstable angina (Multi)      Pulmonary   (+) Dyspnea on exertion      Neuro   (+) Anxiety   (+) CVA (cerebral vascular accident) (Multi)   (+) Idiopathic peripheral neuropathy   (+) Major depressive disorder, single episode   (+) Recurrent major depressive disorder, in remission (CMS-[facility])      GI   (+) GERD (gastroesophageal reflux disease)   (+) GI bleed   (+) Gastrointestinal hemorrhage, unspecified gastrointestinal hemorrhage type      /Renal   (+) Lower urinary tract infectious disease      Liver   (+) NAFLD (nonalcoholic fatty liver disease)      Endocrine   (+) Diabetes mellitus type 2, controlled, without complications (Multi)   (+) Type 2 diabetes mellitus with hyperglycemia, with long-term current use of insulin   (+) Uncontrolled type 2 diabetes mellitus with hyperglycemia      Hematology   (+) DVT (deep venous thrombosis) (Multi)   (+) Iron deficiency anemia      Musculoskeletal   (+) Fibromyalgia   (+) Osteoarthritis   (+) Pain syndrome, chronic      HEENT   (+) Primary open angle glaucoma of both eyes, mild stage      ID   (+) Lower urinary tract infectious disease   (+) Trichimoniasis   (+) Vaginal candida      Skin   (+) Lichen simplex chronicus      GYN   (+) Abnormal uterine bleeding   (+) Fibroid  uterus   (+) Leiomyoma of uterus   (+) Uterine fibroid       Clinical information reviewed:   Tobacco  Allergies  Meds   Med Hx  Surg Hx   Fam Hx  Soc Hx        NPO Detail:  NPO/Void Status  Date of Last Liquid: 01/22/25  Time of Last Liquid: 2359  Date of Last Solid: 01/22/25  Time of Last Solid: 2359         Physical Exam    Airway  Mallampati: unable to assess     Cardiovascular   Rate: abnormal     Dental    Pulmonary   (+) decreased breath sounds     Abdominal   (+) obese  Abdomen: tender         Anesthesia Plan    History of general anesthesia?: yes  History of complications of general anesthesia?: no    ASA 3 - emergent     general     Patient did not smoke on day of procedure.    intravenous induction   Postoperative administration of opioids is intended.  Trial extubation is planned.  Anesthetic plan and risks discussed with patient.  Use of blood products discussed with patient who.    Plan discussed with attending.

## 2025-01-24 NOTE — PROGRESS NOTES
Transitional Care Coordinator Progress Note:     Spoke with patient Nelida Mata is a 64 y.o. female on day 1 of admission presenting with Leiomyoma of uterus at bedside. Pt was asked if she was open to home care and she declined. Pt reports living at home with her boyfriend and her sister is coming to live with her to assist with her care. Pt is refusing SNF and home care at this time. SNF list left at bedside for review if patient changes her mind. Pt reports she needs transportation arranged when she is due to discharge from the hospital.     Jenae Hodges, RN, BSN, TCC

## 2025-01-24 NOTE — PROGRESS NOTES
Plan:  - volume resuscitation ongoing; briefly on pressers prior to OR, now off  - bedside echo showing normal EF    - recheck PM labs with TEG  - CPAP hs  - stop stress steroids  - PT/OT  - ?clears, will d/w sx svc; decrease IVF    Assessment:    Neuro/MSK: Acute metabolic encephalopathy  and expected acute post op pain   A-F bundle; Sleep Hygiene measures: appropriate daytime stimulation/physical activity. 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 chemo, vasoactive) and access  Jose: critically ill patient who need accurate urinary output measurements  Restraint: n/a    Dispo: ICU    Exam:    A&O x 4  NSR  Adequate air-exchange  Abd soft, NT  Extremities warm     REASON FOR ADMISSION    64 y.o. female presents to ICU for management of hypotension and dizziness after total lap hysterectomy and BSO with inguinal hernia repair today 1/23/25. PMHx of HFpEF (50-55% 9/2023), HLD, CAD s/p PCI w/ stent x 3 (2016, 2018), STEMI (9/2023, no stents placed), TIA 9/2023, SVC thrombus on Lovenox, remote DVT, IDDM2 on Farxiga, CKD, MICHAEL noncompliant with CPAP at home and  NAFLD. There were no intra-op complications; EBL was 300 ml. Patient was stable in PACU. Code white was called after she went to floor for increased abdominal pain, lightheadedness, some bleeding from incisional site, hypotension 50s/40s with cold clammy extremities. She did not have AMS and remained conversant through this time and during transfer to SICU. Patient responded well to liter of crystalloid on the floor.     TODAY'S EVENTS    1/23: admitted to ICU from [facility] via RRT for hypotension/hypovolemia  1/24: overnight hypotension progressed to elías hemorrhagic shock --> OR for ex-lap and evac of large hematoma, no clear source, thought d/t venous ooze; resuscitation with 6pc/5ffp/1plt; returned intubated, now extubated and overall improved off pressers    This critically ill patient continues to be at-risk for clinically significant deterioration / failure due to the above mentioned dysfunctional, unstable organ systems.  I have personally identified and managed all complex critical care issues to prevent aforementioned clinical deterioration.  Critical care time is spent at bedside and/or the immediate area and has included, but is not limited to, the review of diagnostic tests, labs, radiographs, serial assessments of hemodynamics, respiratory status, ventilatory management, and family updates.  Time spent in procedures and teaching are reported separately. CF CRITICAL CARE TIME: 45 minutes          LABS:  CMP:  Results from last 7 days   Lab Units 01/24/25  0220 01/23/25  2305 01/23/25  1843 01/23/25  1842 01/23/25  1435   SODIUM mmol/L 142 139 141 141 142   POTASSIUM mmol/L 4.2 4.8 5.0 5.1 4.8   CHLORIDE mmol/L 99 100 103 103 104   CO2 mmol/L 25 29 31 31 20*   ANION GAP mmol/L 22* 15 12 12 23*   BUN mg/dL 22 25* 23 23 20   CREATININE mg/dL 1.31* 1.57* 1.52* 1.50* 1.35*   EGFR mL/min/1.73m*2 45* 36* 38* 39* 44*   MAGNESIUM mg/dL 1.68 1.61 1.59*  --  2.26   ALBUMIN g/dL 4.5 3.7 3.2* 3.2*  --    ALK PHOS U/L   --   --   --  38  --    ALT U/L  --   --   --  8  --    AST U/L  --   --   --  11  --    BILIRUBIN TOTAL mg/dL  --   --   --  0.8  --      CBC:  Results from last 7 days   Lab Units 01/24/25  0816 01/24/25 0220 01/23/25 2305 01/23/25  1842 01/23/25  1435   WBC AUTO x10*3/uL 10.0 8.0 11.9* 11.5* 14.7*   HEMOGLOBIN g/dL 12.7 14.3 7.0* 8.9* 14.7   HEMATOCRIT % 35.6* 40.5 21.4* 27.0* 46.5*   PLATELETS AUTO x10*3/uL 126* 110* 139* 143* 167   MCV fL 81 82 92 91 94     COAG:   Results from last 7 days   Lab Units 01/24/25 0220 01/23/25 2305 01/23/25  1843 01/23/25  1503   INR  1.0 1.2* 1.2* 1.0     ABO:   ABO TYPE   Date Value Ref Range Status   01/23/2025 O  Final     HEME/ENDO:     CARDIAC:   Results from last 7 days   Lab Units 01/23/25  1842 01/23/25  1512   TROPHSCMC ng/L  --  13   BNP pg/mL 9  --      MICRO: No results found for the last 90 days.

## 2025-01-24 NOTE — SIGNIFICANT EVENT
Significant Event Note     Nelida Mata is a 64 y.o. female now s/p XL, GIBSON, BSO, and umbilical hernia repair (1/23) for known fibroid uterus and chronic PMB. S/p code white for dizziness, hypotension, and incisional bleeding with subsequent ICU admission and takeback for exploratory XL and evacuation of hemoperitoneum 1/23     Called to bedside by SICU team. Pt experiencing sudden severe 10/10 abdominal pain. Patient states her pain has suddenly increased while sitting in the bed. She reports pain linearly from the top of her belly down to the suprapubic area. Its painful with movement and while lying still     Vitals: /64, HR 81  General: sitting up in bed, pt grimacing in pain, clutching belly  HEENT: normocephalic, atraumatic  Heart: warm and well perfused  Lungs: breathing comfortably on room air  Abdomen: non-distended, soft, DREW drain in place with dark red output, diffusely tender, no rebound  Neuro: awake and conversant  Psych: appropriate mood and affect     A/P  Nelida Mata is a 64 y.o. female now s/p XL, GIBSON, BSO, and umbilical hernia repair (1/23) for known fibroid uterus and chronic PMB. S/p code white for dizziness, hypotension, and incisional bleeding with subsequent ICU admission and takeback for exploratory XL and evacuation of hemoperitoneum 1/23     Postoperative State  Sudden Increased abdominal pain  -Exam appears stable at this time. She is diffusely tender especially along the length of the incision site.   -DREW drain with stable output. No signs of increased output  -Recent CBC and blood gas panel with stable hgb  -Of note, pt was unable to receive the usual TAP blocks prior to surgery secondary to local anesthetic allergy. She also has not received adequate pain control since her emergent OR takeback. Considering current localization of pain and overall stable abdominal exam, pain may be secondary to poor pain control  Recommend repeating labs: CBC, RFP. Will fu to  ensure hgb remains stable  Recommend starting PCA for improved pain control  Cont to monitor DREW drain output. If increased output is noted, please contact our team  On call gyn onc resident will return to perform repeat abdominal exam in next 2-3hrs     Pt seen & d/w Dr. Femi Lopez MD PGY-1   Gyn Onc, Pager [phone]

## 2025-01-24 NOTE — PROGRESS NOTES
Nelida Mata is a 64 y.o. female on day 1 of admission presenting with Leiomyoma of uterus.    Subjective   Overnight drop of Hb to 8.9, stat CT showed hemoperitoneum. Pt became hypotensive during scan and required phenylephrine drip. MTP initiated, CVC placed, patient given 5u pRBC, 5u FFP, 1u platelets. Pt became nauseous during CVC placement and had episode of emesis. Decision was made to intubate at that time to protect the airway as the plan was to return to OR for evacuation of hematoma and to achieve hemostasis. Pt returned from OR with -700cc hematoma evacuation. She returned to ICU intubated and was extubated this am.    This morning c/o incisional pain. Off pressors, HDS. On 4L NC with mild hypercarbia, satting well.       Objective     Physical Exam  Constitutional:       General: She is not in acute distress.  HENT:      Head: Normocephalic and atraumatic.   Eyes:      General: No scleral icterus.     Extraocular Movements: Extraocular movements intact.   Cardiovascular:      Rate and Rhythm: Normal rate and regular rhythm.      Heart sounds: No murmur heard.     No friction rub. No gallop.   Pulmonary:      Effort: Pulmonary effort is normal. No respiratory distress.      Breath sounds: Rhonchi present. No wheezing or rales.      Comments: Some rhonchi noted in upper lobes. Satting well on 3L NC  Abdominal:      General: Abdomen is flat. There is no distension.      Palpations: Abdomen is soft.      Tenderness: There is abdominal tenderness.      Comments: Vertical abdominal incision clean and dry, remainder of abd soft, non-distended. TTP around incision site, particularly suprapubic and epigastric regions.   Musculoskeletal:         General: No swelling or tenderness. Normal range of motion.      Right lower leg: No edema.      Left lower leg: No edema.   Skin:     General: Skin is warm and dry.   Neurological:      General: No focal deficit present.      Mental Status: She is alert.  "        Last Recorded Vitals  Blood pressure (!) 102/42, pulse 94, temperature (!) 24.9 °C (76.8 °F), resp. rate (!) 9, height 1.499 m (4' 11\"), weight 76 kg (167 lb 8.8 oz), SpO2 96%.  Intake/Output last 3 Shifts:  I/O last 3 completed shifts:  In: 16387.6 (147.2 mL/kg) [P.O.:120; I.V.:3763.6 (49.5 mL/kg); Blood:4604; IV Piggyback:2700]  Out: 6130 (80.7 mL/kg) [Urine:4830 (1.8 mL/kg/hr); Emesis/NG output:50; Drains:250; Blood:1000]  Weight: 76 kg     Relevant Results    Scheduled medications  [Held by provider] aspirin, 81 mg, oral, Daily  [Held by provider] atorvastatin, 80 mg, oral, Daily  brimonidine, 1 drop, Both Eyes, BID  calcium chloride, , ,   calcium chloride, , ,   [Held by provider] carvedilol, 12.5 mg, oral, BID  [Held by provider] DULoxetine, 30 mg, oral, Daily before breakfast   And  [Held by provider] DULoxetine, 60 mg, oral, q PM  EPINEPHrine, , ,   [Held by provider] finerenone, 10 mg, oral, Daily  [Held by provider] heparin (porcine), 5,000 Units, subcutaneous, q8h  insulin lispro, 0-5 Units, subcutaneous, q4h  latanoprost, 1 drop, Both Eyes, Nightly  pantoprazole, 40 mg, intravenous, Daily  polyethylene glycol, 17 g, oral, Daily  rocuronium, , ,       Continuous medications  lactated Ringer's, 100 mL/hr, Last Rate: 100 mL/hr (01/24/25 0230)  phenylephrine, 0-2 mcg/kg/min, Last Rate: Stopped (01/24/25 0734)      PRN medications  PRN medications: calcium chloride, calcium chloride, calcium gluconate, calcium gluconate, dextrose, dextrose, EPINEPHrine, glucagon, glucagon, HYDROmorphone, HYDROmorphone, magnesium sulfate, magnesium sulfate, [Held by provider] melatonin, naloxone, ondansetron ODT **OR** ondansetron, rocuronium, [Held by provider] simethicone, [Held by provider] traMADol    Results for orders placed or performed during the hospital encounter of 01/23/25 (from the past 24 hours)   POCT GLUCOSE   Result Value Ref Range    POCT Glucose 116 (H) 74 - 99 mg/dL   POCT GLUCOSE   Result Value " Ref Range    POCT Glucose 104 (H) 74 - 99 mg/dL   POCT GLUCOSE   Result Value Ref Range    POCT Glucose 107 (H) 74 - 99 mg/dL   CBC   Result Value Ref Range    WBC 14.7 (H) 4.4 - 11.3 x10*3/uL    nRBC 0.0 0.0 - 0.0 /100 WBCs    RBC 4.94 4.00 - 5.20 x10*6/uL    Hemoglobin 14.7 12.0 - 16.0 g/dL    Hematocrit 46.5 (H) 36.0 - 46.0 %    MCV 94 80 - 100 fL    MCH 29.8 26.0 - 34.0 pg    MCHC 31.6 (L) 32.0 - 36.0 g/dL    RDW 13.3 11.5 - 14.5 %    Platelets 167 150 - 450 x10*3/uL   Basic Metabolic Panel   Result Value Ref Range    Glucose 109 (H) 74 - 99 mg/dL    Sodium 142 136 - 145 mmol/L    Potassium 4.8 3.5 - 5.3 mmol/L    Chloride 104 98 - 107 mmol/L    Bicarbonate 20 (L) 21 - 32 mmol/L    Anion Gap 23 (H) 10 - 20 mmol/L    Urea Nitrogen 20 6 - 23 mg/dL    Creatinine 1.35 (H) 0.50 - 1.05 mg/dL    eGFR 44 (L) >60 mL/min/1.73m*2    Calcium 9.2 8.6 - 10.6 mg/dL   Magnesium   Result Value Ref Range    Magnesium 2.26 1.60 - 2.40 mg/dL   BLOOD GAS VENOUS   Result Value Ref Range    POCT pH, Venous 7.22 (LL) 7.33 - 7.43 pH    POCT pCO2, Venous 74 (HH) 41 - 51 mm Hg    POCT pO2, Venous 41 35 - 45 mm Hg    POCT SO2, Venous 54 45 - 75 %    POCT Oxy Hemoglobin, Venous 53.0 45.0 - 75.0 %    POCT Base Excess, Venous 0.5 -2.0 - 3.0 mmol/L    POCT HCO3 Calculated, Venous 30.3 (H) 22.0 - 26.0 mmol/L    Patient Temperature 37.0 degrees Celsius    FiO2 28 %   Coagulation Screen   Result Value Ref Range    Protime 11.5 9.8 - 12.8 seconds    INR 1.0 0.9 - 1.1    aPTT 29 27 - 38 seconds   BLOOD GAS VENOUS FULL PANEL   Result Value Ref Range    POCT pH, Venous 7.23 (LL) 7.33 - 7.43 pH    POCT pCO2, Venous 74 (HH) 41 - 51 mm Hg    POCT pO2, Venous 32 (L) 35 - 45 mm Hg    POCT SO2, Venous 38 (L) 45 - 75 %    POCT Oxy Hemoglobin, Venous 37.6 (L) 45.0 - 75.0 %    POCT Hematocrit Calculated, Venous 42.0 36.0 - 46.0 %    POCT Sodium, Venous 137 136 - 145 mmol/L    POCT Potassium, Venous 5.7 (H) 3.5 - 5.3 mmol/L    POCT Chloride, Venous 104 98 -  107 mmol/L    POCT Ionized Calicum, Venous 1.15 1.10 - 1.33 mmol/L    POCT Glucose, Venous 124 (H) 74 - 99 mg/dL    POCT Lactate, Venous 1.7 0.4 - 2.0 mmol/L    POCT Base Excess, Venous 1.2 -2.0 - 3.0 mmol/L    POCT HCO3 Calculated, Venous 31.0 (H) 22.0 - 26.0 mmol/L    POCT Hemoglobin, Venous 14.1 12.0 - 16.0 g/dL    POCT Anion Gap, Venous 8.0 (L) 10.0 - 25.0 mmol/L    Patient Temperature 37.0 degrees Celsius    FiO2 28 %   Troponin I, High Sensitivity   Result Value Ref Range    Troponin I, High Sensitivity (CMC) 13 0 - 34 ng/L   POCT GLUCOSE   Result Value Ref Range    POCT Glucose 142 (H) 74 - 99 mg/dL   B-type Natriuretic Peptide   Result Value Ref Range    BNP 9 0 - 99 pg/mL   Comprehensive metabolic panel   Result Value Ref Range    Glucose 132 (H) 74 - 99 mg/dL    Sodium 141 136 - 145 mmol/L    Potassium 5.1 3.5 - 5.3 mmol/L    Chloride 103 98 - 107 mmol/L    Bicarbonate 31 21 - 32 mmol/L    Anion Gap 12 10 - 20 mmol/L    Urea Nitrogen 23 6 - 23 mg/dL    Creatinine 1.50 (H) 0.50 - 1.05 mg/dL    eGFR 39 (L) >60 mL/min/1.73m*2    Calcium 8.2 (L) 8.6 - 10.6 mg/dL    Albumin 3.2 (L) 3.4 - 5.0 g/dL    Alkaline Phosphatase 38 33 - 136 U/L    Total Protein 4.5 (L) 6.4 - 8.2 g/dL    AST 11 9 - 39 U/L    Bilirubin, Total 0.8 0.0 - 1.2 mg/dL    ALT 8 7 - 45 U/L   CBC   Result Value Ref Range    WBC 11.5 (H) 4.4 - 11.3 x10*3/uL    nRBC 0.0 0.0 - 0.0 /100 WBCs    RBC 2.98 (L) 4.00 - 5.20 x10*6/uL    Hemoglobin 8.9 (L) 12.0 - 16.0 g/dL    Hematocrit 27.0 (L) 36.0 - 46.0 %    MCV 91 80 - 100 fL    MCH 29.9 26.0 - 34.0 pg    MCHC 33.0 32.0 - 36.0 g/dL    RDW 13.3 11.5 - 14.5 %    Platelets 143 (L) 150 - 450 x10*3/uL   Calcium, Ionized   Result Value Ref Range    POCT Calcium, Ionized 1.13 1.1 - 1.33 mmol/L   Coagulation Screen   Result Value Ref Range    Protime 13.0 (H) 9.8 - 12.8 seconds    INR 1.2 (H) 0.9 - 1.1    aPTT 29 27 - 38 seconds   Magnesium   Result Value Ref Range    Magnesium 1.59 (L) 1.60 - 2.40 mg/dL    Renal Function Panel   Result Value Ref Range    Glucose 131 (H) 74 - 99 mg/dL    Sodium 141 136 - 145 mmol/L    Potassium 5.0 3.5 - 5.3 mmol/L    Chloride 103 98 - 107 mmol/L    Bicarbonate 31 21 - 32 mmol/L    Anion Gap 12 10 - 20 mmol/L    Urea Nitrogen 23 6 - 23 mg/dL    Creatinine 1.52 (H) 0.50 - 1.05 mg/dL    eGFR 38 (L) >60 mL/min/1.73m*2    Calcium 8.2 (L) 8.6 - 10.6 mg/dL    Phosphorus 5.5 (H) 2.5 - 4.9 mg/dL    Albumin 3.2 (L) 3.4 - 5.0 g/dL   Blood Gas Arterial Full Panel   Result Value Ref Range    POCT pH, Arterial 7.31 (L) 7.38 - 7.42 pH    POCT pCO2, Arterial 57 (H) 38 - 42 mm Hg    POCT pO2, Arterial 115 (H) 85 - 95 mm Hg    POCT SO2, Arterial 98 94 - 100 %    POCT Oxy Hemoglobin, Arterial 96.5 94.0 - 98.0 %    POCT Hematocrit Calculated, Arterial 28.0 (L) 36.0 - 46.0 %    POCT Sodium, Arterial 136 136 - 145 mmol/L    POCT Potassium, Arterial 5.2 3.5 - 5.3 mmol/L    POCT Chloride, Arterial 105 98 - 107 mmol/L    POCT Ionized Calcium, Arterial 1.19 1.10 - 1.33 mmol/L    POCT Glucose, Arterial 129 (H) 74 - 99 mg/dL    POCT Lactate, Arterial 1.5 0.4 - 2.0 mmol/L    POCT Base Excess, Arterial 1.7 -2.0 - 3.0 mmol/L    POCT HCO3 Calculated, Arterial 28.7 (H) 22.0 - 26.0 mmol/L    POCT Hemoglobin, Arterial 9.3 (L) 12.0 - 16.0 g/dL    POCT Anion Gap, Arterial 8 (L) 10 - 25 mmo/L    Patient Temperature 37.0 degrees Celsius    FiO2 30 %   POCT GLUCOSE   Result Value Ref Range    POCT Glucose 142 (H) 74 - 99 mg/dL   Prepare RBC: 2 Units   Result Value Ref Range    PRODUCT CODE X5858E33     Unit Number [id number]     Unit ABO O     Unit RH POS     XM INTEP COMP     Dispense Status IS     Blood Expiration Date 2/10/2025 11:59:00 PM EST     PRODUCT BLOOD TYPE 5100     UNIT VOLUME 350     PRODUCT CODE R8767N32     Unit Number [id number]     Unit ABO O     Unit RH POS     XM INTEP COMP     Dispense Status IS     Blood Expiration Date 2/10/2025 11:59:00 PM EST     PRODUCT BLOOD TYPE 5100     UNIT VOLUME  350    Prepare Plasma: 2 Units   Result Value Ref Range    PRODUCT CODE D0048D19     Unit Number [id number]     Unit ABO AB     Unit RH POS     Dispense Status IS     Blood Expiration Date 1/26/2025  9:30:00 PM EST     PRODUCT BLOOD TYPE 8400     UNIT VOLUME 318     PRODUCT CODE R2108T00     Unit Number [id number]     Unit ABO AB     Unit RH NEG     Dispense Status IS     Blood Expiration Date 1/27/2025  7:15:00 AM EST     PRODUCT BLOOD TYPE 2800     UNIT VOLUME 332    Type and Screen   Result Value Ref Range    ABO TYPE O     Rh TYPE POS     ANTIBODY SCREEN NEG    Blood Gas Arterial Full Panel   Result Value Ref Range    POCT pH, Arterial 7.34 (L) 7.38 - 7.42 pH    POCT pCO2, Arterial 53 (H) 38 - 42 mm Hg    POCT pO2, Arterial 195 (H) 85 - 95 mm Hg    POCT SO2, Arterial 98 94 - 100 %    POCT Oxy Hemoglobin, Arterial 96.7 94.0 - 98.0 %    POCT Hematocrit Calculated, Arterial 24.0 (L) 36.0 - 46.0 %    POCT Sodium, Arterial 137 136 - 145 mmol/L    POCT Potassium, Arterial 5.2 3.5 - 5.3 mmol/L    POCT Chloride, Arterial 105 98 - 107 mmol/L    POCT Ionized Calcium, Arterial 1.11 1.10 - 1.33 mmol/L    POCT Glucose, Arterial 155 (H) 74 - 99 mg/dL    POCT Lactate, Arterial 1.0 0.4 - 2.0 mmol/L    POCT Base Excess, Arterial 2.3 -2.0 - 3.0 mmol/L    POCT HCO3 Calculated, Arterial 28.6 (H) 22.0 - 26.0 mmol/L    POCT Hemoglobin, Arterial 8.1 (L) 12.0 - 16.0 g/dL    POCT Anion Gap, Arterial 9 (L) 10 - 25 mmo/L    Patient Temperature 37.0 degrees Celsius    FiO2 30 %   Prepare RBC: 2 Units   Result Value Ref Range    PRODUCT CODE S0863G88     Unit Number [id number]     Unit ABO O     Unit RH POS     XM INTEP COMP     Dispense Status IS     Blood Expiration Date 2/4/2025 11:59:00 PM EST     PRODUCT BLOOD TYPE 5100     UNIT VOLUME 350     PRODUCT CODE R7096T85     Unit Number [id number]     Unit ABO O     Unit RH POS     XM INTEP COMP     Dispense Status IS     Blood Expiration Date 2/11/2025 11:59:00 PM EST      PRODUCT BLOOD TYPE 5100     UNIT VOLUME 273    Prepare Platelets: 1 Units   Result Value Ref Range    PRODUCT CODE L5527F08     Unit Number [id number]     Unit ABO A     Unit RH POS     Dispense Status TR     Blood Expiration Date 1/25/2025 11:59:00 PM EST     PRODUCT BLOOD TYPE 6200     UNIT VOLUME 225    Prepare Plasma: 2 Units   Result Value Ref Range    PRODUCT CODE N0379Z46     Unit Number [id number]     Unit ABO A     Unit RH POS     Dispense Status IS     Blood Expiration Date 1/26/2025  2:40:00 PM EST     PRODUCT BLOOD TYPE 6200     UNIT VOLUME 282     PRODUCT CODE I2324M86     Unit Number [id number]     Unit ABO A     Unit RH POS     Dispense Status IS     Blood Expiration Date 1/27/2025  9:33:00 AM EST     PRODUCT BLOOD TYPE 6200     UNIT VOLUME 382    Prepare Plasma   Result Value Ref Range    PRODUCT CODE W1799E71     Unit Number [id number]     Unit ABO A     Unit RH POS     Dispense Status IS     Blood Expiration Date 1/26/2025  2:46:00 PM EST     PRODUCT BLOOD TYPE 6200     UNIT VOLUME 292    Prepare RBC   Result Value Ref Range    PRODUCT CODE Z9514Q16     Unit Number [id number]     Unit ABO O     Unit RH POS     XM INTEP COMP     Dispense Status IS     Blood Expiration Date 2/13/2025 11:59:00 PM EST     PRODUCT BLOOD TYPE 5100     UNIT VOLUME 350     PRODUCT CODE P7964N56     Unit Number [id number]     Unit ABO O     Unit RH POS     XM INTEP COMP     Dispense Status XM     Blood Expiration Date 2/6/2025 11:59:00 PM EST     PRODUCT BLOOD TYPE 5100     UNIT VOLUME 275     PRODUCT CODE R0028E10     Unit Number [id number]     Unit ABO O     Unit RH NEG     XM INTEP COMP     Dispense Status RE     Blood Expiration Date 1/29/2025 11:59:00 PM EST     PRODUCT BLOOD TYPE 9500     UNIT VOLUME 350     PRODUCT CODE L6822N61     Unit Number [id number]     Unit ABO O     Unit RH NEG     XM INTEP COMP     Dispense Status RE     Blood Expiration Date 1/27/2025 11:59:00 PM  EST     PRODUCT BLOOD TYPE 9500     UNIT VOLUME 329     PRODUCT CODE C1051E04     Unit Number [id number]     Unit ABO O     Unit RH NEG     XM INTEP COMP     Dispense Status RE     Blood Expiration Date 2/1/2025 11:59:00 PM EST     PRODUCT BLOOD TYPE 9500     UNIT VOLUME 310     PRODUCT CODE P1779Y65     Unit Number [id number]     Unit ABO O     Unit RH POS     XM INTEP COMP     Dispense Status XM     Blood Expiration Date 2/16/2025 11:59:00 PM EST     PRODUCT BLOOD TYPE 5100     UNIT VOLUME 281    Coagulation Screen   Result Value Ref Range    Protime 14.0 (H) 9.8 - 12.8 seconds    INR 1.2 (H) 0.9 - 1.1    aPTT 31 27 - 38 seconds   CBC   Result Value Ref Range    WBC 11.9 (H) 4.4 - 11.3 x10*3/uL    nRBC 0.0 0.0 - 0.0 /100 WBCs    RBC 2.32 (L) 4.00 - 5.20 x10*6/uL    Hemoglobin 7.0 (L) 12.0 - 16.0 g/dL    Hematocrit 21.4 (L) 36.0 - 46.0 %    MCV 92 80 - 100 fL    MCH 30.2 26.0 - 34.0 pg    MCHC 32.7 32.0 - 36.0 g/dL    RDW 13.3 11.5 - 14.5 %    Platelets 139 (L) 150 - 450 x10*3/uL   TEG Clot Global Profile   Result Value Ref Range    R (Reaction Time) K 7.4 4.6 - 9.1 min    K (Clot Kinetics) 1.4 0.8 - 2.1 min    ANGLE 69.4 63.0 - 78.0 deg    MA (Max Amplitude) K 63.3 52.0 - 69.0 mm    R (Reaction Time) KH 8.2 4.3 - 8.3 min    MA (Max Amplitude) RT 62.8 52.0 - 70.0 mm    MA ( New Amplitude) FF 19.4 15.0 - 32.0 mm    FLEV 354 278 - 581 mg/dL   Blood Gas Arterial Full Panel   Result Value Ref Range    POCT pH, Arterial 7.33 (L) 7.38 - 7.42 pH    POCT pCO2, Arterial 53 (H) 38 - 42 mm Hg    POCT pO2, Arterial 173 (H) 85 - 95 mm Hg    POCT SO2, Arterial 98 94 - 100 %    POCT Oxy Hemoglobin, Arterial 96.8 94.0 - 98.0 %    POCT Hematocrit Calculated, Arterial 22.0 (L) 36.0 - 46.0 %    POCT Sodium, Arterial 135 (L) 136 - 145 mmol/L    POCT Potassium, Arterial 5.0 3.5 - 5.3 mmol/L    POCT Chloride, Arterial 103 98 - 107 mmol/L    POCT Ionized Calcium, Arterial 1.12 1.10 - 1.33 mmol/L    POCT Glucose, Arterial 183  (H) 74 - 99 mg/dL    POCT Lactate, Arterial 1.2 0.4 - 2.0 mmol/L    POCT Base Excess, Arterial 1.7 -2.0 - 3.0 mmol/L    POCT HCO3 Calculated, Arterial 27.9 (H) 22.0 - 26.0 mmol/L    POCT Hemoglobin, Arterial 7.2 (L) 12.0 - 16.0 g/dL    POCT Anion Gap, Arterial 9 (L) 10 - 25 mmo/L    Patient Temperature 37.0 degrees Celsius    FiO2 24 %   Renal Function Panel   Result Value Ref Range    Glucose 194 (H) 74 - 99 mg/dL    Sodium 139 136 - 145 mmol/L    Potassium 4.8 3.5 - 5.3 mmol/L    Chloride 100 98 - 107 mmol/L    Bicarbonate 29 21 - 32 mmol/L    Anion Gap 15 10 - 20 mmol/L    Urea Nitrogen 25 (H) 6 - 23 mg/dL    Creatinine 1.57 (H) 0.50 - 1.05 mg/dL    eGFR 36 (L) >60 mL/min/1.73m*2    Calcium 8.2 (L) 8.6 - 10.6 mg/dL    Phosphorus 5.6 (H) 2.5 - 4.9 mg/dL    Albumin 3.7 3.4 - 5.0 g/dL   Magnesium   Result Value Ref Range    Magnesium 1.61 1.60 - 2.40 mg/dL   Prepare RBC   Result Value Ref Range    PRODUCT CODE A5414Y64     Unit Number [id number]     Unit ABO O     Unit RH POS     XM INTEP COMP     Dispense Status XM     Blood Expiration Date 2/13/2025 11:59:00 PM EST     PRODUCT BLOOD TYPE 5100     UNIT VOLUME 350     PRODUCT CODE K8491O94     Unit Number [id number]     Unit ABO O     Unit RH POS     XM INTEP COMP     Dispense Status RE     Blood Expiration Date 2/19/2025 11:59:00 PM EST     PRODUCT BLOOD TYPE 5100     UNIT VOLUME 350     PRODUCT CODE A5958T29     Unit Number [id number]     Unit ABO O     Unit RH POS     XM INTEP COMP     Dispense Status XM     Blood Expiration Date 2/16/2025 11:59:00 PM EST     PRODUCT BLOOD TYPE 5100     UNIT VOLUME 274     PRODUCT CODE Z8935Q05     Unit Number [id number]     Unit ABO O     Unit RH POS     XM INTEP COMP     Dispense Status RE     Blood Expiration Date 2/19/2025 11:59:00 PM EST     PRODUCT BLOOD TYPE 5100     UNIT VOLUME 285     PRODUCT CODE H8423G45     Unit Number [id number]     Unit ABO O     Unit RH POS     XM INTEP COMP     Dispense  Status TR     Blood Expiration Date 2/12/2025 11:59:00 PM EST     PRODUCT BLOOD TYPE 5100     UNIT VOLUME 287    Prepare Plasma   Result Value Ref Range    PRODUCT CODE Z7235G65     Unit Number [id number]     Unit ABO A     Unit RH POS     Dispense Status RE     Blood Expiration Date 1/25/2025  7:10:00 AM EST     PRODUCT BLOOD TYPE 6200     UNIT VOLUME 203     PRODUCT CODE V1419I96     Unit Number [id number]     Unit ABO A     Unit RH POS     Dispense Status RE     Blood Expiration Date 1/27/2025  9:33:00 AM EST     PRODUCT BLOOD TYPE 6200     UNIT VOLUME 337     PRODUCT CODE D1817I13     Unit Number [id number]     Unit ABO A     Unit RH POS     Dispense Status RE     Blood Expiration Date 1/27/2025 11:26:00 AM EST     PRODUCT BLOOD TYPE 6200     UNIT VOLUME 319     PRODUCT CODE I2526D92     Unit Number [id number]     Unit ABO A     Unit RH NEG     Dispense Status RE     Blood Expiration Date 1/27/2025  9:33:00 AM EST     PRODUCT BLOOD TYPE 0600     UNIT VOLUME 342     PRODUCT CODE B4530G92     Unit Number [id number]     Unit ABO A     Unit RH NEG     Dispense Status RE     Blood Expiration Date 1/27/2025 11:26:00 AM EST     PRODUCT BLOOD TYPE 0600     UNIT VOLUME 312    Prepare Platelets   Result Value Ref Range    PRODUCT CODE V1005U26     Unit Number [id number]     Unit ABO A     Unit RH POS     Dispense Status RE     Blood Expiration Date 1/25/2025 11:59:00 PM EST     PRODUCT BLOOD TYPE 6200     UNIT VOLUME 211    Prepare Plasma   Result Value Ref Range    PRODUCT CODE E0405C46     Unit Number [id number]     Unit ABO A     Unit RH POS     Dispense Status RE     Blood Expiration Date 1/28/2025 11:32:00 PM EST     PRODUCT BLOOD TYPE 6200     UNIT VOLUME 207     PRODUCT CODE F3048G16     Unit Number [id number]     Unit ABO A     Unit RH POS     Dispense Status RE     Blood Expiration Date 1/27/2025 11:26:00 AM EST     PRODUCT BLOOD TYPE 6200     UNIT VOLUME 320     PRODUCT  CODE N2985O27     Unit Number [id number]     Unit ABO A     Unit RH POS     Dispense Status RE     Blood Expiration Date 1/27/2025 11:26:00 AM EST     PRODUCT BLOOD TYPE 6200     UNIT VOLUME 310     PRODUCT CODE D9546K87     Unit Number [id number]     Unit ABO A     Unit RH POS     Dispense Status RE     Blood Expiration Date 1/28/2025 11:32:00 PM EST     PRODUCT BLOOD TYPE 6200     UNIT VOLUME 316     PRODUCT CODE W4686H70     Unit Number [id number]     Unit ABO A     Unit RH POS     Dispense Status RE     Blood Expiration Date 1/28/2025 11:32:00 PM EST     PRODUCT BLOOD TYPE 6200     UNIT VOLUME 315    Prepare Platelets   Result Value Ref Range    PRODUCT CODE H1103W72     Unit Number [id number]     Unit ABO O     Unit RH POS     Dispense Status RE     Blood Expiration Date 1/25/2025 11:59:00 PM EST     PRODUCT BLOOD TYPE 5100     UNIT VOLUME 171    Blood Gas Arterial Full Panel   Result Value Ref Range    POCT pH, Arterial 7.32 (L) 7.38 - 7.42 pH    POCT pCO2, Arterial 51 (H) 38 - 42 mm Hg    POCT pO2, Arterial 201 (H) 85 - 95 mm Hg    POCT SO2, Arterial 100 94 - 100 %    POCT Oxy Hemoglobin, Arterial 97.7 94.0 - 98.0 %    POCT Hematocrit Calculated, Arterial 37.0 36.0 - 46.0 %    POCT Sodium, Arterial 135 (L) 136 - 145 mmol/L    POCT Potassium, Arterial 6.4 (HH) 3.5 - 5.3 mmol/L    POCT Chloride, Arterial 100 98 - 107 mmol/L    POCT Ionized Calcium, Arterial 1.00 (L) 1.10 - 1.33 mmol/L    POCT Glucose, Arterial 227 (H) 74 - 99 mg/dL    POCT Lactate, Arterial 2.4 (H) 0.4 - 2.0 mmol/L    POCT Base Excess, Arterial -0.4 -2.0 - 3.0 mmol/L    POCT HCO3 Calculated, Arterial 26.3 (H) 22.0 - 26.0 mmol/L    POCT Hemoglobin, Arterial 12.3 12.0 - 16.0 g/dL    POCT Anion Gap, Arterial 15 10 - 25 mmo/L    Patient Temperature 37.0 degrees Celsius    FiO2 100 %   TEG Clot Global Profile   Result Value Ref Range    R (Reaction Time) K 7.3 4.6 - 9.1 min    K (Clot Kinetics) 1.5 0.8 - 2.1 min    ANGLE 72.2  63.0 - 78.0 deg    MA (Max Amplitude) K 58.1 52.0 - 69.0 mm    R (Reaction Time) KH 5.7 4.3 - 8.3 min    MA (Max Amplitude) RT 58.4 52.0 - 70.0 mm    MA ( New Amplitude) FF 19.4 15.0 - 32.0 mm    FLEV 354 278 - 581 mg/dL   Blood Gas Arterial Full Panel   Result Value Ref Range    POCT pH, Arterial 7.38 7.38 - 7.42 pH    POCT pCO2, Arterial 44 (H) 38 - 42 mm Hg    POCT pO2, Arterial 183 (H) 85 - 95 mm Hg    POCT SO2, Arterial 99 94 - 100 %    POCT Oxy Hemoglobin, Arterial 97.2 94.0 - 98.0 %    POCT Hematocrit Calculated, Arterial 38.0 36.0 - 46.0 %    POCT Sodium, Arterial 137 136 - 145 mmol/L    POCT Potassium, Arterial 6.0 (H) 3.5 - 5.3 mmol/L    POCT Chloride, Arterial 101 98 - 107 mmol/L    POCT Ionized Calcium, Arterial 1.07 (L) 1.10 - 1.33 mmol/L    POCT Glucose, Arterial 195 (H) 74 - 99 mg/dL    POCT Lactate, Arterial 1.9 0.4 - 2.0 mmol/L    POCT Base Excess, Arterial 0.6 -2.0 - 3.0 mmol/L    POCT HCO3 Calculated, Arterial 26.0 22.0 - 26.0 mmol/L    POCT Hemoglobin, Arterial 12.5 12.0 - 16.0 g/dL    POCT Anion Gap, Arterial 16 10 - 25 mmo/L    Patient Temperature 37.0 degrees Celsius    FiO2 50 %   Blood Gas Arterial Full Panel   Result Value Ref Range    POCT pH, Arterial 7.47 (H) 7.38 - 7.42 pH    POCT pCO2, Arterial 35 (L) 38 - 42 mm Hg    POCT pO2, Arterial 100 (H) 85 - 95 mm Hg    POCT SO2, Arterial 99 94 - 100 %    POCT Oxy Hemoglobin, Arterial 96.0 94.0 - 98.0 %    POCT Hematocrit Calculated, Arterial 44.0 36.0 - 46.0 %    POCT Sodium, Arterial 140 136 - 145 mmol/L    POCT Potassium, Arterial 4.5 3.5 - 5.3 mmol/L    POCT Chloride, Arterial 99 98 - 107 mmol/L    POCT Ionized Calcium, Arterial 1.22 1.10 - 1.33 mmol/L    POCT Glucose, Arterial 219 (H) 74 - 99 mg/dL    POCT Lactate, Arterial 1.9 0.4 - 2.0 mmol/L    POCT Base Excess, Arterial 2.2 -2.0 - 3.0 mmol/L    POCT HCO3 Calculated, Arterial 25.5 22.0 - 26.0 mmol/L    POCT Hemoglobin, Arterial 14.8 12.0 - 16.0 g/dL    POCT Anion Gap, Arterial 20 10 -  25 mmo/L    Patient Temperature 37.0 degrees Celsius    FiO2 40 %   Calcium, Ionized   Result Value Ref Range    POCT Calcium, Ionized 1.18 1.1 - 1.33 mmol/L   CBC   Result Value Ref Range    WBC 8.0 4.4 - 11.3 x10*3/uL    nRBC 0.0 0.0 - 0.0 /100 WBCs    RBC 4.93 4.00 - 5.20 x10*6/uL    Hemoglobin 14.3 12.0 - 16.0 g/dL    Hematocrit 40.5 36.0 - 46.0 %    MCV 82 80 - 100 fL    MCH 29.0 26.0 - 34.0 pg    MCHC 35.3 32.0 - 36.0 g/dL    RDW 14.1 11.5 - 14.5 %    Platelets 110 (L) 150 - 450 x10*3/uL   Coagulation Screen   Result Value Ref Range    Protime 11.8 9.8 - 12.8 seconds    INR 1.0 0.9 - 1.1    aPTT 31 27 - 38 seconds   Magnesium   Result Value Ref Range    Magnesium 1.68 1.60 - 2.40 mg/dL   Renal Function Panel   Result Value Ref Range    Glucose 205 (H) 74 - 99 mg/dL    Sodium 142 136 - 145 mmol/L    Potassium 4.2 3.5 - 5.3 mmol/L    Chloride 99 98 - 107 mmol/L    Bicarbonate 25 21 - 32 mmol/L    Anion Gap 22 (H) 10 - 20 mmol/L    Urea Nitrogen 22 6 - 23 mg/dL    Creatinine 1.31 (H) 0.50 - 1.05 mg/dL    eGFR 45 (L) >60 mL/min/1.73m*2    Calcium 10.3 8.6 - 10.6 mg/dL    Phosphorus 4.5 2.5 - 4.9 mg/dL    Albumin 4.5 3.4 - 5.0 g/dL   TEG Clot Global Profile   Result Value Ref Range    R (Reaction Time) K 6.7 4.6 - 9.1 min    K (Clot Kinetics) 1.5 0.8 - 2.1 min    ANGLE 72.2 63.0 - 78.0 deg    MA (Max Amplitude) K 58.7 52.0 - 69.0 mm    R (Reaction Time) KH 7.6 4.3 - 8.3 min    MA (Max Amplitude) RT 58.9 52.0 - 70.0 mm    MA ( New Amplitude) FF 19.9 15.0 - 32.0 mm    FLEV 363 278 - 581 mg/dL   POCT GLUCOSE   Result Value Ref Range    POCT Glucose 231 (H) 74 - 99 mg/dL   Blood Gas Arterial Full Panel   Result Value Ref Range    POCT pH, Arterial 7.48 (H) 7.38 - 7.42 pH    POCT pCO2, Arterial 33 (L) 38 - 42 mm Hg    POCT pO2, Arterial 115 (H) 85 - 95 mm Hg    POCT SO2, Arterial      POCT Oxy Hemoglobin, Arterial      POCT Hematocrit Calculated, Arterial      POCT Sodium, Arterial 138 136 - 145 mmol/L    POCT  Potassium, Arterial 3.9 3.5 - 5.3 mmol/L    POCT Chloride, Arterial 102 98 - 107 mmol/L    POCT Ionized Calcium, Arterial 1.20 1.10 - 1.33 mmol/L    POCT Glucose, Arterial 232 (H) 74 - 99 mg/dL    POCT Lactate, Arterial 2.4 (H) 0.4 - 2.0 mmol/L    POCT Base Excess, Arterial 1.6 -2.0 - 3.0 mmol/L    POCT HCO3 Calculated, Arterial 24.6 22.0 - 26.0 mmol/L    POCT Hemoglobin, Arterial      POCT Anion Gap, Arterial 15 10 - 25 mmo/L    Patient Temperature 37.0 degrees Celsius    FiO2 30 %    Peep CHM2O 5.0 cm H2O   Blood Gas Arterial Full Panel   Result Value Ref Range    POCT pH, Arterial 7.48 (H) 7.38 - 7.42 pH    POCT pCO2, Arterial 33 (L) 38 - 42 mm Hg    POCT pO2, Arterial 120 (H) 85 - 95 mm Hg    POCT SO2, Arterial 99 94 - 100 %    POCT Oxy Hemoglobin, Arterial 95.9 94.0 - 98.0 %    POCT Hematocrit Calculated, Arterial 40.0 36.0 - 46.0 %    POCT Sodium, Arterial 139 136 - 145 mmol/L    POCT Potassium, Arterial 3.9 3.5 - 5.3 mmol/L    POCT Chloride, Arterial 101 98 - 107 mmol/L    POCT Ionized Calcium, Arterial 1.20 1.10 - 1.33 mmol/L    POCT Glucose, Arterial 228 (H) 74 - 99 mg/dL    POCT Lactate, Arterial 2.5 (H) 0.4 - 2.0 mmol/L    POCT Base Excess, Arterial 1.6 -2.0 - 3.0 mmol/L    POCT HCO3 Calculated, Arterial 24.6 22.0 - 26.0 mmol/L    POCT Hemoglobin, Arterial 13.4 12.0 - 16.0 g/dL    POCT Anion Gap, Arterial 17 10 - 25 mmo/L    Patient Temperature 37.0 degrees Celsius    FiO2 30 %   Blood Gas Arterial Full Panel   Result Value Ref Range    POCT pH, Arterial 7.36 (L) 7.38 - 7.42 pH    POCT pCO2, Arterial 50 (H) 38 - 42 mm Hg    POCT pO2, Arterial 109 (H) 85 - 95 mm Hg    POCT SO2, Arterial 98 94 - 100 %    POCT Oxy Hemoglobin, Arterial 95.6 94.0 - 98.0 %    POCT Hematocrit Calculated, Arterial 41.0 36.0 - 46.0 %    POCT Sodium, Arterial 139 136 - 145 mmol/L    POCT Potassium, Arterial 3.9 3.5 - 5.3 mmol/L    POCT Chloride, Arterial 102 98 - 107 mmol/L    POCT Ionized Calcium, Arterial 1.27 1.10 - 1.33 mmol/L     POCT Glucose, Arterial 272 (H) 74 - 99 mg/dL    POCT Lactate, Arterial 2.5 (H) 0.4 - 2.0 mmol/L    POCT Base Excess, Arterial 1.9 -2.0 - 3.0 mmol/L    POCT HCO3 Calculated, Arterial 28.2 (H) 22.0 - 26.0 mmol/L    POCT Hemoglobin, Arterial 13.5 12.0 - 16.0 g/dL    POCT Anion Gap, Arterial 13 10 - 25 mmo/L    Patient Temperature 37.0 degrees Celsius    FiO2 40 %     *Note: Due to a large number of results and/or encounters for the requested time period, some results have not been displayed. A complete set of results can be found in Results Review.          Assessment/Plan     Assessment:  Pt is a 65 yo F with PMHx HFpEF (50-55% 9/2023), CAD s/p PCI w/ stent x 3 (2016, 2018) and STEMI (9/2023, no stents placed), TIA 9/2023, SVC thrombus on Lovenox, remote DVT, IDDM2, HLD, CKD, MICHAEL, and NAFLD, s/p GIBSON, BSO in the setting of known fibroid uterus and chronic PMB presenting to SICU d/t post operative hypotension ISO hemorrhagic shock. MTP activated and patient resuscitated, taken back to OR 1/24 am for evacuation of hematoma now HDS and off vasoactive agents.    Plan:  NEURO:  History of depression, glaucoma and TIA 9/23. Acute post-op pain. Continues to complain of abdominal pain after surgery. Got Narcan in OR however did not get long acting opioid intra-op after 20 mg Methadone. A&Ox3 this morning.  - Ongoing neuro and pain assessments  - IV tylenol 1g q6h while NPO  - PRN hydromorphone for pain control  - Has Procaine allergy (hives) but no reaction to lidocaine intra-op   - PT/OT consult tomorrow   - Home meds: AlphaGAN drops and lantoprost for glaucoma, Cymbalta (held while NPO)      CV: History of HFpEF (50-55% 9/2023), HLD, CAD s/p PCI w/ stent x 3 (2016, 2018), STEMI (9/2023, no stents placed), SVC thrombus on Lovenox. Bedside echo and ECG on arrival to ICU were unremarkable. Denies chest pain. Was hypotensive on floor 50s/40s. OR EBL 1/23 300cc. Got total 1.5L fluids (crystalloid and albumin) from code white  to stabilization in ICU at sign-out. BP improving and responsive to fluids. Large Hb drop prompting CT, became hypotensive requiring phenyl drip, now s/p MTP and return to OR, HDS this morning off pressors.  - Continuous EKG/abp monitoring  - Volume resuscitate as appropriate   - Goal map range 65-90  - Home meds: ASA, Statin, Coreg, Furosamide, Losartan, Kerendia (finerenone), Imdur, NTG - holding all for now     PULM: History of MICHAEL non-compliant with CPAP at home. Hypercarbia noted on labs at time of code-white. Arrived to SICU on BIPAP. VBG on floor suggests respiratory acidosis. Became nauseous overnight during CVC placement and intubated for airway protection and plan to return to OR. Extubated early this morning 1/24 satting well on 4L NC, continued hypercarbia pCO2 in the 50s  - 12pm ABG  - CPAP at night d/t MICHAEL  - Additional pulm toilet prn   - daily CXR     GI: Hx GERD on PPI at home. Pain to palpation of lower abdomen bilaterally, no rebound tenderness. Some oozing from incisional site.   - Low threshold for CT scan; await intake labs and notify Gyn if any concerns   - KUB WNL  - NPO for now  - PPI for GI prophylaxis     : No history of CDK, baseline Cr. ~2.5. Good UOP in OR and post-op. K 5.2 on gas. s/p GIBSON, BSO in the setting of known fibroid uterus and chronic PMB with return to OR 1/24 for evacuation of hematoma.  - Maintenance IVF   - Volume resuscitation as needed  - Maintain U/O >0.5ml/kg/hr  - Check renal function panel post op and daily  - Replete electrolytes to goal K>4, Mg>2, Phos>2.5, ionized Ca>1.10.     HEME: Acute blood loss anemia. OR  ml; Hb drop from 14 to 8.9 overnight, CT revealed large hematoma ~700 evacuated during take back to OR. 250cc out from DREW drain. Given 6u pRBC, 5u FFP, 1u plt. Hb stable at 13.4 in ABG this am  - CBC q6h until Hb stable for 24 hours.   - Coags daily  - SCDs for DVT prophylaxis  - holding SC heparin for now  - ongoing monitoring for s/s bleeding      ENDO: Hx IDDM2 on Farxiga, Insulin, Metformin, Semaglutide  - Q4h BG   - SSI Lispro per ICU protocol.  - Increased to SSI#2 d/t hyperglycemia     ID: Afebrile. Leukocytosis resolved. Lactate 2.5 likely ISO hemorrhagic shock, improving  - Temp q4h, wbc daily  - ongoing monitoring for s/s infection     Lines:  - RIJ MAC line  - R radial arterial line  - 2PIVs  - Jose  - DREW drain    Dispo: Admit to ICU. Patient seen and discussed with ICU attending Dr. Hogue.    SICU phone [phone]       Berlin Partida DO  Anesthesiology, PGY1

## 2025-01-24 NOTE — SIGNIFICANT EVENT
Overnight Events:  Ms. Mata was sent for CT AP this evening after her CBC revealed a hemoglobin of 8.9, decreased from her baseline of 16.0. She was requiring low dose phenylephrine at that time. Blood was placed on hold due to her decreasing hemoglobin as well as requirement for pressors. She returned from CT at approximately 2250 and her phenylephrine had been increased to 1 mcg/kg/min to maintain a MAP of 65. Labs were collected. Radiology was called to discuss our concern for hemoperitoneum on imaging. Per radiology, c/f active bleeding from pelvis. Gyn onc aware and at bedside. OR was booked. MTP activated at 2257. Pt with peripheral access (20g x2, 16g) placed earlier in the shift, but decision was made to place MAC line prior to OR due to increasing pressor requirements, patient consented to this procedure. After CVC placement, pt reporting nausea and began vomiting. SVT was noted on monitor but with stable BP and O2 saturation. Decision was made to emergently intubate prior to OR due to concerns for airway protection in the setting of vomiting. She was successfully intubated on first attempt (see procedure note). She was then taken to OR by OR team with assistance of Dr. Vasquez (ICU attending) and Dr. Mott (ICU fellow). At that time, she had received a total of 5 pRBCs, 5 FFP and 2g Ca.     On return from OR,    80mcg/kg/min. No pressors and hypertensive with systolics in 170s. She was given 10mg of labetalol.    Plan for full set of labs and will correct any ongoing coagulopathy. Will continue to hold SQH and ASA for now. Per gyn, no need to continue abx.    Update 0300: Hemoglobin stable and INR normal. TEG still pending. Stable DREW drain output. CXR without PTX following CVC placement earlier in the evening.

## 2025-01-24 NOTE — PROGRESS NOTES
Postop Pain HPI -   Palliative: relieved with IV analgesics and regional local anesthetics  Provocative: movement  Quality:  burning and aching  Radiation:  none  Severity:  9/10, very sleepy post extubation and difficult to arouse, would not recommend additional pain meds at this time  Timing: constant    24-HOUR OPIOID CONSUMPTION:  None (returned to OR for hemorrhage)    Scheduled medications  acetaminophen, 1,000 mg, intravenous, q6h NORBERT  [Held by provider] aspirin, 81 mg, oral, Daily  atorvastatin, 80 mg, oral, Daily  brimonidine, 1 drop, Both Eyes, BID  [Held by provider] carvedilol, 12.5 mg, oral, BID  DULoxetine, 30 mg, oral, Daily before breakfast   And  DULoxetine, 60 mg, oral, q PM  [Held by provider] finerenone, 10 mg, oral, Daily  [Held by provider] heparin (porcine), 5,000 Units, subcutaneous, q8h  insulin lispro, 0-10 Units, subcutaneous, q4h  latanoprost, 1 drop, Both Eyes, Nightly  pantoprazole, 40 mg, intravenous, Daily  polyethylene glycol, 17 g, oral, Daily      Continuous medications  lactated Ringer's, 50 mL/hr, Last Rate: 50 mL/hr (01/24/25 1124)      PRN medications  PRN medications: calcium gluconate, calcium gluconate, dextrose, dextrose, glucagon, glucagon, HYDROmorphone, magnesium sulfate, magnesium sulfate, melatonin, naloxone, ondansetron ODT **OR** ondansetron, oxyCODONE, oxyCODONE, simethicone, [Held by provider] traMADol     Physical Exam:  Constitutional:  no distress, alert and cooperative  Eyes: clear sclera  Head/Neck: No apparent injury, trachea midline  Respiratory/Thorax: Patent airways, thorax symmetric, breathing comfortably  Cardiovascular: no pitting edema  Gastrointestinal: Nondistended  Musculoskeletal: ROM intact  Extremities: no clubbing  Neurological: alert, ruiz x4  Psychological: Appropriate affect    Results for orders placed or performed during the hospital encounter of 01/23/25 (from the past 24 hours)   CBC   Result Value Ref Range    WBC 14.7 (H) 4.4 - 11.3  x10*3/uL    nRBC 0.0 0.0 - 0.0 /100 WBCs    RBC 4.94 4.00 - 5.20 x10*6/uL    Hemoglobin 14.7 12.0 - 16.0 g/dL    Hematocrit 46.5 (H) 36.0 - 46.0 %    MCV 94 80 - 100 fL    MCH 29.8 26.0 - 34.0 pg    MCHC 31.6 (L) 32.0 - 36.0 g/dL    RDW 13.3 11.5 - 14.5 %    Platelets 167 150 - 450 x10*3/uL   Basic Metabolic Panel   Result Value Ref Range    Glucose 109 (H) 74 - 99 mg/dL    Sodium 142 136 - 145 mmol/L    Potassium 4.8 3.5 - 5.3 mmol/L    Chloride 104 98 - 107 mmol/L    Bicarbonate 20 (L) 21 - 32 mmol/L    Anion Gap 23 (H) 10 - 20 mmol/L    Urea Nitrogen 20 6 - 23 mg/dL    Creatinine 1.35 (H) 0.50 - 1.05 mg/dL    eGFR 44 (L) >60 mL/min/1.73m*2    Calcium 9.2 8.6 - 10.6 mg/dL   Magnesium   Result Value Ref Range    Magnesium 2.26 1.60 - 2.40 mg/dL   BLOOD GAS VENOUS   Result Value Ref Range    POCT pH, Venous 7.22 (LL) 7.33 - 7.43 pH    POCT pCO2, Venous 74 (HH) 41 - 51 mm Hg    POCT pO2, Venous 41 35 - 45 mm Hg    POCT SO2, Venous 54 45 - 75 %    POCT Oxy Hemoglobin, Venous 53.0 45.0 - 75.0 %    POCT Base Excess, Venous 0.5 -2.0 - 3.0 mmol/L    POCT HCO3 Calculated, Venous 30.3 (H) 22.0 - 26.0 mmol/L    Patient Temperature 37.0 degrees Celsius    FiO2 28 %   Coagulation Screen   Result Value Ref Range    Protime 11.5 9.8 - 12.8 seconds    INR 1.0 0.9 - 1.1    aPTT 29 27 - 38 seconds   BLOOD GAS VENOUS FULL PANEL   Result Value Ref Range    POCT pH, Venous 7.23 (LL) 7.33 - 7.43 pH    POCT pCO2, Venous 74 (HH) 41 - 51 mm Hg    POCT pO2, Venous 32 (L) 35 - 45 mm Hg    POCT SO2, Venous 38 (L) 45 - 75 %    POCT Oxy Hemoglobin, Venous 37.6 (L) 45.0 - 75.0 %    POCT Hematocrit Calculated, Venous 42.0 36.0 - 46.0 %    POCT Sodium, Venous 137 136 - 145 mmol/L    POCT Potassium, Venous 5.7 (H) 3.5 - 5.3 mmol/L    POCT Chloride, Venous 104 98 - 107 mmol/L    POCT Ionized Calicum, Venous 1.15 1.10 - 1.33 mmol/L    POCT Glucose, Venous 124 (H) 74 - 99 mg/dL    POCT Lactate, Venous 1.7 0.4 - 2.0 mmol/L    POCT Base Excess, Venous  1.2 -2.0 - 3.0 mmol/L    POCT HCO3 Calculated, Venous 31.0 (H) 22.0 - 26.0 mmol/L    POCT Hemoglobin, Venous 14.1 12.0 - 16.0 g/dL    POCT Anion Gap, Venous 8.0 (L) 10.0 - 25.0 mmol/L    Patient Temperature 37.0 degrees Celsius    FiO2 28 %   Troponin I, High Sensitivity   Result Value Ref Range    Troponin I, High Sensitivity (CMC) 13 0 - 34 ng/L   POCT GLUCOSE   Result Value Ref Range    POCT Glucose 142 (H) 74 - 99 mg/dL   B-type Natriuretic Peptide   Result Value Ref Range    BNP 9 0 - 99 pg/mL   Comprehensive metabolic panel   Result Value Ref Range    Glucose 132 (H) 74 - 99 mg/dL    Sodium 141 136 - 145 mmol/L    Potassium 5.1 3.5 - 5.3 mmol/L    Chloride 103 98 - 107 mmol/L    Bicarbonate 31 21 - 32 mmol/L    Anion Gap 12 10 - 20 mmol/L    Urea Nitrogen 23 6 - 23 mg/dL    Creatinine 1.50 (H) 0.50 - 1.05 mg/dL    eGFR 39 (L) >60 mL/min/1.73m*2    Calcium 8.2 (L) 8.6 - 10.6 mg/dL    Albumin 3.2 (L) 3.4 - 5.0 g/dL    Alkaline Phosphatase 38 33 - 136 U/L    Total Protein 4.5 (L) 6.4 - 8.2 g/dL    AST 11 9 - 39 U/L    Bilirubin, Total 0.8 0.0 - 1.2 mg/dL    ALT 8 7 - 45 U/L   CBC   Result Value Ref Range    WBC 11.5 (H) 4.4 - 11.3 x10*3/uL    nRBC 0.0 0.0 - 0.0 /100 WBCs    RBC 2.98 (L) 4.00 - 5.20 x10*6/uL    Hemoglobin 8.9 (L) 12.0 - 16.0 g/dL    Hematocrit 27.0 (L) 36.0 - 46.0 %    MCV 91 80 - 100 fL    MCH 29.9 26.0 - 34.0 pg    MCHC 33.0 32.0 - 36.0 g/dL    RDW 13.3 11.5 - 14.5 %    Platelets 143 (L) 150 - 450 x10*3/uL   Calcium, Ionized   Result Value Ref Range    POCT Calcium, Ionized 1.13 1.1 - 1.33 mmol/L   Coagulation Screen   Result Value Ref Range    Protime 13.0 (H) 9.8 - 12.8 seconds    INR 1.2 (H) 0.9 - 1.1    aPTT 29 27 - 38 seconds   Magnesium   Result Value Ref Range    Magnesium 1.59 (L) 1.60 - 2.40 mg/dL   Renal Function Panel   Result Value Ref Range    Glucose 131 (H) 74 - 99 mg/dL    Sodium 141 136 - 145 mmol/L    Potassium 5.0 3.5 - 5.3 mmol/L    Chloride 103 98 - 107 mmol/L    Bicarbonate  31 21 - 32 mmol/L    Anion Gap 12 10 - 20 mmol/L    Urea Nitrogen 23 6 - 23 mg/dL    Creatinine 1.52 (H) 0.50 - 1.05 mg/dL    eGFR 38 (L) >60 mL/min/1.73m*2    Calcium 8.2 (L) 8.6 - 10.6 mg/dL    Phosphorus 5.5 (H) 2.5 - 4.9 mg/dL    Albumin 3.2 (L) 3.4 - 5.0 g/dL   Blood Gas Arterial Full Panel   Result Value Ref Range    POCT pH, Arterial 7.31 (L) 7.38 - 7.42 pH    POCT pCO2, Arterial 57 (H) 38 - 42 mm Hg    POCT pO2, Arterial 115 (H) 85 - 95 mm Hg    POCT SO2, Arterial 98 94 - 100 %    POCT Oxy Hemoglobin, Arterial 96.5 94.0 - 98.0 %    POCT Hematocrit Calculated, Arterial 28.0 (L) 36.0 - 46.0 %    POCT Sodium, Arterial 136 136 - 145 mmol/L    POCT Potassium, Arterial 5.2 3.5 - 5.3 mmol/L    POCT Chloride, Arterial 105 98 - 107 mmol/L    POCT Ionized Calcium, Arterial 1.19 1.10 - 1.33 mmol/L    POCT Glucose, Arterial 129 (H) 74 - 99 mg/dL    POCT Lactate, Arterial 1.5 0.4 - 2.0 mmol/L    POCT Base Excess, Arterial 1.7 -2.0 - 3.0 mmol/L    POCT HCO3 Calculated, Arterial 28.7 (H) 22.0 - 26.0 mmol/L    POCT Hemoglobin, Arterial 9.3 (L) 12.0 - 16.0 g/dL    POCT Anion Gap, Arterial 8 (L) 10 - 25 mmo/L    Patient Temperature 37.0 degrees Celsius    FiO2 30 %   POCT GLUCOSE   Result Value Ref Range    POCT Glucose 142 (H) 74 - 99 mg/dL   Prepare RBC: 2 Units   Result Value Ref Range    PRODUCT CODE S8835T11     Unit Number [id number]     Unit ABO O     Unit RH POS     XM INTEP COMP     Dispense Status IS     Blood Expiration Date 2/10/2025 11:59:00 PM EST     PRODUCT BLOOD TYPE 5100     UNIT VOLUME 350     PRODUCT CODE X0943C99     Unit Number [id number]     Unit ABO O     Unit RH POS     XM INTEP COMP     Dispense Status IS     Blood Expiration Date 2/10/2025 11:59:00 PM EST     PRODUCT BLOOD TYPE 5100     UNIT VOLUME 350    Prepare Plasma: 2 Units   Result Value Ref Range    PRODUCT CODE N4645K38     Unit Number [id number]     Unit ABO AB     Unit RH POS     Dispense Status IS     Blood Expiration Date  1/26/2025  9:30:00 PM EST     PRODUCT BLOOD TYPE 8400     UNIT VOLUME 318     PRODUCT CODE W8818G81     Unit Number [id number]     Unit ABO AB     Unit RH NEG     Dispense Status IS     Blood Expiration Date 1/27/2025  7:15:00 AM EST     PRODUCT BLOOD TYPE 2800     UNIT VOLUME 332    Type and Screen   Result Value Ref Range    ABO TYPE O     Rh TYPE POS     ANTIBODY SCREEN NEG    Blood Gas Arterial Full Panel   Result Value Ref Range    POCT pH, Arterial 7.34 (L) 7.38 - 7.42 pH    POCT pCO2, Arterial 53 (H) 38 - 42 mm Hg    POCT pO2, Arterial 195 (H) 85 - 95 mm Hg    POCT SO2, Arterial 98 94 - 100 %    POCT Oxy Hemoglobin, Arterial 96.7 94.0 - 98.0 %    POCT Hematocrit Calculated, Arterial 24.0 (L) 36.0 - 46.0 %    POCT Sodium, Arterial 137 136 - 145 mmol/L    POCT Potassium, Arterial 5.2 3.5 - 5.3 mmol/L    POCT Chloride, Arterial 105 98 - 107 mmol/L    POCT Ionized Calcium, Arterial 1.11 1.10 - 1.33 mmol/L    POCT Glucose, Arterial 155 (H) 74 - 99 mg/dL    POCT Lactate, Arterial 1.0 0.4 - 2.0 mmol/L    POCT Base Excess, Arterial 2.3 -2.0 - 3.0 mmol/L    POCT HCO3 Calculated, Arterial 28.6 (H) 22.0 - 26.0 mmol/L    POCT Hemoglobin, Arterial 8.1 (L) 12.0 - 16.0 g/dL    POCT Anion Gap, Arterial 9 (L) 10 - 25 mmo/L    Patient Temperature 37.0 degrees Celsius    FiO2 30 %   Prepare RBC: 2 Units   Result Value Ref Range    PRODUCT CODE Y7944O02     Unit Number [id number]     Unit ABO O     Unit RH POS     XM INTEP COMP     Dispense Status IS     Blood Expiration Date 2/4/2025 11:59:00 PM EST     PRODUCT BLOOD TYPE 5100     UNIT VOLUME 350     PRODUCT CODE P5418G14     Unit Number [id number]     Unit ABO O     Unit RH POS     XM INTEP COMP     Dispense Status IS     Blood Expiration Date 2/11/2025 11:59:00 PM EST     PRODUCT BLOOD TYPE 5100     UNIT VOLUME 273    Prepare Platelets: 1 Units   Result Value Ref Range    PRODUCT CODE J9010H71     Unit Number [id number]     Unit ABO A     Unit RH POS      Dispense Status TR     Blood Expiration Date 1/25/2025 11:59:00 PM EST     PRODUCT BLOOD TYPE 6200     UNIT VOLUME 225    Prepare Plasma: 2 Units   Result Value Ref Range    PRODUCT CODE S5594G79     Unit Number [id number]     Unit ABO A     Unit RH POS     Dispense Status IS     Blood Expiration Date 1/26/2025  2:40:00 PM EST     PRODUCT BLOOD TYPE 6200     UNIT VOLUME 282     PRODUCT CODE W7238P87     Unit Number [id number]     Unit ABO A     Unit RH POS     Dispense Status IS     Blood Expiration Date 1/27/2025  9:33:00 AM EST     PRODUCT BLOOD TYPE 6200     UNIT VOLUME 382    Prepare Plasma   Result Value Ref Range    PRODUCT CODE N4446O46     Unit Number [id number]     Unit ABO A     Unit RH POS     Dispense Status IS     Blood Expiration Date 1/26/2025  2:46:00 PM EST     PRODUCT BLOOD TYPE 6200     UNIT VOLUME 292    Prepare RBC   Result Value Ref Range    PRODUCT CODE C8648D48     Unit Number [id number]     Unit ABO O     Unit RH POS     XM INTEP COMP     Dispense Status IS     Blood Expiration Date 2/13/2025 11:59:00 PM EST     PRODUCT BLOOD TYPE 5100     UNIT VOLUME 350     PRODUCT CODE C1982Q91     Unit Number [id number]     Unit ABO O     Unit RH POS     XM INTEP COMP     Dispense Status XM     Blood Expiration Date 2/6/2025 11:59:00 PM EST     PRODUCT BLOOD TYPE 5100     UNIT VOLUME 275     PRODUCT CODE U4654M00     Unit Number [id number]     Unit ABO O     Unit RH NEG     XM INTEP COMP     Dispense Status RE     Blood Expiration Date 1/29/2025 11:59:00 PM EST     PRODUCT BLOOD TYPE 9500     UNIT VOLUME 350     PRODUCT CODE M1582C77     Unit Number [id number]     Unit ABO O     Unit RH NEG     XM INTEP COMP     Dispense Status RE     Blood Expiration Date 1/27/2025 11:59:00 PM EST     PRODUCT BLOOD TYPE 9500     UNIT VOLUME 329     PRODUCT CODE W8175L86     Unit Number [id number]     Unit ABO O     Unit RH NEG     XM INTEP COMP     Dispense Status RE     Blood  Expiration Date 2/1/2025 11:59:00 PM EST     PRODUCT BLOOD TYPE 9500     UNIT VOLUME 310     PRODUCT CODE K9522P89     Unit Number [id number]     Unit ABO O     Unit RH POS     XM INTEP COMP     Dispense Status XM     Blood Expiration Date 2/16/2025 11:59:00 PM EST     PRODUCT BLOOD TYPE 5100     UNIT VOLUME 281    Coagulation Screen   Result Value Ref Range    Protime 14.0 (H) 9.8 - 12.8 seconds    INR 1.2 (H) 0.9 - 1.1    aPTT 31 27 - 38 seconds   CBC   Result Value Ref Range    WBC 11.9 (H) 4.4 - 11.3 x10*3/uL    nRBC 0.0 0.0 - 0.0 /100 WBCs    RBC 2.32 (L) 4.00 - 5.20 x10*6/uL    Hemoglobin 7.0 (L) 12.0 - 16.0 g/dL    Hematocrit 21.4 (L) 36.0 - 46.0 %    MCV 92 80 - 100 fL    MCH 30.2 26.0 - 34.0 pg    MCHC 32.7 32.0 - 36.0 g/dL    RDW 13.3 11.5 - 14.5 %    Platelets 139 (L) 150 - 450 x10*3/uL   TEG Clot Global Profile   Result Value Ref Range    R (Reaction Time) K 7.4 4.6 - 9.1 min    K (Clot Kinetics) 1.4 0.8 - 2.1 min    ANGLE 69.4 63.0 - 78.0 deg    MA (Max Amplitude) K 63.3 52.0 - 69.0 mm    R (Reaction Time) KH 8.2 4.3 - 8.3 min    MA (Max Amplitude) RT 62.8 52.0 - 70.0 mm    MA ( New Amplitude) FF 19.4 15.0 - 32.0 mm    FLEV 354 278 - 581 mg/dL   Blood Gas Arterial Full Panel   Result Value Ref Range    POCT pH, Arterial 7.33 (L) 7.38 - 7.42 pH    POCT pCO2, Arterial 53 (H) 38 - 42 mm Hg    POCT pO2, Arterial 173 (H) 85 - 95 mm Hg    POCT SO2, Arterial 98 94 - 100 %    POCT Oxy Hemoglobin, Arterial 96.8 94.0 - 98.0 %    POCT Hematocrit Calculated, Arterial 22.0 (L) 36.0 - 46.0 %    POCT Sodium, Arterial 135 (L) 136 - 145 mmol/L    POCT Potassium, Arterial 5.0 3.5 - 5.3 mmol/L    POCT Chloride, Arterial 103 98 - 107 mmol/L    POCT Ionized Calcium, Arterial 1.12 1.10 - 1.33 mmol/L    POCT Glucose, Arterial 183 (H) 74 - 99 mg/dL    POCT Lactate, Arterial 1.2 0.4 - 2.0 mmol/L    POCT Base Excess, Arterial 1.7 -2.0 - 3.0 mmol/L    POCT HCO3 Calculated, Arterial 27.9 (H) 22.0 - 26.0 mmol/L    POCT  Hemoglobin, Arterial 7.2 (L) 12.0 - 16.0 g/dL    POCT Anion Gap, Arterial 9 (L) 10 - 25 mmo/L    Patient Temperature 37.0 degrees Celsius    FiO2 24 %   Renal Function Panel   Result Value Ref Range    Glucose 194 (H) 74 - 99 mg/dL    Sodium 139 136 - 145 mmol/L    Potassium 4.8 3.5 - 5.3 mmol/L    Chloride 100 98 - 107 mmol/L    Bicarbonate 29 21 - 32 mmol/L    Anion Gap 15 10 - 20 mmol/L    Urea Nitrogen 25 (H) 6 - 23 mg/dL    Creatinine 1.57 (H) 0.50 - 1.05 mg/dL    eGFR 36 (L) >60 mL/min/1.73m*2    Calcium 8.2 (L) 8.6 - 10.6 mg/dL    Phosphorus 5.6 (H) 2.5 - 4.9 mg/dL    Albumin 3.7 3.4 - 5.0 g/dL   Magnesium   Result Value Ref Range    Magnesium 1.61 1.60 - 2.40 mg/dL   Prepare RBC   Result Value Ref Range    PRODUCT CODE I3523M90     Unit Number [id number]     Unit ABO O     Unit RH POS     XM INTEP COMP     Dispense Status XM     Blood Expiration Date 2/13/2025 11:59:00 PM EST     PRODUCT BLOOD TYPE 5100     UNIT VOLUME 350     PRODUCT CODE X8087D69     Unit Number [id number]     Unit ABO O     Unit RH POS     XM INTEP COMP     Dispense Status RE     Blood Expiration Date 2/19/2025 11:59:00 PM EST     PRODUCT BLOOD TYPE 5100     UNIT VOLUME 350     PRODUCT CODE R5016B21     Unit Number [id number]     Unit ABO O     Unit RH POS     XM INTEP COMP     Dispense Status XM     Blood Expiration Date 2/16/2025 11:59:00 PM EST     PRODUCT BLOOD TYPE 5100     UNIT VOLUME 274     PRODUCT CODE A6032Z94     Unit Number [id number]     Unit ABO O     Unit RH POS     XM INTEP COMP     Dispense Status RE     Blood Expiration Date 2/19/2025 11:59:00 PM EST     PRODUCT BLOOD TYPE 5100     UNIT VOLUME 285     PRODUCT CODE A6493X09     Unit Number [id number]     Unit ABO O     Unit RH POS     XM INTEP COMP     Dispense Status TR     Blood Expiration Date 2/12/2025 11:59:00 PM EST     PRODUCT BLOOD TYPE 5100     UNIT VOLUME 287    Prepare Plasma   Result Value Ref Range    PRODUCT CODE P8687X40     Unit  Number [id number]     Unit ABO A     Unit RH POS     Dispense Status RE     Blood Expiration Date 1/25/2025  7:10:00 AM EST     PRODUCT BLOOD TYPE 6200     UNIT VOLUME 203     PRODUCT CODE H8367U63     Unit Number [id number]     Unit ABO A     Unit RH POS     Dispense Status RE     Blood Expiration Date 1/27/2025  9:33:00 AM EST     PRODUCT BLOOD TYPE 6200     UNIT VOLUME 337     PRODUCT CODE B2352P80     Unit Number [id number]     Unit ABO A     Unit RH POS     Dispense Status RE     Blood Expiration Date 1/27/2025 11:26:00 AM EST     PRODUCT BLOOD TYPE 6200     UNIT VOLUME 319     PRODUCT CODE R3164G73     Unit Number [id number]     Unit ABO A     Unit RH NEG     Dispense Status RE     Blood Expiration Date 1/27/2025  9:33:00 AM EST     PRODUCT BLOOD TYPE 0600     UNIT VOLUME 342     PRODUCT CODE H1834Z26     Unit Number [id number]     Unit ABO A     Unit RH NEG     Dispense Status RE     Blood Expiration Date 1/27/2025 11:26:00 AM EST     PRODUCT BLOOD TYPE 0600     UNIT VOLUME 312    Prepare Platelets   Result Value Ref Range    PRODUCT CODE B6310W20     Unit Number [id number]     Unit ABO A     Unit RH POS     Dispense Status RE     Blood Expiration Date 1/25/2025 11:59:00 PM EST     PRODUCT BLOOD TYPE 6200     UNIT VOLUME 211    Prepare Plasma   Result Value Ref Range    PRODUCT CODE C9195B97     Unit Number [id number]     Unit ABO A     Unit RH POS     Dispense Status RE     Blood Expiration Date 1/28/2025 11:32:00 PM EST     PRODUCT BLOOD TYPE 6200     UNIT VOLUME 207     PRODUCT CODE X6071J89     Unit Number [id number]     Unit ABO A     Unit RH POS     Dispense Status RE     Blood Expiration Date 1/27/2025 11:26:00 AM EST     PRODUCT BLOOD TYPE 6200     UNIT VOLUME 320     PRODUCT CODE N1054P74     Unit Number [id number]     Unit ABO A     Unit RH POS     Dispense Status RE     Blood Expiration Date 1/27/2025 11:26:00 AM EST     PRODUCT BLOOD TYPE 6200     UNIT  VOLUME 310     PRODUCT CODE R6330P07     Unit Number [id number]     Unit ABO A     Unit RH POS     Dispense Status RE     Blood Expiration Date 1/28/2025 11:32:00 PM EST     PRODUCT BLOOD TYPE 6200     UNIT VOLUME 316     PRODUCT CODE V0310G54     Unit Number [id number]     Unit ABO A     Unit RH POS     Dispense Status RE     Blood Expiration Date 1/28/2025 11:32:00 PM EST     PRODUCT BLOOD TYPE 6200     UNIT VOLUME 315    Prepare Platelets   Result Value Ref Range    PRODUCT CODE K0116S91     Unit Number [id number]     Unit ABO O     Unit RH POS     Dispense Status RE     Blood Expiration Date 1/25/2025 11:59:00 PM EST     PRODUCT BLOOD TYPE 5100     UNIT VOLUME 171    Blood Gas Arterial Full Panel   Result Value Ref Range    POCT pH, Arterial 7.32 (L) 7.38 - 7.42 pH    POCT pCO2, Arterial 51 (H) 38 - 42 mm Hg    POCT pO2, Arterial 201 (H) 85 - 95 mm Hg    POCT SO2, Arterial 100 94 - 100 %    POCT Oxy Hemoglobin, Arterial 97.7 94.0 - 98.0 %    POCT Hematocrit Calculated, Arterial 37.0 36.0 - 46.0 %    POCT Sodium, Arterial 135 (L) 136 - 145 mmol/L    POCT Potassium, Arterial 6.4 (HH) 3.5 - 5.3 mmol/L    POCT Chloride, Arterial 100 98 - 107 mmol/L    POCT Ionized Calcium, Arterial 1.00 (L) 1.10 - 1.33 mmol/L    POCT Glucose, Arterial 227 (H) 74 - 99 mg/dL    POCT Lactate, Arterial 2.4 (H) 0.4 - 2.0 mmol/L    POCT Base Excess, Arterial -0.4 -2.0 - 3.0 mmol/L    POCT HCO3 Calculated, Arterial 26.3 (H) 22.0 - 26.0 mmol/L    POCT Hemoglobin, Arterial 12.3 12.0 - 16.0 g/dL    POCT Anion Gap, Arterial 15 10 - 25 mmo/L    Patient Temperature 37.0 degrees Celsius    FiO2 100 %   TEG Clot Global Profile   Result Value Ref Range    R (Reaction Time) K 7.3 4.6 - 9.1 min    K (Clot Kinetics) 1.5 0.8 - 2.1 min    ANGLE 72.2 63.0 - 78.0 deg    MA (Max Amplitude) K 58.1 52.0 - 69.0 mm    R (Reaction Time) KH 5.7 4.3 - 8.3 min    MA (Max Amplitude) RT 58.4 52.0 - 70.0 mm    MA ( New Amplitude) FF 19.4 15.0 - 32.0 mm     FLEV 354 278 - 581 mg/dL   Blood Gas Arterial Full Panel   Result Value Ref Range    POCT pH, Arterial 7.38 7.38 - 7.42 pH    POCT pCO2, Arterial 44 (H) 38 - 42 mm Hg    POCT pO2, Arterial 183 (H) 85 - 95 mm Hg    POCT SO2, Arterial 99 94 - 100 %    POCT Oxy Hemoglobin, Arterial 97.2 94.0 - 98.0 %    POCT Hematocrit Calculated, Arterial 38.0 36.0 - 46.0 %    POCT Sodium, Arterial 137 136 - 145 mmol/L    POCT Potassium, Arterial 6.0 (H) 3.5 - 5.3 mmol/L    POCT Chloride, Arterial 101 98 - 107 mmol/L    POCT Ionized Calcium, Arterial 1.07 (L) 1.10 - 1.33 mmol/L    POCT Glucose, Arterial 195 (H) 74 - 99 mg/dL    POCT Lactate, Arterial 1.9 0.4 - 2.0 mmol/L    POCT Base Excess, Arterial 0.6 -2.0 - 3.0 mmol/L    POCT HCO3 Calculated, Arterial 26.0 22.0 - 26.0 mmol/L    POCT Hemoglobin, Arterial 12.5 12.0 - 16.0 g/dL    POCT Anion Gap, Arterial 16 10 - 25 mmo/L    Patient Temperature 37.0 degrees Celsius    FiO2 50 %   Blood Gas Arterial Full Panel   Result Value Ref Range    POCT pH, Arterial 7.47 (H) 7.38 - 7.42 pH    POCT pCO2, Arterial 35 (L) 38 - 42 mm Hg    POCT pO2, Arterial 100 (H) 85 - 95 mm Hg    POCT SO2, Arterial 99 94 - 100 %    POCT Oxy Hemoglobin, Arterial 96.0 94.0 - 98.0 %    POCT Hematocrit Calculated, Arterial 44.0 36.0 - 46.0 %    POCT Sodium, Arterial 140 136 - 145 mmol/L    POCT Potassium, Arterial 4.5 3.5 - 5.3 mmol/L    POCT Chloride, Arterial 99 98 - 107 mmol/L    POCT Ionized Calcium, Arterial 1.22 1.10 - 1.33 mmol/L    POCT Glucose, Arterial 219 (H) 74 - 99 mg/dL    POCT Lactate, Arterial 1.9 0.4 - 2.0 mmol/L    POCT Base Excess, Arterial 2.2 -2.0 - 3.0 mmol/L    POCT HCO3 Calculated, Arterial 25.5 22.0 - 26.0 mmol/L    POCT Hemoglobin, Arterial 14.8 12.0 - 16.0 g/dL    POCT Anion Gap, Arterial 20 10 - 25 mmo/L    Patient Temperature 37.0 degrees Celsius    FiO2 40 %   Calcium, Ionized   Result Value Ref Range    POCT Calcium, Ionized 1.18 1.1 - 1.33 mmol/L   CBC   Result Value Ref Range     WBC 8.0 4.4 - 11.3 x10*3/uL    nRBC 0.0 0.0 - 0.0 /100 WBCs    RBC 4.93 4.00 - 5.20 x10*6/uL    Hemoglobin 14.3 12.0 - 16.0 g/dL    Hematocrit 40.5 36.0 - 46.0 %    MCV 82 80 - 100 fL    MCH 29.0 26.0 - 34.0 pg    MCHC 35.3 32.0 - 36.0 g/dL    RDW 14.1 11.5 - 14.5 %    Platelets 110 (L) 150 - 450 x10*3/uL   Coagulation Screen   Result Value Ref Range    Protime 11.8 9.8 - 12.8 seconds    INR 1.0 0.9 - 1.1    aPTT 31 27 - 38 seconds   Magnesium   Result Value Ref Range    Magnesium 1.68 1.60 - 2.40 mg/dL   Renal Function Panel   Result Value Ref Range    Glucose 205 (H) 74 - 99 mg/dL    Sodium 142 136 - 145 mmol/L    Potassium 4.2 3.5 - 5.3 mmol/L    Chloride 99 98 - 107 mmol/L    Bicarbonate 25 21 - 32 mmol/L    Anion Gap 22 (H) 10 - 20 mmol/L    Urea Nitrogen 22 6 - 23 mg/dL    Creatinine 1.31 (H) 0.50 - 1.05 mg/dL    eGFR 45 (L) >60 mL/min/1.73m*2    Calcium 10.3 8.6 - 10.6 mg/dL    Phosphorus 4.5 2.5 - 4.9 mg/dL    Albumin 4.5 3.4 - 5.0 g/dL   TEG Clot Global Profile   Result Value Ref Range    R (Reaction Time) K 6.7 4.6 - 9.1 min    K (Clot Kinetics) 1.5 0.8 - 2.1 min    ANGLE 72.2 63.0 - 78.0 deg    MA (Max Amplitude) K 58.7 52.0 - 69.0 mm    R (Reaction Time) KH 7.6 4.3 - 8.3 min    MA (Max Amplitude) RT 58.9 52.0 - 70.0 mm    MA ( New Amplitude) FF 19.9 15.0 - 32.0 mm    FLEV 363 278 - 581 mg/dL   POCT GLUCOSE   Result Value Ref Range    POCT Glucose 231 (H) 74 - 99 mg/dL   Blood Gas Arterial Full Panel   Result Value Ref Range    POCT pH, Arterial 7.48 (H) 7.38 - 7.42 pH    POCT pCO2, Arterial 33 (L) 38 - 42 mm Hg    POCT pO2, Arterial 115 (H) 85 - 95 mm Hg    POCT SO2, Arterial      POCT Oxy Hemoglobin, Arterial      POCT Hematocrit Calculated, Arterial      POCT Sodium, Arterial 138 136 - 145 mmol/L    POCT Potassium, Arterial 3.9 3.5 - 5.3 mmol/L    POCT Chloride, Arterial 102 98 - 107 mmol/L    POCT Ionized Calcium, Arterial 1.20 1.10 - 1.33 mmol/L    POCT Glucose, Arterial 232 (H) 74 - 99 mg/dL    POCT  Lactate, Arterial 2.4 (H) 0.4 - 2.0 mmol/L    POCT Base Excess, Arterial 1.6 -2.0 - 3.0 mmol/L    POCT HCO3 Calculated, Arterial 24.6 22.0 - 26.0 mmol/L    POCT Hemoglobin, Arterial      POCT Anion Gap, Arterial 15 10 - 25 mmo/L    Patient Temperature 37.0 degrees Celsius    FiO2 30 %    Peep CHM2O 5.0 cm H2O   Blood Gas Arterial Full Panel   Result Value Ref Range    POCT pH, Arterial 7.48 (H) 7.38 - 7.42 pH    POCT pCO2, Arterial 33 (L) 38 - 42 mm Hg    POCT pO2, Arterial 120 (H) 85 - 95 mm Hg    POCT SO2, Arterial 99 94 - 100 %    POCT Oxy Hemoglobin, Arterial 95.9 94.0 - 98.0 %    POCT Hematocrit Calculated, Arterial 40.0 36.0 - 46.0 %    POCT Sodium, Arterial 139 136 - 145 mmol/L    POCT Potassium, Arterial 3.9 3.5 - 5.3 mmol/L    POCT Chloride, Arterial 101 98 - 107 mmol/L    POCT Ionized Calcium, Arterial 1.20 1.10 - 1.33 mmol/L    POCT Glucose, Arterial 228 (H) 74 - 99 mg/dL    POCT Lactate, Arterial 2.5 (H) 0.4 - 2.0 mmol/L    POCT Base Excess, Arterial 1.6 -2.0 - 3.0 mmol/L    POCT HCO3 Calculated, Arterial 24.6 22.0 - 26.0 mmol/L    POCT Hemoglobin, Arterial 13.4 12.0 - 16.0 g/dL    POCT Anion Gap, Arterial 17 10 - 25 mmo/L    Patient Temperature 37.0 degrees Celsius    FiO2 30 %   Blood Gas Arterial Full Panel   Result Value Ref Range    POCT pH, Arterial 7.36 (L) 7.38 - 7.42 pH    POCT pCO2, Arterial 50 (H) 38 - 42 mm Hg    POCT pO2, Arterial 109 (H) 85 - 95 mm Hg    POCT SO2, Arterial 98 94 - 100 %    POCT Oxy Hemoglobin, Arterial 95.6 94.0 - 98.0 %    POCT Hematocrit Calculated, Arterial 41.0 36.0 - 46.0 %    POCT Sodium, Arterial 139 136 - 145 mmol/L    POCT Potassium, Arterial 3.9 3.5 - 5.3 mmol/L    POCT Chloride, Arterial 102 98 - 107 mmol/L    POCT Ionized Calcium, Arterial 1.27 1.10 - 1.33 mmol/L    POCT Glucose, Arterial 272 (H) 74 - 99 mg/dL    POCT Lactate, Arterial 2.5 (H) 0.4 - 2.0 mmol/L    POCT Base Excess, Arterial 1.9 -2.0 - 3.0 mmol/L    POCT HCO3 Calculated, Arterial 28.2 (H) 22.0 -  26.0 mmol/L    POCT Hemoglobin, Arterial 13.5 12.0 - 16.0 g/dL    POCT Anion Gap, Arterial 13 10 - 25 mmo/L    Patient Temperature 37.0 degrees Celsius    FiO2 40 %   POCT GLUCOSE   Result Value Ref Range    POCT Glucose 216 (H) 74 - 99 mg/dL   CBC   Result Value Ref Range    WBC 10.0 4.4 - 11.3 x10*3/uL    nRBC 0.2 (H) 0.0 - 0.0 /100 WBCs    RBC 4.40 4.00 - 5.20 x10*6/uL    Hemoglobin 12.7 12.0 - 16.0 g/dL    Hematocrit 35.6 (L) 36.0 - 46.0 %    MCV 81 80 - 100 fL    MCH 28.9 26.0 - 34.0 pg    MCHC 35.7 32.0 - 36.0 g/dL    RDW 15.1 (H) 11.5 - 14.5 %    Platelets 126 (L) 150 - 450 x10*3/uL   Blood Gas Arterial Full Panel   Result Value Ref Range    POCT pH, Arterial 7.35 (L) 7.38 - 7.42 pH    POCT pCO2, Arterial 53 (H) 38 - 42 mm Hg    POCT pO2, Arterial 88 85 - 95 mm Hg    POCT SO2, Arterial 97 94 - 100 %    POCT Oxy Hemoglobin, Arterial 94.7 94.0 - 98.0 %    POCT Hematocrit Calculated, Arterial 40.0 36.0 - 46.0 %    POCT Sodium, Arterial 140 136 - 145 mmol/L    POCT Potassium, Arterial 4.0 3.5 - 5.3 mmol/L    POCT Chloride, Arterial 102 98 - 107 mmol/L    POCT Ionized Calcium, Arterial 1.31 1.10 - 1.33 mmol/L    POCT Glucose, Arterial 248 (H) 74 - 99 mg/dL    POCT Lactate, Arterial 2.0 0.4 - 2.0 mmol/L    POCT Base Excess, Arterial 2.6 -2.0 - 3.0 mmol/L    POCT HCO3 Calculated, Arterial 29.3 (H) 22.0 - 26.0 mmol/L    POCT Hemoglobin, Arterial 13.4 12.0 - 16.0 g/dL    POCT Anion Gap, Arterial 13 10 - 25 mmo/L    Patient Temperature 37.0 degrees Celsius    FiO2 36 %   TEG Clot Global Profile   Result Value Ref Range    R (Reaction Time) K 5.9 4.6 - 9.1 min    K (Clot Kinetics) 1.2 0.8 - 2.1 min    ANGLE 75.5 63.0 - 78.0 deg    MA (Max Amplitude) K 61.6 52.0 - 69.0 mm    R (Reaction Time) KH 6.7 4.3 - 8.3 min    MA (Max Amplitude) RT 62.2 52.0 - 70.0 mm    MA ( New Amplitude) FF 23.8 15.0 - 32.0 mm    FLEV 434 278 - 581 mg/dL   Magnesium   Result Value Ref Range    Magnesium 3.01 (H) 1.60 - 2.40 mg/dL   Calcium,  Ionized   Result Value Ref Range    POCT Calcium, Ionized 1.24 1.1 - 1.33 mmol/L   Coagulation Screen   Result Value Ref Range    Protime 11.5 9.8 - 12.8 seconds    INR 1.0 0.9 - 1.1    aPTT 27 27 - 38 seconds   Renal Function Panel   Result Value Ref Range    Glucose 264 (H) 74 - 99 mg/dL    Sodium 143 136 - 145 mmol/L    Potassium 4.1 3.5 - 5.3 mmol/L    Chloride 100 98 - 107 mmol/L    Bicarbonate 30 21 - 32 mmol/L    Anion Gap 17 10 - 20 mmol/L    Urea Nitrogen 25 (H) 6 - 23 mg/dL    Creatinine 1.64 (H) 0.50 - 1.05 mg/dL    eGFR 35 (L) >60 mL/min/1.73m*2    Calcium 9.9 8.6 - 10.6 mg/dL    Phosphorus 5.3 (H) 2.5 - 4.9 mg/dL    Albumin 4.0 3.4 - 5.0 g/dL   CBC   Result Value Ref Range    WBC 10.7 4.4 - 11.3 x10*3/uL    nRBC 0.0 0.0 - 0.0 /100 WBCs    RBC 4.50 4.00 - 5.20 x10*6/uL    Hemoglobin 12.9 12.0 - 16.0 g/dL    Hematocrit 36.0 36.0 - 46.0 %    MCV 80 80 - 100 fL    MCH 28.7 26.0 - 34.0 pg    MCHC 35.8 32.0 - 36.0 g/dL    RDW 15.1 (H) 11.5 - 14.5 %    Platelets 118 (L) 150 - 450 x10*3/uL   TEG Clot Global Profile   Result Value Ref Range    R (Reaction Time) K 6.3 4.6 - 9.1 min    K (Clot Kinetics) 1.3 0.8 - 2.1 min    ANGLE 73.8 63.0 - 78.0 deg    MA (Max Amplitude) K 60.8 52.0 - 69.0 mm    R (Reaction Time) KH 6.7 4.3 - 8.3 min    MA (Max Amplitude) RT 61.6 52.0 - 70.0 mm    MA ( New Amplitude) FF 23.5 15.0 - 32.0 mm    FLEV 429 278 - 581 mg/dL   POCT GLUCOSE   Result Value Ref Range    POCT Glucose 239 (H) 74 - 99 mg/dL   Blood Gas Arterial Full Panel   Result Value Ref Range    POCT pH, Arterial 7.36 (L) 7.38 - 7.42 pH    POCT pCO2, Arterial 54 (H) 38 - 42 mm Hg    POCT pO2, Arterial 126 (H) 85 - 95 mm Hg    POCT SO2, Arterial 99 94 - 100 %    POCT Oxy Hemoglobin, Arterial 96.2 94.0 - 98.0 %    POCT Hematocrit Calculated, Arterial 40.0 36.0 - 46.0 %    POCT Sodium, Arterial 138 136 - 145 mmol/L    POCT Potassium, Arterial 4.3 3.5 - 5.3 mmol/L    POCT Chloride, Arterial 102 98 - 107 mmol/L    POCT Ionized  Calcium, Arterial 1.31 1.10 - 1.33 mmol/L    POCT Glucose, Arterial 278 (H) 74 - 99 mg/dL    POCT Lactate, Arterial 3.1 (H) 0.4 - 2.0 mmol/L    POCT Base Excess, Arterial 3.8 (H) -2.0 - 3.0 mmol/L    POCT HCO3 Calculated, Arterial 30.5 (H) 22.0 - 26.0 mmol/L    POCT Hemoglobin, Arterial 13.4 12.0 - 16.0 g/dL    POCT Anion Gap, Arterial 10 10 - 25 mmo/L    Patient Temperature 37.0 degrees Celsius    FiO2 38 %     *Note: Due to a large number of results and/or encounters for the requested time period, some results have not been displayed. A complete set of results can be found in Results Review.      Nelida Mata is a 64 y.o. year old female patient who presents for Procedure(s):  laparotomy, abdominal hysterectomy, bilateral salpingo-oophorectomy.  Dr. Erin Valenzuela to assist. with Maureen Davila MD on 1/23/2025. Acute Pain consulted for assistance with pain control.      Plan:     - No block as allergy to procaine. Recommend multimodal pain management approach.  - Patient very sedated in setting of recent extubation, would not recommend further pain medications at this time.   - Tylenol 975mg Q8H scheduled when taking po  - Consider gabapentin 300mg at bedtime when taking po   - Lidocaine patches around affected site as needed  - Continuous pulse ox  - Acute Pain Service will sign off     Acute Pain Resident  pg [phone] ph [phone]

## 2025-01-24 NOTE — PROGRESS NOTES
Plan:  - volume resuscitation ongoing; may need pressers  - bedside echo showing normal EF and collapsible IVC --> hypovolemia   - c/f possible hemorrhage, low threshold for CT a/p to eval  - BiPAP  - trend abg and hb    Assessment:    Neuro/MSK: Acute metabolic encephalopathy  and expected acute post op pain   A-F bundle; Sleep Hygiene measures: appropriate daytime stimulation/physical activity. 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 chemo, vasoactive) and access  Jose: critically ill patient who need accurate urinary output measurements  Restraint: n/a    Dispo: ICU    Exam:    A&O x 4  NSR  Adequate air-exchange  Abd soft, NT  Extremities warm     REASON FOR ADMISSION    64 y.o. female presents to ICU for management of hypotension and dizziness after total lap hysterectomy and BSO with inguinal hernia repair today 1/23/25. PMHx of HFpEF (50-55% 9/2023), HLD, CAD s/p PCI w/ stent x 3 (2016, 2018), STEMI (9/2023, no stents placed), TIA 9/2023, SVC thrombus on Lovenox, remote DVT, IDDM2 on Farxiga, CKD, MICHAEL noncompliant with CPAP at home and NAFLD. There  were no intra-op complications; EBL was 300 ml. Patient was stable in PACU. Code white was called after she went to floor for increased abdominal pain, lightheadedness, some bleeding from incisional site, hypotension 50s/40s with cold clammy extremities. She did not have AMS and remained conversant through this time and during transfer to SICU. Patient responded well to liter of crystalloid on the floor.     TODAY'S EVENTS    1/23: admitted to ICU from [facility] via RRT for hypotension/hypovolemia    This critically ill patient continues to be at-risk for clinically significant deterioration / failure due to the above mentioned dysfunctional, unstable organ systems.  I have personally identified and managed all complex critical care issues to prevent aforementioned clinical deterioration.  Critical care time is spent at bedside and/or the immediate area and has included, but is not limited to, the review of diagnostic tests, labs, radiographs, serial assessments of hemodynamics, respiratory status, ventilatory management, and family updates.  Time spent in procedures and teaching are reported separately. CF CRITICAL CARE TIME: 45 minutes          LABS:  CMP:  Results from last 7 days   Lab Units 01/24/25 0220 01/23/25 2305 01/23/25  1843 01/23/25  1842 01/23/25  1435   SODIUM mmol/L 142 139 141 141 142   POTASSIUM mmol/L 4.2 4.8 5.0 5.1 4.8   CHLORIDE mmol/L 99 100 103 103 104   CO2 mmol/L 25 29 31 31 20*   ANION GAP mmol/L 22* 15 12 12 23*   BUN mg/dL 22 25* 23 23 20   CREATININE mg/dL 1.31* 1.57* 1.52* 1.50* 1.35*   EGFR mL/min/1.73m*2 45* 36* 38* 39* 44*   MAGNESIUM mg/dL 1.68 1.61 1.59*  --  2.26   ALBUMIN g/dL 4.5 3.7 3.2* 3.2*  --    ALK PHOS U/L  --   --   --  38  --    ALT U/L  --   --   --  8  --    AST U/L  --   --   --  11  --    BILIRUBIN TOTAL mg/dL  --   --   --  0.8  --      CBC:  Results from last 7 days   Lab Units 01/24/25  0816 01/24/25 0220 01/23/25 2305 01/23/25  1842 01/23/25  1435   WBC AUTO  x10*3/uL 10.0 8.0 11.9* 11.5* 14.7*   HEMOGLOBIN g/dL 12.7 14.3 7.0* 8.9* 14.7   HEMATOCRIT % 35.6* 40.5 21.4* 27.0* 46.5*   PLATELETS AUTO x10*3/uL 126* 110* 139* 143* 167   MCV fL 81 82 92 91 94     COAG:   Results from last 7 days   Lab Units 01/24/25  0220 01/23/25  2305 01/23/25  1843 01/23/25  1503   INR  1.0 1.2* 1.2* 1.0     ABO:   ABO TYPE   Date Value Ref Range Status   01/23/2025 O  Final     HEME/ENDO:     CARDIAC:   Results from last 7 days   Lab Units 01/23/25  1842 01/23/25  1512   TROPHSCMC ng/L  --  13   BNP pg/mL 9  --      MICRO: No results found for the last 90 days.

## 2025-01-24 NOTE — ANESTHESIA POSTPROCEDURE EVALUATION
Patient: Nelida Mata    Procedure Summary       Date: 01/23/25 Room / Location: [facility] OR 12 / Virtual [facility] OR    Anesthesia Start: 2333 Anesthesia Stop: 01/24/25 0135    Procedures:       LAPAROTOMY, EXPLORATORY, FOR TRAUMA      EVACUATION HEMATOMA IN ABDOMINAL CAVITY, CONTROL OF PELVIC HEMORRHAGE Diagnosis:       Hemoperitoneum      (Hemoperitoneum [K66.1])    Surgeons: Maureen Davila MD Responsible Provider: Dustin Andrade MD    Anesthesia Type: general ASA Status: 3 - Emergent            Anesthesia Type: general    Vitals Value Taken Time   /81 01/24/25 0135   Temp 35.7 01/24/25 0135   Pulse 92 01/24/25 0134   Resp 24 01/24/25 0134   SpO2 98 % 01/24/25 0134   Vitals shown include unfiled device data.    Anesthesia Post Evaluation    Patient location during evaluation: ICU  Patient participation: complete - patient cannot participate  Level of consciousness: sedated  Pain management: adequate  Airway patency: patent  Cardiovascular status: acceptable, hypertensive and stable  Respiratory status: acceptable, intubated, ETT and airway suctioned  Hydration status: acceptable  Postoperative Nausea and Vomiting: none        No notable events documented.

## 2025-01-24 NOTE — PROGRESS NOTES
Physical Therapy    Physical Therapy Evaluation    Patient Name: Nelida Mata  MRN: [id number]  Department: [facility]  Room: 08/08-A  Today's Date: 1/24/2025   Time Calculation  Start Time: 0921  Stop Time: 0956  Time Calculation (min): 35 min    Assessment/Plan   PT Assessment  PT Assessment Results: Decreased strength, Decreased endurance, Impaired balance, Decreased mobility, Impaired vision, Pain  Rehab Prognosis: Good  Barriers to Discharge Home: Physical needs  Evaluation/Treatment Tolerance: Patient limited by pain, Patient limited by fatigue  Medical Staff Made Aware: Yes  Strengths: Ability to acquire knowledge, Attitude of self, Housing layout, Support of Caregivers  Barriers to Participation: Premorbid level of function  End of Session Communication: Bedside nurse  Assessment Comment: Pt performed bed mobility and all transfers with max-A x2.  Pt was primarily limited by abdominal pain and fatigue.  Pt needed cues throughout PT session and had difficulty with STS transfers due to baseline vision deficit in the L eye.  Pt will benefit from skilled PT to manage pain, increase balance, and mobility.  End of Session Patient Position: Up in chair  IP OR SWING BED PT PLAN  Inpatient or Swing Bed: Inpatient  PT Plan  Treatment/Interventions: Bed mobility, Transfer training, Gait training, Balance training, Strengthening, Endurance training, Therapeutic exercise, Therapeutic activity  PT Plan: Ongoing PT  PT Frequency: 4 times per week  PT Discharge Recommendations: Moderate intensity level of continued care  Equipment Recommended upon Discharge:  (TBD)  PT Recommended Transfer Status: Assist x2  PT - OK to Discharge: Yes    Subjective   General Visit Information:  General  Reason for Referral: Presents to ICU for management of hypotension and dizziness after total lap hysterectomy and BSO with inguinal hernia repair today 1/23/25.  Patient taken to OR emergently with ex-lap for hemorrhage control. Large  hemoperitoneum evacuated.  Referred By: Dr. Gaytan  Past Medical History Relevant to Rehab: HFpEF (50-55% 9/2023), HLD, CAD s/p PCI w/ stent x 3 (2016, 2018), STEMI (9/2023, no stents placed), TIA 9/2023, SVC thrombus on Lovenox, remote DVT, IDDM2 on Farxiga, CKD, MICHAEL noncompliant with CPAP at home and NAFLD.  Family/Caregiver Present: No  Prior to Session Communication: Bedside nurse  Patient Position Received: Bed, 3 rail up, Alarm on  General Comment: Pt was laying supine with HOB elevated upon arrival.  Accepting to participate in therapy.  Home Living:  Home Living  Type of Home: Apartment  Lives With: Alone (Boyfriend lives on different floor of same apt building)  Home Adaptive Equipment: Walker rolling or standard, Cane  Home Layout: One level  Home Access: Level entry  Bathroom Shower/Tub: Walk-in shower  Bathroom Toilet: Standard  Bathroom Equipment: Grab bars in shower, Shower chair with back  Prior Level of Function:  Prior Function Per Pt/Caregiver Report  Level of [place]: Needs assistance with homemaking, Needs assistance with ADLs  Ambulatory Assistance: Independent  Vocational: On disability  Hand Dominance: Right  Prior Function Comments: Pt doesn't drive, boyfriend lives in same apt building and assists with most ADL's.  Pt stated she has a WW and cane at home, but does not use them.  Precautions:  Precautions  Medical Precautions: Fall precautions  Precautions Comment: Limited by abdominal pain.      01/24/25 0921 01/24/25 0956   Vital Signs   Vitals Session Pre PT Post PT   Heart Rate 88 87   Resp 12 11   SpO2 98 % 98 %   /52 135/56   MAP (mmHg) 85 87        Objective   Pain:  Pain Assessment  Pain Assessment: 0-10  0-10 (Numeric) Pain Score: 9  Pain Type: Surgical pain  Pain Location: Abdomen  Cognition:  Cognition  Overall Cognitive Status: Within Functional Limits  Orientation Level: Oriented X4 (Delayed response and needed some cues)  Processing Speed: Delayed    General  Assessments:     Activity Tolerance  Endurance: Tolerates less than 10 min exercise with changes in vital signs  Early Mobility/Exercise Safety Screen: Proceed with mobilization - No exclusion criteria met  Activity Tolerance Comments: Pt was primarily limited by abdominal pain and fatigue throughout PT session.    Sensation  Light Touch: No apparent deficits    Strength  Strength Comments: Impaired  Strength  Strength Comments: Impaired    Perception  Inattention/Neglect: Appears intact      Coordination  Movements are Fluid and Coordinated: Yes    Postural Control  Postural Control: Within Functional Limits    Static Sitting Balance  Static Sitting-Balance Support: Feet supported    Static Standing Balance  Static Standing-Balance Support: Bilateral upper extremity supported  Static Standing-Level of Assistance: Maximum assistance  Static Standing-Comment/Number of Minutes: ~ 1 min  Functional Assessments:  Bed Mobility  Bed Mobility: Yes  Bed Mobility 1  Bed Mobility 1: Supine to sitting  Level of Assistance 1: +2, Moderate assistance  Bed Mobility Comments 1: Needed cues throughout, primarily limited by abdominal pain.    Transfers  Transfer: Yes  Transfer 1  Transfer From 1: Sit to, Stand to  Transfer to 1: Sit, Stand  Technique 1: Sit to stand, Stand to sit  Transfer Device 1: Walker  Transfer Level of Assistance 1: +2, Moderate assistance  Trials/Comments 1: x2 trials    Ambulation/Gait Training  Ambulation/Gait Training Performed: Yes  Ambulation/Gait Training 1  Surface 1: Level tile  Device 1: Rolling walker  Assistance 1: Moderate assistance (x2)  Quality of Gait 1: Narrow base of support, Decreased step length, Ataxic (Difficulty placing feet towards L side for lateral stepping)  Comments/Distance (ft) 1: 4 lateral steps    Stairs  Stairs: No  Extremity/Trunk Assessments:  RLE   RLE : Within Functional Limits  LLE   LLE : Within Functional Limits  Outcome Measures:  [facility] Basic Mobility  Turning from  your back to your side while in a flat bed without using bedrails: A lot  Moving from lying on your back to sitting on the side of a flat bed without using bedrails: A lot  Moving to and from bed to chair (including a wheelchair): A lot  Standing up from a chair using your arms (e.g. wheelchair or bedside chair): A lot  To walk in hospital room: A lot  Climbing 3-5 steps with railing: Total  Basic Mobility - Total Score: 11    FSS-ICU  Ambulation: Walks <50 feet with any assistance x1 or walks any distance with assistance x2 people  Rolling: Maximal assistance (performs 25% - 49% of task)  Sitting: Supervision or set-up only  Transfer Sit-to-Stand: Maximal assistance (performs 25% - 49% of task)  Transfer Supine-to-Sit: Maximal assistance (performs 25% - 49% of task)  Total Score: 12      Early Mobility/Exercise Safety Screen: Proceed with mobilization - No exclusion criteria met  ICU Mobility Scale: Transferring bed to chair [5]    Encounter Problems       Encounter Problems (Active)       Balance       LTG - Patient will maintain static standing balance > 5 mins with CGA.       Start:  01/24/25    Expected End:  02/14/25               Mobility       STG - Patient will ambulate > 75 ft with LRAD and CGA.       Start:  01/24/25    Expected End:  02/14/25            Pt will perform bed mobility with SBA.       Start:  01/24/25    Expected End:  02/14/25            Pt will perform STS transfers with LRAD and CGA.       Start:  01/24/25    Expected End:  02/14/25                   Education Documentation  Precautions, taught by KAMRAN Hansen at 1/24/2025 11:45 AM.  Learner: Patient  Readiness: Acceptance  Method: Explanation  Response: Verbalizes Understanding  Comment: PT POC    Body Mechanics, taught by KAMRAN Hansen at 1/24/2025 11:45 AM.  Learner: Patient  Readiness: Acceptance  Method: Explanation  Response: Verbalizes Understanding  Comment: PT POC    Mobility Training, taught by Destiny Pham  S-PT at 1/24/2025 11:45 AM.  Learner: Patient  Readiness: Acceptance  Method: Explanation  Response: Verbalizes Understanding  Comment: PT POC    Education Comments  No comments found.      KHARI FAIRBANKSPT

## 2025-01-24 NOTE — CARE PLAN
Problem: Skin  Goal: Decreased wound size/increased tissue granulation at next dressing change  Outcome: Progressing  Flowsheets (Taken 1/24/2025 0813)  Decreased wound size/increased tissue granulation at next dressing change: Promote sleep for wound healing  Goal: Participates in plan/prevention/treatment measures  Outcome: Progressing  Flowsheets (Taken 1/24/2025 0813)  Participates in plan/prevention/treatment measures: Elevate heels  Goal: Prevent/manage excess moisture  Outcome: Progressing  Goal: Prevent/minimize sheer/friction injuries  Outcome: Progressing  Flowsheets (Taken 1/24/2025 0813)  Prevent/minimize sheer/friction injuries:   Use pull sheet   Turn/reposition every 2 hours/use positioning/transfer devices   HOB 30 degrees or less  Goal: Promote/optimize nutrition  Outcome: Progressing  Flowsheets (Taken 1/24/2025 0813)  Promote/optimize nutrition: Discuss with provider if NPO > 2 days  Goal: Promote skin healing  Outcome: Progressing  Flowsheets (Taken 1/24/2025 0813)  Promote skin healing: Rotate device position/do not position patient on device     Problem: Safety - Medical Restraint  Goal: Remains free of injury from restraints (Restraint for Interference with Medical Device)  Outcome: Progressing  Flowsheets (Taken 1/24/2025 0813)  Remains free of injury from restraints (restraint for interference with medical device):   Inform patient/family regarding the reason for restraint   Evaluate the patient's condition at the time of restraint application   Determine that other, less restrictive measures have been tried or would not be effective before applying the restraint   Every 2 hours: Monitor safety, psychosocial status, comfort, nutrition and hydration  Goal: Free from restraint(s) (Restraint for Interference with Medical Device)  Outcome: Progressing  Flowsheets (Taken 1/24/2025 0813)  Free from restraint(s) (restraint for interference with medical device):   Identify and implement measures  to help patient regain control   Every 24 hours: Continued use of restraint requires Licensed Independent Practitioner to perform face to face examination and written order

## 2025-01-25 ENCOUNTER — APPOINTMENT (OUTPATIENT)
Dept: RADIOLOGY | Facility: HOSPITAL | Age: 65
DRG: 742 | End: 2025-01-25
Payer: COMMERCIAL

## 2025-01-25 LAB
ALBUMIN SERPL BCP-MCNC: 3.5 G/DL (ref 3.4–5)
ALBUMIN SERPL BCP-MCNC: 3.7 G/DL (ref 3.4–5)
ALP SERPL-CCNC: 47 U/L (ref 33–136)
ALP SERPL-CCNC: 47 U/L (ref 33–136)
ALP SERPL-CCNC: 48 U/L (ref 33–136)
ALT SERPL W P-5'-P-CCNC: 16 U/L (ref 7–45)
ALT SERPL W P-5'-P-CCNC: 18 U/L (ref 7–45)
ALT SERPL W P-5'-P-CCNC: 19 U/L (ref 7–45)
ANION GAP BLDA CALCULATED.4IONS-SCNC: 3 MMO/L (ref 10–25)
ANION GAP BLDA CALCULATED.4IONS-SCNC: 4 MMO/L (ref 10–25)
ANION GAP BLDA CALCULATED.4IONS-SCNC: 8 MMO/L (ref 10–25)
ANION GAP SERPL CALC-SCNC: 13 MMOL/L (ref 10–20)
ANION GAP SERPL CALC-SCNC: 14 MMOL/L (ref 10–20)
ANION GAP SERPL CALC-SCNC: 15 MMOL/L (ref 10–20)
ANION GAP SERPL CALC-SCNC: 15 MMOL/L (ref 10–20)
APTT PPP: 26 SECONDS (ref 27–38)
APTT PPP: 28 SECONDS (ref 27–38)
AST SERPL W P-5'-P-CCNC: 26 U/L (ref 9–39)
AST SERPL W P-5'-P-CCNC: 31 U/L (ref 9–39)
AST SERPL W P-5'-P-CCNC: 32 U/L (ref 9–39)
BASE EXCESS BLDA CALC-SCNC: 7 MMOL/L (ref -2–3)
BASE EXCESS BLDA CALC-SCNC: 7 MMOL/L (ref -2–3)
BASE EXCESS BLDA CALC-SCNC: 7.7 MMOL/L (ref -2–3)
BILIRUB DIRECT SERPL-MCNC: 0.2 MG/DL (ref 0–0.3)
BILIRUB SERPL-MCNC: 0.8 MG/DL (ref 0–1.2)
BILIRUB SERPL-MCNC: 0.8 MG/DL (ref 0–1.2)
BILIRUB SERPL-MCNC: 0.9 MG/DL (ref 0–1.2)
BLOOD EXPIRATION DATE: NORMAL
BODY TEMPERATURE: 37 DEGREES CELSIUS
BUN SERPL-MCNC: 24 MG/DL (ref 6–23)
BUN SERPL-MCNC: 25 MG/DL (ref 6–23)
CA-I BLD-SCNC: 1.17 MMOL/L (ref 1.1–1.33)
CA-I BLD-SCNC: 1.17 MMOL/L (ref 1.1–1.33)
CA-I BLD-SCNC: 1.22 MMOL/L (ref 1.1–1.33)
CA-I BLDA-SCNC: 1.23 MMOL/L (ref 1.1–1.33)
CA-I BLDA-SCNC: 1.24 MMOL/L (ref 1.1–1.33)
CA-I BLDA-SCNC: 1.25 MMOL/L (ref 1.1–1.33)
CALCIUM SERPL-MCNC: 9.1 MG/DL (ref 8.6–10.6)
CALCIUM SERPL-MCNC: 9.2 MG/DL (ref 8.6–10.6)
CALCIUM SERPL-MCNC: 9.3 MG/DL (ref 8.6–10.6)
CALCIUM SERPL-MCNC: 9.4 MG/DL (ref 8.6–10.6)
CHLORIDE BLDA-SCNC: 102 MMOL/L (ref 98–107)
CHLORIDE BLDA-SCNC: 103 MMOL/L (ref 98–107)
CHLORIDE BLDA-SCNC: 104 MMOL/L (ref 98–107)
CHLORIDE SERPL-SCNC: 100 MMOL/L (ref 98–107)
CHLORIDE SERPL-SCNC: 101 MMOL/L (ref 98–107)
CHLORIDE SERPL-SCNC: 101 MMOL/L (ref 98–107)
CHLORIDE SERPL-SCNC: 98 MMOL/L (ref 98–107)
CO2 SERPL-SCNC: 29 MMOL/L (ref 21–32)
CO2 SERPL-SCNC: 30 MMOL/L (ref 21–32)
CO2 SERPL-SCNC: 31 MMOL/L (ref 21–32)
CO2 SERPL-SCNC: 32 MMOL/L (ref 21–32)
CREAT SERPL-MCNC: 1.39 MG/DL (ref 0.5–1.05)
CREAT SERPL-MCNC: 1.46 MG/DL (ref 0.5–1.05)
CREAT SERPL-MCNC: 1.46 MG/DL (ref 0.5–1.05)
CREAT SERPL-MCNC: 1.54 MG/DL (ref 0.5–1.05)
DISPENSE STATUS: NORMAL
EGFRCR SERPLBLD CKD-EPI 2021: 37 ML/MIN/1.73M*2
EGFRCR SERPLBLD CKD-EPI 2021: 40 ML/MIN/1.73M*2
EGFRCR SERPLBLD CKD-EPI 2021: 40 ML/MIN/1.73M*2
EGFRCR SERPLBLD CKD-EPI 2021: 42 ML/MIN/1.73M*2
ERYTHROCYTE [DISTWIDTH] IN BLOOD BY AUTOMATED COUNT: 15.6 % (ref 11.5–14.5)
ERYTHROCYTE [DISTWIDTH] IN BLOOD BY AUTOMATED COUNT: 15.9 % (ref 11.5–14.5)
ERYTHROCYTE [DISTWIDTH] IN BLOOD BY AUTOMATED COUNT: 16 % (ref 11.5–14.5)
GLUCOSE BLD MANUAL STRIP-MCNC: 111 MG/DL (ref 74–99)
GLUCOSE BLD MANUAL STRIP-MCNC: 117 MG/DL (ref 74–99)
GLUCOSE BLD MANUAL STRIP-MCNC: 119 MG/DL (ref 74–99)
GLUCOSE BLD MANUAL STRIP-MCNC: 124 MG/DL (ref 74–99)
GLUCOSE BLD MANUAL STRIP-MCNC: 130 MG/DL (ref 74–99)
GLUCOSE BLD MANUAL STRIP-MCNC: 217 MG/DL (ref 74–99)
GLUCOSE BLD MANUAL STRIP-MCNC: 219 MG/DL (ref 74–99)
GLUCOSE BLDA-MCNC: 138 MG/DL (ref 74–99)
GLUCOSE BLDA-MCNC: 140 MG/DL (ref 74–99)
GLUCOSE BLDA-MCNC: 141 MG/DL (ref 74–99)
GLUCOSE SERPL-MCNC: 122 MG/DL (ref 74–99)
GLUCOSE SERPL-MCNC: 133 MG/DL (ref 74–99)
GLUCOSE SERPL-MCNC: 141 MG/DL (ref 74–99)
GLUCOSE SERPL-MCNC: 354 MG/DL (ref 74–99)
HCO3 BLDA-SCNC: 31 MMOL/L (ref 22–26)
HCO3 BLDA-SCNC: 32 MMOL/L (ref 22–26)
HCO3 BLDA-SCNC: 32.5 MMOL/L (ref 22–26)
HCT VFR BLD AUTO: 33.6 % (ref 36–46)
HCT VFR BLD AUTO: 33.6 % (ref 36–46)
HCT VFR BLD AUTO: 33.8 % (ref 36–46)
HCT VFR BLD EST: 36 % (ref 36–46)
HCT VFR BLD EST: 37 % (ref 36–46)
HCT VFR BLD EST: 41 % (ref 36–46)
HGB BLD-MCNC: 11.3 G/DL (ref 12–16)
HGB BLD-MCNC: 11.7 G/DL (ref 12–16)
HGB BLD-MCNC: 11.9 G/DL (ref 12–16)
HGB BLDA-MCNC: 12 G/DL (ref 12–16)
HGB BLDA-MCNC: 12.3 G/DL (ref 12–16)
HGB BLDA-MCNC: 13.6 G/DL (ref 12–16)
INHALED O2 CONCENTRATION: 21 %
INHALED O2 CONCENTRATION: 32 %
INHALED O2 CONCENTRATION: 32 %
INR PPP: 0.9 (ref 0.9–1.1)
INR PPP: 1 (ref 0.9–1.1)
LACTATE BLDA-SCNC: 0.9 MMOL/L (ref 0.4–2)
LACTATE BLDA-SCNC: 1 MMOL/L (ref 0.4–2)
LACTATE BLDA-SCNC: 2 MMOL/L (ref 0.4–2)
MAGNESIUM SERPL-MCNC: 2.44 MG/DL (ref 1.6–2.4)
MAGNESIUM SERPL-MCNC: 2.52 MG/DL (ref 1.6–2.4)
MAGNESIUM SERPL-MCNC: 2.7 MG/DL (ref 1.6–2.4)
MCH RBC QN AUTO: 28.5 PG (ref 26–34)
MCH RBC QN AUTO: 28.8 PG (ref 26–34)
MCH RBC QN AUTO: 29 PG (ref 26–34)
MCHC RBC AUTO-ENTMCNC: 33.6 G/DL (ref 32–36)
MCHC RBC AUTO-ENTMCNC: 34.6 G/DL (ref 32–36)
MCHC RBC AUTO-ENTMCNC: 35.4 G/DL (ref 32–36)
MCV RBC AUTO: 82 FL (ref 80–100)
MCV RBC AUTO: 82 FL (ref 80–100)
MCV RBC AUTO: 86 FL (ref 80–100)
NRBC BLD-RTO: 0 /100 WBCS (ref 0–0)
OXYHGB MFR BLDA: 94.8 % (ref 94–98)
OXYHGB MFR BLDA: 96.7 % (ref 94–98)
OXYHGB MFR BLDA: 97 % (ref 94–98)
PCO2 BLDA: 38 MM HG (ref 38–42)
PCO2 BLDA: 46 MM HG (ref 38–42)
PCO2 BLDA: 49 MM HG (ref 38–42)
PH BLDA: 7.43 PH (ref 7.38–7.42)
PH BLDA: 7.45 PH (ref 7.38–7.42)
PH BLDA: 7.52 PH (ref 7.38–7.42)
PHOSPHATE SERPL-MCNC: 2.9 MG/DL (ref 2.5–4.9)
PHOSPHATE SERPL-MCNC: 3.1 MG/DL (ref 2.5–4.9)
PHOSPHATE SERPL-MCNC: 3.4 MG/DL (ref 2.5–4.9)
PHOSPHATE SERPL-MCNC: 4.1 MG/DL (ref 2.5–4.9)
PLATELET # BLD AUTO: 107 X10*3/UL (ref 150–450)
PLATELET # BLD AUTO: 111 X10*3/UL (ref 150–450)
PLATELET # BLD AUTO: 115 X10*3/UL (ref 150–450)
PO2 BLDA: 166 MM HG (ref 85–95)
PO2 BLDA: 170 MM HG (ref 85–95)
PO2 BLDA: 78 MM HG (ref 85–95)
POTASSIUM BLDA-SCNC: 3.8 MMOL/L (ref 3.5–5.3)
POTASSIUM BLDA-SCNC: 3.9 MMOL/L (ref 3.5–5.3)
POTASSIUM BLDA-SCNC: 4 MMOL/L (ref 3.5–5.3)
POTASSIUM SERPL-SCNC: 3.8 MMOL/L (ref 3.5–5.3)
POTASSIUM SERPL-SCNC: 3.8 MMOL/L (ref 3.5–5.3)
POTASSIUM SERPL-SCNC: 4 MMOL/L (ref 3.5–5.3)
POTASSIUM SERPL-SCNC: 4.4 MMOL/L (ref 3.5–5.3)
PRODUCT BLOOD TYPE: 2800
PRODUCT BLOOD TYPE: 5100
PRODUCT BLOOD TYPE: 6200
PRODUCT BLOOD TYPE: 8400
PRODUCT BLOOD TYPE: 9500
PRODUCT CODE: NORMAL
PROT SERPL-MCNC: 5.8 G/DL (ref 6.4–8.2)
PROT SERPL-MCNC: 6 G/DL (ref 6.4–8.2)
PROT SERPL-MCNC: 6 G/DL (ref 6.4–8.2)
PROTHROMBIN TIME: 10.6 SECONDS (ref 9.8–12.8)
PROTHROMBIN TIME: 10.9 SECONDS (ref 9.8–12.8)
RBC # BLD AUTO: 3.92 X10*6/UL (ref 4–5.2)
RBC # BLD AUTO: 4.1 X10*6/UL (ref 4–5.2)
RBC # BLD AUTO: 4.1 X10*6/UL (ref 4–5.2)
SAO2 % BLDA: 97 % (ref 94–100)
SAO2 % BLDA: 99 % (ref 94–100)
SAO2 % BLDA: 99 % (ref 94–100)
SODIUM BLDA-SCNC: 135 MMOL/L (ref 136–145)
SODIUM BLDA-SCNC: 136 MMOL/L (ref 136–145)
SODIUM BLDA-SCNC: 137 MMOL/L (ref 136–145)
SODIUM SERPL-SCNC: 136 MMOL/L (ref 136–145)
SODIUM SERPL-SCNC: 142 MMOL/L (ref 136–145)
SODIUM SERPL-SCNC: 142 MMOL/L (ref 136–145)
SODIUM SERPL-SCNC: 143 MMOL/L (ref 136–145)
UNIT ABO: NORMAL
UNIT NUMBER: NORMAL
UNIT RH: NORMAL
UNIT VOLUME: 273
UNIT VOLUME: 275
UNIT VOLUME: 281
UNIT VOLUME: 282
UNIT VOLUME: 292
UNIT VOLUME: 310
UNIT VOLUME: 318
UNIT VOLUME: 329
UNIT VOLUME: 332
UNIT VOLUME: 350
UNIT VOLUME: 382
WBC # BLD AUTO: 10.5 X10*3/UL (ref 4.4–11.3)
WBC # BLD AUTO: 11.1 X10*3/UL (ref 4.4–11.3)
WBC # BLD AUTO: 11.5 X10*3/UL (ref 4.4–11.3)
XM INTEP: NORMAL

## 2025-01-25 PROCEDURE — 82947 ASSAY GLUCOSE BLOOD QUANT: CPT

## 2025-01-25 PROCEDURE — 1170000001 HC PRIVATE ONCOLOGY ROOM DAILY

## 2025-01-25 PROCEDURE — 84132 ASSAY OF SERUM POTASSIUM: CPT

## 2025-01-25 PROCEDURE — 85610 PROTHROMBIN TIME: CPT

## 2025-01-25 PROCEDURE — 82330 ASSAY OF CALCIUM: CPT

## 2025-01-25 PROCEDURE — 2500000002 HC RX 250 W HCPCS SELF ADMINISTERED DRUGS (ALT 637 FOR MEDICARE OP, ALT 636 FOR OP/ED): Performed by: STUDENT IN AN ORGANIZED HEALTH CARE EDUCATION/TRAINING PROGRAM

## 2025-01-25 PROCEDURE — 93971 EXTREMITY STUDY: CPT

## 2025-01-25 PROCEDURE — 71045 X-RAY EXAM CHEST 1 VIEW: CPT

## 2025-01-25 PROCEDURE — 2500000004 HC RX 250 GENERAL PHARMACY W/ HCPCS (ALT 636 FOR OP/ED)

## 2025-01-25 PROCEDURE — 93971 EXTREMITY STUDY: CPT | Performed by: RADIOLOGY

## 2025-01-25 PROCEDURE — 82248 BILIRUBIN DIRECT: CPT

## 2025-01-25 PROCEDURE — 2500000001 HC RX 250 WO HCPCS SELF ADMINISTERED DRUGS (ALT 637 FOR MEDICARE OP): Performed by: NURSE PRACTITIONER

## 2025-01-25 PROCEDURE — 2500000001 HC RX 250 WO HCPCS SELF ADMINISTERED DRUGS (ALT 637 FOR MEDICARE OP): Performed by: STUDENT IN AN ORGANIZED HEALTH CARE EDUCATION/TRAINING PROGRAM

## 2025-01-25 PROCEDURE — 2500000001 HC RX 250 WO HCPCS SELF ADMINISTERED DRUGS (ALT 637 FOR MEDICARE OP)

## 2025-01-25 PROCEDURE — 84075 ASSAY ALKALINE PHOSPHATASE: CPT

## 2025-01-25 PROCEDURE — 85027 COMPLETE CBC AUTOMATED: CPT

## 2025-01-25 PROCEDURE — 83735 ASSAY OF MAGNESIUM: CPT

## 2025-01-25 PROCEDURE — 36415 COLL VENOUS BLD VENIPUNCTURE: CPT

## 2025-01-25 PROCEDURE — 99024 POSTOP FOLLOW-UP VISIT: CPT | Performed by: STUDENT IN AN ORGANIZED HEALTH CARE EDUCATION/TRAINING PROGRAM

## 2025-01-25 PROCEDURE — 99233 SBSQ HOSP IP/OBS HIGH 50: CPT | Performed by: ANESTHESIOLOGY

## 2025-01-25 PROCEDURE — 84100 ASSAY OF PHOSPHORUS: CPT

## 2025-01-25 PROCEDURE — 37799 UNLISTED PX VASCULAR SURGERY: CPT

## 2025-01-25 PROCEDURE — 2500000004 HC RX 250 GENERAL PHARMACY W/ HCPCS (ALT 636 FOR OP/ED): Performed by: STUDENT IN AN ORGANIZED HEALTH CARE EDUCATION/TRAINING PROGRAM

## 2025-01-25 RX ORDER — POLYETHYLENE GLYCOL 3350 17 G/17G
17 POWDER, FOR SOLUTION ORAL 2 TIMES DAILY
Status: DISCONTINUED | OUTPATIENT
Start: 2025-01-25 | End: 2025-01-25

## 2025-01-25 RX ORDER — PANTOPRAZOLE SODIUM 40 MG/10ML
40 INJECTION, POWDER, LYOPHILIZED, FOR SOLUTION INTRAVENOUS DAILY
Status: DISCONTINUED | OUTPATIENT
Start: 2025-01-26 | End: 2025-01-27 | Stop reason: HOSPADM

## 2025-01-25 RX ORDER — BRIMONIDINE TARTRATE 2 MG/ML
1 SOLUTION/ DROPS OPHTHALMIC 2 TIMES DAILY
Status: DISCONTINUED | OUTPATIENT
Start: 2025-01-25 | End: 2025-01-27 | Stop reason: HOSPADM

## 2025-01-25 RX ORDER — DULOXETIN HYDROCHLORIDE 60 MG/1
60 CAPSULE, DELAYED RELEASE ORAL EVERY EVENING
Status: DISCONTINUED | OUTPATIENT
Start: 2025-01-25 | End: 2025-01-27 | Stop reason: HOSPADM

## 2025-01-25 RX ORDER — ACETAMINOPHEN 325 MG/1
975 TABLET ORAL EVERY 6 HOURS
Status: DISCONTINUED | OUTPATIENT
Start: 2025-01-25 | End: 2025-01-27 | Stop reason: HOSPADM

## 2025-01-25 RX ORDER — ATORVASTATIN CALCIUM 80 MG/1
80 TABLET, FILM COATED ORAL DAILY
Status: DISCONTINUED | OUTPATIENT
Start: 2025-01-25 | End: 2025-01-27 | Stop reason: HOSPADM

## 2025-01-25 RX ORDER — INSULIN LISPRO 100 [IU]/ML
0-5 INJECTION, SOLUTION INTRAVENOUS; SUBCUTANEOUS
Status: DISCONTINUED | OUTPATIENT
Start: 2025-01-25 | End: 2025-01-27 | Stop reason: HOSPADM

## 2025-01-25 RX ORDER — LATANOPROST 50 UG/ML
1 SOLUTION/ DROPS OPHTHALMIC NIGHTLY
Status: DISCONTINUED | OUTPATIENT
Start: 2025-01-25 | End: 2025-01-27 | Stop reason: HOSPADM

## 2025-01-25 RX ORDER — POLYETHYLENE GLYCOL 3350 17 G/17G
17 POWDER, FOR SOLUTION ORAL 2 TIMES DAILY
Status: DISCONTINUED | OUTPATIENT
Start: 2025-01-25 | End: 2025-01-27 | Stop reason: HOSPADM

## 2025-01-25 RX ORDER — DULOXETIN HYDROCHLORIDE 30 MG/1
30 CAPSULE, DELAYED RELEASE ORAL
Status: DISCONTINUED | OUTPATIENT
Start: 2025-01-26 | End: 2025-01-27 | Stop reason: HOSPADM

## 2025-01-25 RX ORDER — BISACODYL 10 MG/1
10 SUPPOSITORY RECTAL ONCE
Status: COMPLETED | OUTPATIENT
Start: 2025-01-25 | End: 2025-01-25

## 2025-01-25 RX ORDER — METHOCARBAMOL 100 MG/ML
1000 INJECTION, SOLUTION INTRAMUSCULAR; INTRAVENOUS ONCE
Status: COMPLETED | OUTPATIENT
Start: 2025-01-25 | End: 2025-01-25

## 2025-01-25 RX ORDER — NAPROXEN SODIUM 220 MG/1
81 TABLET, FILM COATED ORAL DAILY
Status: DISCONTINUED | OUTPATIENT
Start: 2025-01-26 | End: 2025-01-27 | Stop reason: HOSPADM

## 2025-01-25 RX ORDER — OXYCODONE HYDROCHLORIDE 5 MG/1
5 TABLET ORAL EVERY 4 HOURS PRN
Status: DISCONTINUED | OUTPATIENT
Start: 2025-01-25 | End: 2025-01-27 | Stop reason: HOSPADM

## 2025-01-25 RX ORDER — LIDOCAINE HYDROCHLORIDE 10 MG/ML
INJECTION, SOLUTION INFILTRATION; PERINEURAL
Status: COMPLETED
Start: 2025-01-25 | End: 2025-01-25

## 2025-01-25 RX ORDER — AMOXICILLIN 250 MG
2 CAPSULE ORAL 2 TIMES DAILY
Status: DISCONTINUED | OUTPATIENT
Start: 2025-01-25 | End: 2025-01-25

## 2025-01-25 RX ORDER — SENNOSIDES 8.6 MG/1
2 TABLET ORAL 2 TIMES DAILY
Status: DISCONTINUED | OUTPATIENT
Start: 2025-01-25 | End: 2025-01-27 | Stop reason: HOSPADM

## 2025-01-25 RX ORDER — OXYCODONE HYDROCHLORIDE 5 MG/1
10 TABLET ORAL EVERY 4 HOURS PRN
Status: DISCONTINUED | OUTPATIENT
Start: 2025-01-25 | End: 2025-01-27 | Stop reason: HOSPADM

## 2025-01-25 RX ORDER — HEPARIN SODIUM 5000 [USP'U]/ML
5000 INJECTION, SOLUTION INTRAVENOUS; SUBCUTANEOUS EVERY 8 HOURS
Status: DISCONTINUED | OUTPATIENT
Start: 2025-01-25 | End: 2025-01-27 | Stop reason: HOSPADM

## 2025-01-25 RX ADMIN — BISACODYL 10 MG: 10 SUPPOSITORY RECTAL at 08:50

## 2025-01-25 RX ADMIN — BRIMONIDINE TARTRATE 1 DROP: 2 SOLUTION/ DROPS OPHTHALMIC at 21:41

## 2025-01-25 RX ADMIN — SENNOSIDES AND DOCUSATE SODIUM 2 TABLET: 50; 8.6 TABLET ORAL at 10:16

## 2025-01-25 RX ADMIN — ACETAMINOPHEN 1000 MG: 10 INJECTION INTRAVENOUS at 12:56

## 2025-01-25 RX ADMIN — OXYCODONE 10 MG: 5 TABLET ORAL at 04:49

## 2025-01-25 RX ADMIN — BRIMONIDINE TARTRATE 1 DROP: 2 SOLUTION/ DROPS OPHTHALMIC at 08:50

## 2025-01-25 RX ADMIN — ACETAMINOPHEN 1000 MG: 10 INJECTION INTRAVENOUS at 06:10

## 2025-01-25 RX ADMIN — ATORVASTATIN CALCIUM 80 MG: 80 TABLET, FILM COATED ORAL at 21:37

## 2025-01-25 RX ADMIN — ATORVASTATIN CALCIUM 80 MG: 80 TABLET, FILM COATED ORAL at 08:22

## 2025-01-25 RX ADMIN — ACETAMINOPHEN 975 MG: 325 TABLET ORAL at 23:11

## 2025-01-25 RX ADMIN — HEPARIN SODIUM 5000 UNITS: 5000 INJECTION, SOLUTION INTRAVENOUS; SUBCUTANEOUS at 18:04

## 2025-01-25 RX ADMIN — OXYCODONE 10 MG: 5 TABLET ORAL at 21:36

## 2025-01-25 RX ADMIN — INSULIN LISPRO 2 UNITS: 100 INJECTION, SOLUTION INTRAVENOUS; SUBCUTANEOUS at 16:21

## 2025-01-25 RX ADMIN — OXYCODONE HYDROCHLORIDE 10 MG: 5 TABLET ORAL at 00:23

## 2025-01-25 RX ADMIN — STANDARDIZED SENNA CONCENTRATE 17.2 MG: 8.6 TABLET ORAL at 21:37

## 2025-01-25 RX ADMIN — HYDROMORPHONE HYDROCHLORIDE 0.4 MG: 0.5 INJECTION, SOLUTION INTRAMUSCULAR; INTRAVENOUS; SUBCUTANEOUS at 02:59

## 2025-01-25 RX ADMIN — LATANOPROST 1 DROP: 50 SOLUTION OPHTHALMIC at 00:24

## 2025-01-25 RX ADMIN — OXYCODONE 10 MG: 5 TABLET ORAL at 14:23

## 2025-01-25 RX ADMIN — POLYETHYLENE GLYCOL 3350 17 G: 17 POWDER, FOR SOLUTION ORAL at 08:22

## 2025-01-25 RX ADMIN — ACETAMINOPHEN 975 MG: 325 TABLET ORAL at 18:04

## 2025-01-25 RX ADMIN — METHOCARBAMOL 1000 MG: 1000 INJECTION, SOLUTION INTRAMUSCULAR; INTRAVENOUS at 08:21

## 2025-01-25 RX ADMIN — DULOXETINE HYDROCHLORIDE 30 MG: 30 CAPSULE, DELAYED RELEASE ORAL at 06:10

## 2025-01-25 RX ADMIN — DULOXETINE 60 MG: 60 CAPSULE, DELAYED RELEASE ORAL at 21:39

## 2025-01-25 RX ADMIN — LIDOCAINE HYDROCHLORIDE 10 MG: 10 INJECTION, SOLUTION INFILTRATION; PERINEURAL at 10:20

## 2025-01-25 RX ADMIN — Medication 5 MG: at 21:37

## 2025-01-25 RX ADMIN — LATANOPROST 1 DROP: 50 SOLUTION OPHTHALMIC at 23:11

## 2025-01-25 RX ADMIN — PANTOPRAZOLE SODIUM 40 MG: 40 INJECTION, POWDER, FOR SOLUTION INTRAVENOUS at 08:22

## 2025-01-25 RX ADMIN — HEPARIN SODIUM 5000 UNITS: 5000 INJECTION, SOLUTION INTRAVENOUS; SUBCUTANEOUS at 10:16

## 2025-01-25 ASSESSMENT — PAIN SCALES - GENERAL
PAINLEVEL_OUTOF10: 10 - WORST POSSIBLE PAIN
PAINLEVEL_OUTOF10: 8
PAINLEVEL_OUTOF10: 6
PAINLEVEL_OUTOF10: 3
PAINLEVEL_OUTOF10: 6
PAINLEVEL_OUTOF10: 9
PAINLEVEL_OUTOF10: 8
PAINLEVEL_OUTOF10: 9
PAINLEVEL_OUTOF10: 4
PAINLEVEL_OUTOF10: 8
PAINLEVEL_OUTOF10: 3
PAINLEVEL_OUTOF10: 0 - NO PAIN

## 2025-01-25 ASSESSMENT — COGNITIVE AND FUNCTIONAL STATUS - GENERAL
MOVING TO AND FROM BED TO CHAIR: A LITTLE
EATING MEALS: A LOT
DRESSING REGULAR LOWER BODY CLOTHING: A LITTLE
STANDING UP FROM CHAIR USING ARMS: A LITTLE
TURNING FROM BACK TO SIDE WHILE IN FLAT BAD: A LITTLE
PERSONAL GROOMING: A LITTLE
DRESSING REGULAR UPPER BODY CLOTHING: A LITTLE
MOBILITY SCORE: 19
CLIMB 3 TO 5 STEPS WITH RAILING: A LITTLE
MOVING TO AND FROM BED TO CHAIR: A LITTLE
TOILETING: A LITTLE
MOBILITY SCORE: 19
CLIMB 3 TO 5 STEPS WITH RAILING: A LITTLE
DRESSING REGULAR LOWER BODY CLOTHING: A LITTLE
EATING MEALS: A LOT
HELP NEEDED FOR BATHING: A LITTLE
WALKING IN HOSPITAL ROOM: A LITTLE
WALKING IN HOSPITAL ROOM: A LITTLE
DAILY ACTIVITIY SCORE: 17
DAILY ACTIVITIY SCORE: 17
PERSONAL GROOMING: A LITTLE
HELP NEEDED FOR BATHING: A LITTLE
TOILETING: A LITTLE
STANDING UP FROM CHAIR USING ARMS: A LITTLE
TURNING FROM BACK TO SIDE WHILE IN FLAT BAD: A LITTLE
DRESSING REGULAR UPPER BODY CLOTHING: A LITTLE

## 2025-01-25 ASSESSMENT — PAIN - FUNCTIONAL ASSESSMENT
PAIN_FUNCTIONAL_ASSESSMENT: 0-10

## 2025-01-25 ASSESSMENT — PAIN DESCRIPTION - DESCRIPTORS
DESCRIPTORS: CRAMPING

## 2025-01-25 NOTE — CARE PLAN
Problem: Pain  Goal: Takes deep breaths with improved pain control throughout the shift  Outcome: Progressing  Goal: Turns in bed with improved pain control throughout the shift  Outcome: Progressing  Goal: Walks with improved pain control throughout the shift  Outcome: Progressing  Goal: Performs ADL's with improved pain control throughout shift  Outcome: Progressing  Goal: Participates in PT with improved pain control throughout the shift  Outcome: Progressing  Goal: Free from opioid side effects throughout the shift  Outcome: Progressing  Goal: Free from acute confusion related to pain meds throughout the shift  Outcome: Progressing     Problem: Skin  Goal: Decreased wound size/increased tissue granulation at next dressing change  Outcome: Progressing  Goal: Participates in plan/prevention/treatment measures  Outcome: Progressing  Goal: Prevent/manage excess moisture  Outcome: Progressing  Goal: Prevent/minimize sheer/friction injuries  Outcome: Progressing  Goal: Promote/optimize nutrition  Outcome: Progressing  Goal: Promote skin healing  Outcome: Progressing     Problem: Fall/Injury  Goal: Not fall by end of shift  Outcome: Progressing  Goal: Be free from injury by end of the shift  Outcome: Progressing  Goal: Verbalize understanding of personal risk factors for fall in the hospital  Outcome: Progressing  Goal: Verbalize understanding of risk factor reduction measures to prevent injury from fall in the home  Outcome: Progressing  Goal: Use assistive devices by end of the shift  Outcome: Progressing  Goal: Pace activities to prevent fatigue by end of the shift  Outcome: Progressing

## 2025-01-25 NOTE — PROGRESS NOTES
"Nelida Mata is a 64 y.o. female on day 2 of admission presenting with Leiomyoma of uterus.      Subjective   Patient without complaints. Pain is controlled, has not gotten up this morning. No chest pain or shortness of breath.       Objective     OB/GYN History  POD #2 s/p XL, GIBSON, BSO, and umbilical hernia repair (1/23) for known fibroid uterus and chronic PMB. S/p code white for dizziness, hypotension, and incisional bleeding with subsequent ICU admission and now POD #1 s/p takeback for exploratory XL and evacuation of hemoperitoneum 1/23           Last Recorded Vitals  Blood pressure (!) 123/104, pulse 82, temperature 36.5 °C (97.7 °F), resp. rate 11, height 1.499 m (4' 11\"), weight 76 kg (167 lb 8.8 oz), SpO2 98%.  Intake/Output last 3 Shifts:    Intake/Output Summary (Last 24 hours) at 1/25/2025 1006  Last data filed at 1/25/2025 0655  Gross per 24 hour   Intake 1019.17 ml   Output 1225 ml   Net -205.83 ml       Physical Exam  Vitals reviewed.   Constitutional:       Appearance: Normal appearance.   HENT:      Head: Normocephalic and atraumatic.   Cardiovascular:      Rate and Rhythm: Normal rate.   Pulmonary:      Effort: Pulmonary effort is normal. No respiratory distress.   Abdominal:      General: There is no distension.      Palpations: Abdomen is soft.      Comments: Incisions clean/dry/intact   Musculoskeletal:         General: Normal range of motion.   Skin:     General: Skin is warm and dry.   Neurological:      General: No focal deficit present.      Mental Status: She is alert.   Psychiatric:         Mood and Affect: Mood normal.         Behavior: Behavior normal.         Results for orders placed or performed during the hospital encounter of 01/23/25 (from the past 24 hours)   Magnesium   Result Value Ref Range    Magnesium 3.01 (H) 1.60 - 2.40 mg/dL   Calcium, Ionized   Result Value Ref Range    POCT Calcium, Ionized 1.24 1.1 - 1.33 mmol/L   Coagulation Screen   Result Value Ref Range    " Protime 11.5 9.8 - 12.8 seconds    INR 1.0 0.9 - 1.1    aPTT 27 27 - 38 seconds   Renal Function Panel   Result Value Ref Range    Glucose 264 (H) 74 - 99 mg/dL    Sodium 143 136 - 145 mmol/L    Potassium 4.1 3.5 - 5.3 mmol/L    Chloride 100 98 - 107 mmol/L    Bicarbonate 30 21 - 32 mmol/L    Anion Gap 17 10 - 20 mmol/L    Urea Nitrogen 25 (H) 6 - 23 mg/dL    Creatinine 1.64 (H) 0.50 - 1.05 mg/dL    eGFR 35 (L) >60 mL/min/1.73m*2    Calcium 9.9 8.6 - 10.6 mg/dL    Phosphorus 5.3 (H) 2.5 - 4.9 mg/dL    Albumin 4.0 3.4 - 5.0 g/dL   CBC   Result Value Ref Range    WBC 10.7 4.4 - 11.3 x10*3/uL    nRBC 0.0 0.0 - 0.0 /100 WBCs    RBC 4.50 4.00 - 5.20 x10*6/uL    Hemoglobin 12.9 12.0 - 16.0 g/dL    Hematocrit 36.0 36.0 - 46.0 %    MCV 80 80 - 100 fL    MCH 28.7 26.0 - 34.0 pg    MCHC 35.8 32.0 - 36.0 g/dL    RDW 15.1 (H) 11.5 - 14.5 %    Platelets 118 (L) 150 - 450 x10*3/uL   TEG Clot Global Profile   Result Value Ref Range    R (Reaction Time) K 6.3 4.6 - 9.1 min    K (Clot Kinetics) 1.3 0.8 - 2.1 min    ANGLE 73.8 63.0 - 78.0 deg    MA (Max Amplitude) K 60.8 52.0 - 69.0 mm    R (Reaction Time) KH 6.7 4.3 - 8.3 min    MA (Max Amplitude) RT 61.6 52.0 - 70.0 mm    MA ( New Amplitude) FF 23.5 15.0 - 32.0 mm    FLEV 429 278 - 581 mg/dL   POCT GLUCOSE   Result Value Ref Range    POCT Glucose 239 (H) 74 - 99 mg/dL   Blood Gas Arterial Full Panel   Result Value Ref Range    POCT pH, Arterial 7.36 (L) 7.38 - 7.42 pH    POCT pCO2, Arterial 54 (H) 38 - 42 mm Hg    POCT pO2, Arterial 126 (H) 85 - 95 mm Hg    POCT SO2, Arterial 99 94 - 100 %    POCT Oxy Hemoglobin, Arterial 96.2 94.0 - 98.0 %    POCT Hematocrit Calculated, Arterial 40.0 36.0 - 46.0 %    POCT Sodium, Arterial 138 136 - 145 mmol/L    POCT Potassium, Arterial 4.3 3.5 - 5.3 mmol/L    POCT Chloride, Arterial 102 98 - 107 mmol/L    POCT Ionized Calcium, Arterial 1.31 1.10 - 1.33 mmol/L    POCT Glucose, Arterial 278 (H) 74 - 99 mg/dL    POCT Lactate, Arterial 3.1 (H) 0.4 -  2.0 mmol/L    POCT Base Excess, Arterial 3.8 (H) -2.0 - 3.0 mmol/L    POCT HCO3 Calculated, Arterial 30.5 (H) 22.0 - 26.0 mmol/L    POCT Hemoglobin, Arterial 13.4 12.0 - 16.0 g/dL    POCT Anion Gap, Arterial 10 10 - 25 mmo/L    Patient Temperature 37.0 degrees Celsius    FiO2 38 %   Blood Gas Arterial Full Panel   Result Value Ref Range    POCT pH, Arterial 7.37 (L) 7.38 - 7.42 pH    POCT pCO2, Arterial 51 (H) 38 - 42 mm Hg    POCT pO2, Arterial 108 (H) 85 - 95 mm Hg    POCT SO2, Arterial 98 94 - 100 %    POCT Oxy Hemoglobin, Arterial 95.4 94.0 - 98.0 %    POCT Hematocrit Calculated, Arterial 40.0 36.0 - 46.0 %    POCT Sodium, Arterial 139 136 - 145 mmol/L    POCT Potassium, Arterial 4.4 3.5 - 5.3 mmol/L    POCT Chloride, Arterial 104 98 - 107 mmol/L    POCT Ionized Calcium, Arterial 1.24 1.10 - 1.33 mmol/L    POCT Glucose, Arterial 254 (H) 74 - 99 mg/dL    POCT Lactate, Arterial 4.6 (HH) 0.4 - 2.0 mmol/L    POCT Base Excess, Arterial 3.2 (H) -2.0 - 3.0 mmol/L    POCT HCO3 Calculated, Arterial 29.5 (H) 22.0 - 26.0 mmol/L    POCT Hemoglobin, Arterial 13.3 12.0 - 16.0 g/dL    POCT Anion Gap, Arterial 10 10 - 25 mmo/L    Patient Temperature 37.0 degrees Celsius    FiO2 38 %   POCT GLUCOSE   Result Value Ref Range    POCT Glucose 205 (H) 74 - 99 mg/dL   Magnesium   Result Value Ref Range    Magnesium 2.93 (H) 1.60 - 2.40 mg/dL   Calcium, Ionized   Result Value Ref Range    POCT Calcium, Ionized 1.21 1.1 - 1.33 mmol/L   Coagulation Screen   Result Value Ref Range    Protime 11.4 9.8 - 12.8 seconds    INR 1.0 0.9 - 1.1    aPTT 21 (L) 27 - 38 seconds   Renal Function Panel   Result Value Ref Range    Glucose 238 (H) 74 - 99 mg/dL    Sodium 146 (H) 136 - 145 mmol/L    Potassium 4.1 3.5 - 5.3 mmol/L    Chloride 103 98 - 107 mmol/L    Bicarbonate 30 21 - 32 mmol/L    Anion Gap 17 10 - 20 mmol/L    Urea Nitrogen 27 (H) 6 - 23 mg/dL    Creatinine 1.61 (H) 0.50 - 1.05 mg/dL    eGFR 35 (L) >60 mL/min/1.73m*2    Calcium 9.9 8.6 -  10.6 mg/dL    Phosphorus 5.1 (H) 2.5 - 4.9 mg/dL    Albumin 3.9 3.4 - 5.0 g/dL   CBC   Result Value Ref Range    WBC 11.3 4.4 - 11.3 x10*3/uL    nRBC 0.0 0.0 - 0.0 /100 WBCs    RBC 4.32 4.00 - 5.20 x10*6/uL    Hemoglobin 12.4 12.0 - 16.0 g/dL    Hematocrit 34.8 (L) 36.0 - 46.0 %    MCV 81 80 - 100 fL    MCH 28.7 26.0 - 34.0 pg    MCHC 35.6 32.0 - 36.0 g/dL    RDW 15.4 (H) 11.5 - 14.5 %    Platelets 118 (L) 150 - 450 x10*3/uL   TEG Clot Global Profile   Result Value Ref Range    R (Reaction Time) K 3.1 (L) 4.6 - 9.1 min    K (Clot Kinetics) 1.2 0.8 - 2.1 min    ANGLE 75.2 63.0 - 78.0 deg    MA (Max Amplitude) K 63.5 52.0 - 69.0 mm    R (Reaction Time) KH 2.6 (L) 4.3 - 8.3 min    MA (Max Amplitude) RT 63.1 52.0 - 70.0 mm    MA ( New Amplitude) FF 26.6 15.0 - 32.0 mm    FLEV 485 278 - 581 mg/dL   POCT GLUCOSE   Result Value Ref Range    POCT Glucose 151 (H) 74 - 99 mg/dL   Blood Gas Arterial Full Panel   Result Value Ref Range    POCT pH, Arterial 7.43 (H) 7.38 - 7.42 pH    POCT pCO2, Arterial 50 (H) 38 - 42 mm Hg    POCT pO2, Arterial 177 (H) 85 - 95 mm Hg    POCT SO2, Arterial 99 94 - 100 %    POCT Oxy Hemoglobin, Arterial 97.1 94.0 - 98.0 %    POCT Hematocrit Calculated, Arterial 39.0 36.0 - 46.0 %    POCT Sodium, Arterial 136 136 - 145 mmol/L    POCT Potassium, Arterial 4.1 3.5 - 5.3 mmol/L    POCT Chloride, Arterial 103 98 - 107 mmol/L    POCT Ionized Calcium, Arterial 1.25 1.10 - 1.33 mmol/L    POCT Glucose, Arterial 178 (H) 74 - 99 mg/dL    POCT Lactate, Arterial 1.3 0.4 - 2.0 mmol/L    POCT Base Excess, Arterial 7.5 (H) -2.0 - 3.0 mmol/L    POCT HCO3 Calculated, Arterial 33.2 (H) 22.0 - 26.0 mmol/L    POCT Hemoglobin, Arterial 13.0 12.0 - 16.0 g/dL    POCT Anion Gap, Arterial 4 (L) 10 - 25 mmo/L    Patient Temperature 37.0 degrees Celsius    FiO2 32 %   Blood Gas Arterial Full Panel   Result Value Ref Range    POCT pH, Arterial 7.43 (H) 7.38 - 7.42 pH    POCT pCO2, Arterial 49 (H) 38 - 42 mm Hg    POCT pO2,  Arterial 166 (H) 85 - 95 mm Hg    POCT SO2, Arterial 99 94 - 100 %    POCT Oxy Hemoglobin, Arterial 96.7 94.0 - 98.0 %    POCT Hematocrit Calculated, Arterial 37.0 36.0 - 46.0 %    POCT Sodium, Arterial 135 (L) 136 - 145 mmol/L    POCT Potassium, Arterial 3.9 3.5 - 5.3 mmol/L    POCT Chloride, Arterial 103 98 - 107 mmol/L    POCT Ionized Calcium, Arterial 1.24 1.10 - 1.33 mmol/L    POCT Glucose, Arterial 141 (H) 74 - 99 mg/dL    POCT Lactate, Arterial 0.9 0.4 - 2.0 mmol/L    POCT Base Excess, Arterial 7.0 (H) -2.0 - 3.0 mmol/L    POCT HCO3 Calculated, Arterial 32.5 (H) 22.0 - 26.0 mmol/L    POCT Hemoglobin, Arterial 12.3 12.0 - 16.0 g/dL    POCT Anion Gap, Arterial 3 (L) 10 - 25 mmo/L    Patient Temperature 37.0 degrees Celsius    FiO2 32 %   Magnesium   Result Value Ref Range    Magnesium 2.70 (H) 1.60 - 2.40 mg/dL   Calcium, Ionized   Result Value Ref Range    POCT Calcium, Ionized 1.22 1.1 - 1.33 mmol/L   Coagulation Screen   Result Value Ref Range    Protime 10.9 9.8 - 12.8 seconds    INR 1.0 0.9 - 1.1    aPTT 28 27 - 38 seconds   CBC   Result Value Ref Range    WBC 11.5 (H) 4.4 - 11.3 x10*3/uL    nRBC 0.0 0.0 - 0.0 /100 WBCs    RBC 4.10 4.00 - 5.20 x10*6/uL    Hemoglobin 11.9 (L) 12.0 - 16.0 g/dL    Hematocrit 33.6 (L) 36.0 - 46.0 %    MCV 82 80 - 100 fL    MCH 29.0 26.0 - 34.0 pg    MCHC 35.4 32.0 - 36.0 g/dL    RDW 15.6 (H) 11.5 - 14.5 %    Platelets 111 (L) 150 - 450 x10*3/uL   Hepatic Function Panel   Result Value Ref Range    Albumin 3.7 3.4 - 5.0 g/dL    Bilirubin, Total 0.8 0.0 - 1.2 mg/dL    Bilirubin, Direct 0.2 0.0 - 0.3 mg/dL    Alkaline Phosphatase 47 33 - 136 U/L    ALT 16 7 - 45 U/L    AST 26 9 - 39 U/L    Total Protein 6.0 (L) 6.4 - 8.2 g/dL   Basic Metabolic Panel   Result Value Ref Range    Glucose 141 (H) 74 - 99 mg/dL    Sodium 142 136 - 145 mmol/L    Potassium 4.0 3.5 - 5.3 mmol/L    Chloride 100 98 - 107 mmol/L    Bicarbonate 31 21 - 32 mmol/L    Anion Gap 15 10 - 20 mmol/L    Urea Nitrogen  25 (H) 6 - 23 mg/dL    Creatinine 1.46 (H) 0.50 - 1.05 mg/dL    eGFR 40 (L) >60 mL/min/1.73m*2    Calcium 9.4 8.6 - 10.6 mg/dL   Phosphorus   Result Value Ref Range    Phosphorus 4.1 2.5 - 4.9 mg/dL   POCT GLUCOSE   Result Value Ref Range    POCT Glucose 130 (H) 74 - 99 mg/dL   Blood Gas Arterial Full Panel   Result Value Ref Range    POCT pH, Arterial 7.52 (H) 7.38 - 7.42 pH    POCT pCO2, Arterial 38 38 - 42 mm Hg    POCT pO2, Arterial 170 (H) 85 - 95 mm Hg    POCT SO2, Arterial 99 94 - 100 %    POCT Oxy Hemoglobin, Arterial 97.0 94.0 - 98.0 %    POCT Hematocrit Calculated, Arterial 41.0 36.0 - 46.0 %    POCT Sodium, Arterial 137 136 - 145 mmol/L    POCT Potassium, Arterial 4.0 3.5 - 5.3 mmol/L    POCT Chloride, Arterial 102 98 - 107 mmol/L    POCT Ionized Calcium, Arterial 1.23 1.10 - 1.33 mmol/L    POCT Glucose, Arterial 140 (H) 74 - 99 mg/dL    POCT Lactate, Arterial 2.0 0.4 - 2.0 mmol/L    POCT Base Excess, Arterial 7.7 (H) -2.0 - 3.0 mmol/L    POCT HCO3 Calculated, Arterial 31.0 (H) 22.0 - 26.0 mmol/L    POCT Hemoglobin, Arterial 13.6 12.0 - 16.0 g/dL    POCT Anion Gap, Arterial 8 (L) 10 - 25 mmo/L    Patient Temperature 37.0 degrees Celsius    FiO2 32 %   POCT GLUCOSE   Result Value Ref Range    POCT Glucose 117 (H) 74 - 99 mg/dL   Magnesium   Result Value Ref Range    Magnesium 2.52 (H) 1.60 - 2.40 mg/dL   Hepatic Function Panel   Result Value Ref Range    Albumin 3.7 3.4 - 5.0 g/dL    Bilirubin, Total 0.9 0.0 - 1.2 mg/dL    Bilirubin, Direct 0.2 0.0 - 0.3 mg/dL    Alkaline Phosphatase 48 33 - 136 U/L    ALT 19 7 - 45 U/L    AST 31 9 - 39 U/L    Total Protein 6.0 (L) 6.4 - 8.2 g/dL   Coagulation Screen   Result Value Ref Range    Protime 10.6 9.8 - 12.8 seconds    INR 0.9 0.9 - 1.1    aPTT 26 (L) 27 - 38 seconds   CBC   Result Value Ref Range    WBC 11.1 4.4 - 11.3 x10*3/uL    nRBC 0.0 0.0 - 0.0 /100 WBCs    RBC 4.10 4.00 - 5.20 x10*6/uL    Hemoglobin 11.7 (L) 12.0 - 16.0 g/dL    Hematocrit 33.8 (L) 36.0 -  46.0 %    MCV 82 80 - 100 fL    MCH 28.5 26.0 - 34.0 pg    MCHC 34.6 32.0 - 36.0 g/dL    RDW 15.9 (H) 11.5 - 14.5 %    Platelets 107 (L) 150 - 450 x10*3/uL   Calcium, Ionized   Result Value Ref Range    POCT Calcium, Ionized 1.17 1.1 - 1.33 mmol/L   Basic Metabolic Panel   Result Value Ref Range    Glucose 133 (H) 74 - 99 mg/dL    Sodium 142 136 - 145 mmol/L    Potassium 3.8 3.5 - 5.3 mmol/L    Chloride 101 98 - 107 mmol/L    Bicarbonate 30 21 - 32 mmol/L    Anion Gap 15 10 - 20 mmol/L    Urea Nitrogen 25 (H) 6 - 23 mg/dL    Creatinine 1.46 (H) 0.50 - 1.05 mg/dL    eGFR 40 (L) >60 mL/min/1.73m*2    Calcium 9.3 8.6 - 10.6 mg/dL   Phosphorus   Result Value Ref Range    Phosphorus 3.4 2.5 - 4.9 mg/dL   POCT GLUCOSE   Result Value Ref Range    POCT Glucose 119 (H) 74 - 99 mg/dL   Blood Gas Arterial Full Panel   Result Value Ref Range    POCT pH, Arterial 7.45 (H) 7.38 - 7.42 pH    POCT pCO2, Arterial 46 (H) 38 - 42 mm Hg    POCT pO2, Arterial 78 (L) 85 - 95 mm Hg    POCT SO2, Arterial 97 94 - 100 %    POCT Oxy Hemoglobin, Arterial 94.8 94.0 - 98.0 %    POCT Hematocrit Calculated, Arterial 36.0 36.0 - 46.0 %    POCT Sodium, Arterial 136 136 - 145 mmol/L    POCT Potassium, Arterial 3.8 3.5 - 5.3 mmol/L    POCT Chloride, Arterial 104 98 - 107 mmol/L    POCT Ionized Calcium, Arterial 1.25 1.10 - 1.33 mmol/L    POCT Glucose, Arterial 138 (H) 74 - 99 mg/dL    POCT Lactate, Arterial 1.0 0.4 - 2.0 mmol/L    POCT Base Excess, Arterial 7.0 (H) -2.0 - 3.0 mmol/L    POCT HCO3 Calculated, Arterial 32.0 (H) 22.0 - 26.0 mmol/L    POCT Hemoglobin, Arterial 12.0 12.0 - 16.0 g/dL    POCT Anion Gap, Arterial 4 (L) 10 - 25 mmo/L    Patient Temperature 37.0 degrees Celsius    FiO2 21 %   POCT GLUCOSE   Result Value Ref Range    POCT Glucose 111 (H) 74 - 99 mg/dL     *Note: Due to a large number of results and/or encounters for the requested time period, some results have not been displayed. A complete set of results can be found in Results  Review.            Assessment/Plan     Assessment & Plan  Leiomyoma of uterus    Type II or unspecified type diabetes mellitus with renal manifestations, uncontrolled(250.42) (Multi)    (HFpEF) heart failure with preserved ejection fraction    Pelvic pain in female    Fibroid uterus    History of DVT (deep vein thrombosis)    Hemoperitoneum      S/P GIBSON, BSO, umbilical hernia repair  Hemoperitoneum  - Postop drop in hgb froom 14 -> with subsequent takeback for exploratory XL and evacuation of hemoperitoneum 1/23. Patient received MTP and 1u plt, and 1u rbc.  - follow ERAS protocol  - pain controlled  - general diet  - constipation prophylaxis: Miralax, senna  - DVT ppx - SCDs, heparin     Comorbidities  - HFpEF (50-55% 9/2023)/CAD s/p PCI w/ stent x 3/ hx of STEMI: okay to resume ASA, Carvedilol held. Losartan & lasix held. Tele  - SVC thrombus/remote DVT: Lovenox held  - T2DM/gastroparesis: Farxiga, metformin, semaglutide, Tresiba 40u at bedtime & lispro 22u TID held. Protonix cont'd. POCT & SSI  - HLD: statin cont'd  - CKD: NO NSAIDs. Kerendia HELD.  - MICHAEL  - Fibromyalgia: Cymbalta cont'd       Patient evaluated and plan discussed with Dr. Davila.    I spent 15 minutes in the professional and overall care of this patient.    Priti Mooney MD, CAROLANN, BSN  [employer] Fellow, PGY-5

## 2025-01-25 NOTE — PROGRESS NOTES
Plan:  - add robaxin, cont IV tylenol  - bowel reg   - LUE USN for swelling  - CPAP hs  - PT/OT/oob  - clears  - likely can transfer    Assessment:    Neuro/MSK: Acute metabolic encephalopathy  and expected acute post op pain   A-F bundle; Sleep Hygiene measures: appropriate daytime stimulation/physical activity. Lights out at 2100, avoidance of night time lab draws/night baths, minimize mind altering medicaments  PT/OT and OOB as appropriate      CV: BRISA cards assessment: CHF Chronic Diastolic , hypovolemic shock , lactic acidosis- resolving, HPL, and CAD s/p PCI    -      -        Pulm: MICHAEL non-compliant with home CPAP  BPH with IS/flutter/OOB  not vented      Renal: CKD 2-3   Trend UOP and renal indices; replete lytes PRN     GI: BRISA GI assessment: Obesity   Proph with PPI  Diet, PRN anti-emetics, and Bowel regimen    ID/rheum: n/a  Obs off abx   Follow cx data     Endo: IDDM2 with stress hyperlgycemia, nephropathy, and PVD  Adjust ISS  for BG goal <180  Increase ISS    Heme: ABL anemia, Anemia of chronic disease, h/o DVT/PE -remote, on home enox, and hemorrhagic shock    -     Trend Hb and transfuse PRN for goal Hb>7  DVT proph with scd and  start SQH 12h post op    Lines/tubes/restraints:   Central line: parenteral medications (I.e. chemo, vasoactive) and access  Jose: critically ill patient who need accurate urinary output measurements  Restraint: n/a    Dispo: ICU    Exam:    A&O x 4  NSR  Adequate air-exchange  Abd soft, NT  Extremities warm     REASON FOR ADMISSION    64 y.o. female presents to ICU for management of hypotension and dizziness after total lap hysterectomy and BSO with inguinal hernia repair today 1/23/25. PMHx of HFpEF (50-55% 9/2023), HLD, CAD s/p PCI w/ stent x 3 (2016, 2018), STEMI (9/2023, no stents placed), TIA 9/2023, SVC thrombus on Lovenox, remote DVT, IDDM2 on Farxiga, CKD, MICHAEL noncompliant with CPAP at home and NAFLD. There were no intra-op complications; EBL was 300 ml. Patient  was stable in PACU. Code white was called after she went to floor for increased abdominal pain, lightheadedness, some bleeding from incisional site, hypotension 50s/40s with cold clammy extremities. She did not have AMS and remained conversant through this time and during transfer to SICU. Patient responded well to liter of crystalloid on the floor.     TODAY'S EVENTS    1/23: admitted to ICU from [facility] via RRT for hypotension/hypovolemia  1/24: overnight hypotension progressed to elías hemorrhagic shock --> OR for ex-lap and evac of large hematoma, no clear source, thought d/t venous ooze; resuscitation with 6pc/5ffp/1plt; returned intubated, now extubated and overall improved off pressers  1/25: no acute events overnight; had acute abd pain yesterday afternoon seemingly more related to cramping/spasming - Hb holding, other perfusion indices I.e. lactate acceptable    Level 3          LABS:  CMP:  Results from last 7 days   Lab Units 01/25/25  0049 01/24/25  1632 01/24/25  1203 01/24/25  0220 01/23/25  2305 01/23/25  1843 01/23/25  1842   SODIUM mmol/L 142 146* 143 142 139   < > 141   POTASSIUM mmol/L 4.0 4.1 4.1 4.2 4.8   < > 5.1   CHLORIDE mmol/L 100 103 100 99 100   < > 103   CO2 mmol/L 31 30 30 25 29   < > 31   ANION GAP mmol/L 15 17 17 22* 15   < > 12   BUN mg/dL 25* 27* 25* 22 25*   < > 23   CREATININE mg/dL 1.46* 1.61* 1.64* 1.31* 1.57*   < > 1.50*   EGFR mL/min/1.73m*2 40* 35* 35* 45* 36*   < > 39*   MAGNESIUM mg/dL 2.70* 2.93* 3.01* 1.68 1.61   < >  --    ALBUMIN g/dL 3.7 3.9 4.0 4.5 3.7   < > 3.2*   ALK PHOS U/L 47  --   --   --   --   --  38   ALT U/L 16  --   --   --   --   --  8   AST U/L 26  --   --   --   --   --  11   BILIRUBIN TOTAL mg/dL 0.8  --   --   --   --   --  0.8    < > = values in this interval not displayed.     CBC:  Results from last 7 days   Lab Units 01/25/25  0636 01/25/25  0049 01/24/25  1632 01/24/25  1203 01/24/25  0816 01/24/25  0220 01/23/25  2305 01/23/25  1842   WBC AUTO  x10*3/uL 11.1 11.5* 11.3 10.7 10.0 8.0 11.9* 11.5*   HEMOGLOBIN g/dL 11.7* 11.9* 12.4 12.9 12.7 14.3 7.0* 8.9*   HEMATOCRIT % 33.8* 33.6* 34.8* 36.0 35.6* 40.5 21.4* 27.0*   PLATELETS AUTO x10*3/uL 107* 111* 118* 118* 126* 110* 139* 143*   MCV fL 82 82 81 80 81 82 92 91     COAG:   Results from last 7 days   Lab Units 01/25/25  0636 01/25/25  0049 01/24/25  1632 01/24/25  1203 01/24/25  0220 01/23/25  2305 01/23/25  1843 01/23/25  1503   INR  0.9 1.0 1.0 1.0 1.0 1.2* 1.2* 1.0     ABO:   ABO TYPE   Date Value Ref Range Status   01/23/2025 O  Final     HEME/ENDO:     CARDIAC:   Results from last 7 days   Lab Units 01/23/25  1842 01/23/25  1512   [facility] ng/L  --  13   BNP pg/mL 9  --      MICRO: No results found for the last 90 days.

## 2025-01-25 NOTE — CARE PLAN
The clinical goals for the shift include Be free from s/sx of hemhorrage    Problem: Pain  Goal: Takes deep breaths with improved pain control throughout the shift  Outcome: Progressing  Goal: Turns in bed with improved pain control throughout the shift  Outcome: Progressing  Goal: Walks with improved pain control throughout the shift  Outcome: Progressing  Goal: Performs ADL's with improved pain control throughout shift  Outcome: Progressing  Goal: Participates in PT with improved pain control throughout the shift  Outcome: Progressing  Goal: Free from opioid side effects throughout the shift  Outcome: Progressing  Goal: Free from acute confusion related to pain meds throughout the shift  Outcome: Progressing     Problem: Skin  Goal: Decreased wound size/increased tissue granulation at next dressing change  Outcome: Progressing  Flowsheets (Taken 1/25/2025 1612)  Decreased wound size/increased tissue granulation at next dressing change: Promote sleep for wound healing  Goal: Participates in plan/prevention/treatment measures  Outcome: Progressing  Flowsheets (Taken 1/25/2025 1612)  Participates in plan/prevention/treatment measures:   Elevate heels   Increase activity/out of bed for meals  Goal: Prevent/manage excess moisture  Outcome: Progressing  Flowsheets (Taken 1/25/2025 1612)  Prevent/manage excess moisture: Moisturize dry skin  Goal: Prevent/minimize sheer/friction injuries  Outcome: Progressing  Flowsheets (Taken 1/25/2025 1612)  Prevent/minimize sheer/friction injuries:   Use pull sheet   Increase activity/out of bed for meals   HOB 30 degrees or less  Goal: Promote/optimize nutrition  Outcome: Progressing  Flowsheets (Taken 1/25/2025 1612)  Promote/optimize nutrition:   Monitor/record intake including meals   Offer water/supplements/favorite foods  Goal: Promote skin healing  Outcome: Progressing  Flowsheets (Taken 1/25/2025 1612)  Promote skin healing: Protective dressings over bony prominences      Problem: Fall/Injury  Goal: Not fall by end of shift  Outcome: Progressing  Goal: Be free from injury by end of the shift  Outcome: Progressing  Goal: Verbalize understanding of personal risk factors for fall in the hospital  Outcome: Progressing  Goal: Verbalize understanding of risk factor reduction measures to prevent injury from fall in the home  Outcome: Progressing  Goal: Use assistive devices by end of the shift  Outcome: Progressing  Goal: Pace activities to prevent fatigue by end of the shift  Outcome: Progressing     Problem: Safety - Medical Restraint  Goal: Remains free of injury from restraints (Restraint for Interference with Medical Device)  Outcome: Progressing  Goal: Free from restraint(s) (Restraint for Interference with Medical Device)  Outcome: Progressing

## 2025-01-25 NOTE — PROGRESS NOTES
"Nelida Mata is a 64 y.o. female on day 2 of admission presenting with Leiomyoma of uterus.    Subjective   Continues to c/o abd pain particularly suprapubic and flank. L arm edematous and tight, possible extravasation of PIV. Pt also endorses sore throat and \"feeling like something is stuck in her throat\".        Objective     Physical Exam  Constitutional:       General: She is not in acute distress.  HENT:      Head: Normocephalic and atraumatic.      Mouth/Throat:      Mouth: Mucous membranes are dry.      Comments: Mild sore throat  Eyes:      General: No scleral icterus.     Extraocular Movements: Extraocular movements intact.   Cardiovascular:      Rate and Rhythm: Normal rate and regular rhythm.      Heart sounds: No murmur heard.     No friction rub. No gallop.   Pulmonary:      Effort: Pulmonary effort is normal. No respiratory distress.      Breath sounds: No wheezing, rhonchi or rales.      Comments: On RA satting well  Abdominal:      General: Abdomen is flat. There is no distension.      Palpations: Abdomen is soft.      Tenderness: There is abdominal tenderness.      Comments: Vertical abdominal incision clean and dry, remainder of abd soft, non-distended. TTP around incision site, particularly suprapubic and epigastric regions.   Musculoskeletal:         General: Swelling and tenderness present. Normal range of motion.      Right lower leg: No edema.      Left lower leg: No edema.      Comments: Moderate edema and tenderness of left forearm   Skin:     General: Skin is warm and dry.   Neurological:      General: No focal deficit present.      Mental Status: She is alert.         Last Recorded Vitals  Blood pressure (!) 123/104, pulse 82, temperature 36.3 °C (97.3 °F), resp. rate 11, height 1.499 m (4' 11\"), weight 76 kg (167 lb 8.8 oz), SpO2 98%.  Intake/Output last 3 Shifts:  I/O last 3 completed shifts:  In: 9541.6 (125.5 mL/kg) [I.V.:3687.6 (48.5 mL/kg); Blood:4604; IV " Piggyback:1250]  Out: 7160 (94.2 mL/kg) [Urine:5995 (2.2 mL/kg/hr); Emesis/NG output:50; Drains:415; Blood:700]  Weight: 76 kg     Relevant Results    Scheduled medications  acetaminophen, 1,000 mg, intravenous, q6h NORBERT  [Held by provider] aspirin, 81 mg, oral, Daily  atorvastatin, 80 mg, oral, Daily  brimonidine, 1 drop, Both Eyes, BID  [Held by provider] carvedilol, 12.5 mg, oral, BID  DULoxetine, 30 mg, oral, Daily before breakfast   And  DULoxetine, 60 mg, oral, q PM  [Held by provider] finerenone, 10 mg, oral, Daily  [Held by provider] heparin (porcine), 5,000 Units, subcutaneous, q8h  insulin lispro, 0-10 Units, subcutaneous, q4h  latanoprost, 1 drop, Both Eyes, Nightly  methocarbamol, 1,000 mg, intravenous, Once  pantoprazole, 40 mg, intravenous, Daily  polyethylene glycol, 17 g, oral, Daily      Continuous medications       PRN medications  PRN medications: calcium gluconate, calcium gluconate, dextrose, dextrose, glucagon, glucagon, HYDROmorphone, magnesium sulfate, magnesium sulfate, melatonin, naloxone, ondansetron ODT **OR** ondansetron, oxyCODONE, oxyCODONE, simethicone    Results for orders placed or performed during the hospital encounter of 01/23/25 (from the past 24 hours)   POCT GLUCOSE   Result Value Ref Range    POCT Glucose 216 (H) 74 - 99 mg/dL   CBC   Result Value Ref Range    WBC 10.0 4.4 - 11.3 x10*3/uL    nRBC 0.2 (H) 0.0 - 0.0 /100 WBCs    RBC 4.40 4.00 - 5.20 x10*6/uL    Hemoglobin 12.7 12.0 - 16.0 g/dL    Hematocrit 35.6 (L) 36.0 - 46.0 %    MCV 81 80 - 100 fL    MCH 28.9 26.0 - 34.0 pg    MCHC 35.7 32.0 - 36.0 g/dL    RDW 15.1 (H) 11.5 - 14.5 %    Platelets 126 (L) 150 - 450 x10*3/uL   Blood Gas Arterial Full Panel   Result Value Ref Range    POCT pH, Arterial 7.35 (L) 7.38 - 7.42 pH    POCT pCO2, Arterial 53 (H) 38 - 42 mm Hg    POCT pO2, Arterial 88 85 - 95 mm Hg    POCT SO2, Arterial 97 94 - 100 %    POCT Oxy Hemoglobin, Arterial 94.7 94.0 - 98.0 %    POCT Hematocrit Calculated,  Arterial 40.0 36.0 - 46.0 %    POCT Sodium, Arterial 140 136 - 145 mmol/L    POCT Potassium, Arterial 4.0 3.5 - 5.3 mmol/L    POCT Chloride, Arterial 102 98 - 107 mmol/L    POCT Ionized Calcium, Arterial 1.31 1.10 - 1.33 mmol/L    POCT Glucose, Arterial 248 (H) 74 - 99 mg/dL    POCT Lactate, Arterial 2.0 0.4 - 2.0 mmol/L    POCT Base Excess, Arterial 2.6 -2.0 - 3.0 mmol/L    POCT HCO3 Calculated, Arterial 29.3 (H) 22.0 - 26.0 mmol/L    POCT Hemoglobin, Arterial 13.4 12.0 - 16.0 g/dL    POCT Anion Gap, Arterial 13 10 - 25 mmo/L    Patient Temperature 37.0 degrees Celsius    FiO2 36 %   TEG Clot Global Profile   Result Value Ref Range    R (Reaction Time) K 5.9 4.6 - 9.1 min    K (Clot Kinetics) 1.2 0.8 - 2.1 min    ANGLE 75.5 63.0 - 78.0 deg    MA (Max Amplitude) K 61.6 52.0 - 69.0 mm    R (Reaction Time) KH 6.7 4.3 - 8.3 min    MA (Max Amplitude) RT 62.2 52.0 - 70.0 mm    MA ( New Amplitude) FF 23.8 15.0 - 32.0 mm    FLEV 434 278 - 581 mg/dL   Magnesium   Result Value Ref Range    Magnesium 3.01 (H) 1.60 - 2.40 mg/dL   Calcium, Ionized   Result Value Ref Range    POCT Calcium, Ionized 1.24 1.1 - 1.33 mmol/L   Coagulation Screen   Result Value Ref Range    Protime 11.5 9.8 - 12.8 seconds    INR 1.0 0.9 - 1.1    aPTT 27 27 - 38 seconds   Renal Function Panel   Result Value Ref Range    Glucose 264 (H) 74 - 99 mg/dL    Sodium 143 136 - 145 mmol/L    Potassium 4.1 3.5 - 5.3 mmol/L    Chloride 100 98 - 107 mmol/L    Bicarbonate 30 21 - 32 mmol/L    Anion Gap 17 10 - 20 mmol/L    Urea Nitrogen 25 (H) 6 - 23 mg/dL    Creatinine 1.64 (H) 0.50 - 1.05 mg/dL    eGFR 35 (L) >60 mL/min/1.73m*2    Calcium 9.9 8.6 - 10.6 mg/dL    Phosphorus 5.3 (H) 2.5 - 4.9 mg/dL    Albumin 4.0 3.4 - 5.0 g/dL   CBC   Result Value Ref Range    WBC 10.7 4.4 - 11.3 x10*3/uL    nRBC 0.0 0.0 - 0.0 /100 WBCs    RBC 4.50 4.00 - 5.20 x10*6/uL    Hemoglobin 12.9 12.0 - 16.0 g/dL    Hematocrit 36.0 36.0 - 46.0 %    MCV 80 80 - 100 fL    MCH 28.7 26.0 -  34.0 pg    MCHC 35.8 32.0 - 36.0 g/dL    RDW 15.1 (H) 11.5 - 14.5 %    Platelets 118 (L) 150 - 450 x10*3/uL   TEG Clot Global Profile   Result Value Ref Range    R (Reaction Time) K 6.3 4.6 - 9.1 min    K (Clot Kinetics) 1.3 0.8 - 2.1 min    ANGLE 73.8 63.0 - 78.0 deg    MA (Max Amplitude) K 60.8 52.0 - 69.0 mm    R (Reaction Time) KH 6.7 4.3 - 8.3 min    MA (Max Amplitude) RT 61.6 52.0 - 70.0 mm    MA ( New Amplitude) FF 23.5 15.0 - 32.0 mm    FLEV 429 278 - 581 mg/dL   POCT GLUCOSE   Result Value Ref Range    POCT Glucose 239 (H) 74 - 99 mg/dL   Blood Gas Arterial Full Panel   Result Value Ref Range    POCT pH, Arterial 7.36 (L) 7.38 - 7.42 pH    POCT pCO2, Arterial 54 (H) 38 - 42 mm Hg    POCT pO2, Arterial 126 (H) 85 - 95 mm Hg    POCT SO2, Arterial 99 94 - 100 %    POCT Oxy Hemoglobin, Arterial 96.2 94.0 - 98.0 %    POCT Hematocrit Calculated, Arterial 40.0 36.0 - 46.0 %    POCT Sodium, Arterial 138 136 - 145 mmol/L    POCT Potassium, Arterial 4.3 3.5 - 5.3 mmol/L    POCT Chloride, Arterial 102 98 - 107 mmol/L    POCT Ionized Calcium, Arterial 1.31 1.10 - 1.33 mmol/L    POCT Glucose, Arterial 278 (H) 74 - 99 mg/dL    POCT Lactate, Arterial 3.1 (H) 0.4 - 2.0 mmol/L    POCT Base Excess, Arterial 3.8 (H) -2.0 - 3.0 mmol/L    POCT HCO3 Calculated, Arterial 30.5 (H) 22.0 - 26.0 mmol/L    POCT Hemoglobin, Arterial 13.4 12.0 - 16.0 g/dL    POCT Anion Gap, Arterial 10 10 - 25 mmo/L    Patient Temperature 37.0 degrees Celsius    FiO2 38 %   Blood Gas Arterial Full Panel   Result Value Ref Range    POCT pH, Arterial 7.37 (L) 7.38 - 7.42 pH    POCT pCO2, Arterial 51 (H) 38 - 42 mm Hg    POCT pO2, Arterial 108 (H) 85 - 95 mm Hg    POCT SO2, Arterial 98 94 - 100 %    POCT Oxy Hemoglobin, Arterial 95.4 94.0 - 98.0 %    POCT Hematocrit Calculated, Arterial 40.0 36.0 - 46.0 %    POCT Sodium, Arterial 139 136 - 145 mmol/L    POCT Potassium, Arterial 4.4 3.5 - 5.3 mmol/L    POCT Chloride, Arterial 104 98 - 107 mmol/L    POCT  Ionized Calcium, Arterial 1.24 1.10 - 1.33 mmol/L    POCT Glucose, Arterial 254 (H) 74 - 99 mg/dL    POCT Lactate, Arterial 4.6 (HH) 0.4 - 2.0 mmol/L    POCT Base Excess, Arterial 3.2 (H) -2.0 - 3.0 mmol/L    POCT HCO3 Calculated, Arterial 29.5 (H) 22.0 - 26.0 mmol/L    POCT Hemoglobin, Arterial 13.3 12.0 - 16.0 g/dL    POCT Anion Gap, Arterial 10 10 - 25 mmo/L    Patient Temperature 37.0 degrees Celsius    FiO2 38 %   POCT GLUCOSE   Result Value Ref Range    POCT Glucose 205 (H) 74 - 99 mg/dL   Magnesium   Result Value Ref Range    Magnesium 2.93 (H) 1.60 - 2.40 mg/dL   Calcium, Ionized   Result Value Ref Range    POCT Calcium, Ionized 1.21 1.1 - 1.33 mmol/L   Coagulation Screen   Result Value Ref Range    Protime 11.4 9.8 - 12.8 seconds    INR 1.0 0.9 - 1.1    aPTT 21 (L) 27 - 38 seconds   Renal Function Panel   Result Value Ref Range    Glucose 238 (H) 74 - 99 mg/dL    Sodium 146 (H) 136 - 145 mmol/L    Potassium 4.1 3.5 - 5.3 mmol/L    Chloride 103 98 - 107 mmol/L    Bicarbonate 30 21 - 32 mmol/L    Anion Gap 17 10 - 20 mmol/L    Urea Nitrogen 27 (H) 6 - 23 mg/dL    Creatinine 1.61 (H) 0.50 - 1.05 mg/dL    eGFR 35 (L) >60 mL/min/1.73m*2    Calcium 9.9 8.6 - 10.6 mg/dL    Phosphorus 5.1 (H) 2.5 - 4.9 mg/dL    Albumin 3.9 3.4 - 5.0 g/dL   CBC   Result Value Ref Range    WBC 11.3 4.4 - 11.3 x10*3/uL    nRBC 0.0 0.0 - 0.0 /100 WBCs    RBC 4.32 4.00 - 5.20 x10*6/uL    Hemoglobin 12.4 12.0 - 16.0 g/dL    Hematocrit 34.8 (L) 36.0 - 46.0 %    MCV 81 80 - 100 fL    MCH 28.7 26.0 - 34.0 pg    MCHC 35.6 32.0 - 36.0 g/dL    RDW 15.4 (H) 11.5 - 14.5 %    Platelets 118 (L) 150 - 450 x10*3/uL   TEG Clot Global Profile   Result Value Ref Range    R (Reaction Time) K 3.1 (L) 4.6 - 9.1 min    K (Clot Kinetics) 1.2 0.8 - 2.1 min    ANGLE 75.2 63.0 - 78.0 deg    MA (Max Amplitude) K 63.5 52.0 - 69.0 mm    R (Reaction Time) KH 2.6 (L) 4.3 - 8.3 min    MA (Max Amplitude) RT 63.1 52.0 - 70.0 mm    MA ( New Amplitude) FF 26.6 15.0 -  32.0 mm    FLEV 485 278 - 581 mg/dL   POCT GLUCOSE   Result Value Ref Range    POCT Glucose 151 (H) 74 - 99 mg/dL   Blood Gas Arterial Full Panel   Result Value Ref Range    POCT pH, Arterial 7.43 (H) 7.38 - 7.42 pH    POCT pCO2, Arterial 50 (H) 38 - 42 mm Hg    POCT pO2, Arterial 177 (H) 85 - 95 mm Hg    POCT SO2, Arterial 99 94 - 100 %    POCT Oxy Hemoglobin, Arterial 97.1 94.0 - 98.0 %    POCT Hematocrit Calculated, Arterial 39.0 36.0 - 46.0 %    POCT Sodium, Arterial 136 136 - 145 mmol/L    POCT Potassium, Arterial 4.1 3.5 - 5.3 mmol/L    POCT Chloride, Arterial 103 98 - 107 mmol/L    POCT Ionized Calcium, Arterial 1.25 1.10 - 1.33 mmol/L    POCT Glucose, Arterial 178 (H) 74 - 99 mg/dL    POCT Lactate, Arterial 1.3 0.4 - 2.0 mmol/L    POCT Base Excess, Arterial 7.5 (H) -2.0 - 3.0 mmol/L    POCT HCO3 Calculated, Arterial 33.2 (H) 22.0 - 26.0 mmol/L    POCT Hemoglobin, Arterial 13.0 12.0 - 16.0 g/dL    POCT Anion Gap, Arterial 4 (L) 10 - 25 mmo/L    Patient Temperature 37.0 degrees Celsius    FiO2 32 %   Blood Gas Arterial Full Panel   Result Value Ref Range    POCT pH, Arterial 7.43 (H) 7.38 - 7.42 pH    POCT pCO2, Arterial 49 (H) 38 - 42 mm Hg    POCT pO2, Arterial 166 (H) 85 - 95 mm Hg    POCT SO2, Arterial 99 94 - 100 %    POCT Oxy Hemoglobin, Arterial 96.7 94.0 - 98.0 %    POCT Hematocrit Calculated, Arterial 37.0 36.0 - 46.0 %    POCT Sodium, Arterial 135 (L) 136 - 145 mmol/L    POCT Potassium, Arterial 3.9 3.5 - 5.3 mmol/L    POCT Chloride, Arterial 103 98 - 107 mmol/L    POCT Ionized Calcium, Arterial 1.24 1.10 - 1.33 mmol/L    POCT Glucose, Arterial 141 (H) 74 - 99 mg/dL    POCT Lactate, Arterial 0.9 0.4 - 2.0 mmol/L    POCT Base Excess, Arterial 7.0 (H) -2.0 - 3.0 mmol/L    POCT HCO3 Calculated, Arterial 32.5 (H) 22.0 - 26.0 mmol/L    POCT Hemoglobin, Arterial 12.3 12.0 - 16.0 g/dL    POCT Anion Gap, Arterial 3 (L) 10 - 25 mmo/L    Patient Temperature 37.0 degrees Celsius    FiO2 32 %   Magnesium    Result Value Ref Range    Magnesium 2.70 (H) 1.60 - 2.40 mg/dL   Calcium, Ionized   Result Value Ref Range    POCT Calcium, Ionized 1.22 1.1 - 1.33 mmol/L   Coagulation Screen   Result Value Ref Range    Protime 10.9 9.8 - 12.8 seconds    INR 1.0 0.9 - 1.1    aPTT 28 27 - 38 seconds   CBC   Result Value Ref Range    WBC 11.5 (H) 4.4 - 11.3 x10*3/uL    nRBC 0.0 0.0 - 0.0 /100 WBCs    RBC 4.10 4.00 - 5.20 x10*6/uL    Hemoglobin 11.9 (L) 12.0 - 16.0 g/dL    Hematocrit 33.6 (L) 36.0 - 46.0 %    MCV 82 80 - 100 fL    MCH 29.0 26.0 - 34.0 pg    MCHC 35.4 32.0 - 36.0 g/dL    RDW 15.6 (H) 11.5 - 14.5 %    Platelets 111 (L) 150 - 450 x10*3/uL   Hepatic Function Panel   Result Value Ref Range    Albumin 3.7 3.4 - 5.0 g/dL    Bilirubin, Total 0.8 0.0 - 1.2 mg/dL    Bilirubin, Direct 0.2 0.0 - 0.3 mg/dL    Alkaline Phosphatase 47 33 - 136 U/L    ALT 16 7 - 45 U/L    AST 26 9 - 39 U/L    Total Protein 6.0 (L) 6.4 - 8.2 g/dL   Basic Metabolic Panel   Result Value Ref Range    Glucose 141 (H) 74 - 99 mg/dL    Sodium 142 136 - 145 mmol/L    Potassium 4.0 3.5 - 5.3 mmol/L    Chloride 100 98 - 107 mmol/L    Bicarbonate 31 21 - 32 mmol/L    Anion Gap 15 10 - 20 mmol/L    Urea Nitrogen 25 (H) 6 - 23 mg/dL    Creatinine 1.46 (H) 0.50 - 1.05 mg/dL    eGFR 40 (L) >60 mL/min/1.73m*2    Calcium 9.4 8.6 - 10.6 mg/dL   Phosphorus   Result Value Ref Range    Phosphorus 4.1 2.5 - 4.9 mg/dL   POCT GLUCOSE   Result Value Ref Range    POCT Glucose 130 (H) 74 - 99 mg/dL   Blood Gas Arterial Full Panel   Result Value Ref Range    POCT pH, Arterial 7.52 (H) 7.38 - 7.42 pH    POCT pCO2, Arterial 38 38 - 42 mm Hg    POCT pO2, Arterial 170 (H) 85 - 95 mm Hg    POCT SO2, Arterial 99 94 - 100 %    POCT Oxy Hemoglobin, Arterial 97.0 94.0 - 98.0 %    POCT Hematocrit Calculated, Arterial 41.0 36.0 - 46.0 %    POCT Sodium, Arterial 137 136 - 145 mmol/L    POCT Potassium, Arterial 4.0 3.5 - 5.3 mmol/L    POCT Chloride, Arterial 102 98 - 107 mmol/L    POCT  Ionized Calcium, Arterial 1.23 1.10 - 1.33 mmol/L    POCT Glucose, Arterial 140 (H) 74 - 99 mg/dL    POCT Lactate, Arterial 2.0 0.4 - 2.0 mmol/L    POCT Base Excess, Arterial 7.7 (H) -2.0 - 3.0 mmol/L    POCT HCO3 Calculated, Arterial 31.0 (H) 22.0 - 26.0 mmol/L    POCT Hemoglobin, Arterial 13.6 12.0 - 16.0 g/dL    POCT Anion Gap, Arterial 8 (L) 10 - 25 mmo/L    Patient Temperature 37.0 degrees Celsius    FiO2 32 %   POCT GLUCOSE   Result Value Ref Range    POCT Glucose 117 (H) 74 - 99 mg/dL   Magnesium   Result Value Ref Range    Magnesium 2.52 (H) 1.60 - 2.40 mg/dL   Hepatic Function Panel   Result Value Ref Range    Albumin 3.7 3.4 - 5.0 g/dL    Bilirubin, Total 0.9 0.0 - 1.2 mg/dL    Bilirubin, Direct 0.2 0.0 - 0.3 mg/dL    Alkaline Phosphatase 48 33 - 136 U/L    ALT 19 7 - 45 U/L    AST 31 9 - 39 U/L    Total Protein 6.0 (L) 6.4 - 8.2 g/dL   Coagulation Screen   Result Value Ref Range    Protime 10.6 9.8 - 12.8 seconds    INR 0.9 0.9 - 1.1    aPTT 26 (L) 27 - 38 seconds   CBC   Result Value Ref Range    WBC 11.1 4.4 - 11.3 x10*3/uL    nRBC 0.0 0.0 - 0.0 /100 WBCs    RBC 4.10 4.00 - 5.20 x10*6/uL    Hemoglobin 11.7 (L) 12.0 - 16.0 g/dL    Hematocrit 33.8 (L) 36.0 - 46.0 %    MCV 82 80 - 100 fL    MCH 28.5 26.0 - 34.0 pg    MCHC 34.6 32.0 - 36.0 g/dL    RDW 15.9 (H) 11.5 - 14.5 %    Platelets 107 (L) 150 - 450 x10*3/uL   Calcium, Ionized   Result Value Ref Range    POCT Calcium, Ionized 1.17 1.1 - 1.33 mmol/L   Basic Metabolic Panel   Result Value Ref Range    Glucose 133 (H) 74 - 99 mg/dL    Sodium 142 136 - 145 mmol/L    Potassium 3.8 3.5 - 5.3 mmol/L    Chloride 101 98 - 107 mmol/L    Bicarbonate 30 21 - 32 mmol/L    Anion Gap 15 10 - 20 mmol/L    Urea Nitrogen 25 (H) 6 - 23 mg/dL    Creatinine 1.46 (H) 0.50 - 1.05 mg/dL    eGFR 40 (L) >60 mL/min/1.73m*2    Calcium 9.3 8.6 - 10.6 mg/dL   Phosphorus   Result Value Ref Range    Phosphorus 3.4 2.5 - 4.9 mg/dL   POCT GLUCOSE   Result Value Ref Range    POCT  Glucose 119 (H) 74 - 99 mg/dL     *Note: Due to a large number of results and/or encounters for the requested time period, some results have not been displayed. A complete set of results can be found in Results Review.          Assessment/Plan     Assessment:  Pt is a 63 yo F with PMHx HFpEF (50-55% 9/2023), CAD s/p PCI w/ stent x 3 (2016, 2018) and STEMI (9/2023, no stents placed), TIA 9/2023, SVC thrombus on Lovenox, remote DVT, IDDM2, HLD, CKD, MICHAEL, and NAFLD, s/p GIBSON, BSO in the setting of known fibroid uterus and chronic PMB presenting to SICU d/t post operative hypotension ISO hemorrhagic shock. MTP activated and patient resuscitated, taken back to OR 1/24 am for evacuation of hematoma now HDS and off vasoactive agents.    Plan:  NEURO:  History of depression, glaucoma and TIA 9/23. Acute post-op pain. Continues to complain of abdominal pain after surgery. Got Narcan in OR however did not get long acting opioid intra-op after 20 mg Methadone. A&Ox3 this morning.  - Ongoing neuro and pain assessments  - IV tylenol 1g q6h  - Oxy5/10mg   - Robaxin 1g  - PRN hydromorphone for pain control  - Has Procaine allergy (hives) but no reaction to lidocaine intra-op   - PT/OT consult tomorrow   - Home meds: AlphaGAN drops and lantoprost for glaucoma, Cymbalta     CV: History of HFpEF (50-55% 9/2023), HLD, CAD s/p PCI w/ stent x 3 (2016, 2018), STEMI (9/2023, no stents placed), SVC thrombus on Lovenox. Bedside echo and ECG on arrival to ICU were unremarkable. Denies chest pain. Was hypotensive on floor 50s/40s. OR EBL 1/23 300cc. Got total 1.5L fluids (crystalloid and albumin) from code white to stabilization in ICU at sign-out. BP improving and responsive to fluids. Large Hb drop prompting CT, became hypotensive requiring phenyl drip, now s/p MTP and return to OR, HDS this morning off pressors.  - Continuous EKG/abp monitoring  - Volume resuscitate as appropriate   - Goal map range 65-90  - Home meds: ASA, Statin, Coreg,  Furosamide, Losartan, Kerendia (finerenone), Imdur, NTG - holding all for now     PULM: History of MICHAEL non-compliant with CPAP at home. Hypercarbia noted on labs at time of code-white. Arrived to SICU on BIPAP. VBG on floor suggests respiratory acidosis. Became nauseous overnight during CVC placement and intubated for airway protection and plan to return to OR. Extubated 1/24, now on room air satting well. Hypercarbia resolved. CXR low volumes this morning however no acute cardiopulmonary process.  - CPAP at night d/t MICHAEL  - Additional pulm toilet prn   - daily CXR     GI: Hx GERD on PPI at home. Pain to palpation of lower abdomen bilaterally, no rebound tenderness. Some oozing from incisional site. Constipated, no BM charted.  - Low threshold for CT scan; await intake labs and notify Gyn if any concerns   - Miralax and Bisacodyl suppository today  - CLD  - PPI for GI prophylaxis     : No history of CDK, baseline Cr. ~2.5. Good UOP in OR and post-op. K 5.2 on gas. s/p GIBSON, BSO in the setting of known fibroid uterus and chronic PMB with return to OR 1/24 for evacuation of hematoma.  - Maintenance IVF 50 ml/hr  - Volume resuscitation as needed  - Maintain U/O >0.5ml/kg/hr  - Check renal function panel post op and daily  - Replete electrolytes to goal K>4, Mg>2, Phos>2.5, ionized Ca>1.10.     HEME: Acute blood loss anemia. OR  ml; Hb drop from 14 to 8.9 overnight, CT revealed large hematoma ~700 evacuated during take back to OR. 250cc out from DREW drain. Given 6u pRBC, 5u FFP, 1u plt. Hb stable at 11.7 this am.   - CBC q6h until Hb stable for 24 hours.   - Coags daily  - SCDs for DVT prophylaxis  - holding SC heparin for now  - LUE US d/t edema and tenderness of L forearm, extravasated IV vs DVT  - ongoing monitoring for s/s bleeding     ENDO: Hx IDDM2 on Farxiga, Insulin, Metformin, Semaglutide  - Q4h BG   - SSI Lispro per ICU protocol.  - Increased to SSI#2 d/t hyperglycemia     ID: Afebrile. Leukocytosis  resolved.  - Temp q4h, wbc daily  - ongoing monitoring for s/s infection     Lines:  - RIJ MAC line  - R radial arterial line  - 2PIVs  - Jose  - DREW drain    Dispo: Admit to ICU. Patient seen and discussed with ICU attending Dr. Hogue.    SICU phone [phone]       Berlin Partida DO  Anesthesiology, PGY1

## 2025-01-25 NOTE — SIGNIFICANT EVENT
Check in    Nelida Mata is a 64 y.o. female now s/p XL, GIBSON, BSO, and umbilical hernia repair (1/23) for known fibroid uterus and chronic PMB. S/p code white for dizziness, hypotension, and incisional bleeding with subsequent ICU admission and takeback for exploratory XL and evacuation of hemoperitoneum 1/23     At bedside patient resting in bed, upon waking reports pain currently 8/10. Denies any chest pain or SOB. Per RN, patient has been sleeping since receiving IV medications    General: resting in bed, no acute distress  HEENT: normocephalic, atraumatic  Heart: warm and well perfused  Lungs: breathing comfortably on room air  Abdomen: non-distended, soft, DREW drain in place with dark red output, diffusely tender, no rebound/guarding    A/P    Exam stable from prior  Hgb stable (12.4), RFP stable  Low concern for acute abdominal pathology, recommend continued pain control and can consider dilaudid PCA pump if pain worsens    Discussed with Dr. Femi Boyer MD PGY-3  Gyn Onc [id number]

## 2025-01-26 VITALS
TEMPERATURE: 97.9 F | HEIGHT: 59 IN | HEART RATE: 80 BPM | WEIGHT: 167.55 LBS | RESPIRATION RATE: 18 BRPM | SYSTOLIC BLOOD PRESSURE: 171 MMHG | OXYGEN SATURATION: 92 % | DIASTOLIC BLOOD PRESSURE: 85 MMHG | BODY MASS INDEX: 33.78 KG/M2

## 2025-01-26 LAB
ALBUMIN SERPL BCP-MCNC: 3.5 G/DL (ref 3.4–5)
ALP SERPL-CCNC: 51 U/L (ref 33–136)
ALT SERPL W P-5'-P-CCNC: 14 U/L (ref 7–45)
ANION GAP SERPL CALC-SCNC: 11 MMOL/L (ref 10–20)
AST SERPL W P-5'-P-CCNC: 17 U/L (ref 9–39)
BILIRUB SERPL-MCNC: 0.8 MG/DL (ref 0–1.2)
BUN SERPL-MCNC: 20 MG/DL (ref 6–23)
CALCIUM SERPL-MCNC: 9 MG/DL (ref 8.6–10.6)
CHLORIDE SERPL-SCNC: 100 MMOL/L (ref 98–107)
CO2 SERPL-SCNC: 29 MMOL/L (ref 21–32)
CREAT SERPL-MCNC: 1.21 MG/DL (ref 0.5–1.05)
EGFRCR SERPLBLD CKD-EPI 2021: 50 ML/MIN/1.73M*2
ERYTHROCYTE [DISTWIDTH] IN BLOOD BY AUTOMATED COUNT: 15.2 % (ref 11.5–14.5)
GLUCOSE BLD MANUAL STRIP-MCNC: 211 MG/DL (ref 74–99)
GLUCOSE BLD MANUAL STRIP-MCNC: 223 MG/DL (ref 74–99)
GLUCOSE BLD MANUAL STRIP-MCNC: 228 MG/DL (ref 74–99)
GLUCOSE BLD MANUAL STRIP-MCNC: 257 MG/DL (ref 74–99)
GLUCOSE SERPL-MCNC: 208 MG/DL (ref 74–99)
HCT VFR BLD AUTO: 37.4 % (ref 36–46)
HGB BLD-MCNC: 11.4 G/DL (ref 12–16)
MAGNESIUM SERPL-MCNC: 2.12 MG/DL (ref 1.6–2.4)
MCH RBC QN AUTO: 28.7 PG (ref 26–34)
MCHC RBC AUTO-ENTMCNC: 30.5 G/DL (ref 32–36)
MCV RBC AUTO: 94 FL (ref 80–100)
NRBC BLD-RTO: 0 /100 WBCS (ref 0–0)
PLATELET # BLD AUTO: 112 X10*3/UL (ref 150–450)
POTASSIUM SERPL-SCNC: 3.5 MMOL/L (ref 3.5–5.3)
PROT SERPL-MCNC: 6.1 G/DL (ref 6.4–8.2)
RBC # BLD AUTO: 3.97 X10*6/UL (ref 4–5.2)
SODIUM SERPL-SCNC: 136 MMOL/L (ref 136–145)
WBC # BLD AUTO: 9.4 X10*3/UL (ref 4.4–11.3)

## 2025-01-26 PROCEDURE — 82947 ASSAY GLUCOSE BLOOD QUANT: CPT

## 2025-01-26 PROCEDURE — 1170000001 HC PRIVATE ONCOLOGY ROOM DAILY

## 2025-01-26 PROCEDURE — 2500000001 HC RX 250 WO HCPCS SELF ADMINISTERED DRUGS (ALT 637 FOR MEDICARE OP): Performed by: STUDENT IN AN ORGANIZED HEALTH CARE EDUCATION/TRAINING PROGRAM

## 2025-01-26 PROCEDURE — 82374 ASSAY BLOOD CARBON DIOXIDE: CPT | Performed by: STUDENT IN AN ORGANIZED HEALTH CARE EDUCATION/TRAINING PROGRAM

## 2025-01-26 PROCEDURE — 2500000004 HC RX 250 GENERAL PHARMACY W/ HCPCS (ALT 636 FOR OP/ED): Performed by: OBSTETRICS & GYNECOLOGY

## 2025-01-26 PROCEDURE — 2500000004 HC RX 250 GENERAL PHARMACY W/ HCPCS (ALT 636 FOR OP/ED): Performed by: STUDENT IN AN ORGANIZED HEALTH CARE EDUCATION/TRAINING PROGRAM

## 2025-01-26 PROCEDURE — 85027 COMPLETE CBC AUTOMATED: CPT | Performed by: STUDENT IN AN ORGANIZED HEALTH CARE EDUCATION/TRAINING PROGRAM

## 2025-01-26 PROCEDURE — 99024 POSTOP FOLLOW-UP VISIT: CPT | Performed by: OBSTETRICS & GYNECOLOGY

## 2025-01-26 PROCEDURE — 97116 GAIT TRAINING THERAPY: CPT | Mod: GP

## 2025-01-26 PROCEDURE — 36415 COLL VENOUS BLD VENIPUNCTURE: CPT | Performed by: STUDENT IN AN ORGANIZED HEALTH CARE EDUCATION/TRAINING PROGRAM

## 2025-01-26 PROCEDURE — 83735 ASSAY OF MAGNESIUM: CPT | Performed by: STUDENT IN AN ORGANIZED HEALTH CARE EDUCATION/TRAINING PROGRAM

## 2025-01-26 PROCEDURE — 2500000002 HC RX 250 W HCPCS SELF ADMINISTERED DRUGS (ALT 637 FOR MEDICARE OP, ALT 636 FOR OP/ED): Performed by: STUDENT IN AN ORGANIZED HEALTH CARE EDUCATION/TRAINING PROGRAM

## 2025-01-26 RX ORDER — HYDROMORPHONE HYDROCHLORIDE 1 MG/ML
0.4 INJECTION, SOLUTION INTRAMUSCULAR; INTRAVENOUS; SUBCUTANEOUS EVERY 2 HOUR PRN
Status: DISCONTINUED | OUTPATIENT
Start: 2025-01-26 | End: 2025-01-26

## 2025-01-26 RX ORDER — OXYCODONE HYDROCHLORIDE 5 MG/1
5 TABLET ORAL ONCE
Status: COMPLETED | OUTPATIENT
Start: 2025-01-26 | End: 2025-01-26

## 2025-01-26 RX ADMIN — ACETAMINOPHEN 975 MG: 325 TABLET ORAL at 13:47

## 2025-01-26 RX ADMIN — HYDROMORPHONE HYDROCHLORIDE 0.4 MG: 1 INJECTION, SOLUTION INTRAMUSCULAR; INTRAVENOUS; SUBCUTANEOUS at 08:38

## 2025-01-26 RX ADMIN — PANTOPRAZOLE SODIUM 40 MG: 40 INJECTION, POWDER, FOR SOLUTION INTRAVENOUS at 05:04

## 2025-01-26 RX ADMIN — STANDARDIZED SENNA CONCENTRATE 17.2 MG: 8.6 TABLET ORAL at 21:06

## 2025-01-26 RX ADMIN — DULOXETINE 60 MG: 60 CAPSULE, DELAYED RELEASE ORAL at 21:06

## 2025-01-26 RX ADMIN — ATORVASTATIN CALCIUM 80 MG: 80 TABLET, FILM COATED ORAL at 21:07

## 2025-01-26 RX ADMIN — ACETAMINOPHEN 975 MG: 325 TABLET ORAL at 08:38

## 2025-01-26 RX ADMIN — CARVEDILOL 12.5 MG: 12.5 TABLET, FILM COATED ORAL at 17:11

## 2025-01-26 RX ADMIN — OXYCODONE 10 MG: 5 TABLET ORAL at 11:47

## 2025-01-26 RX ADMIN — BRIMONIDINE TARTRATE 1 DROP: 2 SOLUTION/ DROPS OPHTHALMIC at 08:38

## 2025-01-26 RX ADMIN — INSULIN LISPRO 2 UNITS: 100 INJECTION, SOLUTION INTRAVENOUS; SUBCUTANEOUS at 17:10

## 2025-01-26 RX ADMIN — HEPARIN SODIUM 5000 UNITS: 5000 INJECTION, SOLUTION INTRAVENOUS; SUBCUTANEOUS at 05:04

## 2025-01-26 RX ADMIN — DULOXETINE HYDROCHLORIDE 30 MG: 30 CAPSULE, DELAYED RELEASE ORAL at 08:39

## 2025-01-26 RX ADMIN — ACETAMINOPHEN 975 MG: 325 TABLET ORAL at 18:23

## 2025-01-26 RX ADMIN — POLYETHYLENE GLYCOL 3350 17 G: 17 POWDER, FOR SOLUTION ORAL at 21:06

## 2025-01-26 RX ADMIN — INSULIN LISPRO 2 UNITS: 100 INJECTION, SOLUTION INTRAVENOUS; SUBCUTANEOUS at 13:46

## 2025-01-26 RX ADMIN — LATANOPROST 1 DROP: 50 SOLUTION OPHTHALMIC at 22:26

## 2025-01-26 RX ADMIN — OXYCODONE HYDROCHLORIDE 5 MG: 5 TABLET ORAL at 14:40

## 2025-01-26 RX ADMIN — ASPIRIN 81 MG: 81 TABLET, CHEWABLE ORAL at 08:39

## 2025-01-26 RX ADMIN — OXYCODONE 10 MG: 5 TABLET ORAL at 05:03

## 2025-01-26 RX ADMIN — OXYCODONE 10 MG: 5 TABLET ORAL at 18:23

## 2025-01-26 RX ADMIN — INSULIN LISPRO 3 UNITS: 100 INJECTION, SOLUTION INTRAVENOUS; SUBCUTANEOUS at 08:38

## 2025-01-26 RX ADMIN — Medication 5 MG: at 21:07

## 2025-01-26 RX ADMIN — HEPARIN SODIUM 5000 UNITS: 5000 INJECTION, SOLUTION INTRAVENOUS; SUBCUTANEOUS at 21:05

## 2025-01-26 RX ADMIN — HEPARIN SODIUM 5000 UNITS: 5000 INJECTION, SOLUTION INTRAVENOUS; SUBCUTANEOUS at 13:47

## 2025-01-26 ASSESSMENT — PAIN SCALES - GENERAL
PAINLEVEL_OUTOF10: 9
PAINLEVEL_OUTOF10: 8
PAINLEVEL_OUTOF10: 9
PAINLEVEL_OUTOF10: 7
PAINLEVEL_OUTOF10: 9
PAINLEVEL_OUTOF10: 7

## 2025-01-26 ASSESSMENT — COGNITIVE AND FUNCTIONAL STATUS - GENERAL
TURNING FROM BACK TO SIDE WHILE IN FLAT BAD: A LITTLE
CLIMB 3 TO 5 STEPS WITH RAILING: A LITTLE
HELP NEEDED FOR BATHING: A LITTLE
WALKING IN HOSPITAL ROOM: A LITTLE
CLIMB 3 TO 5 STEPS WITH RAILING: A LITTLE
DAILY ACTIVITIY SCORE: 20
MOBILITY SCORE: 19
TOILETING: A LITTLE
MOBILITY SCORE: 19
STANDING UP FROM CHAIR USING ARMS: A LITTLE
MOVING TO AND FROM BED TO CHAIR: A LITTLE
DRESSING REGULAR LOWER BODY CLOTHING: A LITTLE
MOVING TO AND FROM BED TO CHAIR: A LITTLE
CLIMB 3 TO 5 STEPS WITH RAILING: A LOT
DRESSING REGULAR UPPER BODY CLOTHING: A LITTLE
TURNING FROM BACK TO SIDE WHILE IN FLAT BAD: A LITTLE
DRESSING REGULAR UPPER BODY CLOTHING: A LITTLE
STANDING UP FROM CHAIR USING ARMS: A LITTLE
TURNING FROM BACK TO SIDE WHILE IN FLAT BAD: A LITTLE
MOBILITY SCORE: 18
WALKING IN HOSPITAL ROOM: A LITTLE
DRESSING REGULAR LOWER BODY CLOTHING: A LITTLE
TOILETING: A LITTLE
HELP NEEDED FOR BATHING: A LITTLE
WALKING IN HOSPITAL ROOM: A LITTLE
DAILY ACTIVITIY SCORE: 20
STANDING UP FROM CHAIR USING ARMS: A LITTLE
MOVING TO AND FROM BED TO CHAIR: A LITTLE

## 2025-01-26 ASSESSMENT — PAIN - FUNCTIONAL ASSESSMENT
PAIN_FUNCTIONAL_ASSESSMENT: 0-10
PAIN_FUNCTIONAL_ASSESSMENT: UNABLE TO SELF-REPORT
PAIN_FUNCTIONAL_ASSESSMENT: 0-10
PAIN_FUNCTIONAL_ASSESSMENT: UNABLE TO SELF-REPORT
PAIN_FUNCTIONAL_ASSESSMENT: 0-10
PAIN_FUNCTIONAL_ASSESSMENT: 0-10

## 2025-01-26 ASSESSMENT — PAIN DESCRIPTION - ORIENTATION: ORIENTATION: RIGHT

## 2025-01-26 ASSESSMENT — PAIN DESCRIPTION - LOCATION
LOCATION: ABDOMEN
LOCATION: ABDOMEN

## 2025-01-26 NOTE — CARE PLAN
Problem: Pain  Goal: Takes deep breaths with improved pain control throughout the shift  Outcome: Progressing  Goal: Turns in bed with improved pain control throughout the shift  Outcome: Progressing  Goal: Walks with improved pain control throughout the shift  Outcome: Progressing  Goal: Performs ADL's with improved pain control throughout shift  Outcome: Progressing  Goal: Participates in PT with improved pain control throughout the shift  Outcome: Progressing  Goal: Free from opioid side effects throughout the shift  Outcome: Progressing  Goal: Free from acute confusion related to pain meds throughout the shift  Outcome: Progressing     Problem: Skin  Goal: Decreased wound size/increased tissue granulation at next dressing change  Outcome: Progressing  Goal: Participates in plan/prevention/treatment measures  Outcome: Progressing  Goal: Prevent/manage excess moisture  Outcome: Progressing  Goal: Prevent/minimize sheer/friction injuries  Outcome: Progressing  Goal: Promote/optimize nutrition  Outcome: Progressing  Goal: Promote skin healing  Outcome: Progressing     Problem: Fall/Injury  Goal: Not fall by end of shift  Outcome: Progressing  Goal: Be free from injury by end of the shift  Outcome: Progressing  Goal: Verbalize understanding of personal risk factors for fall in the hospital  Outcome: Progressing  Goal: Verbalize understanding of risk factor reduction measures to prevent injury from fall in the home  Outcome: Progressing  Goal: Use assistive devices by end of the shift  Outcome: Progressing  Goal: Pace activities to prevent fatigue by end of the shift  Outcome: Progressing     Problem: Safety - Medical Restraint  Goal: Remains free of injury from restraints (Restraint for Interference with Medical Device)  Outcome: Progressing  Goal: Free from restraint(s) (Restraint for Interference with Medical Device)  Outcome: Progressing

## 2025-01-26 NOTE — PROGRESS NOTES
"Nelida Mata is a 64 y.o. female on day 3 of admission presenting with Leiomyoma of uterus.      Subjective   Nelida is doing well this morning. Pain is controlled with pain medications but mostly taking in IV. Tolerated CLD, ready for regular food. Passing flatus. Denies dizziness, CP, SOB.        Objective     OB/GYN History  POD #3 s/p XL, GIBSON, BSO, and umbilical hernia repair (1/23) for known fibroid uterus and chronic PMB. S/p code white for dizziness, hypotension, and incisional bleeding with subsequent ICU admission and now POD #3 s/p takeback for exploratory XL and evacuation of hemoperitoneum 1/23           Last Recorded Vitals  Blood pressure 145/76, pulse 81, temperature 36.7 °C (98.1 °F), temperature source Temporal, resp. rate 18, height 1.499 m (4' 11\"), weight 76 kg (167 lb 8.8 oz), SpO2 95%.  Intake/Output last 3 Shifts:    Intake/Output Summary (Last 24 hours) at 1/26/2025 1107  Last data filed at 1/26/2025 0753  Gross per 24 hour   Intake 175 ml   Output 2020 ml   Net -1845 ml       Physical Exam  Vitals reviewed.   Constitutional:       Appearance: Normal appearance.   HENT:      Head: Normocephalic and atraumatic.   Cardiovascular:      Rate and Rhythm: Normal rate.   Pulmonary:      Effort: Pulmonary effort is normal. No respiratory distress.   Abdominal:      General: There is no distension.      Palpations: Abdomen is soft.      Comments: Incisions clean/dry/intact   Musculoskeletal:         General: Normal range of motion.   Skin:     General: Skin is warm and dry.   Neurological:      General: No focal deficit present.      Mental Status: She is alert.   Psychiatric:         Mood and Affect: Mood normal.         Behavior: Behavior normal.         Results for orders placed or performed during the hospital encounter of 01/23/25 (from the past 24 hours)   POCT GLUCOSE   Result Value Ref Range    POCT Glucose 124 (H) 74 - 99 mg/dL   Renal Function Panel   Result Value Ref Range    " Glucose 354 (H) 74 - 99 mg/dL    Sodium 136 136 - 145 mmol/L    Potassium 4.4 3.5 - 5.3 mmol/L    Chloride 98 98 - 107 mmol/L    Bicarbonate 29 21 - 32 mmol/L    Anion Gap 13 10 - 20 mmol/L    Urea Nitrogen 24 (H) 6 - 23 mg/dL    Creatinine 1.54 (H) 0.50 - 1.05 mg/dL    eGFR 37 (L) >60 mL/min/1.73m*2    Calcium 9.2 8.6 - 10.6 mg/dL    Phosphorus 2.9 2.5 - 4.9 mg/dL    Albumin 3.7 3.4 - 5.0 g/dL   POCT GLUCOSE   Result Value Ref Range    POCT Glucose 219 (H) 74 - 99 mg/dL   POCT GLUCOSE   Result Value Ref Range    POCT Glucose 217 (H) 74 - 99 mg/dL   POCT GLUCOSE   Result Value Ref Range    POCT Glucose 257 (H) 74 - 99 mg/dL     *Note: Due to a large number of results and/or encounters for the requested time period, some results have not been displayed. A complete set of results can be found in Results Review.            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   Postoperative state  -better this AM, VSS, Hgb stable ~11  -s/p  5u PRBC, 4u FFP, 1u plt   - follow ERAS protocol  - switching to PO meds today, ok for regular diet  - constipation prophylaxis: Miralax, senna  - DVT ppx - SCDs, heparin  - will need staple removal POD#7-10, will arrange  -UOP: 79cc/hr, Cr stable, pending this AM, will remove castellano   -Left DREW drain in place, now blood tinged, 70cc/24 hours, will remove prior to discharge tomorrow   2.  Comorbidities  - HFpEF (50-55% 9/2023)/CAD s/p PCI w/ stent x 3/ hx of STEMI: okay to resume ASA, Carvedilol resumed, Losartan & lasix held. Tele  - SVC thrombus/remote DVT: on ppx heparin  -  T2DM/gastroparesis: Farxiga, metformin, semaglutide, Tresiba 40u at bedtime & lispro 22u TID held. Protonix cont'd. POCT & SSI  - HLD: statin cont'd  - CKD: NO NSAIDs. Kerendia HELD.  - MICHAEL  - Fibromyalgia: Cymbalta cont'd     Dispo: likely home tomorrow, will ask PT to see her again now that out of ICU    Patient evaluated and plan discussed with Dr. Davila.    Ashlyn Kahn MD  Gynecologic Oncology Fellow

## 2025-01-26 NOTE — PROGRESS NOTES
Physical Therapy    Physical Therapy Treatment    Patient Name: Nelida Mata  MRN: [id number]  Department: [facility]  Room: [facility]-  Today's Date: 1/26/2025  Time Calculation  Start Time: 1213  Stop Time: 1229  Time Calculation (min): 16 min         Assessment/Plan   PT Assessment  End of Session Communication: Bedside nurse  Assessment Comment: Pt with much improved mobility this session as she was able to ambulate down hallway with SBA and FWW. At this time pt is appropriate for Low Intensity Rehab after DC.  End of Session Patient Position: Up in chair, Alarm off, caregiver present     PT Plan  Treatment/Interventions: Bed mobility, Transfer training, Gait training, Balance training, Strengthening, Endurance training, Therapeutic exercise, Therapeutic activity  PT Plan: Ongoing PT  PT Frequency: 4 times per week  PT Discharge Recommendations: Low intensity level of continued care  Equipment Recommended upon Discharge:  (Owns)  PT Recommended Transfer Status: Contact guard  PT - OK to Discharge: Yes      General Visit Information:   PT  Visit  PT Received On: 01/26/25  Response to Previous Treatment: Patient with no complaints from previous session.  General  Family/Caregiver Present: Yes  Caregiver Feedback: Significant other and  present and supportive thorughout session  Prior to Session Communication: Bedside nurse  Patient Position Received: Up in chair, Alarm off, caregiver present  Preferred Learning Style: auditory, visual  General Comment: Pt pleasant and agreeable to PT session    Subjective   Precautions:  Precautions  Hearing/Visual Limitations: WFL  Medical Precautions: Fall precautions  Post-Surgical Precautions: Abdominal surgery precautions    Objective   Pain:  Pain Assessment  Pain Assessment: 0-10  0-10 (Numeric) Pain Score: 9  Pain Type: Surgical pain  Pain Location: Abdomen  Pain Interventions: Repositioned, Ambulation/increased activity  Cognition:  Cognition  Overall Cognitive  Status: Within Functional Limits  Orientation Level: Oriented X4  Insight: Within function limits  Impulsive: Within functional limits  Coordination:  Movements are Fluid and Coordinated: Yes  Postural Control:  Postural Control  Postural Control: Within Functional Limits  Static Sitting Balance  Static Sitting-Balance Support: Feet supported, No upper extremity supported  Static Sitting-Level of Assistance: Independent  Static Standing Balance  Static Standing-Balance Support: Bilateral upper extremity supported  Static Standing-Level of Assistance: Close supervision  Extremity/Trunk Assessments:    RLE   RLE : Within Functional Limits  LLE   LLE : Within Functional Limits  Activity Tolerance:  Activity Tolerance  Endurance: Tolerates 10 - 20 min exercise with multiple rests  Early Mobility/Exercise Safety Screen: Proceed with mobilization - No exclusion criteria met  Treatments:    Bed Mobility  Bed Mobility: No    Ambulation/Gait Training  Ambulation/Gait Training Performed: Yes  Ambulation/Gait Training 1  Surface 1: Level tile  Device 1: Rolling walker  Assistance 1: Contact guard  Quality of Gait 1: Narrow base of support, Decreased step length (Decreased reg)  Comments/Distance (ft) 1: 100ft  Transfers  Transfer: Yes  Transfer 1  Transfer From 1: Chair with arms to, Stand to  Transfer to 1: Stand, Chair with arms  Technique 1: Sit to stand, Stand to sit  Transfer Device 1: Walker  Transfer Level of Assistance 1: Contact guard    Stairs  Stairs: No    Outcome Measures:  [facility] Basic Mobility  Turning from your back to your side while in a flat bed without using bedrails: None  Moving from lying on your back to sitting on the side of a flat bed without using bedrails: A little  Moving to and from bed to chair (including a wheelchair): A little  Standing up from a chair using your arms (e.g. wheelchair or bedside chair): A little  To walk in hospital room: A little  Climbing 3-5 steps with railing: A  lot  Basic Mobility - Total Score: 18    Education Documentation  Precautions, taught by Kari Norton PT at 1/26/2025 12:50 PM.  Learner: Family, Patient  Readiness: Eager  Method: Explanation, Demonstration  Response: Verbalizes Understanding, Demonstrated Understanding    Body Mechanics, taught by Kari Norton PT at 1/26/2025 12:50 PM.  Learner: Family, Patient  Readiness: Eager  Method: Explanation, Demonstration  Response: Verbalizes Understanding, Demonstrated Understanding    Mobility Training, taught by Kari Norton PT at 1/26/2025 12:50 PM.  Learner: Family, Patient  Readiness: Eager  Method: Explanation, Demonstration  Response: Verbalizes Understanding, Demonstrated Understanding    Education Comments  No comments found.        Encounter Problems       Encounter Problems (Active)       Balance       LTG - Patient will maintain static standing balance > 5 mins with CGA. (Progressing)       Start:  01/24/25    Expected End:  02/14/25               Mobility       STG - Patient will ambulate > 75 ft with LRAD and CGA. (Progressing)       Start:  01/24/25    Expected End:  02/14/25            Pt will perform bed mobility with SBA. (Progressing)       Start:  01/24/25    Expected End:  02/14/25            Pt will perform STS transfers with LRAD and CGA. (Progressing)       Start:  01/24/25    Expected End:  02/14/25

## 2025-01-27 ENCOUNTER — DOCUMENTATION (OUTPATIENT)
Dept: HOME HEALTH SERVICES | Facility: HOME HEALTH | Age: 65
End: 2025-01-27
Payer: COMMERCIAL

## 2025-01-27 ENCOUNTER — HOME HEALTH ADMISSION (OUTPATIENT)
Dept: HOME HEALTH SERVICES | Facility: HOME HEALTH | Age: 65
End: 2025-01-27
Payer: COMMERCIAL

## 2025-01-27 VITALS
WEIGHT: 167.55 LBS | OXYGEN SATURATION: 97 % | HEIGHT: 59 IN | SYSTOLIC BLOOD PRESSURE: 131 MMHG | DIASTOLIC BLOOD PRESSURE: 54 MMHG | HEART RATE: 86 BPM | TEMPERATURE: 96.8 F | BODY MASS INDEX: 33.78 KG/M2 | RESPIRATION RATE: 18 BRPM

## 2025-01-27 LAB
ANION GAP SERPL CALC-SCNC: 13 MMOL/L (ref 10–20)
BLOOD EXPIRATION DATE: NORMAL
BUN SERPL-MCNC: 20 MG/DL (ref 6–23)
CALCIUM SERPL-MCNC: 9 MG/DL (ref 8.6–10.6)
CHLORIDE SERPL-SCNC: 102 MMOL/L (ref 98–107)
CO2 SERPL-SCNC: 28 MMOL/L (ref 21–32)
CREAT SERPL-MCNC: 1.3 MG/DL (ref 0.5–1.05)
DISPENSE STATUS: NORMAL
EGFRCR SERPLBLD CKD-EPI 2021: 46 ML/MIN/1.73M*2
ERYTHROCYTE [DISTWIDTH] IN BLOOD BY AUTOMATED COUNT: 14.6 % (ref 11.5–14.5)
GLUCOSE BLD MANUAL STRIP-MCNC: 204 MG/DL (ref 74–99)
GLUCOSE BLD MANUAL STRIP-MCNC: 240 MG/DL (ref 74–99)
GLUCOSE SERPL-MCNC: 255 MG/DL (ref 74–99)
HCT VFR BLD AUTO: 37.4 % (ref 36–46)
HGB BLD-MCNC: 12 G/DL (ref 12–16)
MAGNESIUM SERPL-MCNC: 2.03 MG/DL (ref 1.6–2.4)
MCH RBC QN AUTO: 29.1 PG (ref 26–34)
MCHC RBC AUTO-ENTMCNC: 32.1 G/DL (ref 32–36)
MCV RBC AUTO: 91 FL (ref 80–100)
NRBC BLD-RTO: 0 /100 WBCS (ref 0–0)
PLATELET # BLD AUTO: 173 X10*3/UL (ref 150–450)
POTASSIUM SERPL-SCNC: 3.5 MMOL/L (ref 3.5–5.3)
PRODUCT BLOOD TYPE: 5100
PRODUCT BLOOD TYPE: 9500
PRODUCT CODE: NORMAL
RBC # BLD AUTO: 4.12 X10*6/UL (ref 4–5.2)
SODIUM SERPL-SCNC: 139 MMOL/L (ref 136–145)
UNIT ABO: NORMAL
UNIT NUMBER: NORMAL
UNIT RH: NORMAL
UNIT VOLUME: 274
UNIT VOLUME: 275
UNIT VOLUME: 281
UNIT VOLUME: 285
UNIT VOLUME: 287
UNIT VOLUME: 310
UNIT VOLUME: 329
UNIT VOLUME: 350
WBC # BLD AUTO: 9.3 X10*3/UL (ref 4.4–11.3)
XM INTEP: NORMAL

## 2025-01-27 PROCEDURE — 2500000004 HC RX 250 GENERAL PHARMACY W/ HCPCS (ALT 636 FOR OP/ED): Performed by: STUDENT IN AN ORGANIZED HEALTH CARE EDUCATION/TRAINING PROGRAM

## 2025-01-27 PROCEDURE — 85027 COMPLETE CBC AUTOMATED: CPT | Performed by: STUDENT IN AN ORGANIZED HEALTH CARE EDUCATION/TRAINING PROGRAM

## 2025-01-27 PROCEDURE — 36415 COLL VENOUS BLD VENIPUNCTURE: CPT | Performed by: STUDENT IN AN ORGANIZED HEALTH CARE EDUCATION/TRAINING PROGRAM

## 2025-01-27 PROCEDURE — 99024 POSTOP FOLLOW-UP VISIT: CPT

## 2025-01-27 PROCEDURE — 2500000001 HC RX 250 WO HCPCS SELF ADMINISTERED DRUGS (ALT 637 FOR MEDICARE OP): Performed by: STUDENT IN AN ORGANIZED HEALTH CARE EDUCATION/TRAINING PROGRAM

## 2025-01-27 PROCEDURE — 80048 BASIC METABOLIC PNL TOTAL CA: CPT | Performed by: STUDENT IN AN ORGANIZED HEALTH CARE EDUCATION/TRAINING PROGRAM

## 2025-01-27 PROCEDURE — 2500000002 HC RX 250 W HCPCS SELF ADMINISTERED DRUGS (ALT 637 FOR MEDICARE OP, ALT 636 FOR OP/ED): Performed by: STUDENT IN AN ORGANIZED HEALTH CARE EDUCATION/TRAINING PROGRAM

## 2025-01-27 PROCEDURE — 82947 ASSAY GLUCOSE BLOOD QUANT: CPT

## 2025-01-27 PROCEDURE — 83735 ASSAY OF MAGNESIUM: CPT | Performed by: STUDENT IN AN ORGANIZED HEALTH CARE EDUCATION/TRAINING PROGRAM

## 2025-01-27 RX ADMIN — POLYETHYLENE GLYCOL 3350 17 G: 17 POWDER, FOR SOLUTION ORAL at 08:46

## 2025-01-27 RX ADMIN — OXYCODONE 10 MG: 5 TABLET ORAL at 13:10

## 2025-01-27 RX ADMIN — PANTOPRAZOLE SODIUM 40 MG: 40 INJECTION, POWDER, FOR SOLUTION INTRAVENOUS at 04:05

## 2025-01-27 RX ADMIN — ASPIRIN 81 MG: 81 TABLET, CHEWABLE ORAL at 08:46

## 2025-01-27 RX ADMIN — CARVEDILOL 12.5 MG: 12.5 TABLET, FILM COATED ORAL at 08:46

## 2025-01-27 RX ADMIN — INSULIN LISPRO 2 UNITS: 100 INJECTION, SOLUTION INTRAVENOUS; SUBCUTANEOUS at 08:45

## 2025-01-27 RX ADMIN — ACETAMINOPHEN 975 MG: 325 TABLET ORAL at 08:45

## 2025-01-27 RX ADMIN — OXYCODONE 10 MG: 5 TABLET ORAL at 04:13

## 2025-01-27 RX ADMIN — HEPARIN SODIUM 5000 UNITS: 5000 INJECTION, SOLUTION INTRAVENOUS; SUBCUTANEOUS at 04:05

## 2025-01-27 RX ADMIN — STANDARDIZED SENNA CONCENTRATE 17.2 MG: 8.6 TABLET ORAL at 08:46

## 2025-01-27 RX ADMIN — ACETAMINOPHEN 975 MG: 325 TABLET ORAL at 00:10

## 2025-01-27 RX ADMIN — DULOXETINE HYDROCHLORIDE 30 MG: 30 CAPSULE, DELAYED RELEASE ORAL at 08:46

## 2025-01-27 RX ADMIN — OXYCODONE 10 MG: 5 TABLET ORAL at 00:10

## 2025-01-27 RX ADMIN — OXYCODONE 10 MG: 5 TABLET ORAL at 08:51

## 2025-01-27 ASSESSMENT — COGNITIVE AND FUNCTIONAL STATUS - GENERAL
DRESSING REGULAR UPPER BODY CLOTHING: A LITTLE
TOILETING: A LITTLE
TURNING FROM BACK TO SIDE WHILE IN FLAT BAD: A LITTLE
MOBILITY SCORE: 19
MOVING TO AND FROM BED TO CHAIR: A LITTLE
DAILY ACTIVITIY SCORE: 20
DRESSING REGULAR LOWER BODY CLOTHING: A LITTLE
HELP NEEDED FOR BATHING: A LITTLE
STANDING UP FROM CHAIR USING ARMS: A LITTLE
CLIMB 3 TO 5 STEPS WITH RAILING: A LITTLE
WALKING IN HOSPITAL ROOM: A LITTLE

## 2025-01-27 ASSESSMENT — PAIN SCALES - GENERAL
PAINLEVEL_OUTOF10: 8
PAINLEVEL_OUTOF10: 8
PAINLEVEL_OUTOF10: 7
PAINLEVEL_OUTOF10: 8
PAINLEVEL_OUTOF10: 7
PAINLEVEL_OUTOF10: 8

## 2025-01-27 ASSESSMENT — PAIN - FUNCTIONAL ASSESSMENT
PAIN_FUNCTIONAL_ASSESSMENT: 0-10

## 2025-01-27 NOTE — NURSING NOTE
Discharge paperwork reviewed with patient. Patient had no questions at this time. Discharged with transport in stable condition with all personal belongings.

## 2025-01-27 NOTE — PROGRESS NOTES
"Occupational Therapy                 Therapy Communication Note    Patient Name: Nelida Mata  MRN: [id number]  Department: [facility]  Room: 6014/6014-A  Today's Date: 1/27/2025     Discipline: Occupational Therapy    Screen: Pt sitting EOB, having already dressed herself, stating she is waiting for her ride to return home. States no need for skilled OT services and has sister in town and BF in apt building to assist w/ \"anything I need.\" Will d/c OT orders.    Time: 1558    "

## 2025-01-27 NOTE — DISCHARGE SUMMARY
Discharge Summary    Admission Date: 1/23/2025  Discharge Date: 1/27/2025    Discharge Diagnosis  Leiomyoma of uterus    Hospital Course  Nelida Mata is a 65 yo patient with pmhx of HFpEF (50-55% 9/2023), CAD s/p PCI w/ stent x 3 (2016, 2018) and STEMI (9/2023, no stents placed), TIA 9/2023, SVC thrombus on Lovenox, remote DVT, IDDM2, HLD, CKD, MICHAEL, and NAFLD who was admitted for scheduled surgery in the setting of known fibroid uterus and chronic PMB.    Recently had in office attempt at endometrial biopsy, also with removal of LNG IUD  IUD was placed at time of vaginoscopy in 2018 - cervix located behind pubic symphysis, unable to reach/visualize in the office.    She underwent Total abdominal hysterectomy, bilateral salpingo-oophorectomy, and umbilical hernia repair on 1/23/25. On POD#0, she became hypotensive, requiring O2 and was admitted to ICU. She subsequently underwent take back XL and evacuation of hemoperitoneum on 1/23. She received 5u PRBC, 4u FFP, & 1u plt. Left DREW drain placed during second surgery, removed intact and without incident on day of discharge. She was extubated POD#1 and taken off pressors and was transferred back to the regular nursing floor on POD#2. PT evaluated patient and recommended home PT but patient declines. The patient is now meeting all post-operative milestones including tolerating diet, passing flatus, voiding, ambulating, pain controlled with oral medications, & labs within expected values. Patient was discharged home in satisfactory condition on post-operative day #4.    Pre-op Imaging:  10/2024 CTAP:  Markedly enlarged multi fibroid uterus (24 x 10 x 16 cm ) which exerts mass effect on the surrounding structures.  Similar to the prior exam.  Lobulated soft tissue densities in the right anterior subcutaneous fat  with some associated air presumably reflecting injection sites.  Approximately 3 cm umbilical hernia defect with a large fat-containing hernia sac  extending inferiorly.    PMH:  HFpEF (50-55% 2023)  CAD s/p PCI w/ stent x 3 (, )  STEMI (2023, no stents placed)  TIA 2023  SVC thrombus on Lovenox, remote DVT  IDDM2  HLD  CKD  MICHAEL  NAFLD   obesity     PSH:  Past Surgical History:   Procedure Laterality Date    BREAST BIOPSY Left     2007    CARDIAC CATHETERIZATION N/A 2024    Procedure: Left And Right Heart Cath, Without LV;  Surgeon: Loki Donis MD;  Location: [place] [address] Cardiac Cath Lab;  Service: Cardiovascular;  Laterality: N/A;    CATARACT EXTRACTION  2015    Cataract Surgery    CORONARY ANGIOPLASTY WITH STENT PLACEMENT  2017    Cath Stent Placement    CT ANGIO AORTA AND BILATERAL ILIOFEMORAL RUN OFF INCLUDING WITHOUT CONTRAST IF PERFORMED  2022    CT AORTA AND BILATERAL ILIOFEMORAL RUNOFF ANGIOGRAM W AND/OR WO IV CONTRAST 2022 [facility]    MR HEAD ANGIO WO IV CONTRAST  2013    MR HEAD ANGIO WO IV CONTRAST 2013 [facility]    MR NECK ANGIO WO IV CONTRAST  2013    MR NECK ANGIO WO IV CONTRAST 2013 [facility]    RETINAL DETACHMENT SURGERY  2015    Repair Of Retinal Detachment    TOE AMPUTATION Right     VULVA SURGERY       OBGyn history:    Postmenopausal, previous reported history of heavy menstrual bleeding     Social history:   Lives at home with boyfriend  Does not smoke    Family History   Problem Relation Name Age of Onset    Diabetes Mother      Glaucoma Father      Hypertension Other FAMILY     Uterine cancer Neg Hx      Ovarian cancer Neg Hx      Colon cancer Neg Hx      Breast cancer Neg Hx         Allergies   Allergen Reactions    Other Rash     Novocaine Solution, reaction rash     Penicillins Rash and Hives    Procaine Rash and Hives     Physical Exam  Constitutional: Sitting in bed, No visible distress, alert and cooperative  Head/Neck: Normocephalic  Respiratory/Thorax: Normal respiratory effort on RA  Abdomen soft, nondistended,  Incision sites with dressings in place appear clean, dry, and intact.   Musculoskeletal: grossly normal ROM  Extremities: Scant LE edema bilaterally  Neurological: A&Ox3  Psychological: Appropriate mood and behavior      Last Vitals:  Temp Pulse Resp BP MAP Pulse Ox   36.3 °C (97.3 °F) 74 18 152/84 107 93 %     Discharge Meds     Your medication list        START taking these medications        Instructions Last Dose Given Next Dose Due   acetaminophen 325 mg tablet  Commonly known as: Tylenol      Take 3 tablets (975 mg) by mouth every 6 hours for 7 days.       naloxone 4 mg/0.1 mL nasal spray  Commonly known as: Narcan      Administer 1 spray (4 mg) into affected nostril(s) if needed for opioid reversal or respiratory depression. May repeat every 2-3 minutes if needed, alternating nostrils, until medical assistance becomes available.       polyethylene glycol 17 gram packet  Commonly known as: Glycolax, Miralax      Take 17 g by mouth once daily as needed (constipation).       sennosides-docusate sodium 8.6-50 mg tablet  Commonly known as: Zoila-Colace      Take 2 tablets by mouth 2 times a day. While taking opiod medication then as needed for stool softener.       simethicone 80 mg chewable tablet  Commonly known as: Mylicon      Chew 1 tablet (80 mg) 4 times a day as needed (gas pain).       traMADol 50 mg tablet  Commonly known as: Ultram      Take 1 tablet (50 mg) by mouth every 6 hours if needed for moderate pain (4 - 6) or severe pain (7 - 10) for up to 6 days. Dx: G89.18              CONTINUE taking these medications        Instructions Last Dose Given Next Dose Due   aspirin 81 mg EC tablet      Take 1 tablet (81 mg) by mouth once daily.       atorvastatin 80 mg tablet  Commonly known as: Lipitor      1 TAB(S) ORALLY ONCE A DAY -.MEDS TO BEDS       blood pressure monitor kit      1 each once daily. Use once daily to check blood pressure       brimonidine 0.2 % ophthalmic solution  Commonly known as:  AlphaGAN      Administer 1 drop into both eyes 2 times a day. PATIENT NEEDS PCP APPT FOR FURTHER REFILLS       carvedilol 12.5 mg tablet  Commonly known as: Coreg      Take 1 tablet (12.5 mg) by mouth 2 times daily (morning and late afternoon).       cholecalciferol 10 MCG (400 UNIT) tablet  Commonly known as: Vitamin D3      Take 1 tablet (10 mcg) by mouth once daily.       dapagliflozin propanediol 10 mg  Commonly known as: Farxiga      Take 1 tablet (10 mg) by mouth once daily.       diclofenac sodium 1 % gel  Commonly known as: Voltaren      Apply 4.5 inches (4 g) topically 2 times a day.       diphenhydramine/Maalox/lidocaine 1:1:1      Swish and swallow 10 mL every 8 hours as needed for epigastric pain       DULoxetine 30 mg DR capsule  Commonly known as: Cymbalta      Take 1 capsule (30 mg) by mouth once daily in the morning AND 2 capsules (60 mg) once daily at bedtime. Do not crush or chew..       enoxaparin 40 mg/0.4 mL syringe  Commonly known as: Lovenox      Inject 0.4 mL (40 mg) under the skin once daily.       enoxaparin 40 mg/0.4 mL syringe  Commonly known as: Lovenox      Inject 0.4ml under the skin once daily.       FreeStyle Filipe 2 [place] [facility] [facility]  Generic drug: flash glucose scanning reader      Use as instructed       FreeStyle Filipe 2 Sensor kit  Generic drug: flash glucose sensor kit      Apply 1 sensor to the back of the upper arm. Change sensor every 14 days.       furosemide 20 mg tablet  Commonly known as: Lasix      Take 1 tablet (20 mg) by mouth once daily.       HumaLOG KwikPen Insulin 200 unit/mL (3 mL) insulin pen pen  Generic drug: insulin lispro      Inject 22 Units under the skin 3 times daily (morning, midday, late afternoon). Take as directed per insulin instructions.       hyoscyamine 0.125 mg disintegrating tablet  Commonly known as: Anaspaz      Take 1 tablet (0.125 mg) by mouth every 8 hours if needed (cramping or nausea).       isosorbide mononitrate ER 60 mg 24 hr  tablet  Commonly known as: Imdur      TAKE 1 TABLET BY MOUTH EVERY DAY IN THE MORNING       Kerendia 10 mg tablet tablet  Generic drug: finerenone      Take 1 tablet (10 mg) by mouth once daily.       latanoprost 0.005 % ophthalmic solution  Commonly known as: Xalatan      Administer 1 drop into both eyes once daily at bedtime.       losartan 50 mg tablet  Commonly known as: Cozaar           melatonin 5 mg tablet      Take 1 tablet (5 mg) by mouth as needed at bedtime for sleep.       metFORMIN  mg 24 hr tablet  Commonly known as: Glucophage-XR      Take 1 tablet (500 mg) by mouth once daily in the evening. Take with meals. Do not crush, chew, or split.       multivitamin tablet      Take 1 tablet by mouth once daily.       nitroglycerin 0.4 mg SL tablet  Commonly known as: Nitrostat           nystatin 100,000 unit/gram powder  Commonly known as: Mycostatin           OneTouch Ultra Test strip  Generic drug: blood sugar diagnostic      USE 1 STRIP(S) TO TEST BLOOD SUGAR 3 TIMES A DAY       Ozempic 2 mg/dose (8 mg/3 mL) pen injector  Generic drug: semaglutide      Inject 2 mg under the skin 1 (one) time per week.       pantoprazole 40 mg EC tablet  Commonly known as: ProtoNix      Take 1 tablet (40 mg) by mouth once daily in the morning. Take before meals.       TRESIBA FLEXTOUCH U-200 SUBQ           urea 20 % cream  Commonly known as: Carmol                  STOP taking these medications      chlorhexidine 4 % external liquid  Commonly known as: Hibiclens               ASK your doctor about these medications        Instructions Last Dose Given Next Dose Due   glucagon 3 mg/actuation spray,non-aerosol      Administer 1 spray into affected nostril(s) if needed (please  apply into a single nostril for blood sugar <60).                 Where to Get Your Medications        These medications were sent to [facility]/pharmacy #[id number] - [facility] - [address] [address]  [address] [facility] [place] [address]      Phone:  [phone]   acetaminophen 325 mg tablet  naloxone 4 mg/0.1 mL nasal spray  sennosides-docusate sodium 8.6-50 mg tablet  traMADol 50 mg tablet       You can get these medications from any pharmacy    You don't need a prescription for these medications  polyethylene glycol 17 gram packet  simethicone 80 mg chewable tablet            Test Results Pending At Discharge  Pending Labs       Order Current Status    Surgical Pathology Exam In process            Outpatient Follow-Up  Future Appointments   Date Time Provider Department [place]   2/3/2025  1:20 PM Maureen Davila MD [id number] [place]   2/5/2025  9:10 AM Jacinta Peraza MD [id number] [facility]   2/6/2025  1:45 PM Brett Tang MD [id number] [facility]   3/3/2025 11:00 AM Erin Valenzuela MD [id number] [facility]   4/8/2025  4:40 PM Makayla Harvey MD [id number] [facility]   4/14/2025  2:00 PM Aldo Rojas MD [id number] [facility]   5/8/2025  7:30 AM Lucas Simons MD [id number] [facility]   5/16/2025  9:00 AM Michael Hilario MD [id number] [facility]           Meme Carmona, APRN-CNP

## 2025-01-27 NOTE — HH CARE COORDINATION
Home Care received a Referral for Physical Therapy and Occupational Therapy. We have processed the referral for a Start of Care on 1/29.     If you have any questions or concerns, please feel free to contact us at [phone]. Follow the prompts, enter your five digit zip code, and you will be directed to your care team on CENTL 1.

## 2025-01-27 NOTE — CARE PLAN
The patient's goals for the shift include      The clinical goals for the shift include pt to remain HDS and pain rated <7 throughout the shift

## 2025-01-27 NOTE — PROGRESS NOTES
01/27/25 1000   Discharge Planning   Living Arrangements Alone   Support Systems Spouse/significant other   Assistance Needed none   Type of Residence Private residence   Who is requesting discharge planning? Provider   Home or Post Acute Services Other (Comment)  (declined PT/OT)   Expected Discharge Disposition Home   Does the patient need discharge transport arranged? Yes   RoundTrip coordination needed? No     Per medical team, patient is medically ready to discharge home today. TCC spoke with patient at bedside. Patient declined home care for PT/OT. Medical team aware. Patient will need an Uber/Lyft ride at discharge, FN aware. Patient declined any further home going needs at this time. Hayley Gilbert RN TCC

## 2025-01-27 NOTE — PROGRESS NOTES
"Nelida Mata is a 64 y.o. female on day 4 of admission presenting with Leiomyoma of uterus.      Subjective   Pt doing well this morning, states she is ready to go home. Pin 8/10 but oral meds are helping it and states she can manage it at home. Voiding s/p castellano, ambulating, passing gas, tolerating regular diet.       Objective     Last Recorded Vitals  Blood pressure 120/77, pulse 76, temperature 36 °C (96.8 °F), temperature source Temporal, resp. rate 18, height 1.499 m (4' 11\"), weight 76 kg (167 lb 8.8 oz), SpO2 92%.  Intake/Output last 3 Shifts:    Intake/Output Summary (Last 24 hours) at 1/27/2025 0643  Last data filed at 1/27/2025 0027  Gross per 24 hour   Intake 811.11 ml   Output 530 ml   Net 281.11 ml       Physical Exam  Constitutional: No visible distress, alert and cooperative  Head/Neck: Normocephalic  Respiratory/Thorax: Normal respiratory effort on RA, lungs CTAB anteriorly  Cardiovascular: RRR, no murmurs  Gastrointestinal: soft, nondistended, mildly tender throughout lower abdomen, without rebound or guarding. Incision with dressing in place appear clean, dry, and intact. DREW drain with serosang output.  Musculoskeletal: grossly normal ROM  Extremities: Scant LE edema bilaterally  Neurological: A&Ox3  Psychological: Appropriate mood and behavior    Lab Results   Component Value Date    WBC 9.4 01/26/2025    HGB 11.4 (L) 01/26/2025    HCT 37.4 01/26/2025     (L) 01/26/2025     Lab Results   Component Value Date    GLUCOSE 208 (H) 01/26/2025     01/26/2025    K 3.5 01/26/2025     01/26/2025    CO2 29 01/26/2025    ANIONGAP 11 01/26/2025    BUN 20 01/26/2025    CREATININE 1.21 (H) 01/26/2025         Assessment/Plan     64y F with a fibroid uterus s/p GIBSON, BSO umbilical hernia repair on 1/23, s/p take back XL, evacuation of hemoperitoneum on 1/23.     Postoperative state  -VSS, Hgb stable ~11  -s/p 5u PRBC, 4u FFP, 1u plt   - follow ERAS protocol  - constipation " prophylaxis: Miralax, senna  - DVT ppx - SCDs, heparin  - will need staple removal POD#7-10, will arrange  -voiding s/p castellano, adequate UOP  -Left DREW drain in place, serosang, 45cc output / 24 hrs. Plan to remove prior to discharge  - Tolerating reg diet    2.  Comorbidities  - HFpEF (50-55% 9/2023)/CAD s/p PCI w/ stent x 3/ hx of STEMI: okay to resume ASA, Carvedilol resumed, Losartan & lasix held. Tele  - SVC thrombus/remote DVT: on ppx heparin  - T2DM/gastroparesis: Farxiga, metformin, semaglutide, Tresiba 40u at bedtime & lispro 22u TID held. Protonix cont'd. POCT & SSI  - HLD: statin cont'd  - CKD: NO NSAIDs. Kerendia HELD.  - MICHAEL  - Fibromyalgia: Cymbalta cont'd    Dispo: likely home today vs tomorrow given that pt is meeting postop milestones. For [facility].    Pt seen and d/w Dr. Kortney Sal MD PGY-3  GYN ONC, pager [phone]

## 2025-01-28 ENCOUNTER — TELEPHONE (OUTPATIENT)
Dept: HOME HEALTH SERVICES | Facility: HOME HEALTH | Age: 65
End: 2025-01-28
Payer: COMMERCIAL

## 2025-01-28 LAB
ATRIAL RATE: 94 BPM
ATRIAL RATE: 97 BPM
P AXIS: -5 DEGREES
P AXIS: 29 DEGREES
P OFFSET: 185 MS
P OFFSET: 191 MS
P ONSET: 137 MS
P ONSET: 155 MS
PR INTERVAL: 114 MS
PR INTERVAL: 142 MS
Q ONSET: 208 MS
Q ONSET: 212 MS
QRS COUNT: 16 BEATS
QRS COUNT: 16 BEATS
QRS DURATION: 116 MS
QRS DURATION: 118 MS
QT INTERVAL: 386 MS
QT INTERVAL: 402 MS
QTC CALCULATION(BAZETT): 490 MS
QTC CALCULATION(BAZETT): 502 MS
QTC FREDERICIA: 452 MS
QTC FREDERICIA: 466 MS
R AXIS: -48 DEGREES
R AXIS: -52 DEGREES
T AXIS: 32 DEGREES
T AXIS: 40 DEGREES
T OFFSET: 405 MS
T OFFSET: 409 MS
VENTRICULAR RATE: 94 BPM
VENTRICULAR RATE: 97 BPM

## 2025-01-28 NOTE — TELEPHONE ENCOUNTER
Good morning Dr. Valenzuela  - I wanted to inform you that your patient has declined homecare services at this time. If patient changes their mind and would like homecare, a new referral can be sent in at that time. Please let me know if there are any questions or concerns. Thank you!

## 2025-01-29 LAB
LABORATORY COMMENT REPORT: NORMAL
PATH REPORT.FINAL DX SPEC: NORMAL
PATH REPORT.GROSS SPEC: NORMAL
PATH REPORT.RELEVANT HX SPEC: NORMAL
PATH REPORT.TOTAL CANCER: NORMAL

## 2025-01-31 PROCEDURE — RXMED WILLOW AMBULATORY MEDICATION CHARGE

## 2025-02-03 ENCOUNTER — APPOINTMENT (OUTPATIENT)
Dept: GYNECOLOGIC ONCOLOGY | Facility: CLINIC | Age: 65
End: 2025-02-03
Payer: COMMERCIAL

## 2025-02-04 ENCOUNTER — PHARMACY VISIT (OUTPATIENT)
Dept: PHARMACY | Facility: CLINIC | Age: 65
End: 2025-02-04
Payer: COMMERCIAL

## 2025-02-05 ENCOUNTER — APPOINTMENT (OUTPATIENT)
Dept: GASTROENTEROLOGY | Facility: HOSPITAL | Age: 65
End: 2025-02-05
Payer: COMMERCIAL

## 2025-02-06 ENCOUNTER — APPOINTMENT (OUTPATIENT)
Dept: OPHTHALMOLOGY | Facility: CLINIC | Age: 65
End: 2025-02-06
Payer: COMMERCIAL

## 2025-02-11 ENCOUNTER — APPOINTMENT (OUTPATIENT)
Dept: GYNECOLOGIC ONCOLOGY | Facility: HOSPITAL | Age: 65
End: 2025-02-11
Payer: COMMERCIAL

## 2025-02-12 ENCOUNTER — OFFICE VISIT (OUTPATIENT)
Dept: GYNECOLOGIC ONCOLOGY | Facility: HOSPITAL | Age: 65
End: 2025-02-12
Payer: COMMERCIAL

## 2025-02-12 VITALS
DIASTOLIC BLOOD PRESSURE: 72 MMHG | HEIGHT: 59 IN | TEMPERATURE: 97.9 F | OXYGEN SATURATION: 96 % | WEIGHT: 169.53 LBS | RESPIRATION RATE: 20 BRPM | BODY MASS INDEX: 34.18 KG/M2 | SYSTOLIC BLOOD PRESSURE: 115 MMHG | HEART RATE: 93 BPM

## 2025-02-12 DIAGNOSIS — T14.8XXA WOUND INFECTION: ICD-10-CM

## 2025-02-12 DIAGNOSIS — L08.9 WOUND INFECTION: ICD-10-CM

## 2025-02-12 DIAGNOSIS — D25.0 INTRAMURAL AND SUBMUCOUS LEIOMYOMA OF UTERUS: Primary | ICD-10-CM

## 2025-02-12 DIAGNOSIS — G89.18 POST-OPERATIVE PAIN: ICD-10-CM

## 2025-02-12 DIAGNOSIS — D25.1 INTRAMURAL AND SUBMUCOUS LEIOMYOMA OF UTERUS: Primary | ICD-10-CM

## 2025-02-12 PROCEDURE — 3078F DIAST BP <80 MM HG: CPT | Performed by: NURSE PRACTITIONER

## 2025-02-12 PROCEDURE — 1159F MED LIST DOCD IN RCRD: CPT | Performed by: NURSE PRACTITIONER

## 2025-02-12 PROCEDURE — 3008F BODY MASS INDEX DOCD: CPT | Performed by: NURSE PRACTITIONER

## 2025-02-12 PROCEDURE — 3074F SYST BP LT 130 MM HG: CPT | Performed by: NURSE PRACTITIONER

## 2025-02-12 PROCEDURE — 99211 OFF/OP EST MAY X REQ PHY/QHP: CPT | Performed by: NURSE PRACTITIONER

## 2025-02-12 PROCEDURE — 1125F AMNT PAIN NOTED PAIN PRSNT: CPT | Performed by: NURSE PRACTITIONER

## 2025-02-12 PROCEDURE — 1111F DSCHRG MED/CURRENT MED MERGE: CPT | Performed by: NURSE PRACTITIONER

## 2025-02-12 PROCEDURE — 4010F ACE/ARB THERAPY RXD/TAKEN: CPT | Performed by: NURSE PRACTITIONER

## 2025-02-12 RX ORDER — SULFAMETHOXAZOLE AND TRIMETHOPRIM 800; 160 MG/1; MG/1
1 TABLET ORAL 2 TIMES DAILY
Qty: 10 TABLET | Refills: 0 | Status: SHIPPED | OUTPATIENT
Start: 2025-02-12 | End: 2025-02-17

## 2025-02-12 RX ORDER — TRAMADOL HYDROCHLORIDE 50 MG/1
50 TABLET ORAL EVERY 8 HOURS PRN
Qty: 10 TABLET | Refills: 0 | Status: SHIPPED | OUTPATIENT
Start: 2025-02-12 | End: 2025-02-17

## 2025-02-12 ASSESSMENT — PAIN SCALES - GENERAL: PAINLEVEL_OUTOF10: 8

## 2025-02-12 NOTE — PROGRESS NOTES
Patient ID: Nelida Mata is a 65 y.o. female.  Referring Physician: No referring provider defined for this encounter.  Primary Care Provider: Betty Boone MD      Subjective    63 y/o female with pmhx of HFpEF (50-55% 9/2023), CAD s/p PCI w/ stent x 3 (2016, 2018) and STEMI (9/2023, no stents placed), TIA 9/2023, SVC thrombus on Lovenox, remote DVT, IDDM2, HLD, CKD, MICHAEL, NAFLD and obesity presenting for evaluation of uterine fibroids and heavy uterine bleeding. She reports a year and a half of constant, cramping lower abdominal pain without radiation along with recurrent heavy uterine bleeding and spotting every 3 months. Tylenol does not help the pain. Percoset alleviates the pain but she has not taken it in a while. She reports and episode a month ago of uterine bleeding, spotting, hematemesis, and hematochezia for which she had to be hospitalized and received a transfusion.  Since then, she has had no changes in symptom quality and severity.     She had previously been scheduled for a hysterectomy last year but was postponed for SVC thrombus 1-2 weeks prior to durgery.   Also reports easy bruising and itching along with a skin rash. Also says that she has had a 32 pound weight loss on Ozemipic.     Interval History: She underwent Total abdominal hysterectomy, bilateral salpingo-oophorectomy, and umbilical hernia repair on 1/23/25. On POD#0, she became hypotensive, requiring O2 and was admitted to ICU. She subsequently underwent take back XL and evacuation of hemoperitoneum on 1/23. She received 5u PRBC, 4u FFP, & 1u plt. Left DREW drain placed during second surgery, removed intact and without incident on day of discharge. She was extubated POD#1 and taken off pressors and was transferred back to the regular nursing floor on POD#2. PT evaluated patient and recommended home PT but patient declined. The patient is tolerating diet, passing flatus, voiding, ambulating, pain controlled with tramadol.  "Patient denies any vaginal bleeding. Reports malodorous drainage from umbilical portion of midline incision.      Gyn history:    Postmenopausal, previous reported history of heavy menstrual bleeding    Social history:   Lives at home with boyfriend  Does not smoke     Objective   BSA: 1.79 meters squared  /72   Pulse 93   Temp 36.6 °C (97.9 °F) (Temporal)   Resp 20   Ht 1.499 m (4' 11.02\")   Wt 76.9 kg (169 lb 8.5 oz)   SpO2 96%   BMI 34.22 kg/m²      Family History   Problem Relation Name Age of Onset    Diabetes Mother      Glaucoma Father      Hypertension Other FAMILY     Uterine cancer Neg Hx      Ovarian cancer Neg Hx      Colon cancer Neg Hx      Breast cancer Neg Hx         Nelida Mata  reports that she has never smoked. She has never been exposed to tobacco smoke. She has never used smokeless tobacco.  She  reports that she does not currently use alcohol.  She  reports no history of drug use.    Physical Exam  Constitutional:       Appearance: She is obese.   HENT:      Head: Normocephalic and atraumatic.      Nose: Nose normal.      Mouth/Throat:      Mouth: Mucous membranes are moist.   Cardiovascular:      Rate and Rhythm: Normal rate.      Heart sounds: Murmur heard.   Pulmonary:      Breath sounds: Normal breath sounds.   Abdominal:      Palpations: Abdomen is soft.      Tenderness: There is no abdominal tenderness.      Comments: Midline incision healing well, staples gently removed, patient tolerated well. Umbilical portion of incision excoriated with purulent drainage, no induration or tunneling. Prior drain site healing well, no drainage.    Musculoskeletal:         General: Swelling present.   Neurological:      General: No focal deficit present.     CTAP 10/27/24 shows markedly enlarged multi fibroid uterus which exerts mass effect on the surrounding structures.  Similar to the prior exam. Lobulated soft tissue densities in the right anterior subcutaneous fat with " some associated air presumably reflecting injection sites. Approximately 3 cm umbilical hernia defect with a large fat containing hernia sac extending inferiorly.    Surgical Pathology Exam: [id number]  Order: [id number]   Collected 1/23/2025 08:58       Status: Final result       Visible to patient: No (inaccessible in [facility])       Dx: Intramural and submucous leiomyoma of...    0 Result Notes      Component    FINAL DIAGNOSIS   A. UTERUS, CERVIX, BILATERAL FALLOPIAN TUBES AND OVARIES, HYSTERECTOMY AND BILATERAL SALPINGO-OOPHORECTOMY:   -- Myometrium with leiomyomata with degenerative changes  -- Proliferative pattern endometrium  -- Cervix with no pathologic diagnosis   -- Bilateral ovaries with stromal hyperthecosis  -- Bilateral fallopian tubes with no pathologic diagnosis      B. HERNIA SAC, EXCISION:   -- See gross examination     C. OMENTUM, BIOPSY:   -- Portion of omentum with no pathologic diagnosis   Electronically signed by Pinky Reddy MD on 1/29/2025 at 1344          Performance Status:  Symptomatic; fully ambulatory    Assessment/Plan    65 y/o female with pmhx of HFpEF (50-55% 9/2023), CAD s/p PCI w/ stent x 3 (2016, 2018) and STEMI (9/2023, no stents placed), TIA 9/2023, SVC thrombus on Lovenox, remote DVT, IDDM2, HLD, CKD, MICHAEL, NAFLD and obesity presenting for post op evaluation s/p total abdominal hysterectomy, bilateral salpingo-oophorectomy, and umbilical hernia repair on 1/23/25 complicated by pneumoperitoneum.     Plan:    # Post op  - Recovering well  - Reviewed ongoing restrictions (no lifting more than 10-15lb, nothing per vagina, no soaking)  - small refill tramadol sent to pharmacy   - start Bactrim DS, concern for wound infection near umbilical portion of incision. Discussed keeping this area as clean and dry as possible.  - Continue stool softeners as needed for constipation  - Follow up with Dr. Valenzuela as scheduled for vaginal cuff check  - We reviewed benign pathology, patient  can follow up with Dr. Valenzuela for annual well woman exams

## 2025-02-18 ENCOUNTER — PHARMACY VISIT (OUTPATIENT)
Dept: PHARMACY | Facility: CLINIC | Age: 65
End: 2025-02-18
Payer: COMMERCIAL

## 2025-02-18 PROCEDURE — RXMED WILLOW AMBULATORY MEDICATION CHARGE

## 2025-02-20 ENCOUNTER — TELEPHONE (OUTPATIENT)
Dept: HEMATOLOGY/ONCOLOGY | Facility: HOSPITAL | Age: 65
End: 2025-02-20
Payer: COMMERCIAL

## 2025-02-20 NOTE — TELEPHONE ENCOUNTER
Pt called triage line stating she received a call from Drew this morning. RN did not see any notes in chart regarding call to patient today. Message sent to team to see if they tried to contact this patient today.

## 2025-02-26 ENCOUNTER — CLINICAL SUPPORT (OUTPATIENT)
Dept: EMERGENCY MEDICINE | Facility: HOSPITAL | Age: 65
End: 2025-02-26
Payer: COMMERCIAL

## 2025-02-26 ENCOUNTER — APPOINTMENT (OUTPATIENT)
Dept: RADIOLOGY | Facility: HOSPITAL | Age: 65
End: 2025-02-26
Payer: COMMERCIAL

## 2025-02-26 ENCOUNTER — HOSPITAL ENCOUNTER (INPATIENT)
Facility: HOSPITAL | Age: 65
End: 2025-02-26
Attending: EMERGENCY MEDICINE | Admitting: STUDENT IN AN ORGANIZED HEALTH CARE EDUCATION/TRAINING PROGRAM
Payer: COMMERCIAL

## 2025-02-26 DIAGNOSIS — E11.65 UNCONTROLLED TYPE 2 DIABETES MELLITUS WITH HYPERGLYCEMIA: ICD-10-CM

## 2025-02-26 DIAGNOSIS — Z79.4 TYPE 2 DIABETES MELLITUS WITH HYPOGLYCEMIA WITHOUT COMA, WITH LONG-TERM CURRENT USE OF INSULIN: ICD-10-CM

## 2025-02-26 DIAGNOSIS — R10.33 PERIUMBILICAL ABDOMINAL PAIN: ICD-10-CM

## 2025-02-26 DIAGNOSIS — R18.8 ABDOMINAL WALL FLUID COLLECTIONS: Primary | ICD-10-CM

## 2025-02-26 DIAGNOSIS — R25.2 CRAMP AND SPASM: ICD-10-CM

## 2025-02-26 DIAGNOSIS — Z98.890 POSTOPERATIVE STATE: ICD-10-CM

## 2025-02-26 DIAGNOSIS — E11.649 TYPE 2 DIABETES MELLITUS WITH HYPOGLYCEMIA WITHOUT COMA, WITH LONG-TERM CURRENT USE OF INSULIN: ICD-10-CM

## 2025-02-26 LAB
ALBUMIN SERPL BCP-MCNC: 4.9 G/DL (ref 3.4–5)
ALP SERPL-CCNC: 103 U/L (ref 33–136)
ALT SERPL W P-5'-P-CCNC: 14 U/L (ref 7–45)
ANION GAP SERPL CALC-SCNC: 18 MMOL/L (ref 10–20)
AST SERPL W P-5'-P-CCNC: 21 U/L (ref 9–39)
BASOPHILS # BLD AUTO: 0.09 X10*3/UL (ref 0–0.1)
BASOPHILS NFR BLD AUTO: 1.4 %
BILIRUB SERPL-MCNC: 0.5 MG/DL (ref 0–1.2)
BNP SERPL-MCNC: 3 PG/ML (ref 0–99)
BUN SERPL-MCNC: 27 MG/DL (ref 6–23)
CALCIUM SERPL-MCNC: 10.8 MG/DL (ref 8.6–10.6)
CARDIAC TROPONIN I PNL SERPL HS: 10 NG/L (ref 0–34)
CHLORIDE SERPL-SCNC: 90 MMOL/L (ref 98–107)
CO2 SERPL-SCNC: 31 MMOL/L (ref 21–32)
CREAT SERPL-MCNC: 2.35 MG/DL (ref 0.5–1.05)
EGFRCR SERPLBLD CKD-EPI 2021: 22 ML/MIN/1.73M*2
EOSINOPHIL # BLD AUTO: 0.12 X10*3/UL (ref 0–0.7)
EOSINOPHIL NFR BLD AUTO: 1.9 %
ERYTHROCYTE [DISTWIDTH] IN BLOOD BY AUTOMATED COUNT: 14.2 % (ref 11.5–14.5)
GLUCOSE SERPL-MCNC: 194 MG/DL (ref 74–99)
HCT VFR BLD AUTO: 47.1 % (ref 36–46)
HGB BLD-MCNC: 16.2 G/DL (ref 12–16)
IMM GRANULOCYTES # BLD AUTO: 0.01 X10*3/UL (ref 0–0.7)
IMM GRANULOCYTES NFR BLD AUTO: 0.2 % (ref 0–0.9)
LYMPHOCYTES # BLD AUTO: 1.96 X10*3/UL (ref 1.2–4.8)
LYMPHOCYTES NFR BLD AUTO: 30.6 %
MCH RBC QN AUTO: 29.8 PG (ref 26–34)
MCHC RBC AUTO-ENTMCNC: 34.4 G/DL (ref 32–36)
MCV RBC AUTO: 87 FL (ref 80–100)
MONOCYTES # BLD AUTO: 0.48 X10*3/UL (ref 0.1–1)
MONOCYTES NFR BLD AUTO: 7.5 %
NEUTROPHILS # BLD AUTO: 3.75 X10*3/UL (ref 1.2–7.7)
NEUTROPHILS NFR BLD AUTO: 58.4 %
NRBC BLD-RTO: 0 /100 WBCS (ref 0–0)
PLATELET # BLD AUTO: 180 X10*3/UL (ref 150–450)
POTASSIUM SERPL-SCNC: 4.2 MMOL/L (ref 3.5–5.3)
PROT SERPL-MCNC: 8.2 G/DL (ref 6.4–8.2)
RBC # BLD AUTO: 5.44 X10*6/UL (ref 4–5.2)
SODIUM SERPL-SCNC: 135 MMOL/L (ref 136–145)
WBC # BLD AUTO: 6.4 X10*3/UL (ref 4.4–11.3)

## 2025-02-26 PROCEDURE — 83735 ASSAY OF MAGNESIUM: CPT | Performed by: EMERGENCY MEDICINE

## 2025-02-26 PROCEDURE — 93005 ELECTROCARDIOGRAM TRACING: CPT

## 2025-02-26 PROCEDURE — 84484 ASSAY OF TROPONIN QUANT: CPT | Performed by: EMERGENCY MEDICINE

## 2025-02-26 PROCEDURE — 85025 COMPLETE CBC W/AUTO DIFF WBC: CPT | Performed by: EMERGENCY MEDICINE

## 2025-02-26 PROCEDURE — 80053 COMPREHEN METABOLIC PANEL: CPT | Performed by: EMERGENCY MEDICINE

## 2025-02-26 PROCEDURE — 93010 ELECTROCARDIOGRAM REPORT: CPT | Performed by: PHYSICIAN ASSISTANT

## 2025-02-26 PROCEDURE — 99285 EMERGENCY DEPT VISIT HI MDM: CPT | Performed by: EMERGENCY MEDICINE

## 2025-02-26 PROCEDURE — 99285 EMERGENCY DEPT VISIT HI MDM: CPT | Mod: 25 | Performed by: EMERGENCY MEDICINE

## 2025-02-26 PROCEDURE — 36415 COLL VENOUS BLD VENIPUNCTURE: CPT | Performed by: EMERGENCY MEDICINE

## 2025-02-26 PROCEDURE — 71046 X-RAY EXAM CHEST 2 VIEWS: CPT

## 2025-02-26 PROCEDURE — 83880 ASSAY OF NATRIURETIC PEPTIDE: CPT | Performed by: EMERGENCY MEDICINE

## 2025-02-26 PROCEDURE — 71046 X-RAY EXAM CHEST 2 VIEWS: CPT | Performed by: RADIOLOGY

## 2025-02-26 RX ORDER — ONDANSETRON HYDROCHLORIDE 2 MG/ML
4 INJECTION, SOLUTION INTRAVENOUS ONCE
Status: COMPLETED | OUTPATIENT
Start: 2025-02-26 | End: 2025-02-27

## 2025-02-26 RX ORDER — ACETAMINOPHEN 325 MG/1
975 TABLET ORAL ONCE
Status: DISCONTINUED | OUTPATIENT
Start: 2025-02-26 | End: 2025-02-27

## 2025-02-26 ASSESSMENT — PAIN - FUNCTIONAL ASSESSMENT: PAIN_FUNCTIONAL_ASSESSMENT: 0-10

## 2025-02-26 ASSESSMENT — PAIN SCALES - GENERAL: PAINLEVEL_OUTOF10: 8

## 2025-02-26 ASSESSMENT — PAIN DESCRIPTION - LOCATION: LOCATION: CHEST

## 2025-02-27 ENCOUNTER — APPOINTMENT (OUTPATIENT)
Dept: RADIOLOGY | Facility: HOSPITAL | Age: 65
End: 2025-02-27
Payer: COMMERCIAL

## 2025-02-27 PROBLEM — R18.8 ABDOMINAL WALL FLUID COLLECTIONS: Status: ACTIVE | Noted: 2025-02-27

## 2025-02-27 LAB
ABO GROUP (TYPE) IN BLOOD: NORMAL
ANTIBODY SCREEN: NORMAL
ATRIAL RATE: 92 BPM
CARDIAC TROPONIN I PNL SERPL HS: 9 NG/L (ref 0–34)
CREAT UR-MCNC: 91 MG/DL (ref 20–320)
GLUCOSE BLD MANUAL STRIP-MCNC: 102 MG/DL (ref 74–99)
GLUCOSE BLD MANUAL STRIP-MCNC: 123 MG/DL (ref 74–99)
GLUCOSE BLD MANUAL STRIP-MCNC: 128 MG/DL (ref 74–99)
GLUCOSE BLD MANUAL STRIP-MCNC: 135 MG/DL (ref 74–99)
GLUCOSE BLD MANUAL STRIP-MCNC: 75 MG/DL (ref 74–99)
GLUCOSE BLD MANUAL STRIP-MCNC: 78 MG/DL (ref 74–99)
GLUCOSE BLD MANUAL STRIP-MCNC: 90 MG/DL (ref 74–99)
MAGNESIUM SERPL-MCNC: 2.67 MG/DL (ref 1.6–2.4)
P AXIS: 14 DEGREES
P OFFSET: 178 MS
P ONSET: 138 MS
POCT GLUCOSE: 128 MG/DL (ref 74–99)
PR INTERVAL: 138 MS
Q ONSET: 207 MS
QRS COUNT: 15 BEATS
QRS DURATION: 122 MS
QT INTERVAL: 398 MS
QTC CALCULATION(BAZETT): 492 MS
QTC FREDERICIA: 459 MS
R AXIS: -57 DEGREES
RH FACTOR (ANTIGEN D): NORMAL
SODIUM UR-SCNC: 20 MMOL/L
SODIUM/CREAT UR-RTO: 22 MMOL/G CREAT
T AXIS: 76 DEGREES
T OFFSET: 406 MS
VENTRICULAR RATE: 92 BPM

## 2025-02-27 PROCEDURE — 2500000002 HC RX 250 W HCPCS SELF ADMINISTERED DRUGS (ALT 637 FOR MEDICARE OP, ALT 636 FOR OP/ED)

## 2025-02-27 PROCEDURE — 82570 ASSAY OF URINE CREATININE: CPT

## 2025-02-27 PROCEDURE — 99223 1ST HOSP IP/OBS HIGH 75: CPT

## 2025-02-27 PROCEDURE — 74176 CT ABD & PELVIS W/O CONTRAST: CPT

## 2025-02-27 PROCEDURE — 2500000004 HC RX 250 GENERAL PHARMACY W/ HCPCS (ALT 636 FOR OP/ED): Performed by: EMERGENCY MEDICINE

## 2025-02-27 PROCEDURE — 2500000004 HC RX 250 GENERAL PHARMACY W/ HCPCS (ALT 636 FOR OP/ED): Mod: JZ,TB | Performed by: STUDENT IN AN ORGANIZED HEALTH CARE EDUCATION/TRAINING PROGRAM

## 2025-02-27 PROCEDURE — 36415 COLL VENOUS BLD VENIPUNCTURE: CPT

## 2025-02-27 PROCEDURE — 96375 TX/PRO/DX INJ NEW DRUG ADDON: CPT | Mod: 59

## 2025-02-27 PROCEDURE — 74176 CT ABD & PELVIS W/O CONTRAST: CPT | Performed by: STUDENT IN AN ORGANIZED HEALTH CARE EDUCATION/TRAINING PROGRAM

## 2025-02-27 PROCEDURE — 86901 BLOOD TYPING SEROLOGIC RH(D): CPT

## 2025-02-27 PROCEDURE — 96374 THER/PROPH/DIAG INJ IV PUSH: CPT | Mod: 59

## 2025-02-27 PROCEDURE — 82947 ASSAY GLUCOSE BLOOD QUANT: CPT

## 2025-02-27 PROCEDURE — 2500000002 HC RX 250 W HCPCS SELF ADMINISTERED DRUGS (ALT 637 FOR MEDICARE OP, ALT 636 FOR OP/ED): Performed by: STUDENT IN AN ORGANIZED HEALTH CARE EDUCATION/TRAINING PROGRAM

## 2025-02-27 PROCEDURE — 96361 HYDRATE IV INFUSION ADD-ON: CPT | Mod: 59

## 2025-02-27 PROCEDURE — 2500000004 HC RX 250 GENERAL PHARMACY W/ HCPCS (ALT 636 FOR OP/ED): Mod: JZ

## 2025-02-27 PROCEDURE — 87081 CULTURE SCREEN ONLY: CPT | Performed by: STUDENT IN AN ORGANIZED HEALTH CARE EDUCATION/TRAINING PROGRAM

## 2025-02-27 PROCEDURE — 10140 I&D HMTMA SEROMA/FLUID COLLJ: CPT | Mod: GC | Performed by: STUDENT IN AN ORGANIZED HEALTH CARE EDUCATION/TRAINING PROGRAM

## 2025-02-27 PROCEDURE — 2500000004 HC RX 250 GENERAL PHARMACY W/ HCPCS (ALT 636 FOR OP/ED)

## 2025-02-27 PROCEDURE — 87075 CULTR BACTERIA EXCEPT BLOOD: CPT | Performed by: STUDENT IN AN ORGANIZED HEALTH CARE EDUCATION/TRAINING PROGRAM

## 2025-02-27 PROCEDURE — 10140 I&D HMTMA SEROMA/FLUID COLLJ: CPT | Performed by: STUDENT IN AN ORGANIZED HEALTH CARE EDUCATION/TRAINING PROGRAM

## 2025-02-27 PROCEDURE — 87070 CULTURE OTHR SPECIMN AEROBIC: CPT | Performed by: STUDENT IN AN ORGANIZED HEALTH CARE EDUCATION/TRAINING PROGRAM

## 2025-02-27 PROCEDURE — 0W9F0ZZ DRAINAGE OF ABDOMINAL WALL, OPEN APPROACH: ICD-10-PCS | Performed by: STUDENT IN AN ORGANIZED HEALTH CARE EDUCATION/TRAINING PROGRAM

## 2025-02-27 PROCEDURE — 2500000001 HC RX 250 WO HCPCS SELF ADMINISTERED DRUGS (ALT 637 FOR MEDICARE OP)

## 2025-02-27 PROCEDURE — 1210000001 HC SEMI-PRIVATE ROOM DAILY

## 2025-02-27 PROCEDURE — 84300 ASSAY OF URINE SODIUM: CPT

## 2025-02-27 PROCEDURE — 2500000004 HC RX 250 GENERAL PHARMACY W/ HCPCS (ALT 636 FOR OP/ED): Performed by: STUDENT IN AN ORGANIZED HEALTH CARE EDUCATION/TRAINING PROGRAM

## 2025-02-27 RX ORDER — DEXTROSE 50 % IN WATER (D50W) INTRAVENOUS SYRINGE
12.5
Status: DISCONTINUED | OUTPATIENT
Start: 2025-02-27 | End: 2025-02-27

## 2025-02-27 RX ORDER — CLINDAMYCIN PHOSPHATE 900 MG/50ML
900 INJECTION, SOLUTION INTRAVENOUS EVERY 8 HOURS
Status: DISCONTINUED | OUTPATIENT
Start: 2025-02-27 | End: 2025-02-28

## 2025-02-27 RX ORDER — LIDOCAINE HYDROCHLORIDE 10 MG/ML
20 INJECTION, SOLUTION INFILTRATION; PERINEURAL ONCE
Status: DISCONTINUED | OUTPATIENT
Start: 2025-02-27 | End: 2025-02-27 | Stop reason: SDUPTHER

## 2025-02-27 RX ORDER — ACETAMINOPHEN 325 MG/1
650 TABLET ORAL EVERY 4 HOURS PRN
Status: DISPENSED | OUTPATIENT
Start: 2025-02-27

## 2025-02-27 RX ORDER — ACETAMINOPHEN 500 MG
5 TABLET ORAL NIGHTLY PRN
Status: DISPENSED | OUTPATIENT
Start: 2025-02-27

## 2025-02-27 RX ORDER — INSULIN LISPRO 100 [IU]/ML
0-5 INJECTION, SOLUTION INTRAVENOUS; SUBCUTANEOUS
Status: DISCONTINUED | OUTPATIENT
Start: 2025-02-27 | End: 2025-03-02

## 2025-02-27 RX ORDER — ACETAMINOPHEN 325 MG/1
3 TABLET ORAL EVERY 6 HOURS PRN
Status: ON HOLD | COMMUNITY

## 2025-02-27 RX ORDER — DEXTROSE 50 % IN WATER (D50W) INTRAVENOUS SYRINGE
25
Status: DISCONTINUED | OUTPATIENT
Start: 2025-02-27 | End: 2025-02-27

## 2025-02-27 RX ORDER — SODIUM CHLORIDE, SODIUM LACTATE, POTASSIUM CHLORIDE, CALCIUM CHLORIDE 600; 310; 30; 20 MG/100ML; MG/100ML; MG/100ML; MG/100ML
125 INJECTION, SOLUTION INTRAVENOUS CONTINUOUS
Status: DISCONTINUED | OUTPATIENT
Start: 2025-02-27 | End: 2025-02-27

## 2025-02-27 RX ORDER — LIDOCAINE HYDROCHLORIDE 10 MG/ML
20 INJECTION, SOLUTION INFILTRATION; PERINEURAL ONCE
Status: DISCONTINUED | OUTPATIENT
Start: 2025-02-27 | End: 2025-02-27

## 2025-02-27 RX ORDER — DEXTROSE 50 % IN WATER (D50W) INTRAVENOUS SYRINGE
25
Status: DISPENSED | OUTPATIENT
Start: 2025-02-27

## 2025-02-27 RX ORDER — ONDANSETRON 4 MG/1
4 TABLET, FILM COATED ORAL EVERY 6 HOURS PRN
Status: DISPENSED | OUTPATIENT
Start: 2025-02-27

## 2025-02-27 RX ORDER — TRAMADOL HYDROCHLORIDE 50 MG/1
50 TABLET ORAL EVERY 12 HOURS PRN
Status: ON HOLD | COMMUNITY

## 2025-02-27 RX ORDER — ONDANSETRON HYDROCHLORIDE 2 MG/ML
4 INJECTION, SOLUTION INTRAVENOUS EVERY 6 HOURS PRN
Status: DISPENSED | OUTPATIENT
Start: 2025-02-27

## 2025-02-27 RX ORDER — ATORVASTATIN CALCIUM 80 MG/1
80 TABLET, FILM COATED ORAL DAILY
Status: DISPENSED | OUTPATIENT
Start: 2025-02-27

## 2025-02-27 RX ORDER — INSULIN LISPRO 100 [IU]/ML
0-5 INJECTION, SOLUTION INTRAVENOUS; SUBCUTANEOUS EVERY 4 HOURS
Status: DISCONTINUED | OUTPATIENT
Start: 2025-02-27 | End: 2025-02-27

## 2025-02-27 RX ORDER — DEXTROSE 50 % IN WATER (D50W) INTRAVENOUS SYRINGE
12.5
Status: ACTIVE | OUTPATIENT
Start: 2025-02-27

## 2025-02-27 RX ORDER — FUROSEMIDE 20 MG/1
20 TABLET ORAL DAILY
Status: DISPENSED | OUTPATIENT
Start: 2025-02-27

## 2025-02-27 RX ORDER — INSULIN LISPRO 100 [IU]/ML
22 INJECTION, SOLUTION INTRAVENOUS; SUBCUTANEOUS
Status: DISCONTINUED | OUTPATIENT
Start: 2025-02-28 | End: 2025-03-02

## 2025-02-27 RX ORDER — MORPHINE SULFATE 4 MG/ML
4 INJECTION INTRAVENOUS ONCE
Status: COMPLETED | OUTPATIENT
Start: 2025-02-27 | End: 2025-02-27

## 2025-02-27 RX ORDER — ENOXAPARIN SODIUM 100 MG/ML
40 INJECTION SUBCUTANEOUS DAILY
Status: DISPENSED | OUTPATIENT
Start: 2025-02-27

## 2025-02-27 RX ORDER — LIDOCAINE HYDROCHLORIDE 10 MG/ML
10 INJECTION, SOLUTION INFILTRATION; PERINEURAL ONCE
Status: COMPLETED | OUTPATIENT
Start: 2025-02-27 | End: 2025-02-27

## 2025-02-27 RX ORDER — CARVEDILOL 12.5 MG/1
12.5 TABLET ORAL
Status: DISPENSED | OUTPATIENT
Start: 2025-02-28

## 2025-02-27 RX ORDER — SODIUM CHLORIDE, SODIUM LACTATE, POTASSIUM CHLORIDE, CALCIUM CHLORIDE 600; 310; 30; 20 MG/100ML; MG/100ML; MG/100ML; MG/100ML
75 INJECTION, SOLUTION INTRAVENOUS CONTINUOUS
Status: ACTIVE | OUTPATIENT
Start: 2025-02-27 | End: 2025-02-28

## 2025-02-27 RX ORDER — CLINDAMYCIN PHOSPHATE 900 MG/50ML
INJECTION, SOLUTION INTRAVENOUS
Status: COMPLETED
Start: 2025-02-27 | End: 2025-02-27

## 2025-02-27 RX ORDER — DULOXETIN HYDROCHLORIDE 60 MG/1
60 CAPSULE, DELAYED RELEASE ORAL NIGHTLY
Status: DISPENSED | OUTPATIENT
Start: 2025-02-27

## 2025-02-27 RX ORDER — OXYCODONE HYDROCHLORIDE 5 MG/1
5 TABLET ORAL EVERY 4 HOURS PRN
Status: DISPENSED | OUTPATIENT
Start: 2025-02-27

## 2025-02-27 RX ORDER — INSULIN DEGLUDEC 100 U/ML
40 INJECTION, SOLUTION SUBCUTANEOUS NIGHTLY
Status: DISCONTINUED | OUTPATIENT
Start: 2025-02-27 | End: 2025-02-28

## 2025-02-27 RX ORDER — INSULIN LISPRO 100 [IU]/ML
22 INJECTION, SOLUTION INTRAVENOUS; SUBCUTANEOUS ONCE
Status: COMPLETED | OUTPATIENT
Start: 2025-02-27 | End: 2025-02-27

## 2025-02-27 RX ORDER — DULOXETIN HYDROCHLORIDE 30 MG/1
30 CAPSULE, DELAYED RELEASE ORAL DAILY
Status: DISPENSED | OUTPATIENT
Start: 2025-02-27

## 2025-02-27 RX ADMIN — SODIUM CHLORIDE, POTASSIUM CHLORIDE, SODIUM LACTATE AND CALCIUM CHLORIDE 1000 ML: 600; 310; 30; 20 INJECTION, SOLUTION INTRAVENOUS at 00:02

## 2025-02-27 RX ADMIN — ACETAMINOPHEN 650 MG: 325 TABLET ORAL at 15:58

## 2025-02-27 RX ADMIN — INSULIN DEGLUDEC INJECTION 40 UNITS: 100 INJECTION, SOLUTION SUBCUTANEOUS at 21:24

## 2025-02-27 RX ADMIN — SODIUM CHLORIDE, POTASSIUM CHLORIDE, SODIUM LACTATE AND CALCIUM CHLORIDE 125 ML/HR: 600; 310; 30; 20 INJECTION, SOLUTION INTRAVENOUS at 09:11

## 2025-02-27 RX ADMIN — SODIUM CHLORIDE, POTASSIUM CHLORIDE, SODIUM LACTATE AND CALCIUM CHLORIDE 125 ML/HR: 600; 310; 30; 20 INJECTION, SOLUTION INTRAVENOUS at 05:49

## 2025-02-27 RX ADMIN — OXYCODONE HYDROCHLORIDE 5 MG: 5 TABLET ORAL at 06:05

## 2025-02-27 RX ADMIN — CLINDAMYCIN IN 5 PERCENT DEXTROSE 900 MG: 18 INJECTION, SOLUTION INTRAVENOUS at 19:01

## 2025-02-27 RX ADMIN — INSULIN LISPRO 22 UNITS: 100 INJECTION, SOLUTION INTRAVENOUS; SUBCUTANEOUS at 17:22

## 2025-02-27 RX ADMIN — HYDROMORPHONE HYDROCHLORIDE 0.5 MG: 1 INJECTION, SOLUTION INTRAMUSCULAR; INTRAVENOUS; SUBCUTANEOUS at 16:10

## 2025-02-27 RX ADMIN — CLINDAMYCIN IN 5 PERCENT DEXTROSE 900 MG: 18 INJECTION, SOLUTION INTRAVENOUS at 11:32

## 2025-02-27 RX ADMIN — ONDANSETRON 4 MG: 2 INJECTION INTRAMUSCULAR; INTRAVENOUS at 00:02

## 2025-02-27 RX ADMIN — LIDOCAINE HYDROCHLORIDE 10 ML: 10 INJECTION, SOLUTION INFILTRATION; PERINEURAL at 17:25

## 2025-02-27 RX ADMIN — OXYCODONE HYDROCHLORIDE 5 MG: 5 TABLET ORAL at 20:03

## 2025-02-27 RX ADMIN — Medication 5 MG: at 21:15

## 2025-02-27 RX ADMIN — SODIUM CHLORIDE, POTASSIUM CHLORIDE, SODIUM LACTATE AND CALCIUM CHLORIDE 75 ML/HR: 600; 310; 30; 20 INJECTION, SOLUTION INTRAVENOUS at 21:35

## 2025-02-27 RX ADMIN — SODIUM CHLORIDE, POTASSIUM CHLORIDE, SODIUM LACTATE AND CALCIUM CHLORIDE 125 ML/HR: 600; 310; 30; 20 INJECTION, SOLUTION INTRAVENOUS at 12:50

## 2025-02-27 RX ADMIN — ACETAMINOPHEN 650 MG: 325 TABLET ORAL at 20:02

## 2025-02-27 RX ADMIN — OXYCODONE HYDROCHLORIDE 5 MG: 5 TABLET ORAL at 15:58

## 2025-02-27 RX ADMIN — OXYCODONE HYDROCHLORIDE 5 MG: 5 TABLET ORAL at 11:37

## 2025-02-27 RX ADMIN — DULOXETINE HYDROCHLORIDE 60 MG: 60 CAPSULE, DELAYED RELEASE ORAL at 21:15

## 2025-02-27 RX ADMIN — ONDANSETRON 4 MG: 2 INJECTION INTRAMUSCULAR; INTRAVENOUS at 13:08

## 2025-02-27 RX ADMIN — MORPHINE SULFATE 4 MG: 4 INJECTION INTRAVENOUS at 04:04

## 2025-02-27 SDOH — SOCIAL STABILITY: SOCIAL INSECURITY: DOES ANYONE TRY TO KEEP YOU FROM HAVING/CONTACTING OTHER FRIENDS OR DOING THINGS OUTSIDE YOUR HOME?: NO

## 2025-02-27 SDOH — SOCIAL STABILITY: SOCIAL INSECURITY: WITHIN THE LAST YEAR, HAVE YOU BEEN HUMILIATED OR EMOTIONALLY ABUSED IN OTHER WAYS BY YOUR PARTNER OR EX-PARTNER?: NO

## 2025-02-27 SDOH — SOCIAL STABILITY: SOCIAL INSECURITY: HAVE YOU HAD ANY THOUGHTS OF HARMING ANYONE ELSE?: NO

## 2025-02-27 SDOH — ECONOMIC STABILITY: INCOME INSECURITY: IN THE PAST 12 MONTHS HAS THE ELECTRIC, GAS, OIL, OR WATER COMPANY THREATENED TO SHUT OFF SERVICES IN YOUR HOME?: NO

## 2025-02-27 SDOH — SOCIAL STABILITY: SOCIAL INSECURITY: HAVE YOU HAD THOUGHTS OF HARMING ANYONE ELSE?: NO

## 2025-02-27 SDOH — SOCIAL STABILITY: SOCIAL INSECURITY: ARE THERE ANY APPARENT SIGNS OF INJURIES/BEHAVIORS THAT COULD BE RELATED TO ABUSE/NEGLECT?: NO

## 2025-02-27 SDOH — ECONOMIC STABILITY: FOOD INSECURITY: WITHIN THE PAST 12 MONTHS, THE FOOD YOU BOUGHT JUST DIDN'T LAST AND YOU DIDN'T HAVE MONEY TO GET MORE.: NEVER TRUE

## 2025-02-27 SDOH — ECONOMIC STABILITY: FOOD INSECURITY: WITHIN THE PAST 12 MONTHS, YOU WORRIED THAT YOUR FOOD WOULD RUN OUT BEFORE YOU GOT THE MONEY TO BUY MORE.: NEVER TRUE

## 2025-02-27 SDOH — SOCIAL STABILITY: SOCIAL INSECURITY: WITHIN THE LAST YEAR, HAVE YOU BEEN AFRAID OF YOUR PARTNER OR EX-PARTNER?: NO

## 2025-02-27 SDOH — SOCIAL STABILITY: SOCIAL INSECURITY: ARE YOU OR HAVE YOU BEEN THREATENED OR ABUSED PHYSICALLY, EMOTIONALLY, OR SEXUALLY BY ANYONE?: NO

## 2025-02-27 SDOH — SOCIAL STABILITY: SOCIAL INSECURITY: DO YOU FEEL ANYONE HAS EXPLOITED OR TAKEN ADVANTAGE OF YOU FINANCIALLY OR OF YOUR PERSONAL PROPERTY?: NO

## 2025-02-27 SDOH — SOCIAL STABILITY: SOCIAL INSECURITY: DO YOU FEEL UNSAFE GOING BACK TO THE PLACE WHERE YOU ARE LIVING?: NO

## 2025-02-27 SDOH — SOCIAL STABILITY: SOCIAL INSECURITY: WERE YOU ABLE TO COMPLETE ALL THE BEHAVIORAL HEALTH SCREENINGS?: YES

## 2025-02-27 SDOH — SOCIAL STABILITY: SOCIAL INSECURITY: HAS ANYONE EVER THREATENED TO HURT YOUR FAMILY OR YOUR PETS?: NO

## 2025-02-27 SDOH — SOCIAL STABILITY: SOCIAL INSECURITY: ABUSE: ADULT

## 2025-02-27 ASSESSMENT — COGNITIVE AND FUNCTIONAL STATUS - GENERAL
PATIENT BASELINE BEDBOUND: NO
DAILY ACTIVITIY SCORE: 24
DAILY ACTIVITIY SCORE: 24
MOBILITY SCORE: 24
MOBILITY SCORE: 24

## 2025-02-27 ASSESSMENT — PAIN DESCRIPTION - DESCRIPTORS
DESCRIPTORS: ACHING;CRAMPING
DESCRIPTORS: HEADACHE
DESCRIPTORS: HEADACHE
DESCRIPTORS: ACHING;CRAMPING
DESCRIPTORS: HEADACHE

## 2025-02-27 ASSESSMENT — PATIENT HEALTH QUESTIONNAIRE - PHQ9
1. LITTLE INTEREST OR PLEASURE IN DOING THINGS: NOT AT ALL
SUM OF ALL RESPONSES TO PHQ9 QUESTIONS 1 & 2: 0
2. FEELING DOWN, DEPRESSED OR HOPELESS: NOT AT ALL

## 2025-02-27 ASSESSMENT — PAIN DESCRIPTION - LOCATION
LOCATION: HEAD
LOCATION: ABDOMEN
LOCATION: ABDOMEN
LOCATION: HEAD

## 2025-02-27 ASSESSMENT — PAIN DESCRIPTION - FREQUENCY
FREQUENCY: CONSTANT/CONTINUOUS
FREQUENCY: CONSTANT/CONTINUOUS

## 2025-02-27 ASSESSMENT — PAIN SCALES - GENERAL
PAINLEVEL_OUTOF10: 6
PAINLEVEL_OUTOF10: 8
PAINLEVEL_OUTOF10: 5 - MODERATE PAIN
PAINLEVEL_OUTOF10: 8
PAINLEVEL_OUTOF10: 7

## 2025-02-27 ASSESSMENT — PAIN DESCRIPTION - ORIENTATION
ORIENTATION: LOWER
ORIENTATION: RIGHT

## 2025-02-27 ASSESSMENT — ACTIVITIES OF DAILY LIVING (ADL)
GROOMING: INDEPENDENT
ASSISTIVE_DEVICE: WALKER
FEEDING YOURSELF: INDEPENDENT
JUDGMENT_ADEQUATE_SAFELY_COMPLETE_DAILY_ACTIVITIES: YES
HEARING - LEFT EAR: FUNCTIONAL
WALKS IN HOME: INDEPENDENT
PATIENT'S MEMORY ADEQUATE TO SAFELY COMPLETE DAILY ACTIVITIES?: YES
ADEQUATE_TO_COMPLETE_ADL: YES
DRESSING YOURSELF: INDEPENDENT
TOILETING: INDEPENDENT
BATHING: INDEPENDENT
LACK_OF_TRANSPORTATION: NO
HEARING - RIGHT EAR: FUNCTIONAL

## 2025-02-27 ASSESSMENT — PAIN DESCRIPTION - PROGRESSION
CLINICAL_PROGRESSION: NOT CHANGED
CLINICAL_PROGRESSION: NOT CHANGED
CLINICAL_PROGRESSION: GRADUALLY IMPROVING

## 2025-02-27 ASSESSMENT — LIFESTYLE VARIABLES
HOW MANY STANDARD DRINKS CONTAINING ALCOHOL DO YOU HAVE ON A TYPICAL DAY: 1 OR 2
HOW OFTEN DO YOU HAVE 6 OR MORE DRINKS ON ONE OCCASION: NEVER
AUDIT-C TOTAL SCORE: 3
AUDIT-C TOTAL SCORE: 3
SKIP TO QUESTIONS 9-10: 1
HOW OFTEN DO YOU HAVE A DRINK CONTAINING ALCOHOL: 2-3 TIMES A WEEK

## 2025-02-27 ASSESSMENT — PAIN DESCRIPTION - PAIN TYPE
TYPE: SURGICAL PAIN
TYPE: ACUTE PAIN

## 2025-02-27 ASSESSMENT — PAIN DESCRIPTION - ONSET
ONSET: GRADUAL
ONSET: ONGOING
ONSET: ONGOING

## 2025-02-27 ASSESSMENT — PAIN - FUNCTIONAL ASSESSMENT
PAIN_FUNCTIONAL_ASSESSMENT: 0-10

## 2025-02-27 NOTE — ED PROVIDER NOTES
"History of Present Illness     History provided by: Patient  Limitations to History: None  External Records Reviewed with Brief Summary:  Medical history, op note from 1/23/2025, previous scans    HPI:  Nelida Mata is a 65 y.o. female with history of HFpEF, CAD s/p PCI with stent x 3, STEMI in 2023, TIA, SVC thrombus on Lovenox, HLD, CKD, total abdominal hysterectomy/bilateral salpingo-oophorectomy/umbilical hernia repair on 1/23/2025, presenting with chest pain, dizziness, vomiting x 3 days.  She also endorsed drainage from her umbilicus with associated abdominal pain around the area.  She said her chest pain feels like \"someone is sitting on her chest\" and this radiates into her right arm.  She said she has been not been able to keep down food or fluids due to vomiting.  She denied any blood in her vomit.  She denied any pain with urination or changes in urination, she stated that she has also been constipated recently.  She denied any shortness of breath, sick contacts, new numbness, tingling, or weakness in her extremities, headache, or changes in vision.  She said that she has a history of neuropathy and does have tingling in her toes at baseline.    Physical Exam   Triage vitals:  T 36.7 °C (98.1 °F)  HR 95  /67  RR 16  O2 100 % None (Room air)    General: Awake, alert, in no acute distress  Eyes: Gaze conjugate.  No scleral icterus or injection  HENT: Normo-cephalic, atraumatic. No stridor  CV: Regular rate, regular rhythm. Radial pulses 2+ bilaterally  Resp: Breathing non-labored, speaking in full sentences.  Clear to auscultation bilaterally  GI: Soft, non-distended, tender to palpation in umbilical region.  Purulent, malodorous discharge from umbilicus.  No rebound or guarding.  MSK/Extremities: No gross bony deformities. Moving all extremities  Skin: Warm. Appropriate color  Neuro: Alert. Oriented. Face symmetric. Speech is fluent.  Gross strength and sensation intact in b/l UE and " LEs  Psych: Appropriate mood and affect    Medical Decision Making & ED Course   Medical Decision Makin y.o. female with above medical history presented the ED with 3 days of chest pain, dizziness, and vomiting.  Physical exam concerning for umbilical tenderness with purulent, malodorous drainage.  Tylenol, Zofran, LR ordered for symptom management.  Blood work ordered along with EKG and chest x-ray for chest pain workup, along with CT abdomen pelvis to look for signs of intra-abdominal infection or abscess due to malodorous drainage.  CMP concerning for CLARIBEL, patient has already been given fluids.  Likely in setting of decreased p.o. intake for the past few days.  CBC negative for white count.CT abdomen pelvis concerning for development of supraumbilical fluid collection with dependent debris likely representing hematoma with surrounding inflammatory change concerning for superimposed infection with possible umbilical communication.  Gyn Onc had performed surgery, so they were consulted in the ED.  Discussed with Gyn Onc, they were concerned for abdominal wall seroma versus abscess.  The plan was to take patient for an I&D in the OR in the morning, discussed starting antibiotics at this time but decided to not start them.  Discussed with patient the plan, and she was agreeable.  Patient was admitted to gynecology for further management.    ED Course:  Diagnoses as of 25 0521   Periumbilical abdominal pain   Abdominal wall fluid collections       EKG Independent Interpretation: EKG done at 10:58 PM showed sinus rhythm with ventricular rate of 83.  Left axis.  Normal WI, QTc 493.  Inverted T waves in 3, aVR, V1.  No ST elevation.  ----    Differential diagnoses considered include but are not limited to: Abscess, infection, gastritis, gastroenteritis, ACS, pneumonia, viral illness     Social Determinants of Health which Significantly Impact Care: None identified     The patient was discussed with the  following consultants/services:  GYN ONC      Disposition   As a result of their workup, the patient will require admission to the hospital.  The patient was informed of her diagnosis.  The patient was given the opportunity to ask questions and I answered them. The patient agreed to be admitted to the hospital.    Procedures   Procedures    Patient seen and discussed with ED attending physician.    Yisel Collier DO  Emergency Medicine     Yisel Collier DO  Resident  02/28/25 0241

## 2025-02-27 NOTE — PROCEDURES
"Incision and Drainage of Incisional Seroma     Indications: Pt with fluid collection 4.0 x 3.9 x 7.3cm fluid collection in the midline just superior to the umbilicus, concerning for seroma, drainage indicated to decrease pain and risk of infection     Findings: 4x5cm seroma superior to umbilicus crossing the midline vertical incision     Procedure Details: The fluid collection was palpated superior to the umbilicus with the midline crossing the prior midline vertical incision. The prior incision site was prepped with alcohol swab and 8cc 1% lidocaine was injected. The site was then prepped with betadine. The incision was reopened approx 2cm in the midline. The collection was probed with a cotton-tipped swab and thin loculations were broken up. Noted to measure as above. It was flushed with normal saline and packed with 1\" packing strips. Hemostasis was noted. A 4x4 gauze was placed and taped in place. The patient tolerated the procedure well and Dr. Parks was present for the entire procedure     Lani Hyde MD  PGY-4, Obstetrics and Gynecology  "

## 2025-02-27 NOTE — PROGRESS NOTES
Pharmacy Medication History Review    Nelida Mata is a 65 y.o. female admitted for Abdominal wall fluid collections. Pharmacy reviewed the patient's [id number] medications and allergies for accuracy.    Medications ADDED:  Tylenol 325 mg tablet  Tramadol 50 mg tablet   Medications CHANGED:  N/A  Medications REMOVED:   N/A     The list below reflects the updated PTA list.   Prior to Admission Medications   Prescriptions Last Dose Informant   DULoxetine (Cymbalta) 30 mg DR capsule 2025 Morning Self   Sig: Take 1 capsule (30 mg) by mouth once daily in the morning AND 2 capsules (60 mg) once daily at bedtime. Do not crush or chew..   FreeStyle Filipe 2 Sensor kit  Self   Sig: Apply 1 sensor to the back of the upper arm. Change sensor every 14 days.   FreeStyle Filipe reader (FreeStyle Filipe 2 [place]) misc  Self   Sig: Use as instructed   acetaminophen (Tylenol) 325 mg tablet 2025 Self   Sig: Take 3 tablets (975 mg) by mouth every 6 hours if needed for mild pain (1 - 3).   aspirin 81 mg EC tablet 2025 Morning Self   Sig: Take 1 tablet (81 mg) by mouth once daily.   atorvastatin (Lipitor) 80 mg tablet 2025 Morning Self   Si TAB(S) ORALLY ONCE A DAY -.MEDS TO [facility]   blood pressure monitor kit  Self   Si each once daily. Use once daily to check blood pressure   blood sugar diagnostic (OneTouch Ultra Test) strip  Self   Sig: USE 1 STRIP(S) TO TEST BLOOD SUGAR 3 TIMES A DAY   brimonidine (AlphaGAN) 0.2 % ophthalmic solution 2025 Morning Self   Sig: Administer 1 drop into both eyes 2 times a day. PATIENT NEEDS PCP APPT FOR FURTHER REFILLS   carvedilol (Coreg) 12.5 mg tablet 2025 Morning Self   Sig: Take 1 tablet (12.5 mg) by mouth 2 times daily (morning and late afternoon).   cholecalciferol (Vitamin D3) 10 MCG (400 UNIT) tablet 2025 Morning Self   Sig: Take 1 tablet (10 mcg) by mouth once daily.   dapagliflozin propanediol (Farxiga) 10 mg 2025 Morning Self    Sig: Take 1 tablet (10 mg) by mouth once daily.   diclofenac sodium (Voltaren) 1 % gel 2/26/2025 Morning Self   Sig: Apply 4.5 inches (4 g) topically 2 times a day.   diphenhydramine/Maalox/lidocaine 1:1:1  Self   Sig: Swish and swallow 10 mL every 8 hours as needed for epigastric pain   enoxaparin (Lovenox) 40 mg/0.4 mL syringe  Self   Sig: Inject 0.4 mL (40 mg) under the skin once daily.   Patient not taking: Reported on 2/12/2025   duplicate   enoxaparin (Lovenox) 40 mg/0.4 mL syringe 2/26/2025 Morning Self   Sig: Inject 0.4ml under the skin once daily.   finerenone (Kerendia) 10 mg tablet tablet 2/26/2025 Morning Self   Sig: Take 1 tablet (10 mg) by mouth once daily.   furosemide (Lasix) 20 mg tablet 2/26/2025 Morning Self   Sig: Take 1 tablet (20 mg) by mouth once daily.   glucagon 3 mg/actuation spray,non-aerosol  Self   Sig: Administer 1 spray into affected nostril(s) if needed (please  apply into a single nostril for blood sugar <60).   hyoscyamine (Anaspaz) 0.125 mg disintegrating tablet  Self   Sig: Take 1 tablet (0.125 mg) by mouth every 8 hours if needed (cramping or nausea).   insulin degludec (TRESIBA FLEXTOUCH U-200 SUBQ) 2/25/2025 Bedtime Self   Sig: Inject 40 Units under the skin once daily at bedtime. Take as directed per insulin instructions.   insulin lispro (HumaLOG KwikPen Insulin) 200 unit/mL (3 mL) insulin pen pen 2/26/2025 Morning Self   Sig: Inject 22 Units under the skin 3 times daily (morning, midday, late afternoon). Take as directed per insulin instructions.   isosorbide mononitrate ER (Imdur) 60 mg 24 hr tablet 2/26/2025 Morning Self   Sig: TAKE 1 TABLET BY MOUTH EVERY DAY IN THE MORNING   latanoprost (Xalatan) 0.005 % ophthalmic solution 2/25/2025 Bedtime Self   Sig: Administer 1 drop into both eyes once daily at bedtime.   losartan (Cozaar) 50 mg tablet 2/26/2025 Morning Self   Sig: Take 1 tablet (50 mg) by mouth early in the morning..   melatonin 5 mg tablet 2/25/2025 Bedtime Self    Sig: Take 1 tablet (5 mg) by mouth as needed at bedtime for sleep.   metFORMIN XR (Glucophage-XR) 500 mg 24 hr tablet 2/25/2025 Evening Self   Sig: Take 1 tablet (500 mg) by mouth once daily in the evening. Take with meals. Do not crush, chew, or split.   multivitamin tablet 2/26/2025 Morning Self   Sig: Take 1 tablet by mouth once daily.   naloxone (Narcan) 4 mg/0.1 mL nasal spray  Self   Sig: Administer 1 spray (4 mg) into affected nostril(s) if needed for opioid reversal or respiratory depression. May repeat every 2-3 minutes if needed, alternating nostrils, until medical assistance becomes available.   nitroglycerin (Nitrostat) 0.4 mg SL tablet  Self   Sig: PLACE 1 TABLET UNDER THE TONGUE EVERY 5 MINUTES FOR UP TO 3 DOSES AS NEEDED FOR CHEST PAIN.CALL 911 IF PAIN PERSISTS.   nystatin (Mycostatin) 100,000 unit/gram powder  Self   Sig: Apply topically.   pantoprazole (ProtoNix) 40 mg EC tablet Not Taking Self   Sig: Take 1 tablet (40 mg) by mouth once daily in the morning. Take before meals.   Patient not taking: Reported on 2/27/2025   patient request refill   polyethylene glycol (Glycolax, Miralax) 17 gram packet  Self   Sig: Take 17 g by mouth once daily as needed (constipation).   semaglutide 2 mg/dose (8 mg/3 mL) pen injector  Self   Sig: Inject 2 mg under the skin 1 (one) time per week.  Patient takes every Sunday    sennosides-docusate sodium (Zoila-Colace) 8.6-50 mg tablet  Self   Sig: Take 2 tablets by mouth 2 times a day. While taking opiod medication then as needed for stool softener.   simethicone (Mylicon) 80 mg chewable tablet  Self   Sig: Chew 1 tablet (80 mg) 4 times a day as needed (gas pain).   traMADol (Ultram) 50 mg tablet  Self   Sig: Take 1 tablet (50 mg) by mouth every 12 hours if needed for severe pain (7 - 10).   urea (Carmol) 20 % cream  Self   Sig: Apply 1 Application topically 2 times a day.      Facility-Administered Medications: None        The list below reflects the updated allergy  "list. Please review each documented allergy for additional clarification and justification.  Allergies  Reviewed by Shakir Kingsley on 2/27/2025        Severity Reactions Comments    Other Low Rash Novocaine Solution, reaction rash     Penicillins Low Rash, Hives     Procaine Low Rash, Hives             Patient accepts M2B at discharge.     Sources:   [facility]  Pharmacy dispense history  Patient interview Moderate historian  Chart Review         - discharge summary from 1/23/25 via Erin Valenzuela MD             - patient also had the same med list on hand from this discharge summary     Additional Comments:  N/A      SHAKIR KINGSLEY  Pharmacy Technician  02/27/25     Secure Chat preferred   If no response call [phone] or OpenBook \"Med Rec\"   "

## 2025-02-27 NOTE — H&P
History Of Present Illness  Nelida Mata is a 65 y.o. female w/ a PMH of HFpEF (50-55% 9/2023), CAD s/p PCI w/ stent x 3 (2016, 2018) and STEMI (9/2023, no stents placed), TIA 9/2023, SVC thrombus on Lovenox, remote DVT, IDDM2, HLD, CKD, MICHAEL, and NAFLD presenting with nausea/vomiting and purulent drainage from her umbilicus s/p GIBSON-BSO and take back with washout on 1/23.    She underwent total abdominal hysterectomy, bilateral salpingo-oophorectomy, and umbilical hernia repair on 1/23/25. On POD#0, she became hypotensive, requiring O2 and was admitted to ICU. She subsequently underwent take back XL and evacuation of hemoperitoneum on 1/23.     She reports starting to have purulent drainage from her umbilicus 2 weeks ago. She was seen for a post-op visit on 2/12 and was given a 5 course of Bactrim. She states she only took 4 days of the antibiotics because they made her feel sick. She states she did not notice any improvement in the drainage after taking the antibiotics. She reports abdominal pain mainly around her umbilicus.    She also reports 4 days of nausea, vomiting, and dizziness. She states she has not been able to keep any food or water down for the last four days. She denies any fevers, chills, or urinary symptoms. She denies any recent sick contacts. She does state she was having some chest pressure and SOB that has now resolved.     Past Medical History  She has a past medical history of (HFpEF) heart failure with preserved ejection fraction, Abnormal uterine and vaginal bleeding, unspecified, Acute vaginitis (02/22/2018), Alcohol abuse, in remission, Anxiety, Arthritis, Blindness, Cerebral vascular accident (Multi) (09/2023), Changes in skin texture (04/10/2018), Chest pain, unspecified (02/22/2018), Chronic pain disorder, CKD (chronic kidney disease), Coronary artery disease, Depression, Disorder of pigmentation, unspecified (02/22/2018), Fibroid, Fibromyalgia, Generalized contraction of  visual field, left eye (02/06/2017), GERD (gastroesophageal reflux disease), Glaucoma, Headache, unspecified (04/10/2018), Hereditary and idiopathic neuropathy, unspecified (04/10/2018), History of blood transfusion, Hydroureter (02/22/2018), Hyperlipidemia, unspecified, Hypertension, Hypokalemia (02/22/2018), Insomnia, unspecified, Lipoma, Localized edema (02/22/2018), Macula scars of posterior pole (postinflammatory) (post-traumatic), left eye (04/10/2018), NAFLD (nonalcoholic fatty liver disease), NSTEMI (non-ST elevated myocardial infarction) (Multi) (06/2024), Obesity, Obstructive sleep apnea (adult) (pediatric), Other chest pain (04/10/2018), Other fatigue (02/22/2018), Other fecal abnormalities (02/22/2018), Other forms of dyspnea, Peripheral vascular disease (CMS-[facility]), Polyp of colon (02/22/2018), Postmenopausal bleeding (04/10/2018), Presence of coronary angioplasty implant and graft (02/22/2018), Presence of intraocular lens (03/18/2015), Psoriasis, unspecified, Repeated falls (02/22/2018), Retinal hemorrhage, right eye (02/06/2017), Shortness of breath, STEMI (ST elevation myocardial infarction) (Multi) (09/2023), 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    Surgical History  She has a past surgical history that includes Coronary angioplasty with stent (03/06/2017); Cataract extraction (03/18/2015); Retinal detachment surgery (03/18/2015); MR viera  head wo IV contrast (2013); MR angio neck wo IV contrast (2013); CT angio aorta and bilateral iliofemoral runoff including without contrast if performed (2022); Breast biopsy (Left); Cardiac catheterization (N/A, 2024); Toe amputation (Right); and Vulva surgery.     OB History  OB History    Para Term  AB Living   1 1 1     1   SAB IAB Ectopic Multiple Live Births           1      # Outcome Date GA Lbr Domenico/2nd Weight Sex Type Anes PTL Lv   1 Term      Vag-Spont   MONY       Sexual History  Social History     Substance and Sexual Activity   Sexual Activity Defer        Social History  She reports that she has never smoked. She has never been exposed to tobacco smoke. She has never used smokeless tobacco. She reports that she does not currently use alcohol. She reports that she does not use drugs.    Family History  Family History   Problem Relation Name Age of Onset    Diabetes Mother      Glaucoma Father      Hypertension Other FAMILY     Uterine cancer Neg Hx      Ovarian cancer Neg Hx      Colon cancer Neg Hx      Breast cancer Neg Hx          Allergies  Other, Penicillins, and Procaine    Review of Systems   All other systems reviewed and are negative.       Physical Exam  Constitutional:       General: She is not in acute distress.  HENT:      Head: Normocephalic and atraumatic.      Mouth/Throat:      Mouth: Mucous membranes are moist.   Eyes:      Extraocular Movements: Extraocular movements intact.      Pupils: Pupils are equal, round, and reactive to light.   Cardiovascular:      Rate and Rhythm: Normal rate and regular rhythm.   Pulmonary:      Effort: Pulmonary effort is normal.   Abdominal:      General: Abdomen is flat.      Palpations: Abdomen is soft.      Comments: Excoriated skin around umbilicus, no drainage expressed. Small excoriations at previous DREW drain site and at top of midline vertical incision. Rest of incision well healed. Mild tenderness to palpation  "diffusely, worse around umbilicus. No rebound or guarding.   Musculoskeletal:         General: Normal range of motion.   Skin:     General: Skin is warm and dry.   Neurological:      General: No focal deficit present.      Mental Status: She is alert.   Psychiatric:         Mood and Affect: Mood normal.          Last Recorded Vitals  Blood pressure 131/76, pulse 87, temperature 36.7 °C (98.1 °F), temperature source Temporal, resp. rate 16, height 1.499 m (4' 11\"), weight 72.1 kg (159 lb), SpO2 98%.    Relevant Results  Medications    Current Facility-Administered Medications:     acetaminophen (Tylenol) tablet 975 mg, 975 mg, oral, Once, Yisel Collier, DO    Current Outpatient Medications:     aspirin 81 mg EC tablet, Take 1 tablet (81 mg) by mouth once daily., Disp: 30 tablet, Rfl: 11    atorvastatin (Lipitor) 80 mg tablet, 1 TAB(S) ORALLY ONCE A DAY -.MEDS TO BEDS, Disp: 90 tablet, Rfl: 1    blood pressure monitor kit, 1 each once daily. Use once daily to check blood pressure, Disp: 1 kit, Rfl: 0    blood sugar diagnostic (OneTouch Ultra Test) strip, USE 1 STRIP(S) TO TEST BLOOD SUGAR 3 TIMES A DAY, Disp: 100 strip, Rfl: 3    brimonidine (AlphaGAN) 0.2 % ophthalmic solution, Administer 1 drop into both eyes 2 times a day. PATIENT NEEDS PCP APPT FOR FURTHER REFILLS, Disp: 30 mL, Rfl: 3    carvedilol (Coreg) 12.5 mg tablet, Take 1 tablet (12.5 mg) by mouth 2 times daily (morning and late afternoon)., Disp: 60 tablet, Rfl: 11    cholecalciferol (Vitamin D3) 10 MCG (400 UNIT) tablet, Take 1 tablet (10 mcg) by mouth once daily., Disp: 30 tablet, Rfl: 11    dapagliflozin propanediol (Farxiga) 10 mg, Take 1 tablet (10 mg) by mouth once daily., Disp: 30 tablet, Rfl: 11    diclofenac sodium (Voltaren) 1 % gel, Apply 4.5 inches (4 g) topically 2 times a day., Disp: 800 g, Rfl: 2    diphenhydramine/Maalox/lidocaine 1:1:1, Swish and swallow 10 mL every 8 hours as needed for epigastric pain, Disp: 120 mL, Rfl: 0    DULoxetine " (Cymbalta) 30 mg DR capsule, Take 1 capsule (30 mg) by mouth once daily in the morning AND 2 capsules (60 mg) once daily at bedtime. Do not crush or chew.., Disp: 60 capsule, Rfl: 11    enoxaparin (Lovenox) 40 mg/0.4 mL syringe, Inject 0.4 mL (40 mg) under the skin once daily. (Patient not taking: Reported on 2/12/2025), Disp: 12 mL, Rfl: 3    enoxaparin (Lovenox) 40 mg/0.4 mL syringe, Inject 0.4ml under the skin once daily., Disp: 12 mL, Rfl: 3    finerenone (Kerendia) 10 mg tablet tablet, Take 1 tablet (10 mg) by mouth once daily., Disp: 90 tablet, Rfl: 3    FreeStyle Filipe 2 Sensor kit, Apply 1 sensor to the back of the upper arm. Change sensor every 14 days., Disp: 2 each, Rfl: 11    FreeStyle Filipe reader (FreeStyle Filipe 2 Joice) misc, Use as instructed, Disp: 1 each, Rfl: 0    furosemide (Lasix) 20 mg tablet, Take 1 tablet (20 mg) by mouth once daily., Disp: 90 tablet, Rfl: 2    glucagon 3 mg/actuation spray,non-aerosol, Administer 1 spray into affected nostril(s) if needed (please  apply into a single nostril for blood sugar <60)., Disp: 2 each, Rfl: 0    hyoscyamine (Anaspaz) 0.125 mg disintegrating tablet, Take 1 tablet (0.125 mg) by mouth every 8 hours if needed (cramping or nausea). (Patient taking differently: Dissolve 1 tablet (0.125 mg) in the mouth every 8 hours if needed (cramping or nausea). Takes PRN), Disp: 30 tablet, Rfl: 0    insulin degludec (TRESIBA FLEXTOUCH U-200 SUBQ), Inject 40 Units under the skin once daily at bedtime. Take as directed per insulin instructions., Disp: , Rfl:     insulin lispro (HumaLOG KwikPen Insulin) 200 unit/mL (3 mL) insulin pen pen, Inject 22 Units under the skin 3 times daily (morning, midday, late afternoon). Take as directed per insulin instructions., Disp: 11 each, Rfl: 3    isosorbide mononitrate ER (Imdur) 60 mg 24 hr tablet, TAKE 1 TABLET BY MOUTH EVERY DAY IN THE MORNING, Disp: 90 tablet, Rfl: 1    latanoprost (Xalatan) 0.005 % ophthalmic solution,  Administer 1 drop into both eyes once daily at bedtime., Disp: 2.5 mL, Rfl: 3    losartan (Cozaar) 50 mg tablet, Take 1 tablet (50 mg) by mouth early in the morning.., Disp: , Rfl:     melatonin 5 mg tablet, Take 1 tablet (5 mg) by mouth as needed at bedtime for sleep., Disp: 30 tablet, Rfl: 11    metFORMIN XR (Glucophage-XR) 500 mg 24 hr tablet, Take 1 tablet (500 mg) by mouth once daily in the evening. Take with meals. Do not crush, chew, or split., Disp: 30 tablet, Rfl: 11    multivitamin tablet, Take 1 tablet by mouth once daily., Disp: 30 tablet, Rfl: 11    naloxone (Narcan) 4 mg/0.1 mL nasal spray, Administer 1 spray (4 mg) into affected nostril(s) if needed for opioid reversal or respiratory depression. May repeat every 2-3 minutes if needed, alternating nostrils, until medical assistance becomes available., Disp: 2 each, Rfl: 1    nitroglycerin (Nitrostat) 0.4 mg SL tablet, PLACE 1 TABLET UNDER THE TONGUE EVERY 5 MINUTES FOR UP TO 3 DOSES AS NEEDED FOR CHEST PAIN.CALL 911 IF PAIN PERSISTS., Disp: , Rfl:     nystatin (Mycostatin) 100,000 unit/gram powder, Apply topically., Disp: , Rfl:     pantoprazole (ProtoNix) 40 mg EC tablet, Take 1 tablet (40 mg) by mouth once daily in the morning. Take before meals., Disp: 30 tablet, Rfl: 5    polyethylene glycol (Glycolax, Miralax) 17 gram packet, Take 17 g by mouth once daily as needed (constipation)., Disp: , Rfl:     semaglutide 2 mg/dose (8 mg/3 mL) pen injector, Inject 2 mg under the skin 1 (one) time per week., Disp: 3 mL, Rfl: 11    simethicone (Mylicon) 80 mg chewable tablet, Chew 1 tablet (80 mg) 4 times a day as needed (gas pain)., Disp: , Rfl:     urea (Carmol) 20 % cream, Apply 1 Application topically 2 times a day., Disp: , Rfl:     Labs  CBC  WBC   Date Value Ref Range Status   02/26/2025 6.4 4.4 - 11.3 x10*3/uL Final     nRBC   Date Value Ref Range Status   02/26/2025 0.0 0.0 - 0.0 /100 WBCs Final     RBC   Date Value Ref Range Status   02/26/2025 5.44  (H) 4.00 - 5.20 x10*6/uL Final     Hemoglobin   Date Value Ref Range Status   02/26/2025 16.2 (H) 12.0 - 16.0 g/dL Final     Hematocrit   Date Value Ref Range Status   02/26/2025 47.1 (H) 36.0 - 46.0 % Final     MCV   Date Value Ref Range Status   02/26/2025 87 80 - 100 fL Final     MCH   Date Value Ref Range Status   02/26/2025 29.8 26.0 - 34.0 pg Final     MCHC   Date Value Ref Range Status   02/26/2025 34.4 32.0 - 36.0 g/dL Final     RDW   Date Value Ref Range Status   02/26/2025 14.2 11.5 - 14.5 % Final     Platelets   Date Value Ref Range Status   02/26/2025 180 150 - 450 x10*3/uL Final       CMP  Glucose   Date Value Ref Range Status   02/26/2025 194 (H) 74 - 99 mg/dL Final     Sodium   Date Value Ref Range Status   02/26/2025 135 (L) 136 - 145 mmol/L Final     Potassium   Date Value Ref Range Status   02/26/2025 4.2 3.5 - 5.3 mmol/L Final     Chloride   Date Value Ref Range Status   02/26/2025 90 (L) 98 - 107 mmol/L Final     Bicarbonate   Date Value Ref Range Status   02/26/2025 31 21 - 32 mmol/L Final     Anion Gap   Date Value Ref Range Status   02/26/2025 18 10 - 20 mmol/L Final     Urea Nitrogen   Date Value Ref Range Status   02/26/2025 27 (H) 6 - 23 mg/dL Final     Creatinine   Date Value Ref Range Status   02/26/2025 2.35 (H) 0.50 - 1.05 mg/dL Final     eGFR   Date Value Ref Range Status   02/26/2025 22 (L) >60 mL/min/1.73m*2 Final     Comment:     Calculations of estimated GFR are performed using the 2021 CKD-EPI Study Refit equation without the race variable for the IDMS-Traceable creatinine methods.  https://jasn.asnjournals.org/content/early/2021/09/22/ASN.4180746422     Calcium   Date Value Ref Range Status   02/26/2025 10.8 (H) 8.6 - 10.6 mg/dL Final     Albumin   Date Value Ref Range Status   02/26/2025 4.9 3.4 - 5.0 g/dL Final     Alkaline Phosphatase   Date Value Ref Range Status   02/26/2025 103 33 - 136 U/L Final     Total Protein   Date Value Ref Range Status   02/26/2025 8.2 6.4 - 8.2  "g/dL Final     AST   Date Value Ref Range Status   02/26/2025 21 9 - 39 U/L Final     Bilirubin, Total   Date Value Ref Range Status   02/26/2025 0.5 0.0 - 1.2 mg/dL Final     ALT   Date Value Ref Range Status   02/26/2025 14 7 - 45 U/L Final     Comment:     Patients treated with Sulfasalazine may generate falsely decreased results for ALT.       Coagulation   No results found for: \"PROTIME\", \"INR\", \"APTT\", \"FIBRINOGEN\"    Pregnancy Tests  No results found for: \"HCGQUANT\", \"HCGURINE\"    Imaging   CT ABDOMEN PELVIS WO IV CONTRAST;  2/27/2025 2:40 am      INDICATION:  Signs/Symptoms:Abdominal pain, purulent drainage from umbilicus,  total abdominal hysterectomy and hernia repair end of January 2025.  Per EMR: Status post total hysterectomy with bilateral  salpingo-oophorectomy umbilical hernia repair for recurrence  postmenopausal bleeding in the setting of leiomyomas of the uterus on  01/23/2025. Patient went back for an exploratory laparotomy for  hemoperitoneum with a evacuation of hematoma in the abdominal cavity  on the same day.      COMPARISON:  CT abdomen/pelvis 01/23/2025      TECHNIQUE:  Contiguous axial images of the abdomen and pelvis were obtained  without intravenous contrast. Coronal and sagittal reformatted images  were reconstructed from the axial data.      FINDINGS:  Note: The absence of intravenous contrast limits evaluation of the  solid organs and vasculature.      LOWER CHEST: Atherosclerotic calcification of the coronary arteries.      ABDOMINAL WALL: Postsurgical changes from umbilical hernia repair.  Postsurgical changes along the midline of the anterior abdominal  wall. Resolved subcutaneous emphysema along the midline surgical site.      Interval development of a circumscribed 4.0 x 3.9 x 7.3 cm fluid  collection in the midline just superior to the umbilicus (series 201,  image 85 and series 204, image 75). Small volume hyperdense layering  products within the collection with approximately " 50 Hounsfield units  (series 201, image 83) which may represent layering blood. Majority  of the collection has a Hounsfield units averaging 20. There is mild  inflammatory changes adjacent to this collection. There is  questionable connection to the umbilicus as noted on series 201,  image 88). No evidence of air foci within the collection.      Again seen scattered subcutaneous granulomas bilaterally likely  related to subcutaneous injections. There is a small amount of air  foci within the subcutaneous tissue of the right hemiabdomen (series  201, image 105), likely injection sites.      LIVER: No significant parenchymal abnormality. Calcified granuloma in  the left hepatic lobe (series 201, image 17).      BILE DUCTS: No significant intrahepatic or extrahepatic dilatation.      GALLBLADDER: No significant abnormality.      PANCREAS: No significant abnormality.      SPLEEN: No significant abnormality.      ADRENALS: No significant abnormality.      KIDNEYS, URETERS, BLADDER: No significant abnormality.      REPRODUCTIVE ORGANS: Status post total abdominal  hysterectomy/bilateral salpingo-oophorectomy. Interval hematoma  evacuation within the pelvis. There is a small fluid collection with  a foci of gas in the left pelvis, posterior to the vaginal vault  measuring 2.7 x 2.2 cm (series 201, image 114) without significant  inflammatory changes.      GI: No bowel obstruction. No significant bowel wall thickening or  adjacent inflammatory change. The appendix is normal.      VESSELS: No significant abnormality. SMA stent versus severe  atherosclerosis. Vascular patency can not be assessed without IV  contrast.      PERITONEUM/RETROPERITONEUM: No intraperitoneal free air. Interval  resolution of previously noted hemoperitoneum. Trace fluid noted  within the pelvis (series 201, image 116).      LYMPH NODES: No enlarged lymph nodes.      OSSEOUS STRUCTURES: No acute osseous abnormality. Multilevel  degenerative changes  throughout the spine including chronic  anterolisthesis of L4 on L5.      IMPRESSION:  1. Postsurgical changes from exploratory laparotomy and umbilical  hernia repair. Interval development of a supraumbilical circumscribed  fluid collection with dependent debris likely representing a  hematoma. There is also surrounding inflammatory change and therefore  superimposed infection is suspected. There is a tiny tract extending  to the umbilicus which may represent umbilical communication.  2. Postsurgical changes from total abdominal hysterectomy and  bilateral salpingo-oophorectomy with interval hematoma evacuation.  There is a small residual postoperative collection containing fluid  and gas in the left pelvis, posterior to the left vaginal cuff  measuring up to 2.7 cm.     Assessment/Plan   Assessment & Plan  Abdominal wall fluid collections    64yo w/ a PMH of HFpEF (50-55% 9/2023), CAD s/p PCI w/ stent x 3 (2016, 2018) and STEMI (9/2023, no stents placed), TIA 9/2023, SVC thrombus on Lovenox, remote DVT, IDDM2, HLD, CKD, MICHAEL, and NAFLD admitted for abdominal wall fluid collection s/p GIBSON-BSO and take back XL with washout on 1/23.    Abdominal wall seroma vs. Abscess  - CT abd/pelvis w/ 4.0 x 3.9 x 7.3cm fluid collection in the midline just superior to the umbilicus  - Plan for I&D in OR in AM; NPO, lovenox held  - Tylenol, PRN oxy for pain    Nausea/vomiting  - PRN antiemetics    CLARIBEL on CKD  - baseline Cr 1.3-1.5  - Cr 2.35 on admission  - Suspect pre-renal d/t nausea/vomiting  - IVF: LR @125ml/hr  - FeNa pending    Comorbidities:  - HFpEF (50-55% 9/2023)/CAD s/p PCI w/ stent x 3/ hx of STEM, on ASA, carvedilol, losartan and lasix (Held)  - TIA 9/2023  - SVC thrombus, remote DVT on Lovenox (Held as above)  - T2DM/gastroparesis: Farxiga, metformin, semaglutide, Tresiba 40u at bedtime & lispro 22u TID (Held), POCT and SSI  - HLD, on statin (Held)  - CKD, NO NSAIDS, on kerendia (Held)  - MICHAEL  - NAFLD    Admit to GYN  for inpatient management.         Seen and discussed w/ Dr. Eleanor Monson MD PGY-2  Obstetrics & Gynecology

## 2025-02-27 NOTE — PROGRESS NOTES
"Nelida Mata is a 65 y.o. female on day 0 of admission presenting with Abdominal wall fluid collections.    Subjective   Pt feeling okay this AM. Not in much pain, no fevers or chills. Nausea continues, no recent vomiting. Last BM 2 days ago, last passed flatus prior to presentation.       Objective     Physical Exam  Constitutional:       Appearance: Normal appearance.   HENT:      Head: Normocephalic and atraumatic.      Nose: Nose normal.      Mouth/Throat:      Mouth: Mucous membranes are moist.   Eyes:      Extraocular Movements: Extraocular movements intact.   Pulmonary:      Effort: Pulmonary effort is normal.   Abdominal:      General: Abdomen is flat. There is no distension.      Palpations: Abdomen is soft.      Tenderness: There is no abdominal tenderness.      Comments: Area malodorous. Midline vertical incision healed. Shallow ulcerations with thin discharge present along incision, mostly along umbilicus. Superior apex of incision with pinpoint opening at skin, unable to be probed, no drainage noted. 4x5cm fluctuant area palpated in midline approx 2cm superior to umbilicus, no opening or drainage from this, no warmth/erythema/tenderness   Musculoskeletal:         General: Normal range of motion.   Skin:     General: Skin is warm and dry.   Neurological:      General: No focal deficit present.      Mental Status: She is alert and oriented to person, place, and time.   Psychiatric:         Mood and Affect: Mood normal.         Behavior: Behavior normal.       Last Recorded Vitals  Blood pressure (!) 158/90, pulse 82, temperature 36.2 °C (97.2 °F), temperature source Temporal, resp. rate 16, height 1.499 m (4' 11\"), weight 72.1 kg (159 lb), SpO2 100%.  Intake/Output last 3 Shifts:  No intake/output data recorded.    Relevant Results  Results in chart reviewed               Assessment/Plan   66yo w/ a PMH of HFpEF (50-55% 9/2023), CAD s/p PCI w/ stent x 3 (2016, 2018) and STEMI (9/2023, no stents " placed), TIA 9/2023, SVC thrombus on Lovenox, remote DVT, IDDM2, HLD, CKD, MICHAEL, and NAFLD admitted for abdominal wall fluid collection s/p GIBSON-BSO and take back XL with washout on 1/23.     Abdominal wall seroma vs abscess  Wound site ulcerations  - CT abd/pelvis w/ 4.0 x 3.9 x 7.3cm fluid collection in the midline just superior to the umbilicus  - Lower c/f infection at site of fluid collection based on CT appearance, lack of cellulitis surrounding, normal WBC count, lack of purulent drainage  - Initially plan for I&D in OR in AM; however, pt poor candidate for anesthesia. Will consider bedside I&D, gyn onc curbsided for input -> continue NPO, lovenox held  - Ulcerations with drainage along wound concerning for infection. Likely poor healing 2/2 DM. Will consult wound care  - Plan to obtain wound culture of ulcerations  - Given  previous purulent drainage and incomplete abx course, along with concerns for infection of ulcerations, will start clinda (PCN allergy)  - Will obtain MRSA nares  - Tylenol, PRN oxy for pain     Nausea/vomiting  - PRN antiemetics  - Suspect 2/2 chronic gastroparesis  - Lower c/f ileus or SBO at this time based on recently passing flatus, benign abd exam, unremarkable CT. Continue to monitor closely     CLARIBEL on CKD  - Baseline Cr 1.3-1.5  - Cr 2.35 on admission  - Suspect pre-renal d/t nausea/vomiting  - IVF: LR @125mL/hr while NPO  - FeNa pending     Comorbidities  - HFpEF (50-55% 9/2023)/CAD s/p PCI w/ stent x 3/ hx of STEM, on ASA, carvedilol, losartan and lasix (held)  - TIA 9/2023  - SVC thrombus, remote DVT on Lovenox (held as above)  - T2DM/gastroparesis: Farxiga, metformin, semaglutide, Tresiba 40u at bedtime & lispro 22u TID (held), POCT and SSI  - HLD, on statin (held)  - CKD, NO NSAIDS, on kerendia (held)  - MICHAEL  - NAFLD  - Chronic pain, onb duloxetine (held)     Dispo: continue inpatient management pending management of fluid collection and wound care consult    D/w   Charly Hyde MD  PGY-4, Obstetrics and Gynecology

## 2025-02-27 NOTE — ED TRIAGE NOTES
Pt states that she has been vomiting for four days, she is having upper chest pain that radiates to her right arm, sob, and she is dizzy.

## 2025-02-28 ENCOUNTER — DOCUMENTATION (OUTPATIENT)
Dept: HOME HEALTH SERVICES | Facility: HOME HEALTH | Age: 65
End: 2025-02-28
Payer: COMMERCIAL

## 2025-02-28 ENCOUNTER — HOME HEALTH ADMISSION (OUTPATIENT)
Dept: HOME HEALTH SERVICES | Facility: HOME HEALTH | Age: 65
End: 2025-02-28
Payer: COMMERCIAL

## 2025-02-28 DIAGNOSIS — K21.9 GASTROESOPHAGEAL REFLUX DISEASE WITHOUT ESOPHAGITIS: ICD-10-CM

## 2025-02-28 LAB
ANION GAP SERPL CALC-SCNC: 14 MMOL/L (ref 10–20)
BUN SERPL-MCNC: 19 MG/DL (ref 6–23)
CALCIUM SERPL-MCNC: 10.3 MG/DL (ref 8.6–10.6)
CHLORIDE SERPL-SCNC: 98 MMOL/L (ref 98–107)
CO2 SERPL-SCNC: 33 MMOL/L (ref 21–32)
CREAT SERPL-MCNC: 1.74 MG/DL (ref 0.5–1.05)
EGFRCR SERPLBLD CKD-EPI 2021: 32 ML/MIN/1.73M*2
ERYTHROCYTE [DISTWIDTH] IN BLOOD BY AUTOMATED COUNT: 14.1 % (ref 11.5–14.5)
GLUCOSE BLD MANUAL STRIP-MCNC: 117 MG/DL (ref 74–99)
GLUCOSE BLD MANUAL STRIP-MCNC: 128 MG/DL (ref 74–99)
GLUCOSE BLD MANUAL STRIP-MCNC: 138 MG/DL (ref 74–99)
GLUCOSE BLD MANUAL STRIP-MCNC: 40 MG/DL (ref 74–99)
GLUCOSE BLD MANUAL STRIP-MCNC: 44 MG/DL (ref 74–99)
GLUCOSE BLD MANUAL STRIP-MCNC: 54 MG/DL (ref 74–99)
GLUCOSE BLD MANUAL STRIP-MCNC: 57 MG/DL (ref 74–99)
GLUCOSE BLD MANUAL STRIP-MCNC: 74 MG/DL (ref 74–99)
GLUCOSE BLD MANUAL STRIP-MCNC: 86 MG/DL (ref 74–99)
GLUCOSE BLD MANUAL STRIP-MCNC: 89 MG/DL (ref 74–99)
GLUCOSE SERPL-MCNC: 45 MG/DL (ref 74–99)
HCT VFR BLD AUTO: 50.8 % (ref 36–46)
HGB BLD-MCNC: 16.7 G/DL (ref 12–16)
MCH RBC QN AUTO: 30.3 PG (ref 26–34)
MCHC RBC AUTO-ENTMCNC: 32.9 G/DL (ref 32–36)
MCV RBC AUTO: 92 FL (ref 80–100)
NRBC BLD-RTO: 0 /100 WBCS (ref 0–0)
PLATELET # BLD AUTO: 178 X10*3/UL (ref 150–450)
POTASSIUM SERPL-SCNC: 4.3 MMOL/L (ref 3.5–5.3)
RBC # BLD AUTO: 5.51 X10*6/UL (ref 4–5.2)
SODIUM SERPL-SCNC: 141 MMOL/L (ref 136–145)
WBC # BLD AUTO: 9.4 X10*3/UL (ref 4.4–11.3)

## 2025-02-28 PROCEDURE — 2500000002 HC RX 250 W HCPCS SELF ADMINISTERED DRUGS (ALT 637 FOR MEDICARE OP, ALT 636 FOR OP/ED)

## 2025-02-28 PROCEDURE — 2500000004 HC RX 250 GENERAL PHARMACY W/ HCPCS (ALT 636 FOR OP/ED): Mod: JZ,TB

## 2025-02-28 PROCEDURE — 2500000001 HC RX 250 WO HCPCS SELF ADMINISTERED DRUGS (ALT 637 FOR MEDICARE OP)

## 2025-02-28 PROCEDURE — 82947 ASSAY GLUCOSE BLOOD QUANT: CPT

## 2025-02-28 PROCEDURE — 2500000004 HC RX 250 GENERAL PHARMACY W/ HCPCS (ALT 636 FOR OP/ED)

## 2025-02-28 PROCEDURE — 99231 SBSQ HOSP IP/OBS SF/LOW 25: CPT

## 2025-02-28 PROCEDURE — 1210000001 HC SEMI-PRIVATE ROOM DAILY

## 2025-02-28 PROCEDURE — 36415 COLL VENOUS BLD VENIPUNCTURE: CPT | Performed by: STUDENT IN AN ORGANIZED HEALTH CARE EDUCATION/TRAINING PROGRAM

## 2025-02-28 PROCEDURE — 80048 BASIC METABOLIC PNL TOTAL CA: CPT | Performed by: STUDENT IN AN ORGANIZED HEALTH CARE EDUCATION/TRAINING PROGRAM

## 2025-02-28 PROCEDURE — 85027 COMPLETE CBC AUTOMATED: CPT | Performed by: STUDENT IN AN ORGANIZED HEALTH CARE EDUCATION/TRAINING PROGRAM

## 2025-02-28 PROCEDURE — 87081 CULTURE SCREEN ONLY: CPT

## 2025-02-28 RX ORDER — INSULIN DEGLUDEC 100 U/ML
25 INJECTION, SOLUTION SUBCUTANEOUS NIGHTLY
Status: DISCONTINUED | OUTPATIENT
Start: 2025-02-28 | End: 2025-03-02

## 2025-02-28 RX ORDER — PANTOPRAZOLE SODIUM 40 MG/1
40 TABLET, DELAYED RELEASE ORAL
Qty: 90 TABLET | Refills: 1 | Status: SHIPPED | OUTPATIENT
Start: 2025-02-28 | End: 2025-08-27

## 2025-02-28 RX ORDER — SIMETHICONE 80 MG
80 TABLET,CHEWABLE ORAL 4 TIMES DAILY
Status: DISPENSED | OUTPATIENT
Start: 2025-02-28

## 2025-02-28 RX ORDER — LIDOCAINE HYDROCHLORIDE 10 MG/ML
5 INJECTION, SOLUTION INFILTRATION; PERINEURAL ONCE
Status: CANCELLED | OUTPATIENT
Start: 2025-02-28 | End: 2025-02-28

## 2025-02-28 RX ORDER — INSULIN DEGLUDEC 100 U/ML
12 INJECTION, SOLUTION SUBCUTANEOUS ONCE
Status: COMPLETED | OUTPATIENT
Start: 2025-02-28 | End: 2025-02-28

## 2025-02-28 RX ORDER — HYDROMORPHONE HYDROCHLORIDE 0.2 MG/ML
0.2 INJECTION INTRAMUSCULAR; INTRAVENOUS; SUBCUTANEOUS
Status: DISCONTINUED | OUTPATIENT
Start: 2025-02-28 | End: 2025-03-01

## 2025-02-28 RX ORDER — INSULIN LISPRO 100 [IU]/ML
11 INJECTION, SOLUTION INTRAVENOUS; SUBCUTANEOUS ONCE
Status: COMPLETED | OUTPATIENT
Start: 2025-02-28 | End: 2025-02-28

## 2025-02-28 RX ORDER — POLYETHYLENE GLYCOL 3350 17 G/17G
17 POWDER, FOR SOLUTION ORAL DAILY
Status: DISPENSED | OUTPATIENT
Start: 2025-02-28

## 2025-02-28 RX ORDER — ADHESIVE BANDAGE
30 BANDAGE TOPICAL DAILY
Status: DISPENSED | OUTPATIENT
Start: 2025-02-28

## 2025-02-28 RX ADMIN — CLINDAMYCIN HYDROCHLORIDE 450 MG: 300 CAPSULE ORAL at 20:55

## 2025-02-28 RX ADMIN — CLINDAMYCIN IN 5 PERCENT DEXTROSE 900 MG: 18 INJECTION, SOLUTION INTRAVENOUS at 03:12

## 2025-02-28 RX ADMIN — POLYETHYLENE GLYCOL 3350 17 G: 17 POWDER, FOR SOLUTION ORAL at 08:57

## 2025-02-28 RX ADMIN — FUROSEMIDE 20 MG: 20 TABLET ORAL at 08:57

## 2025-02-28 RX ADMIN — FINERENONE 10 MG: 10 TABLET, FILM COATED ORAL at 08:58

## 2025-02-28 RX ADMIN — OXYCODONE HYDROCHLORIDE 5 MG: 5 TABLET ORAL at 20:54

## 2025-02-28 RX ADMIN — CLINDAMYCIN HYDROCHLORIDE 450 MG: 300 CAPSULE ORAL at 12:32

## 2025-02-28 RX ADMIN — ENOXAPARIN SODIUM 40 MG: 100 INJECTION SUBCUTANEOUS at 08:57

## 2025-02-28 RX ADMIN — INSULIN LISPRO 11 UNITS: 100 INJECTION, SOLUTION INTRAVENOUS; SUBCUTANEOUS at 20:58

## 2025-02-28 RX ADMIN — INSULIN DEGLUDEC INJECTION 12 UNITS: 100 INJECTION, SOLUTION SUBCUTANEOUS at 22:33

## 2025-02-28 RX ADMIN — CARVEDILOL 12.5 MG: 12.5 TABLET, FILM COATED ORAL at 16:57

## 2025-02-28 RX ADMIN — ATORVASTATIN CALCIUM 80 MG: 80 TABLET, FILM COATED ORAL at 08:57

## 2025-02-28 RX ADMIN — DULOXETINE HYDROCHLORIDE 30 MG: 30 CAPSULE, DELAYED RELEASE ORAL at 08:58

## 2025-02-28 RX ADMIN — Medication 5 MG: at 20:55

## 2025-02-28 RX ADMIN — OXYCODONE HYDROCHLORIDE 5 MG: 5 TABLET ORAL at 14:15

## 2025-02-28 RX ADMIN — MAGNESIUM HYDROXIDE 30 ML: 400 SUSPENSION ORAL at 12:32

## 2025-02-28 RX ADMIN — SIMETHICONE 80 MG: 80 TABLET, CHEWABLE ORAL at 12:32

## 2025-02-28 RX ADMIN — GLUCAGON 1 MG: KIT at 14:49

## 2025-02-28 RX ADMIN — SIMETHICONE 80 MG: 80 TABLET, CHEWABLE ORAL at 20:54

## 2025-02-28 RX ADMIN — OXYCODONE HYDROCHLORIDE 5 MG: 5 TABLET ORAL at 09:15

## 2025-02-28 RX ADMIN — CARVEDILOL 12.5 MG: 12.5 TABLET, FILM COATED ORAL at 08:57

## 2025-02-28 RX ADMIN — INSULIN LISPRO 22 UNITS: 100 INJECTION, SOLUTION INTRAVENOUS; SUBCUTANEOUS at 08:55

## 2025-02-28 RX ADMIN — DULOXETINE HYDROCHLORIDE 60 MG: 60 CAPSULE, DELAYED RELEASE ORAL at 20:54

## 2025-02-28 RX ADMIN — SIMETHICONE 80 MG: 80 TABLET, CHEWABLE ORAL at 16:58

## 2025-02-28 ASSESSMENT — PAIN SCALES - GENERAL
PAINLEVEL_OUTOF10: 8

## 2025-02-28 ASSESSMENT — PAIN DESCRIPTION - DESCRIPTORS
DESCRIPTORS: ACHING;CRAMPING
DESCRIPTORS: CRAMPING

## 2025-02-28 ASSESSMENT — PAIN DESCRIPTION - ORIENTATION: ORIENTATION: MID

## 2025-02-28 ASSESSMENT — PAIN DESCRIPTION - LOCATION: LOCATION: ABDOMEN

## 2025-02-28 NOTE — SIGNIFICANT EVENT
"Updates    Abdominal wall seroma vs abscess   Wound site ulcerations  - Supra-umbilical fluid collection I&D at bedside, uncomplicated. Packed with 1\" packing strips wet to dry - plan for daily packing change. Pt currently declines home care for homegoing  - Ulcerations with drainage along wound concerning for infection. Likely poor healing 2/2 DM. Wound care consult pending  - Wound culture of ulcerations pending  - Continue clinda  - Will obtain MRSA nares  - Tylenol, PRN oxy for pain     Nausea/vomiting  - PRN antiemetics  - Suspect 2/2 chronic gastroparesis  - Lower c/f ileus or SBO at this time based on recently passing flatus, benign abd exam, unremarkable CT. Continue to monitor closely  - Tolerated lunch today     CLARIBEL on CKD  - Baseline Cr 1.3-1.5  - Cr 2.35 on admission  - FeNa pre-renal, likely d/t poor PO intake  - IVF: decrease to maintenance @ 75cc/hr until decrease in Cr  - Repeat labs in AM     Comorbidities  - Home meds restarted as appropriate now post-procedure  - HFpEF (50-55% 9/2023)/CAD s/p PCI w/ stent x 3/ hx of STEM, on ASA, carvedilol, losartan, and lasix  - TIA 9/2023  - SVC thrombus, remote DVT on Lovenox  - T2DM/gastroparesis: farxiga/metformin/ semaglutide HELD, continue Tresiba 40u at bedtime & lispro 22u TID. Pre-meal and bedtime BG with SSI. Diabetic diet.  - HLD, on statin  - CKD, NO NSAIDS, on kerendia  - MICHAEL  - NAFLD  - Chronic pain, continue duloxetine    D/w Dr. Charly Hyde MD  PGY-4, Obstetrics and Gynecology  "

## 2025-02-28 NOTE — CARE PLAN
The patient's goals for the shift include  rest and pain control    The clinical goals for the shift include pain control

## 2025-02-28 NOTE — SIGNIFICANT EVENT
Informed by secure text by DESIRAE Holt MD about referral for home care for wound care. ADOD: today 02/28/25 with SOC for tomorrow 3/1/25. Interviewed patient to verify her address is correct. Her teachable person is her boyfriend, Manuel. Dr MANUEL Valenzuela will be the attending following after discharge.  Homecare secure texted with the above information. Awaiting a reply.    02/28/2025 at 1530:  Jing David from  Home Care approved pt. for start of care on Roque 3/2/25. Updated the patient and her boyfriend, Manuel, Dr. Holt and the bedside RN. If the patient does not go home tomorrow, bedside RN to call home care and let them know @ [phone] (Home). Bedside RN also aware that supplies need to be sent home with patient for 3-4 days worth of dressing changes.

## 2025-02-28 NOTE — NURSING NOTE
Soheila consulted for PIV placement by bedside RN. Soheila RN to bedside to assess. Scanned bilateral upper extremities, no sustainable vessels noted for IV cannulation. Bedside RN made aware, alternative access recommended based on assessment.

## 2025-02-28 NOTE — HOSPITAL COURSE
66yo F with history of GIBSON-BSO on 1/23 complicated by take-back on POD0 for abdominal washout, presenting for incisional drainage, n/v. Pt has PMHx significant for IDDM2, SVC thrombus on lovenox, HFpEF, CAD s/p PCI w stent x3 (2016, 2018) and STEMI. Admission workup notable for 4 x 3.9 x 7.3cm fluid collection superior to the umbilicus, otherwise she was afebrile without signs of infection (WBC 6.4, HR and BP wnl). She underwent an uncomplicated I&D at bedside with findings consistent with a seroma, and her wound was left open with WTD packing. Shallow ulcerations were noted along her incision, for which wound care was consulted. She was discharged with home wound care follow-up to assist with packing her wound. Her nausea improved throughout her admission and she was tolerating PO without emesis. She had an CLARIBEL on CKD that was also resolving by HD1 (2.35 --> 1.7, baseline 1.2-1.3). Pt was discharged with close outpatient follow-up scheduled.

## 2025-02-28 NOTE — HH CARE COORDINATION
Home Care received a Referral for Nursing. We have processed the referral for a Start of Care on 3/2.     If you have any questions or concerns, please feel free to contact us at [phone]. Follow the prompts, enter your five digit zip code, and you will be directed to your care team on [facility].

## 2025-02-28 NOTE — CONSULTS
Wound Care Consult     Visit Date: 2/28/2025      Patient Name: Nelida Mata         MRN: [id number]           YOB: 1960     Reason for Consult: Abdominal wounds         Wound History: 64yo w/ a PMH of HFpEF (50-55% 9/2023), CAD s/p PCI w/ stent x 3 (2016, 2018) and STEMI (9/2023, no stents placed), TIA 9/2023, SVC thrombus and remote DVT on Lovenox, IDDM2, HLD, CKD, MICHAEL, and NAFLD admitted for abdominal wall fluid collection s/p GIBSON-BSO and take back XL with washout on 1/23, now s/p bedside I&D on 2/27.    Wound Assessment:   02/28/25 1316   Wound 01/23/25 Incision Abdomen Medial;Upper   Date First Assessed/Time First Assessed: 01/23/25 0816   Hand Hygiene Completed: Yes  Primary Wound Type: Incision  Location: Abdomen  Wound Location Orientation: Medial;Upper   Wound Image    Site Assessment Clean;Red   Zoila-Wound Assessment (!) Scarred;Hyperpigmented   Wound Length (cm) 3 cm   Wound Width (cm) 2.5 cm   Wound Surface Area (cm^2) 7.5 cm^2   Wound Depth (cm) 3 cm   Wound Volume (cm^3) 22.5 cm^3   State of Healing Non-healing   Margins Attached edges   Treatments Cleansed;Site care   Sutures/Staple Line Non approximated   Drainage Description Sanguineous   Drainage Amount Small   Dressing Other (Comment);Alginate;Silver dressing;Silicone border dressing  (Promogran rodriguez collagen dressing-packed)   Dressing Changed New   Wound 02/28/25 Diabetic Ulcer Abdomen Lower;Medial   Date First Assessed/Time First Assessed: 02/28/25 1316   Present on Original Admission: Yes  Hand Hygiene Completed: Yes  Primary Wound Type: Diabetic Ulcer  Location: Abdomen  Wound Location Orientation: Lower;Medial   Wound Image    Site Assessment Clean;Red   Non-staged Wound Description Partial thickness   Wound Length (cm) 1.3 cm   Wound Width (cm) 3 cm   Wound Surface Area (cm^2) 3.9 cm^2   Wound Depth (cm) 0.3 cm   Wound Volume (cm^3) 1.17 cm^3   State of Healing Non-healing   Margins Attached edges   Drainage  "Description Serous   Drainage Amount Small   Dressing Alginate;Silver dressing;Silicone border dressing   Dressing Changed New   Dressing Status Clean;Dry;Occlusive     Wound Team Summary Assessment: Patient resting in bed states after shower superior incision began to bleed with large clot/ \"tissue\" expelled. Todays WOCN intervention for superior abdominal wound with continued slow sanguineous ooze. Irrigated wounds with Vashe wound cleanser and covered with Vashe soaked 4 x 4 cm sterile gauze. Promogran rodriguez collagen dressing packed into wound bed (please do not remove collagen with dressing change) covered by Aquacel Ag fan folded then covered with mepilex border dressing.       Wound location and recommendations: DAILY for superior and inferior abdominal wounds. Cover wounds with Vashe wound cleanser ([employer] order #[id number]) soaked 4 x 4 cm sterile gauze for 1-5 minutes, then gently pat dry. Leave promogran rodriguez dressing in superior wound bed. Pack wounds gently with Aquacel Ag (Silver) ([employer] order #[id number]) Cover with Mepilex border dressing. If needed may change superior wound BID for drainage control.       Wound Team Plan:  Primary provider please review the wound care consult note and pending wound care order. If you agree with orders please file in EMR.  Please assure patient has adequate supplies for discharge and highly recommend home health care for wound monitoring and dressing assistance.     While inpatient, Secure chat with questions or reconsult wound care if condition worsens or changes. For urgent communications please message the group through [facility] at: [facility], Thank you.      Cydney Berger RN, IVAN  2/28/2025  1:20 PM        "

## 2025-02-28 NOTE — PROGRESS NOTES
"SW received automatin \"risk\" referral for this patient. SW reviewed chart and spoke to RN, no needs noted. Please reconsult if concerns noted.     FRANCISCA Sanchez  "

## 2025-02-28 NOTE — PROGRESS NOTES
"Nelida Mata is a 65 y.o. female on day 1 of admission presenting with Abdominal wall fluid collections.    Subjective   Pt feeling okay this AM, feels pain is appropriately controlled. Persistent nausea, no vomiting, tolerated lunch and dinner yesterday. No flatus or BM since Tuesday. No fevers or chills         Objective     General: no acute distress  HEENT: normocephalic, atraumatic  Heart: warm and well perfused  Lungs: no increased WOB  Abd: Midline vertical incision healed, shallow ulcerations with thin discharge along incision and umbilicus. Incision packed with 4x4 in place, trace shadowing on dressing. Nontender, abdomen soft throughout without guarding or rebound.  Extremities: moving all extremities  Neuro: awake and conversant  Psych: appropriate mood and affect     Last Recorded Vitals  Blood pressure 135/76, pulse 91, temperature 36.2 °C (97.2 °F), temperature source Temporal, resp. rate 16, height 1.499 m (4' 11\"), weight 72.1 kg (159 lb), SpO2 98%.  Intake/Output last 3 Shifts:  I/O last 3 completed shifts:  In: 1380 (19.1 mL/kg) [I.V.:1380 (19.1 mL/kg)]  Out: - (0 mL/kg)   Weight: 72.1 kg     Relevant Results  Lab Results   Component Value Date    GLUCOSE 194 (H) 02/26/2025    CALCIUM 10.8 (H) 02/26/2025     (L) 02/26/2025    K 4.2 02/26/2025    CO2 31 02/26/2025    CL 90 (L) 02/26/2025    BUN 27 (H) 02/26/2025    CREATININE 2.35 (H) 02/26/2025        Lab Results   Component Value Date    WBC 6.4 02/26/2025    HGB 16.2 (H) 02/26/2025    HCT 47.1 (H) 02/26/2025    MCV 87 02/26/2025     02/26/2025                   Assessment/Plan   64yo w/ a PMH of HFpEF (50-55% 9/2023), CAD s/p PCI w/ stent x 3 (2016, 2018) and STEMI (9/2023, no stents placed), TIA 9/2023, SVC thrombus and remote DVT on Lovenox, IDDM2, HLD, CKD, MICHAEL, and NAFLD admitted for abdominal wall fluid collection s/p GIBSON-BSO and take back XL with washout on 1/23, now s/p bedside I&D on 2/27.     Abdominal wall seroma " vs abscess  Wound site ulcerations  - CT abd/pelvis w/ 4.0 x 3.9 x 7.3cm fluid collection in the midline just superior to the umbilicus. No evidence of cellulitis on CT or exam, no leukocytosis, no purulent dishcarge.   - Bedside I&D findings consistent with seroma, currently packed WTD.   - Ulcerations along wound site concerning for poor wound healing in s/o DM. Wound care consult pending. Culture pending.   - Continue clindamycin for empiric abx given prior purulent drainage and incomplete abx course .  - MRSA nares pending   - cont Tylenol, PRN oxy for pain     Nausea/vomiting  - PRN antiemetics. Will increase bowel regimen today given no flatus - miralax, sennokot, Milk of Mag.   - Likely 2/2 chronic gastroparesis, lower suspicion for SBO given benign abd exam, unremarkable CT.      CLARIBEL on CKD  - Baseline Cr 1.3-1.5 --> Cr 2.35 on admission, repeat pending   - FeNa prerenal, likely due to n/v  - IV access lost ovn, encourage PO hydration     Comorbidities  - HFpEF (50-55% 9/2023)/CAD s/p PCI w/ stent x 3/ hx of STEM, on ASA - cont carvedilol, losartan and lasix   - TIA 9/2023  - SVC thrombus, remote DVT on Lovenox, continued    - T2DM/gastroparesis: Farxiga, metformin, semaglutide -held. Continue Tresiba 40u at bedtime & lispro 22u TID (held), POCT and SSI  - HLD, on statin (held)  - CKD, NO NSAIDS, cont kerendia   - MICHAEL  - NAFLD  - Chronic pain, onb duloxetine (held)     Dispo: continue inpatient management pending management of fluid collection and wound care consult    D/w Dr. Charly Holt MD  PGY3, Obstetrics and Gynecology

## 2025-02-28 NOTE — SIGNIFICANT EVENT
Dressing Change     Called to room for report of pt's dressing coming undone, bleeding from open incision. On arrival, pt reports dressing came undone after her shower. Resting in bed with gown and bed soaked with ~50-75cc of blood, pressure being applied at bedside by nurse. Upon examination, 2cm incision open, pink granulation tissue visible, small area of venous bleeding at inferior aspect, flow improving after applying pressure to the site. Re-packed wet to dry, modified pressure dressing applied. Wound care aware, planning to see later today.     Eleanor Holt MD  PGY3, Obstetrics and Gynecology

## 2025-03-01 LAB
ANION GAP SERPL CALC-SCNC: 15 MMOL/L (ref 10–20)
B-LACTAMASE ORGANISM ISLT: POSITIVE
BACTERIA SPEC CULT: ABNORMAL
BACTERIA SPEC CULT: ABNORMAL
BUN SERPL-MCNC: 16 MG/DL (ref 6–23)
CALCIUM SERPL-MCNC: 9.2 MG/DL (ref 8.6–10.6)
CHLORIDE SERPL-SCNC: 99 MMOL/L (ref 98–107)
CO2 SERPL-SCNC: 27 MMOL/L (ref 21–32)
CREAT SERPL-MCNC: 1.08 MG/DL (ref 0.5–1.05)
EGFRCR SERPLBLD CKD-EPI 2021: 57 ML/MIN/1.73M*2
ERYTHROCYTE [DISTWIDTH] IN BLOOD BY AUTOMATED COUNT: 14.3 % (ref 11.5–14.5)
GLUCOSE BLD MANUAL STRIP-MCNC: 129 MG/DL (ref 74–99)
GLUCOSE BLD MANUAL STRIP-MCNC: 152 MG/DL (ref 74–99)
GLUCOSE BLD MANUAL STRIP-MCNC: 189 MG/DL (ref 74–99)
GLUCOSE BLD MANUAL STRIP-MCNC: 37 MG/DL (ref 74–99)
GLUCOSE BLD MANUAL STRIP-MCNC: 48 MG/DL (ref 74–99)
GLUCOSE BLD MANUAL STRIP-MCNC: 54 MG/DL (ref 74–99)
GLUCOSE BLD MANUAL STRIP-MCNC: 55 MG/DL (ref 74–99)
GLUCOSE BLD MANUAL STRIP-MCNC: 55 MG/DL (ref 74–99)
GLUCOSE BLD MANUAL STRIP-MCNC: 58 MG/DL (ref 74–99)
GLUCOSE BLD MANUAL STRIP-MCNC: 74 MG/DL (ref 74–99)
GLUCOSE BLD MANUAL STRIP-MCNC: 96 MG/DL (ref 74–99)
GLUCOSE BLD MANUAL STRIP-MCNC: 97 MG/DL (ref 74–99)
GLUCOSE SERPL-MCNC: 129 MG/DL (ref 74–99)
GRAM STN SPEC: ABNORMAL
GRAM STN SPEC: ABNORMAL
HCT VFR BLD AUTO: 40.3 % (ref 36–46)
HGB BLD-MCNC: 12.8 G/DL (ref 12–16)
MCH RBC QN AUTO: 29.4 PG (ref 26–34)
MCHC RBC AUTO-ENTMCNC: 31.8 G/DL (ref 32–36)
MCV RBC AUTO: 92 FL (ref 80–100)
NRBC BLD-RTO: 0 /100 WBCS (ref 0–0)
PLATELET # BLD AUTO: 134 X10*3/UL (ref 150–450)
POTASSIUM SERPL-SCNC: 3.9 MMOL/L (ref 3.5–5.3)
RBC # BLD AUTO: 4.36 X10*6/UL (ref 4–5.2)
SODIUM SERPL-SCNC: 137 MMOL/L (ref 136–145)
STAPHYLOCOCCUS SPEC CULT: NORMAL
WBC # BLD AUTO: 7.6 X10*3/UL (ref 4.4–11.3)

## 2025-03-01 PROCEDURE — 2500000001 HC RX 250 WO HCPCS SELF ADMINISTERED DRUGS (ALT 637 FOR MEDICARE OP)

## 2025-03-01 PROCEDURE — 82947 ASSAY GLUCOSE BLOOD QUANT: CPT

## 2025-03-01 PROCEDURE — 2500000002 HC RX 250 W HCPCS SELF ADMINISTERED DRUGS (ALT 637 FOR MEDICARE OP, ALT 636 FOR OP/ED): Performed by: STUDENT IN AN ORGANIZED HEALTH CARE EDUCATION/TRAINING PROGRAM

## 2025-03-01 PROCEDURE — 2500000004 HC RX 250 GENERAL PHARMACY W/ HCPCS (ALT 636 FOR OP/ED)

## 2025-03-01 PROCEDURE — 1210000001 HC SEMI-PRIVATE ROOM DAILY

## 2025-03-01 PROCEDURE — 85027 COMPLETE CBC AUTOMATED: CPT | Performed by: STUDENT IN AN ORGANIZED HEALTH CARE EDUCATION/TRAINING PROGRAM

## 2025-03-01 PROCEDURE — 36415 COLL VENOUS BLD VENIPUNCTURE: CPT | Performed by: STUDENT IN AN ORGANIZED HEALTH CARE EDUCATION/TRAINING PROGRAM

## 2025-03-01 PROCEDURE — 93010 ELECTROCARDIOGRAM REPORT: CPT | Performed by: INTERNAL MEDICINE

## 2025-03-01 PROCEDURE — 80048 BASIC METABOLIC PNL TOTAL CA: CPT | Performed by: STUDENT IN AN ORGANIZED HEALTH CARE EDUCATION/TRAINING PROGRAM

## 2025-03-01 PROCEDURE — 2500000001 HC RX 250 WO HCPCS SELF ADMINISTERED DRUGS (ALT 637 FOR MEDICARE OP): Performed by: STUDENT IN AN ORGANIZED HEALTH CARE EDUCATION/TRAINING PROGRAM

## 2025-03-01 PROCEDURE — 2500000002 HC RX 250 W HCPCS SELF ADMINISTERED DRUGS (ALT 637 FOR MEDICARE OP, ALT 636 FOR OP/ED)

## 2025-03-01 RX ORDER — HYDROMORPHONE HYDROCHLORIDE 2 MG/1
1 TABLET ORAL EVERY 4 HOURS PRN
Status: DISCONTINUED | OUTPATIENT
Start: 2025-03-01 | End: 2025-03-02

## 2025-03-01 RX ORDER — CALCIUM CARBONATE 200(500)MG
1000 TABLET,CHEWABLE ORAL 4 TIMES DAILY PRN
Status: DISPENSED | OUTPATIENT
Start: 2025-03-01

## 2025-03-01 RX ADMIN — OXYCODONE HYDROCHLORIDE 5 MG: 5 TABLET ORAL at 12:37

## 2025-03-01 RX ADMIN — CALCIUM CARBONATE (ANTACID) CHEW TAB 500 MG 1000 MG: 500 CHEW TAB at 17:05

## 2025-03-01 RX ADMIN — HYDROMORPHONE HYDROCHLORIDE 1 MG: 2 TABLET ORAL at 00:13

## 2025-03-01 RX ADMIN — CLINDAMYCIN HYDROCHLORIDE 450 MG: 300 CAPSULE ORAL at 05:04

## 2025-03-01 RX ADMIN — CLINDAMYCIN HYDROCHLORIDE 450 MG: 300 CAPSULE ORAL at 12:37

## 2025-03-01 RX ADMIN — ATORVASTATIN CALCIUM 80 MG: 80 TABLET, FILM COATED ORAL at 08:35

## 2025-03-01 RX ADMIN — GLUCAGON 1 MG: KIT at 17:50

## 2025-03-01 RX ADMIN — OXYCODONE HYDROCHLORIDE 5 MG: 5 TABLET ORAL at 05:05

## 2025-03-01 RX ADMIN — CARVEDILOL 12.5 MG: 12.5 TABLET, FILM COATED ORAL at 17:06

## 2025-03-01 RX ADMIN — SIMETHICONE 80 MG: 80 TABLET, CHEWABLE ORAL at 12:37

## 2025-03-01 RX ADMIN — CARVEDILOL 12.5 MG: 12.5 TABLET, FILM COATED ORAL at 08:36

## 2025-03-01 RX ADMIN — MAGNESIUM HYDROXIDE 30 ML: 400 SUSPENSION ORAL at 08:35

## 2025-03-01 RX ADMIN — DULOXETINE HYDROCHLORIDE 60 MG: 60 CAPSULE, DELAYED RELEASE ORAL at 21:32

## 2025-03-01 RX ADMIN — ENOXAPARIN SODIUM 40 MG: 100 INJECTION SUBCUTANEOUS at 08:36

## 2025-03-01 RX ADMIN — GLUCAGON 1 MG: KIT at 13:49

## 2025-03-01 RX ADMIN — OXYCODONE HYDROCHLORIDE 5 MG: 5 TABLET ORAL at 08:37

## 2025-03-01 RX ADMIN — SIMETHICONE 80 MG: 80 TABLET, CHEWABLE ORAL at 08:35

## 2025-03-01 RX ADMIN — OXYCODONE HYDROCHLORIDE 5 MG: 5 TABLET ORAL at 17:27

## 2025-03-01 RX ADMIN — INSULIN LISPRO 22 UNITS: 100 INJECTION, SOLUTION INTRAVENOUS; SUBCUTANEOUS at 08:50

## 2025-03-01 RX ADMIN — FUROSEMIDE 20 MG: 20 TABLET ORAL at 08:36

## 2025-03-01 RX ADMIN — OXYCODONE HYDROCHLORIDE 5 MG: 5 TABLET ORAL at 21:32

## 2025-03-01 RX ADMIN — SIMETHICONE 80 MG: 80 TABLET, CHEWABLE ORAL at 21:32

## 2025-03-01 RX ADMIN — CLINDAMYCIN HYDROCHLORIDE 450 MG: 300 CAPSULE ORAL at 21:32

## 2025-03-01 RX ADMIN — INSULIN LISPRO 22 UNITS: 100 INJECTION, SOLUTION INTRAVENOUS; SUBCUTANEOUS at 11:20

## 2025-03-01 RX ADMIN — FINERENONE 10 MG: 10 TABLET, FILM COATED ORAL at 08:36

## 2025-03-01 RX ADMIN — SIMETHICONE 80 MG: 80 TABLET, CHEWABLE ORAL at 17:05

## 2025-03-01 RX ADMIN — ONDANSETRON HYDROCHLORIDE 4 MG: 4 TABLET, FILM COATED ORAL at 15:45

## 2025-03-01 RX ADMIN — POLYETHYLENE GLYCOL 3350 17 G: 17 POWDER, FOR SOLUTION ORAL at 08:35

## 2025-03-01 RX ADMIN — INSULIN LISPRO 1 UNITS: 100 INJECTION, SOLUTION INTRAVENOUS; SUBCUTANEOUS at 21:42

## 2025-03-01 RX ADMIN — DULOXETINE HYDROCHLORIDE 30 MG: 30 CAPSULE, DELAYED RELEASE ORAL at 08:36

## 2025-03-01 ASSESSMENT — PAIN SCALES - GENERAL
PAINLEVEL_OUTOF10: 8
PAINLEVEL_OUTOF10: 4
PAINLEVEL_OUTOF10: 8
PAINLEVEL_OUTOF10: 7
PAINLEVEL_OUTOF10: 8
PAINLEVEL_OUTOF10: 3
PAINLEVEL_OUTOF10: 3
PAINLEVEL_OUTOF10: 5 - MODERATE PAIN
PAINLEVEL_OUTOF10: 8
PAINLEVEL_OUTOF10: 2

## 2025-03-01 ASSESSMENT — PAIN DESCRIPTION - LOCATION
LOCATION: ABDOMEN

## 2025-03-01 NOTE — PROGRESS NOTES
"Nelida Mata is a 65 y.o. female on day 2 of admission presenting with Abdominal wall fluid collections.    Subjective   Pt reports 8/10 overnight that slighty improved this morning after PO dilaudid 1mg was added to her pain regimen. She continues to have self-limited diet due to being on Ozempic and denies N/V overnight. She is now passing flatus and had one BM yesterday. Denies CP, SOB.      Of note, pt nurse reported Mepilex dressing was lifting and patient had bleeding from her abdominal wound. No active hemorrhage was noted. ABD dressing was applied to reinforce area .     Objective     Physical Exam  General: no acute distress  HEENT: normocephalic, atraumatic  Heart: warm and well perfused  Lungs: no increased WOB  Abd: Midline vertical incision healed, shallow ulcerations with thin discharge along incision and umbilicus. Incision packed with 4x4 in place, Mepilex dressing in place without shadowing, small amount of blood noted at superior aspect of dressing that has been lifting. Diffuse mild soreness to palpation, abdomen soft throughout without guarding or rebound.  Extremities: moving all extremities  Neuro: awake and conversant  Psych: appropriate mood and affect     Last Recorded Vitals  Blood pressure 111/71, pulse 87, temperature 36.1 °C (97 °F), temperature source Temporal, resp. rate 18, height 1.499 m (4' 11\"), weight 72.1 kg (159 lb), SpO2 97%.  Intake/Output last 3 Shifts:  I/O last 3 completed shifts:  In: 1380 (19.1 mL/kg) [I.V.:1380 (19.1 mL/kg)]  Out: - (0 mL/kg)   Weight: 72.1 kg     Relevant Results  .  Results for orders placed or performed during the hospital encounter of 02/26/25 (from the past 24 hours)   POCT GLUCOSE   Result Value Ref Range    POCT Glucose 128 (H) 74 - 99 mg/dL   Basic Metabolic Panel   Result Value Ref Range    Glucose 45 (LL) 74 - 99 mg/dL    Sodium 141 136 - 145 mmol/L    Potassium 4.3 3.5 - 5.3 mmol/L    Chloride 98 98 - 107 mmol/L    Bicarbonate 33 (H) " 21 - 32 mmol/L    Anion Gap 14 10 - 20 mmol/L    Urea Nitrogen 19 6 - 23 mg/dL    Creatinine 1.74 (H) 0.50 - 1.05 mg/dL    eGFR 32 (L) >60 mL/min/1.73m*2    Calcium 10.3 8.6 - 10.6 mg/dL   CBC   Result Value Ref Range    WBC 9.4 4.4 - 11.3 x10*3/uL    nRBC 0.0 0.0 - 0.0 /100 WBCs    RBC 5.51 (H) 4.00 - 5.20 x10*6/uL    Hemoglobin 16.7 (H) 12.0 - 16.0 g/dL    Hematocrit 50.8 (H) 36.0 - 46.0 %    MCV 92 80 - 100 fL    MCH 30.3 26.0 - 34.0 pg    MCHC 32.9 32.0 - 36.0 g/dL    RDW 14.1 11.5 - 14.5 %    Platelets 178 150 - 450 x10*3/uL   POCT GLUCOSE   Result Value Ref Range    POCT Glucose 54 (L) 74 - 99 mg/dL   POCT GLUCOSE   Result Value Ref Range    POCT Glucose 74 74 - 99 mg/dL   POCT GLUCOSE   Result Value Ref Range    POCT Glucose 40 (L) 74 - 99 mg/dL   POCT GLUCOSE   Result Value Ref Range    POCT Glucose 57 (L) 74 - 99 mg/dL   POCT GLUCOSE   Result Value Ref Range    POCT Glucose 44 (L) 74 - 99 mg/dL   POCT GLUCOSE   Result Value Ref Range    POCT Glucose 86 74 - 99 mg/dL   POCT GLUCOSE   Result Value Ref Range    POCT Glucose 138 (H) 74 - 99 mg/dL   POCT GLUCOSE   Result Value Ref Range    POCT Glucose 117 (H) 74 - 99 mg/dL   POCT GLUCOSE   Result Value Ref Range    POCT Glucose 97 74 - 99 mg/dL     *Note: Due to a large number of results and/or encounters for the requested time period, some results have not been displayed. A complete set of results can be found in Results Review.         Assessment/Plan   Assessment & Plan  Abdominal wall fluid collections    66yo w/ a PMH of HFpEF (50-55% 9/2023), CAD s/p PCI w/ stent x 3 (2016, 2018) and STEMI (9/2023, no stents placed), TIA 9/2023, SVC thrombus and remote DVT on Lovenox, IDDM2, HLD, CKD, MICHAEL, and NAFLD admitted for abdominal wall fluid collection s/p GIBSON-BSO and take back XL with washout on 1/23, now s/p bedside I&D on 2/27.     Abdominal wall seroma vs abscess  Wound site ulcerations  - CT abd/pelvis w/ 4.0 x 3.9 x 7.3cm fluid collection in the midline just  superior to the umbilicus. No evidence of cellulitis on CT or exam, no leukocytosis, no purulent dishcarge.   - Bedside I&D findings consistent with seroma, currently packed WTD and covered with Mepilex and reinforced with ABD dressing.   - Ulcerations along wound site concerning for poor wound healing in s/o DM. Wound care consult, see note for details, continue daily dressing changes. Culture pending. Plan for home wound care   - Continue clindamycin for empiric abx given prior purulent drainage and incomplete abx course .  - MRSA nares pending   - cont Tylenol, PRN oxy for pain. PO dilaudid 1mg q3hr prn breakthrough added overnight      Nausea/vomiting  - PRN antiemetics and bowel regimen. Continue to monitor   - Likely 2/2 chronic gastroparesis, lower suspicion for SBO given benign abd exam, unremarkable CT.      CLARIBEL on CKD  - Baseline Cr 1.3-1.5 --> Cr 2.35 on admission, repeat pending   - FeNa prerenal, likely due to n/v  - IV access lost ovn, encourage PO hydration      Comorbidities  - HFpEF (50-55% 9/2023)/CAD s/p PCI w/ stent x 3/ hx of STEM, on ASA - cont carvedilol, losartan and lasix   - TIA 9/2023  - SVC thrombus, remote DVT on Lovenox, continued    - T2DM/gastroparesis: Farxiga, metformin, semaglutide -held. Home Tresiba 40u at bedtime & lispro 22u TID regimen held due to hypoglycemic BG, received Tresiba 12u at bedtime and Lispro 11 units with evening meal overnight, continue SSI and to titrate home insulin regimen pending POCT   - HLD, on statin (held)  - CKD, NO NSAIDS, cont kerendia   - MICHAEL  - NAFLD  - Chronic pain, onb duloxetine (held)     Dispo: plan for possible discharge later today pending pain control and management of fluid collection      To be seen and d/w Dr. Ramos,   Penelope Harper MD, PGY-3     Patient to stay overnight due to hypoglycemia, will adjust insulin/Tresiba. Monitor glucose.   Hayley Ramos MD

## 2025-03-01 NOTE — CARE PLAN
The patient's goals for the shift include vitals and assessments WNL    The clinical goals for the shift include vitals and assessments WNL    Over the shift, the patient met and continues to progress towards the identified goals. Safety maintained.       Problem: Diabetes  Goal: Decrease in ketones present in urine by end of shift  Outcome: Progressing  Goal: Maintain electrolyte levels within acceptable range throughout shift  Outcome: Progressing  Goal: Maintain glucose levels >70mg/dl to <250mg/dl throughout shift  Outcome: Progressing  Goal: Learn about and adhere to nutrition recommendations by end of shift  Outcome: Progressing     Problem: Pain  Goal: Turns in bed with improved pain control throughout the shift  Outcome: Progressing  Goal: Free from acute confusion related to pain meds throughout the shift  Outcome: Progressing     Problem: Pain - Adult  Goal: Verbalizes/displays adequate comfort level or baseline comfort level  Outcome: Progressing     Problem: Discharge Planning  Goal: Discharge to home or other facility with appropriate resources  Outcome: Progressing     Problem: Chronic Conditions and Co-morbidities  Goal: Patient's chronic conditions and co-morbidity symptoms are monitored and maintained or improved  Outcome: Progressing     Problem: Nutrition  Goal: Nutrient intake appropriate for maintaining nutritional needs  Outcome: Progressing

## 2025-03-02 ENCOUNTER — CLINICAL SUPPORT (OUTPATIENT)
Dept: OBSTETRICS AND GYNECOLOGY | Facility: HOSPITAL | Age: 65
End: 2025-03-02
Payer: COMMERCIAL

## 2025-03-02 VITALS
TEMPERATURE: 97 F | DIASTOLIC BLOOD PRESSURE: 63 MMHG | OXYGEN SATURATION: 100 % | HEART RATE: 85 BPM | SYSTOLIC BLOOD PRESSURE: 119 MMHG | BODY MASS INDEX: 32.05 KG/M2 | WEIGHT: 159 LBS | RESPIRATION RATE: 16 BRPM | HEIGHT: 59 IN

## 2025-03-02 PROBLEM — E11.649 TYPE 2 DIABETES MELLITUS WITH HYPOGLYCEMIA: Status: ACTIVE | Noted: 2025-03-02

## 2025-03-02 LAB
ANION GAP SERPL CALC-SCNC: 17 MMOL/L (ref 10–20)
BUN SERPL-MCNC: 15 MG/DL (ref 6–23)
CALCIUM SERPL-MCNC: 10.3 MG/DL (ref 8.6–10.6)
CHLORIDE SERPL-SCNC: 98 MMOL/L (ref 98–107)
CO2 SERPL-SCNC: 23 MMOL/L (ref 21–32)
CREAT SERPL-MCNC: 1.16 MG/DL (ref 0.5–1.05)
EGFRCR SERPLBLD CKD-EPI 2021: 52 ML/MIN/1.73M*2
ERYTHROCYTE [DISTWIDTH] IN BLOOD BY AUTOMATED COUNT: 14.5 % (ref 11.5–14.5)
GLUCOSE BLD MANUAL STRIP-MCNC: 135 MG/DL (ref 74–99)
GLUCOSE BLD MANUAL STRIP-MCNC: 138 MG/DL (ref 74–99)
GLUCOSE BLD MANUAL STRIP-MCNC: 146 MG/DL (ref 74–99)
GLUCOSE BLD MANUAL STRIP-MCNC: 175 MG/DL (ref 74–99)
GLUCOSE BLD MANUAL STRIP-MCNC: 251 MG/DL (ref 74–99)
GLUCOSE BLD MANUAL STRIP-MCNC: 98 MG/DL (ref 74–99)
GLUCOSE SERPL-MCNC: 115 MG/DL (ref 74–99)
HCT VFR BLD AUTO: 50.3 % (ref 36–46)
HGB BLD-MCNC: 15.6 G/DL (ref 12–16)
MCH RBC QN AUTO: 29.1 PG (ref 26–34)
MCHC RBC AUTO-ENTMCNC: 31 G/DL (ref 32–36)
MCV RBC AUTO: 94 FL (ref 80–100)
NRBC BLD-RTO: 0 /100 WBCS (ref 0–0)
PLATELET # BLD AUTO: 145 X10*3/UL (ref 150–450)
POTASSIUM SERPL-SCNC: 5.1 MMOL/L (ref 3.5–5.3)
RBC # BLD AUTO: 5.37 X10*6/UL (ref 4–5.2)
SODIUM SERPL-SCNC: 133 MMOL/L (ref 136–145)
WBC # BLD AUTO: 7.7 X10*3/UL (ref 4.4–11.3)

## 2025-03-02 PROCEDURE — 2500000002 HC RX 250 W HCPCS SELF ADMINISTERED DRUGS (ALT 637 FOR MEDICARE OP, ALT 636 FOR OP/ED)

## 2025-03-02 PROCEDURE — 80048 BASIC METABOLIC PNL TOTAL CA: CPT | Performed by: STUDENT IN AN ORGANIZED HEALTH CARE EDUCATION/TRAINING PROGRAM

## 2025-03-02 PROCEDURE — 85027 COMPLETE CBC AUTOMATED: CPT | Performed by: STUDENT IN AN ORGANIZED HEALTH CARE EDUCATION/TRAINING PROGRAM

## 2025-03-02 PROCEDURE — 93010 ELECTROCARDIOGRAM REPORT: CPT | Performed by: INTERNAL MEDICINE

## 2025-03-02 PROCEDURE — 99223 1ST HOSP IP/OBS HIGH 75: CPT

## 2025-03-02 PROCEDURE — 2500000001 HC RX 250 WO HCPCS SELF ADMINISTERED DRUGS (ALT 637 FOR MEDICARE OP)

## 2025-03-02 PROCEDURE — 2500000005 HC RX 250 GENERAL PHARMACY W/O HCPCS

## 2025-03-02 PROCEDURE — 36415 COLL VENOUS BLD VENIPUNCTURE: CPT | Performed by: STUDENT IN AN ORGANIZED HEALTH CARE EDUCATION/TRAINING PROGRAM

## 2025-03-02 PROCEDURE — 82947 ASSAY GLUCOSE BLOOD QUANT: CPT

## 2025-03-02 PROCEDURE — 2500000004 HC RX 250 GENERAL PHARMACY W/ HCPCS (ALT 636 FOR OP/ED)

## 2025-03-02 PROCEDURE — 87493 C DIFF AMPLIFIED PROBE: CPT

## 2025-03-02 PROCEDURE — 1210000001 HC SEMI-PRIVATE ROOM DAILY

## 2025-03-02 RX ORDER — OXYCODONE HYDROCHLORIDE 5 MG/1
10 TABLET ORAL EVERY 6 HOURS PRN
Status: DISPENSED | OUTPATIENT
Start: 2025-03-02

## 2025-03-02 RX ORDER — INSULIN LISPRO 100 [IU]/ML
0-5 INJECTION, SOLUTION INTRAVENOUS; SUBCUTANEOUS
Status: ACTIVE | OUTPATIENT
Start: 2025-03-02

## 2025-03-02 RX ORDER — INSULIN LISPRO 100 [IU]/ML
0-5 INJECTION, SOLUTION INTRAVENOUS; SUBCUTANEOUS
Status: DISCONTINUED | OUTPATIENT
Start: 2025-03-02 | End: 2025-03-02

## 2025-03-02 RX ORDER — INSULIN DEGLUDEC 100 U/ML
8 INJECTION, SOLUTION SUBCUTANEOUS NIGHTLY
Status: DISPENSED | OUTPATIENT
Start: 2025-03-02

## 2025-03-02 RX ORDER — CYCLOBENZAPRINE HCL 10 MG
10 TABLET ORAL 3 TIMES DAILY PRN
Status: DISPENSED | OUTPATIENT
Start: 2025-03-02

## 2025-03-02 RX ADMIN — DULOXETINE HYDROCHLORIDE 60 MG: 60 CAPSULE, DELAYED RELEASE ORAL at 21:20

## 2025-03-02 RX ADMIN — INSULIN LISPRO 3 UNITS: 100 INJECTION, SOLUTION INTRAVENOUS; SUBCUTANEOUS at 10:15

## 2025-03-02 RX ADMIN — CYCLOBENZAPRINE 10 MG: 10 TABLET, FILM COATED ORAL at 08:57

## 2025-03-02 RX ADMIN — SIMETHICONE 80 MG: 80 TABLET, CHEWABLE ORAL at 08:58

## 2025-03-02 RX ADMIN — CLINDAMYCIN HYDROCHLORIDE 450 MG: 300 CAPSULE ORAL at 21:27

## 2025-03-02 RX ADMIN — CYCLOBENZAPRINE 10 MG: 10 TABLET, FILM COATED ORAL at 21:26

## 2025-03-02 RX ADMIN — OXYCODONE HYDROCHLORIDE 10 MG: 5 TABLET ORAL at 09:56

## 2025-03-02 RX ADMIN — CLINDAMYCIN HYDROCHLORIDE 450 MG: 300 CAPSULE ORAL at 13:28

## 2025-03-02 RX ADMIN — CLINDAMYCIN HYDROCHLORIDE 450 MG: 300 CAPSULE ORAL at 05:38

## 2025-03-02 RX ADMIN — CARVEDILOL 12.5 MG: 12.5 TABLET, FILM COATED ORAL at 08:57

## 2025-03-02 RX ADMIN — CARVEDILOL 12.5 MG: 12.5 TABLET, FILM COATED ORAL at 16:52

## 2025-03-02 RX ADMIN — SIMETHICONE 80 MG: 80 TABLET, CHEWABLE ORAL at 16:52

## 2025-03-02 RX ADMIN — FINERENONE 10 MG: 10 TABLET, FILM COATED ORAL at 08:57

## 2025-03-02 RX ADMIN — ONDANSETRON HYDROCHLORIDE 4 MG: 4 TABLET, FILM COATED ORAL at 16:52

## 2025-03-02 RX ADMIN — SIMETHICONE 80 MG: 80 TABLET, CHEWABLE ORAL at 13:28

## 2025-03-02 RX ADMIN — FUROSEMIDE 20 MG: 20 TABLET ORAL at 08:57

## 2025-03-02 RX ADMIN — OXYCODONE HYDROCHLORIDE 10 MG: 5 TABLET ORAL at 03:29

## 2025-03-02 RX ADMIN — ATORVASTATIN CALCIUM 80 MG: 80 TABLET, FILM COATED ORAL at 08:57

## 2025-03-02 RX ADMIN — INSULIN DEGLUDEC INJECTION 8 UNITS: 100 INJECTION, SOLUTION SUBCUTANEOUS at 21:27

## 2025-03-02 RX ADMIN — POLYETHYLENE GLYCOL 3350 17 G: 17 POWDER, FOR SOLUTION ORAL at 08:57

## 2025-03-02 RX ADMIN — MAGNESIUM HYDROXIDE 30 ML: 400 SUSPENSION ORAL at 08:57

## 2025-03-02 RX ADMIN — ENOXAPARIN SODIUM 40 MG: 100 INJECTION SUBCUTANEOUS at 08:57

## 2025-03-02 RX ADMIN — DULOXETINE HYDROCHLORIDE 30 MG: 30 CAPSULE, DELAYED RELEASE ORAL at 08:57

## 2025-03-02 RX ADMIN — LIDOCAINE HYDROCHLORIDE 10 ML: 20 SOLUTION ORAL; TOPICAL at 13:28

## 2025-03-02 RX ADMIN — SIMETHICONE 80 MG: 80 TABLET, CHEWABLE ORAL at 21:21

## 2025-03-02 ASSESSMENT — PAIN DESCRIPTION - LOCATION
LOCATION: CHEST
LOCATION: ABDOMEN

## 2025-03-02 ASSESSMENT — ENCOUNTER SYMPTOMS
ACTIVITY CHANGE: 0
UNEXPECTED WEIGHT CHANGE: 0
FREQUENCY: 0
HEADACHES: 0
SORE THROAT: 0
VOMITING: 0
COUGH: 0
ABDOMINAL PAIN: 1
DIAPHORESIS: 0
JOINT SWELLING: 0
TROUBLE SWALLOWING: 0
APPETITE CHANGE: 0
EYE PAIN: 0
CHEST TIGHTNESS: 0
PALPITATIONS: 0
NAUSEA: 0
SHORTNESS OF BREATH: 0
DIZZINESS: 0
LIGHT-HEADEDNESS: 0
AGITATION: 0
CONFUSION: 0
EYE REDNESS: 0
DYSURIA: 0
POLYDIPSIA: 0
POLYPHAGIA: 0
BRUISES/BLEEDS EASILY: 0
DIARRHEA: 0
FATIGUE: 1
NUMBNESS: 0

## 2025-03-02 ASSESSMENT — PAIN DESCRIPTION - DESCRIPTORS
DESCRIPTORS: CRAMPING
DESCRIPTORS: SHARP
DESCRIPTORS: ACHING;CRAMPING
DESCRIPTORS: SHARP
DESCRIPTORS: ACHING;DISCOMFORT

## 2025-03-02 ASSESSMENT — COGNITIVE AND FUNCTIONAL STATUS - GENERAL
WALKING IN HOSPITAL ROOM: A LITTLE
DRESSING REGULAR UPPER BODY CLOTHING: A LITTLE
CLIMB 3 TO 5 STEPS WITH RAILING: A LITTLE
HELP NEEDED FOR BATHING: A LITTLE
TOILETING: A LITTLE
DAILY ACTIVITIY SCORE: 21
MOBILITY SCORE: 22

## 2025-03-02 ASSESSMENT — PAIN SCALES - GENERAL
PAINLEVEL_OUTOF10: 9
PAINLEVEL_OUTOF10: 6
PAINLEVEL_OUTOF10: 8

## 2025-03-02 NOTE — PROGRESS NOTES
"Nelida Mata is a 65 y.o. female on day 3 of admission presenting with Abdominal wall fluid collections.    Subjective   Multiple episodes of hypoglycemia yesterday, now improved. Insulin currently held.   Pain is stable, tolerable per pt though she reports \"I'm always in pain,\" and that she's used to it. Tolerating PO, minimal nausea. Large BM this AM.     Objective     Physical Exam  General: no acute distress  HEENT: normocephalic, atraumatic  Heart: warm and well perfused  Lungs: no increased WOB  Abd: Midline vertical incision healed, shallow ulcerations with thin discharge along incision and umbilicus. Incision packed Mepilex dressing in place, trace shadowing. Diffuse mild tenderness to palpation, abdomen soft throughout without guarding or rebound.  Extremities: moving all extremities  Neuro: awake and conversant  Psych: appropriate mood and affect     Last Recorded Vitals  Blood pressure (!) 141/85, pulse 78, temperature 36 °C (96.8 °F), temperature source Temporal, resp. rate 17, height 1.499 m (4' 11\"), weight 72.1 kg (159 lb), SpO2 96%.  Intake/Output last 3 Shifts:  No intake/output data recorded.    Relevant Results  .  Results for orders placed or performed during the hospital encounter of 02/26/25 (from the past 24 hours)   POCT GLUCOSE   Result Value Ref Range    POCT Glucose 97 74 - 99 mg/dL   Basic Metabolic Panel   Result Value Ref Range    Glucose 129 (H) 74 - 99 mg/dL    Sodium 137 136 - 145 mmol/L    Potassium 3.9 3.5 - 5.3 mmol/L    Chloride 99 98 - 107 mmol/L    Bicarbonate 27 21 - 32 mmol/L    Anion Gap 15 10 - 20 mmol/L    Urea Nitrogen 16 6 - 23 mg/dL    Creatinine 1.08 (H) 0.50 - 1.05 mg/dL    eGFR 57 (L) >60 mL/min/1.73m*2    Calcium 9.2 8.6 - 10.6 mg/dL   CBC   Result Value Ref Range    WBC 7.6 4.4 - 11.3 x10*3/uL    nRBC 0.0 0.0 - 0.0 /100 WBCs    RBC 4.36 4.00 - 5.20 x10*6/uL    Hemoglobin 12.8 12.0 - 16.0 g/dL    Hematocrit 40.3 36.0 - 46.0 %    MCV 92 80 - 100 fL    MCH " 29.4 26.0 - 34.0 pg    MCHC 31.8 (L) 32.0 - 36.0 g/dL    RDW 14.3 11.5 - 14.5 %    Platelets 134 (L) 150 - 450 x10*3/uL   POCT GLUCOSE   Result Value Ref Range    POCT Glucose 129 (H) 74 - 99 mg/dL   POCT GLUCOSE   Result Value Ref Range    POCT Glucose 48 (L) 74 - 99 mg/dL   POCT GLUCOSE   Result Value Ref Range    POCT Glucose 74 74 - 99 mg/dL   POCT GLUCOSE   Result Value Ref Range    POCT Glucose 58 (L) 74 - 99 mg/dL   POCT GLUCOSE   Result Value Ref Range    POCT Glucose 37 (L) 74 - 99 mg/dL   POCT GLUCOSE   Result Value Ref Range    POCT Glucose 54 (L) 74 - 99 mg/dL   POCT GLUCOSE   Result Value Ref Range    POCT Glucose 55 (L) 74 - 99 mg/dL   POCT GLUCOSE   Result Value Ref Range    POCT Glucose 55 (L) 74 - 99 mg/dL   POCT GLUCOSE   Result Value Ref Range    POCT Glucose 96 74 - 99 mg/dL   POCT GLUCOSE   Result Value Ref Range    POCT Glucose 189 (H) 74 - 99 mg/dL   POCT GLUCOSE   Result Value Ref Range    POCT Glucose 152 (H) 74 - 99 mg/dL     *Note: Due to a large number of results and/or encounters for the requested time period, some results have not been displayed. A complete set of results can be found in Results Review.         Assessment/Plan   Assessment & Plan  Abdominal wall fluid collections    64yo w/ a PMH of HFpEF (50-55% 9/2023), CAD s/p PCI w/ stent x 3 (2016, 2018) and STEMI (9/2023, no stents placed), TIA 9/2023, SVC thrombus and remote DVT on Lovenox, IDDM2, HLD, CKD, MICHAEL, and NAFLD admitted for abdominal wall fluid collection s/p GIBSON-BSO and take back XL with washout on 1/23, now s/p bedside I&D on 2/27.     Abdominal wall seroma vs abscess  Wound site ulcerations  - CT abd/pelvis w/ 4.0 x 3.9 x 7.3cm fluid collection in the midline just superior to the umbilicus. No evidence of cellulitis on CT or exam, no leukocytosis, no purulent dishcarge.   - Bedside I&D findings consistent with seroma, ulcerations also noted consistent with poor healing 2/2 DM. Wound care recs appreciated, for home  wound care on discharge.   - Continue clindamycin for empiric abx given prior purulent drainage and incomplete abx course.   - MRSA nares pending   - cont Tylenol, PRN oxy for pain. Dilaudid PO discontinued.     Hypoglycemia  - T2DM - on Tresiba 40U qhs and lispro 22u TID with meals. Farxiga, metformin, semaglutide held on admission. Insulin dose initially reduced given irregular PO intake and hypoglycemic episods, now all long-acting insulin held with postprandial SSI ordered given persistent hypoglycemia   - Endo consult today to assist with dose adjustment for homegoing.      Nausea/vomiting  - improved, tolerating PO.   - PRN antiemetics and bowel regimen.      CLARIBEL on CKD  - Resolved, baseline Cr 1.3-1.5. Peak 2.35, resolved to 1.08 on HD2.   - FeNa prerenal, likely due to n/v  - Cont PO hydration      Comorbidities  - HFpEF (50-55% 9/2023)/CAD s/p PCI w/ stent x 3/ hx of STEM, on ASA - cont carvedilol, losartan and lasix   - TIA 9/2023  - SVC thrombus, remote DVT on Lovenox, continued    - T2DM/gastroparesis: as above  - CKD, NO NSAIDS, cont kerendia   - MICHAEL  - NAFLD  - Chronic pain, onb duloxetine (held)     Dispo: plan for possible discharge today pending pain control and glucose control      To be seen and d/w Dr. Gomez,   Eleanor Holt MD, PGY-3

## 2025-03-02 NOTE — CONSULTS
Inpatient consult to Endocrinology  Consult performed by: Catherine Garcia PA-C  Consult ordered by: Anuj Gatica MD        Reason For Consult  64yo with IDDM2 admitted 1mo s/p abdominal hysterectomy with wound complications and nausea. on Farxiga, metformin, semaglutide, Tresiba and lispro for sugar control with significant hypoglycemia. Please assist with homegoing insulin regime       History Of Present Illness  Nelida Mata is a 64yo w/ a PMH of HFpEF (50-55% 9/2023), CAD s/p PCI w/ stent x 3 (2016, 2018) and STEMI (9/2023, no stents placed), TIA 9/2023, SVC thrombus and remote DVT on Lovenox, IDDM2, HLD, CKD, MICHAEL, and NAFLD admitted for abdominal wall fluid collection s/p GIBSON-BSO and take back XL with washout on 1/23, now s/p bedside I&D on 2/27.     Diabetes History  Type of diabetes: DM2, confirmed with negative t1d abs and cpeptide of 1.5  Year diagnosed or age: age 20  Hospitalizations for DKA or HHS: no  Complications: CAD, STEMI, TIA, CKD, gastroparesis, neuropathy PVD, 4th and 5th R foot metacarpal amputated  Seen by PCP or Endocrinology: kathrine Miles,  10/2023  Frequency of glucose checks: continous with kenyon 2 + reader  Glucose review: multiple episodes of hypoglycemia below 55 during current admission  Frequency of Hypoglycemia: multiple times per week  Hypoglycemia unawareness: no  Severe hypoglycemia requiring assistance from others:  yes    Home Medications  Basal: tresiba 40u daily  Prandial: lispro 22u with meals  Correction:  Orals: farxiga 10mg daily, metformin 500mg XR  GLP-1: ozempic 2mg daily  CGM: kenyon 2 + reader  Patient has been titrated up on ozempic to 2mg in outpatient setting and remains on large insulin doses, states her insulin doses have remained relatively the same despite ozempic titration, patient states she has had episodes of lows at home. A1c of 7.1% suggestive of hypoglycemia at home as well.   Also has hx of gastroparesis and frequently experiences  n/v. Patient eats only 1 meal per day at home, but takes lispro 22u 2-3 times per day. We discussed only taking lispro with meals and skipping dose if she skips meal.      Results from Most Recent A1C  Hemoglobin A1C   Date/Time Value Ref Range Status   10/04/2024 09:25 AM 7.1 (H) See comment % Final        Diabetes Problem List Entries with Dates  Problem List:  2023-08: Type 2 diabetes mellitus with hyperglycemia, with long-term   current use of insulin  2023-06: Uncontrolled type 2 diabetes mellitus with hyperglycemia  2023-03: Type II or unspecified type diabetes mellitus with renal   manifestations, uncontrolled(250.42) (Multi)  2023-03: Diabetic leg ulcer (Multi)  2012-08: Diabetes mellitus type 2, controlled, without complications   (Multi)      Past Medical History  She has a past medical history of (HFpEF) heart failure with preserved ejection fraction, Abnormal uterine and vaginal bleeding, unspecified, Acute vaginitis (02/22/2018), Alcohol abuse, in remission, Anxiety, Arthritis, Blindness, Cerebral vascular accident (Multi) (09/2023), Changes in skin texture (04/10/2018), Chest pain, unspecified (02/22/2018), Chronic pain disorder, CKD (chronic kidney disease), Coronary artery disease, Depression, Disorder of pigmentation, unspecified (02/22/2018), Fibroid, Fibromyalgia, Generalized contraction of visual field, left eye (02/06/2017), GERD (gastroesophageal reflux disease), Glaucoma, Headache, unspecified (04/10/2018), Hereditary and idiopathic neuropathy, unspecified (04/10/2018), History of blood transfusion, Hydroureter (02/22/2018), Hyperlipidemia, unspecified, Hypertension, Hypokalemia (02/22/2018), Insomnia, unspecified, Lipoma, Localized edema (02/22/2018), Macula scars of posterior pole (postinflammatory) (post-traumatic), left eye (04/10/2018), NAFLD (nonalcoholic fatty liver disease), NSTEMI (non-ST elevated myocardial infarction) (Multi) (06/2024), Obesity, Obstructive sleep apnea (adult)  (pediatric), Other chest pain (04/10/2018), Other fatigue (02/22/2018), Other fecal abnormalities (02/22/2018), Other forms of dyspnea, Peripheral vascular disease (CMS-HCC), Polyp of colon (02/22/2018), Postmenopausal bleeding (04/10/2018), Presence of coronary angioplasty implant and graft (02/22/2018), Presence of intraocular lens (03/18/2015), Psoriasis, unspecified, Repeated falls (02/22/2018), Retinal hemorrhage, right eye (02/06/2017), Shortness of breath, STEMI (ST elevation myocardial infarction) (Multi) (09/2023), 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    Surgical History  She has a past surgical history that includes Coronary angioplasty with stent (03/06/2017); Cataract extraction (03/18/2015); Retinal detachment surgery (03/18/2015); MR angio head wo IV contrast (08/16/2013); MR angio neck wo IV contrast (08/16/2013); CT angio aorta and bilateral iliofemoral runoff including without contrast if performed (08/08/2022); Breast biopsy (Left); Cardiac catheterization (N/A, 07/05/2024); Toe amputation (Right); and Vulva surgery.     Social History  She reports that she has never smoked. She has never been exposed to tobacco smoke. She has never used smokeless tobacco. She reports that she does not currently use alcohol. She reports that she does not use drugs.    Family History  Family History   Problem Relation Name Age of Onset    Diabetes Mother       Glaucoma Father      Hypertension Other FAMILY     Uterine cancer Neg Hx      Ovarian cancer Neg Hx      Colon cancer Neg Hx      Breast cancer Neg Hx          Allergies  Other, Penicillins, and Procaine    Review of Systems   Constitutional:  Positive for fatigue. Negative for activity change, appetite change, diaphoresis and unexpected weight change.   HENT:  Negative for congestion, sore throat and trouble swallowing.    Eyes:  Negative for pain, redness and visual disturbance.   Respiratory:  Negative for cough, chest tightness and shortness of breath.    Cardiovascular:  Negative for chest pain, palpitations and leg swelling.   Gastrointestinal:  Positive for abdominal pain. Negative for diarrhea, nausea and vomiting.   Endocrine: Negative for cold intolerance, heat intolerance, polydipsia, polyphagia and polyuria.   Genitourinary:  Negative for dysuria, frequency and urgency.   Musculoskeletal:  Negative for gait problem and joint swelling.   Skin:  Negative for pallor and rash.   Allergic/Immunologic: Negative for immunocompromised state.   Neurological:  Negative for dizziness, light-headedness, numbness and headaches.   Hematological:  Does not bruise/bleed easily.   Psychiatric/Behavioral:  Negative for agitation, behavioral problems and confusion.    All other systems reviewed and are negative.       Physical Exam  Vitals reviewed.   Constitutional:       General: She is not in acute distress.     Appearance: Normal appearance.   HENT:      Head: Normocephalic and atraumatic.      Nose: Nose normal.      Mouth/Throat:      Mouth: Mucous membranes are moist.   Eyes:      Extraocular Movements: Extraocular movements intact.      Conjunctiva/sclera: Conjunctivae normal.      Pupils: Pupils are equal, round, and reactive to light.   Cardiovascular:      Pulses: Normal pulses.   Pulmonary:      Effort: Pulmonary effort is normal. No respiratory distress.   Abdominal:      General: Abdomen is flat. There is  "no distension.   Musculoskeletal:         General: Normal range of motion.   Skin:     General: Skin is warm and dry.      Findings: No rash.   Neurological:      Mental Status: She is alert and oriented to person, place, and time.   Psychiatric:         Mood and Affect: Mood normal.         Behavior: Behavior normal.          ROS, PMH, FH/SH, surgical history and allergies have been reviewed.    Last Recorded Vitals  Blood pressure (!) 150/85, pulse 91, temperature 36.4 °C (97.5 °F), temperature source Temporal, resp. rate 16, height 1.499 m (4' 11\"), weight 72.1 kg (159 lb), SpO2 100%.    Relevant Results  Results from last 7 days   Lab Units 03/02/25  0738 03/02/25  0639 03/02/25  0334 03/01/25  2139 03/01/25  1906 03/01/25  1109 03/01/25  0707 02/28/25  1254 02/28/25  1132 02/27/25  0530 02/26/25 2014   POCT GLUCOSE mg/dL 138* 146* 98 152* 189*   < >  --    < >  --    < >  --    GLUCOSE mg/dL  --   --   --   --   --   --  129*  --  45*  --  194*    < > = values in this interval not displayed.     Lab Review  Lab Results   Component Value Date    BILITOT 0.5 02/26/2025    CALCIUM 9.2 03/01/2025    CO2 27 03/01/2025    CL 99 03/01/2025    CREATININE 1.08 (H) 03/01/2025    GLUCOSE 129 (H) 03/01/2025    ALKPHOS 103 02/26/2025    K 3.9 03/01/2025    PROT 8.2 02/26/2025     03/01/2025    AST 21 02/26/2025    ALT 14 02/26/2025    BUN 16 03/01/2025    ANIONGAP 15 03/01/2025    MG 2.67 (H) 02/26/2025    PHOS 2.9 01/25/2025     03/31/2023    ALBUMIN 4.9 02/26/2025    LIPASE 28 10/27/2024    GFRF 24 (A) 09/19/2023    GFRMALE CANCELED 03/22/2023     Lab Results   Component Value Date    TRIG 102 10/04/2024    CHOL 91 10/04/2024    LDLCALC 33 10/04/2024    HDL 37.9 10/04/2024     Lab Results   Component Value Date    HGBA1C 7.1 (H) 10/04/2024    HGBA1C 7.6 (H) 08/19/2024    HGBA1C 8.5 (H) 02/02/2024     The ASCVD Risk score (Adrianne DK, et al., 2019) failed to calculate for the following reasons:    Risk score " cannot be calculated because patient has a medical history suggesting prior/existing ASCVD    Scheduled medications  atorvastatin, 80 mg, oral, Daily  carvedilol, 12.5 mg, oral, BID  clindamycin, 450 mg, oral, q8h NORBERT  DULoxetine, 30 mg, oral, Daily   And  DULoxetine, 60 mg, oral, Nightly  enoxaparin, 40 mg, subcutaneous, Daily  finerenone, 10 mg, oral, Daily  furosemide, 20 mg, oral, Daily  [Held by provider] insulin degludec, 25 Units, subcutaneous, Nightly  insulin lispro, 0-5 Units, subcutaneous, TID after meals  [Held by provider] insulin lispro, 22 Units, subcutaneous, TID AC  magnesium hydroxide, 30 mL, oral, Daily  polyethylene glycol, 17 g, oral, Daily  simethicone, 80 mg, oral, 4x daily      Continuous medications     PRN medications  PRN medications: acetaminophen, calcium carbonate, cyclobenzaprine, dextrose, dextrose, glucagon, glucagon, lidocaine-diphenhydraMINE-Maalox 1:1:1, melatonin, ondansetron **OR** ondansetron, oxyCODONE, oxyCODONE    Assessment/Plan   Assessment & Plan  Abdominal wall fluid collections    Type 2 diabetes mellitus with hypoglycemia      PLAN  Steroids: none  Nutrition: 90g CHO diet  Hypoglycemia 2/2 large home insulin doses, decreased PO intake due to n/v and CLARIBEL    - reduce tresiba to 8u daily inpatient, low PO intake and recent hypo episodes    - continue lispro corrective scale #1 with meals, adjust to q4h if NPO or if persistently hyperglycemic   = 0u  151-200 = 1u  201-250 = 2u  251-300 = 3u  301-350 = 4u  351-400 = 5u    -Accuchecks (not BMP) TIDAC and QHS- kindly ensure QHS Accucheck is drawn; it is often missed   - Goal -180  -Hypoglycemia protocol  -Will continue to follow and titrate insulin accordingly      Discharge planning:   [x] patient may expect to discharge home on tresiba 15u daily, lispro 5u with meals + scale #2   -STOP ozempic 2mg due to gastroparesis, recurrent n/v, recent abdominal surgery. Ozempic restart may be assessed in outpatient  setting  -STOP metformin due to recent CLARIBEL, GFR has dropped below 30 multiple times in recent labwork, wait to restart outpatient once kidney function stabilizes  -Patient may restart farxiga 10mg qAM once she no longer has n/v, discussed holding [id number] on days that she is n/v due to increased risk for dehydration and eDKA    [x]continue use of kenyon 2 + reader, declined kenyon 3 upgrade, patient states she has glucagon at home  [x]will enroll pt in  pharmacy platinum plan program  [x]follow up with Catherine Garcia PA-C in diabetes hospital discharge clinic on 3/11/25        INSULIN INSTRUCTIONS   Breakfast time Lunch time Dinner time  Bedtime      Tresiba = 15 units   Humalog/Lispro = 5 units plus scale Humalog/Lispro = 5 units plus scale  Humalog/Lispro = 5 units plus scale      CORRECTIVE SCALE  GLUCOSE READING   HUMALOG/LISPRO SCALE DOSE    70 - 149 0   150 - 200 2   201 - 250 4   251 - 300 6   301 - 350 8   351 - 400+ 10     If glucose remains over 400, call your diabetes provider, repeat 10 units every 4 hours and drink sugar free   liquids (water, Gatorade zero, chicken broth) until you glucose improves or you hear back from medical   professional. If your glucose remains over 400 and you develop symptoms such as nausea/vomiting or   confusion, go to the emergency room as soon as possible.      I spent 80 minutes in the professional and overall care of this patient.      Catherine Garcia PA-C

## 2025-03-02 NOTE — CARE PLAN
Problem: Diabetes  Goal: Maintain electrolyte levels within acceptable range throughout shift  Outcome: Progressing  Flowsheets (Taken 3/2/2025 0636)  Maintain electrolyte levels within acceptable range throughout shift:   Med administration/monitoring of effect   Monitor urine output  Goal: Maintain glucose levels >70mg/dl to <250mg/dl throughout shift  Outcome: Progressing  Flowsheets (Taken 3/2/2025 0636)  Maintain glucose levels >70mg/dl to <250mg/dl throughout shift:   Med administration/monitoring of effect   Self monitor blood glucose with staff oversight  Goal: Learn about and adhere to nutrition recommendations by end of shift  Outcome: Progressing  Flowsheets (Taken 3/2/2025 0636)  Learn about and adhere to nutrition recommendations by end of shift: Ensure/encourage compliance with appropriate diet     Problem: Pain - Adult  Goal: Verbalizes/displays adequate comfort level or baseline comfort level  Flowsheets (Taken 3/2/2025 0636)  Verbalizes/displays adequate comfort level or baseline comfort level:   Encourage patient to monitor pain and request assistance   Assess pain using appropriate pain scale   Administer analgesics based on type and severity of pain and evaluate response   Implement non-pharmacological measures as appropriate and evaluate response   Consider cultural and social influences on pain and pain management   Notify Licensed Independent Practitioner if interventions unsuccessful or patient reports new pain     Problem: Discharge Planning  Goal: Discharge to home or other facility with appropriate resources  Flowsheets (Taken 3/2/2025 0636)  Discharge to home or other facility with appropriate resources:   Identify barriers to discharge with patient and caregiver   Arrange for needed discharge resources and transportation as appropriate   Identify discharge learning needs (meds, wound care, etc)   Arrange for interpreters to assist at discharge as needed   Refer to discharge planning if  patient needs post-hospital services based on physician order or complex needs related to functional status, cognitive ability or social support system     Problem: Chronic Conditions and Co-morbidities  Goal: Patient's chronic conditions and co-morbidity symptoms are monitored and maintained or improved  Flowsheets (Taken 3/2/2025 6809)  Care Plan - Patient's Chronic Conditions and Co-Morbidity Symptoms are Monitored and Maintained or Improved:   Monitor and assess patient's chronic conditions and comorbid symptoms for stability, deterioration, or improvement   Collaborate with multidisciplinary team to address chronic and comorbid conditions and prevent exacerbation or deterioration   Update acute care plan with appropriate goals if chronic or comorbid symptoms are exacerbated and prevent overall improvement and discharge   The patient's goals for the shift include vitals and assessments WNL    The clinical goals for the shift include No s/sx of infection    Over the shift, the patient did not make progress toward the following goals. Barriers to progression include none. Recommendations to address these barriers include n/a.

## 2025-03-02 NOTE — CARE PLAN
The patient's goals for the shift include pain control    The clinical goals for the shift include No S/Sx on Infection, pain control    Problem: Diabetes  Goal: Decrease in ketones present in urine by end of shift  Outcome: Progressing  Goal: Maintain electrolyte levels within acceptable range throughout shift  Outcome: Progressing  Goal: Maintain glucose levels >70mg/dl to <250mg/dl throughout shift  Outcome: Progressing  Goal: Learn about and adhere to nutrition recommendations by end of shift  Outcome: Progressing     Problem: Pain  Goal: Turns in bed with improved pain control throughout the shift  Outcome: Progressing  Goal: Free from acute confusion related to pain meds throughout the shift  Outcome: Progressing     Problem: Pain - Adult  Goal: Verbalizes/displays adequate comfort level or baseline comfort level  Outcome: Progressing     Problem: Discharge Planning  Goal: Discharge to home or other facility with appropriate resources  Outcome: Progressing     Problem: Chronic Conditions and Co-morbidities  Goal: Patient's chronic conditions and co-morbidity symptoms are monitored and maintained or improved  Outcome: Progressing     Problem: Nutrition  Goal: Nutrient intake appropriate for maintaining nutritional needs  Outcome: Progressing     Problem: Fall/Injury  Goal: Not fall by end of shift  Outcome: Progressing

## 2025-03-03 LAB
ANION GAP SERPL CALC-SCNC: 17 MMOL/L (ref 10–20)
ATRIAL RATE: 94 BPM
BUN SERPL-MCNC: 14 MG/DL (ref 6–23)
C DIF TOX TCDA+TCDB STL QL NAA+PROBE: NOT DETECTED
CALCIUM SERPL-MCNC: 9.6 MG/DL (ref 8.6–10.6)
CHLORIDE SERPL-SCNC: 100 MMOL/L (ref 98–107)
CO2 SERPL-SCNC: 23 MMOL/L (ref 21–32)
CREAT SERPL-MCNC: 1.17 MG/DL (ref 0.5–1.05)
EGFRCR SERPLBLD CKD-EPI 2021: 52 ML/MIN/1.73M*2
ERYTHROCYTE [DISTWIDTH] IN BLOOD BY AUTOMATED COUNT: 14.4 % (ref 11.5–14.5)
GLUCOSE BLD MANUAL STRIP-MCNC: 152 MG/DL (ref 74–99)
GLUCOSE BLD MANUAL STRIP-MCNC: 186 MG/DL (ref 74–99)
GLUCOSE BLD MANUAL STRIP-MCNC: 235 MG/DL (ref 74–99)
GLUCOSE BLD MANUAL STRIP-MCNC: 239 MG/DL (ref 74–99)
GLUCOSE BLD MANUAL STRIP-MCNC: 256 MG/DL (ref 74–99)
GLUCOSE SERPL-MCNC: 156 MG/DL (ref 74–99)
HCT VFR BLD AUTO: 43.6 % (ref 36–46)
HGB BLD-MCNC: 13 G/DL (ref 12–16)
MCH RBC QN AUTO: 29.4 PG (ref 26–34)
MCHC RBC AUTO-ENTMCNC: 29.8 G/DL (ref 32–36)
MCV RBC AUTO: 99 FL (ref 80–100)
NRBC BLD-RTO: 0 /100 WBCS (ref 0–0)
P AXIS: 56 DEGREES
P OFFSET: 152 MS
P ONSET: 104 MS
PLATELET # BLD AUTO: 159 X10*3/UL (ref 150–450)
POTASSIUM SERPL-SCNC: 4.5 MMOL/L (ref 3.5–5.3)
PR INTERVAL: 178 MS
Q ONSET: 193 MS
QRS COUNT: 15 BEATS
QRS DURATION: 130 MS
QT INTERVAL: 404 MS
QTC CALCULATION(BAZETT): 505 MS
QTC FREDERICIA: 469 MS
R AXIS: -40 DEGREES
RBC # BLD AUTO: 4.42 X10*6/UL (ref 4–5.2)
SODIUM SERPL-SCNC: 135 MMOL/L (ref 136–145)
T AXIS: 41 DEGREES
T OFFSET: 395 MS
VENTRICULAR RATE: 94 BPM
WBC # BLD AUTO: 7.6 X10*3/UL (ref 4.4–11.3)

## 2025-03-03 PROCEDURE — 36415 COLL VENOUS BLD VENIPUNCTURE: CPT | Performed by: STUDENT IN AN ORGANIZED HEALTH CARE EDUCATION/TRAINING PROGRAM

## 2025-03-03 PROCEDURE — 85027 COMPLETE CBC AUTOMATED: CPT | Performed by: STUDENT IN AN ORGANIZED HEALTH CARE EDUCATION/TRAINING PROGRAM

## 2025-03-03 PROCEDURE — 2500000001 HC RX 250 WO HCPCS SELF ADMINISTERED DRUGS (ALT 637 FOR MEDICARE OP)

## 2025-03-03 PROCEDURE — 99233 SBSQ HOSP IP/OBS HIGH 50: CPT | Performed by: NURSE PRACTITIONER

## 2025-03-03 PROCEDURE — 84132 ASSAY OF SERUM POTASSIUM: CPT | Performed by: STUDENT IN AN ORGANIZED HEALTH CARE EDUCATION/TRAINING PROGRAM

## 2025-03-03 PROCEDURE — 82947 ASSAY GLUCOSE BLOOD QUANT: CPT

## 2025-03-03 PROCEDURE — 2500000002 HC RX 250 W HCPCS SELF ADMINISTERED DRUGS (ALT 637 FOR MEDICARE OP, ALT 636 FOR OP/ED): Performed by: STUDENT IN AN ORGANIZED HEALTH CARE EDUCATION/TRAINING PROGRAM

## 2025-03-03 PROCEDURE — 2500000004 HC RX 250 GENERAL PHARMACY W/ HCPCS (ALT 636 FOR OP/ED)

## 2025-03-03 PROCEDURE — 1210000001 HC SEMI-PRIVATE ROOM DAILY

## 2025-03-03 PROCEDURE — 2500000002 HC RX 250 W HCPCS SELF ADMINISTERED DRUGS (ALT 637 FOR MEDICARE OP, ALT 636 FOR OP/ED)

## 2025-03-03 PROCEDURE — 2500000005 HC RX 250 GENERAL PHARMACY W/O HCPCS

## 2025-03-03 RX ORDER — INSULIN LISPRO 100 [IU]/ML
2 INJECTION, SOLUTION INTRAVENOUS; SUBCUTANEOUS
Status: DISCONTINUED | OUTPATIENT
Start: 2025-03-03 | End: 2025-03-04

## 2025-03-03 RX ORDER — LOPERAMIDE HYDROCHLORIDE 2 MG/1
2 CAPSULE ORAL 4 TIMES DAILY PRN
Status: DISCONTINUED | OUTPATIENT
Start: 2025-03-03 | End: 2025-03-04 | Stop reason: HOSPADM

## 2025-03-03 RX ADMIN — ENOXAPARIN SODIUM 40 MG: 100 INJECTION SUBCUTANEOUS at 08:48

## 2025-03-03 RX ADMIN — SIMETHICONE 80 MG: 80 TABLET, CHEWABLE ORAL at 13:16

## 2025-03-03 RX ADMIN — INSULIN LISPRO 2 UNITS: 100 INJECTION, SOLUTION INTRAVENOUS; SUBCUTANEOUS at 18:57

## 2025-03-03 RX ADMIN — SIMETHICONE 80 MG: 80 TABLET, CHEWABLE ORAL at 17:58

## 2025-03-03 RX ADMIN — DULOXETINE HYDROCHLORIDE 30 MG: 30 CAPSULE, DELAYED RELEASE ORAL at 12:00

## 2025-03-03 RX ADMIN — LIDOCAINE HYDROCHLORIDE 10 ML: 20 SOLUTION ORAL; TOPICAL at 18:00

## 2025-03-03 RX ADMIN — SIMETHICONE 80 MG: 80 TABLET, CHEWABLE ORAL at 21:54

## 2025-03-03 RX ADMIN — CYCLOBENZAPRINE 10 MG: 10 TABLET, FILM COATED ORAL at 21:53

## 2025-03-03 RX ADMIN — INSULIN LISPRO 2 UNITS: 100 INJECTION, SOLUTION INTRAVENOUS; SUBCUTANEOUS at 13:31

## 2025-03-03 RX ADMIN — OXYCODONE HYDROCHLORIDE 10 MG: 5 TABLET ORAL at 13:17

## 2025-03-03 RX ADMIN — OXYCODONE HYDROCHLORIDE 10 MG: 5 TABLET ORAL at 06:21

## 2025-03-03 RX ADMIN — LOPERAMIDE HYDROCHLORIDE 2 MG: 2 CAPSULE ORAL at 08:48

## 2025-03-03 RX ADMIN — CARVEDILOL 12.5 MG: 12.5 TABLET, FILM COATED ORAL at 17:58

## 2025-03-03 RX ADMIN — OXYCODONE HYDROCHLORIDE 10 MG: 5 TABLET ORAL at 21:52

## 2025-03-03 RX ADMIN — CLINDAMYCIN HYDROCHLORIDE 450 MG: 300 CAPSULE ORAL at 21:54

## 2025-03-03 RX ADMIN — ATORVASTATIN CALCIUM 80 MG: 80 TABLET, FILM COATED ORAL at 08:48

## 2025-03-03 RX ADMIN — DULOXETINE HYDROCHLORIDE 60 MG: 60 CAPSULE, DELAYED RELEASE ORAL at 21:53

## 2025-03-03 RX ADMIN — INSULIN DEGLUDEC INJECTION 8 UNITS: 100 INJECTION, SOLUTION SUBCUTANEOUS at 21:51

## 2025-03-03 RX ADMIN — OXYCODONE HYDROCHLORIDE 10 MG: 5 TABLET ORAL at 00:19

## 2025-03-03 RX ADMIN — CLINDAMYCIN HYDROCHLORIDE 450 MG: 300 CAPSULE ORAL at 13:16

## 2025-03-03 RX ADMIN — CLINDAMYCIN HYDROCHLORIDE 450 MG: 300 CAPSULE ORAL at 06:04

## 2025-03-03 RX ADMIN — ONDANSETRON HYDROCHLORIDE 4 MG: 4 TABLET, FILM COATED ORAL at 15:09

## 2025-03-03 RX ADMIN — CARVEDILOL 12.5 MG: 12.5 TABLET, FILM COATED ORAL at 08:48

## 2025-03-03 RX ADMIN — SIMETHICONE 80 MG: 80 TABLET, CHEWABLE ORAL at 06:05

## 2025-03-03 RX ADMIN — INSULIN LISPRO 3 UNITS: 100 INJECTION, SOLUTION INTRAVENOUS; SUBCUTANEOUS at 18:59

## 2025-03-03 RX ADMIN — FINERENONE 10 MG: 10 TABLET, FILM COATED ORAL at 08:48

## 2025-03-03 RX ADMIN — ONDANSETRON HYDROCHLORIDE 4 MG: 4 TABLET, FILM COATED ORAL at 07:59

## 2025-03-03 RX ADMIN — FUROSEMIDE 20 MG: 20 TABLET ORAL at 08:48

## 2025-03-03 RX ADMIN — INSULIN LISPRO 1 UNITS: 100 INJECTION, SOLUTION INTRAVENOUS; SUBCUTANEOUS at 10:19

## 2025-03-03 RX ADMIN — LIDOCAINE HYDROCHLORIDE 10 ML: 20 SOLUTION ORAL; TOPICAL at 07:59

## 2025-03-03 ASSESSMENT — ENCOUNTER SYMPTOMS
NAUSEA: 0
UNEXPECTED WEIGHT CHANGE: 0
JOINT SWELLING: 0
AGITATION: 0
POLYDIPSIA: 0
ABDOMINAL PAIN: 0
APPETITE CHANGE: 1
POLYPHAGIA: 0
NUMBNESS: 0
HEADACHES: 0
DIARRHEA: 0
ACTIVITY CHANGE: 1
FREQUENCY: 0
CONFUSION: 0
DIAPHORESIS: 0
SHORTNESS OF BREATH: 0
SORE THROAT: 0
EYES NEGATIVE: 1
COUGH: 0
DYSURIA: 0
FATIGUE: 1
CHEST TIGHTNESS: 0
SPEECH DIFFICULTY: 0

## 2025-03-03 ASSESSMENT — PAIN SCALES - GENERAL
PAINLEVEL_OUTOF10: 8

## 2025-03-03 ASSESSMENT — PAIN DESCRIPTION - DESCRIPTORS
DESCRIPTORS: ACHING
DESCRIPTORS: SORE

## 2025-03-03 ASSESSMENT — PAIN DESCRIPTION - LOCATION: LOCATION: ABDOMEN

## 2025-03-03 NOTE — PROGRESS NOTES
Nelida Mata is a 65 y.o. female on day 4 of admission presenting with Abdominal wall fluid collections.    Subjective   Patient seen and evaluated at the bedside. She reports eating soup for breakfast and for lunch was able to eat tomato soup and grilled cheese. Discussed starting a small dose of lispro with meals. Patient agreeable.      I have reviewed histories, allergies and medications have been reviewed and there are no changes     Objective   Review of Systems   Constitutional:  Positive for activity change, appetite change and fatigue. Negative for diaphoresis and unexpected weight change.   HENT: Negative.  Negative for sore throat.    Eyes: Negative.    Respiratory:  Negative for cough, chest tightness and shortness of breath.    Cardiovascular:  Negative for chest pain.   Gastrointestinal:  Negative for abdominal pain, diarrhea and nausea.   Endocrine: Negative for cold intolerance, heat intolerance, polydipsia, polyphagia and polyuria.   Genitourinary:  Negative for dysuria and frequency.   Musculoskeletal:  Negative for gait problem and joint swelling.   Neurological:  Negative for speech difficulty, numbness and headaches.   Psychiatric/Behavioral:  Negative for agitation, behavioral problems and confusion.      Physical Exam  Constitutional:       General: She is not in acute distress.     Appearance: Normal appearance.   HENT:      Nose: Nose normal.      Mouth/Throat:      Mouth: Mucous membranes are moist.      Pharynx: Oropharynx is clear.   Cardiovascular:      Rate and Rhythm: Normal rate and regular rhythm.   Pulmonary:      Effort: Pulmonary effort is normal. No respiratory distress.   Abdominal:      General: There is no distension.      Palpations: Abdomen is soft.   Musculoskeletal:         General: Normal range of motion.   Skin:     General: Skin is warm and dry.   Neurological:      Mental Status: She is alert and oriented to person, place, and time.   Psychiatric:          "Mood and Affect: Mood normal.         Behavior: Behavior normal.     Last Recorded Vitals  Blood pressure 126/65, pulse 90, temperature 37.5 °C (99.5 °F), temperature source Temporal, resp. rate 16, height 1.499 m (4' 11\"), weight 72.1 kg (159 lb), SpO2 100%.  Intake/Output last 3 Shifts:  No intake/output data recorded.    Relevant Results  Results from last 7 days   Lab Units 03/03/25  0629 03/02/25  2236 03/02/25  1828 03/02/25  1008 03/02/25  0919 03/02/25  0738 03/01/25  1109 03/01/25  0707 02/28/25  1254 02/28/25  1132 02/27/25  0530 02/26/25 2014   POCT GLUCOSE mg/dL 152* 175* 135* 251*  --  138*   < >  --    < >  --    < >  --    GLUCOSE mg/dL  --   --   --   --  115*  --   --  129*  --  45*  --  194*    < > = values in this interval not displayed.   Lab Review  Lab Results   Component Value Date    BILITOT 0.5 02/26/2025    CALCIUM 10.3 03/02/2025    CO2 23 03/02/2025    CL 98 03/02/2025    CREATININE 1.16 (H) 03/02/2025    GLUCOSE 115 (H) 03/02/2025    ALKPHOS 103 02/26/2025    K 5.1 03/02/2025    PROT 8.2 02/26/2025     (L) 03/02/2025    AST 21 02/26/2025    ALT 14 02/26/2025    BUN 15 03/02/2025    ANIONGAP 17 03/02/2025    MG 2.67 (H) 02/26/2025    PHOS 2.9 01/25/2025     03/31/2023    ALBUMIN 4.9 02/26/2025    LIPASE 28 10/27/2024    GFRF 24 (A) 09/19/2023    GFRMALE CANCELED 03/22/2023     Lab Results   Component Value Date    TRIG 102 10/04/2024    CHOL 91 10/04/2024    LDLCALC 33 10/04/2024    HDL 37.9 10/04/2024     Lab Results   Component Value Date    HGBA1C 7.1 (H) 10/04/2024    HGBA1C 7.6 (H) 08/19/2024    HGBA1C 8.5 (H) 02/02/2024   Scheduled medications  atorvastatin, 80 mg, oral, Daily  carvedilol, 12.5 mg, oral, BID  clindamycin, 450 mg, oral, q8h NORBERT  DULoxetine, 30 mg, oral, Daily   And  DULoxetine, 60 mg, oral, Nightly  enoxaparin, 40 mg, subcutaneous, Daily  finerenone, 10 mg, oral, Daily  furosemide, 20 mg, oral, Daily  insulin degludec, 8 Units, subcutaneous, " Nightly  insulin lispro, 0-5 Units, subcutaneous, TID AC  [Held by provider] magnesium hydroxide, 30 mL, oral, Daily  [Held by provider] polyethylene glycol, 17 g, oral, Daily  simethicone, 80 mg, oral, 4x daily    Continuous medications     PRN medications  PRN medications: acetaminophen, calcium carbonate, cyclobenzaprine, dextrose, dextrose, glucagon, glucagon, lidocaine-diphenhydraMINE-Maalox 1:1:1, loperamide, melatonin, ondansetron **OR** ondansetron, oxyCODONE, oxyCODONE    The ASCVD Risk score (Adrianne POTTS, et al., 2019) failed to calculate for the following reasons:    Risk score cannot be calculated because patient has a medical history suggesting prior/existing ASCVD    Assessment/Plan   Assessment & Plan  Abdominal wall fluid collections    Type 2 diabetes mellitus with hypoglycemia    Nelida Mata is a 64yo w/ a PMH of HFpEF (50-55% 9/2023), CAD s/p PCI w/ stent x 3 (2016, 2018) and STEMI (9/2023, no stents placed), TIA 9/2023, SVC thrombus and remote DVT on Lovenox, IDDM2, HLD, CKD, MICHAEL, and NAFLD admitted for abdominal wall fluid collection s/p GIBSON-BSO and take back XL with washout on 1/23, now s/p bedside I&D on 2/27.      Diabetes History  Type of diabetes: DM2, confirmed with negative t1d abs and cpeptide of 1.5  Year diagnosed or age: age 20  Hospitalizations for DKA or HHS: no  Complications: CAD, STEMI, TIA, CKD, gastroparesis, neuropathy PVD, 4th and 5th R foot metacarpal amputated  Seen by PCP or Endocrinology: kathrine Milse,  10/2023  Frequency of glucose checks: continous with kenyon 2 + reader  Glucose review: multiple episodes of hypoglycemia below 55 during current admission  Frequency of Hypoglycemia: multiple times per week  Hypoglycemia unawareness: no  Severe hypoglycemia requiring assistance from others:  yes     Home Medications  Basal: tresiba 40u daily  Prandial: lispro 22u with meals  Correction:  Orals: farxiga 10mg daily, metformin 500mg XR  GLP-1: ozempic 2mg  daily  CGM: kenyon 2 + reader  Patient has been titrated up on ozempic to 2mg in outpatient setting and remains on large insulin doses, states her insulin doses have remained relatively the same despite ozempic titration, patient states she has had episodes of lows at home. A1c of 7.1% suggestive of hypoglycemia at home as well.   Also has hx of gastroparesis and frequently experiences n/v. Patient eats only 1 meal per day at home, but takes lispro 22u 2-3 times per day. We discussed only taking lispro with meals and skipping dose if she skips meal.       PLAN  Steroids: none  Nutrition: 90g CHO diet  Hypoglycemia 2/2 large home insulin doses, decreased PO intake due to n/v and CLARIBEL     - recommend continuing tresiba 8 units at bedtime     - continue lispro corrective scale #1 with meals, adjust to q4h if NPO or if persistently hyperglycemic   = 0u  151-200 = 1u  201-250 = 2u  251-300 = 3u  301-350 = 4u  351-400 = 5u     - start 2 units of lispro with meals; hold if NPO or eating less than 50% of the meal    -Accuchecks (not BMP) TIDAC and QHS- kindly ensure QHS Accucheck is drawn; it is often missed   - Goal -180  -Hypoglycemia protocol  -Will continue to follow and titrate insulin accordingly        Discharge planning:   [x] patient may expect to discharge home on tresiba 15u daily, lispro 5u with meals + scale #2   -STOP ozempic 2mg due to gastroparesis, recurrent n/v, recent abdominal surgery. Ozempic restart may be assessed in outpatient setting  -STOP metformin due to recent CLARIBEL, GFR has dropped below 30 multiple times in recent labwork, wait to restart outpatient once kidney function stabilizes  -Patient may restart farxiga 10mg qAM once she no longer has n/v, discussed holding sglt2i on days that she is n/v due to increased risk for dehydration and eDKA     [x]continue use of kenyon 2 + reader, declined kenyon 3 upgrade, patient states she has glucagon at home  [x]will enroll pt in  pharmacy  platinum plan program  [x]follow up with Catherine Garcia PA-C in diabetes hospital discharge clinic on 3/11/25          INSULIN INSTRUCTIONS   Breakfast time Lunch time Dinner time  Bedtime         Tresiba = 15 units   Humalog/Lispro = 5 units plus scale Humalog/Lispro = 5 units plus scale  Humalog/Lispro = 5 units plus scale        CORRECTIVE SCALE  GLUCOSE READING   HUMALOG/LISPRO SCALE DOSE    70 - 149 0   150 - 200 2   201 - 250 4   251 - 300 6   301 - 350 8   351 - 400+ 10      If glucose remains over 400, call your diabetes provider, repeat 10 units every 4 hours and drink sugar free   liquids (water, Gatorade zero, chicken broth) until you glucose improves or you hear back from medical   professional. If your glucose remains over 400 and you develop symptoms such as nausea/vomiting or     I spent 50 minutes in the professional and overall care of this patient.      Hayley Lamb, APRN-CNP

## 2025-03-03 NOTE — PROGRESS NOTES
"Nelida Mata is a 65 y.o. female on day 4 of admission presenting with Abdominal wall fluid collections.    Subjective   Feeling nauseated with heartburn this AM. Recent episode of emesis. Previously tolerating PO. Reports pain is fine. Passing flauts, last BM overnight.    Objective     Physical Exam  General: no acute distress  HEENT: normocephalic, atraumatic  Heart: warm and well perfused  Lungs: no increased WOB  Abd: Midline vertical incision healed. Incision packed Mepilex dressing in place, trace shadowing. Diffuse mild tenderness to palpation, abdomen soft throughout without guarding or rebound.  Extremities: moving all extremities  Neuro: awake and conversant  Psych: appropriate mood and affect     Last Recorded Vitals  Blood pressure 126/65, pulse 90, temperature 37.5 °C (99.5 °F), temperature source Temporal, resp. rate 16, height 1.499 m (4' 11\"), weight 72.1 kg (159 lb), SpO2 100%.  Intake/Output last 3 Shifts:  No intake/output data recorded.    Relevant Results  .  Results for orders placed or performed during the hospital encounter of 02/26/25 (from the past 24 hours)   POCT GLUCOSE   Result Value Ref Range    POCT Glucose 138 (H) 74 - 99 mg/dL   Basic Metabolic Panel   Result Value Ref Range    Glucose 115 (H) 74 - 99 mg/dL    Sodium 133 (L) 136 - 145 mmol/L    Potassium 5.1 3.5 - 5.3 mmol/L    Chloride 98 98 - 107 mmol/L    Bicarbonate 23 21 - 32 mmol/L    Anion Gap 17 10 - 20 mmol/L    Urea Nitrogen 15 6 - 23 mg/dL    Creatinine 1.16 (H) 0.50 - 1.05 mg/dL    eGFR 52 (L) >60 mL/min/1.73m*2    Calcium 10.3 8.6 - 10.6 mg/dL   CBC   Result Value Ref Range    WBC 7.7 4.4 - 11.3 x10*3/uL    nRBC 0.0 0.0 - 0.0 /100 WBCs    RBC 5.37 (H) 4.00 - 5.20 x10*6/uL    Hemoglobin 15.6 12.0 - 16.0 g/dL    Hematocrit 50.3 (H) 36.0 - 46.0 %    MCV 94 80 - 100 fL    MCH 29.1 26.0 - 34.0 pg    MCHC 31.0 (L) 32.0 - 36.0 g/dL    RDW 14.5 11.5 - 14.5 %    Platelets 145 (L) 150 - 450 x10*3/uL   POCT GLUCOSE "   Result Value Ref Range    POCT Glucose 251 (H) 74 - 99 mg/dL   C. difficile, PCR    Specimen: Stool   Result Value Ref Range    C. difficile, PCR Not Detected Not Detected   POCT GLUCOSE   Result Value Ref Range    POCT Glucose 135 (H) 74 - 99 mg/dL   POCT GLUCOSE   Result Value Ref Range    POCT Glucose 175 (H) 74 - 99 mg/dL   POCT GLUCOSE   Result Value Ref Range    POCT Glucose 152 (H) 74 - 99 mg/dL     *Note: Due to a large number of results and/or encounters for the requested time period, some results have not been displayed. A complete set of results can be found in Results Review.         Assessment/Plan   Assessment & Plan  Abdominal wall fluid collections    Type 2 diabetes mellitus with hypoglycemia    66yo w/ a PMH of HFpEF (50-55% 9/2023), CAD s/p PCI w/ stent x 3 (2016, 2018) and STEMI (9/2023, no stents placed), TIA 9/2023, SVC thrombus and remote DVT on Lovenox, IDDM2, HLD, CKD, MICHAEL, and NAFLD admitted for abdominal wall fluid collection s/p GIBSON-BSO and take back XL with washout on 1/23, now s/p bedside I&D on 2/27.     Abdominal wall seroma vs abscess  Wound site ulcerations  - CT abd/pelvis w/ 4.0 x 3.9 x 7.3cm fluid collection in the midline just superior to the umbilicus. No evidence of cellulitis on CT or exam, no leukocytosis, no purulent dishcarge.   - Bedside I&D findings consistent with seroma, ulcerations also noted consistent with poor healing 2/2 DM.   - Wet to dry dressing changes by RN with mepliex dressing; home wound care on discharge  - Continue clindamycin for empiric abx given prior purulent drainage and incomplete abx course.   - MRSA nares neg   - cont Tylenol, PRN oxy for pain. Dilaudid PO discontinued.     T2DM  - Home reg: Tresiba 40U qhs and lispro 22u TID with meals. Farxiga, metformin, semaglutide held on admission.   - Current reg: Tresibe 8 qhs, SSI #1  - Endo following; appreciate recs     Nausea/vomiting  - worse this AM  - PRN antiemetics and bowel regimen  - will  follow up after tx; low suspicion for ileus given passing flatus and nml bowel function     CLARIBEL on CKD  - Resolved, baseline Cr 1.3-1.5. Peak 2.35, resolved to 1.08 on HD2.   - FeNa prerenal, likely due to n/v  - Cont PO hydration      Comorbidities  - HFpEF (50-55% 9/2023)/CAD s/p PCI w/ stent x 3/ hx of STEM, on ASA - cont carvedilol, losartan and lasix   - TIA 9/2023  - SVC thrombus, remote DVT on Lovenox, continued    - T2DM/gastroparesis: as above  - CKD, NO NSAIDS, cont kerendia   - MICHAEL  - NAFLD  - Chronic pain, on duloxetine (held)     Dispo: plan for possible discharge N/V and glucose control      To be seen and d/w Dr. Eleanor Castillo MD, PGY-4   Gynecology [id number]

## 2025-03-03 NOTE — CARE PLAN
The patient's goals for the shift include TO REST    The clinical goals for the shift include DECREASE S/SX OF INFECTION    Over the shift, the patient did  make progress toward the following goals. Barriers to progression include none. Recommendations to address these barriers include following her white count,assessing blood sugars,and continuing antibiotics..

## 2025-03-03 NOTE — CARE PLAN
Problem: Diabetes  Goal: Maintain glucose levels >70mg/dl to <250mg/dl throughout shift  Outcome: Progressing     Problem: Pain  Goal: Turns in bed with improved pain control throughout the shift  Outcome: Progressing  Goal: Free from acute confusion related to pain meds throughout the shift  Outcome: Progressing     Problem: Pain - Adult  Goal: Verbalizes/displays adequate comfort level or baseline comfort level  Outcome: Progressing     Problem: Chronic Conditions and Co-morbidities  Goal: Patient's chronic conditions and co-morbidity symptoms are monitored and maintained or improved  Outcome: Progressing     Problem: Fall/Injury  Goal: Not fall by end of shift  Outcome: Progressing

## 2025-03-04 ENCOUNTER — PHARMACY VISIT (OUTPATIENT)
Dept: PHARMACY | Facility: CLINIC | Age: 65
End: 2025-03-04
Payer: COMMERCIAL

## 2025-03-04 ENCOUNTER — CLINICAL SUPPORT (OUTPATIENT)
Dept: OBSTETRICS AND GYNECOLOGY | Facility: HOSPITAL | Age: 65
End: 2025-03-04
Payer: COMMERCIAL

## 2025-03-04 VITALS
TEMPERATURE: 97.7 F | SYSTOLIC BLOOD PRESSURE: 126 MMHG | OXYGEN SATURATION: 99 % | DIASTOLIC BLOOD PRESSURE: 73 MMHG | WEIGHT: 159 LBS | BODY MASS INDEX: 32.05 KG/M2 | HEIGHT: 59 IN | HEART RATE: 91 BPM | RESPIRATION RATE: 17 BRPM

## 2025-03-04 LAB
ANION GAP SERPL CALC-SCNC: 11 MMOL/L (ref 10–20)
BUN SERPL-MCNC: 10 MG/DL (ref 6–23)
CALCIUM SERPL-MCNC: 9.5 MG/DL (ref 8.6–10.6)
CHLORIDE SERPL-SCNC: 100 MMOL/L (ref 98–107)
CO2 SERPL-SCNC: 32 MMOL/L (ref 21–32)
CREAT SERPL-MCNC: 1.19 MG/DL (ref 0.5–1.05)
EGFRCR SERPLBLD CKD-EPI 2021: 51 ML/MIN/1.73M*2
ERYTHROCYTE [DISTWIDTH] IN BLOOD BY AUTOMATED COUNT: 14 % (ref 11.5–14.5)
GLUCOSE BLD MANUAL STRIP-MCNC: 206 MG/DL (ref 74–99)
GLUCOSE SERPL-MCNC: 196 MG/DL (ref 74–99)
HCT VFR BLD AUTO: 38.9 % (ref 36–46)
HGB BLD-MCNC: 12.2 G/DL (ref 12–16)
MCH RBC QN AUTO: 29.4 PG (ref 26–34)
MCHC RBC AUTO-ENTMCNC: 31.4 G/DL (ref 32–36)
MCV RBC AUTO: 94 FL (ref 80–100)
NRBC BLD-RTO: 0 /100 WBCS (ref 0–0)
PLATELET # BLD AUTO: 175 X10*3/UL (ref 150–450)
POTASSIUM SERPL-SCNC: 4.2 MMOL/L (ref 3.5–5.3)
RBC # BLD AUTO: 4.15 X10*6/UL (ref 4–5.2)
SODIUM SERPL-SCNC: 139 MMOL/L (ref 136–145)
WBC # BLD AUTO: 9.4 X10*3/UL (ref 4.4–11.3)

## 2025-03-04 PROCEDURE — 82947 ASSAY GLUCOSE BLOOD QUANT: CPT

## 2025-03-04 PROCEDURE — 2500000002 HC RX 250 W HCPCS SELF ADMINISTERED DRUGS (ALT 637 FOR MEDICARE OP, ALT 636 FOR OP/ED): Performed by: STUDENT IN AN ORGANIZED HEALTH CARE EDUCATION/TRAINING PROGRAM

## 2025-03-04 PROCEDURE — 2500000002 HC RX 250 W HCPCS SELF ADMINISTERED DRUGS (ALT 637 FOR MEDICARE OP, ALT 636 FOR OP/ED)

## 2025-03-04 PROCEDURE — 2500000004 HC RX 250 GENERAL PHARMACY W/ HCPCS (ALT 636 FOR OP/ED)

## 2025-03-04 PROCEDURE — 85027 COMPLETE CBC AUTOMATED: CPT | Performed by: STUDENT IN AN ORGANIZED HEALTH CARE EDUCATION/TRAINING PROGRAM

## 2025-03-04 PROCEDURE — 2500000001 HC RX 250 WO HCPCS SELF ADMINISTERED DRUGS (ALT 637 FOR MEDICARE OP)

## 2025-03-04 PROCEDURE — 36415 COLL VENOUS BLD VENIPUNCTURE: CPT | Performed by: STUDENT IN AN ORGANIZED HEALTH CARE EDUCATION/TRAINING PROGRAM

## 2025-03-04 PROCEDURE — RXMED WILLOW AMBULATORY MEDICATION CHARGE

## 2025-03-04 PROCEDURE — 80048 BASIC METABOLIC PNL TOTAL CA: CPT | Performed by: STUDENT IN AN ORGANIZED HEALTH CARE EDUCATION/TRAINING PROGRAM

## 2025-03-04 PROCEDURE — 99233 SBSQ HOSP IP/OBS HIGH 50: CPT

## 2025-03-04 PROCEDURE — 99238 HOSP IP/OBS DSCHRG MGMT 30/<: CPT

## 2025-03-04 PROCEDURE — 93010 ELECTROCARDIOGRAM REPORT: CPT | Performed by: INTERNAL MEDICINE

## 2025-03-04 PROCEDURE — 2500000005 HC RX 250 GENERAL PHARMACY W/O HCPCS

## 2025-03-04 RX ORDER — INSULIN LISPRO 100 [IU]/ML
4 INJECTION, SOLUTION INTRAVENOUS; SUBCUTANEOUS ONCE
Status: COMPLETED | OUTPATIENT
Start: 2025-03-04 | End: 2025-03-04

## 2025-03-04 RX ORDER — INSULIN DEGLUDEC 100 U/ML
12 INJECTION, SOLUTION SUBCUTANEOUS NIGHTLY
Qty: 15 ML | Refills: 3 | Status: SHIPPED | OUTPATIENT
Start: 2025-03-04

## 2025-03-04 RX ORDER — INSULIN DEGLUDEC 100 U/ML
12 INJECTION, SOLUTION SUBCUTANEOUS NIGHTLY
Status: DISCONTINUED | OUTPATIENT
Start: 2025-03-04 | End: 2025-03-04 | Stop reason: HOSPADM

## 2025-03-04 RX ORDER — INSULIN LISPRO 100 [IU]/ML
4 INJECTION, SOLUTION INTRAVENOUS; SUBCUTANEOUS
Qty: 15 ML | Refills: 3 | Status: SHIPPED | OUTPATIENT
Start: 2025-03-04

## 2025-03-04 RX ORDER — ACETAMINOPHEN 500 MG
1000 TABLET ORAL EVERY 6 HOURS PRN
Qty: 100 TABLET | Refills: 2 | Status: SHIPPED | OUTPATIENT
Start: 2025-03-04 | End: 2026-03-04

## 2025-03-04 RX ORDER — INSULIN LISPRO 100 [IU]/ML
4 INJECTION, SOLUTION INTRAVENOUS; SUBCUTANEOUS
Status: DISCONTINUED | OUTPATIENT
Start: 2025-03-04 | End: 2025-03-04 | Stop reason: HOSPADM

## 2025-03-04 RX ORDER — AMOXICILLIN 250 MG
2 CAPSULE ORAL 2 TIMES DAILY
Qty: 240 TABLET | Refills: 0 | Status: SHIPPED | OUTPATIENT
Start: 2025-03-04 | End: 2025-05-03

## 2025-03-04 RX ADMIN — CARVEDILOL 12.5 MG: 12.5 TABLET, FILM COATED ORAL at 08:07

## 2025-03-04 RX ADMIN — SIMETHICONE 80 MG: 80 TABLET, CHEWABLE ORAL at 08:07

## 2025-03-04 RX ADMIN — OXYCODONE HYDROCHLORIDE 10 MG: 5 TABLET ORAL at 04:14

## 2025-03-04 RX ADMIN — LIDOCAINE HYDROCHLORIDE 10 ML: 20 SOLUTION ORAL; TOPICAL at 11:49

## 2025-03-04 RX ADMIN — FUROSEMIDE 20 MG: 20 TABLET ORAL at 11:49

## 2025-03-04 RX ADMIN — INSULIN LISPRO 4 UNITS: 100 INJECTION, SOLUTION INTRAVENOUS; SUBCUTANEOUS at 10:19

## 2025-03-04 RX ADMIN — FINERENONE 10 MG: 10 TABLET, FILM COATED ORAL at 08:08

## 2025-03-04 RX ADMIN — ATORVASTATIN CALCIUM 80 MG: 80 TABLET, FILM COATED ORAL at 08:08

## 2025-03-04 RX ADMIN — CLINDAMYCIN HYDROCHLORIDE 450 MG: 300 CAPSULE ORAL at 06:36

## 2025-03-04 RX ADMIN — INSULIN LISPRO 2 UNITS: 100 INJECTION, SOLUTION INTRAVENOUS; SUBCUTANEOUS at 09:38

## 2025-03-04 RX ADMIN — ENOXAPARIN SODIUM 40 MG: 100 INJECTION SUBCUTANEOUS at 08:17

## 2025-03-04 RX ADMIN — OXYCODONE HYDROCHLORIDE 10 MG: 5 TABLET ORAL at 10:16

## 2025-03-04 RX ADMIN — DULOXETINE HYDROCHLORIDE 30 MG: 30 CAPSULE, DELAYED RELEASE ORAL at 08:07

## 2025-03-04 ASSESSMENT — ENCOUNTER SYMPTOMS
SORE THROAT: 0
ACTIVITY CHANGE: 1
ABDOMINAL PAIN: 0
APPETITE CHANGE: 1
JOINT SWELLING: 0
EYES NEGATIVE: 1
FREQUENCY: 0
POLYPHAGIA: 0
AGITATION: 0
DYSURIA: 0
COUGH: 0
NAUSEA: 0
SPEECH DIFFICULTY: 0
CHEST TIGHTNESS: 0
DIAPHORESIS: 0
SHORTNESS OF BREATH: 0
DIARRHEA: 0
HEADACHES: 0
POLYDIPSIA: 0
CONFUSION: 0
FATIGUE: 1
UNEXPECTED WEIGHT CHANGE: 0
NUMBNESS: 0

## 2025-03-04 ASSESSMENT — PAIN SCALES - GENERAL
PAINLEVEL_OUTOF10: 8
PAINLEVEL_OUTOF10: 8

## 2025-03-04 ASSESSMENT — PAIN DESCRIPTION - LOCATION
LOCATION: ABDOMEN
LOCATION: ABDOMEN

## 2025-03-04 NOTE — DISCHARGE SUMMARY
Discharge Diagnosis  Abdominal wall fluid collections    Issues Requiring Follow-Up  Wound care, for home care after discharge  T2DM complicated by gastroparesis, for outpatient endocrinology follow up  CKD, for outpatient nephrology follow up  HFpEF, for outpatient cardiology follow up    Test Results Pending At Discharge  Pending Labs       No current pending labs.        Hospital Course  64yo F with history of GIBSON-BSO on 1/23 complicated by take-back on POD0 for abdominal washout, who re-presented for incisional drainage and n/v, now also s/p bedside I&D on 2/27.     Pt has PMHx significant for IDDM2, SVC thrombus on lovenox, HFpEF, CAD s/p PCI w stent x3 (2016, 2018) and STEMI. Admission workup notable for 4 x 3.9 x 7.3cm fluid collection superior to the umbilicus, otherwise she was afebrile without signs of infection (WBC 6.4, HR and BP wnl). She underwent an uncomplicated I&D at bedside with findings consistent with a seroma, and her wound was left open with WTD packing and Mepilex dressings. Shallow ulcerations were noted along her incision, for which wound care was consulted. She received empiric clindamycin given purulent drainage and incomplete antibiotic course. She was discharged with home wound care follow-up to assist with packing her wound. Her nausea improved throughout her admission and she was tolerating PO without emesis. She had an CLARIBEL on CKD that was also resolving by HD1 (2.35 --> 1.7, baseline 1.2-1.3). Endocrinology was consulted for T2DM while inpatient for medication recommendations and gastroparesis. She was previously on Ozempic, which was stopped inpatient; plan for outpatient consultation regarding re-start. She was instructed to restart Farxiga 10 mg qAM once she does not have nausea/vomiting to decrease risk for dehydration and euglycemic DKA. She will continue on Tresiba 8 units nightly and Lispro 4 units with meals. She has a Filipe 2 and reader and supplies at home.    Pt was  discharged with close outpatient follow-up scheduled, as below.    Comorbidities  - HFpEF (50-55% 9/2023)/CAD s/p PCI w/ stent x 3/ hx of STEM, on ASA, carvedilol, losartan and lasix   - TIA 9/2023  - SVC thrombus, remote DVT on Lovenox, continued while inpatient  - T2DM/gastroparesis: as above  - CKD  - MICHAEL  - NAFLD  - Chronic pain, on duloxetine (held)    Pertinent Physical Exam At Time of Discharge  General: no acute distress  HEENT: normocephalic, atraumatic  Heart: warm and well perfused  Lungs: breathing comfortably on room air  Abd: Midline vertical incision healed. Incision packed Mepilex dressing in place, trace shadowing. Diffuse mild tenderness to palpation, abdomen soft throughout without guarding or rebound.   Extremities: moving all extremities  Neuro: awake and conversant  Psych: normal mood    Home Medications     Medication List      CONTINUE taking these medications     blood pressure monitor kit; 1 each once daily. Use once daily to check   blood pressure   FreeStyle Filipe 2 [place] misc; Generic drug: flash glucose scanning   reader; Use as instructed   FreeStyle Filipe 2 Sensor kit; Generic drug: flash glucose sensor kit;   Apply 1 sensor to the back of the upper arm. Change sensor every 14 days.     ASK your doctor about these medications     acetaminophen 325 mg tablet; Commonly known as: Tylenol   aspirin 81 mg EC tablet; Take 1 tablet (81 mg) by mouth once daily.   atorvastatin 80 mg tablet; Commonly known as: Lipitor; 1 TAB(S) ORALLY   ONCE A DAY -.MEDS TO BEDS   brimonidine 0.2 % ophthalmic solution; Commonly known as: AlphaGAN;   Administer 1 drop into both eyes 2 times a day. PATIENT NEEDS PCP APPT FOR   FURTHER REFILLS   carvedilol 12.5 mg tablet; Commonly known as: Coreg; Take 1 tablet (12.5   mg) by mouth 2 times daily (morning and late afternoon).   cholecalciferol 10 MCG (400 UNIT) tablet; Commonly known as: Vitamin D3;   Take 1 tablet (10 mcg) by mouth once daily.   dapagliflozin  propanediol 10 mg; Commonly known as: Farxiga; Take 1   tablet (10 mg) by mouth once daily.   diclofenac sodium 1 % gel; Commonly known as: Voltaren; Apply 4.5 inches   (4 g) topically 2 times a day.   diphenhydramine/Maalox/lidocaine 1:1:1; Swish and swallow 10 mL every 8   hours as needed for epigastric pain   DULoxetine 30 mg DR capsule; Commonly known as: Cymbalta; Take 1 capsule   (30 mg) by mouth once daily in the morning AND 2 capsules (60 mg) once   daily at bedtime. Do not crush or chew..   * enoxaparin 40 mg/0.4 mL syringe; Commonly known as: Lovenox; Inject   0.4 mL (40 mg) under the skin once daily.; Ask about: Should I take this   medication?   * enoxaparin 40 mg/0.4 mL syringe; Commonly known as: Lovenox; Inject   0.4ml under the skin once daily.   furosemide 20 mg tablet; Commonly known as: Lasix; Take 1 tablet (20 mg)   by mouth once daily.   glucagon 3 mg/actuation spray,non-aerosol; Administer 1 spray into   affected nostril(s) if needed (please  apply into a single nostril for   blood sugar <60).   HumaLOG KwikPen Insulin 200 unit/mL (3 mL) insulin pen pen; Generic   drug: insulin lispro; Inject 22 Units under the skin 3 times daily   (morning, midday, late afternoon). Take as directed per insulin   instructions.   hyoscyamine 0.125 mg disintegrating tablet; Commonly known as: Anaspaz;   Take 1 tablet (0.125 mg) by mouth every 8 hours if needed (cramping or   nausea).   isosorbide mononitrate ER 60 mg 24 hr tablet; Commonly known as: Imdur;   TAKE 1 TABLET BY MOUTH EVERY DAY IN THE MORNING   Kerendia 10 mg tablet tablet; Generic drug: finerenone; Take 1 tablet   (10 mg) by mouth once daily.   latanoprost 0.005 % ophthalmic solution; Commonly known as: Xalatan;   Administer 1 drop into both eyes once daily at bedtime.   losartan 50 mg tablet; Commonly known as: Cozaar   melatonin 5 mg tablet; Take 1 tablet (5 mg) by mouth as needed at   bedtime for sleep.   metFORMIN  mg 24 hr tablet;  Commonly known as: Glucophage-XR; Take   1 tablet (500 mg) by mouth once daily in the evening. Take with meals. Do   not crush, chew, or split.   multivitamin tablet; Take 1 tablet by mouth once daily.   naloxone 4 mg/0.1 mL nasal spray; Commonly known as: Narcan; Administer   1 spray (4 mg) into affected nostril(s) if needed for opioid reversal or   respiratory depression. May repeat every 2-3 minutes if needed,   alternating nostrils, until medical assistance becomes available.   nitroglycerin 0.4 mg SL tablet; Commonly known as: Nitrostat   nystatin 100,000 unit/gram powder; Commonly known as: Mycostatin   OneTouch Ultra Test strip; Generic drug: blood sugar diagnostic; USE 1   STRIP(S) TO TEST BLOOD SUGAR 3 TIMES A DAY   Ozempic 2 mg/dose (8 mg/3 mL) pen injector; Generic drug: semaglutide;   Inject 2 mg under the skin 1 (one) time per week.   pantoprazole 40 mg EC tablet; Commonly known as: ProtoNix; TAKE 1 TABLET   (40 MG) BY MOUTH ONCE DAILY IN THE MORNING. TAKE BEFORE MEALS.; Ask about:   Which instructions should I use?   polyethylene glycol 17 gram packet; Commonly known as: Glycolax,   Miralax; Take 17 g by mouth once daily as needed (constipation).   sennosides-docusate sodium 8.6-50 mg tablet; Commonly known as:   Zoila-Colace; Take 2 tablets by mouth 2 times a day. While taking opiod   medication then as needed for stool softener.; Ask about: Should I take   this medication?   simethicone 80 mg chewable tablet; Commonly known as: Mylicon; Chew 1   tablet (80 mg) 4 times a day as needed (gas pain).   traMADol 50 mg tablet; Commonly known as: Ultram   TRESIBA FLEXTOUCH U-200 SUBQ   urea 20 % cream; Commonly known as: Carmol  * This list has 2 medication(s) that are the same as other medications   prescribed for you. Read the directions carefully, and ask your doctor or   other care provider to review them with you.     Outpatient Follow-Up  Future Appointments   Date Time Provider Department Center    3/11/2025  1:30 PM Catherine Garcia PA-C [id number] [facility]   4/8/2025  4:40 PM Makayla Harvey MD [id number] [facility]   4/14/2025  2:00 PM Aldo Rojas MD [id number] [facility]   5/8/2025  7:30 AM Lucas Simons MD [id number] [facility]   5/16/2025  9:00 AM Michael Hilario MD [id number] [facility]     Patient seen and discussed with Dr. Eleanor Encinas MD, MPH  Obstetrics & Gynecology, PGY-1   Gynecology [id number]

## 2025-03-04 NOTE — CARE PLAN
The patient's goals for the shift include TO REST    The clinical goals for the shift include Pt remains afebriale, absent of s/sx of infection.    Over the shift, the patient did make progress toward the goals above. VS within normal limits and patient stable.

## 2025-03-04 NOTE — PROGRESS NOTES
"Nelida Mata is a 65 y.o. female on day 5 of admission presenting with Abdominal wall fluid collections.    Subjective   Feels better this AM. Tolerating PO. Passing flatus. Ambulating and voiding without difficulty.     Objective     Physical Exam  General: no acute distress  HEENT: normocephalic, atraumatic  Heart: warm and well perfused  Lungs: no increased WOB  Abd: Midline vertical incision healed. Incision packed Mepilex dressing in place, trace shadowing. Diffuse mild tenderness to palpation, abdomen soft throughout without guarding or rebound.  Extremities: moving all extremities  Neuro: awake and conversant  Psych: appropriate mood and affect     Last Recorded Vitals  Blood pressure 107/66, pulse 84, temperature 37 °C (98.6 °F), temperature source Temporal, resp. rate 18, height 1.499 m (4' 11\"), weight 72.1 kg (159 lb), SpO2 99%.  Intake/Output last 3 Shifts:  No intake/output data recorded.    Relevant Results  .  Results for orders placed or performed during the hospital encounter of 02/26/25 (from the past 24 hours)   POCT GLUCOSE   Result Value Ref Range    POCT Glucose 186 (H) 74 - 99 mg/dL   POCT GLUCOSE   Result Value Ref Range    POCT Glucose 235 (H) 74 - 99 mg/dL   POCT GLUCOSE   Result Value Ref Range    POCT Glucose 256 (H) 74 - 99 mg/dL   POCT GLUCOSE   Result Value Ref Range    POCT Glucose 239 (H) 74 - 99 mg/dL   POCT GLUCOSE   Result Value Ref Range    POCT Glucose 206 (H) 74 - 99 mg/dL     *Note: Due to a large number of results and/or encounters for the requested time period, some results have not been displayed. A complete set of results can be found in Results Review.         Assessment/Plan   Assessment & Plan  Abdominal wall fluid collections    Type 2 diabetes mellitus with hypoglycemia    66yo w/ a PMH of HFpEF (50-55% 9/2023), CAD s/p PCI w/ stent x 3 (2016, 2018) and STEMI (9/2023, no stents placed), TIA 9/2023, SVC thrombus and remote DVT on Lovenox, IDDM2, HLD, CKD, " MICHAEL, and NAFLD admitted for abdominal wall fluid collection s/p GIBSON-BSO and take back XL with washout on 1/23, now s/p bedside I&D on 2/27.     Abdominal wall seroma vs abscess  Wound site ulcerations  - CT abd/pelvis w/ 4.0 x 3.9 x 7.3cm fluid collection in the midline just superior to the umbilicus. No evidence of cellulitis on CT or exam, no leukocytosis, no purulent dishcarge.   - Bedside I&D findings consistent with seroma, ulcerations also noted consistent with poor healing 2/2 DM.   - Wet to dry dressing changes by RN with mepliex dressing; home wound care on discharge  - Continue clindamycin for empiric abx given prior purulent drainage and incomplete abx course.   - MRSA nares neg   - cont Tylenol, PRN oxy for pain. Dilaudid PO discontinued.     T2DM  - Home reg: Tresiba 40U qhs and lispro 22u TID with meals. Farxiga, metformin, semaglutide held on admission.   - Current reg: Tresibe 8 qhs, lispro 2U qac, I #1  - Endo following; appreciate recs     Nausea/vomiting  - worse this AM  - PRN antiemetics and bowel regimen  - will follow up after tx; low suspicion for ileus given passing flatus and nml bowel function     CLARIBEL on CKD  - Resolved, baseline Cr 1.3-1.5. Peak 2.35, resolved to 1.08 on HD2.   - FeNa prerenal, likely due to n/v  - Cont PO hydration      Comorbidities  - HFpEF (50-55% 9/2023)/CAD s/p PCI w/ stent x 3/ hx of STEM, on ASA - cont carvedilol, losartan and lasix   - TIA 9/2023  - SVC thrombus, remote DVT on Lovenox, continued    - T2DM/gastroparesis: as above  - CKD, NO NSAIDS, cont kerendia   - MICHAEL  - NAFLD  - Chronic pain, on duloxetine (held)     Dispo: plan for possible discharge N/V and glucose control      To be seen and d/w Dr. Eleanor Castillo MD, PGY-4   Gynecology [id number]

## 2025-03-04 NOTE — PROGRESS NOTES
Nelida Mata is a 65 y.o. female on day 5 of admission presenting with Abdominal wall fluid collections.    Subjective   Reviewed homegoing insulin chart with patient  Excited for discharge today   Appetite improved      I have reviewed histories, allergies and medications have been reviewed and there are no changes     Objective   Review of Systems   Constitutional:  Positive for activity change, appetite change and fatigue. Negative for diaphoresis and unexpected weight change.   HENT: Negative.  Negative for sore throat.    Eyes: Negative.    Respiratory:  Negative for cough, chest tightness and shortness of breath.    Cardiovascular:  Negative for chest pain.   Gastrointestinal:  Negative for abdominal pain, diarrhea and nausea.   Endocrine: Negative for cold intolerance, heat intolerance, polydipsia, polyphagia and polyuria.   Genitourinary:  Negative for dysuria and frequency.   Musculoskeletal:  Negative for gait problem and joint swelling.   Neurological:  Negative for speech difficulty, numbness and headaches.   Psychiatric/Behavioral:  Negative for agitation, behavioral problems and confusion.      Physical Exam  Constitutional:       General: She is not in acute distress.     Appearance: Normal appearance.   HENT:      Nose: Nose normal.      Mouth/Throat:      Mouth: Mucous membranes are moist.      Pharynx: Oropharynx is clear.   Cardiovascular:      Rate and Rhythm: Normal rate and regular rhythm.   Pulmonary:      Effort: Pulmonary effort is normal. No respiratory distress.   Abdominal:      General: There is no distension.      Palpations: Abdomen is soft.   Musculoskeletal:         General: Normal range of motion.   Skin:     General: Skin is warm and dry.   Neurological:      Mental Status: She is alert and oriented to person, place, and time.   Psychiatric:         Mood and Affect: Mood normal.         Behavior: Behavior normal.     Last Recorded Vitals  Blood pressure 126/73, pulse 91,  "temperature 36.5 °C (97.7 °F), temperature source Temporal, resp. rate 17, height 1.499 m (4' 11\"), weight 72.1 kg (159 lb), SpO2 99%.  Intake/Output last 3 Shifts:  No intake/output data recorded.    Relevant Results  Results from last 7 days   Lab Units 03/04/25  0646 03/04/25  0616 03/03/25  2209 03/03/25  1843 03/03/25  1328 03/03/25  1008 03/03/25  0650 03/02/25  1008 03/02/25  0919 03/01/25  1109 03/01/25  0707 02/28/25  1254 02/28/25  1132   POCT GLUCOSE mg/dL  --  206* 239* 256* 235* 186*  --    < >  --    < >  --    < >  --    GLUCOSE mg/dL 196*  --   --   --   --   --  156*  --  115*  --  129*  --  45*    < > = values in this interval not displayed.   Lab Review  Lab Results   Component Value Date    BILITOT 0.5 02/26/2025    CALCIUM 9.5 03/04/2025    CO2 32 03/04/2025     03/04/2025    CREATININE 1.19 (H) 03/04/2025    GLUCOSE 196 (H) 03/04/2025    ALKPHOS 103 02/26/2025    K 4.2 03/04/2025    PROT 8.2 02/26/2025     03/04/2025    AST 21 02/26/2025    ALT 14 02/26/2025    BUN 10 03/04/2025    ANIONGAP 11 03/04/2025    MG 2.67 (H) 02/26/2025    PHOS 2.9 01/25/2025     03/31/2023    ALBUMIN 4.9 02/26/2025    LIPASE 28 10/27/2024    GFRF 24 (A) 09/19/2023    GFRMALE CANCELED 03/22/2023     Lab Results   Component Value Date    TRIG 102 10/04/2024    CHOL 91 10/04/2024    LDLCALC 33 10/04/2024    HDL 37.9 10/04/2024     Lab Results   Component Value Date    HGBA1C 7.1 (H) 10/04/2024    HGBA1C 7.6 (H) 08/19/2024    HGBA1C 8.5 (H) 02/02/2024   Scheduled medications  atorvastatin, 80 mg, oral, Daily  carvedilol, 12.5 mg, oral, BID  clindamycin, 450 mg, oral, q8h NORBERT  DULoxetine, 30 mg, oral, Daily   And  DULoxetine, 60 mg, oral, Nightly  enoxaparin, 40 mg, subcutaneous, Daily  finerenone, 10 mg, oral, Daily  furosemide, 20 mg, oral, Daily  insulin degludec, 12 Units, subcutaneous, Nightly  insulin lispro, 0-5 Units, subcutaneous, TID AC  insulin lispro, 4 Units, subcutaneous, TID AC  [Held by " provider] magnesium hydroxide, 30 mL, oral, Daily  [Held by provider] polyethylene glycol, 17 g, oral, Daily  simethicone, 80 mg, oral, 4x daily    Continuous medications     PRN medications  PRN medications: acetaminophen, calcium carbonate, cyclobenzaprine, dextrose, dextrose, glucagon, glucagon, lidocaine-diphenhydraMINE-Maalox 1:1:1, loperamide, melatonin, ondansetron **OR** ondansetron, oxyCODONE, oxyCODONE    The ASCVD Risk score (Adrianne POTTS, et al., 2019) failed to calculate for the following reasons:    Risk score cannot be calculated because patient has a medical history suggesting prior/existing ASCVD    Assessment/Plan   Assessment & Plan  Abdominal wall fluid collections    Type 2 diabetes mellitus with hypoglycemia    Nelida Mata is a 64yo w/ a PMH of HFpEF (50-55% 9/2023), CAD s/p PCI w/ stent x 3 (2016, 2018) and STEMI (9/2023, no stents placed), TIA 9/2023, SVC thrombus and remote DVT on Lovenox, IDDM2, HLD, CKD, MICHAEL, and NAFLD admitted for abdominal wall fluid collection s/p GIBSON-BSO and take back XL with washout on 1/23, now s/p bedside I&D on 2/27.      Diabetes History  Type of diabetes: DM2, confirmed with negative t1d abs and cpeptide of 1.5  Year diagnosed or age: age 20  Hospitalizations for DKA or HHS: no  Complications: CAD, STEMI, TIA, CKD, gastroparesis, neuropathy PVD, 4th and 5th R foot metacarpal amputated  Seen by PCP or Endocrinology: kathrine Miles,  10/2023  Frequency of glucose checks: continous with kenyon 2 + reader  Glucose review: multiple episodes of hypoglycemia below 55 during current admission  Frequency of Hypoglycemia: multiple times per week  Hypoglycemia unawareness: no  Severe hypoglycemia requiring assistance from others:  yes     Home Medications  Basal: tresiba 40u daily  Prandial: lispro 22u with meals  Correction:  Orals: farxiga 10mg daily, metformin 500mg XR  GLP-1: ozempic 2mg daily  CGM: kenyon 2 + reader  Patient has been titrated up on ozempic  to 2mg in outpatient setting and remains on large insulin doses, states her insulin doses have remained relatively the same despite ozempic titration, patient states she has had episodes of lows at home. A1c of 7.1% suggestive of hypoglycemia at home as well.   Also has hx of gastroparesis and frequently experiences n/v. Patient eats only 1 meal per day at home, but takes lispro 22u 2-3 times per day. We discussed only taking lispro with meals and skipping dose if she skips meal.       PLAN  Steroids: none  Nutrition: 90g CHO diet  Hypoglycemia 2/2 large home insulin doses, decreased PO intake due to n/v and CLARIBEL     - recommend increasing tresiba 12 units at bedtime     - continue lispro corrective scale #1 with meals, adjust to q4h if NPO or if persistently hyperglycemic   = 0u  151-200 = 1u  201-250 = 2u  251-300 = 3u  301-350 = 4u  351-400 = 5u     - increase 4 units of lispro with meals; hold if NPO or eating less than 50% of the meal    -Accuchecks (not BMP) TIDAC and QHS- kindly ensure QHS Accucheck is drawn; it is often missed   - Goal -180  -Hypoglycemia protocol  -Will continue to follow and titrate insulin accordingly        Discharge planning:   [x] patient may expect to discharge home on tresiba 15u daily, lispro 5u with meals + scale #2   -STOP ozempic 2mg due to gastroparesis, recurrent n/v, recent abdominal surgery. Ozempic restart may be assessed in outpatient setting  -STOP metformin due to recent CLARIBEL, GFR has dropped below 30 multiple times in recent labwork, wait to restart outpatient once kidney function stabilizes  -Patient may restart farxiga 10mg qAM once she no longer has n/v, discussed holding sglt2i on days that she is n/v due to increased risk for dehydration and eDKA     [x]continue use of kenoyn 2 + reader, declined kenyon 3 upgrade, patient states she has glucagon at home  [x]will enroll pt in  pharmacy platinum plan program  [x]follow up with Catherine Garcia PA-C in diabetes  hospital discharge clinic on 3/11/25          INSULIN INSTRUCTIONS   Breakfast time Lunch time Dinner time  Bedtime         Tresiba = 15 units   Humalog/Lispro = 5 units plus scale Humalog/Lispro = 5 units plus scale  Humalog/Lispro = 5 units plus scale        CORRECTIVE SCALE  GLUCOSE READING   HUMALOG/LISPRO SCALE DOSE    70 - 149 0   150 - 200 2   201 - 250 4   251 - 300 6   301 - 350 8   351 - 400+ 10      If glucose remains over 400, call your diabetes provider, repeat 10 units every 4 hours and drink sugar free   liquids (water, Gatorade zero, chicken broth) until you glucose improves or you hear back from medical   professional. If your glucose remains over 400 and you develop symptoms such as nausea/vomiting or     I spent 50 minutes in the professional and overall care of this patient.      Catherine Garcia PA-C

## 2025-03-04 NOTE — CARE PLAN
The patient's goals for the shift include to go home    The clinical goals for the shift include pain control    3/4/25    Reviewed discharge instructions with patient. Reviewed new medications and follow-up appts. Patient aware home care nurse to call to schedule appt tomorrow for dressing changes. Patient has ride set up to go home through roundtrip. No other concerns or questions at this time.    Ladan MAYFIELD

## 2025-03-04 NOTE — SIGNIFICANT EVENT
Dr. Castillo notified me of plan to discharge patient this afternoon. Contacted  Home Care and verified SOC for tomorrow 3/5/25 ( per Betzy Mckenna). MD and Bedside RN secure texted with information. Bedside RN also informed to send supplies home with patient for 3-5 days of dressing changes. Nurys Mccauley from  home care to call the patient to schedule.

## 2025-03-05 ENCOUNTER — HOME CARE VISIT (OUTPATIENT)
Dept: HOME HEALTH SERVICES | Facility: HOME HEALTH | Age: 65
End: 2025-03-05

## 2025-03-06 ENCOUNTER — HOSPITAL ENCOUNTER (EMERGENCY)
Facility: HOSPITAL | Age: 65
Discharge: HOME | End: 2025-03-07
Attending: STUDENT IN AN ORGANIZED HEALTH CARE EDUCATION/TRAINING PROGRAM
Payer: COMMERCIAL

## 2025-03-06 ENCOUNTER — APPOINTMENT (OUTPATIENT)
Dept: RADIOLOGY | Facility: HOSPITAL | Age: 65
End: 2025-03-06
Payer: COMMERCIAL

## 2025-03-06 ENCOUNTER — CLINICAL SUPPORT (OUTPATIENT)
Dept: EMERGENCY MEDICINE | Facility: HOSPITAL | Age: 65
End: 2025-03-06
Payer: COMMERCIAL

## 2025-03-06 DIAGNOSIS — R42 DIZZINESS: ICD-10-CM
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
## 2025-03-06 DIAGNOSIS — R10.84 GENERALIZED ABDOMINAL PAIN: ICD-10-CM

## 2025-03-06 LAB
ALBUMIN SERPL BCP-MCNC: 4.4 G/DL (ref 3.4–5)
ALP SERPL-CCNC: 91 U/L (ref 33–136)
ALT SERPL W P-5'-P-CCNC: 8 U/L (ref 7–45)
ANION GAP BLDV CALCULATED.4IONS-SCNC: 11 MMOL/L (ref 10–25)
ANION GAP BLDV CALCULATED.4IONS-SCNC: ABNORMAL MMOL/L
ANION GAP SERPL CALC-SCNC: 19 MMOL/L (ref 10–20)
AST SERPL W P-5'-P-CCNC: 13 U/L (ref 9–39)
ATRIAL RATE: 83 BPM
B-OH-BUTYR SERPL-SCNC: 0.29 MMOL/L (ref 0.02–0.27)
BASE EXCESS BLDV CALC-SCNC: 5.7 MMOL/L (ref -2–3)
BASE EXCESS BLDV CALC-SCNC: 6.1 MMOL/L (ref -2–3)
BASOPHILS # BLD AUTO: 0.14 X10*3/UL (ref 0–0.1)
BASOPHILS NFR BLD AUTO: 1.2 %
BILIRUB SERPL-MCNC: 0.5 MG/DL (ref 0–1.2)
BODY TEMPERATURE: 37 DEGREES CELSIUS
BODY TEMPERATURE: 37 DEGREES CELSIUS
BUN SERPL-MCNC: 18 MG/DL (ref 6–23)
CA-I BLDV-SCNC: 1.02 MMOL/L (ref 1.1–1.33)
CA-I BLDV-SCNC: 1.3 MMOL/L (ref 1.1–1.33)
CALCIUM SERPL-MCNC: 11.1 MG/DL (ref 8.6–10.6)
CARDIAC TROPONIN I PNL SERPL HS: 7 NG/L (ref 0–34)
CHLORIDE BLDV-SCNC: 99 MMOL/L (ref 98–107)
CHLORIDE BLDV-SCNC: 99 MMOL/L (ref 98–107)
CHLORIDE SERPL-SCNC: 98 MMOL/L (ref 98–107)
CO2 SERPL-SCNC: 28 MMOL/L (ref 21–32)
CREAT SERPL-MCNC: 1.32 MG/DL (ref 0.5–1.05)
EGFRCR SERPLBLD CKD-EPI 2021: 45 ML/MIN/1.73M*2
EOSINOPHIL # BLD AUTO: 0.21 X10*3/UL (ref 0–0.7)
EOSINOPHIL NFR BLD AUTO: 1.8 %
ERYTHROCYTE [DISTWIDTH] IN BLOOD BY AUTOMATED COUNT: 14.6 % (ref 11.5–14.5)
FLUAV RNA RESP QL NAA+PROBE: NOT DETECTED
FLUBV RNA RESP QL NAA+PROBE: NOT DETECTED
GLUCOSE BLD MANUAL STRIP-MCNC: 277 MG/DL (ref 74–99)
GLUCOSE BLDV-MCNC: 171 MG/DL (ref 74–99)
GLUCOSE BLDV-MCNC: 189 MG/DL (ref 74–99)
GLUCOSE SERPL-MCNC: 187 MG/DL (ref 74–99)
HCO3 BLDV-SCNC: 31.8 MMOL/L (ref 22–26)
HCO3 BLDV-SCNC: 32.1 MMOL/L (ref 22–26)
HCT VFR BLD AUTO: 40.6 % (ref 36–46)
HCT VFR BLD EST: 41 % (ref 36–46)
HCT VFR BLD EST: 41 % (ref 36–46)
HGB BLD-MCNC: 13.4 G/DL (ref 12–16)
HGB BLDV-MCNC: 13.6 G/DL (ref 12–16)
HGB BLDV-MCNC: 13.7 G/DL (ref 12–16)
IMM GRANULOCYTES # BLD AUTO: 0.05 X10*3/UL (ref 0–0.7)
IMM GRANULOCYTES NFR BLD AUTO: 0.4 % (ref 0–0.9)
INHALED O2 CONCENTRATION: 21 %
INHALED O2 CONCENTRATION: 21 %
LACTATE BLDV-SCNC: 3 MMOL/L (ref 0.4–2)
LACTATE BLDV-SCNC: 3.3 MMOL/L (ref 0.4–2)
LIPASE SERPL-CCNC: 17 U/L (ref 9–82)
LYMPHOCYTES # BLD AUTO: 1.93 X10*3/UL (ref 1.2–4.8)
LYMPHOCYTES NFR BLD AUTO: 16.6 %
MAGNESIUM SERPL-MCNC: 2.15 MG/DL (ref 1.6–2.4)
MCH RBC QN AUTO: 29.6 PG (ref 26–34)
MCHC RBC AUTO-ENTMCNC: 33 G/DL (ref 32–36)
MCV RBC AUTO: 90 FL (ref 80–100)
MONOCYTES # BLD AUTO: 0.72 X10*3/UL (ref 0.1–1)
MONOCYTES NFR BLD AUTO: 6.2 %
NEUTROPHILS # BLD AUTO: 8.58 X10*3/UL (ref 1.2–7.7)
NEUTROPHILS NFR BLD AUTO: 73.8 %
NRBC BLD-RTO: 0 /100 WBCS (ref 0–0)
OXYHGB MFR BLDV: 25.5 % (ref 45–75)
OXYHGB MFR BLDV: 35.1 % (ref 45–75)
P AXIS: 6 DEGREES
P OFFSET: 164 MS
P ONSET: 137 MS
PCO2 BLDV: 49 MM HG (ref 41–51)
PCO2 BLDV: 53 MM HG (ref 41–51)
PH BLDV: 7.39 PH (ref 7.33–7.43)
PH BLDV: 7.42 PH (ref 7.33–7.43)
PLATELET # BLD AUTO: 187 X10*3/UL (ref 150–450)
PO2 BLDV: 21 MM HG (ref 35–45)
PO2 BLDV: 29 MM HG (ref 35–45)
POTASSIUM BLDV-SCNC: 4.3 MMOL/L (ref 3.5–5.3)
POTASSIUM BLDV-SCNC: >10 MMOL/L (ref 3.5–5.3)
POTASSIUM SERPL-SCNC: 4.2 MMOL/L (ref 3.5–5.3)
PR INTERVAL: 134 MS
PROT SERPL-MCNC: 8.2 G/DL (ref 6.4–8.2)
Q ONSET: 204 MS
QRS COUNT: 14 BEATS
QRS DURATION: 126 MS
QT INTERVAL: 430 MS
QTC CALCULATION(BAZETT): 505 MS
QTC FREDERICIA: 479 MS
R AXIS: -38 DEGREES
RBC # BLD AUTO: 4.53 X10*6/UL (ref 4–5.2)
SAO2 % BLDV: 26 % (ref 45–75)
SAO2 % BLDV: 36 % (ref 45–75)
SARS-COV-2 RNA RESP QL NAA+PROBE: NOT DETECTED
SODIUM BLDV-SCNC: 128 MMOL/L (ref 136–145)
SODIUM BLDV-SCNC: 138 MMOL/L (ref 136–145)
SODIUM SERPL-SCNC: 141 MMOL/L (ref 136–145)
T AXIS: 33 DEGREES
T OFFSET: 419 MS
VENTRICULAR RATE: 83 BPM
WBC # BLD AUTO: 11.6 X10*3/UL (ref 4.4–11.3)

## 2025-03-06 PROCEDURE — 83690 ASSAY OF LIPASE: CPT | Performed by: STUDENT IN AN ORGANIZED HEALTH CARE EDUCATION/TRAINING PROGRAM

## 2025-03-06 PROCEDURE — 36415 COLL VENOUS BLD VENIPUNCTURE: CPT

## 2025-03-06 PROCEDURE — 83735 ASSAY OF MAGNESIUM: CPT | Performed by: STUDENT IN AN ORGANIZED HEALTH CARE EDUCATION/TRAINING PROGRAM

## 2025-03-06 PROCEDURE — 70450 CT HEAD/BRAIN W/O DYE: CPT

## 2025-03-06 PROCEDURE — 82010 KETONE BODYS QUAN: CPT

## 2025-03-06 PROCEDURE — 2500000004 HC RX 250 GENERAL PHARMACY W/ HCPCS (ALT 636 FOR OP/ED)

## 2025-03-06 PROCEDURE — 36415 COLL VENOUS BLD VENIPUNCTURE: CPT | Performed by: STUDENT IN AN ORGANIZED HEALTH CARE EDUCATION/TRAINING PROGRAM

## 2025-03-06 PROCEDURE — 84132 ASSAY OF SERUM POTASSIUM: CPT

## 2025-03-06 PROCEDURE — 2500000004 HC RX 250 GENERAL PHARMACY W/ HCPCS (ALT 636 FOR OP/ED): Performed by: STUDENT IN AN ORGANIZED HEALTH CARE EDUCATION/TRAINING PROGRAM

## 2025-03-06 PROCEDURE — 2550000001 HC RX 255 CONTRASTS: Performed by: STUDENT IN AN ORGANIZED HEALTH CARE EDUCATION/TRAINING PROGRAM

## 2025-03-06 PROCEDURE — 93005 ELECTROCARDIOGRAM TRACING: CPT

## 2025-03-06 PROCEDURE — 84132 ASSAY OF SERUM POTASSIUM: CPT | Performed by: STUDENT IN AN ORGANIZED HEALTH CARE EDUCATION/TRAINING PROGRAM

## 2025-03-06 PROCEDURE — 74177 CT ABD & PELVIS W/CONTRAST: CPT

## 2025-03-06 PROCEDURE — 87636 SARSCOV2 & INF A&B AMP PRB: CPT | Performed by: STUDENT IN AN ORGANIZED HEALTH CARE EDUCATION/TRAINING PROGRAM

## 2025-03-06 PROCEDURE — 82947 ASSAY GLUCOSE BLOOD QUANT: CPT

## 2025-03-06 PROCEDURE — 85025 COMPLETE CBC W/AUTO DIFF WBC: CPT | Performed by: STUDENT IN AN ORGANIZED HEALTH CARE EDUCATION/TRAINING PROGRAM

## 2025-03-06 PROCEDURE — 99285 EMERGENCY DEPT VISIT HI MDM: CPT | Mod: 25 | Performed by: STUDENT IN AN ORGANIZED HEALTH CARE EDUCATION/TRAINING PROGRAM

## 2025-03-06 PROCEDURE — 96361 HYDRATE IV INFUSION ADD-ON: CPT

## 2025-03-06 PROCEDURE — 72125 CT NECK SPINE W/O DYE: CPT

## 2025-03-06 PROCEDURE — 84484 ASSAY OF TROPONIN QUANT: CPT | Performed by: STUDENT IN AN ORGANIZED HEALTH CARE EDUCATION/TRAINING PROGRAM

## 2025-03-06 PROCEDURE — 96374 THER/PROPH/DIAG INJ IV PUSH: CPT | Mod: 59

## 2025-03-06 PROCEDURE — 71275 CT ANGIOGRAPHY CHEST: CPT

## 2025-03-06 RX ORDER — ONDANSETRON HYDROCHLORIDE 2 MG/ML
4 INJECTION, SOLUTION INTRAVENOUS ONCE
Status: COMPLETED | OUTPATIENT
Start: 2025-03-06 | End: 2025-03-06

## 2025-03-06 RX ORDER — ACETAMINOPHEN 325 MG/1
650 TABLET ORAL ONCE
Status: COMPLETED | OUTPATIENT
Start: 2025-03-06 | End: 2025-03-07

## 2025-03-06 RX ORDER — LIDOCAINE 560 MG/1
1 PATCH PERCUTANEOUS; TOPICAL; TRANSDERMAL DAILY
Status: DISCONTINUED | OUTPATIENT
Start: 2025-03-07 | End: 2025-03-07 | Stop reason: HOSPADM

## 2025-03-06 RX ADMIN — ONDANSETRON 4 MG: 2 INJECTION INTRAMUSCULAR; INTRAVENOUS at 17:37

## 2025-03-06 RX ADMIN — SODIUM CHLORIDE, POTASSIUM CHLORIDE, SODIUM LACTATE AND CALCIUM CHLORIDE 1000 ML: 600; 310; 30; 20 INJECTION, SOLUTION INTRAVENOUS at 17:38

## 2025-03-06 RX ADMIN — IOHEXOL 94 ML: 350 INJECTION, SOLUTION INTRAVENOUS at 22:52

## 2025-03-06 ASSESSMENT — PAIN DESCRIPTION - LOCATION: LOCATION: ABDOMEN

## 2025-03-06 ASSESSMENT — PAIN DESCRIPTION - DESCRIPTORS
DESCRIPTORS: ACHING
DESCRIPTORS: PRESSURE

## 2025-03-06 ASSESSMENT — PAIN DESCRIPTION - PAIN TYPE: TYPE: ACUTE PAIN

## 2025-03-06 ASSESSMENT — COLUMBIA-SUICIDE SEVERITY RATING SCALE - C-SSRS
6. HAVE YOU EVER DONE ANYTHING, STARTED TO DO ANYTHING, OR PREPARED TO DO ANYTHING TO END YOUR LIFE?: NO
1. IN THE PAST MONTH, HAVE YOU WISHED YOU WERE DEAD OR WISHED YOU COULD GO TO SLEEP AND NOT WAKE UP?: NO
2. HAVE YOU ACTUALLY HAD ANY THOUGHTS OF KILLING YOURSELF?: NO

## 2025-03-06 ASSESSMENT — PAIN DESCRIPTION - FREQUENCY: FREQUENCY: CONSTANT/CONTINUOUS

## 2025-03-06 ASSESSMENT — PAIN SCALES - GENERAL
PAINLEVEL_OUTOF10: 8
PAINLEVEL_OUTOF10: 8

## 2025-03-06 ASSESSMENT — PAIN - FUNCTIONAL ASSESSMENT: PAIN_FUNCTIONAL_ASSESSMENT: 0-10

## 2025-03-06 NOTE — ED TRIAGE NOTES
64 yo female arrived via [facility] due to nausea, vomiting, diarrhea, dizziness and hyperglycemia (277 on arrival).

## 2025-03-06 NOTE — ED PROVIDER NOTES
"History of Present Illness     History provided by: Patient  Limitations to History: None  External Records Reviewed with Brief Summary: Reviewed discharge summary from 3/4/2025 in which patient was admitted to gynecology for abdominal fluid wall collections with history of GIBSON-BSO on 1/23 complicated by take-back on POD0 for abdominal washout, who re-presented for incisional drainage and n/v, now also s/p bedside I&D on 2/27. Seen by endocrinology and was continued on Kajal but 8 units nightly and lispro 4 units with meal    HPI:  Nelida Mata is a 65 y.o. female 65 female history of IDDM 2, SVC thrombus on Lovenox, HFpEF, CAD s/p PCI with stent x 3 and STEMI presenting to the ED with multiple medical complaints.  Patient states that since hospital discharge she has had increasing fatigue, generalized malaise with chills, decreased appetite, chest pain, shortness of breath.  She also notes diffuse abdominal pain, diarrhea and states that her sugars have been high at home despite her taking her newly prescribed insulin regimen.  She also states that yesterday she felt lightheaded and dizzy and had a syncopal episode in which she fell backwards striking her low back and darrell her head and now has some right sided neck pain that has been persisting for the last few months but was exacerbated by the fall. Denies head strike or LOC to me but is on thinners. Reports to Attending that she fell, and \"next thing she knew someone was helping me up\", was ambivalent on repeat questioning about the possibility of LOC. She endorses some midline low back pain, has not taken anything for pain.  Notes decreased oral intake secondary to ongoing nausea.  States that she recently had an abdominal procedure performed and has not had the dressings changed since the procedure and is now having diffuse abdominal pain. Endorses chest pain, shortness of breath, abdominal pain, dizziness/lightheadedness, low back pain. No " lateralizing weakness or numbness tingling, no loss of bowel or bladder.    Physical Exam   Triage vitals:  T 36.4 °C (97.5 °F)  HR (!) 105  /75  RR 20  O2 100 % None (Room air)    General: Awake, alert, in no acute distress, non-toxic appearing  Eyes: Gaze conjugate.  No scleral icterus or injection  HENT: Normo-cephalic, atraumatic. No stridor.   Neck: No midline tenderness to palpation, right paraspinal tenderness  CV: Tachycardic rate, regular rhythm. No MRG. Cap refill less than 2 seconds, radial pulse 2+ bilaterally  Resp: Breathing non-labored, coarse to auscultation bilaterally, no accessory muscle use  GI: Soft, non-distended, diffusely tender throughout, midline surgical abdominal incision with dressing overlying CDI, wound packing in place to the midline anterior abdomen with mild surrounding erythema, no fluctuance, no purulent drainage  MSK/Extremities: No gross bony deformities. Moving all extremities.  No lower extremity pitting edema.  Skin: Warm. Appropriate color  Neuro: Awake and Alert. Face symmetric. Appropriate tone. Moving all extremities equally.  Strength 5 out of 5 bilateral upper and lower extremities.  Finger-to-nose intact bilaterally.  Sensation intact grossly bilaterally.    Medical Decision Making & ED Course   Medical Decision Makin y.o. female history as above with recent hospitalization for bedside I&D after complication from GIBSON/BSO now presenting with multiple medical complaints including nausea vomiting, abdominal pain, dizziness with syncopal fall, chest pain and shortness of breath. On arrival patient was mildly tachycardic and borderline tachypneic, had a reported fall with subsequent midline back pain. Does have reported decreased oral intake in the setting of multiple viral URI symptoms, does have a reported fall and is on anticoagulation therefore will get trauma imaging including CT head to rule out ICH or skull fracture, CT spinal imaging to rule out  fracture, additionally patient notes chest pain and syncopal episode, considered PE due to chest pain shortness of breath therefore will get CT PE to definitively rule out with high risk history. She additionally notes diffuse abdominal tenderness and had recent I&D performed for complications related to her GIBSON/BSO therefore we will get abdominal imaging to rule out any complications. Plan to get labs, patient had hyperglycemia however has no evidence of DKA. She was treated symptomatically with IV fluids, Zofran and Tylenol with lidocaine patch. Labs were notable for a mild leukocytosis with neutrophil predominance however midline surgical abdominal incision has some mild erythema without surrounding fluctuance and no purulent drainage, will monitor imaging without need for antibiotics currently. Will plan to get urinalysis to rule out UTI. Patient otherwise is neurologically intact without lateralizing deficits, is nonseptic appearing without lactate elevation.  Signed out to oncoming provider pending reevaluation after imaging results.  Final disposition pending.    Social Determinants of Health which Significantly Impact Care: None identified     EKG Independent Interpretation: See ED course for my independent interpretation if ECG was obtained.    Independent Result Review and Interpretation: Please see MDM and ED course for my independent interpretation of the results    Chronic conditions affecting the patient's care: Please see H&P and MDM    The patient was discussed with the following consultants/services: None    Care Considerations: As document above in [facility]    ED Course:  ED Course as of 03/07/25 0114   u Mar 06, 2025   1702 ECG 12 lead  Sinus rhythm rate 100, left axis deviation, RBBB, no acute ST segment changes.  , , QTc 477.  ECG grossly unchanged compared to prior from March 4, 2025. [KR]   1702 POCT Glucose(!): 277 [KR]   1744 POCT Lactate, Venous(!): 3.0  Not hypotensive, lactic  less than 4, and have obvious alternative cause with decreased oral intake in the setting of viral URI symptoms.  Does have tenderness over the midline lumbar, and diffusely over the abdomen, unfortunately cannot rule out significant issues clinically and we will therefore proceed with aggressive CT imaging [JH]   1754 POCT pH, Venous: 7.39  Reassuring [JH]   1832 Creatinine(!): 1.32  Only mildly from baseline, acting on with fluids [KR]   1858 Beta-Hydroxybutyrate(!): 0.29  No signs of DKA [KR]   1858 LIPASE: 17 [KR]   1858 Creatinine(!): 1.32  Mild worsening renal function compared to 2 days prior, patient is receiving IV fluids. [KR]   2010 Sars-CoV-2 and Influenza A/B PCR  Viral testing negative. [KR]   2011 Patient still pending imaging, remained hemodynamically stable. [KR]   2313 ECG 12 lead  My independent interpretation: NSR with HR 97, RBBB, R axis, prolonged QT and QRS, no ST changes concerning for ischemia. Unchanged from prior EKG earlier today and 3/4 and 2/26. []   2339 CT head wo IV contrast  Patient has no ICH or calvarial fracture, cervical spine showing no acute fracture or traumatic malalignment. [KR]   Fri Mar 07, 2025   0031 CT angio chest for pulmonary embolism  IMPRESSION:  No definite acute thoracic, abdominal, or pelvic trauma identified.  No acute cardiopulmonary disease.  Superficial supraumbilical midline abdominal wall wound with a  rim-enhancing 3.6 cm fluid collection deep to the wound in the  abdominal wall with a fluid/fluid level, likely a postoperative  hematoma.  No acute thoracic or lumbar spine fracture identified.  Multilevel  age-related osteoarthritic changes as described.   []   0108 CT head wo IV contrast  IMPRESSION:  CT HEAD:  Small scalp hematoma. No acute intracranial hemorrhage or calvarial  fracture.      CT CERVICAL SPINE:  1. No acute fracture or traumatic malalignment of the cervical spine.  2. 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   [MH]      ED Course User Index  [JH] Gino Wooten MD  [KR] Mendy Salvador DO  [] Nima Barclay MD  [SS] Leticia Garcia MD     Disposition   Patient was signed out to oncoming provider at 2300 pending completion of their work-up.  Please see the next provider's transition of care note for the remainder of the patient's care.     Procedures   Procedures    Patient seen and discussed with attending physician    Mendy Salvador DO  Emergency Medicine     Mendy Salvador DO  Resident  03/07/25 0114       Gino Wooten MD  03/07/25 0157

## 2025-03-07 VITALS
BODY MASS INDEX: 24.8 KG/M2 | HEIGHT: 59 IN | TEMPERATURE: 97.5 F | RESPIRATION RATE: 16 BRPM | DIASTOLIC BLOOD PRESSURE: 75 MMHG | HEART RATE: 89 BPM | SYSTOLIC BLOOD PRESSURE: 144 MMHG | WEIGHT: 123 LBS | OXYGEN SATURATION: 98 %

## 2025-03-07 LAB
APPEARANCE UR: CLEAR
ATRIAL RATE: 100 BPM
ATRIAL RATE: 97 BPM
BILIRUB UR STRIP.AUTO-MCNC: NEGATIVE MG/DL
COLOR UR: ABNORMAL
GLUCOSE UR STRIP.AUTO-MCNC: ABNORMAL MG/DL
HOLD SPECIMEN: NORMAL
KETONES UR STRIP.AUTO-MCNC: ABNORMAL MG/DL
LACTATE BLDV-SCNC: 2.3 MMOL/L (ref 0.4–2)
LEUKOCYTE ESTERASE UR QL STRIP.AUTO: NEGATIVE
NITRITE UR QL STRIP.AUTO: NEGATIVE
P AXIS: 12 DEGREES
P AXIS: 52 DEGREES
P OFFSET: 157 MS
P OFFSET: 174 MS
P ONSET: 117 MS
P ONSET: 130 MS
PH UR STRIP.AUTO: 7.5 [PH]
PR INTERVAL: 136 MS
PR INTERVAL: 156 MS
PROT UR STRIP.AUTO-MCNC: NEGATIVE MG/DL
Q ONSET: 195 MS
Q ONSET: 198 MS
QRS COUNT: 16 BEATS
QRS COUNT: 16 BEATS
QRS DURATION: 126 MS
QRS DURATION: 134 MS
QT INTERVAL: 370 MS
QT INTERVAL: 404 MS
QTC CALCULATION(BAZETT): 477 MS
QTC CALCULATION(BAZETT): 513 MS
QTC FREDERICIA: 439 MS
QTC FREDERICIA: 474 MS
R AXIS: -42 DEGREES
R AXIS: -45 DEGREES
RBC # UR STRIP.AUTO: NEGATIVE MG/DL
SP GR UR STRIP.AUTO: 1.04
T AXIS: 61 DEGREES
T AXIS: 73 DEGREES
T OFFSET: 383 MS
T OFFSET: 397 MS
UROBILINOGEN UR STRIP.AUTO-MCNC: NORMAL MG/DL
VENTRICULAR RATE: 100 BPM
VENTRICULAR RATE: 97 BPM

## 2025-03-07 PROCEDURE — 2500000001 HC RX 250 WO HCPCS SELF ADMINISTERED DRUGS (ALT 637 FOR MEDICARE OP)

## 2025-03-07 PROCEDURE — 2500000005 HC RX 250 GENERAL PHARMACY W/O HCPCS

## 2025-03-07 PROCEDURE — 81003 URINALYSIS AUTO W/O SCOPE: CPT

## 2025-03-07 PROCEDURE — 36415 COLL VENOUS BLD VENIPUNCTURE: CPT

## 2025-03-07 PROCEDURE — 83605 ASSAY OF LACTIC ACID: CPT

## 2025-03-07 PROCEDURE — 99212 OFFICE O/P EST SF 10 MIN: CPT | Performed by: STUDENT IN AN ORGANIZED HEALTH CARE EDUCATION/TRAINING PROGRAM

## 2025-03-07 RX ADMIN — ACETAMINOPHEN 650 MG: 325 TABLET, FILM COATED ORAL at 00:04

## 2025-03-07 RX ADMIN — LIDOCAINE 1 PATCH: 4 PATCH TOPICAL at 01:42

## 2025-03-07 NOTE — DISCHARGE INSTRUCTIONS
You presented to the ED for nausea, vomiting, diarrhea, dizziness, and abdominal pain.  Your workup was significant for a fluid collection noted by your previously noted surgical site.  Your evaluated by GYN who recommended outpatient follow-up.  Follow-up with GYN within the next week.  GYN clinic phone number is [phone].  Home health should be coming tomorrow.  Please see them as well as wound care as scheduled.  Please take ibuprofen and Tylenol as needed for abdominal pain.  Please return to the emergency department for any new or worsening symptoms including but not limited to concerning abdominal pain, fall, and lightheadedness.

## 2025-03-07 NOTE — PROGRESS NOTES
Emergency Medicine Transition of Care Note.    I received Nelida Mata in signout from Dr. Salvador.  Please see the previous ED provider note for all HPI, PE and MDM up to the time of signout at 2300. This is in addition to the primary record.    In brief Nelida Mata is an 65 y.o. female  is a 65 y.o. female 65 female history of IDDM 2, SVC thrombus on Lovenox, HFpEF, CAD s/p PCI with stent x 3 and STEMI presenting to the ED with multiple medical complaints.  Patient's workup significant for elevated venous lactate of 3.0, hyperglycemia of 277, mildly elevated creatinine, and CT head without acute intracranial process.    Chief Complaint   Patient presents with    Hyperglycemia     At the time of signout we were awaiting: Labs and imaging    Please see ED course and disposition for remainder of care.    ED Course as of 03/08/25 0635   u Mar 06, 2025   1702 ECG 12 lead  Sinus rhythm rate 100, left axis deviation, RBBB, no acute ST segment changes.  , , QTc 477.  ECG grossly unchanged compared to prior from March 4, 2025. [KR]   1702 POCT Glucose(!): 277 [KR]   1744 POCT Lactate, Venous(!): 3.0  Not hypotensive, lactic less than 4, and have obvious alternative cause with decreased oral intake in the setting of viral URI symptoms.  Does have tenderness over the midline lumbar, and diffusely over the abdomen, unfortunately cannot rule out significant issues clinically and we will therefore proceed with aggressive CT imaging [JH]   1754 POCT pH, Venous: 7.39  Reassuring [JH]   1832 Creatinine(!): 1.32  Only mildly from baseline, acting on with fluids [KR]   1858 Beta-Hydroxybutyrate(!): 0.29  No signs of DKA [KR]   1858 LIPASE: 17 [KR]   1858 Creatinine(!): 1.32  Mild worsening renal function compared to 2 days prior, patient is receiving IV fluids. [KR]   2010 Sars-CoV-2 and Influenza A/B PCR  Viral testing negative. [KR]   2011 Patient still pending imaging, remained hemodynamically  stable. [KR]   2313 ECG 12 lead  My independent interpretation: NSR with HR 97, RBBB, R axis, prolonged QT and QRS, no ST changes concerning for ischemia. Unchanged from prior EKG earlier today and 3/4 and 2/26. [SS]   2339 CT head wo IV contrast  Patient has no ICH or calvarial fracture, cervical spine showing no acute fracture or traumatic malalignment. [KR]   Fri Mar 07, 2025   0031 CT angio chest for pulmonary embolism  IMPRESSION:  No definite acute thoracic, abdominal, or pelvic trauma identified.  No acute cardiopulmonary disease.  Superficial supraumbilical midline abdominal wall wound with a  rim-enhancing 3.6 cm fluid collection deep to the wound in the  abdominal wall with a fluid/fluid level, likely a postoperative  hematoma.  No acute thoracic or lumbar spine fracture identified.  Multilevel  age-related osteoarthritic changes as described.   [MH]   0108 CT head wo IV contrast  IMPRESSION:  CT HEAD:  Small scalp hematoma. No acute intracranial hemorrhage or calvarial  fracture.      CT CERVICAL SPINE:  1. No acute fracture or traumatic malalignment of the cervical spine.  2. Ossification in the ventral canal which may be degenerative or  related to ossification is of the posterior longitudinal ligament  contribute to moderate to severe canal stenosis at C4-C5 and C5-C6.   [MH]   0235 GYN consulted based off imaging findings with concern for rim-enhancing collection at the patient's surgical site.  GYN evaluated the patient and noted that she has been declining wound care as well as not taking her home analgesia including ibuprofen and Tylenol.  Recommended for patient to continue wound care, take over-the-counter analgesia, and follow-up with GYN outpatient.  They are not recommending any acute surgical invention at this time. [MH]   0244 BLOOD GAS LACTIC ACID, VENOUS(!)  Repeat venous lactate downtrending at 2.3. [MH]   0415 Urinalysis with Reflex Culture and Microscopic(!)  UA without findings  concerning for UTI. []   0418 Patient offered admission but she declined.  Notes that she is able to walk on her own.  Just noted that she had an episode of lightheadedness which resulted in a previous fall.  Patient is agreeable with discharge.  Patient noted to tolerate p.o. as well as ambulate in emergency department. [MH]      ED Course User Index  [JH] Gino Wooten MD  [KR] Mendy Salvador DO  [] Nima Barclay MD  [SS] Leticia Garcia MD         Diagnoses as of 03/08/25 0635   Fall, initial encounter   Dizziness   Generalized abdominal pain       Medical Decision Making  Discussed ED findings, plan for outpatient GYN the phone number for outpatient eye clinic follow-up is [phone], receiving home care and wound care as scheduled, and strict return precautions for any new or worsening symptoms including but not limited to concerning abdominal pain, falling, and lightheadedness.  Patient stated understanding and agreement the plan.  All questions were answered.  Patient discharged in stable condition.    Final diagnoses:   [W19.XXXA] Fall, initial encounter   [R42] Dizziness   [R10.84] Generalized abdominal pain           Procedure  Procedures    Patient presentation and plan discussed with attending physician.    Nima Barclay MD

## 2025-03-07 NOTE — CONSULTS
"GYN Consult    Reason for Consult: Postoperative Abdominal Pain    Assessment/Plan      Postoperative Abdominal Pain  - s/p GIBSON-BSO on  complicated by take-back on POD0 for abdominal washout, who re-presented for incisional drainage and n/v, now also s/p bedside I&D on .   -Discharged home on 3/4 in stable condition with home care, pt has not been utilizing. Encouraged to call agency to continue wound care management  - Pt hemodynamically stable, afebrile  -Recommend rx of Tylenol, Motrin as pt has not been taking consistently and reporting breakthrough pain.  -Abdominal assessment unremarkable, no acute c/f worsening seroma or abdominal pain  -Continue routine postoperative care and follow up with GYN provider as scheduled.    Plan of care d/w Dr. Terell Barrientos MD PGY-4    Subjective     Pt is a 66yo female presenting for ongoing postoperative abdominal pain s/p GIBSON-BSO on  complicated by take-back on POD0 for abdominal washout, who re-presented for incisional drainage and n/v, now also s/p bedside I&D on . Discharged home on 3/4 in stable condition however states that she has had some ongoing abdominal pain  since being home and says that standard PO pain meds like tylenol and ibuprofen not working although she takes them infrequently. Denies associated N/V fever or chills. No incisional bleeding. Has home care set up but has not yet utilized as patient states she declined them from coming to her home because she  was \"in the bed all day\".     Comorbidities  - HFpEF (50-55% 2023)/CAD s/p PCI w/ stent x 3/ hx of STEM, on ASA, carvedilol, losartan and lasix   - TIA 2023  - SVC thrombus, remote DVT on Lovenox, continued while inpatient  - T2DM/gastroparesis: as above  - CKD  - MICHAEL  - NAFLD  - Chronic pain, on duloxetine (held)    Obstetrical History   OB History    Para Term  AB Living   1      1   SAB IAB Ectopic Multiple Live Births           1      # Outcome Date " GA Lbr Domenico/2nd Weight Sex Type Anes PTL Lv   1 Term      Vag-Spont   MONY       Past Medical History  Past Medical History:   Diagnosis Date    (HFpEF) heart failure with preserved ejection fraction     (50-55% 9/2023)    Abnormal uterine and vaginal bleeding, unspecified     Abnormal uterine bleeding    Acute vaginitis 02/22/2018    Vaginitis    Alcohol abuse, in remission     History of alcohol abuse    Anxiety     Arthritis     Polyarthritis    Blindness     left eye    Cerebral vascular accident (Multi) 09/2023    Changes in skin texture 04/10/2018    Thickening, skin    Chest pain, unspecified 02/22/2018    Chest pain at rest    Chronic pain disorder     CKD (chronic kidney disease)     stage 4    Coronary artery disease     s/p PCI w/ stent x 3 (2016, 2018)    Depression     Disorder of pigmentation, unspecified 02/22/2018    Discoloration of skin of lower leg    Fibroid     Fibromyalgia     Generalized contraction of visual field, left eye 02/06/2017    Generalized contraction of visual field of left eye    GERD (gastroesophageal reflux disease)     Glaucoma     Headache, unspecified 04/10/2018    Headache    Hereditary and idiopathic neuropathy, unspecified 04/10/2018    Idiopathic peripheral neuropathy    History of blood transfusion     Hydroureter 02/22/2018    Hydroureter on left    Hyperlipidemia, unspecified     Dyslipidemia    Hypertension     Hypokalemia 02/22/2018    Hypokalemia    Insomnia, unspecified     Lipoma     Localized edema 02/22/2018    Bilateral edema of lower extremity    Macula scars of posterior pole (postinflammatory) (post-traumatic), left eye 04/10/2018    Macula scar of posterior pole of left eye    NAFLD (nonalcoholic fatty liver disease)     NSTEMI (non-ST elevated myocardial infarction) (Multi) 06/2024    Obesity     Obstructive sleep apnea (adult) (pediatric)     not able to tolerate CPAP    Other chest pain 04/10/2018    Chest tightness or pressure    Other fatigue 02/22/2018     Fatigue    Other fecal abnormalities 02/22/2018    Loose stools    Other forms of dyspnea     Dyspnea on exertion- takes breaks when ambulating    Peripheral vascular disease (CMS-HCC)     Polyp of colon 02/22/2018    Colon polyps    Postmenopausal bleeding 04/10/2018    Postmenopausal bleeding    Presence of coronary angioplasty implant and graft 02/22/2018    Presence of drug coated stent in right coronary artery    Presence of intraocular lens 03/18/2015    Pseudophakia of left eye    Psoriasis, unspecified     Repeated falls 02/22/2018    Multiple falls    Retinal hemorrhage, right eye 02/06/2017    Retinal hemorrhage of right eye    Shortness of breath     STEMI (ST elevation myocardial infarction) (Multi) 09/2023    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    SVC thrombus on Lovenox    Traction detachment of retina, right eye 02/06/2017    Retinal detachment, tractional, right eye    Type 2 diabetes mellitus with other skin ulcer (CODE) 02/22/2018    Diabetic leg ulcer    Type 2 diabetes mellitus with proliferative diabetic retinopathy without macular edema, unspecified eye 04/10/2018    Proliferative diabetic retinopathy    Type 2 diabetes mellitus with unspecified diabetic retinopathy without macular edema 02/06/2017    Background diabetic retinopathy    Umbilical hernia without obstruction or gangrene     Umbilical hernia    Unqualified visual loss, both eyes 02/06/2017    Complete loss of vision in visual field    Unstable angina (Multi) 04/10/2018    Unstable angina    Vision loss     Vitamin D deficiency, unspecified     Xerosis cutis 02/22/2018    Xerosis of skin        Past Surgical History   Past Surgical History:   Procedure Laterality Date    BREAST BIOPSY Left     2007    CARDIAC CATHETERIZATION N/A 07/05/2024    Procedure: Left And Right Heart Cath, Without LV;  Surgeon: Loki Donis MD;  Location: [place] [id number] Cardiac Cath Lab;  Service: Cardiovascular;  Laterality: N/A;    CATARACT  "EXTRACTION  03/18/2015    Cataract Surgery    CORONARY ANGIOPLASTY WITH STENT PLACEMENT  03/06/2017    Cath Stent Placement    CT ANGIO AORTA AND BILATERAL ILIOFEMORAL RUN OFF INCLUDING WITHOUT CONTRAST IF PERFORMED  08/08/2022    CT AORTA AND BILATERAL ILIOFEMORAL RUNOFF ANGIOGRAM W AND/OR WO IV CONTRAST [place]8/2022 [facility]    MR HEAD ANGIO WO IV CONTRAST  08/16/2013    MR HEAD ANGIO WO IV CONTRAST 8/16/2013 [facility]    MR NECK ANGIO WO IV CONTRAST  08/16/2013    MR NECK ANGIO WO IV CONTRAST 8/16/2013 [facility]    RETINAL DETACHMENT SURGERY  03/18/2015    Repair Of Retinal Detachment    TOE AMPUTATION Right     VULVA SURGERY         Social History  Social History     Tobacco Use    Smoking status: Never     Passive exposure: Never    Smokeless tobacco: Never   Substance Use Topics    Alcohol use: Not Currently     Comment: 1-2 times a week     Substance and Sexual Activity   Drug Use Never       Allergies  Other, Penicillins, and Procaine     Medications  (Not in a hospital admission)      Objective    Last Vitals  Temp Pulse Resp BP MAP O2 Sat   36.4 °C (97.5 °F) 89 16 104/60 93 98 %     Physical Examination    General: no acute distress  HEENT: normocephalic, atraumatic  Heart: warm and well perfused  Lungs: breathing comfortably on room air  Abdomen: soft, nondistended, mepilex dressing over midline incsiion c/d/I, abdominal drainage site c/d/I, scab over area without purulence.  Extremities: moving all extremities  Neuro: awake and conversant  Psych: appropriate mood and affect     Lab Review  No results found for: \"ABO\", \"LABRH\", \"ABSCRN\"  Lab Results   Component Value Date    WBC 11.6 (H) 03/06/2025    HGB 13.4 03/06/2025    HCT 40.6 03/06/2025     03/06/2025     Lab Results   Component Value Date    GLUCOSE 187 (H) 03/06/2025     03/06/2025    K 4.2 03/06/2025    CL 98 03/06/2025    CO2 28 03/06/2025    ANIONGAP 19 03/06/2025    BUN 18 03/06/2025    CREATININE " 1.32 (H) 03/06/2025    EGFR 45 (L) 03/06/2025    CALCIUM 11.1 (H) 03/06/2025    ALBUMIN 4.4 03/06/2025    PROT 8.2 03/06/2025    ALKPHOS 91 03/06/2025    ALT 8 03/06/2025    AST 13 03/06/2025    BILITOT 0.5 03/06/2025

## 2025-03-08 ENCOUNTER — HOME CARE VISIT (OUTPATIENT)
Dept: HOME HEALTH SERVICES | Facility: HOME HEALTH | Age: 65
End: 2025-03-08
Payer: COMMERCIAL

## 2025-03-08 ENCOUNTER — HOME CARE VISIT (OUTPATIENT)
Dept: HOME HEALTH SERVICES | Facility: HOME HEALTH | Age: 65
End: 2025-03-08

## 2025-03-08 VITALS
TEMPERATURE: 97.7 F | SYSTOLIC BLOOD PRESSURE: 110 MMHG | OXYGEN SATURATION: 100 % | HEART RATE: 76 BPM | DIASTOLIC BLOOD PRESSURE: 70 MMHG | RESPIRATION RATE: 19 BRPM

## 2025-03-08 LAB
ATRIAL RATE: 86 BPM
P AXIS: -8 DEGREES
P OFFSET: 185 MS
P ONSET: 139 MS
PR INTERVAL: 138 MS
Q ONSET: 208 MS
QRS COUNT: 14 BEATS
QRS DURATION: 136 MS
QT INTERVAL: 412 MS
QTC CALCULATION(BAZETT): 493 MS
QTC FREDERICIA: 464 MS
R AXIS: -38 DEGREES
T AXIS: 30 DEGREES
T OFFSET: 414 MS
VENTRICULAR RATE: 86 BPM

## 2025-03-08 PROCEDURE — G0299 HHS/HOSPICE OF RN EA 15 MIN: HCPCS

## 2025-03-08 SDOH — ECONOMIC STABILITY: FOOD INSECURITY: MEALS PER DAY: 3

## 2025-03-08 ASSESSMENT — ACTIVITIES OF DAILY LIVING (ADL)
AMBULATION ASSISTANCE: ONE PERSON
OASIS_M1830: 03
CURRENT_FUNCTION: ONE PERSON
ENTERING_EXITING_HOME: MODERATE ASSIST

## 2025-03-08 ASSESSMENT — ENCOUNTER SYMPTOMS
PAIN SEVERITY GOAL: 0/10
PAIN LOCATION - PAIN SEVERITY: 0/10
PAIN: 1
PERSON REPORTING PAIN: PATIENT
HIGHEST PAIN SEVERITY IN PAST 24 HOURS: 0/10
PAIN LOCATION - PAIN FREQUENCY: INTERMITTENT
APPETITE LEVEL: FAIR
LOWEST PAIN SEVERITY IN PAST 24 HOURS: 0/10
PAIN LOCATION: GENERALIZED
CHANGE IN APPETITE: UNCHANGED
MUSCLE WEAKNESS: 1
SUBJECTIVE PAIN PROGRESSION: UNCHANGED
PAIN LOCATION - PAIN QUALITY: ACHY

## 2025-03-10 ENCOUNTER — HOME CARE VISIT (OUTPATIENT)
Dept: HOME HEALTH SERVICES | Facility: HOME HEALTH | Age: 65
End: 2025-03-10
Payer: COMMERCIAL

## 2025-03-10 VITALS
TEMPERATURE: 97.4 F | HEART RATE: 89 BPM | DIASTOLIC BLOOD PRESSURE: 65 MMHG | SYSTOLIC BLOOD PRESSURE: 137 MMHG | OXYGEN SATURATION: 97 %

## 2025-03-10 LAB
ATRIAL RATE: 83 BPM
ATRIAL RATE: 94 BPM
P AXIS: 56 DEGREES
P AXIS: 6 DEGREES
P OFFSET: 152 MS
P OFFSET: 164 MS
P ONSET: 104 MS
P ONSET: 137 MS
PR INTERVAL: 134 MS
PR INTERVAL: 178 MS
Q ONSET: 193 MS
Q ONSET: 204 MS
QRS COUNT: 14 BEATS
QRS COUNT: 15 BEATS
QRS DURATION: 126 MS
QRS DURATION: 130 MS
QT INTERVAL: 404 MS
QT INTERVAL: 430 MS
QTC CALCULATION(BAZETT): 505 MS
QTC CALCULATION(BAZETT): 505 MS
QTC FREDERICIA: 469 MS
QTC FREDERICIA: 479 MS
R AXIS: -38 DEGREES
R AXIS: -40 DEGREES
T AXIS: 33 DEGREES
T AXIS: 41 DEGREES
T OFFSET: 395 MS
T OFFSET: 419 MS
VENTRICULAR RATE: 83 BPM
VENTRICULAR RATE: 94 BPM

## 2025-03-10 PROCEDURE — G0299 HHS/HOSPICE OF RN EA 15 MIN: HCPCS

## 2025-03-10 ASSESSMENT — ENCOUNTER SYMPTOMS
HEADACHES: 1
LAST BOWEL MOVEMENT: 67271
DENIES PAIN: 1
APPETITE LEVEL: FAIR
DIZZINESS: 1
PERSON REPORTING PAIN: PATIENT

## 2025-03-11 ENCOUNTER — APPOINTMENT (OUTPATIENT)
Dept: ENDOCRINOLOGY | Facility: CLINIC | Age: 65
End: 2025-03-11
Payer: COMMERCIAL

## 2025-03-12 ENCOUNTER — HOME CARE VISIT (OUTPATIENT)
Dept: HOME HEALTH SERVICES | Facility: HOME HEALTH | Age: 65
End: 2025-03-12
Payer: COMMERCIAL

## 2025-03-12 VITALS
RESPIRATION RATE: 18 BRPM | HEART RATE: 91 BPM | OXYGEN SATURATION: 99 % | SYSTOLIC BLOOD PRESSURE: 142 MMHG | TEMPERATURE: 98 F | DIASTOLIC BLOOD PRESSURE: 66 MMHG

## 2025-03-12 PROCEDURE — G0300 HHS/HOSPICE OF LPN EA 15 MIN: HCPCS

## 2025-03-12 ASSESSMENT — ENCOUNTER SYMPTOMS
PAIN: 1
APPETITE LEVEL: GOOD
MUSCLE WEAKNESS: 1
HYPERTENSION: 1
PAIN LOCATION: ABDOMEN
SUBJECTIVE PAIN PROGRESSION: UNCHANGED
LOWEST PAIN SEVERITY IN PAST 24 HOURS: 6/10
LAST BOWEL MOVEMENT: 67275
OCCASIONAL FEELINGS OF UNSTEADINESS: 0
HIGHEST PAIN SEVERITY IN PAST 24 HOURS: 8/10

## 2025-03-14 ENCOUNTER — HOME CARE VISIT (OUTPATIENT)
Dept: HOME HEALTH SERVICES | Facility: HOME HEALTH | Age: 65
End: 2025-03-14
Payer: COMMERCIAL

## 2025-03-14 VITALS
RESPIRATION RATE: 18 BRPM | DIASTOLIC BLOOD PRESSURE: 70 MMHG | TEMPERATURE: 98 F | HEART RATE: 88 BPM | OXYGEN SATURATION: 98 % | SYSTOLIC BLOOD PRESSURE: 117 MMHG

## 2025-03-14 PROCEDURE — G0300 HHS/HOSPICE OF LPN EA 15 MIN: HCPCS

## 2025-03-14 ASSESSMENT — ENCOUNTER SYMPTOMS
MUSCLE WEAKNESS: 1
LAST BOWEL MOVEMENT: 67277
APPETITE LEVEL: GOOD
CHANGE IN APPETITE: UNCHANGED
SUBJECTIVE PAIN PROGRESSION: UNCHANGED
DENIES PAIN: 1
PERSON REPORTING PAIN: PATIENT
BOWEL PATTERN NORMAL: 1
HYPERTENSION: 1
OCCASIONAL FEELINGS OF UNSTEADINESS: 0

## 2025-03-17 ENCOUNTER — HOME CARE VISIT (OUTPATIENT)
Dept: HOME HEALTH SERVICES | Facility: HOME HEALTH | Age: 65
End: 2025-03-17
Payer: COMMERCIAL

## 2025-03-17 VITALS
OXYGEN SATURATION: 99 % | RESPIRATION RATE: 18 BRPM | HEART RATE: 71 BPM | TEMPERATURE: 98 F | SYSTOLIC BLOOD PRESSURE: 131 MMHG | DIASTOLIC BLOOD PRESSURE: 81 MMHG

## 2025-03-17 PROCEDURE — G0300 HHS/HOSPICE OF LPN EA 15 MIN: HCPCS

## 2025-03-17 ASSESSMENT — ENCOUNTER SYMPTOMS
HYPERTENSION: 1
OCCASIONAL FEELINGS OF UNSTEADINESS: 0
LAST BOWEL MOVEMENT: 67280
DENIES PAIN: 1
PERSON REPORTING PAIN: PATIENT
CHANGE IN APPETITE: UNCHANGED
MUSCLE WEAKNESS: 1
APPETITE LEVEL: GOOD
BOWEL PATTERN NORMAL: 1
SUBJECTIVE PAIN PROGRESSION: UNCHANGED

## 2025-03-19 ENCOUNTER — HOME CARE VISIT (OUTPATIENT)
Dept: HOME HEALTH SERVICES | Facility: HOME HEALTH | Age: 65
End: 2025-03-19
Payer: COMMERCIAL

## 2025-03-19 VITALS
SYSTOLIC BLOOD PRESSURE: 132 MMHG | HEART RATE: 93 BPM | TEMPERATURE: 98.1 F | OXYGEN SATURATION: 98 % | RESPIRATION RATE: 17 BRPM | DIASTOLIC BLOOD PRESSURE: 67 MMHG

## 2025-03-19 PROCEDURE — G0300 HHS/HOSPICE OF LPN EA 15 MIN: HCPCS

## 2025-03-19 ASSESSMENT — ENCOUNTER SYMPTOMS
DENIES PAIN: 1
APPETITE LEVEL: GOOD
CHANGE IN APPETITE: UNCHANGED
PERSON REPORTING PAIN: PATIENT
OCCASIONAL FEELINGS OF UNSTEADINESS: 0
MUSCLE WEAKNESS: 1
BOWEL PATTERN NORMAL: 1
HYPERTENSION: 1
LAST BOWEL MOVEMENT: 67281
SUBJECTIVE PAIN PROGRESSION: UNCHANGED

## 2025-03-21 ENCOUNTER — HOME CARE VISIT (OUTPATIENT)
Dept: HOME HEALTH SERVICES | Facility: HOME HEALTH | Age: 65
End: 2025-03-21
Payer: COMMERCIAL

## 2025-03-21 VITALS
RESPIRATION RATE: 16 BRPM | OXYGEN SATURATION: 98 % | SYSTOLIC BLOOD PRESSURE: 104 MMHG | HEART RATE: 85 BPM | DIASTOLIC BLOOD PRESSURE: 54 MMHG | TEMPERATURE: 97.8 F

## 2025-03-21 PROCEDURE — G0300 HHS/HOSPICE OF LPN EA 15 MIN: HCPCS

## 2025-03-21 ASSESSMENT — ENCOUNTER SYMPTOMS
CHANGE IN APPETITE: UNCHANGED
HYPERTENSION: 1
MUSCLE WEAKNESS: 1
APPETITE LEVEL: GOOD
DENIES PAIN: 1
BOWEL PATTERN NORMAL: 1
SUBJECTIVE PAIN PROGRESSION: UNCHANGED
PERSON REPORTING PAIN: PATIENT
LAST BOWEL MOVEMENT: 67283
OCCASIONAL FEELINGS OF UNSTEADINESS: 0

## 2025-03-24 ENCOUNTER — HOME CARE VISIT (OUTPATIENT)
Dept: HOME HEALTH SERVICES | Facility: HOME HEALTH | Age: 65
End: 2025-03-24
Payer: COMMERCIAL

## 2025-03-24 VITALS
RESPIRATION RATE: 16 BRPM | DIASTOLIC BLOOD PRESSURE: 60 MMHG | SYSTOLIC BLOOD PRESSURE: 117 MMHG | OXYGEN SATURATION: 99 % | HEART RATE: 88 BPM | TEMPERATURE: 97.5 F

## 2025-03-24 PROCEDURE — G0300 HHS/HOSPICE OF LPN EA 15 MIN: HCPCS

## 2025-03-24 ASSESSMENT — ENCOUNTER SYMPTOMS
MUSCLE WEAKNESS: 1
BOWEL PATTERN NORMAL: 1
APPETITE LEVEL: GOOD
PERSON REPORTING PAIN: PATIENT
DENIES PAIN: 1
HYPERTENSION: 1
OCCASIONAL FEELINGS OF UNSTEADINESS: 0
LAST BOWEL MOVEMENT: 67288

## 2025-03-26 ENCOUNTER — HOME CARE VISIT (OUTPATIENT)
Dept: HOME HEALTH SERVICES | Facility: HOME HEALTH | Age: 65
End: 2025-03-26
Payer: COMMERCIAL

## 2025-03-26 VITALS
HEART RATE: 88 BPM | SYSTOLIC BLOOD PRESSURE: 101 MMHG | RESPIRATION RATE: 16 BRPM | TEMPERATURE: 97.8 F | DIASTOLIC BLOOD PRESSURE: 68 MMHG | OXYGEN SATURATION: 94 %

## 2025-03-26 PROCEDURE — G0300 HHS/HOSPICE OF LPN EA 15 MIN: HCPCS

## 2025-03-26 ASSESSMENT — ENCOUNTER SYMPTOMS
PERSON REPORTING PAIN: PATIENT
HYPERTENSION: 1
OCCASIONAL FEELINGS OF UNSTEADINESS: 0
BOWEL PATTERN NORMAL: 1
APPETITE LEVEL: GOOD
CHANGE IN APPETITE: UNCHANGED
LAST BOWEL MOVEMENT: 67290
DENIES PAIN: 1
MUSCLE WEAKNESS: 1

## 2025-03-27 DIAGNOSIS — I50.32 CHRONIC HEART FAILURE WITH PRESERVED EJECTION FRACTION: ICD-10-CM

## 2025-03-27 DIAGNOSIS — I25.118 CORONARY ARTERY DISEASE OF NATIVE HEART WITH STABLE ANGINA PECTORIS, UNSPECIFIED VESSEL OR LESION TYPE: ICD-10-CM

## 2025-03-27 RX ORDER — ISOSORBIDE MONONITRATE 60 MG/1
60 TABLET, EXTENDED RELEASE ORAL
Qty: 90 TABLET | Refills: 1 | Status: SHIPPED | OUTPATIENT
Start: 2025-03-27

## 2025-03-28 ENCOUNTER — HOME CARE VISIT (OUTPATIENT)
Dept: HOME HEALTH SERVICES | Facility: HOME HEALTH | Age: 65
End: 2025-03-28
Payer: COMMERCIAL

## 2025-03-28 VITALS
HEART RATE: 91 BPM | OXYGEN SATURATION: 99 % | DIASTOLIC BLOOD PRESSURE: 69 MMHG | TEMPERATURE: 97.6 F | SYSTOLIC BLOOD PRESSURE: 129 MMHG | RESPIRATION RATE: 16 BRPM

## 2025-03-28 PROCEDURE — G0300 HHS/HOSPICE OF LPN EA 15 MIN: HCPCS

## 2025-03-28 ASSESSMENT — ENCOUNTER SYMPTOMS
APPETITE LEVEL: GOOD
LAST BOWEL MOVEMENT: 67292
MUSCLE WEAKNESS: 1
HYPERTENSION: 1
OCCASIONAL FEELINGS OF UNSTEADINESS: 0
CHANGE IN APPETITE: UNCHANGED
PERSON REPORTING PAIN: PATIENT
BOWEL PATTERN NORMAL: 1
DENIES PAIN: 1

## 2025-03-31 ENCOUNTER — HOME CARE VISIT (OUTPATIENT)
Dept: HOME HEALTH SERVICES | Facility: HOME HEALTH | Age: 65
End: 2025-03-31
Payer: COMMERCIAL

## 2025-03-31 VITALS
DIASTOLIC BLOOD PRESSURE: 70 MMHG | OXYGEN SATURATION: 99 % | TEMPERATURE: 97.4 F | HEART RATE: 93 BPM | SYSTOLIC BLOOD PRESSURE: 129 MMHG | RESPIRATION RATE: 16 BRPM

## 2025-03-31 PROCEDURE — G0300 HHS/HOSPICE OF LPN EA 15 MIN: HCPCS

## 2025-03-31 ASSESSMENT — ENCOUNTER SYMPTOMS
BOWEL PATTERN NORMAL: 1
LAST BOWEL MOVEMENT: 67295
OCCASIONAL FEELINGS OF UNSTEADINESS: 0
APPETITE LEVEL: GOOD
CHANGE IN APPETITE: UNCHANGED
MUSCLE WEAKNESS: 1
HYPERTENSION: 1
DENIES PAIN: 1

## 2025-04-01 ENCOUNTER — TELEPHONE (OUTPATIENT)
Facility: HOSPITAL | Age: 65
End: 2025-04-01
Payer: COMMERCIAL

## 2025-04-01 DIAGNOSIS — E11.65 UNCONTROLLED TYPE 2 DIABETES MELLITUS WITH HYPERGLYCEMIA: ICD-10-CM

## 2025-04-01 RX ORDER — FLASH GLUCOSE SENSOR
KIT MISCELLANEOUS
Qty: 2 EACH | Refills: 11 | Status: SHIPPED | OUTPATIENT
Start: 2025-04-01

## 2025-04-02 ENCOUNTER — APPOINTMENT (OUTPATIENT)
Dept: PHARMACY | Facility: HOSPITAL | Age: 65
End: 2025-04-02
Payer: COMMERCIAL

## 2025-04-04 ENCOUNTER — TELEPHONE (OUTPATIENT)
Facility: HOSPITAL | Age: 65
End: 2025-04-04
Payer: COMMERCIAL

## 2025-04-04 ENCOUNTER — HOME CARE VISIT (OUTPATIENT)
Dept: HOME HEALTH SERVICES | Facility: HOME HEALTH | Age: 65
End: 2025-04-04
Payer: COMMERCIAL

## 2025-04-04 VITALS
TEMPERATURE: 97.8 F | SYSTOLIC BLOOD PRESSURE: 142 MMHG | RESPIRATION RATE: 16 BRPM | DIASTOLIC BLOOD PRESSURE: 86 MMHG | OXYGEN SATURATION: 98 % | HEART RATE: 88 BPM

## 2025-04-04 PROCEDURE — G0300 HHS/HOSPICE OF LPN EA 15 MIN: HCPCS

## 2025-04-04 ASSESSMENT — ENCOUNTER SYMPTOMS
MUSCLE WEAKNESS: 1
CHANGE IN APPETITE: UNCHANGED
HYPERTENSION: 1
DENIES PAIN: 1
APPETITE LEVEL: GOOD
OCCASIONAL FEELINGS OF UNSTEADINESS: 0
LAST BOWEL MOVEMENT: 67299
SUBJECTIVE PAIN PROGRESSION: UNCHANGED
BOWEL PATTERN NORMAL: 1
PERSON REPORTING PAIN: PATIENT

## 2025-04-04 NOTE — TELEPHONE ENCOUNTER
Received a message from home care nurse Adrienne More informing me that patient's wound seems to be nonhealing with serosanguineous moderate drainage causing irritation to periwound area.  Called patient's to obtain more information.  Patient reports that since she was seen in the hospital 3/7 she has been having stable abdominal pain and has not gotten worse recently.  She did report to me that the drainage from her navel has never stopped since early April and might have increased slightly in the past few days although denies foul-smelling odor, fever, nausea, vomiting.  Informed patient that it is very important to schedule follow-up with OB/GYN given that her wounds continues to drain however she does not seem to have red flag symptoms at this time.  Informed patient that I will also communicate with Dr. Valenzuela about this.  Patient agreeable to make an appointment with Dr. Valenzuela.  Gave patient's strict return precautions to go to the ED if excruciating abdominal pain, fever, foul or dark-colored drainage, nausea or vomiting.  Patient also informed me of a separate, plane which includes upper back pain below her shoulders not relieved by Tylenol.  Advised patient that I will send muscle relaxer to see if that helps.

## 2025-04-07 ENCOUNTER — HOME CARE VISIT (OUTPATIENT)
Dept: HOME HEALTH SERVICES | Facility: HOME HEALTH | Age: 65
End: 2025-04-07
Payer: COMMERCIAL

## 2025-04-07 ENCOUNTER — TELEPHONE (OUTPATIENT)
Dept: CARDIOLOGY | Facility: HOSPITAL | Age: 65
End: 2025-04-07
Payer: COMMERCIAL

## 2025-04-07 DIAGNOSIS — Z86.39 HISTORY OF UNCONTROLLED DIABETES: ICD-10-CM

## 2025-04-07 DIAGNOSIS — F33.40 RECURRENT MAJOR DEPRESSIVE DISORDER, IN REMISSION (CMS-HCC): Chronic | ICD-10-CM

## 2025-04-07 PROCEDURE — RXMED WILLOW AMBULATORY MEDICATION CHARGE

## 2025-04-07 NOTE — TELEPHONE ENCOUNTER
Pt called stating she has no refills left for Ozempic.  I do not see this in her med list, but it is in last chart note from Dr. Hilario.   Can you please process refill being sent to [facility]     4.11.25  pt called again asking for refill to be sent to [facility].  Please process.  Pt is out of medication.

## 2025-04-08 ENCOUNTER — PHARMACY VISIT (OUTPATIENT)
Dept: PHARMACY | Facility: CLINIC | Age: 65
End: 2025-04-08
Payer: COMMERCIAL

## 2025-04-08 ENCOUNTER — APPOINTMENT (OUTPATIENT)
Dept: CARDIOLOGY | Facility: HOSPITAL | Age: 65
End: 2025-04-08
Payer: COMMERCIAL

## 2025-04-10 ENCOUNTER — HOME CARE VISIT (OUTPATIENT)
Dept: HOME HEALTH SERVICES | Facility: HOME HEALTH | Age: 65
End: 2025-04-10
Payer: COMMERCIAL

## 2025-04-10 VITALS
TEMPERATURE: 97.8 F | RESPIRATION RATE: 16 BRPM | OXYGEN SATURATION: 99 % | HEART RATE: 97 BPM | SYSTOLIC BLOOD PRESSURE: 160 MMHG | DIASTOLIC BLOOD PRESSURE: 69 MMHG

## 2025-04-10 PROCEDURE — G0300 HHS/HOSPICE OF LPN EA 15 MIN: HCPCS

## 2025-04-10 RX ORDER — DULOXETIN HYDROCHLORIDE 30 MG/1
CAPSULE, DELAYED RELEASE ORAL
Qty: 90 CAPSULE | Refills: 1 | Status: SHIPPED | OUTPATIENT
Start: 2025-04-10 | End: 2025-12-06

## 2025-04-10 ASSESSMENT — ENCOUNTER SYMPTOMS
SUBJECTIVE PAIN PROGRESSION: UNCHANGED
HYPERTENSION: 1
CHANGE IN APPETITE: UNCHANGED
OCCASIONAL FEELINGS OF UNSTEADINESS: 0
DENIES PAIN: 1
BOWEL PATTERN NORMAL: 1
APPETITE LEVEL: GOOD
LAST BOWEL MOVEMENT: 67304
PERSON REPORTING PAIN: PATIENT
MUSCLE WEAKNESS: 1

## 2025-04-11 DIAGNOSIS — E11.9 CONTROLLED TYPE 2 DIABETES MELLITUS WITHOUT COMPLICATION, WITHOUT LONG-TERM CURRENT USE OF INSULIN: Primary | ICD-10-CM

## 2025-04-14 ENCOUNTER — APPOINTMENT (OUTPATIENT)
Dept: NEPHROLOGY | Facility: CLINIC | Age: 65
End: 2025-04-14
Payer: COMMERCIAL

## 2025-04-14 PROCEDURE — RXMED WILLOW AMBULATORY MEDICATION CHARGE

## 2025-04-15 ENCOUNTER — OFFICE VISIT (OUTPATIENT)
Dept: CARDIOLOGY | Facility: HOSPITAL | Age: 65
End: 2025-04-15
Payer: COMMERCIAL

## 2025-04-15 VITALS
SYSTOLIC BLOOD PRESSURE: 103 MMHG | DIASTOLIC BLOOD PRESSURE: 66 MMHG | BODY MASS INDEX: 32.86 KG/M2 | HEART RATE: 98 BPM | WEIGHT: 163 LBS | HEIGHT: 59 IN | RESPIRATION RATE: 16 BRPM

## 2025-04-15 DIAGNOSIS — I82.210 SUPERIOR VENA CAVA THROMBOSIS (MULTI): Primary | ICD-10-CM

## 2025-04-15 PROCEDURE — 99215 OFFICE O/P EST HI 40 MIN: CPT | Performed by: INTERNAL MEDICINE

## 2025-04-15 PROCEDURE — 1125F AMNT PAIN NOTED PAIN PRSNT: CPT | Performed by: INTERNAL MEDICINE

## 2025-04-15 PROCEDURE — 3060F POS MICROALBUMINURIA REV: CPT | Performed by: INTERNAL MEDICINE

## 2025-04-15 PROCEDURE — G2211 COMPLEX E/M VISIT ADD ON: HCPCS | Performed by: INTERNAL MEDICINE

## 2025-04-15 PROCEDURE — 4010F ACE/ARB THERAPY RXD/TAKEN: CPT | Performed by: INTERNAL MEDICINE

## 2025-04-15 PROCEDURE — 3078F DIAST BP <80 MM HG: CPT | Performed by: INTERNAL MEDICINE

## 2025-04-15 PROCEDURE — 3074F SYST BP LT 130 MM HG: CPT | Performed by: INTERNAL MEDICINE

## 2025-04-15 PROCEDURE — 1160F RVW MEDS BY RX/DR IN RCRD: CPT | Performed by: INTERNAL MEDICINE

## 2025-04-15 PROCEDURE — 1159F MED LIST DOCD IN RCRD: CPT | Performed by: INTERNAL MEDICINE

## 2025-04-15 PROCEDURE — 1036F TOBACCO NON-USER: CPT | Performed by: INTERNAL MEDICINE

## 2025-04-15 PROCEDURE — 3008F BODY MASS INDEX DOCD: CPT | Performed by: INTERNAL MEDICINE

## 2025-04-15 ASSESSMENT — PATIENT HEALTH QUESTIONNAIRE - PHQ9
SUM OF ALL RESPONSES TO PHQ9 QUESTIONS 1 AND 2: 0
2. FEELING DOWN, DEPRESSED OR HOPELESS: NOT AT ALL
1. LITTLE INTEREST OR PLEASURE IN DOING THINGS: NOT AT ALL

## 2025-04-15 ASSESSMENT — PAIN SCALES - GENERAL: PAINLEVEL_OUTOF10: 6

## 2025-04-15 NOTE — PATIENT INSTRUCTIONS
ASSESSMENT/PLAN:    here for follow up AC related to SVC thrombus - discussed based on CT imaging there has been resolution of the SVC thrombus. Unclear etiology - prev APLA testing WNL 1/2023. Discussed the intent was to use the lovenox short term and follow the imaging. On imaging - the thrombus has cleared as noted. Will hold the Ac now and follow clinically. Follow up 6 weeks.

## 2025-04-15 NOTE — PROGRESS NOTES
"OUTPATIENT FOLLOW-UP -  VASCULAR MEDICINE    DOS: 4/15/25  Last seen:   3/31/23     REQUESTING PHYSICIAN:  Dr. Betty Boone    REASON FOR FOLLOW-UP:  here for follow up AC related to SVC thrombus    HISTORY OF PRESENT ILLNESS:     64 yo lady here for follow up AC related to SVC thrombus.     From prev notes:   From available records [place] [facility] [place] for ABD pain. On chest imaging noted to have SVC thrombus. Seen by Dr. Morton at that time. Follow up with Dr. Tamayo but was a no show and scheduled here now. From the prev consult note admitted for ABD pain, N, V, dysphagia and chest discomfort. Rx UFH gtt initially and then DC on eliquis. Remains on this now. Denies bleeding related to the AC. Has a med-alert bracelet but not wearing.      At the time of admission thrombus noted on CT and confirmed by [facility]. She reports prev DVT 1982 - describes this as \"phlebitis\". Does not recall using blood thinners.      2020 had PICC for ABX with ?OM.    [place] [facility] [place]ena and vaginal spotting. Dc esophagitis. Eliquis held at that time and changed to lovenox 40 mg daily.  She has remained on this since August 2024.    In the interim has had multiple admissions:  9/2024 - [facility]   10/2024 - chest pain  1/2025 ex lap/hysterectomy c/b acute peritoneal bleeding. GIBSON-BSO on 1/23 complicated by take-back on POD0 for abdominal washout     2/2025 - abdominal wall fluid collections/ incisional drainage    Denies blood in the urine or stool. No reported bleeding related to the lovenox. Denies injection site bruising.     REVIEW OF SYSTEMS:     weight is decreased on ozempic 205-163  No sores, ulcers, rashes, +skin lesions  + CP last night - happens occasionally, chest pressure  No cough,  SOB  +WEST  No BRBPR, melena, hematuria  No bleeding  + edema, no calf pain    PHYSICAL EXAMINATION:   /66 (BP Location: Left arm, Patient Position: Sitting)   Pulse 98   Resp 16   Ht 1.499 m (4' 11\")   Wt 73.9 kg " (163 lb)   LMP  (LMP Unknown)   BMI 32.92 kg/m²     Gen: Appears well, NAD  Chest: CTA  CVS: regular without murmur or gallop  Abd: soft, NT/ND,  open incisional wound  Ext: no edema, nontender  Skin: +lesions - scaring, few open ulcers legs  Pulses: DP 2+; PT 2+  Mood and affect appropriate    ADDITIONAL DATA:   no compression worn  right calf: 34.0cm   left calf:  33.0cm     Reviewed available CT from 8137-4197 - resolution of the SVC thrombus - ?related to the azygous      ASSESSMENT/PLAN:    here for follow up AC related to SVC thrombus - discussed based on CT imaging there has been resolution of the SVC thrombus. Unclear etiology - prev APLA testing WNL 1/2023. Discussed the intent was to use the lovenox short term and follow the imaging. On imaging - the thrombus has cleared as noted. Will hold the Ac now and follow clinically. Follow up 6 weeks.     Keep the follow up with Dr. Nicolas.

## 2025-04-16 ENCOUNTER — HOME CARE VISIT (OUTPATIENT)
Dept: HOME HEALTH SERVICES | Facility: HOME HEALTH | Age: 65
End: 2025-04-16
Payer: COMMERCIAL

## 2025-04-16 VITALS
TEMPERATURE: 97.3 F | DIASTOLIC BLOOD PRESSURE: 76 MMHG | HEART RATE: 93 BPM | OXYGEN SATURATION: 98 % | SYSTOLIC BLOOD PRESSURE: 139 MMHG

## 2025-04-16 PROCEDURE — G0299 HHS/HOSPICE OF RN EA 15 MIN: HCPCS

## 2025-04-16 ASSESSMENT — ACTIVITIES OF DAILY LIVING (ADL)
OASIS_M1830: 00
HOME_HEALTH_OASIS: 01

## 2025-04-16 ASSESSMENT — ENCOUNTER SYMPTOMS
PERSON REPORTING PAIN: PATIENT
DENIES PAIN: 1

## 2025-04-17 ENCOUNTER — PHARMACY VISIT (OUTPATIENT)
Dept: PHARMACY | Facility: CLINIC | Age: 65
End: 2025-04-17
Payer: COMMERCIAL

## 2025-04-17 ENCOUNTER — OFFICE VISIT (OUTPATIENT)
Dept: NEPHROLOGY | Facility: CLINIC | Age: 65
End: 2025-04-17
Payer: COMMERCIAL

## 2025-04-17 VITALS
BODY MASS INDEX: 32.46 KG/M2 | SYSTOLIC BLOOD PRESSURE: 74 MMHG | DIASTOLIC BLOOD PRESSURE: 51 MMHG | HEIGHT: 59 IN | HEART RATE: 102 BPM | WEIGHT: 161 LBS

## 2025-04-17 DIAGNOSIS — I50.30: Primary | ICD-10-CM

## 2025-04-17 DIAGNOSIS — I13.0: Primary | ICD-10-CM

## 2025-04-17 DIAGNOSIS — N18.30: Primary | ICD-10-CM

## 2025-04-17 PROCEDURE — 3078F DIAST BP <80 MM HG: CPT | Performed by: INTERNAL MEDICINE

## 2025-04-17 PROCEDURE — 99204 OFFICE O/P NEW MOD 45 MIN: CPT | Performed by: INTERNAL MEDICINE

## 2025-04-17 PROCEDURE — 3008F BODY MASS INDEX DOCD: CPT | Performed by: INTERNAL MEDICINE

## 2025-04-17 PROCEDURE — 4010F ACE/ARB THERAPY RXD/TAKEN: CPT | Performed by: INTERNAL MEDICINE

## 2025-04-17 PROCEDURE — 3074F SYST BP LT 130 MM HG: CPT | Performed by: INTERNAL MEDICINE

## 2025-04-17 PROCEDURE — 1126F AMNT PAIN NOTED NONE PRSNT: CPT | Performed by: INTERNAL MEDICINE

## 2025-04-17 PROCEDURE — 3060F POS MICROALBUMINURIA REV: CPT | Performed by: INTERNAL MEDICINE

## 2025-04-17 ASSESSMENT — PAIN SCALES - GENERAL: PAINLEVEL_OUTOF10: 0-NO PAIN

## 2025-04-17 NOTE — PROGRESS NOTES
Patient ID: Nelida Mata is a 65 y.o. female who is seen in nephrology clinic for elevated serum creatinine.  Review of blood work shows longstanding ckd with baseline creatinnine around 1.2-14 mg/dL since 2018.  There have been multiple episodes of CLARIBEL.  Most recent blood work from early march with serum cratinnine at baseline, no electrolyte abnormalities.  Urine studies with no proteinuria or hematuria    Has longstanding history of DM2,  also with SVC thrombus, HFpEF, CAD s/p stent placement, hx of MI, stroke.      Home bp's are well controlled and last A1c was 7.1%.  pt reports was taken off losartan and farxiga recently but remains on glp1 agonist and finerenone.  Most recent echo with hyperdynamic EF but reduced right sided ventriclar systolic function.     Review of systems negative unless noted in HPI      Patient Active Problem List   Diagnosis    Wound of right foot    Type II or unspecified type diabetes mellitus with renal manifestations, uncontrolled(250.42) (Multi)    History of uncontrolled diabetes    (HFpEF) heart failure with preserved ejection fraction    Abdominal hernia with obstruction and without gangrene    Abnormal uterine bleeding    Anxiety    Asymptomatic hypertensive urgency    Atypical face pain    Bariatric surgery status    Bilateral edema of lower extremity    CAD (coronary artery disease)    Chest pain at rest    CHF, chronic (Multi)    Chronic, continuous use of opioids    Colon polyps    Cough, persistent    Cramp and spasm    Diabetic leg ulcer (Multi)    Difficulty speaking    Discoloration of skin of lower leg    Dystrophia unguium    Dysuria    Elevated parathyroid hormone    Pelvic pain in female    Gastroparesis    GERD (gastroesophageal reflux disease)    Fibromyalgia    Headache    Pain syndrome, chronic    History of anemia    Hydroureter on left    Hypokalemia    Idiopathic peripheral neuropathy    Insomnia    Knee pain, bilateral    Lateral knee pain, left     Lichen simplex chronicus    Lipoma    Localized edema    Loose stools    Lower back pain    Lower urinary tract infectious disease    Microalbuminuria    Multiple falls    Myopia of both eyes with astigmatism and presbyopia    NAFLD (nonalcoholic fatty liver disease)    NSTEMI, initial episode of care (Multi)    Obesity, Class II, BMI 35-39.9    Other constipation    Hypertension, uncontrolled    Peripheral vascular disease (CMS-HCC)    Polyarthritis    Postmenopausal bleeding    Presence of drug coated stent in right coronary artery    Primary open angle glaucoma of both eyes, mild stage    Neurodermatitis    Psoriasis    Vaginitis    Retinal detachment, tractional, right eye    Retinal hemorrhage of right eye    S/P PTCA (percutaneous transluminal coronary angioplasty)    Dyspnea on exertion    Shortness of breath    Skin tag of labia    Obstructive sleep apnea    Thyroid nodule    Trichimoniasis    Unstable angina (Multi)    Fibroid uterus    Leiomyoma of uterus    Uterine fibroid    Vaginal candida    Vitamin D deficiency, unspecified    Weight loss, unintentional    Wound of left leg    Wound of right leg    Xerosis of skin    Diabetes mellitus type 2, controlled, without complications    Hypoglycemia    Other hyperlipidemia    Osteoarthritis    Acquired absence of other right toe(s) (Multi)    Hypertensive heart disease with heart failure    Major depressive disorder, single episode    Abnormal gynecological examination    Type 2 diabetes mellitus with hyperglycemia, with long-term current use of insulin    Superior vena cava thrombosis (Multi)    History of DVT (deep vein thrombosis)    Recurrent major depressive disorder, in remission (CMS-HCC)    S/P coronary artery stent placement    Uncontrolled type 2 diabetes mellitus with hyperglycemia    NSTEMI (non-ST elevated myocardial infarction) (Multi)    Gastrointestinal hemorrhage, unspecified gastrointestinal hemorrhage type    CVA (cerebral vascular  accident) (Multi)    DVT (deep venous thrombosis) (Multi)    Iron deficiency anemia    GI bleed    CLARIBEL (acute kidney injury)    Other chest pain    Hemoperitoneum    Abdominal wall fluid collections    Type 2 diabetes mellitus with hypoglycemia        Family History[1]    Social History     Tobacco Use    Smoking status: Never     Passive exposure: Never    Smokeless tobacco: Never   Substance Use Topics    Alcohol use: Not Currently     Comment: 1-2 times a week             Current Outpatient Medications:     acetaminophen (Tylenol) 500 mg tablet, Take 2 tablets (1,000 mg) by mouth every 6 hours if needed for mild pain (1 - 3)., Disp: 100 tablet, Rfl: 2    aspirin 81 mg EC tablet, Take 1 tablet (81 mg) by mouth once daily., Disp: 30 tablet, Rfl: 11    atorvastatin (Lipitor) 80 mg tablet, 1 TAB(S) ORALLY ONCE A DAY -.MEDS TO BEDS, Disp: 90 tablet, Rfl: 1    blood pressure monitor kit, 1 each once daily. Use once daily to check blood pressure, Disp: 1 kit, Rfl: 0    blood sugar diagnostic (OneTouch Ultra Test) strip, USE 1 STRIP(S) TO TEST BLOOD SUGAR 3 TIMES A DAY, Disp: 100 strip, Rfl: 3    brimonidine (AlphaGAN) 0.2 % ophthalmic solution, Administer 1 drop into both eyes 2 times a day. PATIENT NEEDS PCP APPT FOR FURTHER REFILLS, Disp: 30 mL, Rfl: 3    carvedilol (Coreg) 12.5 mg tablet, Take 1 tablet (12.5 mg) by mouth 2 times daily (morning and late afternoon)., Disp: 60 tablet, Rfl: 11    cholecalciferol (Vitamin D3) 10 MCG (400 UNIT) tablet, Take 1 tablet (10 mcg) by mouth once daily., Disp: 30 tablet, Rfl: 11    diclofenac sodium (Voltaren) 1 % gel, Apply 4.5 inches (4 g) topically 2 times a day., Disp: 800 g, Rfl: 2    diphenhydramine/Maalox/lidocaine 1:1:1, Swish and swallow 10 mL every 8 hours as needed for epigastric pain, Disp: 120 mL, Rfl: 3    DULoxetine (Cymbalta) 30 mg DR capsule, TAKE 1 CAPSULE (30 MG) BY MOUTH ONCE DAILY IN THE MORNING AND 2 CAPSULES (60 MG) ONCE DAILY AT BEDTIME. DO NOT CRUSH OR  CHEW.., Disp: 90 capsule, Rfl: 1    enoxaparin (Lovenox) 40 mg/0.4 mL syringe, Inject 0.4ml under the skin once daily., Disp: 12 mL, Rfl: 3    finerenone (Kerendia) 10 mg tablet tablet, Take 1 tablet (10 mg) by mouth once daily., Disp: 90 tablet, Rfl: 3    FreeStyle Filipe 2 Sensor kit, Apply 1 sensor to the back of the upper arm. Change sensor every 14 days., Disp: 2 each, Rfl: 11    FreeStyle Filipe reader (FreeStyle Filipe 2 [place]) misc, Use as instructed, Disp: 1 each, Rfl: 0    furosemide (Lasix) 20 mg tablet, Take 1 tablet (20 mg) by mouth once daily., Disp: 90 tablet, Rfl: 2    glucagon 3 mg/actuation spray,non-aerosol, Administer 1 spray into affected nostril(s) if needed (please  apply into a single nostril for blood sugar <60)., Disp: 2 each, Rfl: 0    hyoscyamine (Anaspaz) 0.125 mg disintegrating tablet, Take 1 tablet (0.125 mg) by mouth every 8 hours if needed (cramping or nausea)., Disp: 30 tablet, Rfl: 0    insulin degludec (Tresiba FlexTouch U-100) 100 unit/mL (3 mL) pen, Inject 12 Units under the skin once daily at bedtime. Take as directed per insulin instructions., Disp: 15 mL, Rfl: 3    insulin lispro (HumaLOG) 100 unit/mL pen, Inject 4 Units under the skin 3 times daily (morning, midday, late afternoon). Take as directed per insulin instructions., Disp: 15 mL, Rfl: 3    isosorbide mononitrate ER (Imdur) 60 mg 24 hr tablet, TAKE 1 TABLET BY MOUTH EVERY DAY IN THE MORNING, Disp: 90 tablet, Rfl: 1    latanoprost (Xalatan) 0.005 % ophthalmic solution, Administer 1 drop into both eyes once daily at bedtime., Disp: 2.5 mL, Rfl: 3    melatonin 5 mg tablet, Take 1 tablet (5 mg) by mouth as needed at bedtime for sleep., Disp: 30 tablet, Rfl: 11    multivitamin tablet, Take 1 tablet by mouth once daily., Disp: 30 tablet, Rfl: 11    nitroglycerin (Nitrostat) 0.4 mg SL tablet, PLACE 1 TABLET UNDER THE TONGUE EVERY 5 MINUTES FOR UP TO 3 DOSES AS NEEDED FOR CHEST PAIN.CALL 911 IF PAIN PERSISTS., Disp: , Rfl:      nystatin (Mycostatin) 100,000 unit/gram powder, Apply topically., Disp: , Rfl:     pantoprazole (ProtoNix) 40 mg EC tablet, TAKE 1 TABLET (40 MG) BY MOUTH ONCE DAILY IN THE MORNING. TAKE BEFORE MEALS., Disp: 90 tablet, Rfl: 1    semaglutide 2 mg/dose (8 mg/3 mL) pen injector, Inject 2 mg under the skin 1 (one) time per week., Disp: 3 mL, Rfl: 11    sennosides-docusate sodium (Zoila-Colace) 8.6-50 mg tablet, Take 2 tablets by mouth 2 times a day. While taking opiod medication then as needed for stool softener., Disp: 240 tablet, Rfl: 0    simethicone (Mylicon) 80 mg chewable tablet, Chew 1 tablet (80 mg) 4 times a day as needed (gas pain)., Disp: , Rfl:     urea (Carmol) 20 % cream, Apply 1 Application topically 2 times a day., Disp: , Rfl:     dapagliflozin propanediol (Farxiga) 10 mg, Take 1 tablet (10 mg) by mouth once daily. (Patient not taking: Reported on 4/17/2025), Disp: 30 tablet, Rfl: 11    losartan (Cozaar) 50 mg tablet, Take 1 tablet (50 mg) by mouth early in the morning.. (Patient not taking: Reported on 4/17/2025), Disp: , Rfl:     naloxone (Narcan) 4 mg/0.1 mL nasal spray, Administer 1 spray (4 mg) into affected nostril(s) if needed for opioid reversal or respiratory depression. May repeat every 2-3 minutes if needed, alternating nostrils, until medical assistance becomes available. (Patient not taking: Reported on 4/17/2025), Disp: 2 each, Rfl: 1       Vitals:    04/17/25 0838   BP: 74/51   Pulse: 102        Repeat bp was 111/58    Physical Exam  Gen alert, pleasant  Heart RRR, no murmurs or rubs  Lungs clear bilaterally  Abd soft, no bruits noted  Ext no lower extremity edema bilaterally, normal pulses  Skin no bruises or rashes on visible skin surfaces  Neuro no asterixis, no focal deficits  Psych normal affect      Lab Results   Component Value Date    WBC 11.6 (H) 03/06/2025    HGB 13.4 03/06/2025    HCT 40.6 03/06/2025    MCV 90 03/06/2025     03/06/2025      Lab Results   Component Value  Date    GLUCOSE 187 (H) 03/06/2025    CALCIUM 11.1 (H) 03/06/2025     03/06/2025    K 4.2 03/06/2025    CO2 28 03/06/2025    CL 98 03/06/2025    BUN 18 03/06/2025    CREATININE 1.32 (H) 03/06/2025      Lab Results   Component Value Date    MICROALBCREA 16.6 03/15/2024        Lab Results   Component Value Date    PTH 61.5 03/05/2021      CT scan 1/2025 - unremarkable imaginng of kidneys, ureter and bladder          Assessment/Plan   CKD 3a - no proteinuria noted on urine studies.  Multiple episodes of CLARIBEL in the past.  Etiology of CKD is multifactorial and related to htn, dm, vascular disease.  On an MRA and glp1 agonist.  Was previously on sglt2i but has been on hold as is losartan.  There are no electrolyte abnormalities.      Will defer to cardiology  regarding restarting farxiga, ok from a renal standpoint to resume    Htn - home bp's on the low side of normal.      Rtc in 6 months       [1]   Family History  Problem Relation Name Age of Onset    Diabetes Mother      Glaucoma Father      Hypertension Other FAMILY     Uterine cancer Neg Hx      Ovarian cancer Neg Hx      Colon cancer Neg Hx      Breast cancer Neg Hx

## 2025-04-18 LAB
ALBUMIN SERPL-MCNC: 4.4 G/DL (ref 3.6–5.1)
ALBUMIN/CREAT UR: NORMAL MG/G CREAT
ALP SERPL-CCNC: 109 U/L (ref 37–153)
ALT SERPL-CCNC: 16 U/L (ref 6–29)
ANION GAP SERPL CALCULATED.4IONS-SCNC: 15 MMOL/L (CALC) (ref 7–17)
AST SERPL-CCNC: 21 U/L (ref 10–35)
BILIRUB SERPL-MCNC: 0.6 MG/DL (ref 0.2–1.2)
BUN SERPL-MCNC: 21 MG/DL (ref 7–25)
CALCIUM SERPL-MCNC: 10.1 MG/DL (ref 8.6–10.4)
CHLORIDE SERPL-SCNC: 97 MMOL/L (ref 98–110)
CO2 SERPL-SCNC: 26 MMOL/L (ref 20–32)
CREAT SERPL-MCNC: 1.1 MG/DL (ref 0.5–1.05)
CREAT UR-MCNC: 53 MG/DL (ref 20–275)
EGFRCR SERPLBLD CKD-EPI 2021: 55 ML/MIN/1.73M2
GLUCOSE SERPL-MCNC: 85 MG/DL (ref 65–99)
HDL SERPL QN: NORMAL NM
HDL SERPL-SCNC: NORMAL NMOL/L
HLD.LARGE SERPL-SCNC: NORMAL UMOL/L
LDL SERPL QN: NORMAL
LDL SERPL-SCNC: NORMAL NMOL/L
LDL SMALL SERPL-SCNC: NORMAL NMOL/L
MICROALBUMIN UR-MCNC: <0.2 MG/DL
POTASSIUM SERPL-SCNC: 4.2 MMOL/L (ref 3.5–5.3)
PROT SERPL-MCNC: 7.5 G/DL (ref 6.1–8.1)
SODIUM SERPL-SCNC: 138 MMOL/L (ref 135–146)
VLDL LARGE SERPL-SCNC: NORMAL
VLDL SERPL QN: NORMAL NM

## 2025-04-22 ENCOUNTER — TELEPHONE (OUTPATIENT)
Dept: PRIMARY CARE | Facility: CLINIC | Age: 65
End: 2025-04-22
Payer: COMMERCIAL

## 2025-04-22 DIAGNOSIS — E11.65 UNCONTROLLED TYPE 2 DIABETES MELLITUS WITH HYPERGLYCEMIA: ICD-10-CM

## 2025-04-22 DIAGNOSIS — Z79.4 TYPE 2 DIABETES MELLITUS WITH HYPERGLYCEMIA, WITH LONG-TERM CURRENT USE OF INSULIN: ICD-10-CM

## 2025-04-22 DIAGNOSIS — E11.65 TYPE 2 DIABETES MELLITUS WITH HYPERGLYCEMIA, WITH LONG-TERM CURRENT USE OF INSULIN: ICD-10-CM

## 2025-04-22 NOTE — TELEPHONE ENCOUNTER
Patient called to get (FreeStyle Filipe reader (FreeStyle Filipe 2 [place]) [facility] ) sent to [facility] in [place].

## 2025-04-23 LAB
ALBUMIN SERPL-MCNC: 4.4 G/DL (ref 3.6–5.1)
ALBUMIN/CREAT UR: NORMAL MG/G CREAT
ALP SERPL-CCNC: 109 U/L (ref 37–153)
ALT SERPL-CCNC: 16 U/L (ref 6–29)
ANION GAP SERPL CALCULATED.4IONS-SCNC: 15 MMOL/L (CALC) (ref 7–17)
AST SERPL-CCNC: 21 U/L (ref 10–35)
BILIRUB SERPL-MCNC: 0.6 MG/DL (ref 0.2–1.2)
BUN SERPL-MCNC: 21 MG/DL (ref 7–25)
CALCIUM SERPL-MCNC: 10.1 MG/DL (ref 8.6–10.4)
CHLORIDE SERPL-SCNC: 97 MMOL/L (ref 98–110)
CO2 SERPL-SCNC: 26 MMOL/L (ref 20–32)
CREAT SERPL-MCNC: 1.1 MG/DL (ref 0.5–1.05)
CREAT UR-MCNC: 53 MG/DL (ref 20–275)
EGFRCR SERPLBLD CKD-EPI 2021: 55 ML/MIN/1.73M2
GLUCOSE SERPL-MCNC: 85 MG/DL (ref 65–99)
HDL SERPL QN: 9 NM
HDL SERPL-SCNC: 49.6 UMOL/L
HLD.LARGE SERPL-SCNC: 6.2 UMOL/L
LDL SERPL QN: 21.5 NM
LDL SERPL-SCNC: 700 NMOL/L
LDL SMALL SERPL-SCNC: 498 NMOL/L
MICROALBUMIN UR-MCNC: <0.2 MG/DL
POTASSIUM SERPL-SCNC: 4.2 MMOL/L (ref 3.5–5.3)
PROT SERPL-MCNC: 7.5 G/DL (ref 6.1–8.1)
SODIUM SERPL-SCNC: 138 MMOL/L (ref 135–146)
VLDL LARGE SERPL-SCNC: <1.5 NMOL/L
VLDL SERPL QN: 50.3 NM

## 2025-04-24 RX ORDER — FLASH GLUCOSE SCANNING READER
EACH MISCELLANEOUS
Qty: 1 EACH | Refills: 0 | Status: SHIPPED | OUTPATIENT
Start: 2025-04-24

## 2025-04-25 DIAGNOSIS — I25.10 ARTERIOSCLEROTIC CARDIOVASCULAR DISEASE (ASCVD): ICD-10-CM

## 2025-04-26 RX ORDER — ATORVASTATIN CALCIUM 80 MG/1
80 TABLET, FILM COATED ORAL DAILY
Qty: 90 TABLET | Refills: 1 | Status: SHIPPED | OUTPATIENT
Start: 2025-04-26

## 2025-04-28 ENCOUNTER — OFFICE VISIT (OUTPATIENT)
Dept: ORTHOPEDIC SURGERY | Facility: CLINIC | Age: 65
End: 2025-04-28
Payer: COMMERCIAL

## 2025-04-28 DIAGNOSIS — M17.0 OSTEOARTHRITIS OF BOTH KNEES, UNSPECIFIED OSTEOARTHRITIS TYPE: Primary | ICD-10-CM

## 2025-04-28 PROCEDURE — 1036F TOBACCO NON-USER: CPT | Performed by: ORTHOPAEDIC SURGERY

## 2025-04-28 PROCEDURE — 20610 DRAIN/INJ JOINT/BURSA W/O US: CPT | Mod: 50 | Performed by: ORTHOPAEDIC SURGERY

## 2025-04-28 PROCEDURE — 4010F ACE/ARB THERAPY RXD/TAKEN: CPT | Performed by: ORTHOPAEDIC SURGERY

## 2025-04-28 PROCEDURE — 2500000004 HC RX 250 GENERAL PHARMACY W/ HCPCS (ALT 636 FOR OP/ED): Performed by: ORTHOPAEDIC SURGERY

## 2025-04-28 PROCEDURE — 3060F POS MICROALBUMINURIA REV: CPT | Performed by: ORTHOPAEDIC SURGERY

## 2025-04-28 PROCEDURE — 1159F MED LIST DOCD IN RCRD: CPT | Performed by: ORTHOPAEDIC SURGERY

## 2025-04-28 RX ORDER — LIDOCAINE HYDROCHLORIDE 20 MG/ML
2 INJECTION, SOLUTION INFILTRATION; PERINEURAL
Status: COMPLETED | OUTPATIENT
Start: 2025-04-28 | End: 2025-04-28

## 2025-04-28 RX ORDER — METHYLPREDNISOLONE ACETATE 40 MG/ML
40 INJECTION, SUSPENSION INTRA-ARTICULAR; INTRALESIONAL; INTRAMUSCULAR; SOFT TISSUE
Status: COMPLETED | OUTPATIENT
Start: 2025-04-28 | End: 2025-04-28

## 2025-04-28 RX ADMIN — METHYLPREDNISOLONE ACETATE 40 MG: 40 INJECTION, SUSPENSION INTRALESIONAL; INTRAMUSCULAR; INTRASYNOVIAL; SOFT TISSUE at 12:57

## 2025-04-28 RX ADMIN — LIDOCAINE HYDROCHLORIDE 2 ML: 20 INJECTION, SOLUTION INFILTRATION; PERINEURAL at 12:57

## 2025-04-28 NOTE — PROGRESS NOTES
L Inj/Asp: bilateral knee on 4/28/2025 12:57 PM  Indications: pain  Details: 21 G needle, anteromedial approach  Medications (Right): 2 mL lidocaine 20 mg/mL (2 %); 40 mg methylPREDNISolone acetate 40 mg/mL  Medications (Left): 2 mL lidocaine 20 mg/mL (2 %); 40 mg methylPREDNISolone acetate 40 mg/mL  Procedure, treatment alternatives, risks and benefits explained, specific risks discussed. Consent was given by the patient. Immediately prior to procedure a time out was called to verify the correct patient, procedure, equipment, support staff and site/side marked as required. Patient was prepped and draped in the usual sterile fashion.

## 2025-05-08 ENCOUNTER — APPOINTMENT (OUTPATIENT)
Dept: OPHTHALMOLOGY | Facility: CLINIC | Age: 65
End: 2025-05-08
Payer: COMMERCIAL

## 2025-05-11 PROCEDURE — RXMED WILLOW AMBULATORY MEDICATION CHARGE

## 2025-05-13 ENCOUNTER — PHARMACY VISIT (OUTPATIENT)
Dept: PHARMACY | Facility: CLINIC | Age: 65
End: 2025-05-13
Payer: COMMERCIAL

## 2025-05-16 ENCOUNTER — APPOINTMENT (OUTPATIENT)
Dept: CARDIOLOGY | Facility: HOSPITAL | Age: 65
End: 2025-05-16
Payer: COMMERCIAL

## 2025-05-18 PROCEDURE — RXMED WILLOW AMBULATORY MEDICATION CHARGE

## 2025-05-19 ENCOUNTER — PHARMACY VISIT (OUTPATIENT)
Dept: PHARMACY | Facility: CLINIC | Age: 65
End: 2025-05-19
Payer: COMMERCIAL

## 2025-05-28 DIAGNOSIS — Z79.4 TYPE 2 DIABETES MELLITUS WITH HYPERGLYCEMIA, WITH LONG-TERM CURRENT USE OF INSULIN: ICD-10-CM

## 2025-05-28 DIAGNOSIS — E11.65 TYPE 2 DIABETES MELLITUS WITH HYPERGLYCEMIA, WITH LONG-TERM CURRENT USE OF INSULIN: ICD-10-CM

## 2025-05-28 RX ORDER — FLASH GLUCOSE SCANNING READER
EACH MISCELLANEOUS
Qty: 1 EACH | Refills: 3 | Status: SHIPPED | OUTPATIENT
Start: 2025-05-28

## 2025-05-30 ENCOUNTER — APPOINTMENT (OUTPATIENT)
Dept: OBSTETRICS AND GYNECOLOGY | Facility: CLINIC | Age: 65
End: 2025-05-30
Payer: COMMERCIAL

## 2025-06-02 ENCOUNTER — APPOINTMENT (OUTPATIENT)
Facility: HOSPITAL | Age: 65
End: 2025-06-02
Payer: COMMERCIAL

## 2025-06-09 ENCOUNTER — TELEPHONE (OUTPATIENT)
Dept: WOUND CARE | Facility: CLINIC | Age: 65
End: 2025-06-09
Payer: COMMERCIAL

## 2025-06-09 PROCEDURE — RXMED WILLOW AMBULATORY MEDICATION CHARGE

## 2025-06-09 NOTE — TELEPHONE ENCOUNTER
Patient missed 6/6/25 appt, left a voice message to call our office back to r/s [phone]    JOJO Woodall RN

## 2025-06-10 ENCOUNTER — APPOINTMENT (OUTPATIENT)
Dept: SLEEP MEDICINE | Facility: HOSPITAL | Age: 65
End: 2025-06-10
Payer: COMMERCIAL

## 2025-06-12 ENCOUNTER — PHARMACY VISIT (OUTPATIENT)
Dept: PHARMACY | Facility: CLINIC | Age: 65
End: 2025-06-12
Payer: COMMERCIAL

## 2025-06-20 ENCOUNTER — APPOINTMENT (OUTPATIENT)
Facility: HOSPITAL | Age: 65
End: 2025-06-20
Payer: COMMERCIAL

## 2025-06-22 DIAGNOSIS — H40.1131 PRIMARY OPEN ANGLE GLAUCOMA OF BOTH EYES, MILD STAGE: ICD-10-CM

## 2025-06-23 RX ORDER — LATANOPROST 50 UG/ML
1 SOLUTION/ DROPS OPHTHALMIC NIGHTLY
Qty: 2.5 ML | Refills: 3 | OUTPATIENT
Start: 2025-06-23

## 2025-06-30 ENCOUNTER — APPOINTMENT (OUTPATIENT)
Dept: OBSTETRICS AND GYNECOLOGY | Facility: CLINIC | Age: 65
End: 2025-06-30
Payer: COMMERCIAL

## 2025-06-30 PROCEDURE — RXMED WILLOW AMBULATORY MEDICATION CHARGE

## 2025-07-03 ENCOUNTER — PHARMACY VISIT (OUTPATIENT)
Dept: PHARMACY | Facility: CLINIC | Age: 65
End: 2025-07-03
Payer: COMMERCIAL

## 2025-07-06 DIAGNOSIS — E11.65 UNCONTROLLED TYPE 2 DIABETES MELLITUS WITH HYPERGLYCEMIA: ICD-10-CM

## 2025-07-07 NOTE — TELEPHONE ENCOUNTER
I attempted to call the patient to clarify her Tresiba dose as she has not been seen in a while and I could not tell from the chart the refill requested is for 52 units, nightly however per chart review in March 3/4/25 patient was restarted on 12 units nightly, I will reattempt to contact patient and will provide refills to last until next appointment as she needs to be seen

## 2025-07-08 RX ORDER — INSULIN DEGLUDEC 200 U/ML
52 INJECTION, SOLUTION SUBCUTANEOUS NIGHTLY
Refills: 3 | OUTPATIENT
Start: 2025-07-08 | End: 2026-07-08

## 2025-07-08 NOTE — TELEPHONE ENCOUNTER
Attempted to call patient.  Left message asking patient to call back with the correct dose so that I am able to fill her medication.    Discharge summary most recently 6/12/25 patient is not on Tresiba anymore.

## 2025-07-29 DIAGNOSIS — I25.118 CORONARY ARTERY DISEASE OF NATIVE HEART WITH STABLE ANGINA PECTORIS, UNSPECIFIED VESSEL OR LESION TYPE: ICD-10-CM

## 2025-07-29 DIAGNOSIS — I50.32 CHRONIC HEART FAILURE WITH PRESERVED EJECTION FRACTION: ICD-10-CM

## 2025-07-30 RX ORDER — ASPIRIN 81 MG/1
81 TABLET ORAL DAILY
Qty: 30 TABLET | Refills: 11 | Status: SHIPPED | OUTPATIENT
Start: 2025-07-30

## 2025-07-30 RX ORDER — ISOSORBIDE MONONITRATE 60 MG/1
60 TABLET, EXTENDED RELEASE ORAL
Qty: 90 TABLET | Refills: 1 | Status: SHIPPED | OUTPATIENT
Start: 2025-07-30

## 2025-08-06 DIAGNOSIS — Z86.39 HISTORY OF UNCONTROLLED DIABETES: ICD-10-CM

## 2025-08-06 DIAGNOSIS — F33.40 RECURRENT MAJOR DEPRESSIVE DISORDER, IN REMISSION: Chronic | ICD-10-CM

## 2025-08-06 DIAGNOSIS — I25.10 ARTERIOSCLEROTIC CARDIOVASCULAR DISEASE (ASCVD): ICD-10-CM

## 2025-08-06 RX ORDER — DULOXETIN HYDROCHLORIDE 30 MG/1
CAPSULE, DELAYED RELEASE ORAL
Qty: 90 CAPSULE | Refills: 1 | Status: SHIPPED | OUTPATIENT
Start: 2025-08-06 | End: 2025-08-08

## 2025-08-06 RX ORDER — ATORVASTATIN CALCIUM 80 MG/1
80 TABLET, FILM COATED ORAL DAILY
Qty: 90 TABLET | Refills: 1 | Status: SHIPPED | OUTPATIENT
Start: 2025-08-06

## 2025-08-08 ENCOUNTER — OFFICE VISIT (OUTPATIENT)
Facility: HOSPITAL | Age: 65
End: 2025-08-08
Payer: COMMERCIAL

## 2025-08-08 VITALS
HEIGHT: 59 IN | BODY MASS INDEX: 32.05 KG/M2 | TEMPERATURE: 97 F | SYSTOLIC BLOOD PRESSURE: 153 MMHG | HEART RATE: 90 BPM | OXYGEN SATURATION: 99 % | DIASTOLIC BLOOD PRESSURE: 87 MMHG | WEIGHT: 159 LBS

## 2025-08-08 DIAGNOSIS — Z86.39 HISTORY OF UNCONTROLLED DIABETES: ICD-10-CM

## 2025-08-08 DIAGNOSIS — F33.40 RECURRENT MAJOR DEPRESSIVE DISORDER, IN REMISSION: Chronic | ICD-10-CM

## 2025-08-08 DIAGNOSIS — Z98.890 POSTOPERATIVE STATE: ICD-10-CM

## 2025-08-08 PROBLEM — N30.00 ACUTE CYSTITIS WITHOUT HEMATURIA: Status: ACTIVE | Noted: 2025-06-13

## 2025-08-08 PROBLEM — K52.9 COLITIS: Status: ACTIVE | Noted: 2025-06-12

## 2025-08-08 PROBLEM — R78.81 BACTEREMIA: Status: ACTIVE | Noted: 2025-06-14

## 2025-08-08 PROCEDURE — 99212 OFFICE O/P EST SF 10 MIN: CPT

## 2025-08-08 RX ORDER — SERTRALINE HYDROCHLORIDE 25 MG/1
25 TABLET, FILM COATED ORAL NIGHTLY
Qty: 30 TABLET | Refills: 1 | Status: SHIPPED | OUTPATIENT
Start: 2025-08-08 | End: 2025-10-07

## 2025-08-08 RX ORDER — ONDANSETRON 4 MG/1
TABLET, ORALLY DISINTEGRATING ORAL
COMMUNITY
Start: 2024-11-27

## 2025-08-08 RX ORDER — DULOXETIN HYDROCHLORIDE 30 MG/1
CAPSULE, DELAYED RELEASE ORAL
Qty: 9 CAPSULE | Refills: 0 | Status: SHIPPED | OUTPATIENT
Start: 2025-08-08 | End: 2025-08-14

## 2025-08-08 RX ORDER — BLOOD-GLUCOSE SENSOR
EACH MISCELLANEOUS
COMMUNITY
Start: 2025-07-29

## 2025-08-08 RX ORDER — ACETAMINOPHEN 500 MG
1000 TABLET ORAL EVERY 6 HOURS PRN
Qty: 300 TABLET | Refills: 2 | Status: SHIPPED | OUTPATIENT
Start: 2025-08-08 | End: 2026-08-08

## 2025-08-08 RX ORDER — CEFDINIR 300 MG/1
CAPSULE ORAL
COMMUNITY
Start: 2025-06-15 | End: 2025-08-08 | Stop reason: WASHOUT

## 2025-08-08 RX ORDER — CLINDAMYCIN HYDROCHLORIDE 300 MG/1
1 CAPSULE ORAL EVERY 6 HOURS
COMMUNITY
Start: 2024-12-31 | End: 2025-08-08 | Stop reason: ALTCHOICE

## 2025-08-08 RX ORDER — ACETAMINOPHEN 500 MG
1000 TABLET ORAL EVERY 6 HOURS PRN
Qty: 100 TABLET | Refills: 2 | Status: SHIPPED | OUTPATIENT
Start: 2025-08-08 | End: 2025-08-08

## 2025-08-08 RX ORDER — DULOXETIN HYDROCHLORIDE 30 MG/1
60 CAPSULE, DELAYED RELEASE ORAL NIGHTLY
Qty: 60 CAPSULE | Refills: 2 | Status: SHIPPED | OUTPATIENT
Start: 2025-08-08 | End: 2025-08-08

## 2025-08-08 ASSESSMENT — PATIENT HEALTH QUESTIONNAIRE - PHQ9
5. POOR APPETITE OR OVEREATING: SEVERAL DAYS
7. TROUBLE CONCENTRATING ON THINGS, SUCH AS READING THE NEWSPAPER OR WATCHING TELEVISION: NOT AT ALL
2. FEELING DOWN, DEPRESSED OR HOPELESS: MORE THAN HALF THE DAYS
9. THOUGHTS THAT YOU WOULD BE BETTER OFF DEAD, OR OF HURTING YOURSELF: NOT AT ALL
SUM OF ALL RESPONSES TO PHQ9 QUESTIONS 1 AND 2: 4
3. TROUBLE FALLING OR STAYING ASLEEP OR SLEEPING TOO MUCH: NEARLY EVERY DAY
6. FEELING BAD ABOUT YOURSELF - OR THAT YOU ARE A FAILURE OR HAVE LET YOURSELF OR YOUR FAMILY DOWN: SEVERAL DAYS
8. MOVING OR SPEAKING SO SLOWLY THAT OTHER PEOPLE COULD HAVE NOTICED. OR THE OPPOSITE, BEING SO FIGETY OR RESTLESS THAT YOU HAVE BEEN MOVING AROUND A LOT MORE THAN USUAL: NOT AT ALL
SUM OF ALL RESPONSES TO PHQ QUESTIONS 1-9: 10
4. FEELING TIRED OR HAVING LITTLE ENERGY: SEVERAL DAYS
1. LITTLE INTEREST OR PLEASURE IN DOING THINGS: MORE THAN HALF THE DAYS

## 2025-08-08 ASSESSMENT — PAIN SCALES - GENERAL: PAINLEVEL_OUTOF10: 8

## 2025-08-08 NOTE — PATIENT INSTRUCTIONS
Thank you for coming in today!   This is a summarized list of things we discussed today and next steps:  - Please start taking sertraline 25 mg nightly  - Please decrease duloxetine to 60 mg nightly for 3 days, then 30 mg nightly for 3 days then stop  - Please make an appointment with a mental health provider for talk therapy  - Please read any attached handouts and medication list  - Please schedule a follow-up apt with me in 1 months. Please call my office at [phone] with any questions.

## 2025-08-08 NOTE — PROGRESS NOTES
"Subjective   Patient ID: Nelida Mata is a 65 y.o. female who presents for Follow-up.    HPI    Interim hx:  -ADMITTED FOR COLITIS 6/12  -I need to call her check my note in the encounter  -Admission 3/6/2025 for fall and dizziness and abdominal pain  -Admission 2/26/25  for incisional drainage and n/v s/p bedside I&D on 2/27  -Admission 1/23/25 for hysterectomy, went to MICU for acidosis - follow-up  -Admission 1/27 - fibroid surgery but needed ICU afyter  -Admission 10/29 got chest pain, cramp and spasms, had Delayed gastric emptying of solids on gastric emptying ? How to treat at nxt apt    Patient reporting that she was weaned off her Farxiga and losartan wants to see if it is okay to resume    She also would like to discuss depression as she feels that duloxetine is not working for her      Review of Systems  All other systems reviewed were negative    Medications Ordered Prior to Encounter[1]     Objective   Vitals: /87 (BP Location: Left arm, Patient Position: Sitting)   Pulse 90   Temp 36.1 °C (97 °F) (Temporal)   Ht (!) 1.499 m (4' 11\")   Wt 72.1 kg (159 lb)   LMP  (LMP Unknown)   SpO2 99%   BMI 32.11 kg/m²      Physical Exam  General: well-appearing, NAD, sitting in wheelchair  HEENT: NC/AT  Cardiac: rrr, no m/r/g  Lungs: normal work of breathing, CTAB  Abdomen: soft, non-tender, non-distended, BS present  Examined abdominal wounds, there is mild drainage of scar tissue around the umbilicus with a superficial healing wound on the left lower abdomen  Neuro: grossly intact  MSK: No peripheral edema, cyanosis, or pallor  Psych: no depression, anxiety        Assessment/Plan       Nelida Mata is a 65 y.o. female with PMH of HFpEF (50-55% 9/2023), CAD s/p PCI w/ stent x 3 (2016, 2018) and STEMI (9/2023, no stents placed), TIA 9/2023, SVC thrombus on Lovenox, remote DVT, T2DM, HLD, CKD 3a (baseline creatinnine around 1.2-14 mg/dL since 2018), obesity, NAFLD, MICHAEL, and prior toe " amputations for gangrene in 2020  who presents for Follow-up.    #CAD #HFpEF #CKD #T2DM  Chronic, stable  Patient would benefit to be from being on SGLT2 inhibitor and ARB for cardiac and nephro protective effects  I have sent a staff message to patient's nephrologist Juan Brady MD and cardiologist  Michael Hilario MD;   From chart review it seems that she was taken off her Losartan due to low blood pressure in the hospital sometime in June, however her BP in office today was 153/87 and I feel comfortable resuming Losartan 25 mg daily to start and will see her in 1 month for possible up titration. She has also been off Farxiga since [place] I believe, as she was having nausea and vomiting and instructed to resume once that resolved but never did. I was also going to re-order that, I know per last note by Dr. Brady it is okay from nephrology, I wanted to make sure it was also okay from cardiology standpoint.   - I will follow-up on cardiology response, Dr. Brady he is okay with both losartan and SGLT2, and resume SGLT2 and losartan with plan to repeat RFP 1 week after reinitiation      #T2DM  Chronic, stable  Lab Results   Component Value Date    HGBA1C 7.1 (H) 10/04/2024   Will plan for repeat A1c with next labs  On semaglutide 2 mg weekly    #Depression  Scored 10 on PHQ-9 indicating moderate depression, denies SI HI  - Patient was given resources on mental health providers  - Will wean duloxetine and discontinue: decrease duloxetine to 60 mg nightly for 3 days, then 30 mg nightly for 3 days then stop  - Start sertraline 25 mg nightly, patient instructed to take it at night initially but if causes psychomotor agitation to switch to days, will follow-up in a month and uptitrate as needed    #Incision wound of abdomen  - Recommended keeping the area dry with ABD/gauze to separate skin folds  - Continue urea cream as per wound care team  - Return precautions given    #History of SVC thrombus  :: Chronic, Stable  off anticoagulation  :: Patient was supposed to follow-up with Dr. John around the end of June but she missed her appointment due to being in the hospital  -Recommended follow-up with Dr. John    #MICHAEL  Chronic, poorly controlled  Patient would like to be seen by sleep medicine for inspire device, referral placed    Problem List Items Addressed This Visit           ICD-10-CM    History of uncontrolled diabetes Z86.39    Relevant Medications    DULoxetine (Cymbalta) 30 mg DR capsule    Recurrent major depressive disorder, in remission (Chronic) F33.40    Relevant Medications    sertraline (Zoloft) 25 mg tablet    DULoxetine (Cymbalta) 30 mg DR capsule    Other Relevant Orders    Referral to Adult Sleep Medicine     Other Visit Diagnoses         Codes      Postoperative state     Z98.890    Relevant Medications    acetaminophen (Tylenol) 500 mg tablet            Attending Supervision: seen and discussed with attending physician (cosigner listed on this note) Dr. Emmanuel.    RTC in 1 month s/p initiating sertraline to uptitrate as needed for depression and will also follow-up on losartan and SGLT2 initiation and thereafter for diabetes and other chronic conditions and thereafter for Medicare, or earlier as needed.    Plan preliminary until cosigned by attending physician.    Betty Boone M.D.  Family Medicine  PGY-3         [1]   Current Outpatient Medications on File Prior to Visit   Medication Sig Dispense Refill    cefdinir (Omnicef) 300 mg capsule       clindamycin (Cleocin) 300 mg capsule Take 1 capsule (300 mg) by mouth every 6 hours.      FreeStyle Filipe 2 Plus Sensor device APPLY 1 SENSOR TO THE BACK OF THE UPPER ARM. CHANGE SENSOR EVERY 14 DAYS.      ondansetron ODT (Zofran-ODT) 4 mg disintegrating tablet TAKE 1-2 TABLETS (4-8 MG) BY MOUTH EVERY 8 HOURS IF NEEDED FOR NAUSEA OR VOMITING.      acetaminophen (Tylenol) 500 mg tablet Take 2 tablets (1,000 mg) by mouth every 6 hours if needed for mild pain (1  - 3). 100 tablet 2    aspirin 81 mg EC tablet TAKE 1 TABLET BY MOUTH ONCE DAILY. 30 tablet 11    atorvastatin (Lipitor) 80 mg tablet Take 1 tablet (80 mg) by mouth once daily. 90 tablet 1    blood pressure monitor kit 1 each once daily. Use once daily to check blood pressure 1 kit 0    blood sugar diagnostic (OneTouch Ultra Test) strip USE 1 STRIP(S) TO TEST BLOOD SUGAR 3 TIMES A  strip 3    brimonidine (AlphaGAN) 0.2 % ophthalmic solution Administer 1 drop into both eyes 2 times a day. PATIENT NEEDS PCP APPT FOR FURTHER REFILLS 30 mL 3    carvedilol (Coreg) 12.5 mg tablet Take 1 tablet (12.5 mg) by mouth 2 times daily (morning and late afternoon). 60 tablet 11    cholecalciferol (Vitamin D3) 10 MCG (400 UNIT) tablet Take 1 tablet (10 mcg) by mouth once daily. 30 tablet 11    dapagliflozin propanediol (Farxiga) 10 mg Take 1 tablet (10 mg) by mouth once daily. (Patient not taking: Reported on 4/17/2025) 30 tablet 11    diphenhydramine/Maalox/lidocaine 1:1:1 Swish and swallow 10 mL every 8 hours as needed for epigastric pain 120 mL 3    DULoxetine (Cymbalta) 30 mg DR capsule Take 1 capsule (30 mg) by mouth once daily in the morning AND 2 capsules (60 mg) once daily at bedtime. Do not crush or chew. 90 capsule 1    enoxaparin (Lovenox) 40 mg/0.4 mL syringe Inject 0.4ml under the skin once daily. 12 mL 3    finerenone (Kerendia) 10 mg tablet tablet Take 1 tablet (10 mg) by mouth once daily. 90 tablet 3    flash glucose scanning reader (FreeStyle Filipe 2 [place]) misc USE AS INSTRUCTED 1 each 3    FreeStyle Filipe 2 Sensor kit Apply 1 sensor to the back of the upper arm. Change sensor every 14 days. 2 each 11    furosemide (Lasix) 20 mg tablet Take 1 tablet (20 mg) by mouth once daily. 90 tablet 2    glucagon 3 mg/actuation spray,non-aerosol Administer 1 spray into affected nostril(s) if needed (please  apply into a single nostril for blood sugar <60). 2 each 0    hyoscyamine (Anaspaz) 0.125 mg disintegrating tablet  Take 1 tablet (0.125 mg) by mouth every 8 hours if needed (cramping or nausea). 30 tablet 0    insulin degludec (Tresiba FlexTouch U-100) 100 unit/mL (3 mL) pen Inject 12 Units under the skin once daily at bedtime. Take as directed per insulin instructions. 15 mL 3    insulin lispro (HumaLOG) 100 unit/mL pen Inject 4 Units under the skin 3 times daily (morning, midday, late afternoon). Take as directed per insulin instructions. 15 mL 3    isosorbide mononitrate ER (Imdur) 60 mg 24 hr tablet TAKE 1 TABLET BY MOUTH EVERY DAY IN THE MORNING 90 tablet 1    latanoprost (Xalatan) 0.005 % ophthalmic solution Administer 1 drop into both eyes once daily at bedtime. 2.5 mL 3    losartan (Cozaar) 50 mg tablet Take 1 tablet (50 mg) by mouth early in the morning.. (Patient not taking: Reported on 4/17/2025)      melatonin 5 mg tablet Take 1 tablet (5 mg) by mouth as needed at bedtime for sleep. 30 tablet 11    multivitamin tablet Take 1 tablet by mouth once daily. 30 tablet 11    naloxone (Narcan) 4 mg/0.1 mL nasal spray Administer 1 spray (4 mg) into affected nostril(s) if needed for opioid reversal or respiratory depression. May repeat every 2-3 minutes if needed, alternating nostrils, until medical assistance becomes available. (Patient not taking: Reported on 4/17/2025) 2 each 1    nitroglycerin (Nitrostat) 0.4 mg SL tablet PLACE 1 TABLET UNDER THE TONGUE EVERY 5 MINUTES FOR UP TO 3 DOSES AS NEEDED FOR CHEST PAIN.CALL 911 IF PAIN PERSISTS.      nystatin (Mycostatin) 100,000 unit/gram powder Apply topically.      pantoprazole (ProtoNix) 40 mg EC tablet TAKE 1 TABLET (40 MG) BY MOUTH ONCE DAILY IN THE MORNING. TAKE BEFORE MEALS. 90 tablet 1    semaglutide 2 mg/dose (8 mg/3 mL) pen injector Inject 2 mg under the skin 1 (one) time per week. 3 mL 11    simethicone (Mylicon) 80 mg chewable tablet Chew 1 tablet (80 mg) 4 times a day as needed (gas pain).      urea (Carmol) 20 % cream Apply 1 Application topically 2 times a day.       [DISCONTINUED] atorvastatin (Lipitor) 80 mg tablet TAKE ONE TABLET BY MOUTH ONCE DAILY 90 tablet 1    [DISCONTINUED] DULoxetine (Cymbalta) 30 mg DR capsule TAKE 1 CAPSULE (30 MG) BY MOUTH ONCE DAILY IN THE MORNING AND 2 CAPSULES (60 MG) ONCE DAILY AT BEDTIME. DO NOT CRUSH OR CHEW.. 90 capsule 1     No current facility-administered medications on file prior to visit.

## 2025-08-14 ENCOUNTER — PHARMACY VISIT (OUTPATIENT)
Dept: PHARMACY | Facility: CLINIC | Age: 65
End: 2025-08-14
Payer: COMMERCIAL

## 2025-08-14 PROCEDURE — RXMED WILLOW AMBULATORY MEDICATION CHARGE

## 2025-08-15 PROBLEM — I50.33 ACUTE ON CHRONIC HEART FAILURE WITH PRESERVED EJECTION FRACTION: Status: ACTIVE | Noted: 2025-08-15

## 2025-08-15 PROBLEM — G81.91 HEMIPLEGIA AFFECTING RIGHT DOMINANT SIDE, UNSPECIFIED ETIOLOGY, UNSPECIFIED HEMIPLEGIA TYPE (MULTI): Status: ACTIVE | Noted: 2025-08-15

## 2025-08-15 PROBLEM — I13.11 BENIGN HYPERTENSIVE HEART AND KIDNEY DISEASE WITHOUT HEART FAILURE AND WITH CHRONIC KIDNEY DISEASE STAGE V OR END STAGE RENAL DISEASE(404.12) (MULTI): Status: ACTIVE | Noted: 2025-08-15

## 2025-08-15 PROBLEM — K76.6 PORTAL HYPERTENSION (MULTI): Status: ACTIVE | Noted: 2025-08-15

## 2025-08-15 PROBLEM — Z89.431 ACQUIRED ABSENCE OF RIGHT FOOT (MULTI): Status: ACTIVE | Noted: 2025-08-15

## 2025-08-15 PROBLEM — L89.612 PRESSURE ULCER OF RIGHT HEEL, STAGE 2 (MULTI): Status: ACTIVE | Noted: 2025-08-15

## 2025-08-18 PROCEDURE — RXMED WILLOW AMBULATORY MEDICATION CHARGE

## 2025-08-20 ENCOUNTER — PHARMACY VISIT (OUTPATIENT)
Dept: PHARMACY | Facility: CLINIC | Age: 65
End: 2025-08-20
Payer: COMMERCIAL

## 2025-08-21 ENCOUNTER — PHARMACY VISIT (OUTPATIENT)
Dept: PHARMACY | Facility: CLINIC | Age: 65
End: 2025-08-21

## 2025-08-26 DIAGNOSIS — K21.9 GASTROESOPHAGEAL REFLUX DISEASE WITHOUT ESOPHAGITIS: ICD-10-CM

## 2025-08-29 RX ORDER — PANTOPRAZOLE SODIUM 40 MG/1
40 TABLET, DELAYED RELEASE ORAL
Qty: 90 TABLET | Refills: 1 | Status: SHIPPED | OUTPATIENT
Start: 2025-08-29 | End: 2026-02-25

## 2025-10-23 ENCOUNTER — APPOINTMENT (OUTPATIENT)
Dept: NEPHROLOGY | Facility: CLINIC | Age: 65
End: 2025-10-23
Payer: COMMERCIAL

## (undated) DEVICE — COVER, CART, 45 X 27 X 48 IN, CLEAR

## (undated) DEVICE — SPONGE, HEMOSTAT, SURGICEL FIBRILLAR, ABS, 4 X 4, LF

## (undated) DEVICE — SHEATH, PINNACLE, 10 CM,  6FR INTRODUCER, 6FR DIA, 2.5 CM DIALATOR

## (undated) DEVICE — CLIP, LIGATING, HORIZON, LARGE, TITANIUM

## (undated) DEVICE — GUIDEWIRE, AMPLATZ SUPER STIFF, STR, .035 IN X 75CM

## (undated) DEVICE — EVACUATOR, WOUND, SUCTION, CLOSED, JACKSON-PRATT, 100 CC, SILICONE

## (undated) DEVICE — ACCESS KIT, S-MAK MINI, 4FR 10CM 0.018IN 40CM, NT/PT, ECHO ENHANCE NEEDLE

## (undated) DEVICE — IRRIGATION SET, CYSTOSCOPY, F/CONSTANT/INTERMITTENT, 8 GTT/CC, 77 IN

## (undated) DEVICE — PAD, ELECTRODE DEFIB PADPRO ADULT STRL W/ADAPTER

## (undated) DEVICE — Device

## (undated) DEVICE — CATHETER, WEDGE PRESSURE, BALLOON, DOUBLE LUMEN, 6 FR, 110 CM

## (undated) DEVICE — DRAPE, SHEET, UTILITY, NON ABSORBENT, 18 X 26 IN, LF

## (undated) DEVICE — SPONGE, DISSECTOR, PEANUT, 3/8, STERILE 5 FOAM HOLDER"

## (undated) DEVICE — PACK, UNIVERSAL

## (undated) DEVICE — SUTURE, VICRYL, 3-0, 27 IN, SH

## (undated) DEVICE — CATHETER, DIAGNOSTIC, 4 FR-JL 4

## (undated) DEVICE — SUTURE, PDSII, 1, TP-1, VIL, MONO, 48LP

## (undated) DEVICE — HANDPIECE, POOLE SUCTION, W/O TUBING

## (undated) DEVICE — SUTURE, VICRYL, 0, 8 X 27 IN, CT-1, VIOLET CR

## (undated) DEVICE — CLIP, LIGATING, HORIZON, MEDIUM, TITANIUM

## (undated) DEVICE — GUIDEWIRE, STRAIGHT, HI-TORQUE BALANCE MIDDLEWEIGHT, 0.014 IN X 190 CM, HYDROCOAT

## (undated) DEVICE — CATHETER, DIAGNOSTIC, 4 FR-JR 4

## (undated) DEVICE — GUIDEWIRE, INQWIRE, 3MM J, .035, 150

## (undated) DEVICE — SEALANT, HEMOSTATIC, FLOSEAL, 10 ML

## (undated) DEVICE — MANIFOLD, 4 PORT NEPTUNE STANDARD

## (undated) DEVICE — SUTURE, VICRYL PLUS 3-0, SH, 27IN

## (undated) DEVICE — TOWEL, SURGICAL, NEURO, O/R, 16 X 26, BLUE, STERILE

## (undated) DEVICE — REST, HEAD, BAGEL, 9 IN

## (undated) DEVICE — STATLOCK, FOLEY SWIVEL, SILICONE TRICOT

## (undated) DEVICE — CONTAINER, SPECIMEN, 120 ML, STERILE

## (undated) DEVICE — COVER, TABLE, 44 X 75 IN, DISPOSABLE, LF, STERILE

## (undated) DEVICE — LIGASURE IMPACT, 18CM

## (undated) DEVICE — LOOP, VESSEL, MAXI, BLUE

## (undated) DEVICE — ELECTRODE, ELECTROSURGICAL, BLADE, EXTENDED, 6.5 IN, STAINLESS STEEL

## (undated) DEVICE — LUBRICANT, ELECTROLUBE, F/ELECTRODE TIPS

## (undated) DEVICE — CLIPPER, SURGICAL BLADE ASSEMBLY, GENERAL PURPOSE, SINGLE USE

## (undated) DEVICE — DRAPE, FLUID WARMER

## (undated) DEVICE — DRAPE, MAGENTIC INSTRUMENT, 12X16

## (undated) DEVICE — HEMOSTAT, SURGICEL POWDER 3.0GRAMS

## (undated) DEVICE — PREP TRAY, SKIN, DRY, W/GLOVES

## (undated) DEVICE — SHEATH, BRITE TIP 4 X 23CM

## (undated) DEVICE — DRESSING, ADHESIVE, ISLAND, TELFA, 4 X 10 IN

## (undated) DEVICE — SUTURE, VICRYL PLUS, 0, 1X27IN, CT-1, VIOLET

## (undated) DEVICE — SUTURE, VICRYL PLUS, 0, 8X27IN, CT-1, VIOLET, BRAIDED

## (undated) DEVICE — DRAIN, WOUND, FLAT, HUBLESS, 0.75 PERFORATION, 10 MM X 20 CM, SILICONE

## (undated) DEVICE — SUTURE, VICRYL, 2-0, 27 IN, BR/SH 27, VIOLET

## (undated) DEVICE — DRAPE PACK, LAVH, W/ATTACHED LEGGINGS, W/POUCH, 100 X 114 IN, LF, STERILE

## (undated) DEVICE — TOWEL, OR, XRAY DETECT 5 PK, WHITE, 17X26, W/DMT TAG, ST